# Patient Record
Sex: FEMALE | Race: BLACK OR AFRICAN AMERICAN | Employment: FULL TIME | URBAN - METROPOLITAN AREA
[De-identification: names, ages, dates, MRNs, and addresses within clinical notes are randomized per-mention and may not be internally consistent; named-entity substitution may affect disease eponyms.]

---

## 2017-10-25 ENCOUNTER — HOSPITAL ENCOUNTER (OUTPATIENT)
Dept: LAB | Age: 57
Discharge: HOME | End: 2017-10-25

## 2017-10-25 PROCEDURE — 88305 TISSUE EXAM BY PATHOLOGIST: CPT | Performed by: INTERNAL MEDICINE

## 2017-10-25 PROCEDURE — 88312 SPECIAL STAINS GROUP 1: CPT | Performed by: INTERNAL MEDICINE

## 2017-12-13 ENCOUNTER — ANESTHESIA EVENT (OUTPATIENT)
Dept: ENDOSCOPY | Age: 57
End: 2017-12-13
Payer: COMMERCIAL

## 2017-12-13 RX ORDER — SODIUM CHLORIDE, SODIUM LACTATE, POTASSIUM CHLORIDE, CALCIUM CHLORIDE 600; 310; 30; 20 MG/100ML; MG/100ML; MG/100ML; MG/100ML
100 INJECTION, SOLUTION INTRAVENOUS CONTINUOUS
Status: CANCELLED | OUTPATIENT
Start: 2017-12-13

## 2017-12-13 RX ORDER — SODIUM CHLORIDE 0.9 % (FLUSH) 0.9 %
5-10 SYRINGE (ML) INJECTION AS NEEDED
Status: CANCELLED | OUTPATIENT
Start: 2017-12-13

## 2017-12-14 ENCOUNTER — ANESTHESIA (OUTPATIENT)
Dept: ENDOSCOPY | Age: 57
End: 2017-12-14
Payer: COMMERCIAL

## 2017-12-14 ENCOUNTER — HOSPITAL ENCOUNTER (OUTPATIENT)
Age: 57
Setting detail: OUTPATIENT SURGERY
Discharge: HOME | End: 2017-12-14
Attending: INTERNAL MEDICINE | Admitting: INTERNAL MEDICINE
Payer: COMMERCIAL

## 2017-12-14 VITALS
RESPIRATION RATE: 14 BRPM | DIASTOLIC BLOOD PRESSURE: 84 MMHG | HEART RATE: 67 BPM | HEIGHT: 65 IN | TEMPERATURE: 98.6 F | BODY MASS INDEX: 35.32 KG/M2 | WEIGHT: 212 LBS | OXYGEN SATURATION: 97 % | SYSTOLIC BLOOD PRESSURE: 144 MMHG

## 2017-12-14 LAB — GLUCOSE BLD STRIP.AUTO-MCNC: 197 MG/DL (ref 65–100)

## 2017-12-14 PROCEDURE — 74011000250 HC RX REV CODE- 250

## 2017-12-14 PROCEDURE — 74011250636 HC RX REV CODE- 250/636

## 2017-12-14 PROCEDURE — 74011250636 HC RX REV CODE- 250/636: Performed by: ANESTHESIOLOGY

## 2017-12-14 PROCEDURE — 77030011640 HC PAD GRND REM COVD -A: Performed by: INTERNAL MEDICINE

## 2017-12-14 PROCEDURE — 82962 GLUCOSE BLOOD TEST: CPT

## 2017-12-14 PROCEDURE — 88305 TISSUE EXAM BY PATHOLOGIST: CPT | Performed by: INTERNAL MEDICINE

## 2017-12-14 PROCEDURE — 77030009426 HC FCPS BIOP ENDOSC BSC -B: Performed by: INTERNAL MEDICINE

## 2017-12-14 PROCEDURE — 76040000026: Performed by: INTERNAL MEDICINE

## 2017-12-14 PROCEDURE — 77030013991 HC SNR POLYP ENDOSC BSC -A: Performed by: INTERNAL MEDICINE

## 2017-12-14 PROCEDURE — 76060000032 HC ANESTHESIA 0.5 TO 1 HR: Performed by: INTERNAL MEDICINE

## 2017-12-14 RX ORDER — PROPOFOL 10 MG/ML
INJECTION, EMULSION INTRAVENOUS
Status: DISCONTINUED | OUTPATIENT
Start: 2017-12-14 | End: 2017-12-14 | Stop reason: HOSPADM

## 2017-12-14 RX ORDER — SODIUM CHLORIDE, SODIUM LACTATE, POTASSIUM CHLORIDE, CALCIUM CHLORIDE 600; 310; 30; 20 MG/100ML; MG/100ML; MG/100ML; MG/100ML
100 INJECTION, SOLUTION INTRAVENOUS CONTINUOUS
Status: DISCONTINUED | OUTPATIENT
Start: 2017-12-14 | End: 2017-12-14 | Stop reason: HOSPADM

## 2017-12-14 RX ORDER — LIDOCAINE HYDROCHLORIDE 20 MG/ML
INJECTION, SOLUTION EPIDURAL; INFILTRATION; INTRACAUDAL; PERINEURAL AS NEEDED
Status: DISCONTINUED | OUTPATIENT
Start: 2017-12-14 | End: 2017-12-14 | Stop reason: HOSPADM

## 2017-12-14 RX ORDER — PROPOFOL 10 MG/ML
INJECTION, EMULSION INTRAVENOUS AS NEEDED
Status: DISCONTINUED | OUTPATIENT
Start: 2017-12-14 | End: 2017-12-14 | Stop reason: HOSPADM

## 2017-12-14 RX ADMIN — SODIUM CHLORIDE, SODIUM LACTATE, POTASSIUM CHLORIDE, AND CALCIUM CHLORIDE 100 ML/HR: 600; 310; 30; 20 INJECTION, SOLUTION INTRAVENOUS at 09:47

## 2017-12-14 RX ADMIN — PROPOFOL 40 MG: 10 INJECTION, EMULSION INTRAVENOUS at 10:26

## 2017-12-14 RX ADMIN — PROPOFOL 20 MG: 10 INJECTION, EMULSION INTRAVENOUS at 10:48

## 2017-12-14 RX ADMIN — PROPOFOL 80 MG: 10 INJECTION, EMULSION INTRAVENOUS at 10:25

## 2017-12-14 RX ADMIN — PROPOFOL 160 MCG/KG/MIN: 10 INJECTION, EMULSION INTRAVENOUS at 10:25

## 2017-12-14 RX ADMIN — LIDOCAINE HYDROCHLORIDE 40 MG: 20 INJECTION, SOLUTION EPIDURAL; INFILTRATION; INTRACAUDAL; PERINEURAL at 10:25

## 2017-12-14 NOTE — ROUTINE PROCESS
Pt discharged to home with . Discharge instructions provided to both parties and time for questions and answers provided. Dr. Eric Gibson spoke with both.

## 2017-12-14 NOTE — DISCHARGE INSTRUCTIONS
Gastrointestinal Colonoscopy/Flexible Sigmoidoscopy - Lower Exam Discharge Instructions  1. Call Dr. Anup Mcclain at  for any problems or questions. 2. Contact the doctors office for follow up appointment as directed  3. Medication may cause drowsiness for several hours, therefore, do not drive or operate machinery for remainder of the day. 4. No alcohol today. 5. Ordinarily, you may resume regular diet and activity after exam unless otherwise specified by your physician. 6. Because of air put into your colon during exam, you may experience some abdominal distension, relieved by the passage of gas, for several hours. 7. Contact your physician if you have any of the following:  a. Excessive amount of bleeding - large amount when having a bowel movement. Occasional specks of blood with bowel movement would not be unusual.  b. Severe abdominal pain  c. Fever or Chills  8. Polyp Removal - follow these additional instructions  a. Take Metamucil - 1 tablespoon in juice every morning for 3 days as needed  b. No Aspirin, Advil, Aleve, Nuprin, Ibuprofen, or medications that contain these drugs for 2 weeks. Any additional instructions:  Repeat Colonoscopy in 1 year, Avoid Nsaids, resume Eliquis 12/15/17      Instructions given to Gunnar Garay and other family members.   Instructions given by:  Bree Rubio RN

## 2017-12-14 NOTE — H&P
Date of Surgery Update:  Maixme Vallejo was seen and examined. History and physical has been reviewed. The patient has been examined.  There have been no significant clinical changes since the completion of the originally dated History and Physical.    Signed By: Marcella Garcia MD     December 14, 2017 10:13 AM

## 2017-12-14 NOTE — H&P
Colonoscopy Procedure Note    Indications: Hx of colon polyps and poor prep at last colonoscopy    Anesthesia/Sedation: TIVA    Pre-Procedure Physical:  Current Facility-Administered Medications   Medication Dose Route Frequency    lactated Ringers infusion  100 mL/hr IntraVENous CONTINUOUS      Review of patient's allergies indicates no known allergies. Patient Vitals for the past 8 hrs:   BP Temp Pulse Resp SpO2 Height Weight   12/14/17 1107 114/59 - 66 16 96 % - -   12/14/17 1106 114/59 98.6 °F (37 °C) 66 16 96 % - -   12/14/17 0915 146/76 - 64 16 98 % - -   12/14/17 0859 - 97.7 °F (36.5 °C) - - - 5' 5\" (1.651 m) 96.2 kg (212 lb)       Exam:    Airway: clear   Heart: normal S1and S2    Lungs: clear bilateral  Abdomen: soft, nontender, bowel sounds present and normal in all quads   Mental Status: awake, alert and oriented to person, place and time        Procedure Details      Informed consent was obtained for the procedure, including sedation. Risks of perforation, hemorrhage, adverse drug reaction and aspiration were discussed. The patient was placed in the left lateral decubitus position. Based on the pre-procedure assessment, including review of the patient's medical history, medications, allergies, and review of systems, she had been deemed to be an appropriate candidate for conscious sedation; she was therefore sedated with the medications listed below. The patient was monitored continuously with ECG tracing, pulse oximetry, blood pressure monitoring, and direct observations. A rectal examination was performed. The colonoscope was inserted into the rectum and advanced under direct vision to the cecum. The quality of the colonic preparation was good. A careful inspection was made as the colonoscope was withdrawn, including a retroflexed view of the rectum; findings and interventions are described below. Appropriate photodocumentation was obtained.     Findings: Pan diverticulosis AC sessile pink 7 mm polyp hot snared                   TC sessile 1.5 cm polyp hot snared                   TC sessile 3 mm polyp cold biopsied                    2+ internal hemorrhoids      Specimens: AC polyp, TC polyps    Estimated Blood Loss: 0 cc           Complications: None; patient tolerated the procedure well. Attending Attestation: I performed the procedure.     Impression:  AC polyp, TC polyps, pan diverticulosis, internal hemorrhoids    Recommendations:   F/U polyp pathology  High fiber diet  Avoid NSAIDs  Repeat colonoscopy likely in one year

## 2017-12-14 NOTE — ANESTHESIA PREPROCEDURE EVALUATION
Anesthetic History   No history of anesthetic complications            Review of Systems / Medical History  Patient summary reviewed and pertinent labs reviewed    Pulmonary        Sleep apnea  Smoker         Neuro/Psych   Within defined limits           Cardiovascular    Hypertension        Dysrhythmias : atrial fibrillation      Exercise tolerance: >4 METS     GI/Hepatic/Renal           Liver disease ([place])     Endo/Other    Diabetes: poorly controlled, type 2    Morbid obesity     Other Findings              Physical Exam    Airway  Mallampati: II  TM Distance: 4 - 6 cm  Neck ROM: normal range of motion   Mouth opening: Normal     Cardiovascular    Rhythm: regular  Rate: normal      Pertinent negatives: No murmur, JVD and peripheral edema   Dental  No notable dental hx       Pulmonary  Breath sounds clear to auscultation               Abdominal         Other Findings            Anesthetic Plan    ASA: 3  Anesthesia type: total IV anesthesia          Induction: Intravenous  Anesthetic plan and risks discussed with: Patient

## 2017-12-14 NOTE — IP AVS SNAPSHOT
[facility] 
 
 
 [address] 
[phone] Patient: Elaine Montano MRN: [id number] :1960 About your hospitalization You were admitted on:  2017 You last received care in the:  [facility] You were discharged on:  2017 Why you were hospitalized Your primary diagnosis was:  Not on File Discharge Orders None A check misti indicates which time of day the medication should be taken. My Medications ASK your physician about these medications Instructions Each Dose to Equal  
 Morning Noon Evening Bedtime CYMBALTA 60 mg capsule Generic drug:  DULoxetine Your last dose was: Your next dose is: Take 60 mg by mouth daily. Indications: morning 60 mg  
    
   
   
   
  
 ELIQUIS 5 mg tablet Generic drug:  apixaban Your last dose was: Your next dose is: Take 5 mg by mouth two (2) times a day. Indications: Dr Faina White- [facility] held for procedure- Afib  
 5 mg HumaLOG 100 unit/mL injection Generic drug:  insulin lispro Your last dose was: Your next dose is:    
   
   
 by SubCUTAneous route Before breakfast, lunch, and dinner. Sliding scale if BS above 200 LANTUS 100 unit/mL injection Generic drug:  insulin glargine Your last dose was: Your next dose is:    
   
   
 30 Units by SubCUTAneous route two (2) times a day. 30 Units  
    
   
   
   
  
 losartan 50 mg tablet Commonly known as:  COZAAR Your last dose was: Your next dose is: Take 50 mg by mouth daily. Indications: morning 50 mg  
    
   
   
   
  
 metFORMIN 1,000 mg tablet Commonly known as:  GLUCOPHAGE Your last dose was: Your next dose is: Take 1,000 mg by mouth two (2) times daily (with meals). 1000 mg  
    
   
   
   
  
 metoprolol tartrate 25 mg tablet Commonly known as:  LOPRESSOR Your last dose was: Your next dose is: Take 25 mg by mouth two (2) times a day. 25 mg  
    
   
   
   
  
 NORVASC 10 mg tablet Generic drug:  amLODIPine Your last dose was: Your next dose is: Take 10 mg by mouth daily. Indications: morning 10 mg  
    
   
   
   
  
 omeprazole 40 mg capsule Commonly known as:  PRILOSEC Your last dose was: Your next dose is: Take 40 mg by mouth daily. Indications: morning 40 mg  
    
   
   
   
  
 raNITIdine hcl 300 mg Cap Your last dose was: Your next dose is: Take 1 Tab by mouth nightly. 1 Tab Discharge Instructions Gastrointestinal Colonoscopy/Flexible Sigmoidoscopy - Lower Exam Discharge Instructions 1. Call Dr. Taiwo Marin at  for any problems or questions. 2. Contact the doctors office for follow up appointment as directed 3. Medication may cause drowsiness for several hours, therefore, do not drive or operate machinery for remainder of the day. 4. No alcohol today. 5. Ordinarily, you may resume regular diet and activity after exam unless otherwise specified by your physician. 6. Because of air put into your colon during exam, you may experience some abdominal distension, relieved by the passage of gas, for several hours. 7. Contact your physician if you have any of the following: 
a. Excessive amount of bleeding  large amount when having a bowel movement. Occasional specks of blood with bowel movement would not be unusual. 
b. Severe abdominal pain 
c. Fever or Chills 8. Polyp Removal  follow these additional instructions 
a. Take Metamucil  1 tablespoon in juice every morning for 3 days as needed b. No Aspirin, Advil, Aleve, Nuprin, Ibuprofen, or medications that contain these drugs for 2 weeks. Any additional instructions:  Repeat Colonoscopy in 1 year, Avoid Nsaids, resume Eliquis 12/15/17 Instructions given to Rachel Wong and other family members. Instructions given by:  Rajesh Britton RN Introducing [facility]! [facility] introduces AddMyBest patient portal. Now you can access parts of your medical record, email your doctor's office, and request medication refills online. 1. In your internet browser, go to [email or web address] 2. Click on the First Time User? Click Here link in the Sign In box. You will see the New Member Sign Up page. 3. Enter your AddMyBest Access Code exactly as it appears below. You will not need to use this code after youve completed the sign-up process. If you do not sign up before the expiration date, you must request a new code. · AddMyBest Access Code: [email or web address] Expires: 3/11/2018  3:03 PM 
 
4. Enter the last four digits of your Social Security Number (xxxx) and Date of Birth (mm/dd/yyyy) as indicated and click Submit. You will be taken to the next sign-up page. 5. Create a AddMyBest ID. This will be your AddMyBest login ID and cannot be changed, so think of one that is secure and easy to remember. 6. Create a AddMyBest password. You can change your password at any time. 7. Enter your Password Reset Question and Answer. This can be used at a later time if you forget your password. 8. Enter your e-mail address. You will receive e-mail notification when new information is available in [address] 9. Click Sign Up. You can now view and download portions of your medical record. 10. Click the Download Summary menu link to download a portable copy of your medical information.  
 
If you have questions, please visit the Frequently Asked Questions section of the [employer]. Remember, MyChart is NOT to be used for urgent needs. For medical emergencies, dial 911. Now available from your iPhone and Android! Providers Seen During Your Hospitalization Provider Specialty Primary office phone Navdeep Johnson MD Gastroenterology [phone] Your Primary Care Physician (PCP) Primary Care Physician Office Phone Office Fax Charito Martines [phone] You are allergic to the following No active allergies Recent Documentation Height Weight BMI Smoking Status 1.651 m 96.2 kg 35.28 kg/m2 Current Every Day Smoker Emergency Contacts Name Discharge Info Relation Home Work Mobile Zeus Jensen  Spouse [3] [phone] Patient Belongings The following personal items are in your possession at time of discharge: 
  Dental Appliances: Uppers  Visual Aid: Glasses Please provide this summary of care documentation to your next provider. Signatures-by signing, you are acknowledging that this After Visit Summary has been reviewed with you and you have received a copy. Patient Signature:  ____________________________________________________________ Date:  ____________________________________________________________  
  
Angel Luis Milian Provider Signature:  ____________________________________________________________ Date:  ____________________________________________________________

## 2017-12-14 NOTE — ANESTHESIA POSTPROCEDURE EVALUATION
Post-Anesthesia Evaluation and Assessment    Patient: Meg Talavera MRN: [id number]  SSN: [id number]    YOB: 1960  Age: 56 y.o. Sex: female       Cardiovascular Function/Vital Signs  Visit Vitals    /76    Pulse 69    Temp 37 °C (98.6 °F)    Resp 16    Ht 5' 5\" (1.651 m)    Wt 96.2 kg (212 lb)    SpO2 92%    BMI 35.28 kg/m2       Patient is status post total IV anesthesia anesthesia for Procedure(s):  COLONOSCOPY / BMI=36  ENDOSCOPIC POLYPECTOMY  COLON BIOPSY. Nausea/Vomiting: None    Postoperative hydration reviewed and adequate. Pain:  Pain Scale 1: Visual (12/14/17 1107)  Pain Intensity 1: 0 (12/14/17 1107)   Managed    Neurological Status: At baseline    Mental Status and Level of Consciousness: Arousable    Pulmonary Status:   O2 Device: Room air (12/14/17 1107)   Adequate oxygenation and airway patent    Complications related to anesthesia: None    Post-anesthesia assessment completed.  No concerns    Signed By: Gideon Barney MD     December 14, 2017

## 2018-02-20 ENCOUNTER — HOSPITAL ENCOUNTER (OUTPATIENT)
Dept: ULTRASOUND IMAGING | Age: 58
Discharge: HOME | End: 2018-02-20
Attending: INTERNAL MEDICINE
Payer: COMMERCIAL

## 2018-02-20 VITALS
HEART RATE: 71 BPM | BODY MASS INDEX: 35.16 KG/M2 | DIASTOLIC BLOOD PRESSURE: 78 MMHG | RESPIRATION RATE: 19 BRPM | HEIGHT: 65 IN | OXYGEN SATURATION: 95 % | SYSTOLIC BLOOD PRESSURE: 152 MMHG | WEIGHT: 211 LBS | TEMPERATURE: 98.9 F

## 2018-02-20 DIAGNOSIS — R74.02 NONSPECIFIC ELEVATION OF LEVELS OF TRANSAMINASE OR LACTIC ACID DEHYDROGENASE (LDH): ICD-10-CM

## 2018-02-20 DIAGNOSIS — R74.01 NONSPECIFIC ELEVATION OF LEVELS OF TRANSAMINASE OR LACTIC ACID DEHYDROGENASE (LDH): ICD-10-CM

## 2018-02-20 LAB
GLUCOSE BLD STRIP.AUTO-MCNC: 263 MG/DL (ref 65–100)
GLUCOSE BLD STRIP.AUTO-MCNC: 283 MG/DL (ref 65–100)
INR BLD: 1.1 (ref 0.9–1.2)
PT BLD: 13.3 SECS (ref 9.6–11.6)

## 2018-02-20 PROCEDURE — 85610 PROTHROMBIN TIME: CPT

## 2018-02-20 PROCEDURE — 88312 SPECIAL STAINS GROUP 1: CPT | Performed by: INTERNAL MEDICINE

## 2018-02-20 PROCEDURE — 82962 GLUCOSE BLOOD TEST: CPT

## 2018-02-20 PROCEDURE — 76942 ECHO GUIDE FOR BIOPSY: CPT

## 2018-02-20 PROCEDURE — 74011636637 HC RX REV CODE- 636/637: Performed by: PHYSICIAN ASSISTANT

## 2018-02-20 PROCEDURE — 74011250637 HC RX REV CODE- 250/637: Performed by: RADIOLOGY

## 2018-02-20 PROCEDURE — 74011250636 HC RX REV CODE- 250/636

## 2018-02-20 PROCEDURE — 74011000250 HC RX REV CODE- 250: Performed by: RADIOLOGY

## 2018-02-20 PROCEDURE — 88307 TISSUE EXAM BY PATHOLOGIST: CPT | Performed by: INTERNAL MEDICINE

## 2018-02-20 PROCEDURE — 88313 SPECIAL STAINS GROUP 2: CPT | Performed by: INTERNAL MEDICINE

## 2018-02-20 PROCEDURE — 99152 MOD SED SAME PHYS/QHP 5/>YRS: CPT

## 2018-02-20 RX ORDER — MIDAZOLAM HYDROCHLORIDE 1 MG/ML
.5-2 INJECTION, SOLUTION INTRAMUSCULAR; INTRAVENOUS
Status: DISCONTINUED | OUTPATIENT
Start: 2018-02-20 | End: 2018-02-24 | Stop reason: HOSPADM

## 2018-02-20 RX ORDER — FENTANYL CITRATE 50 UG/ML
25-50 INJECTION, SOLUTION INTRAMUSCULAR; INTRAVENOUS
Status: DISCONTINUED | OUTPATIENT
Start: 2018-02-20 | End: 2018-02-24 | Stop reason: HOSPADM

## 2018-02-20 RX ORDER — ONDANSETRON 2 MG/ML
4 INJECTION INTRAMUSCULAR; INTRAVENOUS
Status: DISCONTINUED | OUTPATIENT
Start: 2018-02-20 | End: 2018-02-24 | Stop reason: HOSPADM

## 2018-02-20 RX ORDER — LIDOCAINE HYDROCHLORIDE 20 MG/ML
1-400 INJECTION, SOLUTION INFILTRATION; PERINEURAL
Status: DISCONTINUED | OUTPATIENT
Start: 2018-02-20 | End: 2018-02-24 | Stop reason: HOSPADM

## 2018-02-20 RX ORDER — DIPHENHYDRAMINE HYDROCHLORIDE 50 MG/ML
12.5-5 INJECTION, SOLUTION INTRAMUSCULAR; INTRAVENOUS ONCE
Status: ACTIVE | OUTPATIENT
Start: 2018-02-20 | End: 2018-02-20

## 2018-02-20 RX ORDER — INSULIN ASPART 100 [IU]/ML
INJECTION, SOLUTION INTRAVENOUS; SUBCUTANEOUS
COMMUNITY
End: 2020-07-15

## 2018-02-20 RX ORDER — SODIUM CHLORIDE 9 MG/ML
25 INJECTION, SOLUTION INTRAVENOUS CONTINUOUS
Status: DISCONTINUED | OUTPATIENT
Start: 2018-02-20 | End: 2018-02-24 | Stop reason: HOSPADM

## 2018-02-20 RX ORDER — HYDROCODONE BITARTRATE AND ACETAMINOPHEN 5; 325 MG/1; MG/1
1 TABLET ORAL
Status: DISCONTINUED | OUTPATIENT
Start: 2018-02-20 | End: 2018-02-24 | Stop reason: HOSPADM

## 2018-02-20 RX ADMIN — LIDOCAINE HYDROCHLORIDE 100 MG: 20 INJECTION, SOLUTION INFILTRATION; PERINEURAL at 10:17

## 2018-02-20 RX ADMIN — INSULIN HUMAN 9 UNITS: 100 INJECTION, SOLUTION PARENTERAL at 08:21

## 2018-02-20 RX ADMIN — MIDAZOLAM HYDROCHLORIDE 1 MG: 1 INJECTION, SOLUTION INTRAMUSCULAR; INTRAVENOUS at 10:12

## 2018-02-20 RX ADMIN — SODIUM BICARBONATE 2 ML: 0.2 INJECTION, SOLUTION INTRAVENOUS at 10:17

## 2018-02-20 RX ADMIN — MIDAZOLAM HYDROCHLORIDE 1 MG: 1 INJECTION, SOLUTION INTRAMUSCULAR; INTRAVENOUS at 10:05

## 2018-02-20 RX ADMIN — FENTANYL CITRATE 50 MCG: 50 INJECTION, SOLUTION INTRAMUSCULAR; INTRAVENOUS at 10:05

## 2018-02-20 RX ADMIN — HYDROCODONE BITARTRATE AND ACETAMINOPHEN 1 TABLET: 5; 325 TABLET ORAL at 13:12

## 2018-02-20 RX ADMIN — FENTANYL CITRATE 50 MCG: 50 INJECTION, SOLUTION INTRAMUSCULAR; INTRAVENOUS at 10:12

## 2018-02-20 NOTE — DISCHARGE INSTRUCTIONS
[facility] [address]  Department of Interventional Radiology  [address] [place] [address] [address] [facility]  [phone] Office  [phone] Fax    BIOPSY DISCHARGE INSTRUCTIONS    General Instructions:     A biopsy is the removal of a small piece of tissue for microscopic examination or testing. Healthy tissue can be obtained for the purpose of tissue-type matching for transplants. Unhealthy tissues are more commonly biopsied to diagnose disease. Liver Biopsy: This test helps detect cancer, infections, and the cause of an enlargement of the liver or elevated liver enzymes. It also helps to diagnose a number of liver diseases. The pain associated with the procedure may be felt in the shoulder. The risks include internal bleeding, pneumothorax, and injury to the surrounding organs. General Biopsy:     A mass can grow in any area of the body, and we are taking a specimen as ordered by your doctor. The risks are the same. They include bleeding, pain, and infection. Home Care Instructions: You may resume your regular diet and medication regimen. Do not drink alcohol, drive, or make any important legal decisions in the next 24 hours. Do not lift anything heavier than a gallon of milk until the soreness goes away. You may use over the counter acetaminophen or ibuprofen for the soreness. You may apply an ice pack to the affected area for 20-30 minutes at time for the first 24 hours. After that, you may apply a heat pack. Call If: You should call your Physician and/or the Radiology Nurse if you have any questions or concerns about the biopsy site. Call if you should have increased pain, fever, redness, drainage, or bleeding more than a small spot on the bandage. Follow-Up Instructions: Please see your ordering doctor as he/she has requested. To Reach Us:     If you have any questions about your procedure, please call the Interventional Radiology department at [phone]. After business hours (5pm) and weekends, call the answering service at [phone] and ask for the Radiologist on call to be paged. Si tiene Preguntas acerca del procedimiento, por favor llame al departamento de Radiología Intervencional al [phone]. Después de horas de oficina (5 pm) y los fines de [place], llamar al Marie Brit de llamadas al [phone] y pregunte por el Radiologo de [facility]. Interventional Radiology General Nurse Discharge    After general anesthesia or intravenous sedation, for 24 hours or while taking prescription Narcotics:  · Limit your activities  · Do not drive and operate hazardous machinery  · Do not make important personal or business decisions  · Do  not drink alcoholic beverages  · If you have not urinated within 8 hours after discharge, please contact your surgeon on call. * Please give a list of your current medications to your Primary Care Provider. * Please update this list whenever your medications are discontinued, doses are     changed, or new medications (including over-the-counter products) are added. * Please carry medication information at all times in case of emergency situations. These are general instructions for a healthy lifestyle:    No smoking/ No tobacco products/ Avoid exposure to second hand smoke  Surgeon General's Warning:  Quitting smoking now greatly reduces serious risk to your health. Obesity, smoking, and sedentary lifestyle greatly increases your risk for illness  A healthy diet, regular physical exercise & weight monitoring are important for maintaining a healthy lifestyle    You may be retaining fluid if you have a history of heart failure or if you experience any of the following symptoms:  Weight gain of 3 pounds or more overnight or 5 pounds in a week, increased swelling in our hands or feet or shortness of breath while lying flat in bed.   Please call your doctor as soon as you notice any of these symptoms; do not wait until your next office visit. Recognize signs and symptoms of STROKE:  F-face looks uneven    A-arms unable to move or move unevenly    S-speech slurred or non-existent    T-time-call 911 as soon as signs and symptoms begin-DO NOT go       Back to bed or wait to see if you get better-TIME IS BRAIN.       Patient Signature:  Date: 2/20/2018  Discharging Nurse: Miguel Cadena RN

## 2018-02-20 NOTE — PROGRESS NOTES
TRANSFER - OUT REPORT:    Verbal report given to Wesley Sharp RN on Sagrario Mallory  being transferred to IR recovery for routine progression of care       Report consisted of patients Situation, Background, Assessment and   Recommendations(SBAR). Information from the following report(s) SBAR, Procedure Summary and MAR was reviewed with the receiving nurse. Lines:   Peripheral IV 02/20/18 Right Hand (Active)   Site Assessment Clean, dry, & intact 2/20/2018  7:26 AM   Phlebitis Assessment 0 2/20/2018  7:26 AM   Infiltration Assessment 0 2/20/2018  7:26 AM   Dressing Status Clean, dry, & intact 2/20/2018  7:26 AM   Dressing Type Transparent 2/20/2018  7:26 AM        Opportunity for questions and clarification was provided.       Patient transported with:   Registered Nurse

## 2018-02-20 NOTE — IP AVS SNAPSHOT
Sharon Pulse 
 
 
 [address] [facility], [place] 
[phone] Patient: Heather Chiang MRN: [id number] :1960 About your hospitalization You were admitted on:  2018 You last received care in the:  [facility] RADIOLOGY ULTRASOUND You were discharged on:  2018 Why you were hospitalized Your primary diagnosis was:  Not on File Follow-up Information None Discharge Orders None A check misti indicates which time of day the medication should be taken. My Medications ASK your doctor about these medications Instructions Each Dose to Equal  
 Morning Noon Evening Bedtime CYMBALTA 60 mg capsule Generic drug:  DULoxetine Your last dose was: Your next dose is: Take 60 mg by mouth daily. Indications: morning 60 mg  
    
   
   
   
  
 ELIQUIS 5 mg tablet Generic drug:  apixaban Your last dose was: Your next dose is: Take 5 mg by mouth two (2) times a day. Indications: Dr Felipa Fre[facility] held for procedure- Afib  
 5 mg HumaLOG U-100 Insulin 100 unit/mL injection Generic drug:  insulin lispro Your last dose was: Your next dose is:    
   
   
 by SubCUTAneous route Before breakfast, lunch, and dinner. Sliding scale if BS above 200 LANTUS U-100 INSULIN 100 unit/mL injection Generic drug:  insulin glargine Your last dose was: Your next dose is:    
   
   
 30 Units by SubCUTAneous route two (2) times a day. 30 Units  
    
   
   
   
  
 losartan 50 mg tablet Commonly known as:  COZAAR Your last dose was: Your next dose is: Take 50 mg by mouth daily. Indications: morning 50 mg  
    
   
   
   
  
 metFORMIN 1,000 mg tablet Commonly known as:  GLUCOPHAGE Your last dose was: Your next dose is: Take 1,000 mg by mouth two (2) times daily (with meals). 1000 mg  
    
   
   
   
  
 metoprolol tartrate 25 mg tablet Commonly known as:  LOPRESSOR Your last dose was: Your next dose is: Take 25 mg by mouth two (2) times a day. 25 mg  
    
   
   
   
  
 NORVASC 10 mg tablet Generic drug:  amLODIPine Your last dose was: Your next dose is: Take 10 mg by mouth daily. Indications: morning 10 mg NovoLOG Flexpen U-100 Insulin 100 unit/mL Inpn Generic drug:  insulin aspart U-100 Your last dose was: Your next dose is:    
   
   
 by SubCUTAneous route. Indications: sliding scale  
     
   
   
   
  
 omeprazole 40 mg capsule Commonly known as:  PRILOSEC Your last dose was: Your next dose is: Take 40 mg by mouth daily. Indications: morning 40 mg  
    
   
   
   
  
 raNITIdine hcl 300 mg Cap Your last dose was: Your next dose is: Take 1 Tab by mouth nightly. 1 Tab Discharge Instructions [address] Department of Interventional Radiology [facility] 
[phone] Office 
[phone] Fax BIOPSY DISCHARGE INSTRUCTIONS General Instructions: A biopsy is the removal of a small piece of tissue for microscopic examination or testing. Healthy tissue can be obtained for the purpose of tissue-type matching for transplants. Unhealthy tissues are more commonly biopsied to diagnose disease. Liver Biopsy: This test helps detect cancer, infections, and the cause of an enlargement of the liver or elevated liver enzymes. It also helps to diagnose a number of liver diseases. The pain associated with the procedure may be felt in the shoulder.  The risks include internal bleeding, pneumothorax, and injury to the surrounding organs. General Biopsy: 
   A mass can grow in any area of the body, and we are taking a specimen as ordered by your doctor. The risks are the same. They include bleeding, pain, and infection. Home Care Instructions: You may resume your regular diet and medication regimen. Do not drink alcohol, drive, or make any important legal decisions in the next 24 hours. Do not lift anything heavier than a gallon of milk until the soreness goes away. You may use over the counter acetaminophen or ibuprofen for the soreness. You may apply an ice pack to the affected area for 20-30 minutes at time for the first 24 hours. After that, you may apply a heat pack. Call If: You should call your Physician and/or the Radiology Nurse if you have any questions or concerns about the biopsy site. Call if you should have increased pain, fever, redness, drainage, or bleeding more than a small spot on the bandage. Follow-Up Instructions: Please see your ordering doctor as he/she has requested. To Reach Us: If you have any questions about your procedure, please call the Interventional Radiology department at [phone]. After business hours (5pm) and weekends, call the answering service at [phone] and ask for the Radiologist on call to be paged. Si tiene Preguntas acerca del procedimiento, por favor llame al departamento de Radiología Intervencional al [phone]. Después de horas de oficina (5 pm) y los fines de [place], llamar al Nita Car de llamadas al [phone] y pregunte por el Radiologo de [facility]. Interventional Radiology General Nurse Discharge After general anesthesia or intravenous sedation, for 24 hours or while taking prescription Narcotics: · Limit your activities · Do not drive and operate hazardous machinery · Do not make important personal or business decisions · Do  not drink alcoholic beverages · If you have not urinated within 8 hours after discharge, please contact your surgeon on call. * Please give a list of your current medications to your Primary Care Provider. * Please update this list whenever your medications are discontinued, doses are 
   changed, or new medications (including over-the-counter products) are added. * Please carry medication information at all times in case of emergency situations. These are general instructions for a healthy lifestyle: No smoking/ No tobacco products/ Avoid exposure to second hand smoke Surgeon General's Warning:  Quitting smoking now greatly reduces serious risk to your health. Obesity, smoking, and sedentary lifestyle greatly increases your risk for illness A healthy diet, regular physical exercise & weight monitoring are important for maintaining a healthy lifestyle You may be retaining fluid if you have a history of heart failure or if you experience any of the following symptoms:  Weight gain of 3 pounds or more overnight or 5 pounds in a week, increased swelling in our hands or feet or shortness of breath while lying flat in bed. Please call your doctor as soon as you notice any of these symptoms; do not wait until your next office visit. Recognize signs and symptoms of STROKE: 
F-face looks uneven A-arms unable to move or move unevenly S-speech slurred or non-existent T-time-call 911 as soon as signs and symptoms begin-DO NOT go Back to bed or wait to see if you get better-TIME IS BRAIN. Patient Signature: 
Date: 2/20/2018 Discharging Nurse: Owen Simmons RN Introducing [facility] [place] [facility]! [employer] introduces [email or web address] patient portal. Now you can access parts of your medical record, email your doctor's office, and request medication refills online. 1. In your internet browser, go to [email or web address] 2. Click on the First Time User? Click Here link in the Sign In box. You will see the New Member Sign Up page. 3. Enter your Catglobe Access Code exactly as it appears below. You will not need to use this code after youve completed the sign-up process. If you do not sign up before the expiration date, you must request a new code. · Catglobe Access Code: [email or web address] Expires: 3/11/2018  3:03 PM 
 
4. Enter the last four digits of your Social Security Number (xxxx) and Date of Birth (mm/dd/yyyy) as indicated and click Submit. You will be taken to the next sign-up page. 5. Create a Catglobe ID. This will be your Catglobe login ID and cannot be changed, so think of one that is secure and easy to remember. 6. Create a Catglobe password. You can change your password at any time. 7. Enter your Password Reset Question and Answer. This can be used at a later time if you forget your password. 8. Enter your e-mail address. You will receive e-mail notification when new information is available in [address] 9. Click Sign Up. You can now view and download portions of your medical record. 10. Click the Download Summary menu link to download a portable copy of your medical information. If you have questions, please visit the Frequently Asked Questions section of the Catglobe website. Remember, Catglobe is NOT to be used for urgent needs. For medical emergencies, dial 911. Now available from your iPhone and Android! Providers Seen During Your Hospitalization Provider Specialty Primary office phone Edmund Hernandez MD Gastroenterology [phone] Your Primary Care Physician (PCP) Primary Care Physician Office Phone Office Fax Mehdi Rushing [phone] You are allergic to the following No active allergies Recent Documentation Height Weight Breastfeeding? BMI Smoking Status 1.651 m 95.7 kg No 35.11 kg/m2 Current Every Day Smoker Emergency Contacts Name Discharge Info Relation Home Work Mobile Zeus Jensen  Spouse [3] [phone] Patient Belongings The following personal items are in your possession at time of discharge: 
     Visual Aid: Glasses Please provide this summary of care documentation to your next provider. Signatures-by signing, you are acknowledging that this After Visit Summary has been reviewed with you and you have received a copy. Patient Signature:  ____________________________________________________________ Date:  ____________________________________________________________  
  
[facility] Provider Signature:  ____________________________________________________________ Date:  ____________________________________________________________

## 2018-02-20 NOTE — PROGRESS NOTES
Recovery period without difficulty. Pt alert and oriented and denies pain. Dressing is clean, dry, and intact. Reviewed discharge instructions with patient and , both verbalized understanding. Pt escorted to lobby discharge area via wheelchair. Vital signs and Pio score completed.

## 2018-02-20 NOTE — H&P
Department of Interventional Radiology  [phone]    History and Physical    Patient:  Carlos A Braswell MRN:  [id number]  SSN:  [id number]    YOB: 1960  Age:  56 y.o. Sex:  female      Primary Care Provider:  Pradeep Velasquez MD  Referring Physician:  Herminia Frazier MD    Subjective:     Chief Complaint: biopsy    History of the Present Illness: The patient is a 56 y.o. female who presents for random liver biopsy. Elevated LFTs. Eliquis for atrial fib, held. NPO>         Past Medical History:   Diagnosis Date    [facility]     GERD (gastroesophageal reflux disease)     hiatal hernia, nexium and ranitidine daily- 2 pillows    Hypertension     managed with meds    Insulin dependent diabetes mellitus ([facility]     fbs 250--- A1C 9.8 11/2017--- never has low bs/hypoglycemia    Morbid obesity ([facility]     QUINTANILLA (nonalcoholic steatohepatitis)     Sleep apnea     does not use CPAP     Past Surgical History:   Procedure Laterality Date    COLONOSCOPY N/A 12/14/2017    COLONOSCOPY / BMI=36 performed by Freddy Flanagan MD at [facility] 58 HX LAP CHOLECYSTECTOMY  2010    [facility]    HX UROLOGICAL  09/2017    bladder sling         Review of Systems:    Pertinent items are noted in the History of Present Illness. Current Outpatient Prescriptions   Medication Sig    insulin aspart U-100 (NOVOLOG FLEXPEN U-100 INSULIN) 100 unit/mL inpn by SubCUTAneous route. Indications: sliding scale    metFORMIN (GLUCOPHAGE) 1,000 mg tablet Take 1,000 mg by mouth two (2) times daily (with meals).  amLODIPine (NORVASC) 10 mg tablet Take 10 mg by mouth daily. Indications: morning    losartan (COZAAR) 50 mg tablet Take 50 mg by mouth daily. Indications: morning    metoprolol tartrate (LOPRESSOR) 25 mg tablet Take 25 mg by mouth two (2) times a day.  apixaban (ELIQUIS) 5 mg tablet Take 5 mg by mouth two (2) times a day.  Indications: [facility] held for procedure- Afib    DULoxetine (CYMBALTA) 60 mg capsule Take 60 mg by mouth daily. Indications: morning    omeprazole (PRILOSEC) 40 mg capsule Take 40 mg by mouth daily. Indications: morning    raNITIdine hcl 300 mg cap Take 1 Tab by mouth nightly.  insulin glargine (LANTUS) 100 unit/mL injection 30 Units by SubCUTAneous route two (2) times a day.  insulin lispro (HUMALOG) 100 unit/mL injection by SubCUTAneous route Before breakfast, lunch, and dinner. Sliding scale if BS above 200     Current Facility-Administered Medications   Medication Dose Route Frequency    lidocaine (XYLOCAINE) 20 mg/mL (2 %) injection 1-400 mg  1-400 mg IntraDERMal Multiple    sodium bicarbonate (4%) (NEUT) injection 0.5-2 mL  0.5-2 mL SubCUTAneous ONCE    diphenhydrAMINE (BENADRYL) injection 12.5-50 mg  12.5-50 mg IntraVENous ONCE    fentaNYL citrate (PF) injection 25-50 mcg  25-50 mcg IntraVENous Multiple    meperidine (DEMEROL) injection 50 mg  50 mg IntraVENous ONCE    midazolam (VERSED) injection 0.5-2 mg  0.5-2 mg IntraVENous Multiple        No Known Allergies    Family History   Problem Relation Age of Onset    Family history unknown: Yes     Social History   Substance Use Topics    Smoking status: Current Every Day Smoker     Packs/day: 0.50     Years: 30.00    Smokeless tobacco: Never Used    Alcohol use No        Objective:       Physical Examination:    Vitals:    02/20/18 0726   BP: 144/77   Pulse: 75   Resp: 18   Temp: 98.9 °F (37.2 °C)   SpO2: 96%   Weight: 95.7 kg (211 lb)   Height: 5' 5\" (1.651 m)     Blood pressure 144/77, pulse 75, temperature 98.9 °F (37.2 °C), resp. rate 18, height 5' 5\" (1.651 m), weight 95.7 kg (211 lb), SpO2 96 %, not currently breastfeeding.   HEART: regular rate and rhythm  LUNG: clear to auscultation bilaterally  ABDOMEN: normal findings: soft, non-tender  EXTREMITIES: warm, no edema    Laboratory:     No results found for: NA, K, CL, CO2, AGAP, GLU, BUN, CREA, GFRAA, GFRNA, CA, MG, PHOS, ALB, TBIL, TP, ALB, GLOB, AGRAT, SGOT, ALT, GPT  No results found for: WBC, HGB, HCT, PLT, HGBEXT, HCTEXT, PLTEXT  Lab Results   Component Value Date/Time    INR (POC) 1.1 02/20/2018 07:26 AM       Assessment:     Elevated LFTs    Plan:     Planned Procedure:  Liver biopsy    Risks, benefits, and alternatives reviewed with patient and she agrees to proceed with the procedure.       Signed By: Riddhi Fernandes PA-C     February 20, 2018

## 2018-02-20 NOTE — PROGRESS NOTES
IR Nurse Pre-Procedure Checklist Part 2          Consent signed: Yes    H&P complete:  Yes    Antibiotics: No    Airway/Mallampati Done: Yes    Shaved:  No    Pregnancy Form:No    Patient Position: Yes    MD Side: Yes     Biopsy Worksheet: Yes    Specimen Medium: Yes

## 2018-02-20 NOTE — PROCEDURES
Department of Interventional Radiology  [phone]        Interventional Radiology Brief Procedure Note    Patient: Viet Agrawal MRN: [id number]  SSN: [id number]    YOB: 1960  Age: 56 y.o.   Sex: female       Date of Procedure: 2/20/2018    Pre-Procedure Diagnosis: Elevated lfts    Post-Procedure Diagnosis: SAME    Procedure(s): Image Guided Biopsy    Performed By: Ashlee Cuellar MD     Assistants: None    Anesthesia:Moderate Sedation    Estimated Blood Loss: Less than 10ml    Specimens: liver     Implants: None    Findings: Na     Complications: None    Follow Up: prn    Signed By: Ashlee Cuellar MD     February 20, 2018

## 2018-06-26 ENCOUNTER — HOSPITAL ENCOUNTER (OUTPATIENT)
Dept: NUCLEAR MEDICINE | Age: 58
Discharge: HOME | End: 2018-06-26
Attending: INTERNAL MEDICINE
Payer: COMMERCIAL

## 2018-06-26 DIAGNOSIS — K31.84 GASTROPARESIS: ICD-10-CM

## 2018-06-26 DIAGNOSIS — R10.11 RUQ PAIN: ICD-10-CM

## 2018-06-26 PROCEDURE — 78264 GASTRIC EMPTYING IMG STUDY: CPT

## 2019-01-06 ENCOUNTER — HOSPITAL ENCOUNTER (EMERGENCY)
Age: 59
Discharge: HOME | End: 2019-01-07
Attending: STUDENT IN AN ORGANIZED HEALTH CARE EDUCATION/TRAINING PROGRAM
Payer: COMMERCIAL

## 2019-01-06 DIAGNOSIS — R10.13 DYSPEPSIA: Primary | ICD-10-CM

## 2019-01-06 LAB
BASOPHILS # BLD: 0.1 K/UL (ref 0–0.2)
BASOPHILS NFR BLD: 1 % (ref 0–2)
DIFFERENTIAL METHOD BLD: ABNORMAL
EOSINOPHIL # BLD: 0.4 K/UL (ref 0–0.8)
EOSINOPHIL NFR BLD: 3 % (ref 0.5–7.8)
ERYTHROCYTE [DISTWIDTH] IN BLOOD BY AUTOMATED COUNT: 14 % (ref 11.9–14.6)
HCT VFR BLD AUTO: 40.7 % (ref 35.8–46.3)
HGB BLD-MCNC: 14.3 G/DL (ref 11.7–15.4)
IMM GRANULOCYTES # BLD: 0 K/UL (ref 0–0.5)
IMM GRANULOCYTES NFR BLD AUTO: 0 % (ref 0–5)
LYMPHOCYTES # BLD: 5.1 K/UL (ref 0.5–4.6)
LYMPHOCYTES NFR BLD: 46 % (ref 13–44)
MCH RBC QN AUTO: 30.5 PG (ref 26.1–32.9)
MCHC RBC AUTO-ENTMCNC: 35.1 G/DL (ref 31.4–35)
MCV RBC AUTO: 86.8 FL (ref 79.6–97.8)
MONOCYTES # BLD: 0.5 K/UL (ref 0.1–1.3)
MONOCYTES NFR BLD: 5 % (ref 4–12)
NEUTS SEG # BLD: 4.9 K/UL (ref 1.7–8.2)
NEUTS SEG NFR BLD: 45 % (ref 43–78)
NRBC # BLD: 0 K/UL (ref 0–0.2)
PLATELET # BLD AUTO: 292 K/UL (ref 150–450)
PMV BLD AUTO: 10.2 FL (ref 9.4–12.3)
RBC # BLD AUTO: 4.69 M/UL (ref 4.05–5.2)
WBC # BLD AUTO: 11.1 K/UL (ref 4.3–11.1)

## 2019-01-06 PROCEDURE — 74011250636 HC RX REV CODE- 250/636: Performed by: STUDENT IN AN ORGANIZED HEALTH CARE EDUCATION/TRAINING PROGRAM

## 2019-01-06 PROCEDURE — 74011000250 HC RX REV CODE- 250: Performed by: STUDENT IN AN ORGANIZED HEALTH CARE EDUCATION/TRAINING PROGRAM

## 2019-01-06 PROCEDURE — 80053 COMPREHEN METABOLIC PANEL: CPT

## 2019-01-06 PROCEDURE — 99284 EMERGENCY DEPT VISIT MOD MDM: CPT | Performed by: STUDENT IN AN ORGANIZED HEALTH CARE EDUCATION/TRAINING PROGRAM

## 2019-01-06 PROCEDURE — C9113 INJ PANTOPRAZOLE SODIUM, VIA: HCPCS | Performed by: STUDENT IN AN ORGANIZED HEALTH CARE EDUCATION/TRAINING PROGRAM

## 2019-01-06 PROCEDURE — 83690 ASSAY OF LIPASE: CPT

## 2019-01-06 PROCEDURE — 96374 THER/PROPH/DIAG INJ IV PUSH: CPT | Performed by: STUDENT IN AN ORGANIZED HEALTH CARE EDUCATION/TRAINING PROGRAM

## 2019-01-06 PROCEDURE — 85025 COMPLETE CBC W/AUTO DIFF WBC: CPT

## 2019-01-06 RX ORDER — ONDANSETRON 2 MG/ML
4 INJECTION INTRAMUSCULAR; INTRAVENOUS
Status: COMPLETED | OUTPATIENT
Start: 2019-01-06 | End: 2019-01-06

## 2019-01-06 RX ADMIN — PANTOPRAZOLE SODIUM 40 MG: 40 INJECTION, POWDER, FOR SOLUTION INTRAVENOUS at 00:50

## 2019-01-06 RX ADMIN — ONDANSETRON 4 MG: 2 INJECTION INTRAMUSCULAR; INTRAVENOUS at 23:38

## 2019-01-06 RX ADMIN — SODIUM CHLORIDE 1000 ML: 900 INJECTION, SOLUTION INTRAVENOUS at 23:38

## 2019-01-06 NOTE — LETTER
[address] [facility] EMERGENCY DEPT [address] [id number] [phone] Work/School Note Date: 1/6/2019 To Whom It May concern: 
 
Harinder Barakat was seen and treated today in the emergency room by the following provider(s): 
Attending Provider: Samantha Barron. Harinder Barakat needs to be excused from work 1/6/2019 thru 1/7/2019. Ms. Arcenio Lazo can return to work on 1/8/2019.  
 
 
Sincerely, 
 
 
 
 
Jeff Colorado RN

## 2019-01-07 ENCOUNTER — APPOINTMENT (OUTPATIENT)
Dept: GENERAL RADIOLOGY | Age: 59
End: 2019-01-07
Attending: STUDENT IN AN ORGANIZED HEALTH CARE EDUCATION/TRAINING PROGRAM
Payer: COMMERCIAL

## 2019-01-07 VITALS
HEIGHT: 65 IN | WEIGHT: 219 LBS | DIASTOLIC BLOOD PRESSURE: 75 MMHG | OXYGEN SATURATION: 87 % | RESPIRATION RATE: 16 BRPM | TEMPERATURE: 97.9 F | SYSTOLIC BLOOD PRESSURE: 157 MMHG | BODY MASS INDEX: 36.49 KG/M2 | HEART RATE: 82 BPM

## 2019-01-07 LAB
ALBUMIN SERPL-MCNC: 3.4 G/DL (ref 3.5–5)
ALBUMIN/GLOB SERPL: 0.5 {RATIO} (ref 1.2–3.5)
ALP SERPL-CCNC: 146 U/L (ref 50–136)
ALT SERPL-CCNC: 67 U/L (ref 12–65)
ANION GAP SERPL CALC-SCNC: 7 MMOL/L (ref 7–16)
AST SERPL-CCNC: 55 U/L (ref 15–37)
BILIRUB SERPL-MCNC: 0.4 MG/DL (ref 0.2–1.1)
BUN SERPL-MCNC: 6 MG/DL (ref 6–23)
CALCIUM SERPL-MCNC: 9.7 MG/DL (ref 8.3–10.4)
CHLORIDE SERPL-SCNC: 104 MMOL/L (ref 98–107)
CO2 SERPL-SCNC: 26 MMOL/L (ref 21–32)
CREAT SERPL-MCNC: 0.81 MG/DL (ref 0.6–1)
GLOBULIN SER CALC-MCNC: 6.3 G/DL (ref 2.3–3.5)
GLUCOSE SERPL-MCNC: 79 MG/DL (ref 65–100)
LIPASE SERPL-CCNC: 124 U/L (ref 73–393)
POTASSIUM SERPL-SCNC: 3.8 MMOL/L (ref 3.5–5.1)
PROT SERPL-MCNC: 9.7 G/DL (ref 6.3–8.2)
SODIUM SERPL-SCNC: 137 MMOL/L (ref 136–145)

## 2019-01-07 PROCEDURE — 71046 X-RAY EXAM CHEST 2 VIEWS: CPT

## 2019-01-07 PROCEDURE — 74011250636 HC RX REV CODE- 250/636: Performed by: STUDENT IN AN ORGANIZED HEALTH CARE EDUCATION/TRAINING PROGRAM

## 2019-01-07 PROCEDURE — 74018 RADEX ABDOMEN 1 VIEW: CPT

## 2019-01-07 PROCEDURE — 96375 TX/PRO/DX INJ NEW DRUG ADDON: CPT | Performed by: STUDENT IN AN ORGANIZED HEALTH CARE EDUCATION/TRAINING PROGRAM

## 2019-01-07 PROCEDURE — 74011250637 HC RX REV CODE- 250/637: Performed by: STUDENT IN AN ORGANIZED HEALTH CARE EDUCATION/TRAINING PROGRAM

## 2019-01-07 PROCEDURE — 74011000250 HC RX REV CODE- 250: Performed by: STUDENT IN AN ORGANIZED HEALTH CARE EDUCATION/TRAINING PROGRAM

## 2019-01-07 RX ORDER — OMEPRAZOLE 40 MG/1
40 CAPSULE, DELAYED RELEASE ORAL DAILY
Qty: 30 CAP | Refills: 0 | Status: SHIPPED | OUTPATIENT
Start: 2019-01-07 | End: 2020-07-15

## 2019-01-07 RX ORDER — PROMETHAZINE HYDROCHLORIDE 25 MG/1
25 TABLET ORAL
Qty: 12 TAB | Refills: 0 | Status: SHIPPED | OUTPATIENT
Start: 2019-01-07 | End: 2021-05-26

## 2019-01-07 RX ORDER — SUCRALFATE 1 G/1
1 TABLET ORAL 4 TIMES DAILY
Qty: 60 TAB | Refills: 2 | Status: SHIPPED | OUTPATIENT
Start: 2019-01-07 | End: 2019-01-22

## 2019-01-07 RX ORDER — SUCRALFATE 1 G/1
1 TABLET ORAL 4 TIMES DAILY
Qty: 60 TAB | Refills: 2 | Status: SHIPPED | OUTPATIENT
Start: 2019-01-07 | End: 2019-01-07

## 2019-01-07 RX ORDER — MAG HYDROX/ALUMINUM HYD/SIMETH 200-200-20
30 SUSPENSION, ORAL (FINAL DOSE FORM) ORAL ONCE
Status: COMPLETED | OUTPATIENT
Start: 2019-01-07 | End: 2019-01-07

## 2019-01-07 RX ADMIN — PROMETHAZINE HYDROCHLORIDE 12.5 MG: 25 INJECTION INTRAMUSCULAR; INTRAVENOUS at 00:50

## 2019-01-07 RX ADMIN — ALUMINUM HYDROXIDE, MAGNESIUM HYDROXIDE, AND SIMETHICONE 30 ML: 200; 200; 20 SUSPENSION ORAL at 00:50

## 2019-01-07 NOTE — MED STUDENT NOTES
Emergency Medicine Medical Student History and Physical 
Encounter Date: 01/07/19 Subjective: MIRA Darden is a 66 y.o. female with PMHx Afib, QUINTANILLA, GERD, DM and HTN who presents with 2 days of worsening abdominal pain, distention, and belching. She states she is being evaluated by GI for gastroparesis and recently has had a negative gastric emptying study. She often vomits 2+ times per week and has belching with \"rotten\" taste. She can only eat 1 meal per day for fear of becoming sick and vomiting, but since Friday when her pain became worse, she has been nauseated but unable to vomit. She has 7/10 dull aching right sided abdominal pain with burning 5/10 epigastric pain. Worsening nausea but no vomiting. ROS: 
Nausea, abdominal pain Negative for SOB, CP, vomiting, myalgias, arthralgias, F/C, diarrhea or constipation. Prior to Admission medications Medication Sig Start Date End Date Taking? Authorizing Provider  
insulin aspart U-100 (NOVOLOG FLEXPEN U-100 INSULIN) 100 unit/mL inpn by SubCUTAneous route. Indications: sliding scale    Provider, Historical  
metFORMIN (GLUCOPHAGE) 1,000 mg tablet Take 1,000 mg by mouth two (2) times daily (with meals). Provider, Historical  
amLODIPine (NORVASC) 10 mg tablet Take 10 mg by mouth daily. Indications: morning    Provider, Historical  
losartan (COZAAR) 50 mg tablet Take 50 mg by mouth daily. Indications: morning    Provider, Historical  
metoprolol tartrate (LOPRESSOR) 25 mg tablet Take 25 mg by mouth two (2) times a day. Provider, Historical  
apixaban (ELIQUIS) 5 mg tablet Take 5 mg by mouth two (2) times a day. Indications: Dr Peg Miller[facility] procedure- Afib    Provider, Historical  
DULoxetine (CYMBALTA) 60 mg capsule Take 60 mg by mouth daily. Indications: morning    Provider, Historical  
omeprazole (PRILOSEC) 40 mg capsule Take 40 mg by mouth daily.  Indications: morning    Provider, Historical  
 raNITIdine hcl 300 mg cap Take 1 Tab by mouth nightly. Provider, Historical  
insulin glargine (LANTUS) 100 unit/mL injection 30 Units by SubCUTAneous route two (2) times a day. Provider, Historical  
insulin lispro (HUMALOG) 100 unit/mL injection by SubCUTAneous route Before breakfast, lunch, and dinner. Sliding scale if BS above 200    Provider, Historical  
 
Past Medical History:  
Diagnosis Date  [facility]-fib ([facility]  GERD (gastroesophageal reflux disease)   
 hiatal hernia, nexium and ranitidine daily- 2 pillows  Hypertension   
 managed with meds  Insulin dependent diabetes mellitus ([facility])   
 fbs 250--- A1C 9.8 11/2017--- never has low bs/hypoglycemia  Morbid obesity ([facility]  QUINTANILLA (nonalcoholic steatohepatitis)  Sleep apnea   
 does not use CPAP Past Surgical History:  
Procedure Laterality Date  COLONOSCOPY N/A 12/14/2017 COLONOSCOPY / BMI=36 performed by Wing Santhosh MD [address] LAP CHOLECYSTECTOMY  2010 [address]  HX UROLOGICAL  09/2017  
 bladder sling No Known Allergies Objective: 
Visit Vitals BP (!) 188/98 (BP 1 Location: Right arm, BP Patient Position: At rest) Pulse 83 Temp 97.9 °F (36.6 °C) Resp 16 Ht 5' 5\" (1.651 m) Wt 99.3 kg (219 lb) SpO2 98% BMI 36.44 kg/m² Physical Exam 
--General: Well appearing, in no acute distress  
--HENT: NCAT, nares clear without discharge, oropharynx clear and moist, no JVD 
--Eyes: PERRL, EOMI, no scleral icterus --Heart: Normal S1, S2, RRR, no murmurs rubs or gallops --Lungs: Normal work of breathing, CTAB 
--Abdomen: Normal BS, TTP RUQ and epigastrum, soft mildly distended. Abdominal striae. --Extremities: No cyanosis or edema, moves all 4 spontaneously 
--Skin: Warm, dry. No rashes or trauma 
--Neuro: CN II-XII grossly intact B/L, no focal deficits Labs:  
Recent Results (from the past 12 hour(s)) CBC WITH AUTOMATED DIFF  
 Collection Time: 01/06/19 11:18 PM  
Result Value Ref Range WBC 11.1 4.3 - 11.1 K/uL  
 RBC 4.69 4.05 - 5.2 M/uL  
 HGB 14.3 11.7 - 15.4 g/dL HCT 40.7 35.8 - 46.3 % MCV 86.8 79.6 - 97.8 FL  
 MCH 30.5 26.1 - 32.9 PG  
 MCHC 35.1 (H) 31.4 - 35.0 g/dL  
 RDW 14.0 11.9 - 14.6 % PLATELET 752 716 - 638 K/uL MPV 10.2 9.4 - 12.3 FL ABSOLUTE NRBC 0.00 0.0 - 0.2 K/uL  
 DF AUTOMATED NEUTROPHILS 45 43 - 78 % LYMPHOCYTES 46 (H) 13 - 44 % MONOCYTES 5 4.0 - 12.0 % EOSINOPHILS 3 0.5 - 7.8 % BASOPHILS 1 0.0 - 2.0 % IMMATURE GRANULOCYTES 0 0.0 - 5.0 %  
 ABS. NEUTROPHILS 4.9 1.7 - 8.2 K/UL  
 ABS. LYMPHOCYTES 5.1 (H) 0.5 - 4.6 K/UL  
 ABS. MONOCYTES 0.5 0.1 - 1.3 K/UL  
 ABS. EOSINOPHILS 0.4 0.0 - 0.8 K/UL  
 ABS. BASOPHILS 0.1 0.0 - 0.2 K/UL  
 ABS. IMM. GRANS. 0.0 0.0 - 0.5 K/UL METABOLIC PANEL, COMPREHENSIVE Collection Time: 01/06/19 11:18 PM  
Result Value Ref Range Sodium 137 136 - 145 mmol/L Potassium 3.8 3.5 - 5.1 mmol/L Chloride 104 98 - 107 mmol/L  
 CO2 26 21 - 32 mmol/L Anion gap 7 7 - 16 mmol/L Glucose 79 65 - 100 mg/dL BUN 6 6 - 23 MG/DL Creatinine 0.81 0.6 - 1.0 MG/DL  
 GFR est AA >60 >60 ml/min/1.73m2 GFR est non-AA >60 >60 ml/min/1.73m2 Calcium 9.7 8.3 - 10.4 MG/DL Bilirubin, total 0.4 0.2 - 1.1 MG/DL  
 ALT (SGPT) 67 (H) 12 - 65 U/L  
 AST (SGOT) 55 (H) 15 - 37 U/L Alk. phosphatase 146 (H) 50 - 136 U/L Protein, total 9.7 (H) 6.3 - 8.2 g/dL Albumin 3.4 (L) 3.5 - 5.0 g/dL Globulin 6.3 (H) 2.3 - 3.5 g/dL A-G Ratio 0.5 (L) 1.2 - 3.5 LIPASE Collection Time: 01/06/19 11:18 PM  
Result Value Ref Range Lipase 124 73 - 393 U/L Assessment/Plan:  
Monse Urias is a 65 yo female with PMHx DM, Afib, HTN, GERD who presents with acute on chronic abdominal pain with increased belching and abdominal distention. Patient does not have surgical abdomen.  Elevated liver enzymes at baseline. Will obtain imaging to rule out obstruction. - CXR 
- Abd series - protonix 
- Mago Fajardo - IV fluids Jonetta Brunner, OMS-IV *ATTENTION:  This note has been created by a medical student for educational purposes only. Please do not refer to the content of this note for clinical decision-making, billing, or other purposes. Please see attending physicians note to obtain clinical information on this patient. *

## 2019-01-07 NOTE — ED PROVIDER NOTES
65-year-old female patient presents with reports of lower epigastric abdominal pain, right upper quadrant abdominal pain, recurrent belching and nausea. Patient also describes foul taste in her mouth. The symptoms have been present for several days. She is able to keep down food and fluids but states some foods exacerbate her symptoms. She reports a long history of gastric issues including [place] recurrent reflux, nausea and vomiting. She is currently under the care of gastroenterology for the symptoms. She denies associated fever, chills, chest pain, shortness of breath. She denies any changes in bowel or bladder habits. Her last bowel movement was this morning and normal.  She denies black or bloody appearance to her stool. She reports nausea without vomiting. History of cholecystectomy, denies other surgical procedures. The history is provided by the patient and the spouse. No  was used. Abdominal Pain This is a recurrent problem. The current episode started more than 2 days ago. The problem occurs constantly. The problem has not changed since onset. The pain is associated with vomiting and eating. The pain is located in the RUQ and epigastric region. The pain is moderate. Associated symptoms include belching, diarrhea and nausea. Pertinent negatives include no anorexia, no fever, no flatus, no hematochezia, no melena, no vomiting, no constipation, no dysuria, no frequency, no hematuria, no headaches, no arthralgias, no myalgias, no trauma, no chest pain, no testicular pain and no back pain. Nothing worsens the pain. The pain is relieved by nothing. The patient's surgical history includes cholecystectomy. Past Medical History:  
Diagnosis Date  [facility]  GERD (gastroesophageal reflux disease)   
 hiatal hernia, nexium and ranitidine daily- 2 pillows  Hypertension   
 managed with meds  Insulin dependent diabetes mellitus ([facility] fbs 250--- A1C 9.8 11/2017--- never has low bs/hypoglycemia  Morbid obesity ([facility]  QUINTANILLA (nonalcoholic steatohepatitis)  Sleep apnea   
 does not use CPAP Past Surgical History:  
Procedure Laterality Date  COLONOSCOPY N/A 12/14/2017 COLONOSCOPY / BMI=36 performed by Yuly Meneses MD at [address] LAP CHOLECYSTECTOMY  2010 [facility] UROLOGICAL  09/2017  
 bladder sling Family History:  
Family history unknown: Yes  
 
 
Social History Socioeconomic History  Marital status:  Spouse name: Not on file  Number of children: Not on file  Years of education: Not on file  Highest education level: Not on file Social Needs  Financial resource strain: Not on file  Food insecurity - worry: Not on file  Food insecurity - inability: Not on file  Transportation needs - medical: Not on file  Transportation needs - non-medical: Not on file Occupational History  Not on file Tobacco Use  Smoking status: Current Every Day Smoker Packs/day: 0.50 Years: 30.00 Pack years: 15.00  Smokeless tobacco: Never Used Substance and Sexual Activity  Alcohol use: No  
 Drug use: No  
 Sexual activity: Not on file Other Topics Concern  Not on file Social History Narrative  Not on file ALLERGIES: Patient has no known allergies. Review of Systems Constitutional: Negative for chills, diaphoresis and fever. HENT: Negative for congestion, sneezing and sore throat. Eyes: Negative for visual disturbance. Respiratory: Negative for cough, chest tightness, shortness of breath and wheezing. Cardiovascular: Negative for chest pain and leg swelling. Gastrointestinal: Positive for abdominal pain, diarrhea and nausea. Negative for anorexia, blood in stool, constipation, flatus, hematochezia, melena and vomiting. Endocrine: Negative for polyuria. Genitourinary: Negative for difficulty urinating, dysuria, flank pain, frequency, hematuria, testicular pain and urgency. Musculoskeletal: Negative for arthralgias, back pain, myalgias, neck pain and neck stiffness. Skin: Negative for color change and rash. Neurological: Negative for dizziness, syncope, speech difficulty, weakness, light-headedness, numbness and headaches. Psychiatric/Behavioral: Negative for behavioral problems. All other systems reviewed and are negative. Vitals:  
 01/06/19 2307 BP: (!) 188/98 Pulse: 83 Resp: 16 Temp: 97.9 °F (36.6 °C) SpO2: 98% Weight: 99.3 kg (219 lb) Height: 5' 5\" (1.651 m) Physical Exam  
Constitutional: She is oriented to person, place, and time. She appears well-developed and well-nourished. No distress. Well-appearing, Alert and oriented to person place and time. No acute distress, speaks in clear, fluid sentences. HENT:  
Head: Normocephalic and atraumatic. Right Ear: External ear normal.  
Left Ear: External ear normal.  
Nose: Nose normal.  
Eyes: EOM are normal. Pupils are equal, round, and reactive to light. Neck: Normal range of motion. Cardiovascular: Normal rate, regular rhythm, normal heart sounds and intact distal pulses. Exam reveals no gallop and no friction rub. No murmur heard. Pulmonary/Chest: Effort normal and breath sounds normal. No respiratory distress. She has no wheezes. She has no rales. She exhibits no tenderness. Abdominal: Soft. She exhibits no distension and no mass. There is tenderness in the right upper quadrant and epigastric area. There is no rebound and no guarding. No hernia. There is pain over the lower epigastrium and right upper quadrant. No definite Bowling sign. Abdomen is slightly distended in appearance though soft palpation with bowel sounds present throughout. Musculoskeletal: Normal range of motion. She exhibits no edema, tenderness or deformity. Neurological: She is alert and oriented to person, place, and time. No cranial nerve deficit. Skin: Skin is warm and dry. She is not diaphoretic. Nursing note and vitals reviewed. MDM Number of Diagnoses or Management Options Diagnosis management comments: Patient's symptoms are consistent with previous exacerbations of her underlying reflux. She does not display a surgical abdomen on initial exam.  Low suspicion for obstruction, history of cholecystectomy. Her exam is inconsistent with ascites. We will collect labs, plain film imaging of the chest and abdomen and treat symptomatically. Laboratory evaluation thus far appears stable. Liver enzymes are elevated but consistent with previous values. Imaging pending X-ray imaging shows no evidence of bowel obstruction, chest x-ray clear no evidence of perforated ulcer. No improvement with Zofran. Orders for Phenergan placed. GI cocktail pending. Amount and/or Complexity of Data Reviewed Clinical lab tests: ordered and reviewed Tests in the radiology section of CPT®: ordered and reviewed Tests in the medicine section of CPT®: ordered and reviewed Review and summarize past medical records: yes Independent visualization of images, tracings, or specimens: yes Risk of Complications, Morbidity, and/or Mortality Presenting problems: moderate Diagnostic procedures: low Management options: moderate Patient Progress Patient progress: stable Procedures

## 2019-01-07 NOTE — DISCHARGE INSTRUCTIONS
Patient Education        Indigestion (Dyspepsia or Heartburn): Care Instructions  Your Care Instructions  Sometimes it can be hard to pinpoint the cause of indigestion. (It is also called dyspepsia or heartburn.) Most cases of an upset stomach with bloating, burning, burping, and nausea are minor and go away within several hours. Home treatment and over-the-counter medicine often are able to control symptoms. But if you take medicine to relieve your indigestion without making diet and lifestyle changes, your symptoms are likely to return again and again. If you get indigestion often, it may be a sign of a more serious medical problem. Be sure to follow up with your doctor, who may want to do tests to be sure of the cause of your indigestion. Follow-up care is a key part of your treatment and safety. Be sure to make and go to all appointments, and call your doctor if you are having problems. It's also a good idea to know your test results and keep a list of the medicines you take. How can you care for yourself at home? · Your doctor may recommend over-the-counter medicine. For mild or occasional indigestion, antacids such as Gaviscon, Mylanta, Maalox, or Tums, may help. Be safe with medicines. Be careful when you take over-the-counter antacid medicines. Many of these medicines have aspirin in them. Read the label to make sure that you are not taking more than the recommended dose. Too much aspirin can be harmful. · Your doctor also may recommend over-the-counter acid reducers, such as Pepcid AC, Tagamet HB, Zantac 75, or Prilosec. Read and follow all instructions on the label. If you use these medicines often, talk with your doctor. · Change your eating habits. ? It's best to eat several small meals instead of two or three large meals. ? After you eat, wait 2 to 3 hours before you lie down. ? Chocolate, mint, and alcohol can make GERD worse. ?  Spicy foods, foods that have a lot of acid (like tomatoes and oranges), and coffee can make GERD symptoms worse in some people. If your symptoms are worse after you eat a certain food, you may want to stop eating that food to see if your symptoms get better. · Do not smoke or chew tobacco. Smoking can make GERD worse. If you need help quitting, talk to your doctor about stop-smoking programs and medicines. These can increase your chances of quitting for good. · If you have GERD symptoms at night, raise the head of your bed 6 to 8 inches. You can do this by putting the frame on blocks or placing a foam wedge under the head of your mattress. (Adding extra pillows does not work.)  · Do not wear tight clothing around your middle. · Lose weight if you need to. Losing just 5 to 10 pounds can help. · Do not take anti-inflammatory medicines, such as aspirin, ibuprofen (Advil, Motrin), or naproxen (Aleve). These can irritate the stomach. If you need a pain medicine, try acetaminophen (Tylenol), which does not cause stomach upset. When should you call for help? Call your doctor now or seek immediate medical care if:    · You have new or worse belly pain.     · You are vomiting.    Watch closely for changes in your health, and be sure to contact your doctor if:    · You have new or worse symptoms of indigestion.     · You have trouble or pain swallowing.     · You are losing weight.     · You do not get better as expected. Where can you learn more? Go to http://jacky-marciano.info/. Enter M168 in the search box to learn more about \"Indigestion (Dyspepsia or Heartburn): Care Instructions. \"  Current as of: March 28, 2018  Content Version: 11.8  © 3676-1358 [employer]. Care instructions adapted under license by [employer] (which disclaims liability or warranty for this information).  If you have questions about a medical condition or this instruction, always ask your healthcare professional. Kori Robin disclaims any warranty or liability for your use of this information.

## 2019-01-07 NOTE — ED TRIAGE NOTES
Pt states that she is having abd pain and that she feels like she needs to vomit but is unable and having belching that has a foul taste per pt

## 2019-02-26 ENCOUNTER — HOSPITAL ENCOUNTER (OUTPATIENT)
Dept: ULTRASOUND IMAGING | Age: 59
Discharge: HOME | End: 2019-02-26
Payer: COMMERCIAL

## 2019-02-26 DIAGNOSIS — K76.0 FATTY LIVER: ICD-10-CM

## 2019-02-26 DIAGNOSIS — Z87.19 HISTORY OF ESOPHAGEAL VARICES: ICD-10-CM

## 2019-02-26 DIAGNOSIS — R11.2 NAUSEA WITH VOMITING: ICD-10-CM

## 2019-02-26 DIAGNOSIS — K21.9 ESOPHAGEAL REFLUX: ICD-10-CM

## 2019-02-26 DIAGNOSIS — Z86.010 PERSONAL HISTORY OF COLONIC POLYPS: ICD-10-CM

## 2019-02-26 DIAGNOSIS — Z12.11 SPECIAL SCREENING FOR MALIGNANT NEOPLASMS, COLON: ICD-10-CM

## 2019-02-26 PROCEDURE — 76700 US EXAM ABDOM COMPLETE: CPT

## 2019-03-26 ENCOUNTER — HOSPITAL ENCOUNTER (OUTPATIENT)
Age: 59
Setting detail: OUTPATIENT SURGERY
Discharge: HOME | End: 2019-03-26
Attending: INTERNAL MEDICINE | Admitting: INTERNAL MEDICINE
Payer: COMMERCIAL

## 2019-03-26 ENCOUNTER — ANESTHESIA EVENT (OUTPATIENT)
Dept: ENDOSCOPY | Age: 59
End: 2019-03-26
Payer: COMMERCIAL

## 2019-03-26 ENCOUNTER — ANESTHESIA (OUTPATIENT)
Dept: ENDOSCOPY | Age: 59
End: 2019-03-26
Payer: COMMERCIAL

## 2019-03-26 VITALS
RESPIRATION RATE: 12 BRPM | OXYGEN SATURATION: 96 % | WEIGHT: 219 LBS | HEIGHT: 65 IN | DIASTOLIC BLOOD PRESSURE: 84 MMHG | HEART RATE: 76 BPM | SYSTOLIC BLOOD PRESSURE: 143 MMHG | TEMPERATURE: 98 F | BODY MASS INDEX: 36.49 KG/M2

## 2019-03-26 LAB — GLUCOSE BLD STRIP.AUTO-MCNC: 174 MG/DL (ref 65–100)

## 2019-03-26 PROCEDURE — 74011250636 HC RX REV CODE- 250/636: Performed by: ANESTHESIOLOGY

## 2019-03-26 PROCEDURE — 77030009426 HC FCPS BIOP ENDOSC BSC -B: Performed by: INTERNAL MEDICINE

## 2019-03-26 PROCEDURE — 82962 GLUCOSE BLOOD TEST: CPT

## 2019-03-26 PROCEDURE — 74011250636 HC RX REV CODE- 250/636

## 2019-03-26 PROCEDURE — 76060000033 HC ANESTHESIA 1 TO 1.5 HR: Performed by: INTERNAL MEDICINE

## 2019-03-26 PROCEDURE — 88312 SPECIAL STAINS GROUP 1: CPT

## 2019-03-26 PROCEDURE — 76040000026: Performed by: INTERNAL MEDICINE

## 2019-03-26 PROCEDURE — 77030013991 HC SNR POLYP ENDOSC BSC -A: Performed by: INTERNAL MEDICINE

## 2019-03-26 PROCEDURE — 88305 TISSUE EXAM BY PATHOLOGIST: CPT

## 2019-03-26 RX ORDER — PROPOFOL 10 MG/ML
INJECTION, EMULSION INTRAVENOUS
Status: DISCONTINUED | OUTPATIENT
Start: 2019-03-26 | End: 2019-03-26 | Stop reason: HOSPADM

## 2019-03-26 RX ORDER — SODIUM CHLORIDE 0.9 % (FLUSH) 0.9 %
5-40 SYRINGE (ML) INJECTION EVERY 8 HOURS
Status: CANCELLED | OUTPATIENT
Start: 2019-03-26

## 2019-03-26 RX ORDER — SODIUM CHLORIDE, SODIUM LACTATE, POTASSIUM CHLORIDE, CALCIUM CHLORIDE 600; 310; 30; 20 MG/100ML; MG/100ML; MG/100ML; MG/100ML
100 INJECTION, SOLUTION INTRAVENOUS CONTINUOUS
Status: CANCELLED | OUTPATIENT
Start: 2019-03-26

## 2019-03-26 RX ORDER — PROPOFOL 10 MG/ML
INJECTION, EMULSION INTRAVENOUS AS NEEDED
Status: DISCONTINUED | OUTPATIENT
Start: 2019-03-26 | End: 2019-03-26 | Stop reason: HOSPADM

## 2019-03-26 RX ORDER — SODIUM CHLORIDE, SODIUM LACTATE, POTASSIUM CHLORIDE, CALCIUM CHLORIDE 600; 310; 30; 20 MG/100ML; MG/100ML; MG/100ML; MG/100ML
100 INJECTION, SOLUTION INTRAVENOUS CONTINUOUS
Status: DISCONTINUED | OUTPATIENT
Start: 2019-03-26 | End: 2019-03-26 | Stop reason: HOSPADM

## 2019-03-26 RX ORDER — VALSARTAN 160 MG/1
160 TABLET ORAL DAILY
COMMUNITY
End: 2020-07-15

## 2019-03-26 RX ORDER — SODIUM CHLORIDE 0.9 % (FLUSH) 0.9 %
5-40 SYRINGE (ML) INJECTION AS NEEDED
Status: CANCELLED | OUTPATIENT
Start: 2019-03-26

## 2019-03-26 RX ADMIN — PROPOFOL 50 MG: 10 INJECTION, EMULSION INTRAVENOUS at 10:00

## 2019-03-26 RX ADMIN — PROPOFOL 160 MCG/KG/MIN: 10 INJECTION, EMULSION INTRAVENOUS at 10:00

## 2019-03-26 RX ADMIN — SODIUM CHLORIDE, SODIUM LACTATE, POTASSIUM CHLORIDE, AND CALCIUM CHLORIDE 100 ML/HR: 600; 310; 30; 20 INJECTION, SOLUTION INTRAVENOUS at 09:02

## 2019-03-26 NOTE — DISCHARGE INSTRUCTIONS
Gastrointestinal Esophagogastroduodenoscopy (EGD) - Upper Exam Discharge Instructions    1. For mild soreness in your throat you may use Cepacol throat lozenges or warm salt-water gargles as needed. Any additional instructions:    1. Increase Omeprazole to 40 mg 2X a day and continue Ranitidine 150 mg at bedtime  2. Follow up biopsies  3. To take Maalox before meals      Gastrointestinal Colonoscopy/Flexible Sigmoidoscopy - Lower Exam Discharge Instructions  1. Call Dr. Curly Saldana at [phone] for any problems or questions. 2. Contact the doctors office for follow up appointment as directed  3. Medication may cause drowsiness for several hours, therefore:  · Do not drive or operate machinery for reminder of the day. · No alcohol today. · Do not make any important or legal decisions for 24 hours. · Do not sign any legal documents for 24 hours. 4. Ordinarily, you may resume regular diet and activity after exam unless otherwise specified by your physician. 5. Because of air put into your colon during exam, you may experience some abdominal distension, relieved by the passage of gas, for several hours. 6. Contact your physician if you have any of the following:  · Excessive amount of bleeding - large amount when having a bowel movement. Occasional specks of blood with bowel movement would not be unusual.  · Severe abdominal pain  · Fever or Chills  7. Polyp Removal - follow these additional instructions  · Take Metamucil - 1 tablespoon in juice every morning for 3 days if needed; avoid constipation. · No Aspirin, Advil, Aleve, Nuprin, Ibuprofen, or medications that contain these drugs for 2 weeks. Any additional instructions:     Plan one year Follow up due to poor prep. Will need a 2 day prep. Follow up polyp pathology      Instructions given to Argenis Gaviria and other family members.

## 2019-03-26 NOTE — ANESTHESIA PREPROCEDURE EVALUATION
Anesthetic History No history of anesthetic complications Review of Systems / Medical History Patient summary reviewed and pertinent labs reviewed Pulmonary Sleep apnea Smoker Neuro/Psych Within defined limits Cardiovascular Hypertension Dysrhythmias : atrial fibrillation Exercise tolerance: >4 METS 
  
GI/Hepatic/Renal 
  
 
 
 
Liver disease (QUINTANILLA) Endo/Other Diabetes: poorly controlled, type 2 Morbid obesity Other Findings Physical Exam 
 
Airway Mallampati: II 
TM Distance: 4 - 6 cm Neck ROM: normal range of motion Mouth opening: Normal 
 
 Cardiovascular Rhythm: regular Rate: normal 
 
 
Pertinent negatives: No murmur, JVD and peripheral edema Dental 
 
Dentition: Upper partial plate Pulmonary Breath sounds clear to auscultation Abdominal 
 
 
 
 Other Findings Anesthetic Plan ASA: 3 Anesthesia type: total IV anesthesia Induction: Intravenous Anesthetic plan and risks discussed with: Patient

## 2019-03-26 NOTE — ROUTINE PROCESS
VSS. No complaints noted. Education given and reviewed with  who voiced understanding. Pt wheeled out via wheelchair by Shy Davila [place].

## 2019-03-26 NOTE — PROCEDURES
ESOPHAGOGASTRODUODENOSCOPY    ESOPHAGOGASTRODUODENOSCOPY    DATE of PROCEDURE: 3/26/2019    PT NAME: Laura Chacon     [id number]  PCP: Gissell Simeon MD  MEDICATION:  TIVA    INSTRUMENT: GIF  H190    SPECIAL PROCEDURE: None  BLOOD LOSS- min. SPEC-Random gastric    PROCEDURE:  Standard EGD     ASSESSMENT:  1. 3 chains of grade 1 to 2 non bleeding esophageal varices  2. Moderate gastritis with small amount of gastric liquid  3. Duodenitis  4. No gastric varices    PLAN:   1. Increase Omeprazole to 40 mg 2X a day and continue Ranitidine 150 mg qhs  2. F/U biopsies  3.  To take Maalox Roxane Braswell MD

## 2019-03-26 NOTE — PROGRESS NOTES
Pt to resume eliquis tomorrow, 3/27 per Dr. Noemi Valentine. Pt given this information in discharge instructions.

## 2019-03-26 NOTE — PROCEDURES
Colonoscopy Procedure Note    Indications: Hx of polyps, constipation    Anesthesia/Sedation: TIVA    Pre-Procedure Physical:  Current Facility-Administered Medications   Medication Dose Route Frequency    lactated Ringers infusion  100 mL/hr IntraVENous CONTINUOUS      Patient has no known allergies. Patient Vitals for the past 8 hrs:   BP Temp Pulse Resp SpO2 Height Weight   03/26/19 1101 109/62 98 °F (36.7 °C) 92 14 94 % -- --   03/26/19 0956 165/84 -- 82 14 94 % -- --   03/26/19 0847 155/75 -- 83 18 96 % -- --   03/26/19 0839 -- 98.2 °F (36.8 °C) -- -- -- 5' 5\" (1.651 m) 99.3 kg (219 lb)       Exam:    Airway: clear   Heart: normal S1and S2    Lungs: clear bilateral  Abdomen: soft, nontender, bowel sounds present and normal in all quads   Mental Status: awake, alert and oriented to person, place and time        Procedure Details      Informed consent was obtained for the procedure, including sedation. Risks of perforation, hemorrhage, adverse drug reaction and aspiration were discussed. The patient was placed in the left lateral decubitus position. Based on the pre-procedure assessment, including review of the patient's medical history, medications, allergies, and review of systems, she had been deemed to be an appropriate candidate for conscious sedation; she was therefore sedated with the medications listed below. The patient was monitored continuously with ECG tracing, pulse oximetry, blood pressure monitoring, and direct observations. A rectal examination was performed. The colonoscope was inserted into the rectum and advanced under direct vision to the cecum. The quality of the colonic preparation was good. A careful inspection was made as the colonoscope was withdrawn, including a retroflexed view of the rectum; findings and interventions are described below. Appropriate photodocumentation was obtained.     Findings:  Pan diverticulosis, polyps ( AC--cold bxed away, TC --hot snared, DC--hot snared)                    POOR PREP    Specimens: None    Estimated Blood Loss: 0 cc           Complications: None; patient tolerated the procedure well. Attending Attestation: I performed the procedure. Impression:  Colon polyps (AC, TC, DC), pan diverticulosis      Plan one year F/U due to poor prep. Will need a 2 day prep. F/U polyp pathology    ANDREA Lorenzana MD    Recommendations:

## 2019-03-26 NOTE — ANESTHESIA POSTPROCEDURE EVALUATION
Procedure(s): ESOPHAGOGASTRODUODENOSCOPY (EGD) COLONOSCOPY/ 36 
ESOPHAGOGASTRODUODENAL (EGD) BIOPSY ENDOSCOPIC POLYPECTOMY 
COLON BIOPSY. total IV anesthesia Anesthesia Post Evaluation Multimodal analgesia: multimodal analgesia used between 6 hours prior to anesthesia start to PACU discharge Patient location during evaluation: PACU Patient participation: complete - patient participated Level of consciousness: awake Pain management: adequate Airway patency: patent Anesthetic complications: no 
Cardiovascular status: acceptable and hemodynamically stable Respiratory status: acceptable Hydration status: acceptable Comments: Acceptable for discharge from PACU. Post anesthesia nausea and vomiting:  none Vitals Value Taken Time /70 3/26/2019 11:12 AM  
Temp 36.7 °C (98 °F) 3/26/2019 11:01 AM  
Pulse 81 3/26/2019 11:14 AM  
Resp 14 3/26/2019 11:12 AM  
SpO2 94 % 3/26/2019 11:14 AM  
Vitals shown include unvalidated device data.

## 2019-03-26 NOTE — H&P
Gastroenterology Outpatient History and Physical    Patient: Brendan Eddy    Physician: Claribel Oliveira MD    Vital Signs: Blood pressure 155/75, pulse 83, temperature 98.2 °F (36.8 °C), resp. rate 18, height 5' 5\" (1.651 m), weight 99.3 kg (219 lb), SpO2 96 %. Allergies: No Known Allergies    Chief Complaint: Nausea, GERD, constipation and hx of polyps    History of Present Illness: As Above    Justification for Procedure: As Above    History:  Past Medical History:   Diagnosis Date    [facility] ([facility]     atrial fibrillation    GERD (gastroesophageal reflux disease)     hiatal hernia, nexium and ranitidine daily- 2 pillows    Hypertension     managed with meds    [facility]     fbs 250--- A1C 9.8 11/2017--- never has low bs/hypoglycemia    Morbid obesity ([facility]     QUINTANILLA (nonalcoholic steatohepatitis)     Sleep apnea     does not use CPAP      Past Surgical History:   Procedure Laterality Date    COLONOSCOPY N/A 12/14/2017    COLONOSCOPY / BMI=36 performed by Claribel Oliveira MD at [address] [place] [address] HX LAP CHOLECYSTECTOMY  2010    [address]    HX UROLOGICAL  09/2017    bladder sling       Social History     Socioeconomic History    Marital status:      Spouse name: Not on file    Number of children: Not on file    Years of education: Not on file    Highest education level: Not on file   Tobacco Use    Smoking status: Current Every Day Smoker     Packs/day: 0.50     Years: 30.00     Pack years: 15.00    Smokeless tobacco: Never Used   Substance and Sexual Activity    Alcohol use: No    Drug use: No      Family History   Family history unknown: Yes       Medications:   Prior to Admission medications    Medication Sig Start Date End Date Taking? Authorizing Provider   valsartan (DIOVAN) 160 mg tablet Take 160 mg by mouth daily. Indications: high blood pressure   Yes Provider, Historical   empagliflozin (JARDIANCE) 25 mg tablet Take  by mouth daily. Yes Provider, Historical   promethazine (PHENERGAN) 25 mg tablet Take 1 Tab by mouth every six (6) hours as needed for up to 15 doses. 1/7/19  Yes Isidro Figueroa,    insulin aspart U-100 (NOVOLOG FLEXPEN U-100 INSULIN) 100 unit/mL inpn by SubCUTAneous route. Indications: sliding scale   Yes Provider, Historical   metFORMIN (GLUCOPHAGE) 1,000 mg tablet Take 1,000 mg by mouth two (2) times daily (with meals). Yes Provider, Historical   amLODIPine (NORVASC) 10 mg tablet Take 10 mg by mouth daily. Indications: morning   Yes Provider, Historical   metoprolol tartrate (LOPRESSOR) 25 mg tablet Take 25 mg by mouth two (2) times a day. Yes Provider, Historical   DULoxetine (CYMBALTA) 60 mg capsule Take 60 mg by mouth daily. Indications: morning   Yes Provider, Historical   omeprazole (PRILOSEC) 40 mg capsule Take 40 mg by mouth daily. Indications: morning   Yes Provider, Historical   raNITIdine hcl 300 mg cap Take 1 Tab by mouth nightly. Yes Provider, Historical   omeprazole (PRILOSEC) 40 mg capsule Take 1 Cap by mouth daily. 1/7/19   Gisel Figueroa,    losartan (COZAAR) 50 mg tablet Take 50 mg by mouth daily. Indications: morning    Provider, Historical   apixaban (ELIQUIS) 5 mg tablet Take 5 mg by mouth two (2) times a day. Indications: Dr Antonino Weaver- [facility] in [facility] held for procedure- Afib    Provider, Historical   insulin glargine (LANTUS) 100 unit/mL injection 30 Units by SubCUTAneous route two (2) times a day. Provider, Historical   insulin lispro (HUMALOG) 100 unit/mL injection by SubCUTAneous route Before breakfast, lunch, and dinner.  Sliding scale if BS above 200    Provider, Historical       Physical Exam:         Heart: regular rate and rhythm, S1, S2 normal, no murmur, click, rub or gallop   Lungs: Heart exam - S1, S2 normal, no murmur, no gallop, rate regular   Abdominal: Bowel sounds are normal, liver is not enlarged, spleen is not enlarged Findings/Diagnosis: Nausea, GERD, Hx of polyps, constipation        Signed:  Rosalie Babcock MD 3/26/2019

## 2019-05-03 ENCOUNTER — HOSPITAL ENCOUNTER (OUTPATIENT)
Dept: CT IMAGING | Age: 59
Discharge: HOME | End: 2019-05-03
Attending: INTERNAL MEDICINE
Payer: COMMERCIAL

## 2019-05-03 VITALS — BODY MASS INDEX: 36.65 KG/M2 | HEIGHT: 65 IN | WEIGHT: 220 LBS

## 2019-05-03 DIAGNOSIS — R63.4 WEIGHT LOSS: ICD-10-CM

## 2019-05-03 LAB — CREAT BLD-MCNC: 0.7 MG/DL (ref 0.8–1.5)

## 2019-05-03 PROCEDURE — 82565 ASSAY OF CREATININE: CPT

## 2019-05-03 PROCEDURE — 74177 CT ABD & PELVIS W/CONTRAST: CPT

## 2019-05-03 PROCEDURE — 74011000258 HC RX REV CODE- 258: Performed by: INTERNAL MEDICINE

## 2019-05-03 PROCEDURE — 74011636320 HC RX REV CODE- 636/320: Performed by: INTERNAL MEDICINE

## 2019-05-03 RX ORDER — SODIUM CHLORIDE 0.9 % (FLUSH) 0.9 %
10 SYRINGE (ML) INJECTION
Status: COMPLETED | OUTPATIENT
Start: 2019-05-03 | End: 2019-05-03

## 2019-05-03 RX ADMIN — Medication 10 ML: at 11:25

## 2019-05-03 RX ADMIN — DIATRIZOATE MEGLUMINE AND DIATRIZOATE SODIUM 15 ML: 660; 100 LIQUID ORAL; RECTAL at 11:25

## 2019-05-03 RX ADMIN — SODIUM CHLORIDE 100 ML: 900 INJECTION, SOLUTION INTRAVENOUS at 11:25

## 2019-05-03 RX ADMIN — IOPAMIDOL 100 ML: 755 INJECTION, SOLUTION INTRAVENOUS at 11:25

## 2020-07-15 ENCOUNTER — HOSPITAL ENCOUNTER (OUTPATIENT)
Dept: WOUND CARE | Age: 60
Discharge: HOME | End: 2020-07-15
Attending: SURGERY
Payer: COMMERCIAL

## 2020-07-15 VITALS
SYSTOLIC BLOOD PRESSURE: 160 MMHG | TEMPERATURE: 99.3 F | HEIGHT: 65 IN | OXYGEN SATURATION: 97 % | WEIGHT: 227 LBS | DIASTOLIC BLOOD PRESSURE: 93 MMHG | RESPIRATION RATE: 18 BRPM | BODY MASS INDEX: 37.82 KG/M2 | HEART RATE: 88 BPM

## 2020-07-15 DIAGNOSIS — L03.031 PARONYCHIA OF GREAT TOE, RIGHT: Primary | ICD-10-CM

## 2020-07-15 PROCEDURE — 99213 OFFICE O/P EST LOW 20 MIN: CPT

## 2020-07-15 RX ORDER — OLMESARTAN MEDOXOMIL 5 MG/1
10 TABLET ORAL DAILY
COMMUNITY

## 2020-07-15 RX ORDER — AMOXICILLIN AND CLAVULANATE POTASSIUM 875; 125 MG/1; MG/1
1 TABLET, FILM COATED ORAL 2 TIMES DAILY
COMMUNITY
Start: 2020-07-11 | End: 2020-07-22

## 2020-07-15 NOTE — WOUND CARE
[address]  [address], [address]  Phone: [phone]  Fax: [phone]    Patient: Yael Preciado MRN: [id number]  SSN: [id number]    YOB: 1960  Age: 64 y.o. Sex: female       Return Appointment: 1 week with [place] Goodell, MD    Instructions: Right Great Toe:  Cleanse wound and periwound with wound cleanser or normal saline. Iodosorb- apply thin layer to wound bed, cover with cover dressing, change daily    Continue Augmentin til RX completed. Smoking Cessation  - contact this week. Diabetic Diet as discussed. ASHLEY's ordered. (Vascular Office will call). Should you experience increased redness, swelling, pain, foul odor, size of wound(s), or have a temperature over 101 degrees please contact the [address] [place] [address] Road at [phone] or if after hours contact your primary care physician or go to the hospital emergency department.     Signed By: Stan Griggs RN     July 15, 2020

## 2020-07-15 NOTE — WOUND CARE
07/15/20 1336   Wound Toe (Comment  which one) Right;Distal 07/15/20   Date First Assessed/Time First Assessed: 07/15/20 1334   Present on Hospital Admission: Yes  Wound Approximate Age at First Assessment (Weeks): 2 weeks  Primary Wound Type: Diabetic Ulcer  Location: (c) Toe (Comment  which one)  Wound Location Orienta. .. Dressing Status   (no dressing in place)   Non-staged Wound Description Full thickness   Wound Length (cm) 0.2 cm   Wound Width (cm) 0.2 cm   Wound Depth (cm) 0.1 cm   Wound Surface Area (cm^2) 0.04 cm^2   Wound Volume (cm^3) 0 cm^3   Tissue Type Percent Other (comment) 100  (light brown skin tone)   Drainage Amount Scant   Drainage Color Serous   Wound Odor None   Edna-wound Assessment Dry;Blanchable erythema   Cleansing and Cleansing Agents  Normal saline   Dressing Changed Changed/New   Dressing Type Applied   (Iodosorb, Gauze, Rolled Gauze.)       ANTICOAGULANT THERAPY: Eliquis  Mechanically debrided with gauze and normal saline.

## 2020-07-15 NOTE — PROGRESS NOTES
Wound Center Progress Note    Patient: Lisa Hood MRN: [id number]  SSN: [id number]    YOB: 1960  Age: 64 y.o. Sex: female      Subjective:     Chief Complaint:  Lisa Hood is a 64 y.o. BLACK OR  female who presents with toes right, great toe wound of 2 weeks duration. History of Present Illness:     Paronychia right great toenail since having a pedicure now on Augmentin and infection is clearing. We will check her pulses with an ASHLEY. She will return in 1 week and finish Augmentin.   Wound Caused By: acute injury due to pedicure  Associated Signs and Symptoms: Some pain in  No drainage timing: Intermittent  Quality: Burning  Severity: 3/10  Modifying Factors: Has smoked a pack a day for many years  Current Wound Care: See nurses notes    Past Medical History:   Diagnosis Date    A-fib [facility])     atrial fibrillation    GERD (gastroesophageal reflux disease)     hiatal hernia, nexium and ranitidine daily- 2 pillows    Hypercholesterolemia 07/14/2020    Hypertension     managed with meds    Insulin dependent diabetes mellitus     fbs 250--- A1C 9.8 11/2017--- never has low bs/hypoglycemia    Morbid obesity ([address]     QUINTANILLA (nonalcoholic steatohepatitis)     Sleep apnea     does not use CPAP      Past Surgical History:   Procedure Laterality Date    COLONOSCOPY N/A 12/14/2017    COLONOSCOPY / BMI=36 performed by Pushpa Maldonado MD at [address] [facility], [place] N/A 3/26/2019    COLONOSCOPY/ 36 performed by Ishmael Navarro MD at [address] [facility] LAP CHOLECYSTECTOMY  2010    [address] UROLOGICAL  09/2017    bladder sling      Family History   Family history unknown: Yes      Social History     Tobacco Use    Smoking status: Current Every Day Smoker     Packs/day: 1.00     Years: 30.00     Pack years: 30.00    Smokeless tobacco: Never Used    Tobacco comment: has signed up with smoking cessation  associated with her health insurance   Substance Use Topics    Alcohol use: No       Prior to Admission medications    Medication Sig Start Date End Date Taking? Authorizing Provider   olmesartan (Benicar) 5 mg tablet Take 10 mg by mouth daily. Indications: high blood pressure   Yes Provider, Historical   amoxicillin-clavulanate (Augmentin) 875-125 mg per tablet Take 1 Tab by mouth two (2) times a day. Indications: diabetic foot infection 7/11/20 7/21/20 Yes Provider, Historical   empagliflozin (JARDIANCE) 25 mg tablet Take  by mouth daily. Provider, Historical   promethazine (PHENERGAN) 25 mg tablet Take 1 Tab by mouth every six (6) hours as needed for up to 15 doses. 1/7/19   Julianna Figueroa,    metFORMIN (GLUCOPHAGE) 1,000 mg tablet Take 1,000 mg by mouth two (2) times daily (with meals). Provider, Historical   amLODIPine (NORVASC) 10 mg tablet Take 10 mg by mouth daily. Indications: morning    Provider, Historical   metoprolol tartrate (LOPRESSOR) 25 mg tablet 75 mg two (2) times a day. Indications: high blood pressure, A-fib    Provider, Historical   apixaban (ELIQUIS) 5 mg tablet Take 5 mg by mouth two (2) times a day. Indications: Dr Danielle [facility]- [facility] in [facility] held for procedure- Afib    Provider, Historical   DULoxetine (CYMBALTA) 60 mg capsule Take 60 mg by mouth daily. Indications: morning    Provider, Historical   omeprazole (PRILOSEC) 40 mg capsule Take 40 mg by mouth two (2) times a day. Indications: morning    Provider, Historical   insulin glargine (LANTUS) 100 unit/mL injection 30 Units by SubCUTAneous route two (2) times a day. Provider, Historical   insulin lispro (HUMALOG) 100 unit/mL injection by SubCUTAneous route Before breakfast, lunch, and dinner. Sliding scale if BS above 200    Provider, Historical     No Known Allergies     Review of Systems:  A comprehensive review of systems was negative except for that written in the History of Present Illness.      No results found for: HBA1C, ROA3UEYX, HGBE8, PDT7AKBU, XUE8CWFW       There is no immunization history on file for this patient. Body mass index is 37.77 kg/m². Counseling regarding nutrition done: No     Current medications:  Current Outpatient Medications   Medication Sig Dispense Refill    olmesartan (Benicar) 5 mg tablet Take 10 mg by mouth daily. Indications: high blood pressure      amoxicillin-clavulanate (Augmentin) 875-125 mg per tablet Take 1 Tab by mouth two (2) times a day. Indications: diabetic foot infection      empagliflozin (JARDIANCE) 25 mg tablet Take  by mouth daily.  promethazine (PHENERGAN) 25 mg tablet Take 1 Tab by mouth every six (6) hours as needed for up to 15 doses. 12 Tab 0    metFORMIN (GLUCOPHAGE) 1,000 mg tablet Take 1,000 mg by mouth two (2) times daily (with meals).  amLODIPine (NORVASC) 10 mg tablet Take 10 mg by mouth daily. Indications: morning      metoprolol tartrate (LOPRESSOR) 25 mg tablet 75 mg two (2) times a day. Indications: high blood pressure, A-fib      apixaban (ELIQUIS) 5 mg tablet Take 5 mg by mouth two (2) times a day. Indications: Dr Whit Mark- [facility] in [facility] held for procedure- Afib      DULoxetine (CYMBALTA) 60 mg capsule Take 60 mg by mouth daily. Indications: morning      omeprazole (PRILOSEC) 40 mg capsule Take 40 mg by mouth two (2) times a day. Indications: morning      insulin glargine (LANTUS) 100 unit/mL injection 30 Units by SubCUTAneous route two (2) times a day.  insulin lispro (HUMALOG) 100 unit/mL injection by SubCUTAneous route Before breakfast, lunch, and dinner. Sliding scale if BS above 200           Objective:     Physical Exam:     Visit Vitals  BP (!) 160/93 (BP 1 Location: Right arm, BP Patient Position: Sitting)   Pulse 88   Temp 99.3 °F (37.4 °C)   Resp 18   Ht 5' 5\" (1.651 m)   Wt 103 kg (227 lb)   SpO2 97%   BMI 37.77 kg/m²       General: well developed, well nourished, pleasant , NAD. Hygiene good  Psych: cooperative. Pleasant. No anxiety or depression. Normal mood and affect. Neuro: alert and oriented to person/place/situation. Otherwise nonfocal.  Derm: Normal turgor for age, dry skin  HEENT: Normocephalic, atraumatic. EOMI. Conjunctiva clear. No scleral icterus. Neck: Normal range of motion. No masses. Chest: Good air entry bilaterally. Respirations nonlabored  Cardio: Normal heart sounds,no rubs, murmurs or gallops  Abdomen: Soft, nontender, nondistended, normoactive bowel sounds  Lower extremities: color normal; temperature normal. Hair growth is not present. Calves are supple, nontender, approximately equally sized in comparison. Capillary refill <3 sec            Ulcer Description:   Wound Toe (Comment  which one) Right;Distal 07/15/20 (Active)   Non-staged Wound Description Partial thickness 07/15/20 1336   Wound Length (cm) 0.2 cm 07/15/20 1336   Wound Width (cm) 0.1 cm 07/15/20 1336   Wound Depth (cm) 0.1 cm 07/15/20 1336   Wound Surface Area (cm^2) 0.02 cm^2 07/15/20 1336   Wound Volume (cm^3) 0 cm^3 07/15/20 1336   Tissue Type Percent Pink 100 07/15/20 1336   Number of days: 0         Data Review:   No results found for this or any previous visit (from the past 24 hour(s)). Assessment:     64 y.o. female with toes right, great toe combined ulcer. Plan:     Orders Placed This Encounter    REFERRAL TO VASCULAR CENTER     Referral Priority:   Routine     Referral Type:   Consultation     Referral Reason:   Specialty Services Required     Number of Visits Requested:   1    olmesartan (Benicar) 5 mg tablet     Sig: Take 10 mg by mouth daily. Indications: high blood pressure    amoxicillin-clavulanate (Augmentin) 875-125 mg per tablet     Sig: Take 1 Tab by mouth two (2) times a day. Indications: diabetic foot infection        Patient understood and agrees with plan. Questions answered.     Follow-up Information    None            Any procedures done today in the [address] [facility],[address] are documented in a separate note in [facility] and made part of this record by reference.      Dictated using voice recognition software; proofread, but unrecognized errors may exist.    Signed By: Keith Alcantara MD     July 15, 2020

## 2020-07-15 NOTE — DISCHARGE INSTRUCTIONS
[address]  [address], [address]  Phone: [phone]  Fax: [phone]    Patient: Zion Kevin MRN: [id number]  SSN: [id number]    YOB: 1960  Age: 64 y.o. Sex: female       Return Appointment: 1 week with Kwame Rhodes MD    Instructions: Right Great Toe:  Cleanse wound and periwound with wound cleanser or normal saline. Iodosorb- apply thin layer to wound bed, cover with cover dressing, change daily    Continue Augmentin til RX completed. Smoking Cessation  - contact this week. Diabetic Diet as discussed. ASHLEY's ordered. (Vascular Office will call). Should you experience increased redness, swelling, pain, foul odor, size of wound(s), or have a temperature over 101 degrees please contact the [address] [place] [address] Road at [phone] or if after hours contact your primary care physician or go to the hospital emergency department.     Signed By: Mason Colbert RN     July 15, 2020

## 2020-07-22 ENCOUNTER — HOSPITAL ENCOUNTER (OUTPATIENT)
Dept: WOUND CARE | Age: 60
Discharge: HOME | End: 2020-07-22
Attending: SURGERY
Payer: COMMERCIAL

## 2020-07-22 VITALS
WEIGHT: 224 LBS | RESPIRATION RATE: 18 BRPM | BODY MASS INDEX: 37.32 KG/M2 | DIASTOLIC BLOOD PRESSURE: 75 MMHG | TEMPERATURE: 99.2 F | OXYGEN SATURATION: 99 % | HEART RATE: 88 BPM | HEIGHT: 65 IN | SYSTOLIC BLOOD PRESSURE: 172 MMHG

## 2020-07-22 PROCEDURE — 99211 OFF/OP EST MAY X REQ PHY/QHP: CPT

## 2020-07-22 NOTE — WOUND CARE
07/22/20 1445   Wound Toe (Comment  which one) Right;Distal 07/15/20   Date First Assessed/Time First Assessed: 07/15/20 1334   Present on Hospital Admission: Yes  Wound Approximate Age at First Assessment (Weeks): 2 weeks  Primary Wound Type: Diabetic Ulcer  Location: (c) Toe (Comment  which one)  Wound Location Orienta. .. Non-staged Wound Description Full thickness   Wound Length (cm) 0 cm   Wound Width (cm) 0 cm   Wound Depth (cm) 0 cm   Wound Surface Area (cm^2) 0 cm^2   Wound Volume (cm^3) 0 cm^3   Change in Wound Size % 100   Condition of Base Epithelializing   Epithelialization (%) 100   Drainage Amount None   Wound Odor None   Edna-wound Assessment Intact   Cleansing and Cleansing Agents  Normal saline   Dressing Changed Changed/New     Patient is on an anti coagulant daily eliquis.

## 2020-07-22 NOTE — WOUND CARE
[address]  [address], [address]  Phone: [phone]  Fax: [phone]    Patient: Meggan Ballesteros MRN: [id number]  SSN: [id number]    YOB: 1960  Age: 64 y.o. Sex: female       Return Appointment: as needed with Dariel Urias MD    Instructions: Discharge from wound center. Follow up if needed. Should you experience increased redness, swelling, pain, foul odor, size of wound(s), or have a temperature over 101 degrees please contact the [address] [place] [address] Road at [phone] or if after hours contact your primary care physician or go to the hospital emergency department.     Signed By: Brittney Torres     July 22, 2020

## 2020-07-22 NOTE — PROGRESS NOTES
Paronychia is now well, she is released               [address] [facility],[address] Progress Note    Patient: Zion Kevin MRN: [id number]  SSN: [id number]    YOB: 1960  Age: 64 y.o. Sex: female      Subjective:     Chief Complaint:  Zion Kevin is a 64 y.o. BLACK OR  female who presents with toes right, great toe wound of 4 weeks duration. History of Present Illness:     Paronychia is now healed she is released  Wound Caused By: previous infection        Modifying Factors: None*  Current Wound Care: See nurses notes    Past Medical History:   Diagnosis Date    A-fib [facility])     atrial fibrillation    GERD (gastroesophageal reflux disease)     hiatal hernia, nexium and ranitidine daily- 2 pillows    Hypercholesterolemia 07/14/2020    Hypertension     managed with meds    Insulin dependent diabetes mellitus     fbs 250--- A1C 9.8 11/2017--- never has low bs/hypoglycemia    Morbid obesity ([address]     QUINTANILLA (nonalcoholic steatohepatitis)     Sleep apnea     does not use CPAP      Past Surgical History:   Procedure Laterality Date    COLONOSCOPY N/A 12/14/2017    COLONOSCOPY / BMI=36 performed by Luisa Herrera MD at [facility]. N/A 3/26/2019    COLONOSCOPY/ 36 performed by Juju Joseph MD at [facility] LAP CHOLECYSTECTOMY  2010    [facility]     UROLOGICAL  09/2017    bladder sling      Family History   Family history unknown: Yes      Social History     Tobacco Use    Smoking status: Current Every Day Smoker     Packs/day: 1.00     Years: 30.00     Pack years: 30.00    Smokeless tobacco: Never Used    Tobacco comment: has signed up with smoking cessation  associated with her health insurance   Substance Use Topics    Alcohol use: No       Prior to Admission medications    Medication Sig Start Date End Date Taking? Authorizing Provider   olmesartan (Benicar) 5 mg tablet Take 10 mg by mouth daily.  Indications: high blood pressure Yes Provider, Historical   empagliflozin (JARDIANCE) 25 mg tablet Take  by mouth daily. Yes Provider, Historical   promethazine (PHENERGAN) 25 mg tablet Take 1 Tab by mouth every six (6) hours as needed for up to 15 doses. 1/7/19  Yes Arturo Figueroa, DO   metFORMIN (GLUCOPHAGE) 1,000 mg tablet Take 1,000 mg by mouth two (2) times daily (with meals). Yes Provider, Historical   amLODIPine (NORVASC) 10 mg tablet Take 10 mg by mouth daily. Indications: morning   Yes Provider, Historical   metoprolol tartrate (LOPRESSOR) 25 mg tablet 75 mg two (2) times a day. Indications: high blood pressure, A-fib   Yes Provider, Historical   apixaban (ELIQUIS) 5 mg tablet Take 5 mg by mouth two (2) times a day. Indications: Dr Myke Mcmahon- [facility] in [place]-  held for procedure- Afib   Yes Provider, Historical   DULoxetine (CYMBALTA) 60 mg capsule Take 60 mg by mouth daily. Indications: morning   Yes Provider, Historical   omeprazole (PRILOSEC) 40 mg capsule Take 40 mg by mouth two (2) times a day. Indications: morning   Yes Provider, Historical   insulin glargine (LANTUS) 100 unit/mL injection 30 Units by SubCUTAneous route two (2) times a day. Yes Provider, Historical   insulin lispro (HUMALOG) 100 unit/mL injection by SubCUTAneous route Before breakfast, lunch, and dinner. Sliding scale if BS above 200   Yes Provider, Historical     No Known Allergies     Review of Systems:  A comprehensive review of systems was negative except for that written in the History of Present Illness. No results found for: HBA1C, VOF1MIZY, HGBE8, ISU1SLUO, IUP4UIBZ       There is no immunization history on file for this patient. Body mass index is 37.28 kg/m². Counseling regarding nutrition done: No     Current medications:  Current Outpatient Medications   Medication Sig Dispense Refill    olmesartan (Benicar) 5 mg tablet Take 10 mg by mouth daily.  Indications: high blood pressure      empagliflozin (JARDIANCE) 25 mg tablet Take  by mouth daily.  promethazine (PHENERGAN) 25 mg tablet Take 1 Tab by mouth every six (6) hours as needed for up to 15 doses. 12 Tab 0    metFORMIN (GLUCOPHAGE) 1,000 mg tablet Take 1,000 mg by mouth two (2) times daily (with meals).  amLODIPine (NORVASC) 10 mg tablet Take 10 mg by mouth daily. Indications: morning      metoprolol tartrate (LOPRESSOR) 25 mg tablet 75 mg two (2) times a day. Indications: high blood pressure, A-fib      apixaban (ELIQUIS) 5 mg tablet Take 5 mg by mouth two (2) times a day. Indications: Dr Danielle Johnson- [facility] in [facility] held for procedure- Afib      DULoxetine (CYMBALTA) 60 mg capsule Take 60 mg by mouth daily. Indications: morning      omeprazole (PRILOSEC) 40 mg capsule Take 40 mg by mouth two (2) times a day. Indications: morning      insulin glargine (LANTUS) 100 unit/mL injection 30 Units by SubCUTAneous route two (2) times a day.  insulin lispro (HUMALOG) 100 unit/mL injection by SubCUTAneous route Before breakfast, lunch, and dinner. Sliding scale if BS above 200           Objective:     Physical Exam:     Visit Vitals  /75 (BP 1 Location: Left arm)   Pulse 88   Temp 99.2 °F (37.3 °C)   Resp 18   Ht 5' 5\" (1.651 m)   Wt 101.6 kg (224 lb)   SpO2 99%   BMI 37.28 kg/m²       General: well developed, well nourished, pleasant , NAD. Hygiene good  Psych: cooperative. Pleasant. No anxiety or depression. Normal mood and affect. Neuro: alert and oriented to person/place/situation. Otherwise nonfocal.  Derm: Normal turgor for age, dry skin  HEENT: Normocephalic, atraumatic. EOMI. Conjunctiva clear. No scleral icterus. Neck: Normal range of motion. No masses. Chest: Good air entry bilaterally. Respirations nonlabored  Cardio: Normal heart sounds,no rubs, murmurs or gallops  Abdomen: Soft, nontender, nondistended, normoactive bowel sounds  Lower extremities: color normal; temperature normal. Hair growth is not present.  Calves are supple, nontender, approximately equally sized in comparison. Capillary refill <3 sec            Ulcer Description:   Wound Toe (Comment  which one) Right;Distal 07/15/20 (Active)   Non-staged Wound Description Full thickness 07/22/20 1445   Wound Length (cm) 0 cm 07/22/20 1445   Wound Width (cm) 0 cm 07/22/20 1445   Wound Depth (cm) 0 cm 07/22/20 1445   Wound Surface Area (cm^2) 0 cm^2 07/22/20 1445   Wound Volume (cm^3) 0 cm^3 07/22/20 1445   Change in Wound Size % 100 07/22/20 1445   Condition of Base Epithelializing 07/22/20 1445   Epithelialization (%) 100 07/22/20 1445   Tissue Type Percent Other (comment) 100 07/15/20 1336   Drainage Amount None 07/22/20 1445   Drainage Color Serous 07/15/20 1336   Wound Odor None 07/22/20 1445   Edna-wound Assessment Intact 07/22/20 1445   Cleansing and Cleansing Agents  Normal saline 07/22/20 1445   Dressing Changed Changed/New 07/22/20 1445   Number of days: 7         Data Review:   No results found for this or any previous visit (from the past 24 hour(s)). Assessment:     64 y.o. female with toes right infectious ulcer. Plan:     Orders Placed This Encounter    WOUND CARE, DRESSING CHANGE     Discharge from wound center. Follow up if needed. Standing Status:   Standing     Number of Occurrences:   1        Patient understood and agrees with plan. Questions answered. Follow-up Information    None            Any procedures done today in the [address] [facility] are documented in a separate note in [facility] and made part of this record by reference.      Dictated using voice recognition software; proofread, but unrecognized errors may exist.    Signed By: Arminda Hendrix MD     July 22, 2020

## 2020-07-22 NOTE — DISCHARGE INSTRUCTIONS
[address]  [address], [address]  Phone: [phone]  Fax: [phone]    Patient: Jerilyn Alvarez MRN: [id number]  SSN: [id number]    YOB: 1960  Age: 64 y.o. Sex: female       Return Appointment: as needed with Malu Rodriguez MD    Instructions:  Discharge from wound center. Follow up if needed. Should you experience increased redness, swelling, pain, foul odor, size of wound(s), or have a temperature over 101 degrees please contact the [address] [place] [address] Road at [phone] or if after hours contact your primary care physician or go to the hospital emergency department.     Signed By: Ashley Perez     July 22, 2020

## 2021-05-27 ENCOUNTER — ANESTHESIA EVENT (OUTPATIENT)
Dept: ENDOSCOPY | Age: 61
End: 2021-05-27
Payer: COMMERCIAL

## 2021-05-27 RX ORDER — SODIUM CHLORIDE 0.9 % (FLUSH) 0.9 %
5-40 SYRINGE (ML) INJECTION EVERY 8 HOURS
Status: CANCELLED | OUTPATIENT
Start: 2021-05-27

## 2021-05-27 RX ORDER — SODIUM CHLORIDE 0.9 % (FLUSH) 0.9 %
5-40 SYRINGE (ML) INJECTION AS NEEDED
Status: CANCELLED | OUTPATIENT
Start: 2021-05-27

## 2021-05-27 RX ORDER — SODIUM CHLORIDE, SODIUM LACTATE, POTASSIUM CHLORIDE, CALCIUM CHLORIDE 600; 310; 30; 20 MG/100ML; MG/100ML; MG/100ML; MG/100ML
100 INJECTION, SOLUTION INTRAVENOUS CONTINUOUS
Status: CANCELLED | OUTPATIENT
Start: 2021-05-27

## 2021-05-28 ENCOUNTER — ANESTHESIA (OUTPATIENT)
Dept: ENDOSCOPY | Age: 61
End: 2021-05-28
Payer: COMMERCIAL

## 2021-05-28 ENCOUNTER — HOSPITAL ENCOUNTER (OUTPATIENT)
Age: 61
Setting detail: OUTPATIENT SURGERY
Discharge: HOME | End: 2021-05-28
Attending: INTERNAL MEDICINE | Admitting: INTERNAL MEDICINE
Payer: COMMERCIAL

## 2021-05-28 VITALS
SYSTOLIC BLOOD PRESSURE: 121 MMHG | OXYGEN SATURATION: 100 % | WEIGHT: 228 LBS | BODY MASS INDEX: 37.99 KG/M2 | HEIGHT: 65 IN | HEART RATE: 70 BPM | TEMPERATURE: 96.8 F | DIASTOLIC BLOOD PRESSURE: 71 MMHG | RESPIRATION RATE: 16 BRPM

## 2021-05-28 LAB
GLUCOSE BLD STRIP.AUTO-MCNC: 163 MG/DL (ref 65–100)
SERVICE CMNT-IMP: ABNORMAL

## 2021-05-28 PROCEDURE — 82962 GLUCOSE BLOOD TEST: CPT

## 2021-05-28 PROCEDURE — 76060000031 HC ANESTHESIA FIRST 0.5 HR: Performed by: INTERNAL MEDICINE

## 2021-05-28 PROCEDURE — 76040000025: Performed by: INTERNAL MEDICINE

## 2021-05-28 PROCEDURE — 77030021593 HC FCPS BIOP ENDOSC BSC -A: Performed by: INTERNAL MEDICINE

## 2021-05-28 PROCEDURE — 88305 TISSUE EXAM BY PATHOLOGIST: CPT

## 2021-05-28 PROCEDURE — 74011000250 HC RX REV CODE- 250: Performed by: REGISTERED NURSE

## 2021-05-28 PROCEDURE — 2709999900 HC NON-CHARGEABLE SUPPLY: Performed by: INTERNAL MEDICINE

## 2021-05-28 PROCEDURE — 74011250636 HC RX REV CODE- 250/636: Performed by: ANESTHESIOLOGY

## 2021-05-28 PROCEDURE — 74011250636 HC RX REV CODE- 250/636: Performed by: REGISTERED NURSE

## 2021-05-28 RX ORDER — PROPOFOL 10 MG/ML
INJECTION, EMULSION INTRAVENOUS
Status: DISCONTINUED | OUTPATIENT
Start: 2021-05-28 | End: 2021-05-28 | Stop reason: HOSPADM

## 2021-05-28 RX ORDER — LIDOCAINE HYDROCHLORIDE 20 MG/ML
INJECTION, SOLUTION EPIDURAL; INFILTRATION; INTRACAUDAL; PERINEURAL AS NEEDED
Status: DISCONTINUED | OUTPATIENT
Start: 2021-05-28 | End: 2021-05-28 | Stop reason: HOSPADM

## 2021-05-28 RX ORDER — PROPOFOL 10 MG/ML
INJECTION, EMULSION INTRAVENOUS AS NEEDED
Status: DISCONTINUED | OUTPATIENT
Start: 2021-05-28 | End: 2021-05-28 | Stop reason: HOSPADM

## 2021-05-28 RX ORDER — SODIUM CHLORIDE, SODIUM LACTATE, POTASSIUM CHLORIDE, CALCIUM CHLORIDE 600; 310; 30; 20 MG/100ML; MG/100ML; MG/100ML; MG/100ML
100 INJECTION, SOLUTION INTRAVENOUS CONTINUOUS
Status: DISCONTINUED | OUTPATIENT
Start: 2021-05-28 | End: 2021-05-28 | Stop reason: HOSPADM

## 2021-05-28 RX ADMIN — PROPOFOL 10 MG: 10 INJECTION, EMULSION INTRAVENOUS at 09:23

## 2021-05-28 RX ADMIN — LIDOCAINE HYDROCHLORIDE 100 MG: 20 INJECTION, SOLUTION EPIDURAL; INFILTRATION; INTRACAUDAL; PERINEURAL at 09:21

## 2021-05-28 RX ADMIN — PROPOFOL 10 MG: 10 INJECTION, EMULSION INTRAVENOUS at 09:22

## 2021-05-28 RX ADMIN — PROPOFOL 40 MG: 10 INJECTION, EMULSION INTRAVENOUS at 09:21

## 2021-05-28 RX ADMIN — SODIUM CHLORIDE, SODIUM LACTATE, POTASSIUM CHLORIDE, AND CALCIUM CHLORIDE 100 ML/HR: 600; 310; 30; 20 INJECTION, SOLUTION INTRAVENOUS at 08:33

## 2021-05-28 RX ADMIN — PROPOFOL 160 MCG/KG/MIN: 10 INJECTION, EMULSION INTRAVENOUS at 09:21

## 2021-05-28 NOTE — DISCHARGE INSTRUCTIONS
Gastrointestinal Esophagogastroduodenoscopy (EGD) - Upper Exam Discharge Instructions    1. Call Dr. Katelin Bear office for any problems or questions. 2. Contact the doctor's office for follow up appointment as directed. 3. Medication may cause drowsiness for several hours, therefore:  · Do not drive or operate machinery for remainder of the day. · No alcohol today. · Do not make any important or legal decisions for 24 hours. · Do not sign any legal documents for 24 hours. 5. Ordinarily, you may resume regular diet and activity after exam unless otherwise              specified by your physician. 6. For mild soreness in your throat you may use Cepacol throat lozenges or warm               salt-water gargles as needed. Any additional instructions:    1. Restart Eliquis on 5/29.  2. Office will call with pathology results and to set up an appointment with Dr. Adelina Fontanez in the next few weeks.

## 2021-05-28 NOTE — PROCEDURES
Esophagogastroduodenoscopy    DATE of PROCEDURE: 5/28/2021    INDICATION: low chest pain, screening for varices    POSTPROCEDURE DIAGNOSIS: esophageal varices; esophageal nodule; gastropathy    MEDICATIONS ADMINISTERED: MAC anesthesia (see anesthesia report)    INSTRUMENT:    PROCEDURE:  After obtaining informed consent, the patient was placed in the left lateral position and sedated. The endoscope was advanced under direct vision without difficulty. The esophagus, stomach (including retroflexed views) and duodenum were evaluated. The patient was taken to the recovery area in stable condition. FINDINGS:  ESOPHAGUS: mainly in mid esophagus are a couple of 2+ sized varices, minimally in distal esophagus; 3 mm nodule very distal esophagus close to GE junction that I cold biopsied. STOMACH: no gastric varices. Mild nonspecific gastropathy, not classic for portal hypertensive gastropathy  DUODENUM: normal    Estimated blood loss: 0-minimal   Specimens obtained during procedure: yes    PLAN: Followup with Dr Matthew Duckworth to discuss beta blocker prophylaxis.

## 2021-05-28 NOTE — H&P
PreProcedure H&P Update    Today's Date:  5/28/2021    CC:  GERD, screen for varices    Subjective:   HPI:  GERD, screen for varices    PMH:  Past Medical History:   Diagnosis Date    A-fib [facility])     atrial fibrillation    COVID-19 vaccine series completed 03/06/2021    [employer] 2nd dose 3/6/21    GERD (gastroesophageal reflux disease)     hiatal hernia, medication    Hypercholesterolemia 07/14/2020    Hypertension     managed with meds    Insulin dependent diabetes mellitus     oral and insulin, -285, A1c 10.9, denies hypoglycemia    Morbid obesity ([address]     QUINTANILLA (nonalcoholic steatohepatitis)     Sleep apnea     does not use CPAP       Medications:   Current Facility-Administered Medications   Medication Dose Route Frequency    lactated Ringers infusion  100 mL/hr IntraVENous CONTINUOUS         Objective:   Vitals:  Visit Vitals  BP (!) 142/69   Pulse 68   Resp 18   Ht 5' 5\" (1.651 m)   Wt 103.4 kg (228 lb)   SpO2 98%   Breastfeeding No   BMI 37.94 kg/m²     Exam:  General appearance: alert, cooperative, no distress  Lungs: clear to auscultation bilaterally anteriorly  Heart: regular rate and rhythm  Abdomen: soft, non-tender. Bowel sounds normal. No masses, no organomegaly      Data Review (Labs):    No results for input(s): WBC, HGB, HCT, PLT, MCV, NA, K, CL, CO2, BUN, CREA, CA, GLU, AP, TBIL, CBIL, ALB, TP, AML, LPSE, PTP, INR, APTT, HGBEXT, HCTEXT, PLTEXT, INREXT in the last 72 hours.     No lab exists for component: SGOT, GPT, DBIL    Plan:       GERD, screen for varices proceed with EGD

## 2021-05-28 NOTE — ANESTHESIA PREPROCEDURE EVALUATION
Relevant Problems   No relevant active problems       Anesthetic History   No history of anesthetic complications            Review of Systems / Medical History  Patient summary reviewed and pertinent labs reviewed    Pulmonary        Sleep apnea: No treatment           Neuro/Psych   Within defined limits           Cardiovascular    Hypertension: well controlled        Dysrhythmias (Eliquis held x 3d) : atrial fibrillation      Exercise tolerance: >4 METS  Comments: Stress 3/20 - normal perfusion  Echo 3/20 - normal EF, no sig valve dz   GI/Hepatic/Renal     GERD: well controlled      Liver disease (QUINTANILLA)     Endo/Other    Diabetes: well controlled, type 2, using insulin    Obesity     Other Findings              Physical Exam    Airway  Mallampati: II  TM Distance: 4 - 6 cm  Neck ROM: normal range of motion   Mouth opening: Normal     Cardiovascular    Rhythm: regular  Rate: normal         Dental    Dentition: Upper partial plate     Pulmonary  Breath sounds clear to auscultation               Abdominal         Other Findings            Anesthetic Plan    ASA: 3  Anesthesia type: total IV anesthesia            Anesthetic plan and risks discussed with: Patient

## 2021-05-28 NOTE — ANESTHESIA POSTPROCEDURE EVALUATION
Procedure(s):  ESOPHAGOGASTRODUODENOSCOPY (EGD) BMI 38  ESOPHAGOGASTRODUODENAL (EGD) BIOPSY. total IV anesthesia    Anesthesia Post Evaluation      Multimodal analgesia: multimodal analgesia used between 6 hours prior to anesthesia start to PACU discharge  Patient location during evaluation: PACU  Patient participation: complete - patient participated  Level of consciousness: awake  Pain management: adequate  Airway patency: patent  Anesthetic complications: no  Cardiovascular status: acceptable and hemodynamically stable  Respiratory status: acceptable  Hydration status: acceptable  Comments: Acceptable for discharge from PACU. Post anesthesia nausea and vomiting:  none  Final Post Anesthesia Temperature Assessment:  Normothermia (36.0-37.5 degrees C)      INITIAL Post-op Vital signs:   Vitals Value Taken Time   /68 05/28/21 0943   Temp     Pulse 68 05/28/21 0948   Resp     SpO2 98 % 05/28/21 0948   Vitals shown include unvalidated device data.

## 2021-08-18 ENCOUNTER — HOSPITAL ENCOUNTER (EMERGENCY)
Age: 61
Discharge: HOME | End: 2021-08-19
Payer: COMMERCIAL

## 2021-08-18 ENCOUNTER — APPOINTMENT (OUTPATIENT)
Dept: ULTRASOUND IMAGING | Age: 61
End: 2021-08-18
Attending: EMERGENCY MEDICINE
Payer: COMMERCIAL

## 2021-08-18 VITALS
HEART RATE: 95 BPM | BODY MASS INDEX: 38.65 KG/M2 | OXYGEN SATURATION: 97 % | DIASTOLIC BLOOD PRESSURE: 88 MMHG | TEMPERATURE: 99.1 F | RESPIRATION RATE: 20 BRPM | HEIGHT: 65 IN | WEIGHT: 232 LBS | SYSTOLIC BLOOD PRESSURE: 163 MMHG

## 2021-08-18 DIAGNOSIS — M79.601 RIGHT ARM PAIN: Primary | ICD-10-CM

## 2021-08-18 PROCEDURE — 93971 EXTREMITY STUDY: CPT

## 2021-08-18 PROCEDURE — 99283 EMERGENCY DEPT VISIT LOW MDM: CPT

## 2021-08-19 PROCEDURE — 74011250637 HC RX REV CODE- 250/637: Performed by: EMERGENCY MEDICINE

## 2021-08-19 RX ORDER — HYDROCODONE BITARTRATE AND ACETAMINOPHEN 10; 325 MG/1; MG/1
1 TABLET ORAL
Status: COMPLETED | OUTPATIENT
Start: 2021-08-19 | End: 2021-08-19

## 2021-08-19 RX ORDER — CYCLOBENZAPRINE HCL 10 MG
10 TABLET ORAL
Status: COMPLETED | OUTPATIENT
Start: 2021-08-19 | End: 2021-08-19

## 2021-08-19 RX ORDER — HYDROCODONE BITARTRATE AND ACETAMINOPHEN 5; 325 MG/1; MG/1
1-2 TABLET ORAL
Qty: 20 TABLET | Refills: 0 | Status: SHIPPED | OUTPATIENT
Start: 2021-08-19 | End: 2021-08-22

## 2021-08-19 RX ORDER — CYCLOBENZAPRINE HCL 10 MG
10 TABLET ORAL
Qty: 30 TABLET | Refills: 0 | Status: SHIPPED | OUTPATIENT
Start: 2021-08-19

## 2021-08-19 RX ORDER — IBUPROFEN 800 MG/1
800 TABLET ORAL
Status: COMPLETED | OUTPATIENT
Start: 2021-08-19 | End: 2021-08-19

## 2021-08-19 RX ADMIN — HYDROCODONE BITARTRATE AND ACETAMINOPHEN 1 TABLET: 10; 325 TABLET ORAL at 00:34

## 2021-08-19 RX ADMIN — CYCLOBENZAPRINE 10 MG: 10 TABLET, FILM COATED ORAL at 00:34

## 2021-08-19 RX ADMIN — IBUPROFEN 800 MG: 800 TABLET ORAL at 00:34

## 2021-08-19 NOTE — DISCHARGE INSTRUCTIONS
Wear sling for comfort for a few days.   Remove sling to do some range of motion exercises several times a day, as demonstrated  Alternate 4 ibuprofen every 8 hours with 2 extra-strength tylenol every 6 hours

## 2021-08-19 NOTE — ED NOTES
I have reviewed discharge instructions with the patient. The patient verbalized understanding. Patient left ED via Discharge Method: ambulatory to Home with (spouse). Opportunity for questions and clarification provided. Patient given 2 scripts. To continue your aftercare when you leave the hospital, you may receive an automated call from our care team to check in on how you are doing. This is a free service and part of our promise to provide the best care and service to meet your aftercare needs.  If you have questions, or wish to unsubscribe from this service please call [phone]. Thank you for Choosing our [facility] Emergency Department.

## 2021-08-20 NOTE — ED PROVIDER NOTES
42-year-old female presents to the ER for evaluation of right arm pain. Onset 4 days ago. Pain is predominantly in the right anterior bicep but does radiate distally. There is no dermatomal component to it    Worsens with movement, no relief with Tylenol    Patient recalls the day before the pain started she had moved to [address] [place] [address], as they are preparing to move from [place] to [place]    Patient was sent to us from an urgent care who was concerned about the possibility of a DVT despite being on Eliquis for atrial fibrillation.            Past Medical History:   Diagnosis Date    A-fib [facility])     atrial fibrillation    COVID-19 vaccine series completed 03/06/2021    Pfizer 2nd dose 3/6/21    GERD (gastroesophageal reflux disease)     hiatal hernia, medication    Hypercholesterolemia 07/14/2020    Hypertension     managed with meds    Insulin dependent diabetes mellitus     oral and insulin, -285, A1c 10.9, denies hypoglycemia    Morbid obesity ([address]     QUINTANILLA (nonalcoholic steatohepatitis)     Sleep apnea     does not use CPAP       Past Surgical History:   Procedure Laterality Date    COLONOSCOPY N/A 12/14/2017    COLONOSCOPY / BMI=36 performed by Marietta Martinez MD at [address] [place] [address] N/A 3/26/2019    COLONOSCOPY/ 36 performed by Danielle Archibald MD at [facility] HX LAP CHOLECYSTECTOMY  2010    [address]    HX UROLOGICAL  09/2017    bladder sling          Family History:   Family history unknown: Yes       Social History     Socioeconomic History    Marital status:      Spouse name: Not on file    Number of children: 3    Years of education: Not on file    Highest education level: Not on file   Occupational History    Occupation: LPN     Employer: [employer]   Tobacco Use    Smoking status: Current Every Day Smoker     Packs/day: 1.00     Years: 30.00     Pack years: 30.00    Smokeless tobacco: Never Used    Tobacco comment: working on quitting   Vaping Use    Vaping Use: Never used   Substance and Sexual Activity    Alcohol use: No    Drug use: No    Sexual activity: Not on file   Other Topics Concern     Service Not Asked    Blood Transfusions Not Asked    Caffeine Concern Not Asked    Occupational Exposure Not Asked    Hobby Hazards Not Asked    Sleep Concern Not Asked    Stress Concern Not Asked    Weight Concern Not Asked    Special Diet Not Asked    Back Care Not Asked    Exercise Not Asked    Bike Helmet Not Asked   [address] Not Asked    Self-Exams Not Asked   Social History Narrative    Not on file     Social Determinants of Health     Financial Resource Strain:     Difficulty of Paying Living Expenses:    Food Insecurity:     Worried About Running Out of Food in the Last Year:     [address] in the Last Year:    Transportation Needs:     Lack of Transportation (Medical):  Lack of Transportation (Non-Medical):    Physical Activity:     Days of Exercise per Week:     Minutes of Exercise per Session:    Stress:     Feeling of Stress :    Social Connections:     Frequency of Communication with Friends and Family:     Frequency of Social Gatherings with Friends and Family:     Attends Oriental orthodox Services:     Active Member of Clubs or Organizations:     Attends Club or Organization Meetings:     Marital Status:    Intimate Partner Violence:     Fear of Current or Ex-Partner:     Emotionally Abused:     Physically Abused:     Sexually Abused: ALLERGIES: Patient has no known allergies. Review of Systems   Musculoskeletal: Positive for arthralgias and myalgias. Negative for neck pain and neck stiffness. Neurological: Negative for weakness and numbness. All other systems reviewed and are negative.       Vitals:    08/18/21 2142   BP: (!) 163/88   Pulse: 95   Resp: 20   Temp: 99.1 °F (37.3 °C)   SpO2: 97%   Weight: 105.2 kg (232 lb)   Height: 5' 5\" (1.651 m) Physical Exam  Vitals and nursing note reviewed. Constitutional:       General: She is not in acute distress. Appearance: Normal appearance. She is well-developed. She is not ill-appearing, toxic-appearing or diaphoretic. HENT:      Head: Normocephalic and atraumatic. Right Ear: External ear normal.      Left Ear: External ear normal.   Eyes:      General:         Right eye: No discharge. Left eye: No discharge. Conjunctiva/sclera: Conjunctivae normal.   Pulmonary:      Effort: Pulmonary effort is normal. No respiratory distress. Musculoskeletal:         General: Tenderness present. Normal range of motion. Cervical back: Normal range of motion and neck supple. Comments: Mild diffuse right upper arm pain without swelling contusion or ecchymosis   Skin:     General: Skin is warm and dry. Findings: No rash. Neurological:      General: No focal deficit present. Mental Status: She is alert and oriented to person, place, and time. Mental status is at baseline. Motor: No abnormal muscle tone. Comments: cni 2-12 grossly  Nl gait,  Nl speech     Psychiatric:         Mood and Affect: Mood normal.         Behavior: Behavior normal.          MDM  Number of Diagnoses or Management Options  Right arm pain: new and requires workup  Diagnosis management comments: Medical decision making note:  Arm pain onset after moving furniture, Doppler ultrasound ordered from triage before my evaluation or consultation, no DVT  But musculoskeletal strain  This concludes the \"medical decision making note\" part of this emergency department visit note.          Amount and/or Complexity of Data Reviewed  Tests in the radiology section of CPT®: reviewed and ordered  Decide to obtain previous medical records or to obtain history from someone other than the patient: yes    Risk of Complications, Morbidity, and/or Mortality  Presenting problems: low  Diagnostic procedures: low  Management options: low    Patient Progress  Patient progress: improved         Procedures

## 2021-11-19 ENCOUNTER — OFFICE VISIT (OUTPATIENT)
Dept: FAMILY MEDICINE CLINIC | Facility: CLINIC | Age: 61
End: 2021-11-19

## 2021-11-19 ENCOUNTER — LAB (OUTPATIENT)
Dept: LAB | Facility: HOSPITAL | Age: 61
End: 2021-11-19

## 2021-11-19 VITALS
OXYGEN SATURATION: 99 % | TEMPERATURE: 97.8 F | BODY MASS INDEX: 38.02 KG/M2 | SYSTOLIC BLOOD PRESSURE: 138 MMHG | HEIGHT: 65 IN | WEIGHT: 228.2 LBS | DIASTOLIC BLOOD PRESSURE: 72 MMHG | HEART RATE: 90 BPM

## 2021-11-19 DIAGNOSIS — E11.65 TYPE 2 DIABETES MELLITUS WITH HYPERGLYCEMIA, WITH LONG-TERM CURRENT USE OF INSULIN (HCC): Chronic | ICD-10-CM

## 2021-11-19 DIAGNOSIS — Z79.4 TYPE 2 DIABETES MELLITUS WITH HYPERGLYCEMIA, WITH LONG-TERM CURRENT USE OF INSULIN (HCC): ICD-10-CM

## 2021-11-19 DIAGNOSIS — I48.0 PAROXYSMAL ATRIAL FIBRILLATION (HCC): Chronic | ICD-10-CM

## 2021-11-19 DIAGNOSIS — R06.00 DYSPNEA, UNSPECIFIED TYPE: ICD-10-CM

## 2021-11-19 DIAGNOSIS — Z11.59 NEED FOR HEPATITIS C SCREENING TEST: ICD-10-CM

## 2021-11-19 DIAGNOSIS — I10 ESSENTIAL HYPERTENSION: Chronic | ICD-10-CM

## 2021-11-19 DIAGNOSIS — E78.2 MIXED HYPERLIPIDEMIA: ICD-10-CM

## 2021-11-19 DIAGNOSIS — K75.81 NASH (NONALCOHOLIC STEATOHEPATITIS): ICD-10-CM

## 2021-11-19 DIAGNOSIS — Z72.0 TOBACCO ABUSE: ICD-10-CM

## 2021-11-19 DIAGNOSIS — Z76.89 ESTABLISHING CARE WITH NEW DOCTOR, ENCOUNTER FOR: Primary | ICD-10-CM

## 2021-11-19 DIAGNOSIS — E55.9 VITAMIN D DEFICIENCY: ICD-10-CM

## 2021-11-19 DIAGNOSIS — I10 ESSENTIAL HYPERTENSION: ICD-10-CM

## 2021-11-19 DIAGNOSIS — Z79.4 TYPE 2 DIABETES MELLITUS WITH HYPERGLYCEMIA, WITH LONG-TERM CURRENT USE OF INSULIN (HCC): Chronic | ICD-10-CM

## 2021-11-19 DIAGNOSIS — E78.2 MIXED HYPERLIPIDEMIA: Chronic | ICD-10-CM

## 2021-11-19 DIAGNOSIS — E11.65 TYPE 2 DIABETES MELLITUS WITH HYPERGLYCEMIA, WITH LONG-TERM CURRENT USE OF INSULIN (HCC): ICD-10-CM

## 2021-11-19 PROBLEM — R74.01 ELEVATED TRANSAMINASE LEVEL: Status: ACTIVE | Noted: 2017-01-09

## 2021-11-19 PROBLEM — R07.9 CHEST PAIN: Status: RESOLVED | Noted: 2020-11-13 | Resolved: 2021-11-19

## 2021-11-19 PROBLEM — E87.6 HYPOKALEMIA: Status: RESOLVED | Noted: 2017-01-09 | Resolved: 2021-11-19

## 2021-11-19 PROBLEM — K64.5 EXTERNAL HEMORRHOID, THROMBOSED: Status: RESOLVED | Noted: 2018-10-10 | Resolved: 2021-11-19

## 2021-11-19 PROBLEM — K31.84 DIABETIC GASTROPARESIS: Status: ACTIVE | Noted: 2017-10-19

## 2021-11-19 PROBLEM — Z86.010 HX OF COLONIC POLYP: Status: ACTIVE | Noted: 2017-10-30

## 2021-11-19 PROBLEM — K64.5 EXTERNAL HEMORRHOID, THROMBOSED: Status: ACTIVE | Noted: 2018-10-10

## 2021-11-19 PROBLEM — K57.30 DIVERTICULOSIS OF COLON: Status: ACTIVE | Noted: 2017-10-30

## 2021-11-19 PROBLEM — G47.33 OBSTRUCTIVE SLEEP APNEA SYNDROME: Status: ACTIVE | Noted: 2017-04-10

## 2021-11-19 PROBLEM — Z86.0100 HX OF COLONIC POLYP: Status: ACTIVE | Noted: 2017-10-30

## 2021-11-19 PROBLEM — E03.9 HYPOTHYROIDISM: Status: ACTIVE | Noted: 2019-03-04

## 2021-11-19 PROBLEM — R07.9 CHEST PAIN: Status: ACTIVE | Noted: 2020-11-13

## 2021-11-19 PROBLEM — E11.43 DIABETIC GASTROPARESIS: Status: ACTIVE | Noted: 2017-10-19

## 2021-11-19 PROBLEM — N32.81 OAB (OVERACTIVE BLADDER): Status: ACTIVE | Noted: 2017-01-09

## 2021-11-19 PROBLEM — E87.6 HYPOKALEMIA: Status: ACTIVE | Noted: 2017-01-09

## 2021-11-19 LAB
25(OH)D3 SERPL-MCNC: 22.6 NG/ML (ref 30–100)
ALBUMIN SERPL-MCNC: 4 G/DL (ref 3.5–5.2)
ALBUMIN UR-MCNC: 1.8 MG/DL
ALBUMIN/GLOB SERPL: 0.8 G/DL
ALP SERPL-CCNC: 130 U/L (ref 39–117)
ALT SERPL W P-5'-P-CCNC: 67 U/L (ref 1–33)
ANION GAP SERPL CALCULATED.3IONS-SCNC: 7.5 MMOL/L (ref 5–15)
AST SERPL-CCNC: 57 U/L (ref 1–32)
BASOPHILS # BLD AUTO: 0.04 10*3/MM3 (ref 0–0.2)
BASOPHILS NFR BLD AUTO: 0.5 % (ref 0–1.5)
BILIRUB SERPL-MCNC: 0.3 MG/DL (ref 0–1.2)
BUN SERPL-MCNC: 9 MG/DL (ref 6–20)
BUN/CREAT SERPL: 11.5 (ref 7–25)
CALCIUM SPEC-SCNC: 9.5 MG/DL (ref 8.6–10.5)
CHLORIDE SERPL-SCNC: 103 MMOL/L (ref 98–107)
CHOLEST SERPL-MCNC: 217 MG/DL (ref 0–200)
CO2 SERPL-SCNC: 25.5 MMOL/L (ref 22–29)
CREAT SERPL-MCNC: 0.78 MG/DL (ref 0.57–1)
DEPRECATED RDW RBC AUTO: 50 FL (ref 37–54)
EOSINOPHIL # BLD AUTO: 0.09 10*3/MM3 (ref 0–0.4)
EOSINOPHIL NFR BLD AUTO: 1.1 % (ref 0.3–6.2)
ERYTHROCYTE [DISTWIDTH] IN BLOOD BY AUTOMATED COUNT: 15.6 % (ref 12.3–15.4)
GFR SERPL CREATININE-BSD FRML MDRD: 92 ML/MIN/1.73
GLOBULIN UR ELPH-MCNC: 4.8 GM/DL
GLUCOSE SERPL-MCNC: 294 MG/DL (ref 65–99)
HBA1C MFR BLD: 8.39 % (ref 4.8–5.6)
HCT VFR BLD AUTO: 40.6 % (ref 34–46.6)
HCV AB SER DONR QL: NORMAL
HDLC SERPL-MCNC: 31 MG/DL (ref 40–60)
HGB BLD-MCNC: 13.8 G/DL (ref 12–15.9)
IMM GRANULOCYTES # BLD AUTO: 0.03 10*3/MM3 (ref 0–0.05)
IMM GRANULOCYTES NFR BLD AUTO: 0.4 % (ref 0–0.5)
LDLC SERPL CALC-MCNC: 148 MG/DL (ref 0–100)
LDLC/HDLC SERPL: 4.66 {RATIO}
LYMPHOCYTES # BLD AUTO: 3.43 10*3/MM3 (ref 0.7–3.1)
LYMPHOCYTES NFR BLD AUTO: 40.6 % (ref 19.6–45.3)
MCH RBC QN AUTO: 30.1 PG (ref 26.6–33)
MCHC RBC AUTO-ENTMCNC: 34 G/DL (ref 31.5–35.7)
MCV RBC AUTO: 88.5 FL (ref 79–97)
MONOCYTES # BLD AUTO: 0.39 10*3/MM3 (ref 0.1–0.9)
MONOCYTES NFR BLD AUTO: 4.6 % (ref 5–12)
NEUTROPHILS NFR BLD AUTO: 4.46 10*3/MM3 (ref 1.7–7)
NEUTROPHILS NFR BLD AUTO: 52.8 % (ref 42.7–76)
NRBC BLD AUTO-RTO: 0 /100 WBC (ref 0–0.2)
PLATELET # BLD AUTO: 267 10*3/MM3 (ref 140–450)
PMV BLD AUTO: 11.9 FL (ref 6–12)
POTASSIUM SERPL-SCNC: 3.9 MMOL/L (ref 3.5–5.2)
PROT SERPL-MCNC: 8.8 G/DL (ref 6–8.5)
RBC # BLD AUTO: 4.59 10*6/MM3 (ref 3.77–5.28)
SODIUM SERPL-SCNC: 136 MMOL/L (ref 136–145)
TRIGL SERPL-MCNC: 207 MG/DL (ref 0–150)
TSH SERPL DL<=0.05 MIU/L-ACNC: 2.06 UIU/ML (ref 0.27–4.2)
VLDLC SERPL-MCNC: 38 MG/DL (ref 5–40)
WBC NRBC COR # BLD: 8.44 10*3/MM3 (ref 3.4–10.8)

## 2021-11-19 PROCEDURE — 84443 ASSAY THYROID STIM HORMONE: CPT

## 2021-11-19 PROCEDURE — 80053 COMPREHEN METABOLIC PANEL: CPT

## 2021-11-19 PROCEDURE — 82306 VITAMIN D 25 HYDROXY: CPT

## 2021-11-19 PROCEDURE — 86803 HEPATITIS C AB TEST: CPT

## 2021-11-19 PROCEDURE — 83036 HEMOGLOBIN GLYCOSYLATED A1C: CPT

## 2021-11-19 PROCEDURE — 85025 COMPLETE CBC W/AUTO DIFF WBC: CPT

## 2021-11-19 PROCEDURE — 80061 LIPID PANEL: CPT

## 2021-11-19 PROCEDURE — 82043 UR ALBUMIN QUANTITATIVE: CPT

## 2021-11-19 PROCEDURE — 36415 COLL VENOUS BLD VENIPUNCTURE: CPT

## 2021-11-19 PROCEDURE — 99204 OFFICE O/P NEW MOD 45 MIN: CPT | Performed by: PHYSICIAN ASSISTANT

## 2021-11-19 RX ORDER — METOPROLOL TARTRATE 50 MG/1
75 TABLET, FILM COATED ORAL 2 TIMES DAILY
COMMUNITY
Start: 2021-06-22 | End: 2022-08-22 | Stop reason: SDUPTHER

## 2021-11-19 RX ORDER — OLMESARTAN MEDOXOMIL 5 MG/1
10 TABLET ORAL DAILY
COMMUNITY
End: 2021-11-19 | Stop reason: SDUPTHER

## 2021-11-19 RX ORDER — CYCLOBENZAPRINE HCL 10 MG
10 TABLET ORAL
COMMUNITY
Start: 2021-08-19 | End: 2021-11-19

## 2021-11-19 RX ORDER — FLURBIPROFEN SODIUM 0.3 MG/ML
SOLUTION/ DROPS OPHTHALMIC
COMMUNITY
Start: 2021-10-08

## 2021-11-19 RX ORDER — PROCHLORPERAZINE 25 MG/1
SUPPOSITORY RECTAL
COMMUNITY
Start: 2021-10-08 | End: 2021-11-19 | Stop reason: SDUPTHER

## 2021-11-19 RX ORDER — MELATONIN
2000 DAILY
COMMUNITY

## 2021-11-19 RX ORDER — INSULIN GLARGINE 300 U/ML
120 INJECTION, SOLUTION SUBCUTANEOUS DAILY
COMMUNITY
Start: 2020-02-03 | End: 2022-07-30

## 2021-11-19 RX ORDER — IBUPROFEN 800 MG/1
800 TABLET ORAL 3 TIMES DAILY PRN
COMMUNITY
End: 2022-11-21

## 2021-11-19 RX ORDER — PEN NEEDLE, DIABETIC 31 G X1/4"
NEEDLE, DISPOSABLE MISCELLANEOUS
COMMUNITY
Start: 2021-10-08 | End: 2021-11-19 | Stop reason: SDUPTHER

## 2021-11-19 RX ORDER — SEMAGLUTIDE 1.34 MG/ML
INJECTION, SOLUTION SUBCUTANEOUS
COMMUNITY
Start: 2021-11-01 | End: 2021-11-19 | Stop reason: SDUPTHER

## 2021-11-19 RX ORDER — METOPROLOL TARTRATE 50 MG/1
TABLET, FILM COATED ORAL
COMMUNITY
Start: 2021-10-23 | End: 2021-11-19 | Stop reason: SDUPTHER

## 2021-11-19 RX ORDER — ALBUTEROL SULFATE 90 UG/1
2 AEROSOL, METERED RESPIRATORY (INHALATION) EVERY 4 HOURS PRN
COMMUNITY

## 2021-11-19 RX ORDER — AMLODIPINE BESYLATE 10 MG/1
10 TABLET ORAL DAILY
COMMUNITY
End: 2022-12-16 | Stop reason: SDUPTHER

## 2021-11-19 RX ORDER — OLMESARTAN MEDOXOMIL 20 MG/1
20 TABLET ORAL DAILY
COMMUNITY
Start: 2021-11-10 | End: 2022-11-21 | Stop reason: SDUPTHER

## 2021-11-19 RX ORDER — SEMAGLUTIDE 1.34 MG/ML
0.5 INJECTION, SOLUTION SUBCUTANEOUS
Status: ON HOLD | COMMUNITY
Start: 2021-07-30 | End: 2022-04-16

## 2021-11-19 RX ORDER — INSULIN LISPRO 100 [IU]/ML
55 INJECTION, SOLUTION INTRAVENOUS; SUBCUTANEOUS
COMMUNITY
Start: 2021-07-30 | End: 2022-01-27 | Stop reason: SDUPTHER

## 2021-11-19 RX ORDER — MULTIPLE VITAMINS W/ MINERALS TAB 9MG-400MCG
1 TAB ORAL DAILY
COMMUNITY

## 2021-11-19 RX ORDER — BUPROPION HYDROCHLORIDE 150 MG/1
150 TABLET ORAL 2 TIMES DAILY
COMMUNITY
End: 2022-07-01 | Stop reason: SDUPTHER

## 2021-11-19 RX ORDER — MULTIVIT WITH MINERALS/LUTEIN
250 TABLET ORAL DAILY
COMMUNITY

## 2021-11-19 RX ORDER — POLYETHYLENE GLYCOL 3350 17 G/17G
17 POWDER, FOR SOLUTION ORAL 2 TIMES DAILY
COMMUNITY
End: 2022-06-30 | Stop reason: SDUPTHER

## 2021-11-19 RX ORDER — OMEPRAZOLE 40 MG/1
40 CAPSULE, DELAYED RELEASE ORAL
COMMUNITY
End: 2021-12-30

## 2021-11-19 RX ORDER — PEN NEEDLE, DIABETIC 30 GX5/16"
NEEDLE, DISPOSABLE MISCELLANEOUS
COMMUNITY
Start: 2021-10-08 | End: 2022-10-08

## 2021-11-19 RX ORDER — HYDROXYZINE HYDROCHLORIDE 25 MG/1
25 TABLET, FILM COATED ORAL 2 TIMES DAILY PRN
COMMUNITY
Start: 2021-06-23

## 2021-11-19 RX ORDER — INSULIN GLARGINE 300 U/ML
INJECTION, SOLUTION SUBCUTANEOUS
COMMUNITY
Start: 2021-11-18 | End: 2021-11-19 | Stop reason: SDUPTHER

## 2021-11-19 NOTE — PROGRESS NOTES
Chief Complaint  Chief Complaint   Patient presents with   • Establish Care   • Medication Refills       Subjective          Anika Casillas presents to [facility]  History of Present Illness     Establish Care     Patient would like refills on medications today.     Last PCP: Dr. Wesley Whitehead ([place])    PAP: 12/2020    MAMMO: 12/2020    Colonoscopy: 2 years ago    Labs 7/2021  ________  HTN: Patient presents for hypertension management. Patient is currently taking Amlodipine and Benicar and is consistent with medication. Patient denies chest pain, shortness of air and edema.     Afib: on Eliquis 5mg twice daily; needs referral to cardiology.    COPD: on albuterol and Stiolto (having diffiuculty affording but previous provider gave sample); needing referral to pulmonlogy    Hyperlipidemia: not on any medication due to elevated LFTs and BERKOWITZ; needs referral to both cardiology and GI    BERKOWITZ: needs referral to GI    DM: On Jardiance 25mg daily, Toujeo 120 u subq, Humalog 55 units and sliding scale, Ozempic 0.5mg subq weekly; patient states feet with blisters and needs referral to podiatry as well as endocrinology.          Medical History: has a past medical history of Atrial fibrillation (HCC), Diabetes mellitus (HCC), and Hypertension.   Surgical History: has a past surgical history that includes Gallbladder surgery and Breast lumpectomy.   Family History: Family history is unknown by patient.   Social History: reports that she has been smoking. She has a 10.25 pack-year smoking history. She has never used smokeless tobacco. She reports current alcohol use. She reports that she does not use drugs.    Allergies: Liraglutide and Metformin    Health Maintenance Due   Topic Date Due   • COLORECTAL CANCER SCREENING  Never done   • ANNUAL PHYSICAL  Never done   • Hepatitis B (1 of 3 - Risk 3-dose series) Never done   • TDAP/TD VACCINES (1 - Tdap) Never done   • ZOSTER VACCINE (1 of  "2) Never done   • COVID-19 Vaccine (3 - Booster for Pfizer series) 09/06/2021   • DIABETIC FOOT EXAM  Never done   • DIABETIC EYE EXAM  Never done       Immunization History   Administered Date(s) Administered   • COVID-19 (PFIZER) 02/06/2021, 03/06/2021   • Flu Vaccine Intradermal Quad 18-64YR 09/12/2021   • FluLaval/Fluarix/Fluzone >6 10/29/2019   • Influenza, Unspecified 11/12/2018, 09/27/2020   • Pneumococcal Conjugate 13-Valent (PCV13) 01/09/2017   • Pneumococcal Polysaccharide (PPSV23) 10/12/2020       Objective     Vitals:    11/19/21 1100 11/19/21 1149   BP: 148/84 138/72   BP Location: Right arm    Patient Position: Sitting    Pulse: 90    Temp: 97.8 °F (36.6 °C)    SpO2: 99%    Weight: 104 kg (228 lb 3.2 oz)    Height: 165.1 cm (65\")      Body mass index is 37.97 kg/m².       Physical Exam  Vitals and nursing note reviewed.   Constitutional:       Appearance: Normal appearance. She is obese.   HENT:      Head: Normocephalic and atraumatic.   Neck:      Vascular: No carotid bruit.      Comments: Thyroid : gland size normal, nontender, no nodules or masses present on palpation   Cardiovascular:      Rate and Rhythm: Normal rate and regular rhythm.      Pulses: Normal pulses.      Heart sounds: Murmur heard.       Pulmonary:      Effort: Pulmonary effort is normal.      Breath sounds: Normal breath sounds.   Musculoskeletal:      Cervical back: Neck supple. No tenderness.      Right lower leg: No edema.      Left lower leg: No edema.   Lymphadenopathy:      Cervical: No cervical adenopathy.   Neurological:      Mental Status: She is alert.   Psychiatric:         Mood and Affect: Mood normal.         Behavior: Behavior normal.           Result Review :                           Assessment and Plan      Diagnoses and all orders for this visit:    1. Establishing care with new doctor, encounter for (Primary)    2. Paroxysmal atrial fibrillation (HCC)  Comments:  Stable on Eliquis 5mg twice daily; referral to " cardiology.  Orders:  -     apixaban (ELIQUIS) 5 MG tablet tablet; Take 1 tablet by mouth 2 (Two) Times a Day.  Dispense: 180 tablet; Refill: 1  -     Ambulatory Referral to Cardiology    3. Essential hypertension  Comments:  Stable on Amlodipine 10mg daily and Benicar 20mg daily; check labs and follow up in 6mths.  Orders:  -     Comprehensive Metabolic Panel; Future  -     Lipid Panel; Future  -     CBC & Differential; Future    4. Mixed hyperlipidemia  Comments:  Check labs; consult with cardiology in regards to lipid lowering and elevated LFTs  Orders:  -     Lipid Panel; Future    5. BERKOWITZ (nonalcoholic steatohepatitis)  Comments:  Referral to GI.  Orders:  -     Ambulatory Referral to Gastroenterology    6. Type 2 diabetes mellitus with hyperglycemia, with long-term current use of insulin (HCC)  Comments:  Continue with current medication; referral to Endocrinology and Podiatry.  Orders:  -     TSH; Future  -     Hemoglobin A1c; Future  -     MicroAlbumin, Urine, Random - Urine, Clean Catch; Future  -     Ambulatory Referral to Endocrinology  -     Ambulatory Referral to Podiatry    7. Tobacco abuse  Comments:  Patient is trying to quit; on wellbutrin XL 300mg daily; down to 5 cig/day.    8. Dyspnea, unspecified type  Comments:  Referral to pulmonology.  Orders:  -     Ambulatory Referral to Pulmonology    9. Vitamin D deficiency  Comments:  Continue on current medication, check labs.  Orders:  -     Vitamin D 25 Hydroxy; Future    10. Need for hepatitis C screening test  -     Hepatitis C Antibody; Future            Follow Up     Return in about 6 months (around 5/19/2022).    Patient was given instructions and counseling regarding her condition or for health maintenance advice. Please see specific information pulled into the AVS if appropriate.

## 2021-11-22 PROBLEM — J43.8 OTHER EMPHYSEMA: Status: ACTIVE | Noted: 2021-11-22

## 2021-11-23 ENCOUNTER — TELEPHONE (OUTPATIENT)
Dept: FAMILY MEDICINE CLINIC | Facility: CLINIC | Age: 61
End: 2021-11-23

## 2021-12-10 ENCOUNTER — OFFICE VISIT (OUTPATIENT)
Dept: CARDIOLOGY | Facility: CLINIC | Age: 61
End: 2021-12-10

## 2021-12-10 VITALS
WEIGHT: 225 LBS | SYSTOLIC BLOOD PRESSURE: 170 MMHG | HEIGHT: 65 IN | HEART RATE: 91 BPM | BODY MASS INDEX: 37.49 KG/M2 | DIASTOLIC BLOOD PRESSURE: 94 MMHG

## 2021-12-10 DIAGNOSIS — E78.2 MIXED HYPERLIPIDEMIA: ICD-10-CM

## 2021-12-10 DIAGNOSIS — E66.01 MORBID (SEVERE) OBESITY DUE TO EXCESS CALORIES (HCC): ICD-10-CM

## 2021-12-10 DIAGNOSIS — F17.200 SMOKER: ICD-10-CM

## 2021-12-10 DIAGNOSIS — I10 HYPERTENSION, ESSENTIAL: ICD-10-CM

## 2021-12-10 DIAGNOSIS — I25.10 MILD CAD: ICD-10-CM

## 2021-12-10 DIAGNOSIS — I48.0 PAROXYSMAL ATRIAL FIBRILLATION (HCC): Primary | ICD-10-CM

## 2021-12-10 PROCEDURE — 99204 OFFICE O/P NEW MOD 45 MIN: CPT | Performed by: INTERNAL MEDICINE

## 2021-12-10 PROCEDURE — 99406 BEHAV CHNG SMOKING 3-10 MIN: CPT | Performed by: INTERNAL MEDICINE

## 2021-12-10 RX ORDER — EVOLOCUMAB 140 MG/ML
140 INJECTION, SOLUTION SUBCUTANEOUS
Qty: 45 EACH | Refills: 3 | Status: SHIPPED | OUTPATIENT
Start: 2021-12-10 | End: 2022-03-23

## 2021-12-10 NOTE — PROGRESS NOTES
Chief Complaint  Atrial Fibrillation    Subjective            Anika Casillas presents to [facility] CARDIOLOG[facility]  History of Present Illness    61-year-old -American female.  She has paroxysmal atrial fibrillation, hypertension, dyslipidemia, mild CAD and obesity.  She relocated from [place] and is establishing care here.  She followed with an electrophysiologist in [place].  She has been on metoprolol and Eliquis for atrial fibrillation.  There was some discussion regarding starting flecainide but this had not been started.  She typically has 1 or 2 episodes a week that just last a few minutes and are mild.  This is associated with palpitations.  It is self-limiting.  She has not taken her medications this morning but her blood pressure is usually running around 140 at home.  She has been intolerant to statins and has been on too high potency statins in the past with intolerable myalgias.    PMH  Past Medical History:   Diagnosis Date   • Atrial fibrillation (HCC)    • Diabetes mellitus (HCC)    • Hypertension          SURGICALHX  Past Surgical History:   Procedure Laterality Date   • BREAST LUMPECTOMY     • CARDIAC CATHETERIZATION     • GALLBLADDER SURGERY          SOC  Social History     Socioeconomic History   • Marital status:    Tobacco Use   • Smoking status: Current Every Day Smoker     Packs/day: 0.25     Years: 41.00     Pack years: 10.25   • Smokeless tobacco: Never Used   Vaping Use   • Vaping Use: Never used   Substance and Sexual Activity   • Alcohol use: Yes     Comment: 1 a month   • Drug use: Never   • Sexual activity: Defer         FAMHX  Family History   Adopted: Yes   Family history unknown: Yes          ALLERGY  Allergies   Allergen Reactions   • Liraglutide Nausea And Vomiting   • Metformin Nausea Only     GI Upset        MEDSCURRENT    Current Outpatient Medications:   •  albuterol sulfate  (90 Base) MCG/ACT inhaler, Inhale 2 puffs Every 4  "(Four) Hours As Needed., Disp: , Rfl:   •  amLODIPine (NORVASC) 10 MG tablet, Take 10 mg by mouth Daily., Disp: , Rfl:   •  apixaban (ELIQUIS) 5 MG tablet tablet, Take 1 tablet by mouth 2 (Two) Times a Day., Disp: 180 tablet, Rfl: 1  •  B-D UF III MINI PEN NEEDLES 31G X 5 MM misc, , Disp: , Rfl:   •  buPROPion XL (Wellbutrin XL) 150 MG 24 hr tablet, Take  by mouth Daily., Disp: , Rfl:   •  cholecalciferol (VITAMIN D3) 25 MCG (1000 UT) tablet, Take 2,000 Units by mouth Daily., Disp: , Rfl:   •  empagliflozin (Jardiance) 25 MG tablet tablet, Take 25 mg by mouth., Disp: , Rfl:   •  hydrOXYzine (ATARAX) 25 MG tablet, Take 25 mg by mouth 2 (Two) Times a Day As Needed., Disp: , Rfl:   •  ibuprofen (ADVIL,MOTRIN) 800 MG tablet, Take 800 mg by mouth 3 (Three) Times a Day As Needed., Disp: , Rfl:   •  Insulin Glargine, 2 Unit Dial, (Toujeo Max SoloStar) 300 UNIT/ML solution pen-injector injection, Inject 120 Units under the skin into the appropriate area as directed., Disp: , Rfl:   •  Insulin Lispro, 1 Unit Dial, (HUMALOG) 100 UNIT/ML solution pen-injector, Inject 55 Units under the skin into the appropriate area as directed., Disp: , Rfl:   •  Insulin Pen Needle (Pen Needles 3/16\") 31G X 5 MM misc, , Disp: , Rfl:   •  metoprolol tartrate (LOPRESSOR) 50 MG tablet, Take 75 mg by mouth 2 (Two) Times a Day. TAKE 1 1/2 TABLETS BID, Disp: , Rfl:   •  multivitamin with minerals tablet tablet, Take 1 tablet by mouth Daily., Disp: , Rfl:   •  olmesartan (BENICAR) 20 MG tablet, Daily., Disp: , Rfl:   •  omeprazole (priLOSEC) 40 MG capsule, Take 40 mg by mouth., Disp: , Rfl:   •  polyethylene glycol (MiraLax) 17 g packet, Take 17 g by mouth 2 (Two) Times a Day., Disp: , Rfl:   •  Semaglutide,0.25 or 0.5MG/DOS, (Ozempic, 0.25 or 0.5 MG/DOSE,) 2 MG/1.5ML solution pen-injector, Inject 0.5 mg under the skin into the appropriate area as directed., Disp: , Rfl:   •  tiotropium bromide-olodaterol (STIOLTO RESPIMAT) 2.5-2.5 MCG/ACT aerosol " "solution inhaler, Inhale Daily., Disp: , Rfl:   •  vitamin C (ASCORBIC ACID) 250 MG tablet, Take 250 mg by mouth Daily., Disp: , Rfl:   •  Evolocumab (Repatha) solution prefilled syringe injection, Inject 1 mL under the skin into the appropriate area as directed Every 14 (Fourteen) Days., Disp: 45 each, Rfl: 3      Review of Systems   Constitutional: Negative.   HENT: Negative.    Eyes: Negative.    Cardiovascular: Positive for palpitations. Negative for chest pain and dyspnea on exertion.   Respiratory: Negative.  Negative for shortness of breath.    Endocrine: Negative.    Hematologic/Lymphatic: Negative.    Skin: Negative.    Musculoskeletal: Negative.    Gastrointestinal: Negative.    Genitourinary: Negative.    Neurological: Negative.    Psychiatric/Behavioral: Negative.         Objective     /94   Pulse 91   Ht 165.1 cm (65\")   Wt 102 kg (225 lb)   BMI 37.44 kg/m²       General Appearance:   · well developed  · well nourished  HENT:   · oropharynx moist  · lips not cyanotic  Neck:  · thyroid not enlarged  · supple  Respiratory:  · no respiratory distress  · normal breath sounds  · no rales  Cardiovascular:  · no jugular venous distention  · regular rhythm  · apical impulse normal  · S1 normal, S2 normal  · no S3, no S4   · no murmur  · no rub, no thrill  · carotid pulses normal; no bruit  · pedal pulses normal  · lower extremity edema: none    Musculoskeletal:  · no clubbing of fingers.   · normocephalic, head atraumatic  Skin:   · warm, dry  Psychiatric:  · judgement and insight appropriate  · normal mood and affect      Result Review :     The following data was reviewed by: Mandeep Webb MD on 12/10/2021:    CMP    CMP 11/19/21   Glucose 294 (A)   BUN 9   Creatinine 0.78   eGFR African Am 92   Sodium 136   Potassium 3.9   Chloride 103   Calcium 9.5   Albumin 4.00   Total Bilirubin 0.3   Alkaline Phosphatase 130 (A)   AST (SGOT) 57 (A)   ALT (SGPT) 67 (A)   (A) Abnormal value        "     CBC    CBC 11/19/21   WBC 8.44   RBC 4.59   Hemoglobin 13.8   Hematocrit 40.6   MCV 88.5   MCH 30.1   MCHC 34.0   RDW 15.6 (A)   Platelets 267   (A) Abnormal value            Lipid Panel    Lipid Panel 11/19/21   Total Cholesterol 217 (A)   Triglycerides 207 (A)   HDL Cholesterol 31 (A)   VLDL Cholesterol 38   LDL Cholesterol  148 (A)   LDL/HDL Ratio 4.66   (A) Abnormal value              Data reviewed: Previous cardiology records and previous cardiac catheterization reviewed     Procedures      Anika Casillas  reports that she has been smoking. She has a 10.25 pack-year smoking history. She has never used smokeless tobacco.. I have educated her on the risk of diseases from using tobacco products such as cancer, COPD and heart disease.     I advised her to quit and she is willing to quit. We have discussed the following method/s for tobacco cessation:  Counseling.  Together we have set a quit date for When she weans down to 0 cigarettes.  She will follow up with me in several months or sooner to check on her progress.    I spent 3.5 minutes counseling the patient.                Assessment and Plan        ASSESSMENT:  Encounter Diagnoses   Name Primary?   • Paroxysmal atrial fibrillation (HCC) Yes   • Mixed hyperlipidemia    • Hypertension, essential    • Morbid (severe) obesity due to excess calories (HCC)    • Mild CAD    • Smoker          PLAN:    1.  Anika has paroxysmal atrial fibrillation, episodes are self-limiting at this time.  We will continue anticoagulation and beta-blocker therapy.  Antiarrhythmic drug therapy can be considered if her symptoms worsen  2.  Her blood pressure is uncontrolled, I need her to start monitoring that more closely at home, reduce dietary sodium intake, make attempts to lose weight.  Call if averaging greater than 140/90.  3.  Her LDL cholesterol is markedly elevated at 148, she has high vascular risk and has known mild coronary artery disease.  She is intolerant to statins.   I am starting Repatha today and we will repeat a lipid panel in 3 months.  4.  Smoking cessation counseling    Annual follow-up will be arranged otherwise.          Patient was given instructions and counseling regarding her condition or for health maintenance advice. Please see specific information pulled into the AVS if appropriate.             AKTHARINE Webb MD  12/10/2021    12:21 EST

## 2021-12-30 ENCOUNTER — TELEPHONE (OUTPATIENT)
Dept: GASTROENTEROLOGY | Facility: CLINIC | Age: 61
End: 2021-12-30

## 2021-12-30 ENCOUNTER — OFFICE VISIT (OUTPATIENT)
Dept: GASTROENTEROLOGY | Facility: CLINIC | Age: 61
End: 2021-12-30

## 2021-12-30 VITALS
HEIGHT: 65 IN | SYSTOLIC BLOOD PRESSURE: 178 MMHG | WEIGHT: 226.63 LBS | BODY MASS INDEX: 37.76 KG/M2 | HEART RATE: 96 BPM | DIASTOLIC BLOOD PRESSURE: 94 MMHG

## 2021-12-30 DIAGNOSIS — K76.82 HEPATIC ENCEPHALOPATHY (HCC): ICD-10-CM

## 2021-12-30 DIAGNOSIS — K22.70 BARRETT'S ESOPHAGUS WITHOUT DYSPLASIA: Primary | ICD-10-CM

## 2021-12-30 DIAGNOSIS — I85.10 SECONDARY ESOPHAGEAL VARICES WITHOUT BLEEDING (HCC): ICD-10-CM

## 2021-12-30 DIAGNOSIS — R11.2 NAUSEA AND VOMITING, INTRACTABILITY OF VOMITING NOT SPECIFIED, UNSPECIFIED VOMITING TYPE: ICD-10-CM

## 2021-12-30 DIAGNOSIS — K74.69 OTHER CIRRHOSIS OF LIVER (HCC): ICD-10-CM

## 2021-12-30 PROCEDURE — 99204 OFFICE O/P NEW MOD 45 MIN: CPT | Performed by: NURSE PRACTITIONER

## 2021-12-30 RX ORDER — LACTULOSE 10 G/15ML
30 SOLUTION ORAL 2 TIMES DAILY
Qty: 900 ML | Refills: 2 | Status: SHIPPED | OUTPATIENT
Start: 2021-12-30 | End: 2022-01-29

## 2021-12-30 RX ORDER — FAMOTIDINE 40 MG/1
40 TABLET, FILM COATED ORAL NIGHTLY PRN
Qty: 90 TABLET | Refills: 2 | Status: SHIPPED | OUTPATIENT
Start: 2021-12-30 | End: 2022-06-30 | Stop reason: SDUPTHER

## 2021-12-30 RX ORDER — PANTOPRAZOLE SODIUM 40 MG/1
40 TABLET, DELAYED RELEASE ORAL DAILY
Qty: 90 TABLET | Refills: 2 | Status: SHIPPED | OUTPATIENT
Start: 2021-12-30 | End: 2022-06-30 | Stop reason: SDUPTHER

## 2021-12-30 NOTE — PROGRESS NOTES
The patient is low to moderate historical stable cardiac risk for GI procedures    Okay to hold Eliquis for 48 to 72 hours for the procedure, please resume afterward    No additional preop cardiac work-up is needed    Mandeep Webb MD

## 2021-12-30 NOTE — TELEPHONE ENCOUNTER
"RECEIVED CLEARANCE TO HOLD ELIQUIS \"48 TO 72 HOURS\" PRIOR TO EGD. LEFT VOICEMAIL FOR PATIENT TO CALL ME BACK TO SCHEDULE HER EGD FOR 1ST OR 2ND WEEK IN JUNE AT PROVIDER REQUEST.  "

## 2021-12-30 NOTE — TELEPHONE ENCOUNTER
KATHARINE Webb MD French, Tonya, HORACE; Leia Marquez RegSched Rep           Previous Messages            Routed Note    Author: KATHARINE Webb MD Service: -- Author Type: Physician   Filed: 12/30/21 7244 Encounter Date: 12/30/2021 Status: Signed   : KATHARINE Webb MD (Physician)            Show:Clear all  [x]Manual[x]Template[]Copied    Added by:  [x]KATHARINE Webb MD      []Hover for details    The patient is low to moderate historical stable cardiac risk for GI procedures    Okay to hold Eliquis for 48 to 72 hours for the procedure, please resume afterward     No additional preop cardiac work-up is needed    Mandeep Webb MD

## 2021-12-30 NOTE — PATIENT INSTRUCTIONS
Cirrhosis    Cirrhosis is long-term (chronic) liver injury. The liver is the body's largest internal organ, and it performs many functions. It converts food into energy, removes toxic material from the blood, makes important proteins, and absorbs necessary vitamins from food.  In cirrhosis, healthy liver cells are replaced by scar tissue. This prevents blood from flowing through the liver, making it difficult for the liver to function. Scarring of the liver cannot be reversed, but treatment can prevent it from getting worse.  What are the causes?  Common causes of this condition are hepatitis C and long-term alcohol abuse. Other causes include:  · Nonalcoholic fatty liver disease. This happens when fat is deposited in the liver by causes other than alcohol.  · Hepatitis B infection.  · Autoimmune hepatitis. In this condition, the body's defense system (immune system) mistakenly attacks the liver cells, causing irritation and swelling (inflammation).  · Diseases that cause blockage of ducts inside the liver.  · Inherited liver diseases, such as hemochromatosis. This is one of the most common inherited liver diseases. In this disease, deposits of iron collect in the liver and other organs.  · Reactions to certain long-term medicines, such as amiodarone, a heart medicine.  · Parasitic infections. These include schistosomiasis, which is caused by a flatworm.  · Long-term contact to certain toxins. These toxins include certain organic solvents, such as toluene and chloroform.  What increases the risk?  You are more likely to develop this condition if:  · You have certain types of viral hepatitis.  · You abuse alcohol, especially if you are female.  · You are overweight.  · You share needles.  · You have unprotected sex with someone who has viral hepatitis.  What are the signs or symptoms?  You may not have any signs and symptoms at first. Symptoms may not develop until the damage to your liver starts to get worse.  Early  symptoms may include:  · Weakness and tiredness (fatigue).  · Changes in sleep patterns or having trouble sleeping.  · Itchiness.  · Tenderness in the right-upper part of your abdomen.  · Weight loss and muscle loss.  · Nausea.  · Loss of appetite.  · Appearance of tiny blood vessels under the skin.  Later symptoms may include:  · Fatigue or weakness that is getting worse.  · Yellow skin and eyes (jaundice).  · Buildup of fluid in the abdomen (ascites). You may notice that your clothes are tight around your waist.  · Weight gain.  · Swelling of the feet and ankles (edema).  · Trouble breathing.  · Easy bruising and bleeding.  · Vomiting blood.  · Black or bloody stool.  · Mental confusion.  How is this diagnosed?  Your health care provider may suspect cirrhosis based on your symptoms and medical history, especially if you have other medical conditions or a history of alcohol abuse. Your health care provider will do a physical exam to feel your liver and to check for signs of cirrhosis. He or she may perform other tests, including:  · Blood tests to check:  ? For hepatitis B or C.  ? Kidney function.  ? Liver function.  · Imaging tests such as:  ? MRI or CT scan to look for changes seen in advanced cirrhosis.  ? Ultrasound to see if normal liver tissue is being replaced by scar tissue.  · A procedure in which a long needle is used to take a sample of liver tissue to be checked in a lab (biopsy). Liver biopsy can confirm the diagnosis of cirrhosis.  How is this treated?  Treatment for this condition depends on how damaged your liver is and what caused the damage. It may include treating the symptoms of cirrhosis, or treating the underlying causes in order to slow the damage. Treatment may include:  · Making lifestyle changes, such as:  ? Eating a healthy diet. You may need to work with your health care provider or a diet and nutrition specialist (dietitian) to develop an eating plan.  ? Restricting salt  intake.  ? Maintaining a healthy weight.  ? Not abusing drugs or alcohol.  · Taking medicines to:  ? Treat liver infections or other infections.  ? Control itching.  ? Reduce fluid buildup.  ? Reduce certain blood toxins.  ? Reduce risk of bleeding from enlarged blood vessels in the stomach or esophagus (varices).  · Liver transplant. In this procedure, a liver from a donor is used to replace your diseased liver. This is done if cirrhosis has caused liver failure.  Other treatments and procedures may be done depending on the problems that you get from cirrhosis. Common problems include liver-related kidney failure (hepatorenal syndrome).  Follow these instructions at home:    · Take medicines only as told by your health care provider. Do not use medicines that are toxic to your liver. Ask your health care provider before taking any new medicines, including over-the-counter medicines.  · Rest as needed.  · Eat a well-balanced diet. Ask your health care provider or dietitian for more information.  · Limit your salt or water intake, if your health care provider asks you to do this.  · Do not drink alcohol. This is especially important if you are taking acetaminophen.  · Keep all follow-up visits as told by your health care provider. This is important.  Contact a health care provider if you:  · Have fatigue or weakness that is getting worse.  · Develop swelling of the hands, feet, legs, or face.  · Have a fever.  · Develop loss of appetite.  · Have nausea or vomiting.  · Develop jaundice.  · Develop easy bruising or bleeding.  Get help right away if you:  · Vomit bright red blood or a material that looks like coffee grounds.  · Have blood in your stools.  · Notice that your stools appear black and tarry.  · Become confused.  · Have chest pain or trouble breathing.  Summary  · Cirrhosis is chronic liver injury. Liver damage cannot be reversed. Common causes are hepatitis C and long-term alcohol abuse.  · Tests used to  diagnose cirrhosis include blood tests, imaging tests, and liver biopsy.  · Treatment for this condition involves treating the underlying cause. Avoid alcohol, drugs, salt, and medicines that may damage your liver.  · Contact your health care provider if you develop ascites, edema, jaundice, fever, nausea or vomiting, easy bruising or bleeding, or worsening fatigue.  This information is not intended to replace advice given to you by your health care provider. Make sure you discuss any questions you have with your health care provider.  Document Revised: 04/08/2020 Document Reviewed: 11/07/2018  [employer] Patient Education © 2021 [employer]  Colindres's Esophagus    Colindres's esophagus occurs when the tissue that lines the esophagus changes or becomes damaged. The esophagus is the tube that carries food from the throat to the stomach. With Colindres's esophagus, the cells that line the esophagus are replaced by cells that are similar to the lining of the intestines (intestinal metaplasia).  Colindres's esophagus itself may not cause any symptoms. However, many people who have Colindres's esophagus also have gastroesophageal reflux disease (GERD), which may cause symptoms such as heartburn. Over time, a few people with this condition may develop cancer of the esophagus. Treatment may include medicines, procedures to destroy the abnormal cells, or surgery.  What are the causes?  The exact cause of this condition is not known. In some cases, the condition develops from damage to the lining of the esophagus caused by gastroesophageal reflux disease (GERD). GERD occurs when stomach acids flow up from the stomach into the esophagus. Frequent symptoms of GERD may cause intestinal metaplasia or cause cell changes (dysplasia).  What increases the risk?  You are more likely to develop this condition if you:  · Have GERD.  · Are male.  · Are of  descent.  · Are obese.  · Are older than 50.  · Have a hiatal hernia. This is a  condition in which part of your stomach bulges into your chest.  · Smoke.  What are the signs or symptoms?  People with Colindres's esophagus often have no symptoms. However, many people with this condition also have GERD. Symptoms of GERD may include:  · Heartburn.  · Difficulty swallowing.  · Dry cough.  How is this diagnosed?  This condition may be diagnosed based on:  · Results of an upper gastrointestinal endoscopy. For this exam, a thin, flexible tube with a light and a camera on the end (endoscope) is passed down your esophagus. Your health care provider can view the inside of your esophagus during this procedure.  · Results of a biopsy. For this procedure, several tissue samples are removed (biopsy) from your esophagus to look at under a microscope. They are then checked for intestinal metaplasia or dysplasia.  How is this treated?  Treatment for this condition may include:  · Medicines (proton pump inhibitors, or PPIs) to decrease or stop GERD.  · Periodic endoscopic exams to make sure that cancer is not developing.  · A procedure or surgery for dysplasia. This may include:  ? Removal or destruction of abnormal cells.  ? Removal of part of the esophagus.  Follow these instructions at home:  Eating and drinking  · Eat more fruits and vegetables.  · Avoid fatty foods.  · Eat small, frequent meals instead of large meals.  · Avoid foods that cause heartburn. These foods include:  ? Coffee and alcoholic drinks.  ? Tomatoes and foods made with tomatoes.  ? Greasy or spicy foods.  ? Chocolate and peppermint.  · Do not drink alcohol.  General instructions  · Take over-the-counter and prescription medicines only as told by your health care provider.  · Do not use any products that contain nicotine or tobacco, such as cigarettes, e-cigarettes, and chewing tobacco. If you need help quitting, ask your health care provider.  · If you are being treated for GERD, make sure you take medicines and follow all instructions as  told by your health care provider.  · Keep all follow-up visits as told by your health care provider. This is important.  Contact a health care provider if:  · You have heartburn or GERD symptoms.  · You have difficulty swallowing.  Get help right away if:  · You have chest pain.  · You are unable to swallow.  · You vomit blood or material that looks like coffee grounds.  · Your stool (feces) is bright red or dark.  These symptoms may represent a serious problem that is an emergency. Do not wait to see if the symptoms will go away. Get medical help right away. Call your local emergency services (911 in the [place] Do not drive yourself to the hospital.  Summary  · Colindres's esophagus occurs when the tissue that lines the esophagus changes or becomes damaged.  · Colindres's esophagus may be diagnosed with an upper gastrointestinal endoscopy and a biopsy.  · Treatment may include medicines, procedures to remove abnormal cells, or surgery.  · Follow your health care provider's instructions about what to eat and drink, what medicines to take, and when to call for help.  This information is not intended to replace advice given to you by your health care provider. Make sure you discuss any questions you have with your health care provider.  Document Revised: 03/06/2021 Document Reviewed: 03/06/2021  [employer] Patient Education © 2021 [employer]

## 2021-12-30 NOTE — PROGRESS NOTES
"Patient Name: Anika Casillas   Visit Date: 12/30/2021   Patient ID: [id number]  Provider: CHU Emery    Sex: female  Location:  Location Address:  Location Phone: [address] [place] [address] [phone]    YOB: 1960      Primary Care Provider Elena Henderson PA      Referring Provider: No ref. provider found        Chief Complaint  GI Problem (Gastroparesis ), Vomiting, Constipation, and Hepatic Disease (KLINE )    History of Present Illness  Anika Casillas is a 61 y.o. who presents to [facility] on referral from No ref. provider found for a gastroenterology evaluation of GI Problem (Gastroparesis ), Vomiting, Constipation, and Hepatic Disease (KLINE ).    Ms. Casillas presents today seeking new GI care she has recently moved.  She has a history of cirrhosis from Kline.  Also has a history of esophageal varices.  Reports she was unable to take nadolol due to her insurance denying it however she now has new insurance with her new job.  Admits that she has been having more confusion lately as she feels it is hard for her to complete her daily tasks at work.  Feels that she is able to drive safely however.  Denies any dysphagia.      Extensive history with GERD and last EGD was consistent with Colindres's esophagus.  She has not had a repeat scope yet for surveillance of Colindres's esophagus.  Has been on omeprazole 40 mg for several years however does not feel it is working as well as it used to.  Nauseated often and will occasionally projectile vomit at night due to the acid.  Expresses that when she vomits her food looks undigested.  Feels that she has gastroparesis despite a negative gastric emptying study which actually showed rapid gastric emptying.  Expresses that her burp smelled \"rotten\".    Bowel movement once a day however reports she has to take capful MiraLAX 1-2 times daily in order to produce BM.  Describes bowel " movements to be a #1-3 on the [place] stool chart.  Previously tried Linzess with no change in bowel movement pattern.  Denies any hematochezia or melena.    eliquis- rx Dr. Webb     Labs Result Review Imaging    Past Medical History:   Diagnosis Date   • Atrial fibrillation (HCC)    • Diabetes mellitus (HCC)    • Gastroparesis    • Hypertension    • Irritable bowel syndrome    • BERKOWITZ (nonalcoholic steatohepatitis)        Past Surgical History:   Procedure Laterality Date   • BLADDER SLING MODIFIED, ANTERIOR AND POSTERIOR VAGINAL REPAIR     • BREAST LUMPECTOMY     • CARDIAC CATHETERIZATION     • CHOLECYSTECTOMY     • GALLBLADDER SURGERY     • TUBAL ABDOMINAL LIGATION           Current Outpatient Medications:   •  albuterol sulfate  (90 Base) MCG/ACT inhaler, Inhale 2 puffs Every 4 (Four) Hours As Needed., Disp: , Rfl:   •  amLODIPine (NORVASC) 10 MG tablet, Take 10 mg by mouth Daily., Disp: , Rfl:   •  apixaban (ELIQUIS) 5 MG tablet tablet, Take 1 tablet by mouth 2 (Two) Times a Day., Disp: 180 tablet, Rfl: 1  •  B-D UF III MINI PEN NEEDLES 31G X 5 MM misc, , Disp: , Rfl:   •  buPROPion XL (Wellbutrin XL) 150 MG 24 hr tablet, Take  by mouth Daily., Disp: , Rfl:   •  cholecalciferol (VITAMIN D3) 25 MCG (1000 UT) tablet, Take 2,000 Units by mouth Daily., Disp: , Rfl:   •  empagliflozin (Jardiance) 25 MG tablet tablet, Take 25 mg by mouth., Disp: , Rfl:   •  Evolocumab (Repatha) solution prefilled syringe injection, Inject 1 mL under the skin into the appropriate area as directed Every 14 (Fourteen) Days., Disp: 45 each, Rfl: 3  •  hydrOXYzine (ATARAX) 25 MG tablet, Take 25 mg by mouth 2 (Two) Times a Day As Needed., Disp: , Rfl:   •  ibuprofen (ADVIL,MOTRIN) 800 MG tablet, Take 800 mg by mouth 3 (Three) Times a Day As Needed., Disp: , Rfl:   •  Insulin Glargine, 2 Unit Dial, (Toujeo Max SoloStar) 300 UNIT/ML solution pen-injector injection, Inject 120 Units under the skin into the appropriate area as  "directed., Disp: , Rfl:   •  Insulin Lispro, 1 Unit Dial, (HUMALOG) 100 UNIT/ML solution pen-injector, Inject 55 Units under the skin into the appropriate area as directed., Disp: , Rfl:   •  Insulin Pen Needle (Pen Needles 3/16\") 31G X 5 MM misc, , Disp: , Rfl:   •  metoprolol tartrate (LOPRESSOR) 50 MG tablet, Take 75 mg by mouth 2 (Two) Times a Day. TAKE 1 1/2 TABLETS BID, Disp: , Rfl:   •  multivitamin with minerals tablet tablet, Take 1 tablet by mouth Daily., Disp: , Rfl:   •  olmesartan (BENICAR) 20 MG tablet, Daily., Disp: , Rfl:   •  polyethylene glycol (MiraLax) 17 g packet, Take 17 g by mouth 2 (Two) Times a Day., Disp: , Rfl:   •  Semaglutide,0.25 or 0.5MG/DOS, (Ozempic, 0.25 or 0.5 MG/DOSE,) 2 MG/1.5ML solution pen-injector, Inject 0.5 mg under the skin into the appropriate area as directed., Disp: , Rfl:   •  tiotropium bromide-olodaterol (STIOLTO RESPIMAT) 2.5-2.5 MCG/ACT aerosol solution inhaler, Inhale Daily., Disp: , Rfl:   •  vitamin C (ASCORBIC ACID) 250 MG tablet, Take 250 mg by mouth Daily., Disp: , Rfl:   •  famotidine (PEPCID) 40 MG tablet, Take 1 tablet by mouth At Night As Needed for Heartburn., Disp: 90 tablet, Rfl: 2  •  lactulose (CHRONULAC) 10 GM/15ML solution, Take 30 mL by mouth 2 (Two) Times a Day for 30 days., Disp: 900 mL, Rfl: 2  •  pantoprazole (PROTONIX) 40 MG EC tablet, Take 1 tablet by mouth Daily., Disp: 90 tablet, Rfl: 2     Allergies   Allergen Reactions   • Liraglutide Nausea And Vomiting   • Metformin Nausea Only     GI Upset       Family History   Adopted: Yes   Family history unknown: Yes        Social History     Social History Narrative    Exercises 1 x week    Lives at home with spouse         Objective     Review of Systems     Vital Signs:   /94 (BP Location: Left arm, Patient Position: Sitting, Cuff Size: Large Adult)   Pulse 96   Ht 165.1 cm (65\")   Wt 103 kg (226 lb 10.1 oz)   BMI 37.71 kg/m²       Physical Exam    Result Review :   The following data " was reviewed by: CHU Emery on 12/30/2021:  CMP    CMP 11/19/21   Glucose 294 (A)   BUN 9   Creatinine 0.78   eGFR African Am 92   Sodium 136   Potassium 3.9   Chloride 103   Calcium 9.5   Albumin 4.00   Total Bilirubin 0.3   Alkaline Phosphatase 130 (A)   AST (SGOT) 57 (A)   ALT (SGPT) 67 (A)   (A) Abnormal value            CBC w/diff    CBC w/Diff 11/19/21   WBC 8.44   RBC 4.59   Hemoglobin 13.8   Hematocrit 40.6   MCV 88.5   MCH 30.1   MCHC 34.0   RDW 15.6 (A)   Platelets 267   Neutrophil Rel % 52.8   Immature Granulocyte Rel % 0.4   Lymphocyte Rel % 40.6   Monocyte Rel % 4.6 (A)   Eosinophil Rel % 1.1   Basophil Rel % 0.5   (A) Abnormal value            No results found for: IRON, TIBC, FERRITIN, LABIRON      EGD 5/28/2021: Mainly in mid esophagus are couple of 2+ size varices, minimally in distal esophagus; 3 mm nodule very distal esophagus close to GE junction that was biopsied.  Mild nonspecific gastropathy, not classic for portal hypertensive gastropathy.  Esophageal biopsies-gastroesophageal mucosa with focal goblet cell intestinal metaplasia, no signs of dysplasia identified.    EGD 3/26/2019: 3 changes of grade 1-2 nonbleeding esophageal varices.  Moderate gastritis with small amount of gastric liquid.  Duodenitis.  Colonoscopy 3/26/2019: Colon polyps  Random gastric biopsies-no H. pylori organisms identified.  Descending colon polyps-fragments of benign colonic mucosa.  Ascending colon biopsies-fragments of hyperplastic polyp.  Transverse colon polyp-fragments of benign colonic mucosa.    Gastric emptying study 2/26/2018: No gastroparesis.  Abnormally rapid gastric emptying.  There was 0% activity remaining in the stomach at 2 hours.    Liver biopsy 2/20/2018: Moderate to marked fatty metamorphosis in association with increased fibrous connective tissue most consistent with stage IV 4 fibrosis and grade 0 for chronic inflammation.     Assessment and Plan    Diagnoses and all orders for this  visit:    1. Colindres's esophagus without dysplasia (Primary)  -     Case Request; Standing  -     Case Request    2. Other cirrhosis of liver (HCC)  -     US Liver; Future  -     CBC & Differential  -     Comprehensive Metabolic Panel; Future  -     Protime-INR; Future    3. Nausea and vomiting, intractability of vomiting not specified, unspecified vomiting type    4. Hepatic encephalopathy (HCC)    5. Secondary esophageal varices without bleeding (HCC)  -     Case Request; Standing  -     Case Request    Other orders  -     lactulose (CHRONULAC) 10 GM/15ML solution; Take 30 mL by mouth 2 (Two) Times a Day for 30 days.  Dispense: 900 mL; Refill: 2  -     pantoprazole (PROTONIX) 40 MG EC tablet; Take 1 tablet by mouth Daily.  Dispense: 90 tablet; Refill: 2  -     famotidine (PEPCID) 40 MG tablet; Take 1 tablet by mouth At Night As Needed for Heartburn.  Dispense: 90 tablet; Refill: 2  -     Follow Anesthesia Guidelines / Protocol; Future  -     Obtain Informed Consent; Future      ESOPHAGOGASTRODUODENOSCOPY (N/A)     Cirrhosis: BERKOWITZ  Ascites: None noted  Varices: Esophageal varices noted on last EGD 5/20/2021.  Asking permission from Dr. Webb to switch metoprolol to nadolol.  Encephalopathy: Noted.  Checking ammonia level and initiating lactulose today.    Sending letter to Dr. Webb for permission to switch metoprolol to nadaolol     Follow Up   Return in about 6 months (around 6/30/2022).  Patient was given instructions and counseling regarding her condition or for health maintenance advice. Please see specific information pulled into the AVS if appropriate.

## 2022-01-03 ENCOUNTER — TELEPHONE (OUTPATIENT)
Dept: CARDIOLOGY | Facility: CLINIC | Age: 62
End: 2022-01-03

## 2022-01-03 NOTE — TELEPHONE ENCOUNTER
Per Dr Webb:  The patient is low to moderate historical stable cardiac risk for GI procedures    Okay to hold Eliquis for 48 to 72 hours for the procedure, please resume afterward     No additional preop cardiac work-up is needed    Mandeep Webb MD

## 2022-01-03 NOTE — TELEPHONE ENCOUNTER
----- Message from KATHARINE Webb MD sent at 12/30/2021  1:51 PM EST -----      ----- Message -----  From: Leia Marquez RegSched Rep  Sent: 12/30/2021  10:21 AM EST  To: KATHARINE Webb MD

## 2022-01-13 PROBLEM — I85.10 SECONDARY ESOPHAGEAL VARICES WITHOUT BLEEDING: Status: ACTIVE | Noted: 2022-01-13

## 2022-01-13 PROBLEM — K22.70 BARRETT'S ESOPHAGUS WITHOUT DYSPLASIA: Status: ACTIVE | Noted: 2022-01-13

## 2022-01-18 ENCOUNTER — TELEPHONE (OUTPATIENT)
Dept: GASTROENTEROLOGY | Facility: CLINIC | Age: 62
End: 2022-01-18

## 2022-01-26 ENCOUNTER — PATIENT MESSAGE (OUTPATIENT)
Dept: FAMILY MEDICINE CLINIC | Facility: CLINIC | Age: 62
End: 2022-01-26

## 2022-01-26 DIAGNOSIS — Z79.4 TYPE 2 DIABETES MELLITUS WITH HYPERGLYCEMIA, WITH LONG-TERM CURRENT USE OF INSULIN: Primary | ICD-10-CM

## 2022-01-26 DIAGNOSIS — E11.65 TYPE 2 DIABETES MELLITUS WITH HYPERGLYCEMIA, WITH LONG-TERM CURRENT USE OF INSULIN: Primary | ICD-10-CM

## 2022-01-27 ENCOUNTER — LAB (OUTPATIENT)
Dept: LAB | Facility: HOSPITAL | Age: 62
End: 2022-01-27

## 2022-01-27 DIAGNOSIS — K74.69 OTHER CIRRHOSIS OF LIVER: ICD-10-CM

## 2022-01-27 DIAGNOSIS — K76.82 HEPATIC ENCEPHALOPATHY: ICD-10-CM

## 2022-01-27 LAB
ALBUMIN SERPL-MCNC: 4.1 G/DL (ref 3.5–5.2)
ALBUMIN/GLOB SERPL: 0.9 G/DL
ALP SERPL-CCNC: 152 U/L (ref 39–117)
ALT SERPL W P-5'-P-CCNC: 88 U/L (ref 1–33)
AMMONIA BLD-SCNC: 26 UMOL/L (ref 11–51)
ANION GAP SERPL CALCULATED.3IONS-SCNC: 8 MMOL/L (ref 5–15)
AST SERPL-CCNC: 76 U/L (ref 1–32)
BASOPHILS # BLD AUTO: 0.04 10*3/MM3 (ref 0–0.2)
BASOPHILS NFR BLD AUTO: 0.5 % (ref 0–1.5)
BILIRUB SERPL-MCNC: 0.4 MG/DL (ref 0–1.2)
BUN SERPL-MCNC: 8 MG/DL (ref 6–20)
BUN/CREAT SERPL: 11.3 (ref 7–25)
CALCIUM SPEC-SCNC: 10 MG/DL (ref 8.6–10.5)
CHLORIDE SERPL-SCNC: 103 MMOL/L (ref 98–107)
CO2 SERPL-SCNC: 26 MMOL/L (ref 22–29)
CREAT SERPL-MCNC: 0.71 MG/DL (ref 0.57–1)
DEPRECATED RDW RBC AUTO: 47.7 FL (ref 37–54)
EOSINOPHIL # BLD AUTO: 0.1 10*3/MM3 (ref 0–0.4)
EOSINOPHIL NFR BLD AUTO: 1.3 % (ref 0.3–6.2)
ERYTHROCYTE [DISTWIDTH] IN BLOOD BY AUTOMATED COUNT: 15.1 % (ref 12.3–15.4)
GFR SERPL CREATININE-BSD FRML MDRD: 102 ML/MIN/1.73
GLOBULIN UR ELPH-MCNC: 4.7 GM/DL
GLUCOSE SERPL-MCNC: 204 MG/DL (ref 65–99)
HCT VFR BLD AUTO: 43 % (ref 34–46.6)
HGB BLD-MCNC: 14.2 G/DL (ref 12–15.9)
IMM GRANULOCYTES # BLD AUTO: 0.02 10*3/MM3 (ref 0–0.05)
IMM GRANULOCYTES NFR BLD AUTO: 0.3 % (ref 0–0.5)
INR PPP: 1.01 (ref 2–3)
LYMPHOCYTES # BLD AUTO: 3.68 10*3/MM3 (ref 0.7–3.1)
LYMPHOCYTES NFR BLD AUTO: 47.7 % (ref 19.6–45.3)
MCH RBC QN AUTO: 28.9 PG (ref 26.6–33)
MCHC RBC AUTO-ENTMCNC: 33 G/DL (ref 31.5–35.7)
MCV RBC AUTO: 87.4 FL (ref 79–97)
MONOCYTES # BLD AUTO: 0.31 10*3/MM3 (ref 0.1–0.9)
MONOCYTES NFR BLD AUTO: 4 % (ref 5–12)
NEUTROPHILS NFR BLD AUTO: 3.57 10*3/MM3 (ref 1.7–7)
NEUTROPHILS NFR BLD AUTO: 46.2 % (ref 42.7–76)
NRBC BLD AUTO-RTO: 0 /100 WBC (ref 0–0.2)
PLATELET # BLD AUTO: 233 10*3/MM3 (ref 140–450)
PMV BLD AUTO: 11.6 FL (ref 6–12)
POTASSIUM SERPL-SCNC: 4.1 MMOL/L (ref 3.5–5.2)
PROT SERPL-MCNC: 8.8 G/DL (ref 6–8.5)
PROTHROMBIN TIME: 10.6 SECONDS (ref 9.4–12)
RBC # BLD AUTO: 4.92 10*6/MM3 (ref 3.77–5.28)
SODIUM SERPL-SCNC: 137 MMOL/L (ref 136–145)
WBC NRBC COR # BLD: 7.72 10*3/MM3 (ref 3.4–10.8)

## 2022-01-27 PROCEDURE — 80053 COMPREHEN METABOLIC PANEL: CPT

## 2022-01-27 PROCEDURE — 85610 PROTHROMBIN TIME: CPT

## 2022-01-27 PROCEDURE — 85025 COMPLETE CBC W/AUTO DIFF WBC: CPT | Performed by: NURSE PRACTITIONER

## 2022-01-27 PROCEDURE — 82140 ASSAY OF AMMONIA: CPT

## 2022-01-27 RX ORDER — INSULIN LISPRO 100 [IU]/ML
55 INJECTION, SOLUTION INTRAVENOUS; SUBCUTANEOUS 2 TIMES DAILY
Qty: 11 PEN | Refills: 0 | Status: SHIPPED | OUTPATIENT
Start: 2022-01-27 | End: 2022-02-07 | Stop reason: ALTCHOICE

## 2022-01-27 NOTE — TELEPHONE ENCOUNTER
From: Anika Casillas  To: CHACHO Carrasco  Sent: 1/26/2022 7:48 PM EST  Subject: med refill    Can u refill my one touch ultra test strips also I need refills on my Humalog Kwik Pen 55 units BID after FS qid I used [facility] Thanks

## 2022-01-28 ENCOUNTER — TELEPHONE (OUTPATIENT)
Dept: CARDIOLOGY | Facility: CLINIC | Age: 62
End: 2022-01-28

## 2022-01-28 NOTE — TELEPHONE ENCOUNTER
Please send an appeal to this denial for Repatha, and include the following as well.    This patient has very high cardiovascular risk, based on all traditional risk modeling she is high cardiovascular risk.  In addition she has known coronary artery disease by previous cardiac catheterization.  She is intolerant to high potency statins and has an LDL of 148, well over twice what our goal is.    PCSK9 inhibitor therapy is indicated for this use, otherwise the patient is going to carry higher cardiovascular risk and statistically worse cardiovascular outcome by denying this therapy.    Please let us know at your earliest convenience regarding coverage for this indicated and needed therapy for the patient.    Sincerely,    Mandeep Webb MD

## 2022-01-31 ENCOUNTER — HOSPITAL ENCOUNTER (OUTPATIENT)
Dept: ULTRASOUND IMAGING | Facility: HOSPITAL | Age: 62
Discharge: HOME | End: 2022-01-31
Admitting: NURSE PRACTITIONER

## 2022-01-31 DIAGNOSIS — K74.69 OTHER CIRRHOSIS OF LIVER: ICD-10-CM

## 2022-01-31 PROCEDURE — 76705 ECHO EXAM OF ABDOMEN: CPT

## 2022-02-07 DIAGNOSIS — Z79.4 TYPE 2 DIABETES MELLITUS WITH HYPERGLYCEMIA, WITH LONG-TERM CURRENT USE OF INSULIN: Primary | ICD-10-CM

## 2022-02-07 DIAGNOSIS — E11.65 TYPE 2 DIABETES MELLITUS WITH HYPERGLYCEMIA, WITH LONG-TERM CURRENT USE OF INSULIN: Primary | ICD-10-CM

## 2022-02-14 ENCOUNTER — TELEPHONE (OUTPATIENT)
Dept: FAMILY MEDICINE CLINIC | Facility: CLINIC | Age: 62
End: 2022-02-14

## 2022-02-14 DIAGNOSIS — Z79.4 TYPE 2 DIABETES MELLITUS WITH HYPERGLYCEMIA, WITH LONG-TERM CURRENT USE OF INSULIN: Primary | ICD-10-CM

## 2022-02-14 DIAGNOSIS — E11.65 TYPE 2 DIABETES MELLITUS WITH HYPERGLYCEMIA, WITH LONG-TERM CURRENT USE OF INSULIN: Primary | ICD-10-CM

## 2022-02-14 RX ORDER — BLOOD-GLUCOSE METER
1 KIT MISCELLANEOUS DAILY
Qty: 1 EACH | Refills: 0 | Status: SHIPPED | OUTPATIENT
Start: 2022-02-14

## 2022-02-14 NOTE — TELEPHONE ENCOUNTER
[facility] Fax:    Patient requesting a prescription for:   Accu Check Guide Meter   Accu Check Soft Click Lancets    Requesting prescription sent to [facility] in [place].

## 2022-02-21 ENCOUNTER — TELEPHONE (OUTPATIENT)
Dept: FAMILY MEDICINE CLINIC | Facility: CLINIC | Age: 62
End: 2022-02-21

## 2022-02-21 NOTE — TELEPHONE ENCOUNTER
[facility] Fax: Alternative Medication Request    Insulin Lispro 100 unit/ML Pen    Sig: inject 55 units under the skin into the appropriate area as directed 2 (two) times a day.     Quantity: 30

## 2022-03-21 ENCOUNTER — PATIENT MESSAGE (OUTPATIENT)
Dept: CARDIOLOGY | Facility: CLINIC | Age: 62
End: 2022-03-21

## 2022-03-21 RX ORDER — BLOOD-GLUCOSE METER
EACH MISCELLANEOUS
COMMUNITY
Start: 2022-02-14

## 2022-03-23 ENCOUNTER — OFFICE VISIT (OUTPATIENT)
Dept: FAMILY MEDICINE CLINIC | Facility: CLINIC | Age: 62
End: 2022-03-23

## 2022-03-23 ENCOUNTER — HOSPITAL ENCOUNTER (OUTPATIENT)
Dept: GENERAL RADIOLOGY | Facility: HOSPITAL | Age: 62
Discharge: HOME | End: 2022-03-23

## 2022-03-23 ENCOUNTER — LAB (OUTPATIENT)
Dept: LAB | Facility: HOSPITAL | Age: 62
End: 2022-03-23

## 2022-03-23 VITALS
HEIGHT: 65 IN | OXYGEN SATURATION: 98 % | WEIGHT: 223 LBS | BODY MASS INDEX: 37.15 KG/M2 | HEART RATE: 75 BPM | DIASTOLIC BLOOD PRESSURE: 67 MMHG | SYSTOLIC BLOOD PRESSURE: 133 MMHG | TEMPERATURE: 97.2 F

## 2022-03-23 DIAGNOSIS — H53.9 VISION CHANGES: ICD-10-CM

## 2022-03-23 DIAGNOSIS — R41.840 POOR CONCENTRATION: ICD-10-CM

## 2022-03-23 DIAGNOSIS — E55.9 VITAMIN D DEFICIENCY: ICD-10-CM

## 2022-03-23 DIAGNOSIS — R05.9 COUGH: Primary | ICD-10-CM

## 2022-03-23 DIAGNOSIS — E78.2 MIXED HYPERLIPIDEMIA: ICD-10-CM

## 2022-03-23 DIAGNOSIS — R05.9 COUGH: ICD-10-CM

## 2022-03-23 PROBLEM — E66.812 CLASS 2 OBESITY WITH BODY MASS INDEX (BMI) OF 36.0 TO 36.9 IN ADULT: Status: ACTIVE | Noted: 2022-02-19

## 2022-03-23 PROBLEM — IMO0002 UNCONTROLLED TYPE 2 DIABETES MELLITUS WITH NEUROLOGIC COMPLICATION: Status: ACTIVE | Noted: 2022-03-15

## 2022-03-23 PROBLEM — E66.9 CLASS 2 OBESITY WITH BODY MASS INDEX (BMI) OF 36.0 TO 36.9 IN ADULT: Status: ACTIVE | Noted: 2022-02-19

## 2022-03-23 PROBLEM — E16.1 HYPERINSULINISM: Status: ACTIVE | Noted: 2022-02-19

## 2022-03-23 PROBLEM — Z79.4 ENCOUNTER FOR LONG-TERM (CURRENT) USE OF INSULIN: Status: ACTIVE | Noted: 2022-02-19

## 2022-03-23 LAB
25(OH)D3 SERPL-MCNC: 15.9 NG/ML (ref 30–100)
BASOPHILS # BLD AUTO: 0.06 10*3/MM3 (ref 0–0.2)
BASOPHILS NFR BLD AUTO: 0.6 % (ref 0–1.5)
CHOLEST SERPL-MCNC: 216 MG/DL (ref 0–200)
DEPRECATED RDW RBC AUTO: 47.5 FL (ref 37–54)
EOSINOPHIL # BLD AUTO: 0.11 10*3/MM3 (ref 0–0.4)
EOSINOPHIL NFR BLD AUTO: 1.2 % (ref 0.3–6.2)
ERYTHROCYTE [DISTWIDTH] IN BLOOD BY AUTOMATED COUNT: 15.1 % (ref 12.3–15.4)
FOLATE SERPL-MCNC: >20 NG/ML (ref 4.78–24.2)
HCT VFR BLD AUTO: 39.7 % (ref 34–46.6)
HDLC SERPL-MCNC: 35 MG/DL (ref 40–60)
HGB BLD-MCNC: 13.6 G/DL (ref 12–15.9)
IMM GRANULOCYTES # BLD AUTO: 0.03 10*3/MM3 (ref 0–0.05)
IMM GRANULOCYTES NFR BLD AUTO: 0.3 % (ref 0–0.5)
LDLC SERPL CALC-MCNC: 135 MG/DL (ref 0–100)
LDLC/HDLC SERPL: 3.7 {RATIO}
LYMPHOCYTES # BLD AUTO: 3.94 10*3/MM3 (ref 0.7–3.1)
LYMPHOCYTES NFR BLD AUTO: 41.4 % (ref 19.6–45.3)
MCH RBC QN AUTO: 29.8 PG (ref 26.6–33)
MCHC RBC AUTO-ENTMCNC: 34.3 G/DL (ref 31.5–35.7)
MCV RBC AUTO: 87.1 FL (ref 79–97)
MONOCYTES # BLD AUTO: 0.45 10*3/MM3 (ref 0.1–0.9)
MONOCYTES NFR BLD AUTO: 4.7 % (ref 5–12)
NEUTROPHILS NFR BLD AUTO: 4.92 10*3/MM3 (ref 1.7–7)
NEUTROPHILS NFR BLD AUTO: 51.8 % (ref 42.7–76)
NRBC BLD AUTO-RTO: 0 /100 WBC (ref 0–0.2)
PLATELET # BLD AUTO: 275 10*3/MM3 (ref 140–450)
PMV BLD AUTO: 11.5 FL (ref 6–12)
RBC # BLD AUTO: 4.56 10*6/MM3 (ref 3.77–5.28)
T4 FREE SERPL-MCNC: 0.98 NG/DL (ref 0.93–1.7)
TRIGL SERPL-MCNC: 257 MG/DL (ref 0–150)
TSH SERPL DL<=0.05 MIU/L-ACNC: 2.66 UIU/ML (ref 0.27–4.2)
VIT B12 BLD-MCNC: 946 PG/ML (ref 211–946)
VLDLC SERPL-MCNC: 46 MG/DL (ref 5–40)
WBC NRBC COR # BLD: 9.51 10*3/MM3 (ref 3.4–10.8)

## 2022-03-23 PROCEDURE — 82607 VITAMIN B-12: CPT

## 2022-03-23 PROCEDURE — 84443 ASSAY THYROID STIM HORMONE: CPT

## 2022-03-23 PROCEDURE — 80061 LIPID PANEL: CPT

## 2022-03-23 PROCEDURE — 82746 ASSAY OF FOLIC ACID SERUM: CPT

## 2022-03-23 PROCEDURE — 71046 X-RAY EXAM CHEST 2 VIEWS: CPT

## 2022-03-23 PROCEDURE — 82306 VITAMIN D 25 HYDROXY: CPT

## 2022-03-23 PROCEDURE — 36415 COLL VENOUS BLD VENIPUNCTURE: CPT

## 2022-03-23 PROCEDURE — 85025 COMPLETE CBC W/AUTO DIFF WBC: CPT

## 2022-03-23 PROCEDURE — 99213 OFFICE O/P EST LOW 20 MIN: CPT | Performed by: PHYSICIAN ASSISTANT

## 2022-03-23 PROCEDURE — 84439 ASSAY OF FREE THYROXINE: CPT

## 2022-03-23 RX ORDER — POLYETHYLENE GLYCOL 3350 17 G/17G
17 POWDER, FOR SOLUTION ORAL
Status: ON HOLD | COMMUNITY
End: 2022-04-16

## 2022-03-23 RX ORDER — LACTULOSE 10 G/15ML
SOLUTION ORAL
Status: ON HOLD | COMMUNITY
Start: 2021-12-30 | End: 2022-04-16

## 2022-03-23 RX ORDER — INSULIN ASPART 100 [IU]/ML
60 INJECTION, SOLUTION INTRAVENOUS; SUBCUTANEOUS 2 TIMES DAILY
COMMUNITY
Start: 2022-03-14

## 2022-04-11 ENCOUNTER — OFFICE VISIT (OUTPATIENT)
Dept: PULMONOLOGY | Facility: CLINIC | Age: 62
End: 2022-04-11

## 2022-04-11 VITALS
BODY MASS INDEX: 37.15 KG/M2 | SYSTOLIC BLOOD PRESSURE: 134 MMHG | HEART RATE: 79 BPM | OXYGEN SATURATION: 96 % | DIASTOLIC BLOOD PRESSURE: 78 MMHG | RESPIRATION RATE: 17 BRPM | HEIGHT: 65 IN | TEMPERATURE: 98 F | WEIGHT: 223 LBS

## 2022-04-11 DIAGNOSIS — G47.33 OBSTRUCTIVE SLEEP APNEA: ICD-10-CM

## 2022-04-11 DIAGNOSIS — R06.09 DOE (DYSPNEA ON EXERTION): Primary | ICD-10-CM

## 2022-04-11 PROCEDURE — 99204 OFFICE O/P NEW MOD 45 MIN: CPT | Performed by: INTERNAL MEDICINE

## 2022-04-11 NOTE — PROGRESS NOTES
Primary Care Provider  Elena Henderson PA   Referring Provider  Elena Henderson, *      Patient Complaint  Establish Care (New Patient ), Sleep Apnea, Emphysema, Shortness of Breath, and Cough      Subjective          Anika Casillas presents to [facility] PULMONARY & CRITICAL CARE MEDICINE      History of Presenting Illness  Anika Casillas is a 61 y.o. pleasant -American lady, who works as an LPN, presents to [facility] PULMONARY & CRITICAL CARE MEDICINE  first time for evaluation of dyspnea on exertion and dry cough.  The patient reports dyspnea on exertion walking less than 100 yards but denies associated cough or wheezing.  The patient reports mild dry cough particularly when she lays down at night but several times she gets these bouts of cough every now and then regardless of time.  The patient denies hemoptysis, chest pain, fever, chills, and night sweats.  She is an active smoker, used to smoke 1 pack a day for about 40 years down to 4 cigarettes a day since she started cutting down last September.  The patient has history of sinus allergies but denies history of skin allergies or previous history of asthma.  She has obstructive sleep apnea and supposed to be on CPAP but she has not been using it lately because she reports that the tubing gets tangled and interfered with her sleep.      I have personally reviewed the review of systems, past family, social, medical and surgical histories; and agree with their findings.        Review of Systems  Constitutional symptoms:  Denied complaints   Ear, nose, throat: Denied complaints  Cardiovascular:  Denied complaints  Respiratory: See above  Gastrointestinal: Denied complaints  Musculoskeletal: Denied complaints  Genitourinary: Denied complaints  Allergy / Immunology: Denied complaints  Hematologic: Denied complaints  Neurologic: Denied complaints  Skin: Denied complaints  Endocrine: Denied  complaints  Psychiatric: Denied complaints      Family History   Adopted: Yes   Family history unknown: Yes        Social History     Socioeconomic History   • Marital status:    Tobacco Use   • Smoking status: Current Every Day Smoker     Packs/day: 0.25     Years: 41.00     Pack years: 10.25     Types: Cigarettes     Start date: 6/17/1975   • Smokeless tobacco: Never Used   Vaping Use   • Vaping Use: Never used   Substance and Sexual Activity   • Alcohol use: Yes     Alcohol/week: 1.0 standard drink     Types: 1 Drinks containing 0.5 oz of alcohol per week     Comment: 1 a month   • Drug use: Never   • Sexual activity: Yes     Partners: Male     Birth control/protection: Surgical        Past Medical History:   Diagnosis Date   • Atrial fibrillation (HCC)    • Diabetes mellitus (HCC)    • Gastroparesis    • GERD (gastroesophageal reflux disease) 1995   • Hypertension    • Irritable bowel syndrome    • BERKOWITZ (nonalcoholic steatohepatitis)         Immunization History   Administered Date(s) Administered   • COVID-19 (PFIZER) PURPLE CAP 02/06/2021, 03/06/2021, 12/04/2021   • Flu Vaccine Intradermal Quad 18-64YR 09/12/2021, 09/12/2021   • FluLaval/Fluarix/Fluzone >6 10/29/2019   • Influenza, Unspecified 11/12/2018, 09/27/2020   • Pneumococcal Conjugate 13-Valent (PCV13) 01/09/2017   • Pneumococcal Polysaccharide (PPSV23) 10/12/2020   • flucelvax quad pfs =>4 YRS 09/27/2020         Allergies   Allergen Reactions   • Liraglutide Nausea And Vomiting   • Metformin Nausea Only     GI Upset          Current Outpatient Medications:   •  albuterol sulfate  (90 Base) MCG/ACT inhaler, Inhale 2 puffs Every 4 (Four) Hours As Needed., Disp: , Rfl:   •  amLODIPine (NORVASC) 10 MG tablet, Take 10 mg by mouth Daily., Disp: , Rfl:   •  apixaban (ELIQUIS) 5 MG tablet tablet, Take 1 tablet by mouth 2 (Two) Times a Day., Disp: 180 tablet, Rfl: 1  •  B-D UF III MINI PEN NEEDLES 31G X 5 MM misc, , Disp: , Rfl:   •  Blood  "Glucose Monitoring Suppl (Accu-Chek Guide) w/Device kit, , Disp: , Rfl:   •  buPROPion XL (WELLBUTRIN XL) 150 MG 24 hr tablet, Take  by mouth Daily., Disp: , Rfl:   •  cholecalciferol (VITAMIN D3) 25 MCG (1000 UT) tablet, Take 2,000 Units by mouth Daily., Disp: , Rfl:   •  empagliflozin (JARDIANCE) 25 MG tablet tablet, Take 25 mg by mouth., Disp: , Rfl:   •  famotidine (PEPCID) 40 MG tablet, Take 1 tablet by mouth At Night As Needed for Heartburn., Disp: 90 tablet, Rfl: 2  •  glucose blood test strip, Check blood glucose twice daily, Disp: 60 each, Rfl: 12  •  glucose monitor monitoring kit, 1 each Daily. Patient prefers Accu Chek Meter., Disp: 1 each, Rfl: 0  •  hydrOXYzine (ATARAX) 25 MG tablet, Take 25 mg by mouth 2 (Two) Times a Day As Needed., Disp: , Rfl:   •  ibuprofen (ADVIL,MOTRIN) 800 MG tablet, Take 800 mg by mouth 3 (Three) Times a Day As Needed., Disp: , Rfl:   •  Insulin Glargine, 2 Unit Dial, (Toujeo Max SoloStar) 300 UNIT/ML solution pen-injector injection, Inject 120 Units under the skin into the appropriate area as directed., Disp: , Rfl:   •  Insulin Pen Needle (Pen Needles 3/16\") 31G X 5 MM misc, , Disp: , Rfl:   •  Insulin Pen Needle 31G X 5 MM misc, by Other route., Disp: , Rfl:   •  lactulose (CHRONULAC) 10 GM/15ML solution, , Disp: , Rfl:   •  Lancets (accu-chek soft touch) lancets, Check blood sugar twice daily, Disp: 100 each, Rfl: 12  •  metoprolol tartrate (LOPRESSOR) 50 MG tablet, Take 75 mg by mouth 2 (Two) Times a Day. TAKE 1 1/2 TABLETS BID, Disp: , Rfl:   •  multivitamin with minerals tablet tablet, Take 1 tablet by mouth Daily., Disp: , Rfl:   •  NovoLOG FlexPen 100 UNIT/ML solution pen-injector sc pen, , Disp: , Rfl:   •  olmesartan (BENICAR) 20 MG tablet, Daily., Disp: , Rfl:   •  pantoprazole (PROTONIX) 40 MG EC tablet, Take 1 tablet by mouth Daily., Disp: 90 tablet, Rfl: 2  •  polyethylene glycol (MIRALAX) 17 g packet, Take 17 g by mouth 2 (Two) Times a Day., Disp: , Rfl:   •  " "polyethylene glycol (MIRALAX) 17 GM/SCOOP powder, Take 17 g by mouth., Disp: , Rfl:   •  Semaglutide,0.25 or 0.5MG/DOS, (Ozempic, 0.25 or 0.5 MG/DOSE,) 2 MG/1.5ML solution pen-injector, Inject 0.5 mg under the skin into the appropriate area as directed., Disp: , Rfl:   •  vitamin C (ASCORBIC ACID) 250 MG tablet, Take 250 mg by mouth Daily., Disp: , Rfl:          Objective     Vital Signs:   /78 (BP Location: Left arm, Patient Position: Sitting, Cuff Size: Adult)   Pulse 79   Temp 98 °F (36.7 °C) (Temporal)   Resp 17   Ht 165.1 cm (65\")   Wt 101 kg (223 lb)   SpO2 96% Comment: room air  BMI 37.11 kg/m²     Physical Exam  Vital Signs Reviewed   WDWN, Alert, NAD.    HEENT:  PERRL, EOMI.  OP, nares clear, no sinus tenderness  Neck:  Supple, no JVD, no thyromegaly  Lymph: no axillary, cervical, supraclavicular lymphadenopathy noted bilaterally  Chest:  good aeration, clear to auscultation bilaterally, tympanic to percussion bilaterally, no work of breathing noted  CV: RRR, no MGR, pulses 2+, equal.  Abd:  Soft, NT, ND, + BS, no HSM  EXT:  no clubbing, no cyanosis, no edema, no joint tenderness  Neuro:  A&Ox3, CN grossly intact, no focal deficits.  Skin: No rashes or lesions noted       Result Review :   I have personally reviewed the medical records, labs, and radiology reports.  Chest x-ray done last month showed no active disease.  Unremarkable low-dose chest CT scan in 2020.            Pulmonary Functions Testing Results:    No results found for: FEV1, FVC, [id number], TLC, DLCO        Assessment and Plan      Patient Active Problem List   Diagnosis   • Diabetic gastroparesis (HCC)   • Diverticulosis of colon   • Elevated transaminase level   • Essential hypertension   • Gastroesophageal reflux disease without esophagitis   • Hx of colonic polyp   • Hypothyroidism   • Major depressive disorder with single episode, in full remission (HCC)   • Mixed hyperlipidemia   • BERKOWITZ (nonalcoholic steatohepatitis)   • " OAB (overactive bladder)   • Obstructive sleep apnea syndrome   • Paroxysmal atrial fibrillation (HCC)   • Tobacco abuse   • Type 2 diabetes mellitus with hyperglycemia, with long-term current use of insulin (HCC)   • Other emphysema (HCC)   • Colindres's esophagus without dysplasia   • Secondary esophageal varices without bleeding (HCC)   • Encounter for long-term (current) use of insulin (HCC)   • Hyperinsulinism   • Uncontrolled type 2 diabetes mellitus with neurologic complication   • Class 2 obesity with body mass index (BMI) of 36.0 to 36.9 in adult           Impression and Plan:  Mrs. Casillas is a 61-year-old lady who presents with dyspnea on exertion and mild dry cough suggestive of an obstructive airway disease.  This could be COPD, asthma, or an overlap between both.  The patient is an active smoker but down significantly to 4 cigarettes a day.  Plan: We will start the patient on Trelegy 100 daily.  6-week sample was given.  PFT was ordered.  Clinical response to the triple inhaler therapy will be assessed in 6 weeks.  Clinical response and pulmonary function test will then be used to tailor the therapy either with ICS-based inhaler therapy for asthma or ACO, or bronchodilator therapy alone for pure COPD.  Smoking cessation was stressed and counseling provided.  Will schedule the patient for low-dose CT scan of the chest in the next visit.    Medications personally reviewed.      Follow Up     Patient was given instructions and counseling regarding her condition or for health maintenance advice. Please see specific information pulled into the AVS if appropriate.

## 2022-04-15 ENCOUNTER — APPOINTMENT (OUTPATIENT)
Dept: GENERAL RADIOLOGY | Facility: HOSPITAL | Age: 62
End: 2022-04-15

## 2022-04-15 ENCOUNTER — HOSPITAL ENCOUNTER (INPATIENT)
Facility: HOSPITAL | Age: 62
LOS: 3 days | Discharge: HOME | End: 2022-04-19
Attending: EMERGENCY MEDICINE | Admitting: INTERNAL MEDICINE

## 2022-04-15 DIAGNOSIS — K66.8 PNEUMOPERITONEUM OF UNKNOWN ETIOLOGY: ICD-10-CM

## 2022-04-15 DIAGNOSIS — Z98.890 S/P EXPLORATORY LAPAROTOMY: ICD-10-CM

## 2022-04-15 DIAGNOSIS — K29.50 CHRONIC GASTRITIS WITHOUT BLEEDING, UNSPECIFIED GASTRITIS TYPE: Primary | ICD-10-CM

## 2022-04-15 DIAGNOSIS — K21.00 GASTROESOPHAGEAL REFLUX DISEASE WITH ESOPHAGITIS WITHOUT HEMORRHAGE: ICD-10-CM

## 2022-04-15 DIAGNOSIS — K59.00 CONSTIPATION, UNSPECIFIED CONSTIPATION TYPE: ICD-10-CM

## 2022-04-15 LAB
ALBUMIN SERPL-MCNC: 4.6 G/DL (ref 3.5–5.2)
ALBUMIN/GLOB SERPL: 0.9 G/DL
ALP SERPL-CCNC: 154 U/L (ref 39–117)
ALT SERPL W P-5'-P-CCNC: 79 U/L (ref 1–33)
ANION GAP SERPL CALCULATED.3IONS-SCNC: 14.2 MMOL/L (ref 5–15)
AST SERPL-CCNC: 73 U/L (ref 1–32)
BACTERIA UR QL AUTO: ABNORMAL /HPF
BASOPHILS # BLD AUTO: 0.05 10*3/MM3 (ref 0–0.2)
BASOPHILS NFR BLD AUTO: 0.5 % (ref 0–1.5)
BILIRUB SERPL-MCNC: 0.3 MG/DL (ref 0–1.2)
BILIRUB UR QL STRIP: NEGATIVE
BUN SERPL-MCNC: 10 MG/DL (ref 6–20)
BUN/CREAT SERPL: 11.4 (ref 7–25)
CALCIUM SPEC-SCNC: 9.9 MG/DL (ref 8.6–10.5)
CHLORIDE SERPL-SCNC: 104 MMOL/L (ref 98–107)
CLARITY UR: CLEAR
CO2 SERPL-SCNC: 20.8 MMOL/L (ref 22–29)
COLOR UR: YELLOW
CREAT SERPL-MCNC: 0.88 MG/DL (ref 0.57–1)
DEPRECATED RDW RBC AUTO: 48.8 FL (ref 37–54)
EGFRCR SERPLBLD CKD-EPI 2021: 75.8 ML/MIN/1.73
EOSINOPHIL # BLD AUTO: 0.11 10*3/MM3 (ref 0–0.4)
EOSINOPHIL NFR BLD AUTO: 1 % (ref 0.3–6.2)
ERYTHROCYTE [DISTWIDTH] IN BLOOD BY AUTOMATED COUNT: 15.9 % (ref 12.3–15.4)
GLOBULIN UR ELPH-MCNC: 4.9 GM/DL
GLUCOSE SERPL-MCNC: 175 MG/DL (ref 65–99)
GLUCOSE UR STRIP-MCNC: ABNORMAL MG/DL
HCT VFR BLD AUTO: 41.5 % (ref 34–46.6)
HGB BLD-MCNC: 14.6 G/DL (ref 12–15.9)
HGB UR QL STRIP.AUTO: NEGATIVE
HOLD SPECIMEN: NORMAL
HOLD SPECIMEN: NORMAL
HYALINE CASTS UR QL AUTO: ABNORMAL /LPF
IMM GRANULOCYTES # BLD AUTO: 0.03 10*3/MM3 (ref 0–0.05)
IMM GRANULOCYTES NFR BLD AUTO: 0.3 % (ref 0–0.5)
KETONES UR QL STRIP: NEGATIVE
LEUKOCYTE ESTERASE UR QL STRIP.AUTO: NEGATIVE
LIPASE SERPL-CCNC: 24 U/L (ref 13–60)
LYMPHOCYTES # BLD AUTO: 3.92 10*3/MM3 (ref 0.7–3.1)
LYMPHOCYTES NFR BLD AUTO: 36.6 % (ref 19.6–45.3)
MCH RBC QN AUTO: 29.9 PG (ref 26.6–33)
MCHC RBC AUTO-ENTMCNC: 35.2 G/DL (ref 31.5–35.7)
MCV RBC AUTO: 84.9 FL (ref 79–97)
MONOCYTES # BLD AUTO: 0.68 10*3/MM3 (ref 0.1–0.9)
MONOCYTES NFR BLD AUTO: 6.3 % (ref 5–12)
NEUTROPHILS NFR BLD AUTO: 5.93 10*3/MM3 (ref 1.7–7)
NEUTROPHILS NFR BLD AUTO: 55.3 % (ref 42.7–76)
NITRITE UR QL STRIP: NEGATIVE
NRBC BLD AUTO-RTO: 0 /100 WBC (ref 0–0.2)
PH UR STRIP.AUTO: 7 [PH] (ref 5–8)
PLATELET # BLD AUTO: 275 10*3/MM3 (ref 140–450)
PMV BLD AUTO: 10.8 FL (ref 6–12)
POTASSIUM SERPL-SCNC: 3.7 MMOL/L (ref 3.5–5.2)
PROT SERPL-MCNC: 9.5 G/DL (ref 6–8.5)
PROT UR QL STRIP: ABNORMAL
RBC # BLD AUTO: 4.89 10*6/MM3 (ref 3.77–5.28)
RBC # UR STRIP: ABNORMAL /HPF
REF LAB TEST METHOD: ABNORMAL
SODIUM SERPL-SCNC: 139 MMOL/L (ref 136–145)
SP GR UR STRIP: 1.02 (ref 1–1.03)
SQUAMOUS #/AREA URNS HPF: ABNORMAL /HPF
TROPONIN T SERPL-MCNC: <0.01 NG/ML (ref 0–0.03)
UROBILINOGEN UR QL STRIP: ABNORMAL
WBC # UR STRIP: ABNORMAL /HPF
WBC NRBC COR # BLD: 10.72 10*3/MM3 (ref 3.4–10.8)
WHOLE BLOOD HOLD SPECIMEN: NORMAL
WHOLE BLOOD HOLD SPECIMEN: NORMAL

## 2022-04-15 PROCEDURE — 99283 EMERGENCY DEPT VISIT LOW MDM: CPT

## 2022-04-15 PROCEDURE — 80053 COMPREHEN METABOLIC PANEL: CPT

## 2022-04-15 PROCEDURE — 84484 ASSAY OF TROPONIN QUANT: CPT

## 2022-04-15 PROCEDURE — 93005 ELECTROCARDIOGRAM TRACING: CPT | Performed by: EMERGENCY MEDICINE

## 2022-04-15 PROCEDURE — 25010000002 ONDANSETRON PER 1 MG: Performed by: NURSE PRACTITIONER

## 2022-04-15 PROCEDURE — 81001 URINALYSIS AUTO W/SCOPE: CPT

## 2022-04-15 PROCEDURE — 99284 EMERGENCY DEPT VISIT MOD MDM: CPT

## 2022-04-15 PROCEDURE — 93005 ELECTROCARDIOGRAM TRACING: CPT | Performed by: NURSE PRACTITIONER

## 2022-04-15 PROCEDURE — 36415 COLL VENOUS BLD VENIPUNCTURE: CPT

## 2022-04-15 PROCEDURE — 25010000002 DICYCLOMINE PER 20 MG: Performed by: NURSE PRACTITIONER

## 2022-04-15 PROCEDURE — 85025 COMPLETE CBC W/AUTO DIFF WBC: CPT

## 2022-04-15 PROCEDURE — 86677 HELICOBACTER PYLORI ANTIBODY: CPT | Performed by: NURSE PRACTITIONER

## 2022-04-15 PROCEDURE — 93005 ELECTROCARDIOGRAM TRACING: CPT

## 2022-04-15 PROCEDURE — 83690 ASSAY OF LIPASE: CPT

## 2022-04-15 PROCEDURE — 74022 RADEX COMPL AQT ABD SERIES: CPT

## 2022-04-15 RX ORDER — SODIUM CHLORIDE 0.9 % (FLUSH) 0.9 %
10 SYRINGE (ML) INJECTION AS NEEDED
Status: DISCONTINUED | OUTPATIENT
Start: 2022-04-15 | End: 2022-04-19 | Stop reason: HOSPADM

## 2022-04-15 RX ORDER — DICYCLOMINE HYDROCHLORIDE 10 MG/ML
20 INJECTION INTRAMUSCULAR ONCE
Status: COMPLETED | OUTPATIENT
Start: 2022-04-15 | End: 2022-04-15

## 2022-04-15 RX ORDER — LIDOCAINE HYDROCHLORIDE 20 MG/ML
15 SOLUTION OROPHARYNGEAL ONCE
Status: COMPLETED | OUTPATIENT
Start: 2022-04-15 | End: 2022-04-15

## 2022-04-15 RX ORDER — ALUMINA, MAGNESIA, AND SIMETHICONE 2400; 2400; 240 MG/30ML; MG/30ML; MG/30ML
15 SUSPENSION ORAL ONCE
Status: COMPLETED | OUTPATIENT
Start: 2022-04-15 | End: 2022-04-15

## 2022-04-15 RX ORDER — ONDANSETRON 2 MG/ML
4 INJECTION INTRAMUSCULAR; INTRAVENOUS ONCE
Status: COMPLETED | OUTPATIENT
Start: 2022-04-15 | End: 2022-04-15

## 2022-04-15 RX ADMIN — DICYCLOMINE HYDROCHLORIDE 20 MG: 20 INJECTION, SOLUTION INTRAMUSCULAR at 22:59

## 2022-04-15 RX ADMIN — ALUMINUM HYDROXIDE, MAGNESIUM HYDROXIDE, AND DIMETHICONE 15 ML: 400; 400; 40 SUSPENSION ORAL at 22:59

## 2022-04-15 RX ADMIN — ONDANSETRON 4 MG: 2 INJECTION INTRAMUSCULAR; INTRAVENOUS at 23:31

## 2022-04-15 RX ADMIN — LIDOCAINE HYDROCHLORIDE 15 ML: 20 SOLUTION ORAL at 23:00

## 2022-04-15 RX ADMIN — SODIUM CHLORIDE 1000 ML: 9 INJECTION, SOLUTION INTRAVENOUS at 23:31

## 2022-04-16 ENCOUNTER — ANESTHESIA EVENT (OUTPATIENT)
Dept: PERIOP | Facility: HOSPITAL | Age: 62
End: 2022-04-16

## 2022-04-16 ENCOUNTER — APPOINTMENT (OUTPATIENT)
Dept: CT IMAGING | Facility: HOSPITAL | Age: 62
End: 2022-04-16

## 2022-04-16 ENCOUNTER — ANESTHESIA (OUTPATIENT)
Dept: PERIOP | Facility: HOSPITAL | Age: 62
End: 2022-04-16

## 2022-04-16 PROBLEM — K29.50 CHRONIC GASTRITIS WITHOUT BLEEDING, UNSPECIFIED GASTRITIS TYPE: Status: ACTIVE | Noted: 2022-04-16

## 2022-04-16 PROBLEM — K66.8 PNEUMOPERITONEUM OF UNKNOWN ETIOLOGY: Status: ACTIVE | Noted: 2022-04-15

## 2022-04-16 LAB
ANION GAP SERPL CALCULATED.3IONS-SCNC: 13.8 MMOL/L (ref 5–15)
BASOPHILS # BLD AUTO: 0.03 10*3/MM3 (ref 0–0.2)
BASOPHILS NFR BLD AUTO: 0.2 % (ref 0–1.5)
BUN SERPL-MCNC: 16 MG/DL (ref 6–20)
BUN/CREAT SERPL: 15.7 (ref 7–25)
CALCIUM SPEC-SCNC: 9.3 MG/DL (ref 8.6–10.5)
CHLORIDE SERPL-SCNC: 105 MMOL/L (ref 98–107)
CO2 SERPL-SCNC: 20.2 MMOL/L (ref 22–29)
CREAT SERPL-MCNC: 1.02 MG/DL (ref 0.57–1)
DEPRECATED RDW RBC AUTO: 47.8 FL (ref 37–54)
EGFRCR SERPLBLD CKD-EPI 2021: 63.5 ML/MIN/1.73
EOSINOPHIL # BLD AUTO: 0 10*3/MM3 (ref 0–0.4)
EOSINOPHIL NFR BLD AUTO: 0 % (ref 0.3–6.2)
ERYTHROCYTE [DISTWIDTH] IN BLOOD BY AUTOMATED COUNT: 15.5 % (ref 12.3–15.4)
GLUCOSE BLDC GLUCOMTR-MCNC: 141 MG/DL (ref 70–99)
GLUCOSE BLDC GLUCOMTR-MCNC: 167 MG/DL (ref 70–99)
GLUCOSE BLDC GLUCOMTR-MCNC: 208 MG/DL (ref 70–99)
GLUCOSE BLDC GLUCOMTR-MCNC: 223 MG/DL (ref 70–99)
GLUCOSE BLDC GLUCOMTR-MCNC: 249 MG/DL (ref 70–99)
GLUCOSE SERPL-MCNC: 203 MG/DL (ref 65–99)
H PYLORI IGG SER IA-ACNC: NEGATIVE
HCT VFR BLD AUTO: 38.6 % (ref 34–46.6)
HGB BLD-MCNC: 13.6 G/DL (ref 12–15.9)
IMM GRANULOCYTES # BLD AUTO: 0.04 10*3/MM3 (ref 0–0.05)
IMM GRANULOCYTES NFR BLD AUTO: 0.3 % (ref 0–0.5)
LYMPHOCYTES # BLD AUTO: 1.53 10*3/MM3 (ref 0.7–3.1)
LYMPHOCYTES NFR BLD AUTO: 11.7 % (ref 19.6–45.3)
MCH RBC QN AUTO: 29.8 PG (ref 26.6–33)
MCHC RBC AUTO-ENTMCNC: 35.2 G/DL (ref 31.5–35.7)
MCV RBC AUTO: 84.6 FL (ref 79–97)
MONOCYTES # BLD AUTO: 0.51 10*3/MM3 (ref 0.1–0.9)
MONOCYTES NFR BLD AUTO: 3.9 % (ref 5–12)
NEUTROPHILS NFR BLD AUTO: 10.93 10*3/MM3 (ref 1.7–7)
NEUTROPHILS NFR BLD AUTO: 83.9 % (ref 42.7–76)
NRBC BLD AUTO-RTO: 0 /100 WBC (ref 0–0.2)
PLATELET # BLD AUTO: 274 10*3/MM3 (ref 140–450)
PMV BLD AUTO: 10.1 FL (ref 6–12)
POTASSIUM SERPL-SCNC: 4.3 MMOL/L (ref 3.5–5.2)
QT INTERVAL: 374 MS
QT INTERVAL: 377 MS
RBC # BLD AUTO: 4.56 10*6/MM3 (ref 3.77–5.28)
SODIUM SERPL-SCNC: 139 MMOL/L (ref 136–145)
WBC NRBC COR # BLD: 13.04 10*3/MM3 (ref 3.4–10.8)

## 2022-04-16 PROCEDURE — 0DBL0ZZ EXCISION OF TRANSVERSE COLON, OPEN APPROACH: ICD-10-PCS | Performed by: SURGERY

## 2022-04-16 PROCEDURE — 85025 COMPLETE CBC W/AUTO DIFF WBC: CPT | Performed by: SURGERY

## 2022-04-16 PROCEDURE — 25010000002 HYDROMORPHONE 1 MG/ML SOLUTION: Performed by: EMERGENCY MEDICINE

## 2022-04-16 PROCEDURE — 94761 N-INVAS EAR/PLS OXIMETRY MLT: CPT

## 2022-04-16 PROCEDURE — 25010000002 PROTHROMBIN COMPLEX CONC HUMAN 500 UNITS KIT: Performed by: EMERGENCY MEDICINE

## 2022-04-16 PROCEDURE — 44140 PARTIAL REMOVAL OF COLON: CPT | Performed by: SPECIALIST/TECHNOLOGIST, OTHER

## 2022-04-16 PROCEDURE — 25010000002 HYDROMORPHONE 1 MG/ML SOLUTION: Performed by: NURSE ANESTHETIST, CERTIFIED REGISTERED

## 2022-04-16 PROCEDURE — 0DBU0ZZ EXCISION OF OMENTUM, OPEN APPROACH: ICD-10-PCS | Performed by: SURGERY

## 2022-04-16 PROCEDURE — 0 IOPAMIDOL PER 1 ML: Performed by: EMERGENCY MEDICINE

## 2022-04-16 PROCEDURE — 44140 PARTIAL REMOVAL OF COLON: CPT | Performed by: SURGERY

## 2022-04-16 PROCEDURE — 25010000002 PIPERACILLIN SOD-TAZOBACTAM PER 1 G: Performed by: SURGERY

## 2022-04-16 PROCEDURE — 74177 CT ABD & PELVIS W/CONTRAST: CPT

## 2022-04-16 PROCEDURE — 94799 UNLISTED PULMONARY SVC/PX: CPT

## 2022-04-16 PROCEDURE — 25010000002 ALBUMIN HUMAN 5% PER 50 ML: Performed by: NURSE ANESTHETIST, CERTIFIED REGISTERED

## 2022-04-16 PROCEDURE — 99223 1ST HOSP IP/OBS HIGH 75: CPT | Performed by: FAMILY MEDICINE

## 2022-04-16 PROCEDURE — 99255 IP/OBS CONSLTJ NEW/EST HI 80: CPT | Performed by: SURGERY

## 2022-04-16 PROCEDURE — 25010000002 ONDANSETRON PER 1 MG: Performed by: NURSE ANESTHETIST, CERTIFIED REGISTERED

## 2022-04-16 PROCEDURE — 25010000002 METOCLOPRAMIDE PER 10 MG: Performed by: NURSE PRACTITIONER

## 2022-04-16 PROCEDURE — C1889 IMPLANT/INSERT DEVICE, NOC: HCPCS | Performed by: SURGERY

## 2022-04-16 PROCEDURE — 25010000002 DEXAMETHASONE PER 1 MG: Performed by: NURSE ANESTHETIST, CERTIFIED REGISTERED

## 2022-04-16 PROCEDURE — 25010000002 DIPHENHYDRAMINE PER 50 MG: Performed by: NURSE PRACTITIONER

## 2022-04-16 PROCEDURE — 25010000002 FENTANYL CITRATE (PF) 50 MCG/ML SOLUTION: Performed by: NURSE ANESTHETIST, CERTIFIED REGISTERED

## 2022-04-16 PROCEDURE — P9041 ALBUMIN (HUMAN),5%, 50ML: HCPCS | Performed by: NURSE ANESTHETIST, CERTIFIED REGISTERED

## 2022-04-16 PROCEDURE — 88305 TISSUE EXAM BY PATHOLOGIST: CPT | Performed by: SURGERY

## 2022-04-16 PROCEDURE — 82962 GLUCOSE BLOOD TEST: CPT

## 2022-04-16 PROCEDURE — 25010000002 MORPHINE PER 10 MG: Performed by: SURGERY

## 2022-04-16 PROCEDURE — 80048 BASIC METABOLIC PNL TOTAL CA: CPT | Performed by: SURGERY

## 2022-04-16 PROCEDURE — 94640 AIRWAY INHALATION TREATMENT: CPT

## 2022-04-16 PROCEDURE — 25010000002 PROPOFOL 10 MG/ML EMULSION: Performed by: NURSE ANESTHETIST, CERTIFIED REGISTERED

## 2022-04-16 PROCEDURE — 63710000001 INSULIN LISPRO (HUMAN) PER 5 UNITS: Performed by: SURGERY

## 2022-04-16 DEVICE — LIGACLIP MCA MULTIPLE CLIP APPLIERS, 20 MEDIUM CLIPS
Type: IMPLANTABLE DEVICE | Site: ABDOMEN | Status: FUNCTIONAL
Brand: LIGACLIP

## 2022-04-16 DEVICE — PROXIMATE RELOADABLE LINEAR STAPLER, 60MM
Type: IMPLANTABLE DEVICE | Site: ABDOMEN | Status: FUNCTIONAL
Brand: PROXIMATE

## 2022-04-16 RX ORDER — DIPHENHYDRAMINE HYDROCHLORIDE 50 MG/ML
25 INJECTION INTRAMUSCULAR; INTRAVENOUS ONCE
Status: COMPLETED | OUTPATIENT
Start: 2022-04-16 | End: 2022-04-16

## 2022-04-16 RX ORDER — EPHEDRINE SULFATE 50 MG/ML
INJECTION, SOLUTION INTRAVENOUS AS NEEDED
Status: DISCONTINUED | OUTPATIENT
Start: 2022-04-16 | End: 2022-04-16 | Stop reason: SURG

## 2022-04-16 RX ORDER — CLINDAMYCIN PHOSPHATE 150 MG/ML
INJECTION, SOLUTION INTRAVENOUS AS NEEDED
Status: DISCONTINUED | OUTPATIENT
Start: 2022-04-16 | End: 2022-04-16 | Stop reason: SURG

## 2022-04-16 RX ORDER — SODIUM CHLORIDE 0.9 % (FLUSH) 0.9 %
10 SYRINGE (ML) INJECTION EVERY 12 HOURS SCHEDULED
Status: DISCONTINUED | OUTPATIENT
Start: 2022-04-16 | End: 2022-04-16 | Stop reason: HOSPADM

## 2022-04-16 RX ORDER — ALBUMIN, HUMAN INJ 5% 5 %
SOLUTION INTRAVENOUS CONTINUOUS PRN
Status: DISCONTINUED | OUTPATIENT
Start: 2022-04-16 | End: 2022-04-16 | Stop reason: SURG

## 2022-04-16 RX ORDER — DICYCLOMINE HCL 20 MG
20 TABLET ORAL EVERY 8 HOURS PRN
Qty: 30 TABLET | Refills: 0 | Status: ON HOLD | OUTPATIENT
Start: 2022-04-16 | End: 2022-06-03

## 2022-04-16 RX ORDER — ACETAMINOPHEN 325 MG/1
650 TABLET ORAL EVERY 4 HOURS PRN
Status: DISCONTINUED | OUTPATIENT
Start: 2022-04-16 | End: 2022-04-19 | Stop reason: HOSPADM

## 2022-04-16 RX ORDER — ONDANSETRON 2 MG/ML
4 INJECTION INTRAMUSCULAR; INTRAVENOUS ONCE AS NEEDED
Status: DISCONTINUED | OUTPATIENT
Start: 2022-04-16 | End: 2022-04-16

## 2022-04-16 RX ORDER — PROMETHAZINE HYDROCHLORIDE 25 MG/1
25 SUPPOSITORY RECTAL ONCE AS NEEDED
Status: DISCONTINUED | OUTPATIENT
Start: 2022-04-16 | End: 2022-04-16

## 2022-04-16 RX ORDER — MEPERIDINE HYDROCHLORIDE 25 MG/ML
12.5 INJECTION INTRAMUSCULAR; INTRAVENOUS; SUBCUTANEOUS
Status: DISCONTINUED | OUTPATIENT
Start: 2022-04-16 | End: 2022-04-16

## 2022-04-16 RX ORDER — CLINDAMYCIN PHOSPHATE 900 MG/50ML
900 INJECTION INTRAVENOUS ONCE
Status: DISCONTINUED | OUTPATIENT
Start: 2022-04-16 | End: 2022-04-16 | Stop reason: HOSPADM

## 2022-04-16 RX ORDER — DEXAMETHASONE SODIUM PHOSPHATE 4 MG/ML
INJECTION, SOLUTION INTRA-ARTICULAR; INTRALESIONAL; INTRAMUSCULAR; INTRAVENOUS; SOFT TISSUE AS NEEDED
Status: DISCONTINUED | OUTPATIENT
Start: 2022-04-16 | End: 2022-04-16 | Stop reason: SURG

## 2022-04-16 RX ORDER — SODIUM CHLORIDE 0.9 % (FLUSH) 0.9 %
1-10 SYRINGE (ML) INJECTION AS NEEDED
Status: DISCONTINUED | OUTPATIENT
Start: 2022-04-16 | End: 2022-04-16 | Stop reason: HOSPADM

## 2022-04-16 RX ORDER — NALOXONE HCL 0.4 MG/ML
0.1 VIAL (ML) INJECTION
Status: DISCONTINUED | OUTPATIENT
Start: 2022-04-16 | End: 2022-04-17

## 2022-04-16 RX ORDER — NICOTINE 21 MG/24HR
1 PATCH, TRANSDERMAL 24 HOURS TRANSDERMAL
Status: DISCONTINUED | OUTPATIENT
Start: 2022-04-16 | End: 2022-04-19 | Stop reason: HOSPADM

## 2022-04-16 RX ORDER — ACETAMINOPHEN 160 MG/5ML
650 SOLUTION ORAL EVERY 4 HOURS PRN
Status: DISCONTINUED | OUTPATIENT
Start: 2022-04-16 | End: 2022-04-19 | Stop reason: HOSPADM

## 2022-04-16 RX ORDER — MORPHINE SULFATE 1 MG/ML
INJECTION INTRAVENOUS CONTINUOUS
Status: DISCONTINUED | OUTPATIENT
Start: 2022-04-16 | End: 2022-04-17

## 2022-04-16 RX ORDER — ONDANSETRON 2 MG/ML
4 INJECTION INTRAMUSCULAR; INTRAVENOUS EVERY 6 HOURS PRN
Status: DISCONTINUED | OUTPATIENT
Start: 2022-04-16 | End: 2022-04-19 | Stop reason: HOSPADM

## 2022-04-16 RX ORDER — FAMOTIDINE 10 MG/ML
20 INJECTION, SOLUTION INTRAVENOUS EVERY 12 HOURS SCHEDULED
Status: DISCONTINUED | OUTPATIENT
Start: 2022-04-16 | End: 2022-04-17

## 2022-04-16 RX ORDER — METOCLOPRAMIDE HYDROCHLORIDE 5 MG/ML
10 INJECTION INTRAMUSCULAR; INTRAVENOUS ONCE
Status: COMPLETED | OUTPATIENT
Start: 2022-04-16 | End: 2022-04-16

## 2022-04-16 RX ORDER — OXYCODONE HYDROCHLORIDE 5 MG/1
5 TABLET ORAL
Status: DISCONTINUED | OUTPATIENT
Start: 2022-04-16 | End: 2022-04-16

## 2022-04-16 RX ORDER — MORPHINE SULFATE 2 MG/ML
2 INJECTION, SOLUTION INTRAMUSCULAR; INTRAVENOUS EVERY 4 HOURS PRN
Status: DISCONTINUED | OUTPATIENT
Start: 2022-04-16 | End: 2022-04-17 | Stop reason: ALTCHOICE

## 2022-04-16 RX ORDER — SUCCINYLCHOLINE/SOD CL,ISO/PF 100 MG/5ML
SYRINGE (ML) INTRAVENOUS AS NEEDED
Status: DISCONTINUED | OUTPATIENT
Start: 2022-04-16 | End: 2022-04-16 | Stop reason: SURG

## 2022-04-16 RX ORDER — IPRATROPIUM BROMIDE AND ALBUTEROL SULFATE 2.5; .5 MG/3ML; MG/3ML
3 SOLUTION RESPIRATORY (INHALATION) EVERY 6 HOURS PRN
Status: DISCONTINUED | OUTPATIENT
Start: 2022-04-16 | End: 2022-04-19 | Stop reason: HOSPADM

## 2022-04-16 RX ORDER — ONDANSETRON 2 MG/ML
INJECTION INTRAMUSCULAR; INTRAVENOUS AS NEEDED
Status: DISCONTINUED | OUTPATIENT
Start: 2022-04-16 | End: 2022-04-16 | Stop reason: SURG

## 2022-04-16 RX ORDER — ARFORMOTEROL TARTRATE 15 UG/2ML
15 SOLUTION RESPIRATORY (INHALATION)
Status: DISCONTINUED | OUTPATIENT
Start: 2022-04-16 | End: 2022-04-19 | Stop reason: HOSPADM

## 2022-04-16 RX ORDER — FENTANYL CITRATE 50 UG/ML
INJECTION, SOLUTION INTRAMUSCULAR; INTRAVENOUS AS NEEDED
Status: DISCONTINUED | OUTPATIENT
Start: 2022-04-16 | End: 2022-04-16 | Stop reason: SURG

## 2022-04-16 RX ORDER — SODIUM CHLORIDE 0.9 % (FLUSH) 0.9 %
10 SYRINGE (ML) INJECTION AS NEEDED
Status: DISCONTINUED | OUTPATIENT
Start: 2022-04-16 | End: 2022-04-19 | Stop reason: HOSPADM

## 2022-04-16 RX ORDER — POLYETHYLENE GLYCOL 3350 17 G/17G
17 POWDER, FOR SOLUTION ORAL DAILY PRN
Status: DISCONTINUED | OUTPATIENT
Start: 2022-04-16 | End: 2022-04-17

## 2022-04-16 RX ORDER — PROMETHAZINE HYDROCHLORIDE 12.5 MG/1
25 TABLET ORAL ONCE AS NEEDED
Status: DISCONTINUED | OUTPATIENT
Start: 2022-04-16 | End: 2022-04-16

## 2022-04-16 RX ORDER — SODIUM CHLORIDE 0.9 % (FLUSH) 0.9 %
10 SYRINGE (ML) INJECTION EVERY 12 HOURS SCHEDULED
Status: DISCONTINUED | OUTPATIENT
Start: 2022-04-16 | End: 2022-04-19 | Stop reason: HOSPADM

## 2022-04-16 RX ORDER — BISACODYL 5 MG/1
5 TABLET, DELAYED RELEASE ORAL DAILY PRN
Status: DISCONTINUED | OUTPATIENT
Start: 2022-04-16 | End: 2022-04-17

## 2022-04-16 RX ORDER — BUDESONIDE 0.5 MG/2ML
0.5 INHALANT ORAL
Status: DISCONTINUED | OUTPATIENT
Start: 2022-04-16 | End: 2022-04-19 | Stop reason: HOSPADM

## 2022-04-16 RX ORDER — ROCURONIUM BROMIDE 10 MG/ML
INJECTION, SOLUTION INTRAVENOUS AS NEEDED
Status: DISCONTINUED | OUTPATIENT
Start: 2022-04-16 | End: 2022-04-16 | Stop reason: SURG

## 2022-04-16 RX ORDER — NICOTINE POLACRILEX 4 MG
24 LOZENGE BUCCAL
Status: DISCONTINUED | OUTPATIENT
Start: 2022-04-16 | End: 2022-04-19 | Stop reason: HOSPADM

## 2022-04-16 RX ORDER — DEXMEDETOMIDINE HYDROCHLORIDE 100 UG/ML
INJECTION, SOLUTION INTRAVENOUS AS NEEDED
Status: DISCONTINUED | OUTPATIENT
Start: 2022-04-16 | End: 2022-04-16 | Stop reason: SURG

## 2022-04-16 RX ORDER — LIDOCAINE HYDROCHLORIDE 20 MG/ML
INJECTION, SOLUTION INFILTRATION; PERINEURAL AS NEEDED
Status: DISCONTINUED | OUTPATIENT
Start: 2022-04-16 | End: 2022-04-16 | Stop reason: SURG

## 2022-04-16 RX ORDER — MAGNESIUM HYDROXIDE 1200 MG/15ML
LIQUID ORAL AS NEEDED
Status: DISCONTINUED | OUTPATIENT
Start: 2022-04-16 | End: 2022-04-16 | Stop reason: HOSPADM

## 2022-04-16 RX ORDER — NALOXONE HCL 0.4 MG/ML
0.4 VIAL (ML) INJECTION
Status: DISCONTINUED | OUTPATIENT
Start: 2022-04-16 | End: 2022-04-17 | Stop reason: ALTCHOICE

## 2022-04-16 RX ORDER — BISACODYL 10 MG
10 SUPPOSITORY, RECTAL RECTAL DAILY PRN
Status: DISCONTINUED | OUTPATIENT
Start: 2022-04-16 | End: 2022-04-17

## 2022-04-16 RX ORDER — CLINDAMYCIN PHOSPHATE 150 MG/ML
INJECTION, SOLUTION INTRAVENOUS AS NEEDED
Status: DISCONTINUED | OUTPATIENT
Start: 2022-04-16 | End: 2022-04-16

## 2022-04-16 RX ORDER — ACETAMINOPHEN 650 MG/1
650 SUPPOSITORY RECTAL EVERY 4 HOURS PRN
Status: DISCONTINUED | OUTPATIENT
Start: 2022-04-16 | End: 2022-04-19 | Stop reason: HOSPADM

## 2022-04-16 RX ORDER — PHENYLEPHRINE HCL IN 0.9% NACL 1 MG/10 ML
SYRINGE (ML) INTRAVENOUS AS NEEDED
Status: DISCONTINUED | OUTPATIENT
Start: 2022-04-16 | End: 2022-04-16 | Stop reason: SURG

## 2022-04-16 RX ORDER — AMOXICILLIN 250 MG
2 CAPSULE ORAL 2 TIMES DAILY
Status: DISCONTINUED | OUTPATIENT
Start: 2022-04-16 | End: 2022-04-17

## 2022-04-16 RX ORDER — DEXTROSE MONOHYDRATE 100 MG/ML
25 INJECTION, SOLUTION INTRAVENOUS
Status: DISCONTINUED | OUTPATIENT
Start: 2022-04-16 | End: 2022-04-19 | Stop reason: HOSPADM

## 2022-04-16 RX ORDER — SODIUM CHLORIDE, SODIUM LACTATE, POTASSIUM CHLORIDE, CALCIUM CHLORIDE 600; 310; 30; 20 MG/100ML; MG/100ML; MG/100ML; MG/100ML
INJECTION, SOLUTION INTRAVENOUS CONTINUOUS PRN
Status: DISCONTINUED | OUTPATIENT
Start: 2022-04-16 | End: 2022-04-16 | Stop reason: SURG

## 2022-04-16 RX ORDER — PROPOFOL 10 MG/ML
VIAL (ML) INTRAVENOUS AS NEEDED
Status: DISCONTINUED | OUTPATIENT
Start: 2022-04-16 | End: 2022-04-16 | Stop reason: SURG

## 2022-04-16 RX ADMIN — EPHEDRINE SULFATE 5 MG: 50 INJECTION INTRAVENOUS at 09:15

## 2022-04-16 RX ADMIN — HYDROMORPHONE HYDROCHLORIDE 0.5 MG: 1 INJECTION, SOLUTION INTRAMUSCULAR; INTRAVENOUS; SUBCUTANEOUS at 10:03

## 2022-04-16 RX ADMIN — TAZOBACTAM SODIUM AND PIPERACILLIN SODIUM 3.38 G: 375; 3 INJECTION, SOLUTION INTRAVENOUS at 21:16

## 2022-04-16 RX ADMIN — FAMOTIDINE 20 MG: 10 INJECTION INTRAVENOUS at 21:14

## 2022-04-16 RX ADMIN — Medication 200 MCG: at 08:55

## 2022-04-16 RX ADMIN — EPHEDRINE SULFATE 10 MG: 50 INJECTION INTRAVENOUS at 09:17

## 2022-04-16 RX ADMIN — CLINDAMYCIN PHOSPHATE 900 MG: 150 INJECTION, SOLUTION INTRAVENOUS at 08:22

## 2022-04-16 RX ADMIN — MORPHINE SULFATE: 1 INJECTION INTRAVENOUS at 10:56

## 2022-04-16 RX ADMIN — SUGAMMADEX 200 MG: 100 INJECTION, SOLUTION INTRAVENOUS at 09:40

## 2022-04-16 RX ADMIN — DEXMEDETOMIDINE HYDROCHLORIDE 10 MCG: 100 INJECTION, SOLUTION, CONCENTRATE INTRAVENOUS at 08:44

## 2022-04-16 RX ADMIN — HYDROMORPHONE HYDROCHLORIDE 1 MG: 1 INJECTION, SOLUTION INTRAMUSCULAR; INTRAVENOUS; SUBCUTANEOUS at 04:07

## 2022-04-16 RX ADMIN — INSULIN LISPRO 4 UNITS: 100 INJECTION, SOLUTION INTRAVENOUS; SUBCUTANEOUS at 12:53

## 2022-04-16 RX ADMIN — ALBUMIN HUMAN: 0.05 INJECTION, SOLUTION INTRAVENOUS at 09:08

## 2022-04-16 RX ADMIN — SODIUM CHLORIDE, POTASSIUM CHLORIDE, SODIUM LACTATE AND CALCIUM CHLORIDE: 600; 310; 30; 20 INJECTION, SOLUTION INTRAVENOUS at 09:36

## 2022-04-16 RX ADMIN — SODIUM CHLORIDE, POTASSIUM CHLORIDE, SODIUM LACTATE AND CALCIUM CHLORIDE: 600; 310; 30; 20 INJECTION, SOLUTION INTRAVENOUS at 08:20

## 2022-04-16 RX ADMIN — Medication 10 ML: at 21:15

## 2022-04-16 RX ADMIN — TAZOBACTAM SODIUM AND PIPERACILLIN SODIUM 3.38 G: 375; 3 INJECTION, SOLUTION INTRAVENOUS at 14:31

## 2022-04-16 RX ADMIN — HYDROMORPHONE HYDROCHLORIDE 0.5 MG: 1 INJECTION, SOLUTION INTRAMUSCULAR; INTRAVENOUS; SUBCUTANEOUS at 09:57

## 2022-04-16 RX ADMIN — Medication 100 MCG: at 09:24

## 2022-04-16 RX ADMIN — LIDOCAINE HYDROCHLORIDE 80 MG: 20 INJECTION, SOLUTION INFILTRATION; PERINEURAL at 08:30

## 2022-04-16 RX ADMIN — FAMOTIDINE 20 MG: 10 INJECTION INTRAVENOUS at 12:54

## 2022-04-16 RX ADMIN — ARFORMOTEROL TARTRATE 15 MCG: 15 SOLUTION RESPIRATORY (INHALATION) at 20:47

## 2022-04-16 RX ADMIN — METOPROLOL TARTRATE 75 MG: 25 TABLET, FILM COATED ORAL at 21:14

## 2022-04-16 RX ADMIN — FENTANYL CITRATE 50 MCG: 50 INJECTION, SOLUTION INTRAMUSCULAR; INTRAVENOUS at 09:25

## 2022-04-16 RX ADMIN — ROCURONIUM BROMIDE 10 MG: 10 INJECTION INTRAVENOUS at 08:30

## 2022-04-16 RX ADMIN — Medication 100 MCG: at 08:47

## 2022-04-16 RX ADMIN — Medication 100 MG: at 08:30

## 2022-04-16 RX ADMIN — INSULIN LISPRO 4 UNITS: 100 INJECTION, SOLUTION INTRAVENOUS; SUBCUTANEOUS at 17:44

## 2022-04-16 RX ADMIN — METOCLOPRAMIDE HYDROCHLORIDE 10 MG: 5 INJECTION INTRAMUSCULAR; INTRAVENOUS at 01:44

## 2022-04-16 RX ADMIN — Medication 200 MCG: at 09:00

## 2022-04-16 RX ADMIN — Medication 200 MCG: at 09:17

## 2022-04-16 RX ADMIN — EPHEDRINE SULFATE 10 MG: 50 INJECTION INTRAVENOUS at 09:20

## 2022-04-16 RX ADMIN — IOPAMIDOL 100 ML: 755 INJECTION, SOLUTION INTRAVENOUS at 02:34

## 2022-04-16 RX ADMIN — DIPHENHYDRAMINE HYDROCHLORIDE 25 MG: 50 INJECTION INTRAMUSCULAR; INTRAVENOUS at 01:44

## 2022-04-16 RX ADMIN — DEXMEDETOMIDINE HYDROCHLORIDE 20 MCG: 100 INJECTION, SOLUTION, CONCENTRATE INTRAVENOUS at 08:21

## 2022-04-16 RX ADMIN — EPHEDRINE SULFATE 10 MG: 50 INJECTION INTRAVENOUS at 09:25

## 2022-04-16 RX ADMIN — BUDESONIDE 0.5 MG: 0.5 SUSPENSION RESPIRATORY (INHALATION) at 20:47

## 2022-04-16 RX ADMIN — DEXMEDETOMIDINE HYDROCHLORIDE 10 MCG: 100 INJECTION, SOLUTION, CONCENTRATE INTRAVENOUS at 09:26

## 2022-04-16 RX ADMIN — DEXAMETHASONE SODIUM PHOSPHATE 4 MG: 4 INJECTION, SOLUTION INTRA-ARTICULAR; INTRALESIONAL; INTRAMUSCULAR; INTRAVENOUS; SOFT TISSUE at 09:20

## 2022-04-16 RX ADMIN — ONDANSETRON 4 MG: 2 INJECTION INTRAMUSCULAR; INTRAVENOUS at 09:20

## 2022-04-16 RX ADMIN — HYDROMORPHONE HYDROCHLORIDE 0.5 MG: 1 INJECTION, SOLUTION INTRAMUSCULAR; INTRAVENOUS; SUBCUTANEOUS at 10:18

## 2022-04-16 RX ADMIN — Medication 200 MCG: at 09:15

## 2022-04-16 RX ADMIN — SENNOSIDES AND DOCUSATE SODIUM 2 TABLET: 50; 8.6 TABLET ORAL at 21:14

## 2022-04-16 RX ADMIN — ROCURONIUM BROMIDE 40 MG: 10 INJECTION INTRAVENOUS at 08:34

## 2022-04-16 RX ADMIN — FENTANYL CITRATE 50 MCG: 50 INJECTION, SOLUTION INTRAMUSCULAR; INTRAVENOUS at 08:30

## 2022-04-16 RX ADMIN — Medication 200 MCG: at 08:45

## 2022-04-16 RX ADMIN — PROPOFOL 200 MG: 10 INJECTION, EMULSION INTRAVENOUS at 08:30

## 2022-04-16 NOTE — ED PROVIDER NOTES
Time: 04:45 EDT  Arrived by: Vehicle  Chief Complaint: abdominal pain  History provided by: Patient  History is limited by: N/A    History of Present Illness:  Patient is a 61 y.o. year old female that presents to the emergency department with abdominal pain for the past week mildly.  Worse today with vomiting starting      Abdominal Pain  Pain location:  Epigastric  Pain quality: bloating, burning, cramping and gnawing    Pain radiates to:  Back  Pain severity:  Severe  Onset quality:  Gradual  Duration:  7 days  Timing:  Constant  Progression:  Worsening ( Today)  Chronicity:  Recurrent  Context comment:  Patient has history of gastritis and reflux and takes Protonix and Prevacid and is due to have a EGD in June.  Pain today was much more severe  Relieved by:  Nothing  Worsened by:  Eating, palpation and movement  Ineffective treatments:  Belching (Home GI meds including Prevacid and Protonix)  Associated symptoms: belching and vomiting    Associated symptoms: no anorexia, no chest pain, no chills, no constipation, no cough, no diarrhea, no dysuria, no fatigue, no fever, no flatus, no hematemesis, no hematochezia, no hematuria, no melena, no nausea, no shortness of breath and no sore throat    Vomiting  The primary symptoms include abdominal pain and vomiting. Primary symptoms do not include fever, fatigue, nausea, diarrhea, melena, hematemesis, hematochezia or dysuria.   The illness is also significant for back pain. The illness does not include chills, anorexia or constipation.       Similar Symptoms Previously: Patient has chronic history of gastritis and takes Protonix and Prevacid from her GI doctor.  Is scheduled for scope in June.  This pain today was more severe  Recently seen: Patient sees a GI doctor Dr. Charles and was put on medications few months ago      Patient Care Team  Primary Care Provider: Elena Henderson    Past Medical History:     Allergies   Allergen Reactions   • Liraglutide Nausea And  Vomiting   • Metformin Nausea Only     GI Upset     Past Medical History:   Diagnosis Date   • Atrial fibrillation (HCC)    • Diabetes mellitus (HCC)    • Gastroparesis    • GERD (gastroesophageal reflux disease) 1995   • Hypertension    • Irritable bowel syndrome    • BERKOWITZ (nonalcoholic steatohepatitis)      Past Surgical History:   Procedure Laterality Date   • BLADDER SLING MODIFIED, ANTERIOR AND POSTERIOR VAGINAL REPAIR     • BREAST LUMPECTOMY     • CARDIAC CATHETERIZATION     • CHOLECYSTECTOMY     • GALLBLADDER SURGERY     • TUBAL ABDOMINAL LIGATION       Family History   Adopted: Yes   Family history unknown: Yes       Home Medications:  Prior to Admission medications    Medication Sig Start Date End Date Taking? Authorizing Provider   albuterol sulfate  (90 Base) MCG/ACT inhaler Inhale 2 puffs Every 4 (Four) Hours As Needed.    Joyce Colmenares MD   amLODIPine (NORVASC) 10 MG tablet Take 10 mg by mouth Daily.    Joyce Colmenares MD   apixaban (ELIQUIS) 5 MG tablet tablet Take 1 tablet by mouth 2 (Two) Times a Day. 11/19/21   Elena Henderson PA   B-D UF III MINI PEN NEEDLES 31G X 5 MM misc  10/8/21   Joyce Colmenares MD   Blood Glucose Monitoring Suppl (Accu-Chek Guide) w/Device kit  2/14/22   Joyce Colmenares MD   buPROPion XL (WELLBUTRIN XL) 150 MG 24 hr tablet Take  by mouth Daily.    Joyce Colmenares MD   cholecalciferol (VITAMIN D3) 25 MCG (1000 UT) tablet Take 2,000 Units by mouth Daily.    Joyce Colmenares MD   empagliflozin (JARDIANCE) 25 MG tablet tablet Take 25 mg by mouth. 7/30/21   Joyce Colmenares MD   famotidine (PEPCID) 40 MG tablet Take 1 tablet by mouth At Night As Needed for Heartburn. 12/30/21   Christina Rinaldi APRN   Fluticasone-Umeclidin-Vilant (Trelegy Ellipta) 100-62.5-25 MCG/INH inhaler Inhale 1 puff Daily. 4/14/22   Yarelis Melara MD   glucose blood test strip Check blood glucose twice daily 1/27/22   Elena Henderson PA  "  glucose monitor monitoring kit 1 each Daily. Patient prefers Accu Chek Meter. 2/14/22   Elena Henderson PA   hydrOXYzine (ATARAX) 25 MG tablet Take 25 mg by mouth 2 (Two) Times a Day As Needed. 6/23/21   Joyce Colmenares MD   ibuprofen (ADVIL,MOTRIN) 800 MG tablet Take 800 mg by mouth 3 (Three) Times a Day As Needed.    Joyce Colmenares MD   Insulin Glargine, 2 Unit Dial, (Toujeo Max SoloStar) 300 UNIT/ML solution pen-injector injection Inject 120 Units under the skin into the appropriate area as directed. 2/3/20 7/30/22  Joyce Colmenares MD   Insulin Pen Needle (Pen Needles 3/16\") 31G X 5 MM misc  10/8/21 10/8/22  Joyce Colmenares MD   Insulin Pen Needle 31G X 5 MM misc by Other route. 10/8/21 10/8/22  Joyce Colmenares MD   lactulose (CHRONULAC) 10 GM/15ML solution  12/30/21   Joyce Colmenares MD   Lancets (accu-chek soft touch) lancets Check blood sugar twice daily 2/14/22   Elena Henderson PA   metoprolol tartrate (LOPRESSOR) 50 MG tablet Take 75 mg by mouth 2 (Two) Times a Day. TAKE 1 1/2 TABLETS BID 6/22/21   Joyce Colmenares MD   multivitamin with minerals tablet tablet Take 1 tablet by mouth Daily.    Joyce Colmenares MD   NovoLOG FlexPen 100 UNIT/ML solution pen-injector sc pen  3/14/22   Joyce Colmenares MD   olmesartan (BENICAR) 20 MG tablet Daily. 11/10/21   Joyce Colmenares MD   pantoprazole (PROTONIX) 40 MG EC tablet Take 1 tablet by mouth Daily. 12/30/21   Christina Rinaldi APRN   polyethylene glycol (MIRALAX) 17 g packet Take 17 g by mouth 2 (Two) Times a Day.    Joyce Colmenares MD   polyethylene glycol (MIRALAX) 17 GM/SCOOP powder Take 17 g by mouth.    Joyce Colmenares MD   Semaglutide,0.25 or 0.5MG/DOS, (Ozempic, 0.25 or 0.5 MG/DOSE,) 2 MG/1.5ML solution pen-injector Inject 0.5 mg under the skin into the appropriate area as directed. 7/30/21   Provider, MD Joyce   vitamin C (ASCORBIC ACID) 250 MG tablet Take 250 " "mg by mouth Daily.    Provider, Joyce, MD        Social History:   PT  reports that she has been smoking cigarettes. She started smoking about 46 years ago. She has a 10.25 pack-year smoking history. She has never used smokeless tobacco. She reports current alcohol use of about 1.0 standard drink of alcohol per week. She reports that she does not use drugs.    Record Review:  I have reviewed the patient's records in [facility].     Review of Systems  Review of Systems   Constitutional: Negative for chills, fatigue and fever.   HENT: Negative for congestion, ear pain and sore throat.    Eyes: Negative for pain.   Respiratory: Negative for cough, chest tightness and shortness of breath.    Cardiovascular: Negative for chest pain and palpitations.   Gastrointestinal: Positive for abdominal pain and vomiting. Negative for anorexia, constipation, diarrhea, flatus, hematemesis, hematochezia, melena and nausea.   Genitourinary: Negative for dysuria, flank pain and hematuria.   Musculoskeletal: Positive for back pain. Negative for joint swelling.   Skin: Negative for pallor.   Neurological: Negative for seizures and headaches.   Hematological: Negative.    Psychiatric/Behavioral: Negative.    All other systems reviewed and are negative.       Physical Exam  /88   Pulse 94   Temp 98.3 °F (36.8 °C) (Oral)   Resp 20   Ht 165.1 cm (65\")   Wt 101 kg (222 lb 7.1 oz)   SpO2 98%   BMI 37.02 kg/m²     Physical Exam  Vitals and nursing note reviewed.   Constitutional:       General: She is not in acute distress.     Appearance: Normal appearance. She is not toxic-appearing.   HENT:      Head: Normocephalic and atraumatic.      Mouth/Throat:      Mouth: Mucous membranes are moist.   Eyes:      General: No scleral icterus.  Cardiovascular:      Rate and Rhythm: Normal rate and regular rhythm.      Pulses: Normal pulses.      Heart sounds: Normal heart sounds.   Pulmonary:      Effort: Pulmonary effort is normal. No " "respiratory distress.      Breath sounds: Normal breath sounds.   Abdominal:      General: Abdomen is protuberant. Bowel sounds are normal.      Palpations: Abdomen is soft.      Tenderness: There is abdominal tenderness in the epigastric area. There is no right CVA tenderness or left CVA tenderness.      Hernia: No hernia is present.   Musculoskeletal:         General: Normal range of motion.      Cervical back: Normal range of motion and neck supple.   Skin:     General: Skin is warm and dry.   Neurological:      Mental Status: She is alert and oriented to person, place, and time. Mental status is at baseline.   Psychiatric:         Mood and Affect: Mood normal.         Behavior: Behavior normal.          ED Course  /88   Pulse 94   Temp 98.3 °F (36.8 °C) (Oral)   Resp 20   Ht 165.1 cm (65\")   Wt 101 kg (222 lb 7.1 oz)   SpO2 98%   BMI 37.02 kg/m²   Results for orders placed or performed during the hospital encounter of 04/15/22   Comprehensive Metabolic Panel    Specimen: Hand, Right; Blood   Result Value Ref Range    Glucose 175 (H) 65 - 99 mg/dL    BUN 10 6 - 20 mg/dL    Creatinine 0.88 0.57 - 1.00 mg/dL    Sodium 139 136 - 145 mmol/L    Potassium 3.7 3.5 - 5.2 mmol/L    Chloride 104 98 - 107 mmol/L    CO2 20.8 (L) 22.0 - 29.0 mmol/L    Calcium 9.9 8.6 - 10.5 mg/dL    Total Protein 9.5 (H) 6.0 - 8.5 g/dL    Albumin 4.60 3.50 - 5.20 g/dL    ALT (SGPT) 79 (H) 1 - 33 U/L    AST (SGOT) 73 (H) 1 - 32 U/L    Alkaline Phosphatase 154 (H) 39 - 117 U/L    Total Bilirubin 0.3 0.0 - 1.2 mg/dL    Globulin 4.9 gm/dL    A/G Ratio 0.9 g/dL    BUN/Creatinine Ratio 11.4 7.0 - 25.0    Anion Gap 14.2 5.0 - 15.0 mmol/L    eGFR 75.8 >60.0 mL/min/1.73   Lipase    Specimen: Hand, Right; Blood   Result Value Ref Range    Lipase 24 13 - 60 U/L   Troponin    Specimen: Hand, Right; Blood   Result Value Ref Range    Troponin T <0.010 0.000 - 0.030 ng/mL   Urinalysis With Microscopic If Indicated (No Culture) - Urine, Clean " Catch    Specimen: Urine, Clean Catch   Result Value Ref Range    Color, UA Yellow Yellow, Straw    Appearance, UA Clear Clear    pH, UA 7.0 5.0 - 8.0    Specific Gravity, UA 1.024 1.005 - 1.030    Glucose, UA >=1000 mg/dL (3+) (A) Negative    Ketones, UA Negative Negative    Bilirubin, UA Negative Negative    Blood, UA Negative Negative    Protein,  mg/dL (2+) (A) Negative    Leuk Esterase, UA Negative Negative    Nitrite, UA Negative Negative    Urobilinogen, UA 1.0 E.U./dL 0.2 - 1.0 E.U./dL   CBC Auto Differential    Specimen: Hand, Right; Blood   Result Value Ref Range    WBC 10.72 3.40 - 10.80 10*3/mm3    RBC 4.89 3.77 - 5.28 10*6/mm3    Hemoglobin 14.6 12.0 - 15.9 g/dL    Hematocrit 41.5 34.0 - 46.6 %    MCV 84.9 79.0 - 97.0 fL    MCH 29.9 26.6 - 33.0 pg    MCHC 35.2 31.5 - 35.7 g/dL    RDW 15.9 (H) 12.3 - 15.4 %    RDW-SD 48.8 37.0 - 54.0 fl    MPV 10.8 6.0 - 12.0 fL    Platelets 275 140 - 450 10*3/mm3    Neutrophil % 55.3 42.7 - 76.0 %    Lymphocyte % 36.6 19.6 - 45.3 %    Monocyte % 6.3 5.0 - 12.0 %    Eosinophil % 1.0 0.3 - 6.2 %    Basophil % 0.5 0.0 - 1.5 %    Immature Grans % 0.3 0.0 - 0.5 %    Neutrophils, Absolute 5.93 1.70 - 7.00 10*3/mm3    Lymphocytes, Absolute 3.92 (H) 0.70 - 3.10 10*3/mm3    Monocytes, Absolute 0.68 0.10 - 0.90 10*3/mm3    Eosinophils, Absolute 0.11 0.00 - 0.40 10*3/mm3    Basophils, Absolute 0.05 0.00 - 0.20 10*3/mm3    Immature Grans, Absolute 0.03 0.00 - 0.05 10*3/mm3    nRBC 0.0 0.0 - 0.2 /100 WBC   Urinalysis, Microscopic Only - Urine, Clean Catch    Specimen: Urine, Clean Catch   Result Value Ref Range    RBC, UA 0-2 (A) None Seen /HPF    WBC, UA 0-2 (A) None Seen /HPF    Bacteria, UA None Seen None Seen /HPF    Squamous Epithelial Cells, UA 0-2 None Seen, 0-2 /HPF    Hyaline Casts, UA None Seen None Seen /LPF    Methodology Automated Microscopy    H. Pylori Antibody, IgG    Specimen: Hand, Right; Blood   Result Value Ref Range    H. pylori IgG Negative Negative,  Equivocal   ECG 12 Lead   Result Value Ref Range    QT Interval 374 ms   ECG 12 Lead   Result Value Ref Range    QT Interval 377 ms   Green Top (Gel)   Result Value Ref Range    Extra Tube Hold for add-ons.    Lavender Top   Result Value Ref Range    Extra Tube hold for add-on    Gold Top - SST   Result Value Ref Range    Extra Tube Hold for add-ons.    Light Blue Top   Result Value Ref Range    Extra Tube hold for add-on      Medications   sodium chloride 0.9 % flush 10 mL (has no administration in time range)   prothrombin complex conc human (KCentra) IV solution 5,000 Units (has no administration in time range)   ondansetron (ZOFRAN) injection 4 mg (4 mg Intravenous Given 4/15/22 2331)   sodium chloride 0.9 % bolus 1,000 mL (0 mL Intravenous Stopped 4/16/22 0100)   aluminum-magnesium hydroxide-simethicone (MAALOX MAX) 400-400-40 MG/5ML suspension 15 mL (15 mL Oral Given 4/15/22 2259)   Lidocaine Viscous HCl (XYLOCAINE) 2 % solution 15 mL (15 mL Mouth/Throat Given 4/15/22 2300)   dicyclomine (BENTYL) injection 20 mg (20 mg Intramuscular Given 4/15/22 2259)   metoclopramide (REGLAN) injection 10 mg (10 mg Intravenous Given 4/16/22 0144)   diphenhydrAMINE (BENADRYL) injection 25 mg (25 mg Intravenous Given 4/16/22 0144)   iopamidol (ISOVUE-370) 76 % injection 100 mL (100 mL Intravenous Given 4/16/22 0234)   HYDROmorphone (DILAUDID) injection 1 mg (1 mg Intravenous Given 4/16/22 0407)     XR Abdomen 2+ VW with Chest 1 VW    Result Date: 4/16/2022  Narrative: PROCEDURE: XR ABDOMEN 2+ VIEWS W CHEST 1 VW  COMPARISON: [facility], CR, XR CHEST PA AND LATERAL, 3/23/2022, 11:54.  INDICATIONS: UNSPECIFIED ABDOMINAL PAIN AND BLOATING.  DISCUSSION:  Three views of the abdomen and pelvis reveal a nonobstructive bowel gas pattern and no pneumoperitoneum.  The patient has undergone cholecystectomy.  Hepatomegaly is possible.  Formed stool is seen within the rectosigmoid colon.  Fecal stasis as with  constipation cannot be excluded.  Fecal impaction would be in the differential diagnosis.  There are arterial calcifications.  A single frontal chest radiograph reveals no acute infiltrate and no cardiac enlargement. The thoracic aorta is atherosclerotic.  No pneumothorax is seen.      Impression:   A nonobstructive bowel gas pattern is noted.  No pneumoperitoneum.     COMMENT:  Part of this note is an electronic transcription of spoken language to printed text. The electronic translation/transcription may permit erroneous, or at times, nonsensical (or even sensical) words or phrases to be inadvertently transcribed or omitted; this  has reviewed the note for such errors (as well as additional errors); however, some may still exist.  MIKEY CALERO JR, MD           Electronically Signed and Approved By: MIKEY CALERO JR, MD on 4/16/2022 at 0:24          CT Abdomen Pelvis With Contrast    Addendum Date: 4/16/2022 Addendum:   ADDENDUM:  Pertinent findings were phoned to CHU Broderick (ordering [facility] ED Clinician) at approximately 0311 hours on 4/16/2022.    MIKEY CALERO JR, MD       Electronically Signed and Approved By: MIKEY CALERO JR, MD on 4/16/2022 at 3:20              Result Date: 4/16/2022  Narrative: PROCEDURE: CT ABDOMEN PELVIS W CONTRAST  COMPARISON: None.  INDICATIONS: Upper abdominal pain.  TECHNIQUE: After obtaining the patient's consent, 631 CT images were created with non-ionic intravenous contrast material.  No oral contrast agent was administered for the study.  PROTOCOL:   Standard CT imaging protocol performed.    RADIATION:   DLP: 1,315.5 mGy*cm.   Automated exposure control was utilized to minimize radiation dose. CONTRAST: 100 mL Isovue 370 I.V.  FINDINGS: There is minimal pneumoperitoneum at about the level of the umbilicus, such as seen on image 69 of series 201 and adjacent images.  The etiology for the pneumoperitoneum is uncertain.  It may be related to a perforated colonic  diverticulum, especially involving the sigmoid colon and/or the descending colon.  Definite acute colonic diverticulitis is not clearly appreciated.  There is diffuse colonic diverticulosis.  No pericolonic fluid collections are seen to suggest abscess.  No pneumatosis.  No portal or mesenteric venous gas is seen.  No acute appendicitis.  No definite acute colitis or enteritis.  No mechanical bowel obstruction.  The stomach appears to be diffusely underdistended.  No definite gastric emphysema or gastric outlet obstruction is seen.  No pneumo- retroperitoneum is suggested.  There is diffuse hepatic steatosis with hepatomegaly.  There may be cirrhosis.  No splenomegaly.  The main portal vein is patent.  The patient has undergone cholecystectomy.  There may be mild compensatory dilatation of biliary tree.  No acute pancreatitis is seen.  Probably no adrenal mass.  There are benign right renal cysts, which measure about 2 cm or less.  No hydronephrosis or obstructive uropathy.  No acute pyelonephritis.  There may be a small hiatal hernia.  Portal hypertension and portosystemic venous shunting cannot be excluded.  There may be paraesophageal varices.  Minimal, if any, ascites is suggested.  No cardiac enlargement.  Mild atelectasis, infiltrate(s), and/or fibrosis involve the lung bases.  Probably no pleural effusion is seen.  Atherosclerotic change involves the aortoiliac arterial system without aneurysmal dilatation.  There are suspected accessory right renal arteries (with probably 2 or 3 total right renal arteries).  There is thought to be a single left renal artery.  The celiac trunk, superior mesenteric artery, and inferior mesenteric artery are patent.  No definite nephrolithiasis or ureterolithiasis.  No acute pyelonephritis.  No hydronephrosis or obstructive uropathy.  The minimal left renal hilar calcifications are thought to be arterial in nature.  No suspicious uterine or adnexal mass is seen by CT.  There  are pelvic phleboliths.  No urinary bladder wall thickening or urinary bladder calculi.      Impression:  Minimal pneumoperitoneum is seen.  Please correlate clinically, especially with history of a recent abdominal/pelvic recent procedure.  A perforated diverticulum would be in the differential diagnosis.  Minimal if any acute colonic diverticulitis is suggested.  No mechanical bowel obstruction.  No definite pneumatosis.  No pericolonic fluid collections are seen to suggest abscess.  Probably no portal or mesenteric venous gas is seen.  Please see above comments for further detail.     COMMENT:  Part of this note is an electronic transcription of spoken language to printed text. The electronic translation/transcription may permit erroneous, or at times, nonsensical (or even sensical) words or phrases to be inadvertently transcribed or omitted; this  has reviewed the note for such errors (as well as additional errors); however, some may still exist.  MIKEY CALERO JR, MD       Electronically Signed and Approved By: MIKEY CALERO JR, MD on 4/16/2022 at 3:07              XR Chest PA & Lateral    Result Date: 3/23/2022  Narrative: PROCEDURE: XR CHEST PA AND LATERAL  COMPARISON: None  INDICATIONS: CHRONIC COUGH, WORSE X 1 MONTH  FINDINGS:  The heart is normal in size.  The lungs are well-expanded and free of infiltrates.  Mild degenerative spurring is seen in the thoracic spine.      Impression:  No active disease is seen.     FLYNN DUNNE MD       Electronically Signed and Approved By: FLYNN DUNNE MD on 3/23/2022 at 11:49               Procedures/EKGs:  Procedures    EKG:  Narrative & Impression    HEART RATE= 88  bpm  RR Interval= 684  ms  SD Interval= 173  ms  P Horizontal Axis= -13  deg  P Front Axis= 69  deg  QRSD Interval= 72  ms  QT Interval= 377  ms  QRS Axis= 52  deg  T Wave Axis= 87  deg  - NORMAL ECG -  Sinus rhythm       Medical Decision Making:                     MDM  Number of Diagnoses  or Management Options  Chronic gastritis without bleeding, unspecified gastritis type  Constipation, unspecified constipation type  Gastroesophageal reflux disease with esophagitis without hemorrhage  Pneumoperitoneum of unknown etiology  Diagnosis management comments: Patient was found to have pneumoperitoneum which requires surgical intervention and treatment.  She will be admitted to medicine.  She has been advised of this plan and is agreeable       Amount and/or Complexity of Data Reviewed  Clinical lab tests: reviewed and ordered  Tests in the radiology section of CPT®: reviewed and ordered  Tests in the medicine section of CPT®: reviewed and ordered  Discuss the patient with other providers: yes ([id number]-spoke with Dr. Stanley the surgeon on-call.  Give reversal agent for Eliquis now she will take for exploratory lap this morning.  Admit to the hospitalist     [id number]-patient has been accepted to hospitalist service dr rizvi)    Risk of Complications, Morbidity, and/or Mortality  Presenting problems: moderate  Diagnostic procedures: moderate  Management options: moderate    Patient Progress  Patient progress: stable       Final diagnoses:   Chronic gastritis without bleeding, unspecified gastritis type   Gastroesophageal reflux disease with esophagitis without hemorrhage   Constipation, unspecified constipation type   Pneumoperitoneum of unknown etiology        Disposition:  ED Disposition     ED Disposition   Decision to Admit    Condition   --    Comment   --              Elidia Lee, APRN  04/16/22 0445

## 2022-04-16 NOTE — OP NOTE
LAPAROTOMY EXPLORATORY  Procedure Report    Patient Name:  Anika Casillas  YOB: 1960    Date of Surgery:  4/16/2022     Indications:  Free air at iblicus    Pre-op Diagnosis:   Pneumoperitoneum of unknown etiology [K66.8]       Post-Op Diagnosis Codes:     * Pneumoperitoneum of unknown etiology [K66.8]    Procedure/CPT® Codes:  Procedure(s):  EXPLORATORY LAPAROTOMY, LYSIS OF ADHESIONS, RESECTION OF NECTROIC COLON WITHOUT ANASTAMOSIS    Staff:  Surgeon(s):  Cande Stanley MD    Assistant: Yesika Murguia CSA    Anesthesia: General    Estimated Blood Loss: minimal    Implants:    Implant Name Type Inv. Item Serial No.  Lot No. LRB No. Used Action   CLIPAPPLR M/ ENDO LIGACLIP 20CLP 11IN MD - [id number] Implant CLIPAPPLR M/ ENDO LIGACLIP 20CLP 11IN MD  [employer]  DIV OF J AND J [id number] N/A 1 Implanted   STPLR LNR CUT 60MM GRN [id number] - [id number] Implant STPLR LNR CUT 60MM GRN [id number]  [employer]  DIV OF J AND J [id number] N/A 1 Implanted       Specimen:          Specimens     ID Source Type Tests Collected By Collected At Frozen?    A Large Intestine, Transverse Colon Tissue · TISSUE PATHOLOGY EXAM   Cande Stanley MD 4/16/22 0925     Description: NECTROIC COLON AND OMENTUM    This specimen was not marked as sent.              Findings: none    Complications: none    Description of Procedure:   After informed consent was obtained, the patient was taken to the O.R. and placed supine on the table, and after adequate anesthesia, they were prepped and draped. We began by making a midline incision and dissecting down to the level of the fascia. The fascia was grasped, elevated and incised. Care was taken not to injure the underlying structures. Adhesions between the bowel and abdominal wall were taken down with no enterotomies or deserosalizations. We were able to see a discolored area of transverse colon densely adherent to the omentum and to the underside of  the umbilicus.  The omentum stuck here had incarcerated a 1.5 cm diameter knuckle of the antimesenteric side of the transverse colon that was necrotic and black in color.  I feel this was the etiology of the air and likely had a microperforation.  We used the SPY to ensure this area was in fact necrotic with no blood flow and that the surrounding colon was viable.  Once we had done this we lifted it up with a paolo and were able to resect it using a TA 60 stapler and then oversew the area with no significant narrowing of the lumen of the colon.  We re SPY'd the area and showed no areas of decreased blood flow with very well perfused tissue only.  The impacted omentum was also resected with a ligasure.  The abdomen was irrigated with saline. The fascia was closed with looped PDS superiorly and inferiorly after we had changed gowns, gloves and all instruments. The skin was closed with staples at all sites. The patient was awakened and taken to the recovery room in good condition.  Assistant: Yesika Murguia CSA  was responsible for performing the following activities: Retraction and Closing and their skilled assistance was necessary for the success of this case.    Cande Stanley MD     Date: 4/16/2022  Time: 09:54 EDT

## 2022-04-16 NOTE — H&P
[facility] HISTORY AND PHYSICAL  Date: 2022   Patient Name: Anika Casillas  : 1960  MRN: [id number]  Primary Care Physician:  Elena Henderson PA  Date of admission: 4/15/2022    Subjective   Subjective     Chief Complaint: abdominal pain    HPI:    Anika Casillas is a very pleasant 61 y.o. female with a history of BERKOWITZ cirrhosis, Afib, Diabetes mellitus, chronic gastritis and GERD who presents to the hospital with abdominal pain.  Pain started about 1 week ago suddenly and has been getting progressively worse and more frequent (constant but waxes and wanes).  She started vomiting today which was not bloody or coffee grounds but was more like dark bile.  She denies fever or chest pain.  Her abdominal pain is located in the epigastric area and does not radiate.  In the ER her pain was poorly controlled with parenteral narcotics so she underwent CT which showed free air without a clear source.  Due to her pain and associated free air, general surgery was consulted and we are admitting her to the hospital for further medical management.       Personal History     Past Medical History:  Past Medical History:   Diagnosis Date   • Atrial fibrillation (HCC)    • Diabetes mellitus (HCC)    • Gastroparesis    • GERD (gastroesophageal reflux disease)    • Hypertension    • Irritable bowel syndrome    • BERKOWITZ (nonalcoholic steatohepatitis)          Past Surgical History:  Past Surgical History:   Procedure Laterality Date   • BLADDER SLING MODIFIED, ANTERIOR AND POSTERIOR VAGINAL REPAIR     • BREAST LUMPECTOMY     • CARDIAC CATHETERIZATION     • CHOLECYSTECTOMY     • GALLBLADDER SURGERY     • TUBAL ABDOMINAL LIGATION           Family History:   Family History   Adopted: Yes   Family history unknown: Yes         Social History:   Social History     Tobacco Use   • Smoking status: Current Every Day Smoker     Packs/day: 0.25     Years: 41.00     Pack years: 10.25     Types: Cigarettes      "Start date: 6/17/1975   • Smokeless tobacco: Never Used   Vaping Use   • Vaping Use: Never used   Substance Use Topics   • Alcohol use: Yes     Alcohol/week: 1.0 standard drink     Types: 1 Drinks containing 0.5 oz of alcohol per week     Comment: 1 a month   • Drug use: Never         Home Medications:  Accu-Chek Guide, Fluticasone-Umeclidin-Vilant, Insulin Glargine (2 Unit Dial), Insulin Pen Needle, Pen Needles 3/16\", Semaglutide(0.25 or 0.5MG/DOS), accu-chek soft touch, albuterol sulfate HFA, amLODIPine, apixaban, buPROPion XL, cholecalciferol, dicyclomine, empagliflozin, famotidine, glucose blood, glucose monitor, hydrOXYzine, ibuprofen, insulin aspart, lactulose, metoprolol tartrate, multivitamin with minerals, olmesartan, pantoprazole, polyethylene glycol, and vitamin C    Allergies:  Allergies   Allergen Reactions   • Liraglutide Nausea And Vomiting   • Metformin Nausea Only     GI Upset       Review of Systems   All systems were reviewed and negative except for: Noted above or in HPI    Objective   Objective     Vitals:   Temp:  [98.3 °F (36.8 °C)] 98.3 °F (36.8 °C)  Heart Rate:  [88-99] 94  Resp:  [20] 20  BP: (159-179)/() 164/88    Physical Exam    Constitutional: Awake, alert, no acute distress   Eyes: Pupils equal, sclerae anicteric, no conjunctival injection   HENT: NCAT, mucous membranes moist   Neck: Supple, no thyromegaly, no lymphadenopathy, trachea midline   Respiratory: Clear to auscultation bilaterally, nonlabored respirations    Cardiovascular: RRR, no murmurs, rubs, or gallops, palpable pedal pulses bilaterally   Gastrointestinal: Positive bowel sounds, soft, mildly tender epigastric area, nondistended   Musculoskeletal: No bilateral ankle edema, no clubbing or cyanosis to extremities   Psychiatric: Appropriate affect, cooperative   Neurologic: Oriented x 3, strength symmetric in all extremities, Cranial Nerves grossly intact to confrontation, speech clear   Skin: No rashes "           Assessment/Plan   Assessment / Plan     Assessment/Plan:   • Abdominal pain  • Free air in the abdomen - suspect perforated viscus/perforated ulcer  • BERKOWITZ cirrhosis  • Afib - currently in sinus rhythm  • Diabetes mellitus  • Chronic gastritis and GERD    Patient is admitted to the hospital for further care  Consult general surgery.  Planning exploratory laparotomy  Continuous cardiac telemetry monitoring  IV Zosyn  Pain control  N.p.o.  Sliding scale insulin  CBC in am for surveillance of any acute blood loss or thrombocytopenia  Metabolic panel in the morning to evaluate electrolytes and renal function  Reviewed today's labs  Reviewed telemetry  Discussed with ER physician  Risk of primary complaint: patient is at significant risk of morbidity if primary complaint is not treated especially considering the patient's comorbidities.  DVT prophylaxis:  Mechanical DVT prophylaxis orders are present.    CODE STATUS:    Code Status (Patient has no pulse and is not breathing): CPR (Attempt to Resuscitate)  Medical Interventions (Patient has pulse or is breathing): Full Support      Admission Status:  I believe this patient meets inpatient status.    Electronically signed by Jose Garibay DO, 04/16/22, 4:40 AM EDT.

## 2022-04-16 NOTE — CONSULTS
Chief Complaint: Abdominal Pain (Patient reports abdominal pain X1 week. Vomiting starting today.) and Vomiting    Subjective         History of Present Illness  Anika Casillas is a 61 y.o. female presents to [facility] to be seen for abdominal pain x 1 week that has worsened.  She has no hx of gastric or duodenal ulcer disease but does have a hx of gastritis and was scheduled to have an EGD in June.  She does have diverticulosis.  Her ct scan showed a small amount of free air near umbilicus with no obvious etiology seen -  No inflammation or fluid seen near stomach or colon.  She did have diverticulosis but no signs of active diverticulitis.  We discussed that both sites are the most likely etiologies for free air and the treatments for each.  She understands that she may need a colostomy if the colon is the source of free air.  Given that there is only air and no pericolonic inflammation I am hopeful the source is a small gastric or pyloric perforation.  She was on Eliquis and hemodynamically very stable so we have given her a reversal agent and now are taking her immediately to OR.  She does not report any recent surgery.   She is still having abdominal pain not controlled with pain meds but otherwise clinically stable.  Her ct scan is shown below:    Narrative & Impression   PROCEDURE:  CT ABDOMEN PELVIS W CONTRAST     COMPARISON: None.     INDICATIONS:  Upper abdominal pain.     TECHNIQUE:    After obtaining the patient's consent, 631 CT images were created with non-ionic   intravenous contrast material.  No oral contrast agent was administered for the study.     PROTOCOL:     Standard CT imaging protocol performed.                 RADIATION:      DLP: 1,315.5 mGy*cm.               Automated exposure control was utilized to minimize radiation dose.   CONTRAST:      100 mL Isovue 370 I.V.     FINDINGS:        There is minimal pneumoperitoneum at about the level of the  umbilicus, such as seen on   image 69 of series 201 and adjacent images.  The etiology for the pneumoperitoneum is uncertain.    It may be related to a perforated colonic diverticulum, especially involving the sigmoid colon   and/or the descending colon.  Definite acute colonic diverticulitis is not clearly appreciated.    There is diffuse colonic diverticulosis.  No pericolonic fluid collections are seen to suggest   abscess.  No pneumatosis.  No portal or mesenteric venous gas is seen.  No acute appendicitis.  No   definite acute colitis or enteritis.  No mechanical bowel obstruction.  The stomach appears to be   diffusely underdistended.  No definite gastric emphysema or gastric outlet obstruction is seen.  No   pneumo- retroperitoneum is suggested.     There is diffuse hepatic steatosis with hepatomegaly.  There may be cirrhosis.  No splenomegaly.    The main portal vein is patent.  The patient has undergone cholecystectomy.  There may be mild   compensatory dilatation of biliary tree.  No acute pancreatitis is seen.  Probably no adrenal mass.    There are benign right renal cysts, which measure about 2 cm or less.  No hydronephrosis or   obstructive uropathy.  No acute pyelonephritis.  There may be a small hiatal hernia.  Portal   hypertension and portosystemic venous shunting cannot be excluded.  There may be paraesophageal   varices.  Minimal, if any, ascites is suggested.  No cardiac enlargement.  Mild atelectasis,   infiltrate(s), and/or fibrosis involve the lung bases.  Probably no pleural effusion is seen.    Atherosclerotic change involves the aortoiliac arterial system without aneurysmal dilatation.    There are suspected accessory right renal arteries (with probably 2 or 3 total right renal   arteries).  There is thought to be a single left renal artery.  The celiac trunk, superior   mesenteric artery, and inferior mesenteric artery are patent.  No definite nephrolithiasis or   ureterolithiasis.  No  acute pyelonephritis.  No hydronephrosis or obstructive uropathy.  The   minimal left renal hilar calcifications are thought to be arterial in nature.  No suspicious   uterine or adnexal mass is seen by CT.  There are pelvic phleboliths.  No urinary bladder wall   thickening or urinary bladder calculi.     IMPRESSION:               Minimal pneumoperitoneum is seen.  Please correlate clinically, especially with history   of a recent abdominal/pelvic recent procedure.  A perforated diverticulum would be in the   differential diagnosis.  Minimal if any acute colonic diverticulitis is suggested.  No mechanical   bowel obstruction.  No definite pneumatosis.  No pericolonic fluid collections are seen to suggest   abscess.  Probably no portal or mesenteric venous gas is seen.  Please see above comments for   further detail.              Objective     Past Medical History:   Diagnosis Date   • Atrial fibrillation (HCC)    • Diabetes mellitus (HCC)    • Gastroparesis    • GERD (gastroesophageal reflux disease) 1995   • Hypertension    • Irritable bowel syndrome    • BERKOWITZ (nonalcoholic steatohepatitis)        Past Surgical History:   Procedure Laterality Date   • BLADDER SLING MODIFIED, ANTERIOR AND POSTERIOR VAGINAL REPAIR     • BREAST LUMPECTOMY     • CARDIAC CATHETERIZATION     • CHOLECYSTECTOMY     • GALLBLADDER SURGERY     • TUBAL ABDOMINAL LIGATION           Current Facility-Administered Medications:   •  acetaminophen (TYLENOL) tablet 650 mg, 650 mg, Oral, Q4H PRN **OR** acetaminophen (TYLENOL) 160 MG/5ML solution 650 mg, 650 mg, Oral, Q4H PRN **OR** acetaminophen (TYLENOL) suppository 650 mg, 650 mg, Rectal, Q4H PRN, Jose Garibay, DO  •  sennosides-docusate (PERICOLACE) 8.6-50 MG per tablet 2 tablet, 2 tablet, Oral, BID **AND** polyethylene glycol (MIRALAX) packet 17 g, 17 g, Oral, Daily PRN **AND** bisacodyl (DULCOLAX) EC tablet 5 mg, 5 mg, Oral, Daily PRN **AND** bisacodyl (DULCOLAX) suppository 10 mg, 10 mg,  Rectal, Daily PRN, Jose Garibay, DO  •  clindamycin (CLEOCIN) 900 mg in dextrose 5% 50 mL IVPB (premix), 900 mg, Intravenous, Once, Cande Stanley MD  •  dextrose (GLUTOSE) oral gel 24 g, 24 g, Oral, Q15 Min PRN, Jose Garibay, DO  •  dextrose 10 % infusion, 25 g, Intravenous, Q15 Min PRN, Jose Garibay, DO  •  glucagon (human recombinant) (GLUCAGEN DIAGNOSTIC) injection 1 mg, 1 mg, Intramuscular, Q15 Min PRN, Jose Garibay, DO  •  insulin lispro (humaLOG) injection 0-9 Units, 0-9 Units, Subcutaneous, TID AC, Jose Garibay, DO  •  morphine injection 2 mg, 2 mg, Intravenous, Q4H PRN **AND** naloxone (NARCAN) injection 0.4 mg, 0.4 mg, Intravenous, Q5 Min PRN, Jose Garibay, DO  •  ondansetron (ZOFRAN) injection 4 mg, 4 mg, Intravenous, Q6H PRN, Jose Garibay, DO  •  Pharmacy to Dose Zosyn, , Does not apply, Continuous PRN, Jose Garibay DO  •  piperacillin-tazobactam (ZOSYN) 3.375 g in iso-osmotic dextrose 50 ml (premix), 3.375 g, Intravenous, Once, Jose Garibay, DO  •  piperacillin-tazobactam (ZOSYN) 3.375 g in iso-osmotic dextrose 50 ml (premix), 3.375 g, Intravenous, Q8H, Jose Garibay, DO  •  sodium chloride 0.9 % flush 1-10 mL, 1-10 mL, Intravenous, PRN, Cande Stanley MD  •  sodium chloride 0.9 % flush 10 mL, 10 mL, Intravenous, PRN, Shady Jamil MD  •  sodium chloride 0.9 % flush 10 mL, 10 mL, Intravenous, Q12H, Jose Garibay, DO  •  sodium chloride 0.9 % flush 10 mL, 10 mL, Intravenous, PRN, Jose Garibay, DO  •  sodium chloride 0.9 % flush 10 mL, 10 mL, Intravenous, Q12H, Cande Stanley MD    Allergies   Allergen Reactions   • Liraglutide Nausea And Vomiting   • Metformin Nausea Only     GI Upset        Family History   Adopted: Yes   Family history unknown: Yes       Social History     Socioeconomic History   • Marital status:    Tobacco Use   • Smoking status: Current Every Day Smoker     Packs/day: 0.25     Years: 41.00     Pack years: 10.25     Types: Cigarettes     Start  "date: 6/17/1975   • Smokeless tobacco: Never Used   Vaping Use   • Vaping Use: Never used   Substance and Sexual Activity   • Alcohol use: Yes     Alcohol/week: 1.0 standard drink     Types: 1 Drinks containing 0.5 oz of alcohol per week     Comment: 1 a month   • Drug use: Never   • Sexual activity: Defer     Partners: Male     Birth control/protection: Surgical       Vital Signs:   /75 (BP Location: Left arm, Patient Position: Lying)   Pulse 87   Temp 98.4 °F (36.9 °C) (Oral)   Resp 22   Ht 165 cm (64.96\")   Wt 101 kg (222 lb 7.1 oz)   SpO2 98%   BMI 37.06 kg/m²    Review of Systems   Constitutional: Negative for fatigue and fever.   HENT: Negative for ear pain and sore throat.    Eyes: Negative for blurred vision.   Respiratory: Negative for cough and shortness of breath.    Cardiovascular: Negative for chest pain and leg swelling.   Gastrointestinal: Positive for abdominal pain and vomiting. Negative for constipation, diarrhea and nausea.   Musculoskeletal: Negative for arthralgias and myalgias.   Skin: Negative for rash and skin lesions.   Neurological: Negative for weakness and headache.   Hematological: Negative for adenopathy. Does not bruise/bleed easily.   Psychiatric/Behavioral: Negative for sleep disturbance and depressed mood.       Physical Exam  Vitals and nursing note reviewed.   Constitutional:       General: She is not in acute distress.     Appearance: Normal appearance. She is well-developed.   HENT:      Head: Normocephalic and atraumatic.   Eyes:      Extraocular Movements: Extraocular movements intact.      Pupils: Pupils are equal, round, and reactive to light.   Cardiovascular:      Pulses: Normal pulses.   Pulmonary:      Effort: Pulmonary effort is normal. No retractions.      Breath sounds: Normal air entry. No wheezing.   Abdominal:      General: There is no distension.      Palpations: Abdomen is soft.      Tenderness: There is abdominal tenderness.      Hernia: No hernia " is present.   Musculoskeletal:         General: No swelling or deformity.      Cervical back: Neck supple.   Skin:     General: Skin is warm and dry.      Findings: No erythema.   Neurological:      General: No focal deficit present.      Mental Status: She is alert and oriented to person, place, and time.      Motor: Motor function is intact.   Psychiatric:         Mood and Affect: Mood normal.         Thought Content: Thought content normal.          Result Review :     Common labs    Common Labsle 1/27/22 1/27/22 3/23/22 3/23/22 4/15/22 4/15/22    1430 1430 1157 1157 2044 2044   Glucose  204 (A)    175 (A)   BUN  8    10   Creatinine  0.71    0.88   eGFR African Am  102       Sodium  137    139   Potassium  4.1    3.7   Chloride  103    104   Calcium  10.0    9.9   Albumin  4.10    4.60   Total Bilirubin  0.4    0.3   Alkaline Phosphatase  152 (A)    154 (A)   AST (SGOT)  76 (A)    73 (A)   ALT (SGPT)  88 (A)    79 (A)   WBC 7.72  9.51  10.72    Hemoglobin 14.2  13.6  14.6    Hematocrit 43.0  39.7  41.5    Platelets 233  275  275    Total Cholesterol    216 (A)     Triglycerides    257 (A)     HDL Cholesterol    35 (A)     LDL Cholesterol     135 (A)     (A) Abnormal value            Data reviewed: Radiologic studies ct scan        Assessment and Plan    Diagnoses and all orders for this visit:    1. Chronic gastritis without bleeding, unspecified gastritis type (Primary)    2. Gastroesophageal reflux disease with esophagitis without hemorrhage    3. Constipation, unspecified constipation type    4. Pneumoperitoneum of unknown etiology  -     Case Request; Standing  -     Case Request    Risks and benefits discussed with patient and allergies reviewed.  Consent for surgery   NPO  IVF  IV abx      This document has been electronically signed by Cande Stanley MD  April 16, 2022 07:27 EDT

## 2022-04-16 NOTE — ED PROVIDER NOTES
"Time: 03:50 EDT  Arrived by: Vehicle  Chief Complaint: Abdominal pain  History provided by: Patient  History is limited by: N/A    Patient is 61 y.o. year old female that presents to the ED for evaluation of abdominal pain. Patient states that she developed mild abdominal pain approximately one week ago which has worsened significantly with time. She also developed associated vomiting today. She does not feel that her pain has improved in the ED.    The patient confirms that she is presently on Eliquis. There are no other acute complaints at this time.       Physical Exam  Vitals and nursing note reviewed.   Constitutional:       General: She is not in acute distress.     Comments: Patient appears uncomfortable.   HENT:      Head: Normocephalic and atraumatic.      Nose: Nose normal.      Mouth/Throat:      Mouth: Mucous membranes are moist.   Eyes:      General: No scleral icterus.  Cardiovascular:      Rate and Rhythm: Normal rate and regular rhythm.      Heart sounds: Normal heart sounds. No murmur heard.  Pulmonary:      Effort: No respiratory distress.      Breath sounds: Normal breath sounds.   Abdominal:      Palpations: Abdomen is soft.      Tenderness: There is generalized abdominal tenderness.   Musculoskeletal:         General: No tenderness. Normal range of motion.      Cervical back: Normal range of motion and neck supple.      Right lower leg: No edema.      Left lower leg: No edema.   Skin:     General: Skin is warm and dry.   Neurological:      Mental Status: She is alert. Mental status is at baseline.   Psychiatric:         Behavior: Behavior normal.         ED Course:    /75 (BP Location: Left arm, Patient Position: Lying)   Pulse 87   Temp 98.4 °F (36.9 °C) (Oral)   Resp 22   Ht 165 cm (64.96\")   Wt 101 kg (222 lb 7.1 oz)   SpO2 98%   BMI 37.06 kg/m²   Results for orders placed or performed during the hospital encounter of 04/15/22   Comprehensive Metabolic Panel    Specimen: Hand, " Right; Blood   Result Value Ref Range    Glucose 175 (H) 65 - 99 mg/dL    BUN 10 6 - 20 mg/dL    Creatinine 0.88 0.57 - 1.00 mg/dL    Sodium 139 136 - 145 mmol/L    Potassium 3.7 3.5 - 5.2 mmol/L    Chloride 104 98 - 107 mmol/L    CO2 20.8 (L) 22.0 - 29.0 mmol/L    Calcium 9.9 8.6 - 10.5 mg/dL    Total Protein 9.5 (H) 6.0 - 8.5 g/dL    Albumin 4.60 3.50 - 5.20 g/dL    ALT (SGPT) 79 (H) 1 - 33 U/L    AST (SGOT) 73 (H) 1 - 32 U/L    Alkaline Phosphatase 154 (H) 39 - 117 U/L    Total Bilirubin 0.3 0.0 - 1.2 mg/dL    Globulin 4.9 gm/dL    A/G Ratio 0.9 g/dL    BUN/Creatinine Ratio 11.4 7.0 - 25.0    Anion Gap 14.2 5.0 - 15.0 mmol/L    eGFR 75.8 >60.0 mL/min/1.73   Lipase    Specimen: Hand, Right; Blood   Result Value Ref Range    Lipase 24 13 - 60 U/L   Troponin    Specimen: Hand, Right; Blood   Result Value Ref Range    Troponin T <0.010 0.000 - 0.030 ng/mL   Urinalysis With Microscopic If Indicated (No Culture) - Urine, Clean Catch    Specimen: Urine, Clean Catch   Result Value Ref Range    Color, UA Yellow Yellow, Straw    Appearance, UA Clear Clear    pH, UA 7.0 5.0 - 8.0    Specific Gravity, UA 1.024 1.005 - 1.030    Glucose, UA >=1000 mg/dL (3+) (A) Negative    Ketones, UA Negative Negative    Bilirubin, UA Negative Negative    Blood, UA Negative Negative    Protein,  mg/dL (2+) (A) Negative    Leuk Esterase, UA Negative Negative    Nitrite, UA Negative Negative    Urobilinogen, UA 1.0 E.U./dL 0.2 - 1.0 E.U./dL   CBC Auto Differential    Specimen: Hand, Right; Blood   Result Value Ref Range    WBC 10.72 3.40 - 10.80 10*3/mm3    RBC 4.89 3.77 - 5.28 10*6/mm3    Hemoglobin 14.6 12.0 - 15.9 g/dL    Hematocrit 41.5 34.0 - 46.6 %    MCV 84.9 79.0 - 97.0 fL    MCH 29.9 26.6 - 33.0 pg    MCHC 35.2 31.5 - 35.7 g/dL    RDW 15.9 (H) 12.3 - 15.4 %    RDW-SD 48.8 37.0 - 54.0 fl    MPV 10.8 6.0 - 12.0 fL    Platelets 275 140 - 450 10*3/mm3    Neutrophil % 55.3 42.7 - 76.0 %    Lymphocyte % 36.6 19.6 - 45.3 %    Monocyte  % 6.3 5.0 - 12.0 %    Eosinophil % 1.0 0.3 - 6.2 %    Basophil % 0.5 0.0 - 1.5 %    Immature Grans % 0.3 0.0 - 0.5 %    Neutrophils, Absolute 5.93 1.70 - 7.00 10*3/mm3    Lymphocytes, Absolute 3.92 (H) 0.70 - 3.10 10*3/mm3    Monocytes, Absolute 0.68 0.10 - 0.90 10*3/mm3    Eosinophils, Absolute 0.11 0.00 - 0.40 10*3/mm3    Basophils, Absolute 0.05 0.00 - 0.20 10*3/mm3    Immature Grans, Absolute 0.03 0.00 - 0.05 10*3/mm3    nRBC 0.0 0.0 - 0.2 /100 WBC   Urinalysis, Microscopic Only - Urine, Clean Catch    Specimen: Urine, Clean Catch   Result Value Ref Range    RBC, UA 0-2 (A) None Seen /HPF    WBC, UA 0-2 (A) None Seen /HPF    Bacteria, UA None Seen None Seen /HPF    Squamous Epithelial Cells, UA 0-2 None Seen, 0-2 /HPF    Hyaline Casts, UA None Seen None Seen /LPF    Methodology Automated Microscopy    H. Pylori Antibody, IgG    Specimen: Hand, Right; Blood   Result Value Ref Range    H. pylori IgG Negative Negative, Equivocal   ECG 12 Lead   Result Value Ref Range    QT Interval 374 ms   ECG 12 Lead   Result Value Ref Range    QT Interval 377 ms   Green Top (Gel)   Result Value Ref Range    Extra Tube Hold for add-ons.    Lavender Top   Result Value Ref Range    Extra Tube hold for add-on    Gold Top - SST   Result Value Ref Range    Extra Tube Hold for add-ons.    Light Blue Top   Result Value Ref Range    Extra Tube hold for add-on      Medications   sodium chloride 0.9 % flush 10 mL (has no administration in time range)   sodium chloride 0.9 % flush 10 mL (has no administration in time range)   sodium chloride 0.9 % flush 10 mL (has no administration in time range)   acetaminophen (TYLENOL) tablet 650 mg (has no administration in time range)     Or   acetaminophen (TYLENOL) 160 MG/5ML solution 650 mg (has no administration in time range)     Or   acetaminophen (TYLENOL) suppository 650 mg (has no administration in time range)   sennosides-docusate (PERICOLACE) 8.6-50 MG per tablet 2 tablet (has no  administration in time range)     And   polyethylene glycol (MIRALAX) packet 17 g (has no administration in time range)     And   bisacodyl (DULCOLAX) EC tablet 5 mg (has no administration in time range)     And   bisacodyl (DULCOLAX) suppository 10 mg (has no administration in time range)   ondansetron (ZOFRAN) injection 4 mg (has no administration in time range)   dextrose (GLUTOSE) oral gel 24 g (has no administration in time range)   dextrose 10 % infusion (has no administration in time range)   glucagon (human recombinant) (GLUCAGEN DIAGNOSTIC) injection 1 mg (has no administration in time range)   insulin lispro (humaLOG) injection 0-9 Units (has no administration in time range)   morphine injection 2 mg (has no administration in time range)     And   naloxone (NARCAN) injection 0.4 mg (has no administration in time range)   Pharmacy to Dose Zosyn (has no administration in time range)   ondansetron (ZOFRAN) injection 4 mg (4 mg Intravenous Given 4/15/22 2331)   sodium chloride 0.9 % bolus 1,000 mL (0 mL Intravenous Stopped 4/16/22 0100)   aluminum-magnesium hydroxide-simethicone (MAALOX MAX) 400-400-40 MG/5ML suspension 15 mL (15 mL Oral Given 4/15/22 2259)   Lidocaine Viscous HCl (XYLOCAINE) 2 % solution 15 mL (15 mL Mouth/Throat Given 4/15/22 2300)   dicyclomine (BENTYL) injection 20 mg (20 mg Intramuscular Given 4/15/22 2259)   metoclopramide (REGLAN) injection 10 mg (10 mg Intravenous Given 4/16/22 0144)   diphenhydrAMINE (BENADRYL) injection 25 mg (25 mg Intravenous Given 4/16/22 0144)   iopamidol (ISOVUE-370) 76 % injection 100 mL (100 mL Intravenous Given 4/16/22 0234)   HYDROmorphone (DILAUDID) injection 1 mg (1 mg Intravenous Given 4/16/22 0407)   prothrombin complex conc human (KCentra) IV solution 5,000 Units (5,000 Units Intravenous Given 4/16/22 3381)     XR Abdomen 2+ VW with Chest 1 VW    Result Date: 4/16/2022  Narrative: PROCEDURE: XR ABDOMEN 2+ VIEWS W CHEST 1 VW  COMPARISON: [facility], CR, XR CHEST PA AND LATERAL, 3/23/2022, 11:54.  INDICATIONS: UNSPECIFIED ABDOMINAL PAIN AND BLOATING.  DISCUSSION:  Three views of the abdomen and pelvis reveal a nonobstructive bowel gas pattern and no pneumoperitoneum.  The patient has undergone cholecystectomy.  Hepatomegaly is possible.  Formed stool is seen within the rectosigmoid colon.  Fecal stasis as with constipation cannot be excluded.  Fecal impaction would be in the differential diagnosis.  There are arterial calcifications.  A single frontal chest radiograph reveals no acute infiltrate and no cardiac enlargement. The thoracic aorta is atherosclerotic.  No pneumothorax is seen.      Impression:   A nonobstructive bowel gas pattern is noted.  No pneumoperitoneum.     COMMENT:  Part of this note is an electronic transcription of spoken language to printed text. The electronic translation/transcription may permit erroneous, or at times, nonsensical (or even sensical) words or phrases to be inadvertently transcribed or omitted; this  has reviewed the note for such errors (as well as additional errors); however, some may still exist.  MIKEY CALERO JR, MD           Electronically Signed and Approved By: MIKEY CALERO JR, MD on 4/16/2022 at 0:24          CT Abdomen Pelvis With Contrast    Addendum Date: 4/16/2022 Addendum:   ADDENDUM:  Pertinent findings were phoned to CHU Broderick (ordering [facility] ED Clinician) at approximately 0311 hours on 4/16/2022.    MIKEY CALERO JR, MD       Electronically Signed and Approved By: MIKEY CALERO JR, MD on 4/16/2022 at 3:20              Result Date: 4/16/2022  Narrative: PROCEDURE: CT ABDOMEN PELVIS W CONTRAST  COMPARISON: None.  INDICATIONS: Upper abdominal pain.  TECHNIQUE: After obtaining the patient's consent, 631 CT images were created with non-ionic intravenous contrast material.  No oral contrast agent was administered for the study.  PROTOCOL:   Standard CT imaging protocol  performed.    RADIATION:   DLP: 1,315.5 mGy*cm.   Automated exposure control was utilized to minimize radiation dose. CONTRAST: 100 mL Isovue 370 I.V.  FINDINGS: There is minimal pneumoperitoneum at about the level of the umbilicus, such as seen on image 69 of series 201 and adjacent images.  The etiology for the pneumoperitoneum is uncertain.  It may be related to a perforated colonic diverticulum, especially involving the sigmoid colon and/or the descending colon.  Definite acute colonic diverticulitis is not clearly appreciated.  There is diffuse colonic diverticulosis.  No pericolonic fluid collections are seen to suggest abscess.  No pneumatosis.  No portal or mesenteric venous gas is seen.  No acute appendicitis.  No definite acute colitis or enteritis.  No mechanical bowel obstruction.  The stomach appears to be diffusely underdistended.  No definite gastric emphysema or gastric outlet obstruction is seen.  No pneumo- retroperitoneum is suggested.  There is diffuse hepatic steatosis with hepatomegaly.  There may be cirrhosis.  No splenomegaly.  The main portal vein is patent.  The patient has undergone cholecystectomy.  There may be mild compensatory dilatation of biliary tree.  No acute pancreatitis is seen.  Probably no adrenal mass.  There are benign right renal cysts, which measure about 2 cm or less.  No hydronephrosis or obstructive uropathy.  No acute pyelonephritis.  There may be a small hiatal hernia.  Portal hypertension and portosystemic venous shunting cannot be excluded.  There may be paraesophageal varices.  Minimal, if any, ascites is suggested.  No cardiac enlargement.  Mild atelectasis, infiltrate(s), and/or fibrosis involve the lung bases.  Probably no pleural effusion is seen.  Atherosclerotic change involves the aortoiliac arterial system without aneurysmal dilatation.  There are suspected accessory right renal arteries (with probably 2 or 3 total right renal arteries).  There is thought  to be a single left renal artery.  The celiac trunk, superior mesenteric artery, and inferior mesenteric artery are patent.  No definite nephrolithiasis or ureterolithiasis.  No acute pyelonephritis.  No hydronephrosis or obstructive uropathy.  The minimal left renal hilar calcifications are thought to be arterial in nature.  No suspicious uterine or adnexal mass is seen by CT.  There are pelvic phleboliths.  No urinary bladder wall thickening or urinary bladder calculi.      Impression:  Minimal pneumoperitoneum is seen.  Please correlate clinically, especially with history of a recent abdominal/pelvic recent procedure.  A perforated diverticulum would be in the differential diagnosis.  Minimal if any acute colonic diverticulitis is suggested.  No mechanical bowel obstruction.  No definite pneumatosis.  No pericolonic fluid collections are seen to suggest abscess.  Probably no portal or mesenteric venous gas is seen.  Please see above comments for further detail.     COMMENT:  Part of this note is an electronic transcription of spoken language to printed text. The electronic translation/transcription may permit erroneous, or at times, nonsensical (or even sensical) words or phrases to be inadvertently transcribed or omitted; this  has reviewed the note for such errors (as well as additional errors); however, some may still exist.  MIKEY CALERO JR, MD       Electronically Signed and Approved By: MIKEY CALERO JR, MD on 4/16/2022 at 3:07              XR Chest PA & Lateral    Result Date: 3/23/2022  Narrative: PROCEDURE: XR CHEST PA AND LATERAL  COMPARISON: None  INDICATIONS: CHRONIC COUGH, WORSE X 1 MONTH  FINDINGS:  The heart is normal in size.  The lungs are well-expanded and free of infiltrates.  Mild degenerative spurring is seen in the thoracic spine.      Impression:  No active disease is seen.     FLYNN DUNNE MD       Electronically Signed and Approved By: FLYNN DUNNE MD on 3/23/2022 at  11:49               MDM:      I have seen and evaluated this patient and agree with the nurse practitioner or physician assistant´s documentation and assessment. All charts, labs, and imaging studies were reviewed. Documentation of one or more elements of my assessment included in the medical record. I agree with findings, exam, and plan.    Disposition:   ED Disposition     ED Disposition   Decision to Admit    Condition   --    Comment   Level of Care: Telemetry [5]   Diagnosis: Chronic gastritis without bleeding, unspecified gastritis type [[id number]]   Admitting Physician: CLOVIS WILLS [[id number]]   Attending Physician: CLOVIS WILLS [[id number]]   Certification: I Certify That Inpatient Hospital Services Are Medically Necessary For Greater Than 2 Midnights                 Clincal Impression:     Pneumoperitoneum    Shady Jamil MD  07:10 EDT  04/16/22       Jennifer Nair  04/16/22 0351       Shady Jamil MD  04/16/22 0710

## 2022-04-16 NOTE — ANESTHESIA PREPROCEDURE EVALUATION
Anesthesia Evaluation     Patient summary reviewed and Nursing notes reviewed   no history of anesthetic complications:  NPO Solid Status: > 8 hours  NPO Liquid Status: > 2 hours           Airway   Mallampati: II  TM distance: >3 FB  Neck ROM: full  No difficulty expected  Dental      Pulmonary - normal exam    breath sounds clear to auscultation  (+) COPD mild, sleep apnea,   Cardiovascular - normal exam  Exercise tolerance: good (4-7 METS)    Rhythm: regular  Rate: normal    (+) hypertension, dysrhythmias Atrial Fib, hyperlipidemia,       Neuro/Psych  (+) psychiatric history,    GI/Hepatic/Renal/Endo    (+) obesity,  GERD well controlled,  hepatitis, liver disease, diabetes mellitus type 2, thyroid problem hypothyroidism    Musculoskeletal (-) negative ROS    Abdominal    Substance History - negative use     OB/GYN negative ob/gyn ROS         Other - negative ROS                       Anesthesia Plan    ASA 3 - emergent     general     intravenous induction     Anesthetic plan, all risks, benefits, and alternatives have been provided, discussed and informed consent has been obtained with: patient.  Use of blood products discussed with patient .   Plan discussed with CRNA.        CODE STATUS:    Code Status (Patient has no pulse and is not breathing): CPR (Attempt to Resuscitate)  Medical Interventions (Patient has pulse or is breathing): Full Support

## 2022-04-17 LAB
ALBUMIN SERPL-MCNC: 4.1 G/DL (ref 3.5–5.2)
ALBUMIN/GLOB SERPL: 0.9 G/DL
ALP SERPL-CCNC: 116 U/L (ref 39–117)
ALT SERPL W P-5'-P-CCNC: 49 U/L (ref 1–33)
ANION GAP SERPL CALCULATED.3IONS-SCNC: 10.6 MMOL/L (ref 5–15)
AST SERPL-CCNC: 43 U/L (ref 1–32)
BASOPHILS # BLD AUTO: 0.04 10*3/MM3 (ref 0–0.2)
BASOPHILS NFR BLD AUTO: 0.3 % (ref 0–1.5)
BILIRUB SERPL-MCNC: 0.9 MG/DL (ref 0–1.2)
BUN SERPL-MCNC: 11 MG/DL (ref 6–20)
BUN/CREAT SERPL: 14.5 (ref 7–25)
CALCIUM SPEC-SCNC: 10 MG/DL (ref 8.6–10.5)
CHLORIDE SERPL-SCNC: 102 MMOL/L (ref 98–107)
CO2 SERPL-SCNC: 22.4 MMOL/L (ref 22–29)
CREAT SERPL-MCNC: 0.76 MG/DL (ref 0.57–1)
DEPRECATED RDW RBC AUTO: 46.9 FL (ref 37–54)
EGFRCR SERPLBLD CKD-EPI 2021: 90.4 ML/MIN/1.73
EOSINOPHIL # BLD AUTO: 0.01 10*3/MM3 (ref 0–0.4)
EOSINOPHIL NFR BLD AUTO: 0.1 % (ref 0.3–6.2)
ERYTHROCYTE [DISTWIDTH] IN BLOOD BY AUTOMATED COUNT: 15.5 % (ref 12.3–15.4)
GLOBULIN UR ELPH-MCNC: 4.5 GM/DL
GLUCOSE BLDC GLUCOMTR-MCNC: 175 MG/DL (ref 70–99)
GLUCOSE BLDC GLUCOMTR-MCNC: 199 MG/DL (ref 70–99)
GLUCOSE SERPL-MCNC: 162 MG/DL (ref 65–99)
HCT VFR BLD AUTO: 38.5 % (ref 34–46.6)
HGB BLD-MCNC: 13.3 G/DL (ref 12–15.9)
IMM GRANULOCYTES # BLD AUTO: 0.04 10*3/MM3 (ref 0–0.05)
IMM GRANULOCYTES NFR BLD AUTO: 0.3 % (ref 0–0.5)
LYMPHOCYTES # BLD AUTO: 2.49 10*3/MM3 (ref 0.7–3.1)
LYMPHOCYTES NFR BLD AUTO: 20.7 % (ref 19.6–45.3)
MCH RBC QN AUTO: 29.1 PG (ref 26.6–33)
MCHC RBC AUTO-ENTMCNC: 34.5 G/DL (ref 31.5–35.7)
MCV RBC AUTO: 84.2 FL (ref 79–97)
MONOCYTES # BLD AUTO: 0.94 10*3/MM3 (ref 0.1–0.9)
MONOCYTES NFR BLD AUTO: 7.8 % (ref 5–12)
NEUTROPHILS NFR BLD AUTO: 70.8 % (ref 42.7–76)
NEUTROPHILS NFR BLD AUTO: 8.5 10*3/MM3 (ref 1.7–7)
NRBC BLD AUTO-RTO: 0 /100 WBC (ref 0–0.2)
PLATELET # BLD AUTO: 227 10*3/MM3 (ref 140–450)
PMV BLD AUTO: 10.7 FL (ref 6–12)
POTASSIUM SERPL-SCNC: 3.8 MMOL/L (ref 3.5–5.2)
PROT SERPL-MCNC: 8.6 G/DL (ref 6–8.5)
RBC # BLD AUTO: 4.57 10*6/MM3 (ref 3.77–5.28)
SODIUM SERPL-SCNC: 135 MMOL/L (ref 136–145)
WBC NRBC COR # BLD: 12.02 10*3/MM3 (ref 3.4–10.8)

## 2022-04-17 PROCEDURE — 94761 N-INVAS EAR/PLS OXIMETRY MLT: CPT

## 2022-04-17 PROCEDURE — 85025 COMPLETE CBC W/AUTO DIFF WBC: CPT | Performed by: INTERNAL MEDICINE

## 2022-04-17 PROCEDURE — 82962 GLUCOSE BLOOD TEST: CPT

## 2022-04-17 PROCEDURE — 25010000002 ONDANSETRON PER 1 MG: Performed by: SURGERY

## 2022-04-17 PROCEDURE — 80053 COMPREHEN METABOLIC PANEL: CPT | Performed by: INTERNAL MEDICINE

## 2022-04-17 PROCEDURE — 94799 UNLISTED PULMONARY SVC/PX: CPT

## 2022-04-17 PROCEDURE — 63710000001 INSULIN LISPRO (HUMAN) PER 5 UNITS: Performed by: SURGERY

## 2022-04-17 PROCEDURE — 25010000002 PIPERACILLIN SOD-TAZOBACTAM PER 1 G: Performed by: SURGERY

## 2022-04-17 PROCEDURE — 99233 SBSQ HOSP IP/OBS HIGH 50: CPT | Performed by: INTERNAL MEDICINE

## 2022-04-17 PROCEDURE — 25010000002 KETOROLAC TROMETHAMINE PER 15 MG: Performed by: INTERNAL MEDICINE

## 2022-04-17 PROCEDURE — 99024 POSTOP FOLLOW-UP VISIT: CPT | Performed by: SURGERY

## 2022-04-17 RX ORDER — POLYETHYLENE GLYCOL 3350 17 G/17G
17 POWDER, FOR SOLUTION ORAL 2 TIMES DAILY
Status: DISCONTINUED | OUTPATIENT
Start: 2022-04-17 | End: 2022-04-19 | Stop reason: HOSPADM

## 2022-04-17 RX ORDER — AMLODIPINE BESYLATE 5 MG/1
10 TABLET ORAL DAILY
Status: DISCONTINUED | OUTPATIENT
Start: 2022-04-17 | End: 2022-04-19 | Stop reason: HOSPADM

## 2022-04-17 RX ORDER — HYDROXYZINE HYDROCHLORIDE 25 MG/1
25 TABLET, FILM COATED ORAL 2 TIMES DAILY PRN
Status: DISCONTINUED | OUTPATIENT
Start: 2022-04-17 | End: 2022-04-19 | Stop reason: HOSPADM

## 2022-04-17 RX ORDER — MORPHINE SULFATE 2 MG/ML
2 INJECTION, SOLUTION INTRAMUSCULAR; INTRAVENOUS EVERY 4 HOURS PRN
Status: DISCONTINUED | OUTPATIENT
Start: 2022-04-17 | End: 2022-04-19 | Stop reason: HOSPADM

## 2022-04-17 RX ORDER — BUPROPION HYDROCHLORIDE 150 MG/1
150 TABLET ORAL 2 TIMES DAILY
Status: DISCONTINUED | OUTPATIENT
Start: 2022-04-17 | End: 2022-04-19 | Stop reason: HOSPADM

## 2022-04-17 RX ORDER — BISACODYL 5 MG/1
5 TABLET, DELAYED RELEASE ORAL DAILY PRN
Status: DISCONTINUED | OUTPATIENT
Start: 2022-04-17 | End: 2022-04-19 | Stop reason: HOSPADM

## 2022-04-17 RX ORDER — KETOROLAC TROMETHAMINE 30 MG/ML
30 INJECTION, SOLUTION INTRAMUSCULAR; INTRAVENOUS EVERY 6 HOURS PRN
Status: DISCONTINUED | OUTPATIENT
Start: 2022-04-17 | End: 2022-04-19 | Stop reason: HOSPADM

## 2022-04-17 RX ORDER — BISACODYL 10 MG
10 SUPPOSITORY, RECTAL RECTAL DAILY PRN
Status: DISCONTINUED | OUTPATIENT
Start: 2022-04-17 | End: 2022-04-19 | Stop reason: HOSPADM

## 2022-04-17 RX ORDER — LOSARTAN POTASSIUM 50 MG/1
50 TABLET ORAL
Status: DISCONTINUED | OUTPATIENT
Start: 2022-04-17 | End: 2022-04-18

## 2022-04-17 RX ORDER — PANTOPRAZOLE SODIUM 40 MG/1
40 TABLET, DELAYED RELEASE ORAL DAILY
Status: DISCONTINUED | OUTPATIENT
Start: 2022-04-17 | End: 2022-04-19 | Stop reason: HOSPADM

## 2022-04-17 RX ORDER — OXYCODONE HYDROCHLORIDE 5 MG/1
5 TABLET ORAL EVERY 6 HOURS PRN
Status: DISCONTINUED | OUTPATIENT
Start: 2022-04-17 | End: 2022-04-19 | Stop reason: HOSPADM

## 2022-04-17 RX ORDER — OXYCODONE HYDROCHLORIDE 5 MG/1
10 TABLET ORAL EVERY 6 HOURS PRN
Status: DISCONTINUED | OUTPATIENT
Start: 2022-04-17 | End: 2022-04-19 | Stop reason: HOSPADM

## 2022-04-17 RX ORDER — AMOXICILLIN 250 MG
2 CAPSULE ORAL 2 TIMES DAILY
Status: DISCONTINUED | OUTPATIENT
Start: 2022-04-17 | End: 2022-04-19 | Stop reason: HOSPADM

## 2022-04-17 RX ORDER — FAMOTIDINE 20 MG/1
20 TABLET, FILM COATED ORAL
Status: DISCONTINUED | OUTPATIENT
Start: 2022-04-17 | End: 2022-04-19 | Stop reason: HOSPADM

## 2022-04-17 RX ADMIN — KETOROLAC TROMETHAMINE 30 MG: 30 INJECTION, SOLUTION INTRAMUSCULAR; INTRAVENOUS at 22:45

## 2022-04-17 RX ADMIN — Medication 10 ML: at 20:54

## 2022-04-17 RX ADMIN — POLYETHYLENE GLYCOL 3350 17 G: 17 POWDER, FOR SOLUTION ORAL at 12:05

## 2022-04-17 RX ADMIN — BUPROPION HYDROCHLORIDE 150 MG: 150 TABLET, EXTENDED RELEASE ORAL at 12:01

## 2022-04-17 RX ADMIN — TAZOBACTAM SODIUM AND PIPERACILLIN SODIUM 3.38 G: 375; 3 INJECTION, SOLUTION INTRAVENOUS at 15:22

## 2022-04-17 RX ADMIN — INSULIN LISPRO 2 UNITS: 100 INJECTION, SOLUTION INTRAVENOUS; SUBCUTANEOUS at 18:31

## 2022-04-17 RX ADMIN — KETOROLAC TROMETHAMINE 30 MG: 30 INJECTION, SOLUTION INTRAMUSCULAR; INTRAVENOUS at 12:44

## 2022-04-17 RX ADMIN — TAZOBACTAM SODIUM AND PIPERACILLIN SODIUM 3.38 G: 375; 3 INJECTION, SOLUTION INTRAVENOUS at 22:45

## 2022-04-17 RX ADMIN — POLYETHYLENE GLYCOL 3350 17 G: 17 POWDER, FOR SOLUTION ORAL at 20:56

## 2022-04-17 RX ADMIN — BUDESONIDE 0.5 MG: 0.5 SUSPENSION RESPIRATORY (INHALATION) at 22:10

## 2022-04-17 RX ADMIN — ARFORMOTEROL TARTRATE 15 MCG: 15 SOLUTION RESPIRATORY (INHALATION) at 06:27

## 2022-04-17 RX ADMIN — FAMOTIDINE 20 MG: 10 INJECTION INTRAVENOUS at 10:53

## 2022-04-17 RX ADMIN — INSULIN LISPRO 2 UNITS: 100 INJECTION, SOLUTION INTRAVENOUS; SUBCUTANEOUS at 12:50

## 2022-04-17 RX ADMIN — LOSARTAN POTASSIUM 50 MG: 50 TABLET, FILM COATED ORAL at 12:04

## 2022-04-17 RX ADMIN — ARFORMOTEROL TARTRATE 15 MCG: 15 SOLUTION RESPIRATORY (INHALATION) at 22:10

## 2022-04-17 RX ADMIN — AMLODIPINE BESYLATE 10 MG: 5 TABLET ORAL at 12:04

## 2022-04-17 RX ADMIN — METOPROLOL TARTRATE 75 MG: 25 TABLET, FILM COATED ORAL at 20:53

## 2022-04-17 RX ADMIN — BUDESONIDE 0.5 MG: 0.5 SUSPENSION RESPIRATORY (INHALATION) at 06:27

## 2022-04-17 RX ADMIN — ONDANSETRON 4 MG: 2 INJECTION INTRAMUSCULAR; INTRAVENOUS at 18:30

## 2022-04-17 RX ADMIN — SENNOSIDES AND DOCUSATE SODIUM 2 TABLET: 50; 8.6 TABLET ORAL at 20:53

## 2022-04-17 RX ADMIN — TAZOBACTAM SODIUM AND PIPERACILLIN SODIUM 3.38 G: 375; 3 INJECTION, SOLUTION INTRAVENOUS at 06:37

## 2022-04-17 RX ADMIN — PANTOPRAZOLE SODIUM 40 MG: 40 TABLET, DELAYED RELEASE ORAL at 12:04

## 2022-04-17 RX ADMIN — FAMOTIDINE 20 MG: 20 TABLET ORAL at 18:30

## 2022-04-17 RX ADMIN — METOPROLOL TARTRATE 75 MG: 25 TABLET, FILM COATED ORAL at 09:24

## 2022-04-17 RX ADMIN — BUPROPION HYDROCHLORIDE 150 MG: 150 TABLET, EXTENDED RELEASE ORAL at 20:54

## 2022-04-17 NOTE — PROGRESS NOTES
[facility]   Hospitalist Progress Note  Date: 2022  Patient Name: Anika Casillas  : 1960  MRN: [id number]  Date of admission: 4/15/2022      Subjective   Subjective     Chief Complaint: Abdominal pain    Summary:   Anika Casillas is a very pleasant 61 y.o. female with a history of BERKOWITZ cirrhosis, Afib, Diabetes mellitus, chronic gastritis and GERD who presents to the hospital with abdominal pain.  Pain started about 1 week ago suddenly and has been getting progressively worse and more frequent (constant but waxes and wanes).  She started vomiting today which was not bloody or coffee grounds but was more like dark bile.  She denies fever or chest pain.  Her abdominal pain is located in the epigastric area and does not radiate.  In the ER her pain was poorly controlled with parenteral narcotics so she underwent CT which showed free air without a clear source.  Due to her pain and associated free air, general surgery was consulted and we are admitting her to the hospital for further medical management.  Patient taken to the OR was found to have incarcerated necrotic portion of transverse colon with omentum under the umbilicus.  S/p resection of necrotic colon and end-to-end anastomosis.  [facility].    Interval Followup:   Blood pressure up.  Other vital signs stable.  97% saturation on 2 L.  Of morphine PCA pump.  Pain tolerable.  No nausea or vomiting.  Tolerating liquid diet well.  Passing gas.  No BM yet.  Good urine output -1.8 L.      Review of Systems   All systems were reviewed and negative except for: Summary and interval follow-up    Objective   Objective     Vitals:   Temp:  [98.24 °F (36.8 °C)-99.5 °F (37.5 °C)] 98.96 °F (37.2 °C)  Heart Rate:  [] 88  Resp:  [17-27] 20  BP: (151-175)/(67-87) 158/77  Flow (L/min):  [0-2] 2  Physical Exam    Constitutional: Awake, alert, no acute distress   Eyes: Pupils equal, sclerae anicteric, no conjunctival  injection   HENT: NCAT, mucous membranes moist   Neck: Supple, no thyromegaly, no lymphadenopathy, trachea midline   Respiratory: Decreased to auscultation bilaterally, nonlabored respirations    Cardiovascular: RRR, no murmurs, rubs, or gallops, palpable pedal pulses bilaterally   Gastrointestinal: Positive bowel sounds, soft, mild tender at surgical incision.  Wound dressed, nondistended   Musculoskeletal: +1 bilateral ankle edema, no clubbing or cyanosis to extremities   Psychiatric: Appropriate affect, cooperative   Neurologic: Oriented x 3, strength symmetric in all extremities, Cranial Nerves grossly intact to confrontation, speech clear   Skin: No rashes     Result Review    Result Review:  I have personally reviewed the results from the time of this admission to 4/17/2022 16:41 EDT and agree with these findings:  [x]  Laboratory  []  Microbiology  [x]  Radiology  []  EKG/Telemetry   []  Cardiology/Vascular   []  Pathology  [x]  Old records  [x]  Other: Medications    Assessment/Plan   Assessment / Plan     Assessment:    · Abdominal pain.  S/p exploratory laparotomy, lysis of adhesion and resection of necrotic transverse colon/omentum due to incarcerated hernia under the umbilicus on April 16.  · Free air in the abdomen -due to above.    · BERKOWITZ cirrhosis  · Afib - currently in sinus rhythm on chronic anticoagulation with Eliquis  · Diabetes mellitus  · Chronic gastritis and GERD  · Hypertension.  · Leukocytosis.  Improving.  · Chronic tobacco abuse disorder.  · Elevated liver enzymes..  Resolved     Plan.  Advance diet as per general surgery.  IV and oral narcotics for pain control.  As needed Toradol.  Of morphine PCA pump  Appreciate general surgery input.  Keep holding Eliquis  Continuous cardiac telemetry monitoring  IV Zosyn  Resume home blood pressure medication including metoprolol, Norvasc and olmesartan.  Nebulizer treatment.  Nicotine patch  Sliding scale insulin.  MiraLAX.  SCD      Discussed plan  with RN.  Discharge home when cleared by general surgery    DVT prophylaxis:  Medical and mechanical DVT prophylaxis orders are present.    CODE STATUS:   Code Status (Patient has no pulse and is not breathing): CPR (Attempt to Resuscitate)  Medical Interventions (Patient has pulse or is breathing): Full Support      Part of this note may be an electronic transcription/translation of spoken language to printed text using the Dragon Dictation System.     Electronically signed by Gregorio Herrera MD, 04/17/22, 4:41 PM EDT.

## 2022-04-17 NOTE — PROGRESS NOTES
LOS: 1 day   Patient Care Team:  Elena Henderson PA as PCP - General (Family Medicine)  KATHARINE Webb MD as Consulting Physician (Cardiology)      Subjective     Interval History:   Doing very well, no new issues, tolerating clears, no BM but passing some flatus, hungry and would like more to eat and having no nausea    Objective     Vital Signs  Temp:  [98.24 °F (36.8 °C)-99.5 °F (37.5 °C)] 99.5 °F (37.5 °C)  Heart Rate:  [] 93  Resp:  [17-27] 20  BP: (151-175)/(67-87) 161/81    Physical Exam:  NAD  Abd soft, nd, inc ok      Results Review:     I reviewed the patient's new clinical results.      Assessment/Plan       Pneumoperitoneum of unknown etiology    Chronic gastritis without bleeding, unspecified gastritis type        Plan:  Advance to fulls  Will dc PCA varun and add po pain meds  Await full gi function

## 2022-04-18 PROBLEM — Z98.890 S/P EXPLORATORY LAPAROTOMY: Status: ACTIVE | Noted: 2022-04-18

## 2022-04-18 LAB
ALBUMIN SERPL-MCNC: 4 G/DL (ref 3.5–5.2)
ANION GAP SERPL CALCULATED.3IONS-SCNC: 10.9 MMOL/L (ref 5–15)
BASOPHILS # BLD AUTO: 0.06 10*3/MM3 (ref 0–0.2)
BASOPHILS NFR BLD AUTO: 0.5 % (ref 0–1.5)
BUN SERPL-MCNC: 12 MG/DL (ref 6–20)
BUN/CREAT SERPL: 14.6 (ref 7–25)
CALCIUM SPEC-SCNC: 9.7 MG/DL (ref 8.6–10.5)
CHLORIDE SERPL-SCNC: 103 MMOL/L (ref 98–107)
CO2 SERPL-SCNC: 24.1 MMOL/L (ref 22–29)
CREAT SERPL-MCNC: 0.82 MG/DL (ref 0.57–1)
DEPRECATED RDW RBC AUTO: 48.9 FL (ref 37–54)
EGFRCR SERPLBLD CKD-EPI 2021: 82.5 ML/MIN/1.73
EOSINOPHIL # BLD AUTO: 0.11 10*3/MM3 (ref 0–0.4)
EOSINOPHIL NFR BLD AUTO: 1 % (ref 0.3–6.2)
ERYTHROCYTE [DISTWIDTH] IN BLOOD BY AUTOMATED COUNT: 15.9 % (ref 12.3–15.4)
GLUCOSE BLDC GLUCOMTR-MCNC: 129 MG/DL (ref 70–99)
GLUCOSE BLDC GLUCOMTR-MCNC: 136 MG/DL (ref 70–99)
GLUCOSE BLDC GLUCOMTR-MCNC: 217 MG/DL (ref 70–99)
GLUCOSE SERPL-MCNC: 151 MG/DL (ref 65–99)
HCT VFR BLD AUTO: 36 % (ref 34–46.6)
HGB BLD-MCNC: 12.9 G/DL (ref 12–15.9)
IMM GRANULOCYTES # BLD AUTO: 0.05 10*3/MM3 (ref 0–0.05)
IMM GRANULOCYTES NFR BLD AUTO: 0.4 % (ref 0–0.5)
LYMPHOCYTES # BLD AUTO: 4.04 10*3/MM3 (ref 0.7–3.1)
LYMPHOCYTES NFR BLD AUTO: 35.2 % (ref 19.6–45.3)
MCH RBC QN AUTO: 30.6 PG (ref 26.6–33)
MCHC RBC AUTO-ENTMCNC: 35.8 G/DL (ref 31.5–35.7)
MCV RBC AUTO: 85.5 FL (ref 79–97)
MONOCYTES # BLD AUTO: 0.93 10*3/MM3 (ref 0.1–0.9)
MONOCYTES NFR BLD AUTO: 8.1 % (ref 5–12)
NEUTROPHILS NFR BLD AUTO: 54.8 % (ref 42.7–76)
NEUTROPHILS NFR BLD AUTO: 6.3 10*3/MM3 (ref 1.7–7)
NRBC BLD AUTO-RTO: 0 /100 WBC (ref 0–0.2)
PHOSPHATE SERPL-MCNC: 2.9 MG/DL (ref 2.5–4.5)
PLATELET # BLD AUTO: 205 10*3/MM3 (ref 140–450)
PMV BLD AUTO: 10.3 FL (ref 6–12)
POTASSIUM SERPL-SCNC: 3.5 MMOL/L (ref 3.5–5.2)
RBC # BLD AUTO: 4.21 10*6/MM3 (ref 3.77–5.28)
SODIUM SERPL-SCNC: 138 MMOL/L (ref 136–145)
WBC NRBC COR # BLD: 11.49 10*3/MM3 (ref 3.4–10.8)

## 2022-04-18 PROCEDURE — 63710000001 INSULIN LISPRO (HUMAN) PER 5 UNITS: Performed by: SURGERY

## 2022-04-18 PROCEDURE — 80069 RENAL FUNCTION PANEL: CPT | Performed by: INTERNAL MEDICINE

## 2022-04-18 PROCEDURE — 25010000002 KETOROLAC TROMETHAMINE PER 15 MG: Performed by: INTERNAL MEDICINE

## 2022-04-18 PROCEDURE — 94799 UNLISTED PULMONARY SVC/PX: CPT

## 2022-04-18 PROCEDURE — 25010000002 PIPERACILLIN SOD-TAZOBACTAM PER 1 G: Performed by: SURGERY

## 2022-04-18 PROCEDURE — 85025 COMPLETE CBC W/AUTO DIFF WBC: CPT | Performed by: INTERNAL MEDICINE

## 2022-04-18 PROCEDURE — 82962 GLUCOSE BLOOD TEST: CPT

## 2022-04-18 PROCEDURE — 99233 SBSQ HOSP IP/OBS HIGH 50: CPT | Performed by: INTERNAL MEDICINE

## 2022-04-18 RX ORDER — POTASSIUM CHLORIDE 750 MG/1
40 CAPSULE, EXTENDED RELEASE ORAL ONCE
Status: COMPLETED | OUTPATIENT
Start: 2022-04-18 | End: 2022-04-18

## 2022-04-18 RX ORDER — LOSARTAN POTASSIUM 50 MG/1
100 TABLET ORAL
Status: DISCONTINUED | OUTPATIENT
Start: 2022-04-19 | End: 2022-04-19 | Stop reason: HOSPADM

## 2022-04-18 RX ADMIN — METOPROLOL TARTRATE 75 MG: 25 TABLET, FILM COATED ORAL at 20:30

## 2022-04-18 RX ADMIN — POLYETHYLENE GLYCOL 3350 17 G: 17 POWDER, FOR SOLUTION ORAL at 08:06

## 2022-04-18 RX ADMIN — TAZOBACTAM SODIUM AND PIPERACILLIN SODIUM 3.38 G: 375; 3 INJECTION, SOLUTION INTRAVENOUS at 22:20

## 2022-04-18 RX ADMIN — KETOROLAC TROMETHAMINE 30 MG: 30 INJECTION, SOLUTION INTRAMUSCULAR; INTRAVENOUS at 13:31

## 2022-04-18 RX ADMIN — FAMOTIDINE 20 MG: 20 TABLET ORAL at 16:49

## 2022-04-18 RX ADMIN — BUPROPION HYDROCHLORIDE 150 MG: 150 TABLET, EXTENDED RELEASE ORAL at 20:30

## 2022-04-18 RX ADMIN — INSULIN LISPRO 4 UNITS: 100 INJECTION, SOLUTION INTRAVENOUS; SUBCUTANEOUS at 11:22

## 2022-04-18 RX ADMIN — BUPROPION HYDROCHLORIDE 150 MG: 150 TABLET, EXTENDED RELEASE ORAL at 08:06

## 2022-04-18 RX ADMIN — Medication 10 ML: at 20:30

## 2022-04-18 RX ADMIN — FAMOTIDINE 20 MG: 20 TABLET ORAL at 08:10

## 2022-04-18 RX ADMIN — POTASSIUM CHLORIDE 40 MEQ: 750 CAPSULE, EXTENDED RELEASE ORAL at 10:08

## 2022-04-18 RX ADMIN — AMLODIPINE BESYLATE 10 MG: 5 TABLET ORAL at 08:07

## 2022-04-18 RX ADMIN — ARFORMOTEROL TARTRATE 15 MCG: 15 SOLUTION RESPIRATORY (INHALATION) at 08:36

## 2022-04-18 RX ADMIN — LOSARTAN POTASSIUM 50 MG: 50 TABLET, FILM COATED ORAL at 08:07

## 2022-04-18 RX ADMIN — Medication 10 ML: at 08:08

## 2022-04-18 RX ADMIN — METOPROLOL TARTRATE 75 MG: 25 TABLET, FILM COATED ORAL at 08:07

## 2022-04-18 RX ADMIN — SENNOSIDES AND DOCUSATE SODIUM 2 TABLET: 50; 8.6 TABLET ORAL at 08:07

## 2022-04-18 RX ADMIN — TAZOBACTAM SODIUM AND PIPERACILLIN SODIUM 3.38 G: 375; 3 INJECTION, SOLUTION INTRAVENOUS at 05:42

## 2022-04-18 RX ADMIN — BUDESONIDE 0.5 MG: 0.5 SUSPENSION RESPIRATORY (INHALATION) at 22:26

## 2022-04-18 RX ADMIN — ARFORMOTEROL TARTRATE 15 MCG: 15 SOLUTION RESPIRATORY (INHALATION) at 22:26

## 2022-04-18 RX ADMIN — TAZOBACTAM SODIUM AND PIPERACILLIN SODIUM 3.38 G: 375; 3 INJECTION, SOLUTION INTRAVENOUS at 13:36

## 2022-04-18 RX ADMIN — BUDESONIDE 0.5 MG: 0.5 SUSPENSION RESPIRATORY (INHALATION) at 08:36

## 2022-04-18 RX ADMIN — PANTOPRAZOLE SODIUM 40 MG: 40 TABLET, DELAYED RELEASE ORAL at 08:07

## 2022-04-18 NOTE — PAYOR COMM NOTE
"Anika Tran (61 y.o. Female)             Date of Birth   1960    Social Security Number       Address   [address] [address]    Home Phone   [phone]    MRN   [id number]       [facility]    Marital Status                               Admission Date   4/15/22    Admission Type   Emergency    Admitting Provider   Gregorio Herrera MD    Attending Provider   Gregorio Herrera MD    Department, Room/Bed   [facility] 5TH FLOOR SURGICAL TELEMETRY UNIT, 515/1       Discharge Date       Discharge Disposition       Discharge Destination                               Attending Provider: Gregorio Herrera MD    Allergies: Liraglutide, Metformin    Isolation: None   Infection: None   Code Status: CPR   Advance Care Planning Activity    Ht: 165 cm (64.96\")   Wt: 101 kg (222 lb 7.1 oz)    Admission Cmt: None   Principal Problem: Pneumoperitoneum of unknown etiology [K66.8] More...                 Active Insurance as of 4/15/2022     Primary Coverage     Payor Plan Insurance Group Employer/Plan Group    HUMANA HUMANA [id number]     Payor Plan Address Payor Plan Phone Number Payor Plan Fax Number Effective Dates    [address] [address] [phone]  9/1/2021 - None Entered    [facility] [id number]       Subscriber Name Subscriber Birth Date Member ID       ANIKA TRAN 1960 [id number]                 Emergency Contacts      (Rel.) Home Phone Work Phone Mobile Phone    LUIS TRAN (Spouse) [phone] -- [phone]              Gastroenterology GRG - Clinical Indications for Admission to Inpatient Care by Luba Carrasco         Met: Reviewed on 4/18/2022 by Luba Carrasco       Created Using Review Status Review Entered   [place] book Completed 4/18/2022 16:41       Criteria Set Name - Subset   Gastroenterology GRG - Clinical Indications for Admission to Inpatient Care      Criteria Review      Clinical Indications for Admission to Inpatient Care  "   Most Recent : Luba Carrasco Most Recent Date: 4/18/2022 16:41:20 EDST    (X) Hospital admission is needed for appropriate care of the patient because of  1 or more  of    the following :       (X) Suspected acute intra-abdominal process, as indicated by  1 or more  of the following  (1)       (2) (3) (4) (5):          (X) Other signs or symptoms of acute abdominal disease (eg, severe pain, free air) (11)          4/18/2022 16:41:20 EDST by Luba Carrasco            Pt. presents to ED with abdominal pain for the past week, worse today with vomiting. Epigastrice pain, bloating, burning, carmping and gnawing, radiates to back. Severe pain gradual onset, constant, worsening, recurrent.    Notes:    4/18/2022 16:41:20 EDST by Luba Carrasco    Subject: Admission    Abdominal Pain    Pain location:  Epigastric    Pain quality: bloating, burning, cramping and gnawing     Pain radiates to:  Back    Pain severity:  Severe    Onset quality:  Gradual    Duration:  7 days    Timing:  Constant    Progression:  Worsening ( Today)    Chronicity:  Recurrent    Context comment:  Patient has history of gastritis and reflux and takes Protonix and Prevacid and is due to have a EGD in June.  Pain today was much more severe    Relieved by:  Nothing    Worsened by:  Eating, palpation and movement    Ineffective treatments:  Belching (Home GI meds including Prevacid and Protonix)    Associated symptoms: belching and vomiting     Associated symptoms: no anorexia, no chest pain, no chills, no constipation, no cough, no diarrhea, no dysuria, no fatigue, no fever, no flatus, no hematemesis, no hematochezia, no hematuria, no melena, no nausea, no shortness of breath and no sore throat               Patient is 61 y.o. year old female that presents to the ED for evaluation of abdominal pain. Patient states that she developed mild abdominal pain approximately one week ago which has worsened significantly with time. She also  developed associated vomiting today. She does not feel that her pain has improved in the ED.           Anika Casillas is a very pleasant 61 y.o. female with a history of BERKOWITZ cirrhosis, Afib, Diabetes mellitus, chronic gastritis and GERD who presents to the hospital with abdominal pain.  Pain started about 1 week ago suddenly and has been getting progressively worse and more frequent (constant but waxes and wanes).  She started vomiting today which was not bloody or coffee grounds but was more like dark bile.  She denies fever or chest pain.  Her abdominal pain is located in the epigastric area and does not radiate.  In the ER her pain was poorly controlled with parenteral narcotics so she underwent CT which showed free air without a clear source.  Due to her pain and associated free air, general surgery was consulted and we are admitting her to the hospital for further medical management.          Chetna CARVAJALKADEN [id number] Tax ID [id number]  Problem List           Codes Noted - Resolved       Hospital    S/P exploratory laparotomy, lysis of adhesions, resection of necrotic colon without anastomosis ICD-10-CM: Z98.890  ICD-9-CM: V45.89 4/18/2022 - Present    Chronic gastritis without bleeding, unspecified gastritis type ICD-10-CM: K29.50  ICD-9-CM: 535.10 4/16/2022 - Present    * (Principal) Pneumoperitoneum of unknown etiology ICD-10-CM: K66.8  ICD-9-CM: 568.89 4/15/2022 - Present       Non-Hospital    Uncontrolled type 2 diabetes mellitus with neurologic complication ICD-10-CM: SIA0072  ICD-9-CM: 250.62 3/15/2022 - Present    Encounter for long-term (current) use of insulin (HCC) ICD-10-CM: Z79.4  ICD-9-CM: V58.67 2/19/2022 - Present    Hyperinsulinism ICD-10-CM: E16.1  ICD-9-CM: 251.1 2/19/2022 - Present    Class 2 obesity with body mass index (BMI) of 36.0 to 36.9 in adult ICD-10-CM: E66.9, Z68.36  ICD-9-CM: 278.00, V85.36 2/19/2022 - Present    Colindres's esophagus without dysplasia ICD-10-CM: K22.70  ICD-9-CM:  530.85 2022 - Present    Secondary esophageal varices without bleeding (HCC) ICD-10-CM: I85.10  ICD-9-CM: 456.21 2022 - Present    Other emphysema (HCC) ICD-10-CM: J43.8  ICD-9-CM: 492.8 2021 - Present    Hypothyroidism ICD-10-CM: E03.9  ICD-9-CM: 244.9 3/4/2019 - Present    BERKOWITZ (nonalcoholic steatohepatitis) ICD-10-CM: K75.81  ICD-9-CM: 571.8 11/3/2017 - Present    Diverticulosis of colon ICD-10-CM: K57.30  ICD-9-CM: 562.10 10/30/2017 - Present    Hx of colonic polyp ICD-10-CM: Z86.010  ICD-9-CM: V12.72 10/30/2017 - Present    Diabetic gastroparesis (HCC) ICD-10-CM: E11.43, K31.84  ICD-9-CM: 250.60, 536.3 10/19/2017 - Present    Obstructive sleep apnea syndrome ICD-10-CM: G47.33  ICD-9-CM: 327.23 4/10/2017 - Present    Elevated transaminase level ICD-10-CM: R74.01  ICD-9-CM: 790.4 2017 - Present    Mixed hyperlipidemia ICD-10-CM: E78.2  ICD-9-CM: 272.2 2017 - Present    OAB (overactive bladder) ICD-10-CM: N32.81  ICD-9-CM: 596.51 2017 - Present    Paroxysmal atrial fibrillation (HCC) ICD-10-CM: I48.0  ICD-9-CM: 427.31 2016 - Present    Gastroesophageal reflux disease without esophagitis ICD-10-CM: K21.9  ICD-9-CM: 530.81 2016 - Present    Major depressive disorder with single episode, in full remission (HCC) ICD-10-CM: F32.5  ICD-9-CM: 296.26 2016 - Present    Essential hypertension ICD-10-CM: I10  ICD-9-CM: 401.9 2016 - Present    Tobacco abuse ICD-10-CM: Z72.0  ICD-9-CM: 305.1 2016 - Present    Type 2 diabetes mellitus with hyperglycemia, with long-term current use of insulin (HCC) ICD-10-CM: E11.65, Z79.4  ICD-9-CM: 250.00, 790.29, V58.67 2016 - Present             History & Physical      Jose Garibay DO at 22 0440           [facility] HISTORY AND PHYSICAL  Date: 2022   Patient Name: Anika Casillas  : 1960  MRN: [id number]  Primary Care Physician:  Elena Henderson PA  Date of admission:  4/15/2022    Subjective   Subjective     Chief Complaint: abdominal pain    HPI:    Anika Casillas is a very pleasant 61 y.o. female with a history of BERKOWITZ cirrhosis, Afib, Diabetes mellitus, chronic gastritis and GERD who presents to the hospital with abdominal pain.  Pain started about 1 week ago suddenly and has been getting progressively worse and more frequent (constant but waxes and wanes).  She started vomiting today which was not bloody or coffee grounds but was more like dark bile.  She denies fever or chest pain.  Her abdominal pain is located in the epigastric area and does not radiate.  In the ER her pain was poorly controlled with parenteral narcotics so she underwent CT which showed free air without a clear source.  Due to her pain and associated free air, general surgery was consulted and we are admitting her to the hospital for further medical management.       Personal History     Past Medical History:  Past Medical History:   Diagnosis Date   • Atrial fibrillation (HCC)    • Diabetes mellitus (HCC)    • Gastroparesis    • GERD (gastroesophageal reflux disease) 1995   • Hypertension    • Irritable bowel syndrome    • BERKOWITZ (nonalcoholic steatohepatitis)          Past Surgical History:  Past Surgical History:   Procedure Laterality Date   • BLADDER SLING MODIFIED, ANTERIOR AND POSTERIOR VAGINAL REPAIR     • BREAST LUMPECTOMY     • CARDIAC CATHETERIZATION     • CHOLECYSTECTOMY     • GALLBLADDER SURGERY     • TUBAL ABDOMINAL LIGATION           Family History:   Family History   Adopted: Yes   Family history unknown: Yes         Social History:   Social History     Tobacco Use   • Smoking status: Current Every Day Smoker     Packs/day: 0.25     Years: 41.00     Pack years: 10.25     Types: Cigarettes     Start date: 6/17/1975   • Smokeless tobacco: Never Used   Vaping Use   • Vaping Use: Never used   Substance Use Topics   • Alcohol use: Yes     Alcohol/week: 1.0 standard drink     Types: 1 Drinks  "containing 0.5 oz of alcohol per week     Comment: 1 a month   • Drug use: Never         Home Medications:  Accu-Chek Guide, Fluticasone-Umeclidin-Vilant, Insulin Glargine (2 Unit Dial), Insulin Pen Needle, Pen Needles 3/16\", Semaglutide(0.25 or 0.5MG/DOS), accu-chek soft touch, albuterol sulfate HFA, amLODIPine, apixaban, buPROPion XL, cholecalciferol, dicyclomine, empagliflozin, famotidine, glucose blood, glucose monitor, hydrOXYzine, ibuprofen, insulin aspart, lactulose, metoprolol tartrate, multivitamin with minerals, olmesartan, pantoprazole, polyethylene glycol, and vitamin C    Allergies:  Allergies   Allergen Reactions   • Liraglutide Nausea And Vomiting   • Metformin Nausea Only     GI Upset       Review of Systems   All systems were reviewed and negative except for: Noted above or in HPI    Objective   Objective     Vitals:   Temp:  [98.3 °F (36.8 °C)] 98.3 °F (36.8 °C)  Heart Rate:  [88-99] 94  Resp:  [20] 20  BP: (159-179)/() 164/88    Physical Exam    Constitutional: Awake, alert, no acute distress   Eyes: Pupils equal, sclerae anicteric, no conjunctival injection   HENT: NCAT, mucous membranes moist   Neck: Supple, no thyromegaly, no lymphadenopathy, trachea midline   Respiratory: Clear to auscultation bilaterally, nonlabored respirations    Cardiovascular: RRR, no murmurs, rubs, or gallops, palpable pedal pulses bilaterally   Gastrointestinal: Positive bowel sounds, soft, mildly tender epigastric area, nondistended   Musculoskeletal: No bilateral ankle edema, no clubbing or cyanosis to extremities   Psychiatric: Appropriate affect, cooperative   Neurologic: Oriented x 3, strength symmetric in all extremities, Cranial Nerves grossly intact to confrontation, speech clear   Skin: No rashes           Assessment/Plan   Assessment / Plan     Assessment/Plan:   Abdominal pain  Free air in the abdomen - suspect perforated viscus/perforated ulcer  BERKOWITZ cirrhosis  Afib - currently in sinus " rhythm  Diabetes mellitus  Chronic gastritis and GERD    Patient is admitted to the hospital for further care  Consult general surgery.  Planning exploratory laparotomy  Continuous cardiac telemetry monitoring  IV Zosyn  Pain control  N.p.o.  Sliding scale insulin  CBC in am for surveillance of any acute blood loss or thrombocytopenia  Metabolic panel in the morning to evaluate electrolytes and renal function  Reviewed today's labs  Reviewed telemetry  Discussed with ER physician  Risk of primary complaint: patient is at significant risk of morbidity if primary complaint is not treated especially considering the patient's comorbidities.  DVT prophylaxis:  Mechanical DVT prophylaxis orders are present.    CODE STATUS:    Code Status (Patient has no pulse and is not breathing): CPR (Attempt to Resuscitate)  Medical Interventions (Patient has pulse or is breathing): Full Support      Admission Status:  I believe this patient meets inpatient status.    Electronically signed by Jose Garibay DO, 04/16/22, 4:40 AM EDT.               Electronically signed by Jose Garibay DO at 04/16/22 0709          Emergency Department Notes      Elidia Lee APRN at 04/15/22 2232     Attestation signed by Shady Jamil MD at 04/16/22 0710        FACE TO FACE: This visit was performed by BOTH a physician and an APC. I personally evaluated and examined the patient. I performed all aspects of the MDM as documented.  Shady Jamil MD 4/16/2022 07:10 EDT                         Time: 04:45 EDT  Arrived by: Vehicle  Chief Complaint: abdominal pain  History provided by: Patient  History is limited by: N/A    History of Present Illness:  Patient is a 61 y.o. year old female that presents to the emergency department with abdominal pain for the past week mildly.  Worse today with vomiting starting      Abdominal Pain  Pain location:  Epigastric  Pain quality: bloating, burning, cramping and gnawing    Pain radiates to:  Back  Pain severity:   Severe  Onset quality:  Gradual  Duration:  7 days  Timing:  Constant  Progression:  Worsening ( Today)  Chronicity:  Recurrent  Context comment:  Patient has history of gastritis and reflux and takes Protonix and Prevacid and is due to have a EGD in June.  Pain today was much more severe  Relieved by:  Nothing  Worsened by:  Eating, palpation and movement  Ineffective treatments:  Belching (Home GI meds including Prevacid and Protonix)  Associated symptoms: belching and vomiting    Associated symptoms: no anorexia, no chest pain, no chills, no constipation, no cough, no diarrhea, no dysuria, no fatigue, no fever, no flatus, no hematemesis, no hematochezia, no hematuria, no melena, no nausea, no shortness of breath and no sore throat    Vomiting  The primary symptoms include abdominal pain and vomiting. Primary symptoms do not include fever, fatigue, nausea, diarrhea, melena, hematemesis, hematochezia or dysuria.   The illness is also significant for back pain. The illness does not include chills, anorexia or constipation.       Similar Symptoms Previously: Patient has chronic history of gastritis and takes Protonix and Prevacid from her GI doctor.  Is scheduled for scope in June.  This pain today was more severe  Recently seen: Patient sees a GI doctor Dr. Charles and was put on medications few months ago      Patient Care Team  Primary Care Provider: Elena Henderson    Past Medical History:     Allergies   Allergen Reactions   • Liraglutide Nausea And Vomiting   • Metformin Nausea Only     GI Upset     Past Medical History:   Diagnosis Date   • Atrial fibrillation (HCC)    • Diabetes mellitus (HCC)    • Gastroparesis    • GERD (gastroesophageal reflux disease) 1995   • Hypertension    • Irritable bowel syndrome    • BERKOWITZ (nonalcoholic steatohepatitis)      Past Surgical History:   Procedure Laterality Date   • BLADDER SLING MODIFIED, ANTERIOR AND POSTERIOR VAGINAL REPAIR     • BREAST LUMPECTOMY     • CARDIAC  CATHETERIZATION     • CHOLECYSTECTOMY     • GALLBLADDER SURGERY     • TUBAL ABDOMINAL LIGATION       Family History   Adopted: Yes   Family history unknown: Yes       Home Medications:  Prior to Admission medications    Medication Sig Start Date End Date Taking? Authorizing Provider   albuterol sulfate  (90 Base) MCG/ACT inhaler Inhale 2 puffs Every 4 (Four) Hours As Needed.    Joyce Colmenares MD   amLODIPine (NORVASC) 10 MG tablet Take 10 mg by mouth Daily.    Joyce Colmenares MD   apixaban (ELIQUIS) 5 MG tablet tablet Take 1 tablet by mouth 2 (Two) Times a Day. 11/19/21   Elena Henderson PA   B-D UF III MINI PEN NEEDLES 31G X 5 MM misc  10/8/21   Joyce Colmenares MD   Blood Glucose Monitoring Suppl (Accu-Chek Guide) w/Device kit  2/14/22   Joyce Colmenares MD   buPROPion XL (WELLBUTRIN XL) 150 MG 24 hr tablet Take  by mouth Daily.    Joyce Colmenares MD   cholecalciferol (VITAMIN D3) 25 MCG (1000 UT) tablet Take 2,000 Units by mouth Daily.    Joyce Colmenares MD   empagliflozin (JARDIANCE) 25 MG tablet tablet Take 25 mg by mouth. 7/30/21   Joyce Colmenares MD   famotidine (PEPCID) 40 MG tablet Take 1 tablet by mouth At Night As Needed for Heartburn. 12/30/21   Christina Rinaldi APRN   Fluticasone-Umeclidin-Vilant (Trelegy Ellipta) 100-62.5-25 MCG/INH inhaler Inhale 1 puff Daily. 4/14/22   Yarelis Melara MD   glucose blood test strip Check blood glucose twice daily 1/27/22   Elena Henderson PA   glucose monitor monitoring kit 1 each Daily. Patient prefers Accu Chek Meter. 2/14/22   Elena Henderson PA   hydrOXYzine (ATARAX) 25 MG tablet Take 25 mg by mouth 2 (Two) Times a Day As Needed. 6/23/21   Joyce Colmenares MD   ibuprofen (ADVIL,MOTRIN) 800 MG tablet Take 800 mg by mouth 3 (Three) Times a Day As Needed.    Joyce Colmenares MD   Insulin Glargine, 2 Unit Dial, (Toujeo Max SoloStar) 300 UNIT/ML solution pen-injector injection Inject  "120 Units under the skin into the appropriate area as directed. 2/3/20 7/30/22  Joyce Colmenares MD   Insulin Pen Needle (Pen Needles 3/16\") 31G X 5 MM misc  10/8/21 10/8/22  Joyce Colmenares MD   Insulin Pen Needle 31G X 5 MM misc by Other route. 10/8/21 10/8/22  Joyce Colmenares MD   lactulose (CHRONULAC) 10 GM/15ML solution  12/30/21   Joyce Colmenares MD   Lancets (accu-chek soft touch) lancets Check blood sugar twice daily 2/14/22   Elena Henderson PA   metoprolol tartrate (LOPRESSOR) 50 MG tablet Take 75 mg by mouth 2 (Two) Times a Day. TAKE 1 1/2 TABLETS BID 6/22/21   Joyce Colmenares MD   multivitamin with minerals tablet tablet Take 1 tablet by mouth Daily.    Joyce Colmenares MD   NovoLOG FlexPen 100 UNIT/ML solution pen-injector sc pen  3/14/22   Joyce Colmenares MD   olmesartan (BENICAR) 20 MG tablet Daily. 11/10/21   Joyce Colmenares MD   pantoprazole (PROTONIX) 40 MG EC tablet Take 1 tablet by mouth Daily. 12/30/21   Christina Rinaldi APRN   polyethylene glycol (MIRALAX) 17 g packet Take 17 g by mouth 2 (Two) Times a Day.    Joyce Colmenares MD   polyethylene glycol (MIRALAX) 17 GM/SCOOP powder Take 17 g by mouth.    Joyce Colmenares MD   Semaglutide,0.25 or 0.5MG/DOS, (Ozempic, 0.25 or 0.5 MG/DOSE,) 2 MG/1.5ML solution pen-injector Inject 0.5 mg under the skin into the appropriate area as directed. 7/30/21   Joyce Colmenares MD   vitamin C (ASCORBIC ACID) 250 MG tablet Take 250 mg by mouth Daily.    Joyce Colmenares MD        Social History:   PT  reports that she has been smoking cigarettes. She started smoking about 46 years ago. She has a 10.25 pack-year smoking history. She has never used smokeless tobacco. She reports current alcohol use of about 1.0 standard drink of alcohol per week. She reports that she does not use drugs.    Record Review:  I have reviewed the patient's records in [facility].     Review of Systems  Review of " "Systems   Constitutional: Negative for chills, fatigue and fever.   HENT: Negative for congestion, ear pain and sore throat.    Eyes: Negative for pain.   Respiratory: Negative for cough, chest tightness and shortness of breath.    Cardiovascular: Negative for chest pain and palpitations.   Gastrointestinal: Positive for abdominal pain and vomiting. Negative for anorexia, constipation, diarrhea, flatus, hematemesis, hematochezia, melena and nausea.   Genitourinary: Negative for dysuria, flank pain and hematuria.   Musculoskeletal: Positive for back pain. Negative for joint swelling.   Skin: Negative for pallor.   Neurological: Negative for seizures and headaches.   Hematological: Negative.    Psychiatric/Behavioral: Negative.    All other systems reviewed and are negative.       Physical Exam  /88   Pulse 94   Temp 98.3 °F (36.8 °C) (Oral)   Resp 20   Ht 165.1 cm (65\")   Wt 101 kg (222 lb 7.1 oz)   SpO2 98%   BMI 37.02 kg/m²     Physical Exam  Vitals and nursing note reviewed.   Constitutional:       General: She is not in acute distress.     Appearance: Normal appearance. She is not toxic-appearing.   HENT:      Head: Normocephalic and atraumatic.      Mouth/Throat:      Mouth: Mucous membranes are moist.   Eyes:      General: No scleral icterus.  Cardiovascular:      Rate and Rhythm: Normal rate and regular rhythm.      Pulses: Normal pulses.      Heart sounds: Normal heart sounds.   Pulmonary:      Effort: Pulmonary effort is normal. No respiratory distress.      Breath sounds: Normal breath sounds.   Abdominal:      General: Abdomen is protuberant. Bowel sounds are normal.      Palpations: Abdomen is soft.      Tenderness: There is abdominal tenderness in the epigastric area. There is no right CVA tenderness or left CVA tenderness.      Hernia: No hernia is present.   Musculoskeletal:         General: Normal range of motion.      Cervical back: Normal range of motion and neck supple.   Skin:     " "General: Skin is warm and dry.   Neurological:      Mental Status: She is alert and oriented to person, place, and time. Mental status is at baseline.   Psychiatric:         Mood and Affect: Mood normal.         Behavior: Behavior normal.          ED Course  /88   Pulse 94   Temp 98.3 °F (36.8 °C) (Oral)   Resp 20   Ht 165.1 cm (65\")   Wt 101 kg (222 lb 7.1 oz)   SpO2 98%   BMI 37.02 kg/m²   Results for orders placed or performed during the hospital encounter of 04/15/22   Comprehensive Metabolic Panel    Specimen: Hand, Right; Blood   Result Value Ref Range    Glucose 175 (H) 65 - 99 mg/dL    BUN 10 6 - 20 mg/dL    Creatinine 0.88 0.57 - 1.00 mg/dL    Sodium 139 136 - 145 mmol/L    Potassium 3.7 3.5 - 5.2 mmol/L    Chloride 104 98 - 107 mmol/L    CO2 20.8 (L) 22.0 - 29.0 mmol/L    Calcium 9.9 8.6 - 10.5 mg/dL    Total Protein 9.5 (H) 6.0 - 8.5 g/dL    Albumin 4.60 3.50 - 5.20 g/dL    ALT (SGPT) 79 (H) 1 - 33 U/L    AST (SGOT) 73 (H) 1 - 32 U/L    Alkaline Phosphatase 154 (H) 39 - 117 U/L    Total Bilirubin 0.3 0.0 - 1.2 mg/dL    Globulin 4.9 gm/dL    A/G Ratio 0.9 g/dL    BUN/Creatinine Ratio 11.4 7.0 - 25.0    Anion Gap 14.2 5.0 - 15.0 mmol/L    eGFR 75.8 >60.0 mL/min/1.73   Lipase    Specimen: Hand, Right; Blood   Result Value Ref Range    Lipase 24 13 - 60 U/L   Troponin    Specimen: Hand, Right; Blood   Result Value Ref Range    Troponin T <0.010 0.000 - 0.030 ng/mL   Urinalysis With Microscopic If Indicated (No Culture) - Urine, Clean Catch    Specimen: Urine, Clean Catch   Result Value Ref Range    Color, UA Yellow Yellow, Straw    Appearance, UA Clear Clear    pH, UA 7.0 5.0 - 8.0    Specific Gravity, UA 1.024 1.005 - 1.030    Glucose, UA >=1000 mg/dL (3+) (A) Negative    Ketones, UA Negative Negative    Bilirubin, UA Negative Negative    Blood, UA Negative Negative    Protein,  mg/dL (2+) (A) Negative    Leuk Esterase, UA Negative Negative    Nitrite, UA Negative Negative    Urobilinogen, " UA 1.0 E.U./dL 0.2 - 1.0 E.U./dL   CBC Auto Differential    Specimen: Hand, Right; Blood   Result Value Ref Range    WBC 10.72 3.40 - 10.80 10*3/mm3    RBC 4.89 3.77 - 5.28 10*6/mm3    Hemoglobin 14.6 12.0 - 15.9 g/dL    Hematocrit 41.5 34.0 - 46.6 %    MCV 84.9 79.0 - 97.0 fL    MCH 29.9 26.6 - 33.0 pg    MCHC 35.2 31.5 - 35.7 g/dL    RDW 15.9 (H) 12.3 - 15.4 %    RDW-SD 48.8 37.0 - 54.0 fl    MPV 10.8 6.0 - 12.0 fL    Platelets 275 140 - 450 10*3/mm3    Neutrophil % 55.3 42.7 - 76.0 %    Lymphocyte % 36.6 19.6 - 45.3 %    Monocyte % 6.3 5.0 - 12.0 %    Eosinophil % 1.0 0.3 - 6.2 %    Basophil % 0.5 0.0 - 1.5 %    Immature Grans % 0.3 0.0 - 0.5 %    Neutrophils, Absolute 5.93 1.70 - 7.00 10*3/mm3    Lymphocytes, Absolute 3.92 (H) 0.70 - 3.10 10*3/mm3    Monocytes, Absolute 0.68 0.10 - 0.90 10*3/mm3    Eosinophils, Absolute 0.11 0.00 - 0.40 10*3/mm3    Basophils, Absolute 0.05 0.00 - 0.20 10*3/mm3    Immature Grans, Absolute 0.03 0.00 - 0.05 10*3/mm3    nRBC 0.0 0.0 - 0.2 /100 WBC   Urinalysis, Microscopic Only - Urine, Clean Catch    Specimen: Urine, Clean Catch   Result Value Ref Range    RBC, UA 0-2 (A) None Seen /HPF    WBC, UA 0-2 (A) None Seen /HPF    Bacteria, UA None Seen None Seen /HPF    Squamous Epithelial Cells, UA 0-2 None Seen, 0-2 /HPF    Hyaline Casts, UA None Seen None Seen /LPF    Methodology Automated Microscopy    H. Pylori Antibody, IgG    Specimen: Hand, Right; Blood   Result Value Ref Range    H. pylori IgG Negative Negative, Equivocal   ECG 12 Lead   Result Value Ref Range    QT Interval 374 ms   ECG 12 Lead   Result Value Ref Range    QT Interval 377 ms   Green Top (Gel)   Result Value Ref Range    Extra Tube Hold for add-ons.    Lavender Top   Result Value Ref Range    Extra Tube hold for add-on    Gold Top - SST   Result Value Ref Range    Extra Tube Hold for add-ons.    Light Blue Top   Result Value Ref Range    Extra Tube hold for add-on      Medications   sodium chloride 0.9 % flush 10  mL (has no administration in time range)   prothrombin complex conc human (KCentra) IV solution 5,000 Units (has no administration in time range)   ondansetron (ZOFRAN) injection 4 mg (4 mg Intravenous Given 4/15/22 2331)   sodium chloride 0.9 % bolus 1,000 mL (0 mL Intravenous Stopped 4/16/22 0100)   aluminum-magnesium hydroxide-simethicone (MAALOX MAX) 400-400-40 MG/5ML suspension 15 mL (15 mL Oral Given 4/15/22 2259)   Lidocaine Viscous HCl (XYLOCAINE) 2 % solution 15 mL (15 mL Mouth/Throat Given 4/15/22 2300)   dicyclomine (BENTYL) injection 20 mg (20 mg Intramuscular Given 4/15/22 2259)   metoclopramide (REGLAN) injection 10 mg (10 mg Intravenous Given 4/16/22 0144)   diphenhydrAMINE (BENADRYL) injection 25 mg (25 mg Intravenous Given 4/16/22 0144)   iopamidol (ISOVUE-370) 76 % injection 100 mL (100 mL Intravenous Given 4/16/22 0234)   HYDROmorphone (DILAUDID) injection 1 mg (1 mg Intravenous Given 4/16/22 0407)     XR Abdomen 2+ VW with Chest 1 VW    Result Date: 4/16/2022  Narrative: PROCEDURE: XR ABDOMEN 2+ VIEWS W CHEST 1 VW  COMPARISON: [facility], CR, XR CHEST PA AND LATERAL, 3/23/2022, 11:54.  INDICATIONS: UNSPECIFIED ABDOMINAL PAIN AND BLOATING.  DISCUSSION:  Three views of the abdomen and pelvis reveal a nonobstructive bowel gas pattern and no pneumoperitoneum.  The patient has undergone cholecystectomy.  Hepatomegaly is possible.  Formed stool is seen within the rectosigmoid colon.  Fecal stasis as with constipation cannot be excluded.  Fecal impaction would be in the differential diagnosis.  There are arterial calcifications.  A single frontal chest radiograph reveals no acute infiltrate and no cardiac enlargement. The thoracic aorta is atherosclerotic.  No pneumothorax is seen.      Impression:   A nonobstructive bowel gas pattern is noted.  No pneumoperitoneum.     COMMENT:  Part of this note is an electronic transcription of spoken language to printed text. The electronic  translation/transcription may permit erroneous, or at times, nonsensical (or even sensical) words or phrases to be inadvertently transcribed or omitted; this  has reviewed the note for such errors (as well as additional errors); however, some may still exist.  MIKEY CALERO JR, MD           Electronically Signed and Approved By: MIKEY CALERO JR, MD on 4/16/2022 at 0:24          CT Abdomen Pelvis With Contrast    Addendum Date: 4/16/2022 Addendum:   ADDENDUM:  Pertinent findings were phoned to CHU Broderick (ordering [facility] ED Clinician) at approximately 0311 hours on 4/16/2022.    MIKEY CALERO JR, MD       Electronically Signed and Approved By: MIKEY CALERO JR, MD on 4/16/2022 at 3:20              Result Date: 4/16/2022  Narrative: PROCEDURE: CT ABDOMEN PELVIS W CONTRAST  COMPARISON: None.  INDICATIONS: Upper abdominal pain.  TECHNIQUE: After obtaining the patient's consent, 631 CT images were created with non-ionic intravenous contrast material.  No oral contrast agent was administered for the study.  PROTOCOL:   Standard CT imaging protocol performed.    RADIATION:   DLP: 1,315.5 mGy*cm.   Automated exposure control was utilized to minimize radiation dose. CONTRAST: 100 mL Isovue 370 I.V.  FINDINGS: There is minimal pneumoperitoneum at about the level of the umbilicus, such as seen on image 69 of series 201 and adjacent images.  The etiology for the pneumoperitoneum is uncertain.  It may be related to a perforated colonic diverticulum, especially involving the sigmoid colon and/or the descending colon.  Definite acute colonic diverticulitis is not clearly appreciated.  There is diffuse colonic diverticulosis.  No pericolonic fluid collections are seen to suggest abscess.  No pneumatosis.  No portal or mesenteric venous gas is seen.  No acute appendicitis.  No definite acute colitis or enteritis.  No mechanical bowel obstruction.  The stomach appears to be diffusely underdistended.  No definite  gastric emphysema or gastric outlet obstruction is seen.  No pneumo- retroperitoneum is suggested.  There is diffuse hepatic steatosis with hepatomegaly.  There may be cirrhosis.  No splenomegaly.  The main portal vein is patent.  The patient has undergone cholecystectomy.  There may be mild compensatory dilatation of biliary tree.  No acute pancreatitis is seen.  Probably no adrenal mass.  There are benign right renal cysts, which measure about 2 cm or less.  No hydronephrosis or obstructive uropathy.  No acute pyelonephritis.  There may be a small hiatal hernia.  Portal hypertension and portosystemic venous shunting cannot be excluded.  There may be paraesophageal varices.  Minimal, if any, ascites is suggested.  No cardiac enlargement.  Mild atelectasis, infiltrate(s), and/or fibrosis involve the lung bases.  Probably no pleural effusion is seen.  Atherosclerotic change involves the aortoiliac arterial system without aneurysmal dilatation.  There are suspected accessory right renal arteries (with probably 2 or 3 total right renal arteries).  There is thought to be a single left renal artery.  The celiac trunk, superior mesenteric artery, and inferior mesenteric artery are patent.  No definite nephrolithiasis or ureterolithiasis.  No acute pyelonephritis.  No hydronephrosis or obstructive uropathy.  The minimal left renal hilar calcifications are thought to be arterial in nature.  No suspicious uterine or adnexal mass is seen by CT.  There are pelvic phleboliths.  No urinary bladder wall thickening or urinary bladder calculi.      Impression:  Minimal pneumoperitoneum is seen.  Please correlate clinically, especially with history of a recent abdominal/pelvic recent procedure.  A perforated diverticulum would be in the differential diagnosis.  Minimal if any acute colonic diverticulitis is suggested.  No mechanical bowel obstruction.  No definite pneumatosis.  No pericolonic fluid collections are seen to suggest  abscess.  Probably no portal or mesenteric venous gas is seen.  Please see above comments for further detail.     COMMENT:  Part of this note is an electronic transcription of spoken language to printed text. The electronic translation/transcription may permit erroneous, or at times, nonsensical (or even sensical) words or phrases to be inadvertently transcribed or omitted; this  has reviewed the note for such errors (as well as additional errors); however, some may still exist.  MIKEY CALERO JR, MD       Electronically Signed and Approved By: MIKEY CALERO JR, MD on 4/16/2022 at 3:07              XR Chest PA & Lateral    Result Date: 3/23/2022  Narrative: PROCEDURE: XR CHEST PA AND LATERAL  COMPARISON: None  INDICATIONS: CHRONIC COUGH, WORSE X 1 MONTH  FINDINGS:  The heart is normal in size.  The lungs are well-expanded and free of infiltrates.  Mild degenerative spurring is seen in the thoracic spine.      Impression:  No active disease is seen.     FLYNN DUNNE MD       Electronically Signed and Approved By: FLYNN DUNNE MD on 3/23/2022 at 11:49               Procedures/EKGs:  Procedures    EKG:  Narrative & Impression    HEART RATE= 88  bpm  RR Interval= 684  ms  IN Interval= 173  ms  P Horizontal Axis= -13  deg  P Front Axis= 69  deg  QRSD Interval= 72  ms  QT Interval= 377  ms  QRS Axis= 52  deg  T Wave Axis= 87  deg  - NORMAL ECG -  Sinus rhythm       Medical Decision Making:                     MDM  Number of Diagnoses or Management Options  Chronic gastritis without bleeding, unspecified gastritis type  Constipation, unspecified constipation type  Gastroesophageal reflux disease with esophagitis without hemorrhage  Pneumoperitoneum of unknown etiology  Diagnosis management comments: Patient was found to have pneumoperitoneum which requires surgical intervention and treatment.  She will be admitted to medicine.  She has been advised of this plan and is agreeable       Amount and/or  Complexity of Data Reviewed  Clinical lab tests: reviewed and ordered  Tests in the radiology section of CPT®: reviewed and ordered  Tests in the medicine section of CPT®: reviewed and ordered  Discuss the patient with other providers: yes ([id number]-spoke with Dr. Stanley the surgeon on-call.  Give reversal agent for Eliquis now she will take for exploratory lap this morning.  Admit to the hospitalist     [id number]-patient has been accepted to hospitalist service dr rizvi)    Risk of Complications, Morbidity, and/or Mortality  Presenting problems: moderate  Diagnostic procedures: moderate  Management options: moderate    Patient Progress  Patient progress: stable       Final diagnoses:   Chronic gastritis without bleeding, unspecified gastritis type   Gastroesophageal reflux disease with esophagitis without hemorrhage   Constipation, unspecified constipation type   Pneumoperitoneum of unknown etiology        Disposition:  ED Disposition     ED Disposition   Decision to Admit    Condition   --    Comment   --              Elidia Lee, CHU  04/16/22 2995      Electronically signed by Shady Jamil MD at 04/16/22 0710     Shady Jamil MD at 04/16/22 1109          Time: 03:50 EDT  Arrived by: Vehicle  Chief Complaint: Abdominal pain  History provided by: Patient  History is limited by: N/A    Patient is 61 y.o. year old female that presents to the ED for evaluation of abdominal pain. Patient states that she developed mild abdominal pain approximately one week ago which has worsened significantly with time. She also developed associated vomiting today. She does not feel that her pain has improved in the ED.    The patient confirms that she is presently on Eliquis. There are no other acute complaints at this time.       Physical Exam  Vitals and nursing note reviewed.   Constitutional:       General: She is not in acute distress.     Comments: Patient appears uncomfortable.   HENT:      Head: Normocephalic and atraumatic.  "     Nose: Nose normal.      Mouth/Throat:      Mouth: Mucous membranes are moist.   Eyes:      General: No scleral icterus.  Cardiovascular:      Rate and Rhythm: Normal rate and regular rhythm.      Heart sounds: Normal heart sounds. No murmur heard.  Pulmonary:      Effort: No respiratory distress.      Breath sounds: Normal breath sounds.   Abdominal:      Palpations: Abdomen is soft.      Tenderness: There is generalized abdominal tenderness.   Musculoskeletal:         General: No tenderness. Normal range of motion.      Cervical back: Normal range of motion and neck supple.      Right lower leg: No edema.      Left lower leg: No edema.   Skin:     General: Skin is warm and dry.   Neurological:      Mental Status: She is alert. Mental status is at baseline.   Psychiatric:         Behavior: Behavior normal.         ED Course:    /75 (BP Location: Left arm, Patient Position: Lying)   Pulse 87   Temp 98.4 °F (36.9 °C) (Oral)   Resp 22   Ht 165 cm (64.96\")   Wt 101 kg (222 lb 7.1 oz)   SpO2 98%   BMI 37.06 kg/m²   Results for orders placed or performed during the hospital encounter of 04/15/22   Comprehensive Metabolic Panel    Specimen: Hand, Right; Blood   Result Value Ref Range    Glucose 175 (H) 65 - 99 mg/dL    BUN 10 6 - 20 mg/dL    Creatinine 0.88 0.57 - 1.00 mg/dL    Sodium 139 136 - 145 mmol/L    Potassium 3.7 3.5 - 5.2 mmol/L    Chloride 104 98 - 107 mmol/L    CO2 20.8 (L) 22.0 - 29.0 mmol/L    Calcium 9.9 8.6 - 10.5 mg/dL    Total Protein 9.5 (H) 6.0 - 8.5 g/dL    Albumin 4.60 3.50 - 5.20 g/dL    ALT (SGPT) 79 (H) 1 - 33 U/L    AST (SGOT) 73 (H) 1 - 32 U/L    Alkaline Phosphatase 154 (H) 39 - 117 U/L    Total Bilirubin 0.3 0.0 - 1.2 mg/dL    Globulin 4.9 gm/dL    A/G Ratio 0.9 g/dL    BUN/Creatinine Ratio 11.4 7.0 - 25.0    Anion Gap 14.2 5.0 - 15.0 mmol/L    eGFR 75.8 >60.0 mL/min/1.73   Lipase    Specimen: Hand, Right; Blood   Result Value Ref Range    Lipase 24 13 - 60 U/L   Troponin    " Specimen: Hand, Right; Blood   Result Value Ref Range    Troponin T <0.010 0.000 - 0.030 ng/mL   Urinalysis With Microscopic If Indicated (No Culture) - Urine, Clean Catch    Specimen: Urine, Clean Catch   Result Value Ref Range    Color, UA Yellow Yellow, Straw    Appearance, UA Clear Clear    pH, UA 7.0 5.0 - 8.0    Specific Gravity, UA 1.024 1.005 - 1.030    Glucose, UA >=1000 mg/dL (3+) (A) Negative    Ketones, UA Negative Negative    Bilirubin, UA Negative Negative    Blood, UA Negative Negative    Protein,  mg/dL (2+) (A) Negative    Leuk Esterase, UA Negative Negative    Nitrite, UA Negative Negative    Urobilinogen, UA 1.0 E.U./dL 0.2 - 1.0 E.U./dL   CBC Auto Differential    Specimen: Hand, Right; Blood   Result Value Ref Range    WBC 10.72 3.40 - 10.80 10*3/mm3    RBC 4.89 3.77 - 5.28 10*6/mm3    Hemoglobin 14.6 12.0 - 15.9 g/dL    Hematocrit 41.5 34.0 - 46.6 %    MCV 84.9 79.0 - 97.0 fL    MCH 29.9 26.6 - 33.0 pg    MCHC 35.2 31.5 - 35.7 g/dL    RDW 15.9 (H) 12.3 - 15.4 %    RDW-SD 48.8 37.0 - 54.0 fl    MPV 10.8 6.0 - 12.0 fL    Platelets 275 140 - 450 10*3/mm3    Neutrophil % 55.3 42.7 - 76.0 %    Lymphocyte % 36.6 19.6 - 45.3 %    Monocyte % 6.3 5.0 - 12.0 %    Eosinophil % 1.0 0.3 - 6.2 %    Basophil % 0.5 0.0 - 1.5 %    Immature Grans % 0.3 0.0 - 0.5 %    Neutrophils, Absolute 5.93 1.70 - 7.00 10*3/mm3    Lymphocytes, Absolute 3.92 (H) 0.70 - 3.10 10*3/mm3    Monocytes, Absolute 0.68 0.10 - 0.90 10*3/mm3    Eosinophils, Absolute 0.11 0.00 - 0.40 10*3/mm3    Basophils, Absolute 0.05 0.00 - 0.20 10*3/mm3    Immature Grans, Absolute 0.03 0.00 - 0.05 10*3/mm3    nRBC 0.0 0.0 - 0.2 /100 WBC   Urinalysis, Microscopic Only - Urine, Clean Catch    Specimen: Urine, Clean Catch   Result Value Ref Range    RBC, UA 0-2 (A) None Seen /HPF    WBC, UA 0-2 (A) None Seen /HPF    Bacteria, UA None Seen None Seen /HPF    Squamous Epithelial Cells, UA 0-2 None Seen, 0-2 /HPF    Hyaline Casts, UA None Seen None Seen  /LPF    Methodology Automated Microscopy    H. Pylori Antibody, IgG    Specimen: Hand, Right; Blood   Result Value Ref Range    H. pylori IgG Negative Negative, Equivocal   ECG 12 Lead   Result Value Ref Range    QT Interval 374 ms   ECG 12 Lead   Result Value Ref Range    QT Interval 377 ms   Green Top (Gel)   Result Value Ref Range    Extra Tube Hold for add-ons.    Lavender Top   Result Value Ref Range    Extra Tube hold for add-on    Gold Top - SST   Result Value Ref Range    Extra Tube Hold for add-ons.    Light Blue Top   Result Value Ref Range    Extra Tube hold for add-on      Medications   sodium chloride 0.9 % flush 10 mL (has no administration in time range)   sodium chloride 0.9 % flush 10 mL (has no administration in time range)   sodium chloride 0.9 % flush 10 mL (has no administration in time range)   acetaminophen (TYLENOL) tablet 650 mg (has no administration in time range)     Or   acetaminophen (TYLENOL) 160 MG/5ML solution 650 mg (has no administration in time range)     Or   acetaminophen (TYLENOL) suppository 650 mg (has no administration in time range)   sennosides-docusate (PERICOLACE) 8.6-50 MG per tablet 2 tablet (has no administration in time range)     And   polyethylene glycol (MIRALAX) packet 17 g (has no administration in time range)     And   bisacodyl (DULCOLAX) EC tablet 5 mg (has no administration in time range)     And   bisacodyl (DULCOLAX) suppository 10 mg (has no administration in time range)   ondansetron (ZOFRAN) injection 4 mg (has no administration in time range)   dextrose (GLUTOSE) oral gel 24 g (has no administration in time range)   dextrose 10 % infusion (has no administration in time range)   glucagon (human recombinant) (GLUCAGEN DIAGNOSTIC) injection 1 mg (has no administration in time range)   insulin lispro (humaLOG) injection 0-9 Units (has no administration in time range)   morphine injection 2 mg (has no administration in time range)     And   naloxone  (NARCAN) injection 0.4 mg (has no administration in time range)   Pharmacy to Dose Zosyn (has no administration in time range)   ondansetron (ZOFRAN) injection 4 mg (4 mg Intravenous Given 4/15/22 2331)   sodium chloride 0.9 % bolus 1,000 mL (0 mL Intravenous Stopped 4/16/22 0100)   aluminum-magnesium hydroxide-simethicone (MAALOX MAX) 400-400-40 MG/5ML suspension 15 mL (15 mL Oral Given 4/15/22 2259)   Lidocaine Viscous HCl (XYLOCAINE) 2 % solution 15 mL (15 mL Mouth/Throat Given 4/15/22 2300)   dicyclomine (BENTYL) injection 20 mg (20 mg Intramuscular Given 4/15/22 2259)   metoclopramide (REGLAN) injection 10 mg (10 mg Intravenous Given 4/16/22 0144)   diphenhydrAMINE (BENADRYL) injection 25 mg (25 mg Intravenous Given 4/16/22 0144)   iopamidol (ISOVUE-370) 76 % injection 100 mL (100 mL Intravenous Given 4/16/22 0234)   HYDROmorphone (DILAUDID) injection 1 mg (1 mg Intravenous Given 4/16/22 0407)   prothrombin complex conc human (KCentra) IV solution 5,000 Units (5,000 Units Intravenous Given 4/16/22 0451)     XR Abdomen 2+ VW with Chest 1 VW    Result Date: 4/16/2022  Narrative: PROCEDURE: XR ABDOMEN 2+ VIEWS W CHEST 1 VW  COMPARISON: [facility], , XR CHEST PA AND LATERAL, 3/23/2022, 11:54.  INDICATIONS: UNSPECIFIED ABDOMINAL PAIN AND BLOATING.  DISCUSSION:  Three views of the abdomen and pelvis reveal a nonobstructive bowel gas pattern and no pneumoperitoneum.  The patient has undergone cholecystectomy.  Hepatomegaly is possible.  Formed stool is seen within the rectosigmoid colon.  Fecal stasis as with constipation cannot be excluded.  Fecal impaction would be in the differential diagnosis.  There are arterial calcifications.  A single frontal chest radiograph reveals no acute infiltrate and no cardiac enlargement. The thoracic aorta is atherosclerotic.  No pneumothorax is seen.      Impression:   A nonobstructive bowel gas pattern is noted.  No pneumoperitoneum.     COMMENT:  Part of  this note is an electronic transcription of spoken language to printed text. The electronic translation/transcription may permit erroneous, or at times, nonsensical (or even sensical) words or phrases to be inadvertently transcribed or omitted; this  has reviewed the note for such errors (as well as additional errors); however, some may still exist.  MIKEY CALERO JR, MD           Electronically Signed and Approved By: MIKEY CALERO JR, MD on 4/16/2022 at 0:24          CT Abdomen Pelvis With Contrast    Addendum Date: 4/16/2022 Addendum:   ADDENDUM:  Pertinent findings were phoned to [facility]U Broderick (ordering [facility] ED Clinician) at approximately 0311 hours on 4/16/2022.    MIKEY CALERO JR, MD       Electronically Signed and Approved By: MIKEY CALERO JR, MD on 4/16/2022 at 3:20              Result Date: 4/16/2022  Narrative: PROCEDURE: CT ABDOMEN PELVIS W CONTRAST  COMPARISON: None.  INDICATIONS: Upper abdominal pain.  TECHNIQUE: After obtaining the patient's consent, 631 CT images were created with non-ionic intravenous contrast material.  No oral contrast agent was administered for the study.  PROTOCOL:   Standard CT imaging protocol performed.    RADIATION:   DLP: 1,315.5 mGy*cm.   Automated exposure control was utilized to minimize radiation dose. CONTRAST: 100 mL Isovue 370 I.V.  FINDINGS: There is minimal pneumoperitoneum at about the level of the umbilicus, such as seen on image 69 of series 201 and adjacent images.  The etiology for the pneumoperitoneum is uncertain.  It may be related to a perforated colonic diverticulum, especially involving the sigmoid colon and/or the descending colon.  Definite acute colonic diverticulitis is not clearly appreciated.  There is diffuse colonic diverticulosis.  No pericolonic fluid collections are seen to suggest abscess.  No pneumatosis.  No portal or mesenteric venous gas is seen.  No acute appendicitis.  No definite acute colitis or enteritis.  No  mechanical bowel obstruction.  The stomach appears to be diffusely underdistended.  No definite gastric emphysema or gastric outlet obstruction is seen.  No pneumo- retroperitoneum is suggested.  There is diffuse hepatic steatosis with hepatomegaly.  There may be cirrhosis.  No splenomegaly.  The main portal vein is patent.  The patient has undergone cholecystectomy.  There may be mild compensatory dilatation of biliary tree.  No acute pancreatitis is seen.  Probably no adrenal mass.  There are benign right renal cysts, which measure about 2 cm or less.  No hydronephrosis or obstructive uropathy.  No acute pyelonephritis.  There may be a small hiatal hernia.  Portal hypertension and portosystemic venous shunting cannot be excluded.  There may be paraesophageal varices.  Minimal, if any, ascites is suggested.  No cardiac enlargement.  Mild atelectasis, infiltrate(s), and/or fibrosis involve the lung bases.  Probably no pleural effusion is seen.  Atherosclerotic change involves the aortoiliac arterial system without aneurysmal dilatation.  There are suspected accessory right renal arteries (with probably 2 or 3 total right renal arteries).  There is thought to be a single left renal artery.  The celiac trunk, superior mesenteric artery, and inferior mesenteric artery are patent.  No definite nephrolithiasis or ureterolithiasis.  No acute pyelonephritis.  No hydronephrosis or obstructive uropathy.  The minimal left renal hilar calcifications are thought to be arterial in nature.  No suspicious uterine or adnexal mass is seen by CT.  There are pelvic phleboliths.  No urinary bladder wall thickening or urinary bladder calculi.      Impression:  Minimal pneumoperitoneum is seen.  Please correlate clinically, especially with history of a recent abdominal/pelvic recent procedure.  A perforated diverticulum would be in the differential diagnosis.  Minimal if any acute colonic diverticulitis is suggested.  No mechanical bowel  obstruction.  No definite pneumatosis.  No pericolonic fluid collections are seen to suggest abscess.  Probably no portal or mesenteric venous gas is seen.  Please see above comments for further detail.     COMMENT:  Part of this note is an electronic transcription of spoken language to printed text. The electronic translation/transcription may permit erroneous, or at times, nonsensical (or even sensical) words or phrases to be inadvertently transcribed or omitted; this  has reviewed the note for such errors (as well as additional errors); however, some may still exist.  MIKEY CALERO JR, MD       Electronically Signed and Approved By: MIKEY CALERO JR, MD on 4/16/2022 at 3:07              XR Chest PA & Lateral    Result Date: 3/23/2022  Narrative: PROCEDURE: XR CHEST PA AND LATERAL  COMPARISON: None  INDICATIONS: CHRONIC COUGH, WORSE X 1 MONTH  FINDINGS:  The heart is normal in size.  The lungs are well-expanded and free of infiltrates.  Mild degenerative spurring is seen in the thoracic spine.      Impression:  No active disease is seen.     FLYNN DUNNE MD       Electronically Signed and Approved By: FLYNN DUNNE MD on 3/23/2022 at 11:49               MDM:      I have seen and evaluated this patient and agree with the nurse practitioner or physician assistant´s documentation and assessment. All charts, labs, and imaging studies were reviewed. Documentation of one or more elements of my assessment included in the medical record. I agree with findings, exam, and plan.    Disposition:   ED Disposition     ED Disposition   Decision to Admit    Condition   --    Comment   Level of Care: Telemetry [5]   Diagnosis: Chronic gastritis without bleeding, unspecified gastritis type [[id number]]   Admitting Physician: CLOVIS WILLS [[id number]]   Attending Physician: CLOVIS WILLS [[id number]]   Certification: I Certify That Inpatient Hospital Services Are Medically Necessary For Greater Than 2 Midnights                  Clincal Impression:     Pneumoperitoneum    Shady Jamil MD  07:10 EDT  04/16/22       Jennifer Nair  04/16/22 0351       Shady Jamil MD  04/16/22 0710      Electronically signed by Shady Jamil MD at 04/16/22 0710       Vital Signs (last day)     Date/Time Temp Temp src Pulse Resp BP Patient Position SpO2    04/18/22 1525 99 (37.2) Oral 83 18 145/78 Lying 97    04/18/22 1121 98.1 (36.7) Oral 87 18 143/82 Lying 94    04/18/22 0836 -- -- 86 20 -- -- 95    04/18/22 0831 -- -- 84 18 -- Sitting 94    04/18/22 0709 98.2 (36.8) Oral 86 18 161/84 Lying 95    04/18/22 0254 98.8 (37.1) Oral 84 18 143/81 Lying 93    04/17/22 2255 98.3 (36.8) Oral 90 18 165/83 -- 97    04/17/22 2211 -- -- 81 18 -- -- 94    04/17/22 2200 -- -- 79 16 -- -- 94    04/17/22 1900 98.9 (37.2) Oral 88 18 134/73 Lying 93    04/17/22 1447 98.96 (37.2) Oral 88 20 158/77 Lying 94    04/17/22 1223 -- -- -- -- -- -- 94    04/17/22 1122 99.5 (37.5) Oral 93 20 161/81 Lying 93    04/17/22 0700 98.24 (36.8) Oral 94 20 175/83 Lying 97    04/17/22 0630 -- -- 89 18 -- -- --    04/17/22 0628 -- -- 89 18 -- -- 100    04/17/22 0405 99.3 (37.4) Oral 90 20 171/87 Lying 96    04/17/22 0336 -- -- 91 27 -- -- 95    04/17/22 0100 -- -- 93 24 -- -- 95    04/17/22 0000 -- -- -- 23 -- -- 96          Oxygen Therapy (last day)     Date/Time SpO2 Device (Oxygen Therapy) Flow (L/min) Oxygen Concentration (%) ETCO2 (mmHg)    04/18/22 1525 97 room air -- -- --    04/18/22 1121 94 room air -- -- --    04/18/22 0836 95 room air -- -- --    04/18/22 0831 94 room air -- -- --    04/18/22 0709 95 room air -- -- --    04/18/22 0705 -- room air -- -- --    04/18/22 0254 93 -- -- -- --    04/17/22 2255 97 -- -- -- --    04/17/22 2211 94 room air -- -- --    04/17/22 2200 94 room air -- -- --    04/17/22 2100 -- room air -- -- --    04/17/22 1900 93 -- -- -- --    04/17/22 1447 94 -- -- -- --    04/17/22 1223 94 nasal cannula 2 -- 29 04/17/22 1122 93 -- -- -- --     "04/17/22 0700 97 -- -- -- --    04/17/22 0628 100 nasal cannula 2 -- 35    04/17/22 0405 96 nasal cannula with ETCO2 -- -- 32    04/17/22 0336 95 nasal cannula with ETCO2 -- -- 31    04/17/22 0100 95 nasal cannula with ETCO2 0 -- 33    04/17/22 0000 96 nasal cannula with ETCO2 2 -- 32          Orders (last 24 hrs)      Start     Ordered    04/19/22 0900  losartan (COZAAR) tablet 100 mg  Every 24 Hours Scheduled         04/18/22 0917    04/19/22 0600  Change Dressing  Daily      Comments: Clean incision with alcohol and leave open to air    04/18/22 0837    04/19/22 0600  CBC & Differential  Morning Draw         04/18/22 0837    04/19/22 0600  Basic Metabolic Panel  Morning Draw         04/18/22 0917    04/18/22 1642  POC Glucose Once  PROCEDURE ONCE         04/18/22 1640    04/18/22 1636  Discontinue Telemetry Monitoring  Once         04/18/22 1635    04/18/22 1103  POC Glucose Once  PROCEDURE ONCE         04/18/22 1101    04/18/22 1015  potassium chloride (MICRO-K) CR capsule 40 mEq  Once         04/18/22 0918    04/18/22 0701  POC Glucose Once  PROCEDURE ONCE         04/18/22 0659    04/18/22 0600  CBC & Differential  Morning Draw         04/17/22 1059    04/18/22 0600  Renal Function Panel  Morning Draw         04/17/22 1059    04/18/22 0600  CBC Auto Differential  PROCEDURE ONCE         04/17/22 2205    04/17/22 2100  polyethylene glycol (MIRALAX) packet 17 g  2 Times Daily        \"And\" Linked Group Details    04/17/22 1640    04/17/22 2100  sennosides-docusate (PERICOLACE) 8.6-50 MG per tablet 2 tablet  2 Times Daily        \"And\" Linked Group Details    04/17/22 1640    04/17/22 1812  POC Glucose Once  PROCEDURE ONCE         04/17/22 1809    04/17/22 1730  famotidine (PEPCID) tablet 20 mg  2 Times Daily Before Meals         04/17/22 1056    04/17/22 1640  bisacodyl (DULCOLAX) EC tablet 5 mg  Daily PRN        \"And\" Linked Group Details    04/17/22 1640    04/17/22 1640  bisacodyl (DULCOLAX) suppository 10 mg  " "Daily PRN        \"And\" Linked Group Details    04/17/22 1640    04/17/22 1639  oxyCODONE (ROXICODONE) immediate release tablet 10 mg  Every 6 Hours PRN         04/17/22 1640    04/17/22 1639  oxyCODONE (ROXICODONE) immediate release tablet 5 mg  Every 6 Hours PRN         04/17/22 1640    04/17/22 1638  morphine injection 2 mg  Every 4 Hours PRN         04/17/22 1640    04/17/22 1145  amLODIPine (NORVASC) tablet 10 mg  Daily         04/17/22 1058    04/17/22 1145  buPROPion XL (WELLBUTRIN XL) 24 hr tablet 150 mg  2 Times Daily         04/17/22 1058    04/17/22 1145  losartan (COZAAR) tablet 50 mg  Every 24 Hours Scheduled,   Status:  Discontinued         04/17/22 1058    04/17/22 1145  pantoprazole (PROTONIX) EC tablet 40 mg  Daily         04/17/22 1058    04/17/22 1059  ketorolac (TORADOL) injection 30 mg  Every 6 Hours PRN         04/17/22 1059    04/17/22 1057  hydrOXYzine (ATARAX) tablet 25 mg  2 Times Daily PRN         04/17/22 1058    04/16/22 2115  metoprolol tartrate (LOPRESSOR) tablet 75 mg  2 Times Daily         04/16/22 2026 04/16/22 1800  !NOT ORDERED- apixaban (ELIQUIS)  Daily With Dinner         04/16/22 1230    04/16/22 1415  piperacillin-tazobactam (ZOSYN) 3.375 g in iso-osmotic dextrose 50 ml (premix)  Every 8 Hours         04/16/22 0721    04/16/22 1300  nicotine (NICODERM CQ) 21 MG/24HR patch 1 patch  Every 24 Hours Scheduled         04/16/22 1203    04/16/22 1300  budesonide (PULMICORT) nebulizer solution 0.5 mg  2 Times Daily - RT         04/16/22 1204    04/16/22 1300  arformoterol (BROVANA) nebulizer solution 15 mcg  2 Times Daily - RT         04/16/22 1204    04/16/22 1203  ipratropium-albuterol (DUO-NEB) nebulizer solution 3 mL  Every 6 Hours PRN         04/16/22 1203    04/16/22 0900  sodium chloride 0.9 % flush 10 mL  Every 12 Hours Scheduled         04/16/22 0621    04/16/22 0815  piperacillin-tazobactam (ZOSYN) 3.375 g in iso-osmotic dextrose 50 ml (premix)  Once         04/16/22 0721 " "   22 0800  Vital Signs  Every 4 Hours       22 0621    22 0800  Instructions on coughing, deep breathing, and incentive spirometry.  Every 2 Hours While Awake       22 0706    22 0730  insulin lispro (humaLOG) injection 0-9 Units  3 Times Daily Before Meals         22 0621    22 0707  Pharmacy to Dose Zosyn  Continuous PRN         22 0707    22 0700  POC Glucose TID AC  3 Times Daily Before Meals       22 0621    22 0700  Strict Intake & Output  Every Hour       22 0621    22 0620  sodium chloride 0.9 % flush 10 mL  As Needed         22 0621    22 0620  acetaminophen (TYLENOL) tablet 650 mg  Every 4 Hours PRN        \"Or\" Linked Group Details    22 0621    22 0620  acetaminophen (TYLENOL) 160 MG/5ML solution 650 mg  Every 4 Hours PRN        \"Or\" Linked Group Details    22 0621    22 0620  acetaminophen (TYLENOL) suppository 650 mg  Every 4 Hours PRN        \"Or\" Linked Group Details    22 0621    22 0620  ondansetron (ZOFRAN) injection 4 mg  Every 6 Hours PRN         22 0621    22 0620  dextrose (GLUTOSE) oral gel 24 g  Every 15 Minutes PRN         22 0621    22 0620  dextrose 10 % infusion  Every 15 Minutes PRN         22 0621    22 0620  glucagon (human recombinant) (GLUCAGEN DIAGNOSTIC) injection 1 mg  Every 15 Minutes PRN         22 0621    22 0000  dicyclomine (BENTYL) 20 MG tablet  Every 8 Hours PRN         22 0036    04/15/22 2026  sodium chloride 0.9 % flush 10 mL  As Needed         04/15/22 2026                   Physician Progress Notes (last 24 hours)      Gregorio Herrera MD at 22 1638           [facility]  Date[facility]  Patient Name: Anika Casillas  : 1960  MRN: [id number]  Date of admission: 4/15/2022      Subjective   Subjective     Chief Complaint: Abdominal pain    Summary: "   Anika Casillas is a very pleasant 61 y.o. female with a history of BERKOWITZ cirrhosis, Afib, Diabetes mellitus, chronic gastritis and GERD who presents to the hospital with abdominal pain.  Pain started about 1 week ago suddenly and has been getting progressively worse and more frequent (constant but waxes and wanes).  She started vomiting today which was not bloody or coffee grounds but was more like dark bile.  She denies fever or chest pain.  Her abdominal pain is located in the epigastric area and does not radiate.  In the ER her pain was poorly controlled with parenteral narcotics so she underwent CT which showed free air without a clear source.  Due to her pain and associated free air, general surgery was consulted and we are admitting her to the hospital for further medical management.  Patient taken to the OR was found to have incarcerated necrotic portion of transverse colon with omentum under the umbilicus.  S/p resection of necrotic colon and end-to-end anastomosis.  Her Eliquis was reserved by [facility] prior to procedure.    Interval Followup:   Blood pressure up.  Other vital signs stable.  97% saturation on 2 L.  Surgical incision site pain relieved by pain medicines  Having nausea, no  vomiting.  Tolerating liquid diet well.  Passing gas.  No BM yet.  Good urine output .      Review of Systems   All systems were reviewed and negative except for: Summary and interval follow-up    Objective   Objective     Vitals:   Temp:  [98.1 °F (36.7 °C)-99 °F (37.2 °C)] 99 °F (37.2 °C)  Heart Rate:  [79-90] 83  Resp:  [16-20] 18  BP: (134-165)/(73-84) 145/78  Physical Exam    Constitutional: Awake, alert, no acute distress   Eyes: Pupils equal, sclerae anicteric, no conjunctival injection   HENT: NCAT, mucous membranes moist   Neck: Supple, no thyromegaly, no lymphadenopathy, trachea midline   Respiratory: Decreased to auscultation bilaterally, nonlabored respirations    Cardiovascular: RRR, no murmurs, rubs, or  gallops, palpable pedal pulses bilaterally   Gastrointestinal: Positive bowel sounds, soft, mild tender at surgical incision.  Wound dressed, nondistended   Musculoskeletal: +1 bilateral ankle edema, no clubbing or cyanosis to extremities   Psychiatric: Appropriate affect, cooperative   Neurologic: Oriented x 3, strength symmetric in all extremities, Cranial Nerves grossly intact to confrontation, speech clear   Skin: No rashes     Result Review    Result Review:  I have personally reviewed the results from the time of this admission to 4/18/2022 16:38 EDT and agree with these findings:  [x]  Laboratory  []  Microbiology  [x]  Radiology  []  EKG/Telemetry   []  Cardiology/Vascular   []  Pathology  [x]  Old records  [x]  Other: Medications    Assessment/Plan   Assessment / Plan     Assessment:    Abdominal pain.  S/p exploratory laparotomy, lysis of adhesion and resection of necrotic transverse colon/omentum due to incarcerated hernia under the umbilicus on April 16.  Free air in the abdomen -due to above.    BERKOWITZ cirrhosis  Afib - currently in sinus rhythm on chronic anticoagulation with Eliquis  Diabetes mellitus  Chronic gastritis and GERD  Hypertension.  Leukocytosis.  Improving.  Chronic tobacco abuse disorder.  Elevated liver enzymes..  Resolved     Plan.  Advance diet as per general surgery.  IV and oral narcotics for pain control.  As needed Toradol.   Appreciate general surgery input.  Keep holding Eliquis  DC cardiac telemetry monitoring  IV Zosyn  Resume home blood pressure medication including metoprolol, Norvasc and olmesartan(losartan) dose increased.  Nebulizer treatment.  Nicotine patch  Sliding scale insulin.  MiraLAX.  SCD  Ambulate      Discussed plan with RN.  Discharge home when cleared by general surgery.  General surgery to decide on the timing of resuming home Eliquis.    DVT prophylaxis:  Medical and mechanical DVT prophylaxis orders are present.    CODE STATUS:   Code Status (Patient has no  pulse and is not breathing): CPR (Attempt to Resuscitate)  Medical Interventions (Patient has pulse or is breathing): Full Support      Part of this note may be an electronic transcription/translation of spoken language to printed text using the Dragon Dictation System.       Electronically signed by Gregorio Herrera MD, 22, 4:42 PM EDT.                 Electronically signed by Gregorio Herrera MD at 22 1642     Christel Pillai APRN at 22 0832            [facility]     Progress Note    Patient Name: Anika Casillas  : 1960  MRN: [id number]    Date of admission: 4/15/2022    Subjective   Subjective     Patient sitting up at bedside.  Reports nausea with clear liquids.  Pain controlled. VSS. Afebrile.  WBC 11. No BM.     Objective   Objective     Review of Systems:    A 10 point review of systems was performed and all are negative except for what is documented in the HPI.     Vitals:   Temp:  [98.2 °F (36.8 °C)-99.5 °F (37.5 °C)] 98.2 °F (36.8 °C)  Heart Rate:  [79-93] 86  Resp:  [16-20] 18  BP: (134-165)/(73-84) 161/84  Flow (L/min):  [2] 2  Physical Exam    Constitutional: Awake, alert   Eyes: PERRLA    Neck: Supple, trachea midline   Respiratory: respirations even and unlabored   Cardiovascular: RRR   Gastrointestinal: Soft, approp TTP, midline incision with staples intact, without evidence of  infection   Psychiatric: Appropriate affect, cooperative   Neurologic: Oriented x 3, speech clear   Skin: No rashes     Result Review    Result Review:  I have personally reviewed the results from the time of this admission to 2022 08:36 EDT and agree with these findings:  [x]  Laboratory  []  Microbiology  []  Radiology  []  EKG/Telemetry   []  Cardiology/Vascular   []  Pathology  []  Old records  []  Other:      Assessment/Plan   Assessment / Plan     Diagnosis     S/P ex lap, lysis of adhesions, resection of necrotic colon without anastomosis    Active Hospital Problems:  Active  Hospital Problems    Diagnosis    • **Pneumoperitoneum of unknown etiology      Added automatically from request for surgery [id number]     • S/P exploratory laparotomy, lysis of adhesions, resection of necrotic colon without anastomosis    • Chronic gastritis without bleeding, unspecified gastritis type        Plan:      Stay on full liquid diet  CBC in AM  ambulate       DVT prophylaxis:  Medical and mechanical DVT prophylaxis orders are present.          This document has been electronically signed by CHU Avila  April 18, 2022 08:36 EDT           Electronically signed by Christel Pillai APRN at 04/18/22 0836       Consult Notes (last 24 hours)  Notes from 04/17/22 1655 through 04/18/22 1655   No notes of this type exist for this encounter.

## 2022-04-18 NOTE — PROGRESS NOTES
[facility]   Hospitalist Progress Note  Date: 2022  Patient Name: Anika Casillas  : 1960  MRN: [id number]  Date of admission: 4/15/2022      Subjective   Subjective     Chief Complaint: Abdominal pain    Summary:   Anika Casillas is a very pleasant 61 y.o. female with a history of BERKOWITZ cirrhosis, Afib, Diabetes mellitus, chronic gastritis and GERD who presents to the hospital with abdominal pain.  Pain started about 1 week ago suddenly and has been getting progressively worse and more frequent (constant but waxes and wanes).  She started vomiting today which was not bloody or coffee grounds but was more like dark bile.  She denies fever or chest pain.  Her abdominal pain is located in the epigastric area and does not radiate.  In the ER her pain was poorly controlled with parenteral narcotics so she underwent CT which showed free air without a clear source.  Due to her pain and associated free air, general surgery was consulted and we are admitting her to the hospital for further medical management.  Patient taken to the OR was found to have incarcerated necrotic portion of transverse colon with omentum under the umbilicus.  S/p resection of necrotic colon and end-to-end anastomosis.  Her Eliquis was reserved by [facility] prior to procedure.    Interval Followup:   Blood pressure up.  Other vital signs stable.  97% saturation on 2 L.  Surgical incision site pain relieved by pain medicines  Having nausea, no  vomiting.  Tolerating liquid diet well.  Passing gas.  No BM yet.  Good urine output .      Review of Systems   All systems were reviewed and negative except for: Summary and interval follow-up    Objective   Objective     Vitals:   Temp:  [98.1 °F (36.7 °C)-99 °F (37.2 °C)] 99 °F (37.2 °C)  Heart Rate:  [79-90] 83  Resp:  [16-20] 18  BP: (134-165)/(73-84) 145/78  Physical Exam    Constitutional: Awake, alert, no acute distress   Eyes: Pupils equal, sclerae anicteric, no conjunctival  injection   HENT: NCAT, mucous membranes moist   Neck: Supple, no thyromegaly, no lymphadenopathy, trachea midline   Respiratory: Decreased to auscultation bilaterally, nonlabored respirations    Cardiovascular: RRR, no murmurs, rubs, or gallops, palpable pedal pulses bilaterally   Gastrointestinal: Positive bowel sounds, soft, mild tender at surgical incision.  Wound dressed, nondistended   Musculoskeletal: +1 bilateral ankle edema, no clubbing or cyanosis to extremities   Psychiatric: Appropriate affect, cooperative   Neurologic: Oriented x 3, strength symmetric in all extremities, Cranial Nerves grossly intact to confrontation, speech clear   Skin: No rashes     Result Review    Result Review:  I have personally reviewed the results from the time of this admission to 4/18/2022 16:38 EDT and agree with these findings:  [x]  Laboratory  []  Microbiology  [x]  Radiology  []  EKG/Telemetry   []  Cardiology/Vascular   []  Pathology  [x]  Old records  [x]  Other: Medications    Assessment/Plan   Assessment / Plan     Assessment:    · Abdominal pain.  S/p exploratory laparotomy, lysis of adhesion and resection of necrotic transverse colon/omentum due to incarcerated hernia under the umbilicus on April 16.  · Free air in the abdomen -due to above.    · BERKOWITZ cirrhosis  · Afib - currently in sinus rhythm on chronic anticoagulation with Eliquis  · Diabetes mellitus  · Chronic gastritis and GERD  · Hypertension.  · Leukocytosis.  Improving.  · Chronic tobacco abuse disorder.  · Elevated liver enzymes..  Resolved     Plan.  Advance diet as per general surgery.  IV and oral narcotics for pain control.  As needed Toradol.   Appreciate general surgery input.  Keep holding Eliquis  DC cardiac telemetry monitoring  IV Zosyn  Resume home blood pressure medication including metoprolol, Norvasc and olmesartan(losartan) dose increased.  Nebulizer treatment.  Nicotine patch  Sliding scale  insulin.  MiraLAX.  SCD  Ambulate      Discussed plan with RN.  Discharge home when cleared by general surgery.  General surgery to decide on the timing of resuming home Eliquis.    DVT prophylaxis:  Medical and mechanical DVT prophylaxis orders are present.    CODE STATUS:   Code Status (Patient has no pulse and is not breathing): CPR (Attempt to Resuscitate)  Medical Interventions (Patient has pulse or is breathing): Full Support      Part of this note may be an electronic transcription/translation of spoken language to printed text using the Dragon Dictation System.       Electronically signed by Gregorio Herrera MD, 04/18/22, 4:42 PM EDT.

## 2022-04-18 NOTE — PROGRESS NOTES
[facility]     Progress Note    Patient Name: Anika Casillas  : 1960  MRN: [id number]    Date of admission: 4/15/2022    Subjective   Subjective     Patient sitting up at bedside.  Reports nausea with clear liquids.  Pain controlled. VSS. Afebrile.  WBC 11. No BM.     Objective   Objective     Review of Systems:    A 10 point review of systems was performed and all are negative except for what is documented in the HPI.     Vitals:   Temp:  [98.2 °F (36.8 °C)-99.5 °F (37.5 °C)] 98.2 °F (36.8 °C)  Heart Rate:  [79-93] 86  Resp:  [16-20] 18  BP: (134-165)/(73-84) 161/84  Flow (L/min):  [2] 2  Physical Exam    Constitutional: Awake, alert   Eyes: PERRLA    Neck: Supple, trachea midline   Respiratory: respirations even and unlabored   Cardiovascular: RRR   Gastrointestinal: Soft, approp TTP, midline incision with staples intact, without evidence of  infection   Psychiatric: Appropriate affect, cooperative   Neurologic: Oriented x 3, speech clear   Skin: No rashes     Result Review    Result Review:  I have personally reviewed the results from the time of this admission to 2022 08:36 EDT and agree with these findings:  [x]  Laboratory  []  Microbiology  []  Radiology  []  EKG/Telemetry   []  Cardiology/Vascular   []  Pathology  []  Old records  []  Other:      Assessment/Plan   Assessment / Plan     Diagnosis     S/P ex lap, lysis of adhesions, resection of necrotic colon without anastomosis    Active Hospital Problems:  Active Hospital Problems    Diagnosis    • **Pneumoperitoneum of unknown etiology      Added automatically from request for surgery [id number]     • S/P exploratory laparotomy, lysis of adhesions, resection of necrotic colon without anastomosis    • Chronic gastritis without bleeding, unspecified gastritis type        Plan:      Stay on full liquid diet  CBC in AM  ambulate       DVT prophylaxis:  Medical and mechanical DVT prophylaxis orders are present.          This document has been  electronically signed by Christel Pillai, CHU  April 18, 2022 08:36 EDT

## 2022-04-19 ENCOUNTER — HOSPITAL ENCOUNTER (OUTPATIENT)
Dept: CT IMAGING | Facility: HOSPITAL | Age: 62
Discharge: HOME | End: 2022-04-19
Admitting: PHYSICIAN ASSISTANT

## 2022-04-19 ENCOUNTER — READMISSION MANAGEMENT (OUTPATIENT)
Dept: CALL CENTER | Facility: HOSPITAL | Age: 62
End: 2022-04-19

## 2022-04-19 VITALS
HEIGHT: 65 IN | HEART RATE: 81 BPM | OXYGEN SATURATION: 98 % | DIASTOLIC BLOOD PRESSURE: 68 MMHG | BODY MASS INDEX: 37.06 KG/M2 | WEIGHT: 222.44 LBS | RESPIRATION RATE: 16 BRPM | SYSTOLIC BLOOD PRESSURE: 142 MMHG | TEMPERATURE: 98.6 F

## 2022-04-19 DIAGNOSIS — H53.9 VISION CHANGES: ICD-10-CM

## 2022-04-19 DIAGNOSIS — R41.840 POOR CONCENTRATION: ICD-10-CM

## 2022-04-19 LAB
ANION GAP SERPL CALCULATED.3IONS-SCNC: 12.1 MMOL/L (ref 5–15)
BASOPHILS # BLD AUTO: 0.05 10*3/MM3 (ref 0–0.2)
BASOPHILS NFR BLD AUTO: 0.5 % (ref 0–1.5)
BUN SERPL-MCNC: 11 MG/DL (ref 6–20)
BUN/CREAT SERPL: 12.9 (ref 7–25)
CALCIUM SPEC-SCNC: 9.4 MG/DL (ref 8.6–10.5)
CHLORIDE SERPL-SCNC: 102 MMOL/L (ref 98–107)
CO2 SERPL-SCNC: 22.9 MMOL/L (ref 22–29)
CREAT SERPL-MCNC: 0.85 MG/DL (ref 0.57–1)
DEPRECATED RDW RBC AUTO: 47.7 FL (ref 37–54)
EGFRCR SERPLBLD CKD-EPI 2021: 79 ML/MIN/1.73
EOSINOPHIL # BLD AUTO: 0.17 10*3/MM3 (ref 0–0.4)
EOSINOPHIL NFR BLD AUTO: 1.7 % (ref 0.3–6.2)
ERYTHROCYTE [DISTWIDTH] IN BLOOD BY AUTOMATED COUNT: 15.5 % (ref 12.3–15.4)
GLUCOSE BLDC GLUCOMTR-MCNC: 190 MG/DL (ref 70–99)
GLUCOSE BLDC GLUCOMTR-MCNC: 195 MG/DL (ref 70–99)
GLUCOSE SERPL-MCNC: 155 MG/DL (ref 65–99)
HCT VFR BLD AUTO: 35.7 % (ref 34–46.6)
HGB BLD-MCNC: 12.4 G/DL (ref 12–15.9)
IMM GRANULOCYTES # BLD AUTO: 0.04 10*3/MM3 (ref 0–0.05)
IMM GRANULOCYTES NFR BLD AUTO: 0.4 % (ref 0–0.5)
LYMPHOCYTES # BLD AUTO: 3.56 10*3/MM3 (ref 0.7–3.1)
LYMPHOCYTES NFR BLD AUTO: 35 % (ref 19.6–45.3)
MCH RBC QN AUTO: 29.5 PG (ref 26.6–33)
MCHC RBC AUTO-ENTMCNC: 34.7 G/DL (ref 31.5–35.7)
MCV RBC AUTO: 85 FL (ref 79–97)
MONOCYTES # BLD AUTO: 0.82 10*3/MM3 (ref 0.1–0.9)
MONOCYTES NFR BLD AUTO: 8.1 % (ref 5–12)
NEUTROPHILS NFR BLD AUTO: 5.52 10*3/MM3 (ref 1.7–7)
NEUTROPHILS NFR BLD AUTO: 54.3 % (ref 42.7–76)
NRBC BLD AUTO-RTO: 0 /100 WBC (ref 0–0.2)
PLATELET # BLD AUTO: 223 10*3/MM3 (ref 140–450)
PMV BLD AUTO: 10.3 FL (ref 6–12)
POTASSIUM SERPL-SCNC: 3.6 MMOL/L (ref 3.5–5.2)
RBC # BLD AUTO: 4.2 10*6/MM3 (ref 3.77–5.28)
SODIUM SERPL-SCNC: 137 MMOL/L (ref 136–145)
WBC NRBC COR # BLD: 10.16 10*3/MM3 (ref 3.4–10.8)

## 2022-04-19 PROCEDURE — 82962 GLUCOSE BLOOD TEST: CPT

## 2022-04-19 PROCEDURE — 80048 BASIC METABOLIC PNL TOTAL CA: CPT | Performed by: INTERNAL MEDICINE

## 2022-04-19 PROCEDURE — 0 IOPAMIDOL PER 1 ML: Performed by: PHYSICIAN ASSISTANT

## 2022-04-19 PROCEDURE — 94760 N-INVAS EAR/PLS OXIMETRY 1: CPT

## 2022-04-19 PROCEDURE — 70470 CT HEAD/BRAIN W/O & W/DYE: CPT

## 2022-04-19 PROCEDURE — 25010000002 PIPERACILLIN SOD-TAZOBACTAM PER 1 G: Performed by: SURGERY

## 2022-04-19 PROCEDURE — 94799 UNLISTED PULMONARY SVC/PX: CPT

## 2022-04-19 PROCEDURE — 99239 HOSP IP/OBS DSCHRG MGMT >30: CPT | Performed by: INTERNAL MEDICINE

## 2022-04-19 PROCEDURE — 63710000001 INSULIN LISPRO (HUMAN) PER 5 UNITS: Performed by: SURGERY

## 2022-04-19 PROCEDURE — 85025 COMPLETE CBC W/AUTO DIFF WBC: CPT | Performed by: NURSE PRACTITIONER

## 2022-04-19 RX ORDER — POLYETHYLENE GLYCOL 3350 17 G/17G
17 POWDER, FOR SOLUTION ORAL DAILY
Qty: 21 PACKET | Refills: 0 | Status: SHIPPED | OUTPATIENT
Start: 2022-04-19 | End: 2022-06-30 | Stop reason: SDUPTHER

## 2022-04-19 RX ORDER — OXYCODONE HYDROCHLORIDE 5 MG/1
5 TABLET ORAL EVERY 4 HOURS PRN
Qty: 18 TABLET | Refills: 0 | Status: SHIPPED | OUTPATIENT
Start: 2022-04-19 | End: 2022-04-22

## 2022-04-19 RX ADMIN — INSULIN LISPRO 2 UNITS: 100 INJECTION, SOLUTION INTRAVENOUS; SUBCUTANEOUS at 10:10

## 2022-04-19 RX ADMIN — METOPROLOL TARTRATE 75 MG: 25 TABLET, FILM COATED ORAL at 10:12

## 2022-04-19 RX ADMIN — BUPROPION HYDROCHLORIDE 150 MG: 150 TABLET, EXTENDED RELEASE ORAL at 10:13

## 2022-04-19 RX ADMIN — FAMOTIDINE 20 MG: 20 TABLET ORAL at 10:13

## 2022-04-19 RX ADMIN — BUDESONIDE 0.5 MG: 0.5 SUSPENSION RESPIRATORY (INHALATION) at 08:21

## 2022-04-19 RX ADMIN — Medication 10 ML: at 13:02

## 2022-04-19 RX ADMIN — INSULIN LISPRO 2 UNITS: 100 INJECTION, SOLUTION INTRAVENOUS; SUBCUTANEOUS at 12:54

## 2022-04-19 RX ADMIN — SENNOSIDES AND DOCUSATE SODIUM 2 TABLET: 50; 8.6 TABLET ORAL at 10:12

## 2022-04-19 RX ADMIN — TAZOBACTAM SODIUM AND PIPERACILLIN SODIUM 3.38 G: 375; 3 INJECTION, SOLUTION INTRAVENOUS at 05:52

## 2022-04-19 RX ADMIN — IOPAMIDOL 50 ML: 755 INJECTION, SOLUTION INTRAVENOUS at 16:48

## 2022-04-19 RX ADMIN — LOSARTAN POTASSIUM 100 MG: 50 TABLET, FILM COATED ORAL at 10:13

## 2022-04-19 RX ADMIN — AMLODIPINE BESYLATE 10 MG: 5 TABLET ORAL at 10:13

## 2022-04-19 RX ADMIN — POLYETHYLENE GLYCOL 3350 17 G: 17 POWDER, FOR SOLUTION ORAL at 12:59

## 2022-04-19 RX ADMIN — PANTOPRAZOLE SODIUM 40 MG: 40 TABLET, DELAYED RELEASE ORAL at 10:12

## 2022-04-19 RX ADMIN — ARFORMOTEROL TARTRATE 15 MCG: 15 SOLUTION RESPIRATORY (INHALATION) at 08:21

## 2022-04-19 NOTE — PROGRESS NOTES
[facility]     Progress Note  General surgery    Patient Name: Anika Casillas  : 1960  MRN: [id number]    Date of admission: 4/15/2022    Subjective   Subjective     Patient sitting up at bedside.  Tolerating regular diet.  Pain controlled. BM x2    Objective   Objective     Review of Systems:    A complete review of systems was performed and all are negative except for what is documented in the HPI.     Vitals:   Temp:  [97.3 °F (36.3 °C)-99.1 °F (37.3 °C)] 98.6 °F (37 °C)  Heart Rate:  [69-85] 81  Resp:  [16-18] 16  BP: (128-176)/(62-79) 142/68  Physical Exam    Constitutional: Awake, alert   Eyes: PERRLA    Neck: Supple, trachea midline   Respiratory: respirations even and unlabored   Cardiovascular: RRR   Gastrointestinal: Soft, approp TTP, incision with staples intact   Psychiatric: Appropriate affect, cooperative   Neurologic: Oriented x 3, speech clear   Skin: No rashes     Result Review    Result Review:  I have personally reviewed the results from the time of this admission to 2022 11:35 EDT and agree with these findings:  []  Laboratory  []  Microbiology  []  Radiology  []  EKG/Telemetry   []  Cardiology/Vascular   []  Pathology  []  Old records  []  Other:      Assessment/Plan   Assessment / Plan     Diagnosis       Active Hospital Problems:  Active Hospital Problems    Diagnosis    • **Pneumoperitoneum of unknown etiology      Added automatically from request for surgery [id number]     • S/P exploratory laparotomy, lysis of adhesions, resection of necrotic colon without anastomosis    • Chronic gastritis without bleeding, unspecified gastritis type        Plan:      Ok to DC home  Follow up in one week         DVT prophylaxis:  Medical and mechanical DVT prophylaxis orders are present.          This document has been electronically signed by Christel Pillai, CHU  2022 11:35 EDT    .[email or web address]

## 2022-04-19 NOTE — PLAN OF CARE
Problem: Adult Inpatient Plan of Care  Goal: Plan of Care Review  Outcome: Met  Goal: Patient-Specific Goal (Individualized)  Outcome: Met  Goal: Absence of Hospital-Acquired Illness or Injury  Outcome: Met  Intervention: Prevent Skin Injury  Recent Flowsheet Documentation  Taken 4/19/2022 0830 by Tyler Serrano RN  Body Position:   position changed independently   head facing, left  Intervention: Prevent and Manage VTE (Venous Thromboembolism) Risk  Recent Flowsheet Documentation  Taken 4/19/2022 0830 by Tyler Serrano RN  Activity Management: up in chair  VTE Prevention/Management: sequential compression devices off  Goal: Optimal Comfort and Wellbeing  Outcome: Met  Intervention: Monitor Pain and Promote Comfort  Recent Flowsheet Documentation  Taken 4/19/2022 0830 by Tyler Serrano RN  Pain Management Interventions: quiet environment facilitated  Intervention: Provide Person-Centered Care  Recent Flowsheet Documentation  Taken 4/19/2022 0830 by Tyler Serrano RN  Trust Relationship/Rapport:   care explained   thoughts/feelings acknowledged   reassurance provided   questions encouraged  Goal: Readiness for Transition of Care  Outcome: Met     Problem: Pain Acute  Goal: Acceptable Pain Control and Functional Ability  Outcome: Met  Intervention: Develop Pain Management Plan  Recent Flowsheet Documentation  Taken 4/19/2022 0830 by Tyler Serrano RN  Pain Management Interventions: quiet environment facilitated     Problem: Diabetes Comorbidity  Goal: Blood Glucose Level Within Targeted Range  Outcome: Met  Intervention: Monitor and Manage Glycemia  Recent Flowsheet Documentation  Taken 4/19/2022 0830 by Tyler Serrano RN  Glycemic Management: blood glucose monitored     Problem: Bleeding (Surgery Nonspecified)  Goal: Absence of Bleeding  Outcome: Met     Problem: Bowel Motility Impaired (Surgery Nonspecified)  Goal: Effective Bowel Elimination  Outcome: Met   Goal Outcome Evaluation:

## 2022-04-19 NOTE — OUTREACH NOTE
Prep Survey    Flowsheet Row Responses   [facility] patient discharged from? [facility]   Is LACE score < 7 ? No   Emergency Room discharge w/ pulse ox? No   Eligibility [facility]   Date of Admission 04/15/22   Date of Discharge 04/12/22   Discharge Disposition Home or Self Care   Discharge diagnosis Pneumoperitoneum of unknown etiology  EXPLORATORY LAPAROTOMY, LYSIS OF ADHESIONS, RESECTION OF NECTROIC COLON   Does the patient have one of the following disease processes/diagnoses(primary or secondary)? General Surgery   Does the patient have Home health ordered? No   Is there a DME ordered? No   Prep survey completed? Yes          OSIEL HOANG - Registered Nurse

## 2022-04-20 ENCOUNTER — TRANSITIONAL CARE MANAGEMENT TELEPHONE ENCOUNTER (OUTPATIENT)
Dept: CALL CENTER | Facility: HOSPITAL | Age: 62
End: 2022-04-20

## 2022-04-20 ENCOUNTER — PATIENT ROUNDING (BHMG ONLY) (OUTPATIENT)
Dept: PULMONOLOGY | Facility: CLINIC | Age: 62
End: 2022-04-20

## 2022-04-20 LAB
CYTO UR: NORMAL
LAB AP CASE REPORT: NORMAL
LAB AP CLINICAL INFORMATION: NORMAL
PATH REPORT.FINAL DX SPEC: NORMAL
PATH REPORT.GROSS SPEC: NORMAL

## 2022-04-20 NOTE — PROGRESS NOTES
Xiomy, My name is Taisha    I am  The Practice Manager with [facility] and want to officially welcome you to our practice and ask about your recent visit.    Tell me about your visit with us. What things went well?         We're always looking for ways to make our patients' experiences even better. Do you have recommendations on ways we may improve?      Overall were you satisfied with your first visit to our practice?      I appreciate you taking the time to answer these question. Is there anything else I can do for you?     You may also receive a brief survey via e-mail or mail from [employer] about our patient satisfaction, if you could please take a moment and answer it would be helpful to the practice growth.    Thank you, and have a great day.    Taisha

## 2022-04-20 NOTE — OUTREACH NOTE
Call Center TCM Note    Flowsheet Row Responses   [facility] patient discharged from? [facility]   Does the patient have one of the following disease processes/diagnoses(primary or secondary)? General Surgery   TCM attempt successful? No   Unsuccessful attempts Attempt 2  [Patients number is busy. Spouse went to . ]          Mima Adam RN    4/20/2022, 13:55 EDT

## 2022-04-20 NOTE — OUTREACH NOTE
Call Center TCM Note    Flowsheet Row Responses   [facility] patient discharged from? [facility]   Does the patient have one of the following disease processes/diagnoses(primary or secondary)? General Surgery   TCM attempt successful? No  [Negrito-spouse, son-no number listed]   Unsuccessful attempts Attempt 1  [Pt's number rings busy]   Call Status Left message          JAMES MIRAMONTES RN    4/20/2022, 09:05 EDT

## 2022-04-20 NOTE — DISCHARGE SUMMARY
[facility]         HOSPITALIST  DISCHARGE SUMMARY    Patient Name: Anika Casillas  : 1960  MRN: [id number]    Date of Admission: 4/15/2022  Date of Discharge:  2022  Primary Care Physician: Elena Henderson PA    Consults     Date and Time Order Name Status Description    2022  3:15 AM Surgery (on-call MD unless specified) Completed           Active and Resolved Hospital Problems:  Pneumoperitoneum s/p exploratory laparotomy, lysis of adhesion and resection of necrotic transverse colon/omentum due to incarcerated hernia under the umbilicus on .  BERKOWITZ cirrhosis  Afib - currently in sinus rhythm on chronic anticoagulation with Eliquis  Diabetes mellitus  Chronic gastritis and GERD  Hypertension.  Leukocytosis.  Improving.  Chronic tobacco abuse disorder.  Elevated liver enzymes..  Resolved      Hospital Course     Hospital Course:  Anika Casillas is a 61 y.o. female with a history of BERKOWITZ cirrhosis, Afib on eliquis, Diabetes mellitus, chronic gastritis and GERD who presents to the hospital with abdominal pain.  Pain started about 1 week ago suddenly and has been getting progressively worse and more frequent (constant but waxes and wanes).  In the ER her pain was poorly controlled with parenteral narcotics so she underwent CT which showed free air without a clear source.  Due to her pain and associated free air, general surgery was consulted. Patient taken to the OR was found to have incarcerated necrotic portion of transverse colon with omentum under the umbilicus.  S/p resection of necrotic colon and end-to-end anastomosis.  Patient was slowly advanced on diet and discharged home on 2022.  Patient will follow up with surgery in 1 week as an outpatient.    DISCHARGE Follow Up Recommendations for labs and diagnostics:   Follow-up with PCP soon as possible  Follow-up with general surgery in 1 week      Day of Discharge     Vital Signs:     Physical Exam:                Constitutional: Awake, alert, no acute distress              Eyes: Pupils equal, sclerae anicteric, no conjunctival injection              HENT: NCAT, mucous membranes moist              Neck: Supple, no thyromegaly, no lymphadenopathy, trachea midline              Respiratory: Decreased to auscultation bilaterally, nonlabored respirations               Cardiovascular: RRR, no murmurs, rubs, or gallops, palpable pedal pulses bilaterally              Gastrointestinal: Positive bowel sounds, soft, mild tender at surgical incision.  Wound dressed, nondistended              Musculoskeletal: +1 bilateral ankle edema, no clubbing or cyanosis to extremities              Psychiatric: Appropriate affect, cooperative              Neurologic: Oriented x 3, strength symmetric in all extremities, Cranial Nerves grossly intact to confrontation, speech clear              Skin: No rashes          Discharge Details        Discharge Medications      New Medications      Instructions Start Date   dicyclomine 20 MG tablet  Commonly known as: BENTYL   20 mg, Oral, Every 8 Hours PRN      oxyCODONE 5 MG immediate release tablet  Commonly known as: ROXICODONE   5 mg, Oral, Every 4 Hours PRN         Changes to Medications      Instructions Start Date   polyethylene glycol 17 g packet  Commonly known as: MIRALAX  What changed: Another medication with the same name was added. Make sure you understand how and when to take each.   17 g, Oral, 2 Times Daily      polyethylene glycol 17 g packet  Commonly known as: MIRALAX  What changed: You were already taking a medication with the same name, and this prescription was added. Make sure you understand how and when to take each.   17 g, Oral, Daily         Continue These Medications      Instructions Start Date   Accu-Chek Guide w/Device kit   No dose, route, or frequency recorded.      accu-chek soft touch lancets   Check blood sugar twice daily      albuterol sulfate  (90 Base) MCG/ACT  "inhaler  Commonly known as: PROVENTIL HFA;VENTOLIN HFA;PROAIR HFA   2 puffs, Inhalation, Every 4 Hours PRN      amLODIPine 10 MG tablet  Commonly known as: NORVASC   10 mg, Oral, Daily      apixaban 5 MG tablet tablet  Commonly known as: ELIQUIS   5 mg, Oral, 2 Times Daily      buPROPion  MG 24 hr tablet  Commonly known as: WELLBUTRIN XL   150 mg, Oral, 2 Times Daily      cholecalciferol 25 MCG (1000 UT) tablet  Commonly known as: VITAMIN D3   2,000 Units, Oral, Daily      empagliflozin 25 MG tablet tablet  Commonly known as: JARDIANCE   25 mg, Oral, Daily      famotidine 40 MG tablet  Commonly known as: PEPCID   40 mg, Oral, Nightly PRN      glucose blood test strip   Check blood glucose twice daily      glucose monitor monitoring kit   1 each, Does not apply, Daily, Patient prefers Accu Chek Meter.      hydrOXYzine 25 MG tablet  Commonly known as: ATARAX   25 mg, Oral, 2 Times Daily PRN      ibuprofen 800 MG tablet  Commonly known as: ADVIL,MOTRIN   800 mg, Oral, 3 Times Daily PRN      metoprolol tartrate 50 MG tablet  Commonly known as: LOPRESSOR   75 mg, Oral, 2 Times Daily, TAKE 1 1/2 TABLETS BID      multivitamin with minerals tablet tablet   1 tablet, Oral, Daily      NovoLOG FlexPen 100 UNIT/ML solution pen-injector sc pen  Generic drug: insulin aspart   60 Units, Subcutaneous, 2 Times Daily      olmesartan 20 MG tablet  Commonly known as: BENICAR   20 mg, Oral, Daily      pantoprazole 40 MG EC tablet  Commonly known as: PROTONIX   40 mg, Oral, Daily      Pen Needles 3/16\" 31G X 5 MM misc   Does not apply      B-D UF III MINI PEN NEEDLES 31G X 5 MM misc  Generic drug: Insulin Pen Needle   No dose, route, or frequency recorded.      Insulin Pen Needle 31G X 5 MM misc   Other      Toujeo Max SoloStar 300 UNIT/ML solution pen-injector injection  Generic drug: Insulin Glargine (2 Unit Dial)   120 Units, Subcutaneous, Daily      Trelegy Ellipta 100-62.5-25 MCG/INH inhaler  Generic drug: " Fluticasone-Umeclidin-Vilant   1 puff, Inhalation, Daily - RT      vitamin C 250 MG tablet  Commonly known as: ASCORBIC ACID   250 mg, Oral, Daily             Allergies   Allergen Reactions   • Liraglutide Nausea And Vomiting   • Metformin Nausea Only     GI Upset       Discharge Disposition:  Home or Self Care    Diet:  Hospital:No active diet order      Discharge Activity:   Activity Instructions     Bathing Restrictions      Ok to shower    Type of Restriction: Bathing    Bathing Restrictions: No Tub Bath    Driving Restrictions      Type of Restriction: Driving    Driving Restrictions: No Driving While Taking Narcotics    Lifting Restrictions      Type of Restriction: Lifting    Lifting Restrictions: Lifting Restriction (Indicate Limit)    Weight Limit (Pounds): 10    Length of Lifting Restriction: 4 weeks    Work Restrictions      Type of Restriction: Work    May Return to Work: In 1 Week    With / Without Restrictions: Without Restrictions          CODE STATUS:  Code Status and Medical Interventions:   Ordered at: 04/16/22 0504     Code Status (Patient has no pulse and is not breathing):    CPR (Attempt to Resuscitate)     Medical Interventions (Patient has pulse or is breathing):    Full Support         Future Appointments   Date Time Provider Department Center   4/27/2022 11:00 AM Elena Henderson PA [facility] CSPRG [facility]   4/28/2022  9:20 AM Cande Stanley MD [facility] GS HARET [facility]   5/12/2022  9:30 AM Elena Henderson PA [facility] CSPRG [facility]   5/24/2022  3:00 PM Yarelis Melara MD [facility] PCC ETW [facility]   6/30/2022  3:00 PM Christina Rinaldi APRN [facility] GE ETWH [facility]   7/5/2022  8:30 AM [facility] PULM LAB ROOM [facility] PFT [facility]   12/16/2022 11:45 AM KATHARINE Webb MD [facility] CD EDIXE [facility]       Additional Instructions for the Follow-ups that You Need to Schedule     Discharge Follow-up with PCP   As directed       Currently Documented PCP:    Elena Henderson PA    PCP Phone Number:    [phone]     Follow Up  Details: In less than one week         Discharge Follow-up with Specialty: Lizeth; 1 Week   As directed      Specialty: Lizeth    Follow Up: 1 Week        Additional information on Labs and Follow-ups:    April 28th @ 0920           Pertinent  and/or Most Recent Results     PROCEDURES:   LAPAROTOMY EXPLORATORY  Procedure Report     Patient Name:  Anika Casillas  YOB: 1960     Date of Surgery:  4/16/2022     Indications:  Free air at [facility]     Pre-op Diagnosis:   Pneumoperitoneum of unknown etiology [K66.8]       Post-Op Diagnosis Codes:     * Pneumoperitoneum of unknown etiology [K66.8]     Procedure/CPT® Codes:  Procedure(s):  EXPLORATORY LAPAROTOMY, LYSIS OF ADHESIONS, RESECTION OF NECTROIC COLON WITHOUT ANASTAMOSIS     Staff:  Surgeon(s):  Cande Stanley MD     Assistant: Yesika Murguia CSA     Anesthesia: General     Estimated Blood Loss: minimal     Implants:    Implant Name Type Inv. Item Serial No.  Lot No. LRB No. Used Action   CLIPAPPLR M/ ENDO LIGACLIP 20CLP 11IN MD - [id number] Implant CLIPAPPLR M/ ENDO LIGACLIP 20CLP 11IN MD   [employer]  DIV [employer] J [employer] J [id number] N/A 1 Implanted   STPLR LNR CUT 60MM GRN [id number] - [id number] Implant STPLR LNR CUT 60MM GRN TA60 [id number]   [employer]  [employer] J [id number] N/A 1 Implanted         Specimen:                     Specimens      ID Source Type Tests Collected By Collected At Frozen?     A Large Intestine, Transverse Colon Tissue · TISSUE PATHOLOGY EXAM    Cande Stanley MD 4/16/22 0935       Description: NECTROIC COLON AND OMENTUM     This specimen was not marked as sent.                 Findings: none     Complications: none     Description of Procedure:   After informed consent was obtained, the patient was taken to the O.R. and placed supine on the table, and after adequate anesthesia, they were prepped and draped. We began by making a midline incision and dissecting down to the level of  the fascia. The fascia was grasped, elevated and incised. Care was taken not to injure the underlying structures. Adhesions between the bowel and abdominal wall were taken down with no enterotomies or deserosalizations. We were able to see a discolored area of transverse colon densely adherent to the omentum and to the underside of the umbilicus.  The omentum stuck here had incarcerated a 1.5 cm diameter knuckle of the antimesenteric side of the transverse colon that was necrotic and black in color.  I feel this was the etiology of the air and likely had a microperforation.  We used the SPY to ensure this area was in fact necrotic with no blood flow and that the surrounding colon was viable.  Once we had done this we lifted it up with a paolo and were able to resect it using a TA 60 stapler and then oversew the area with no significant narrowing of the lumen of the colon.  We re SPY'd the area and showed no areas of decreased blood flow with very well perfused tissue only.  The impacted omentum was also resected with a ligasure.  The abdomen was irrigated with saline. The fascia was closed with looped PDS superiorly and inferiorly after we had changed gowns, gloves and all instruments. The skin was closed with staples at all sites. The patient was awakened and taken to the recovery room in good condition.  Assistant: Yesika Murguia CSA  was responsible for performing the following activities: Retraction and Closing and their skilled assistance was necessary for the success of this case.     Cande Stanley MD      Date: 4/16/2022  Time: 09:54 EDT                         LAB RESULTS:      Lab 04/19/22  0454 04/18/22  0540 04/17/22  0540 04/16/22  1311 04/15/22  2044   WBC 10.16 11.49* 12.02* 13.04* 10.72   HEMOGLOBIN 12.4 12.9 13.3 13.6 14.6   HEMATOCRIT 35.7 36.0 38.5 38.6 41.5   PLATELETS 223 205 227 274 275   NEUTROS ABS 5.52 6.30 8.50* 10.93* 5.93   IMMATURE GRANS (ABS) 0.04 0.05 0.04 0.04 0.03   LYMPHS  ABS 3.56* 4.04* 2.49 1.53 3.92*   MONOS ABS 0.82 0.93* 0.94* 0.51 0.68   EOS ABS 0.17 0.11 0.01 0.00 0.11   MCV 85.0 85.5 84.2 84.6 84.9         Lab 04/19/22  0454 04/18/22  0540 04/17/22  0540 04/16/22  1311 04/15/22  2044   SODIUM 137 138 135* 139 139   POTASSIUM 3.6 3.5 3.8 4.3 3.7   CHLORIDE 102 103 102 105 104   CO2 22.9 24.1 22.4 20.2* 20.8*   ANION GAP 12.1 10.9 10.6 13.8 14.2   BUN 11 12 11 16 10   CREATININE 0.85 0.82 0.76 1.02* 0.88   EGFR 79.0 82.5 90.4 63.5 75.8   GLUCOSE 155* 151* 162* 203* 175*   CALCIUM 9.4 9.7 10.0 9.3 9.9   PHOSPHORUS  --  2.9  --   --   --          Lab 04/18/22  0540 04/17/22  0540 04/15/22  2044   TOTAL PROTEIN  --  8.6* 9.5*   ALBUMIN 4.00 4.10 4.60   GLOBULIN  --  4.5 4.9   ALT (SGPT)  --  49* 79*   AST (SGOT)  --  43* 73*   BILIRUBIN  --  0.9 0.3   ALK PHOS  --  116 154*   LIPASE  --   --  24         Lab 04/15/22  2044   TROPONIN T <0.010                 Brief Urine Lab Results  (Last result in the past 365 days)      Color   Clarity   Blood   Leuk Est   Nitrite   Protein   CREAT   Urine HCG        04/15/22 2051 Yellow   Clear   Negative   Negative   Negative   100 mg/dL (2+)               Microbiology Results (last 10 days)     ** No results found for the last 240 hours. **          XR Abdomen 2+ VW with Chest 1 VW    Result Date: 4/16/2022  Impression:   A nonobstructive bowel gas pattern is noted.  No pneumoperitoneum.     COMMENT:  Part of this note is an electronic transcription of spoken language to printed text. The electronic translation/transcription may permit erroneous, or at times, nonsensical (or even sensical) words or phrases to be inadvertently transcribed or omitted; this  has reviewed the note for such errors (as well as additional errors); however, some may still exist.  MIKEY CALERO JR, MD           Electronically Signed and Approved By: MIKEY CALERO JR, MD on 4/16/2022 at 0:24          CT Head With & Without Contrast    Result Date:  4/19/2022  Impression:   1. No acute intracranial abnormality     Vinayak Farah M.D.       Electronically Signed and Approved By: Vinayak Farah M.D. on 4/19/2022 at 17:11             CT Abdomen Pelvis With Contrast    Addendum Date: 4/16/2022    ADDENDUM:  Pertinent findings were phoned to CHU Broderick (ordering [facility] ED Clinician) at approximately 0311 hours on 4/16/2022.    MIKEY CALERO JR, MD       Electronically Signed and Approved By: MIKEY CALERO JR, MD on 4/16/2022 at 3:20              Result Date: 4/16/2022  Impression:  Minimal pneumoperitoneum is seen.  Please correlate clinically, especially with history of a recent abdominal/pelvic recent procedure.  A perforated diverticulum would be in the differential diagnosis.  Minimal if any acute colonic diverticulitis is suggested.  No mechanical bowel obstruction.  No definite pneumatosis.  No pericolonic fluid collections are seen to suggest abscess.  Probably no portal or mesenteric venous gas is seen.  Please see above comments for further detail.     COMMENT:  Part of this note is an electronic transcription of spoken language to printed text. The electronic translation/transcription may permit erroneous, or at times, nonsensical (or even sensical) words or phrases to be inadvertently transcribed or omitted; this  has reviewed the note for such errors (as well as additional errors); however, some may still exist.  MIKEY CALERO JR, MD       Electronically Signed and Approved By: MIKEY CALERO JR, MD on 4/16/2022 at 3:07              XR Chest PA & Lateral    Result Date: 3/23/2022  Impression:  No active disease is seen.     FLYNN DUNNE MD       Electronically Signed and Approved By: FLYNN DUNNE MD on 3/23/2022 at 11:49                           Labs Pending at Discharge:        Time spent on Discharge including face to face service:  35 minutes    Electronically signed by Fan Perez MD, 04/20/22, 4:13 PM EDT.

## 2022-04-21 ENCOUNTER — TRANSITIONAL CARE MANAGEMENT TELEPHONE ENCOUNTER (OUTPATIENT)
Dept: CALL CENTER | Facility: HOSPITAL | Age: 62
End: 2022-04-21

## 2022-04-21 NOTE — OUTREACH NOTE
Call Center TCM Note    Flowsheet Row Responses   [facility] facility patient discharged from? [facility]   Does the patient have one of the following disease processes/diagnoses(primary or secondary)? General Surgery   TCM attempt successful? No   Unsuccessful attempts Attempt 3  [attempted patient and spouse]          America Velazquez RN    4/21/2022, 09:34 EDT

## 2022-04-26 NOTE — PROGRESS NOTES
"Chief Complaint  Follow-up    Subjective          History of Present Illness  The patient is here to follow up on ex lap with necrotic colon resection.  They are doing well and have no complaints.  Pathology is shown below:    Clinical Information    Comment:    Pneumoperitoneum of unknown etiology      Final Diagnosis   Necrotic colon and omentum, resection:               -Unremarkable omentum               -1 benign lymph node               -Colon not present for evaluation     Pathology said colon not present- discussed with them that it would be a stapled very small piece less than the size of a nickel and it was grossly necrotic... they went back through slides and did not see this... discussed this with patient as well     Objective   Vital Signs:   Resp 15   Ht 165.1 cm (65\")   Wt 98.4 kg (217 lb)   BMI 36.11 kg/m²     Physical Exam  Vitals and nursing note reviewed.   Constitutional:       General: She is not in acute distress.     Appearance: Normal appearance. She is well-developed.   HENT:      Head: Normocephalic and atraumatic.   Eyes:      Extraocular Movements: Extraocular movements intact.      Pupils: Pupils are equal, round, and reactive to light.   Cardiovascular:      Pulses: Normal pulses.   Pulmonary:      Effort: Pulmonary effort is normal. No retractions.      Breath sounds: Normal air entry. No wheezing.   Abdominal:      General: There is no distension.      Palpations: Abdomen is soft.      Tenderness: There is no abdominal tenderness.      Hernia: No hernia is present.   Musculoskeletal:         General: No swelling or deformity.      Cervical back: Neck supple.   Skin:     General: Skin is warm and dry.      Findings: No erythema.      Comments: Surgical Incision Healing Well   Neurological:      General: No focal deficit present.      Mental Status: She is alert and oriented to person, place, and time.      Motor: Motor function is intact.   Psychiatric:         Mood and Affect: Mood " normal.         Thought Content: Thought content normal.            Result Review :                 Assessment and Plan    Diagnoses and all orders for this visit:    1. S/P exploratory laparotomy, lysis of adhesions, resection of necrotic colon without anastomosis (Primary)    Follow up one week with Leyla SAGE for remaining staple removal and then prn    Follow Up   Return in about 1 week (around 5/5/2022), or if symptoms worsen or fail to improve.  Patient was given instructions and counseling regarding her condition or for health maintenance advice. Please see specific information pulled into the AVS if appropriate.

## 2022-04-27 ENCOUNTER — TELEPHONE (OUTPATIENT)
Dept: SURGERY | Facility: OTHER | Age: 62
End: 2022-04-27

## 2022-04-27 ENCOUNTER — OFFICE VISIT (OUTPATIENT)
Dept: FAMILY MEDICINE CLINIC | Facility: CLINIC | Age: 62
End: 2022-04-27

## 2022-04-27 VITALS
BODY MASS INDEX: 36.22 KG/M2 | HEART RATE: 102 BPM | DIASTOLIC BLOOD PRESSURE: 78 MMHG | HEIGHT: 65 IN | OXYGEN SATURATION: 97 % | SYSTOLIC BLOOD PRESSURE: 142 MMHG | WEIGHT: 217.4 LBS

## 2022-04-27 DIAGNOSIS — R41.840 POOR CONCENTRATION: ICD-10-CM

## 2022-04-27 DIAGNOSIS — N76.0 ACUTE VAGINITIS: Primary | ICD-10-CM

## 2022-04-27 DIAGNOSIS — Z98.890 S/P EXPLORATORY LAPAROTOMY: ICD-10-CM

## 2022-04-27 DIAGNOSIS — R41.3 MEMORY CHANGES: ICD-10-CM

## 2022-04-27 PROCEDURE — 99213 OFFICE O/P EST LOW 20 MIN: CPT | Performed by: PHYSICIAN ASSISTANT

## 2022-04-27 RX ORDER — FLUCONAZOLE 150 MG/1
150 TABLET ORAL ONCE
Qty: 1 TABLET | Refills: 0 | Status: SHIPPED | OUTPATIENT
Start: 2022-04-27 | End: 2022-04-27

## 2022-04-27 NOTE — PROGRESS NOTES
Transitional Care Follow Up Visit  Subjective     Anika Casillas is a 61 y.o. female who presents for a transitional care management visit.    TCM call was unsuccessful.     I reviewed and discussed the details of that call along with the discharge summary, hospital problems, inpatient lab results, inpatient diagnostic studies, and consultation reports with Anika.     Current outpatient and discharge medications have been reconciled for the patient.  Reviewed by: CHACHO Carrasco     Anika Casillas is a 61 y.o. female who presents today for TCM.     Pt presented to [facility] ED 4/16/22 with abdominal pain and vomiting. Pt was found to have chronic gastritis and pneumoperitoneum which required surgical intervention. Pt was admitted and underwent exploratory laparotomy and resection of necrotic colon with Dr. Stanley. Pt was discharged 4/19/22 and has follow up with surgeon 4/28/22.     Pt reports she is feeling good since discharge. Pt stated she is sore at the incision sites. Pt denies any vomiting, abdominal pain, she has been having regular BM.        Date of TCM Phone Call 4/19/2022   [facility]   Date of Admission 4/15/2022   Date of Discharge 4/12/2022   Discharge Disposition Home or Self Care     Risk for Readmission (LACE) Score: 13 (4/19/2022  6:01 AM)  Current outpatient and discharge medications have been reconciled for the patient.  Reviewed by: CHACHO Carrasco      History of Present Illness   Course During Hospital Stay:  Anika Casillas is a 61 y.o. female with a history of BERKOWITZ cirrhosis, Afib on eliquis, Diabetes mellitus, chronic gastritis and GERD who presents to the hospital with abdominal pain.  Pain started about 1 week ago suddenly and has been getting progressively worse and more frequent (constant but waxes and wanes).  In the ER her pain was poorly controlled with parenteral narcotics so she underwent CT which showed free air without a clear source.  Due to  her pain and associated free air, general surgery was consulted. Patient taken to the OR was found to have incarcerated necrotic portion of transverse colon with omentum under the umbilicus.  S/p resection of necrotic colon and end-to-end anastomosis.  Patient was slowly advanced on diet and discharged home on 4/19/2022.      Patient is doing well without abdominal pain. No drainage from wound. BM normal; had bowel movement this morning.    She does states vaginal itching and discharge; requesting treatment.    Patient also recently had CT of brain in regards to recent mental status change and change in memory; CT normal; patient requests referral to neurology.    Patient forgot to take blood pressure medication this morning; blood pressure slightly elevated.     The following portions of the patient's history were reviewed and updated as appropriate: allergies, current medications, past family history, past medical history, past social history, past surgical history and problem list.    Review of Systems    Objective   Physical Exam  Vitals and nursing note reviewed.   Constitutional:       Appearance: Normal appearance.   HENT:      Head: Normocephalic and atraumatic.   Neck:      Vascular: No carotid bruit.   Cardiovascular:      Rate and Rhythm: Normal rate and regular rhythm.      Pulses: Normal pulses.      Heart sounds: Normal heart sounds.   Pulmonary:      Effort: Pulmonary effort is normal.      Breath sounds: Normal breath sounds.   Abdominal:      Palpations: Abdomen is soft.      Tenderness: There is no abdominal tenderness.      Comments: Staples in place; surgical wound appears to be healing well.   Musculoskeletal:      Cervical back: Neck supple. No tenderness.      Right lower leg: No edema.      Left lower leg: No edema.   Lymphadenopathy:      Cervical: No cervical adenopathy.   Neurological:      Mental Status: She is alert.   Psychiatric:         Mood and Affect: Mood normal.         Behavior:  Behavior normal.         Assessment/Plan   Diagnoses and all orders for this visit:    1. Acute vaginitis (Primary)  Comments:  Probably secondary to antibiotics; Diflucan 150mg one dose sent to pharmacy.  Orders:  -     fluconazole (Diflucan) 150 MG tablet; Take 1 tablet by mouth 1 (One) Time for 1 dose.  Dispense: 1 tablet; Refill: 0    2. Poor concentration    3. Memory changes  Comments:  Discussed CT; patient states problems persisting; referral to neurology placed.  Orders:  -     Ambulatory Referral to Neurology    4. S/P exploratory laparotomy, lysis of adhesions, resection of necrotic colon without anastomosis  Comments:  Patient appears to be doing well after surgery; patient has appointment with general surgery tomorrow.

## 2022-04-27 NOTE — TELEPHONE ENCOUNTER
CALLER: BRUCE    RELATIONSHIP TO PT: [facility]- SHORT TERM DISABILITY DEPARTMENT    REASON FOR CALL: TO CONFIRM PT HAD SURGERY Saturday 4/16/22. HUB CONFIRMED THAT PATIENT HAD SURGERY THAT DATE.

## 2022-04-28 ENCOUNTER — OFFICE VISIT (OUTPATIENT)
Dept: SURGERY | Facility: CLINIC | Age: 62
End: 2022-04-28

## 2022-04-28 VITALS — RESPIRATION RATE: 15 BRPM | WEIGHT: 217 LBS | HEIGHT: 65 IN | BODY MASS INDEX: 36.15 KG/M2

## 2022-04-28 DIAGNOSIS — Z98.890 S/P EXPLORATORY LAPAROTOMY: Primary | ICD-10-CM

## 2022-04-28 PROCEDURE — 99024 POSTOP FOLLOW-UP VISIT: CPT | Performed by: SURGERY

## 2022-04-28 RX ORDER — FLUCONAZOLE 150 MG/1
TABLET ORAL
Status: ON HOLD | COMMUNITY
Start: 2022-04-27 | End: 2022-06-03

## 2022-05-04 ENCOUNTER — OFFICE VISIT (OUTPATIENT)
Dept: NEUROLOGY | Facility: CLINIC | Age: 62
End: 2022-05-04

## 2022-05-04 ENCOUNTER — OFFICE VISIT (OUTPATIENT)
Dept: SURGERY | Facility: CLINIC | Age: 62
End: 2022-05-04

## 2022-05-04 VITALS — BODY MASS INDEX: 36.15 KG/M2 | HEIGHT: 65 IN | HEART RATE: 84 BPM | RESPIRATION RATE: 18 BRPM | WEIGHT: 217 LBS

## 2022-05-04 VITALS
SYSTOLIC BLOOD PRESSURE: 134 MMHG | DIASTOLIC BLOOD PRESSURE: 76 MMHG | BODY MASS INDEX: 36.52 KG/M2 | WEIGHT: 219.2 LBS | HEART RATE: 84 BPM | HEIGHT: 65 IN

## 2022-05-04 DIAGNOSIS — Z98.890 S/P EXPLORATORY LAPAROTOMY: Primary | ICD-10-CM

## 2022-05-04 DIAGNOSIS — G47.33 OSA (OBSTRUCTIVE SLEEP APNEA): ICD-10-CM

## 2022-05-04 DIAGNOSIS — R41.3 MEMORY LOSS: Primary | ICD-10-CM

## 2022-05-04 PROCEDURE — 99024 POSTOP FOLLOW-UP VISIT: CPT | Performed by: NURSE PRACTITIONER

## 2022-05-04 PROCEDURE — 99215 OFFICE O/P EST HI 40 MIN: CPT | Performed by: NURSE PRACTITIONER

## 2022-05-04 RX ORDER — FLUCONAZOLE 100 MG/1
100 TABLET ORAL DAILY
Qty: 3 TABLET | Refills: 0 | Status: SHIPPED | OUTPATIENT
Start: 2022-05-04 | End: 2022-05-07

## 2022-05-04 NOTE — PROGRESS NOTES
"Chief Complaint  Memory Loss (Feels like she is in a fog)    Subjective          Anika Casillas presents to [facility]  States that about a year ago she would see a letter or number and would write the wrong thing.  States in the past 3-4 months she's having difficulty with her job.  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  Does the same thing every day.  States she's having to really focus to ensure she's doing her job appropriately.  Will look at a number or letter and either see the wrong thing or will forget it quickly.  States she has difficulty getting her words out at times.  States she feels as if her brain is in a fog. Denies increase in depression or anxiety recently.  States she's sleeping about 4 hours per night.  Has history of RUKHSANA but has difficulty getting tangled in the tubing at night so she doesn't use it.       Objective   Vital Signs:   /76   Pulse 84   Ht 165.1 cm (65\")   Wt 99.4 kg (219 lb 3.2 oz)   BMI 36.48 kg/m²     Physical Exam  HENT:      Head: Normocephalic.   Pulmonary:      Effort: Pulmonary effort is normal.   Neurological:      Mental Status: She is alert and oriented to person, place, and time.      Cranial Nerves: Cranial nerves are intact.      Sensory: Sensation is intact.      Motor: Motor function is intact.      Coordination: Coordination is intact.      Deep Tendon Reflexes: Reflexes are normal and symmetric.        Neurologic Exam     Mental Status   Oriented to person, place, and time.        Result Review :   CBC:  Lab Results   Component Value Date    WBC 10.16 04/19/2022    RBC 4.20 04/19/2022    HGB 12.4 04/19/2022    HCT 35.7 04/19/2022    MCV 85.0 04/19/2022    MCH 29.5 04/19/2022    MCHC 34.7 04/19/2022    RDW 15.5 (H) 04/19/2022     04/19/2022     CMP:  Lab Results   Component Value Date    BUN 11 04/19/2022    CREATININE 0.85 04/19/2022    EGFRIFAFRI 102 01/27/2022     04/19/2022    K 3.6 " 04/19/2022     04/19/2022    CALCIUM 9.4 04/19/2022    ALBUMIN 4.00 04/18/2022    BILITOT 0.9 04/17/2022    ALKPHOS 116 04/17/2022    AST 43 (H) 04/17/2022    ALT 49 (H) 04/17/2022     B12:   Lab Results   Component Value Date    APAMNXJO81 946 03/23/2022      FOLATE:   Lab Results   Component Value Date    FOLATE >20.00 03/23/2022             MoCA: 27/30    Assessment and Plan    Diagnoses and all orders for this visit:    1. Memory loss (Primary)  Assessment & Plan:  Will order MRI brain for further evaluation of memory difficulty.  MoCA intact.     Orders:  -     MRI Brain With & Without Contrast; Future    2. RUKHSANA (obstructive sleep apnea)  Assessment & Plan:  Discussed correlation between unmanaged RUKHSANA and memory loss.  Will refer to sleep medicine.     Orders:  -     Ambulatory Referral to Sleep Medicine    I spent 40 minutes caring for Anika on this date of service. This time includes time spent by me in the following activities:preparing for the visit, reviewing tests, obtaining and/or reviewing a separately obtained history, performing a medically appropriate examination and/or evaluation , counseling and educating the patient/family/caregiver, ordering medications, tests, or procedures, documenting information in the medical record and independently interpreting results and communicating that information with the patient/family/caregiver  Follow Up   No follow-ups on file.  Patient was given instructions and counseling regarding her condition or for health maintenance advice. Please see specific information pulled into the AVS if appropriate.

## 2022-05-05 PROBLEM — G47.33 OBSTRUCTIVE SLEEP APNEA SYNDROME: Status: RESOLVED | Noted: 2017-04-10 | Resolved: 2022-05-05

## 2022-05-05 PROBLEM — G47.33 OSA (OBSTRUCTIVE SLEEP APNEA): Status: ACTIVE | Noted: 2022-05-05

## 2022-05-05 PROBLEM — R41.3 MEMORY LOSS: Status: ACTIVE | Noted: 2022-05-05

## 2022-05-05 NOTE — PROGRESS NOTES
Chief Complaint: Post-op Follow-up    Subjective      Follow-up visit   {Problem List  Visit Diagnosis   Encounters  Notes  Medications  Labs  Result Review Imaging  Media :23}    History of Present Illness  Anika Casillas is a 61 y.o. female presents to [facility] for a follow-up visit.    Patient underwent an exploratory laparotomy with lysis of adhesions and resection of necrotic colon on 4/16/2022 performed by Dr. Cande Stanley.    Patient admits to tolerating her diet well with no nausea.  Having bowel movements without difficulty.    Pathology:  Clinical Information    Comment:    Pneumoperitoneum of unknown etiology      Final Diagnosis   Necrotic colon and omentum, resection:               -Unremarkable omentum               -1 benign lymph node               -Colon not present for evaluation   Electronically signed by Barb Fraser MD      Patient does report a yeast infection since being on IV antibiotics at the hospital.  I will treat her with Diflucan.    Objective     Past Medical History:   Diagnosis Date   • Atrial fibrillation (HCC)    • Diabetes mellitus (HCC)    • Gastroparesis    • GERD (gastroesophageal reflux disease) 1995   • Hypertension    • Irritable bowel syndrome    • BERKOWITZ (nonalcoholic steatohepatitis)    • Obstructive sleep apnea syndrome    • S/P exploratory laparotomy, lysis of adhesions, resection of necrotic colon without anastomosis 4/18/2022       Past Surgical History:   Procedure Laterality Date   • BLADDER SLING MODIFIED, ANTERIOR AND POSTERIOR VAGINAL REPAIR     • BREAST LUMPECTOMY     • CARDIAC CATHETERIZATION     • CHOLECYSTECTOMY     • EXPLORATORY LAPAROTOMY N/A 4/16/2022    Procedure: EXPLORATORY LAPAROTOMY, LYSIS OF ADHESIONS, RESECTION OF NECTROIC COLON;  Surgeon: Cande Stanley MD;  Location: [facility];  Service: General;  Laterality: N/A;   • GALLBLADDER SURGERY     • TUBAL ABDOMINAL LIGATION    "      Outpatient Medications Marked as Taking for the 5/4/22 encounter (Office Visit) with Leyla ClarosCHU   Medication Sig Dispense Refill   • albuterol sulfate  (90 Base) MCG/ACT inhaler Inhale 2 puffs Every 4 (Four) Hours As Needed.     • amLODIPine (NORVASC) 10 MG tablet Take 10 mg by mouth Daily.     • apixaban (ELIQUIS) 5 MG tablet tablet Take 1 tablet by mouth 2 (Two) Times a Day. 180 tablet 1   • B-D UF III MINI PEN NEEDLES 31G X 5 MM misc      • Blood Glucose Monitoring Suppl (Accu-Chek Guide) w/Device kit      • buPROPion XL (WELLBUTRIN XL) 150 MG 24 hr tablet Take 150 mg by mouth 2 (Two) Times a Day.     • cholecalciferol (VITAMIN D3) 25 MCG (1000 UT) tablet Take 2,000 Units by mouth Daily.     • dicyclomine (BENTYL) 20 MG tablet Take 1 tablet by mouth Every 8 (Eight) Hours As Needed (Abdominal pain and cramping). 30 tablet 0   • empagliflozin (JARDIANCE) 25 MG tablet tablet Take 25 mg by mouth Daily.     • famotidine (PEPCID) 40 MG tablet Take 1 tablet by mouth At Night As Needed for Heartburn. 90 tablet 2   • fluconazole (DIFLUCAN) 150 MG tablet      • Fluticasone-Umeclidin-Vilant (Trelegy Ellipta) 100-62.5-25 MCG/INH inhaler Inhale 1 puff Daily. 2 each 0   • glucose blood test strip Check blood glucose twice daily 60 each 12   • glucose monitor monitoring kit 1 each Daily. Patient prefers Accu Chek Meter. 1 each 0   • hydrOXYzine (ATARAX) 25 MG tablet Take 25 mg by mouth 2 (Two) Times a Day As Needed.     • ibuprofen (ADVIL,MOTRIN) 800 MG tablet Take 800 mg by mouth 3 (Three) Times a Day As Needed.     • Insulin Glargine, 2 Unit Dial, (Toujeo Max SoloStar) 300 UNIT/ML solution pen-injector injection Inject 120 Units under the skin into the appropriate area as directed Daily.     • Insulin Pen Needle (Pen Needles 3/16\") 31G X 5 MM misc      • Insulin Pen Needle 31G X 5 MM misc by Other route.     • Lancets (accu-chek soft touch) lancets Check blood sugar twice daily 100 each 12   • metoprolol " "tartrate (LOPRESSOR) 50 MG tablet Take 75 mg by mouth 2 (Two) Times a Day. TAKE 1 1/2 TABLETS BID     • multivitamin with minerals tablet tablet Take 1 tablet by mouth Daily.     • NovoLOG FlexPen 100 UNIT/ML solution pen-injector sc pen Inject 60 Units under the skin into the appropriate area as directed 2 (Two) Times a Day.     • olmesartan (BENICAR) 20 MG tablet Take 20 mg by mouth Daily.     • pantoprazole (PROTONIX) 40 MG EC tablet Take 1 tablet by mouth Daily. 90 tablet 2   • polyethylene glycol (MIRALAX) 17 g packet Take 17 g by mouth 2 (Two) Times a Day.     • polyethylene glycol (MIRALAX) 17 g packet Take 17 g by mouth Daily. 21 packet 0   • vitamin C (ASCORBIC ACID) 250 MG tablet Take 250 mg by mouth Daily.         Allergies   Allergen Reactions   • Liraglutide Nausea And Vomiting   • Metformin Nausea Only     GI Upset        Family History   Adopted: Yes   Family history unknown: Yes       Social History     Socioeconomic History   • Marital status:    Tobacco Use   • Smoking status: Current Every Day Smoker     Packs/day: 0.25     Years: 41.00     Pack years: 10.25     Types: Cigarettes     Start date: 6/17/1975   • Smokeless tobacco: Never Used   Vaping Use   • Vaping Use: Never used   Substance and Sexual Activity   • Alcohol use: Yes     Alcohol/week: 1.0 standard drink     Types: 1 Drinks containing 0.5 oz of alcohol per week     Comment: 1 a month   • Drug use: Never   • Sexual activity: Defer     Partners: Male     Birth control/protection: Surgical       Review of Systems   Constitutional: Negative for chills and fever.   Gastrointestinal: Negative for abdominal distention, abdominal pain, anal bleeding, blood in stool, constipation, diarrhea and rectal pain.        Vital Signs:   Pulse 84   Resp 18   Ht 165.1 cm (65\")   Wt 98.4 kg (217 lb)   BMI 36.11 kg/m²      Physical Exam  Constitutional:       Appearance: She is obese.   HENT:      Head: Normocephalic.   Cardiovascular:      Rate " and Rhythm: Normal rate.   Pulmonary:      Effort: Pulmonary effort is normal.   Abdominal:      Palpations: Abdomen is soft.      Tenderness: There is abdominal tenderness.      Comments: Abdomen midline incision is clean dry and intact without erythema.  Staples are present.    Today I have removed all staples and applied Steri-Strips.   Skin:     General: Skin is warm and dry.   Neurological:      General: No focal deficit present.      Mental Status: She is alert and oriented to person, place, and time.   Psychiatric:         Mood and Affect: Mood normal.         Thought Content: Thought content normal.          Result Review :          []  Laboratory  []  Radiology  [x]  Pathology  []  Microbiology  []  EKG/Telemetry   []  Cardiology/Vascular   [x]  Old records  Today I reviewed Dr. Stanley's previous operative and pathology report.     Assessment and Plan    Diagnoses and all orders for this visit:    1. S/P exploratory laparotomy, lysis of adhesions, resection of necrotic colon without anastomosis (Primary)    Other orders  -     fluconazole (Diflucan) 100 MG tablet; Take 1 tablet by mouth Daily for 3 days.  Dispense: 3 tablet; Refill: 0        Follow Up   Return if symptoms worsen or fail to improve.     Seek medical emergency services:  Fever greater than 101 associated with chills.  Extreme pain.    Patient was given instructions and counseling regarding her condition or for health maintenance advice. Please see specific information pulled into the AVS if appropriate.

## 2022-05-11 ENCOUNTER — OFFICE VISIT (OUTPATIENT)
Dept: SLEEP MEDICINE | Facility: HOSPITAL | Age: 62
End: 2022-05-11

## 2022-05-11 VITALS
OXYGEN SATURATION: 98 % | SYSTOLIC BLOOD PRESSURE: 157 MMHG | HEART RATE: 80 BPM | BODY MASS INDEX: 36.32 KG/M2 | HEIGHT: 65 IN | WEIGHT: 218 LBS | TEMPERATURE: 96 F | DIASTOLIC BLOOD PRESSURE: 80 MMHG

## 2022-05-11 DIAGNOSIS — G47.8 NON-RESTORATIVE SLEEP: ICD-10-CM

## 2022-05-11 DIAGNOSIS — G47.30 OBSERVED SLEEP APNEA: Primary | ICD-10-CM

## 2022-05-11 DIAGNOSIS — I10 ESSENTIAL HYPERTENSION: ICD-10-CM

## 2022-05-11 DIAGNOSIS — E66.09 CLASS 2 OBESITY DUE TO EXCESS CALORIES WITHOUT SERIOUS COMORBIDITY WITH BODY MASS INDEX (BMI) OF 36.0 TO 36.9 IN ADULT: ICD-10-CM

## 2022-05-11 DIAGNOSIS — G47.10 HYPERSOMNIA: ICD-10-CM

## 2022-05-11 DIAGNOSIS — R06.83 SNORING: ICD-10-CM

## 2022-05-11 PROCEDURE — 99204 OFFICE O/P NEW MOD 45 MIN: CPT | Performed by: INTERNAL MEDICINE

## 2022-05-11 PROCEDURE — G0463 HOSPITAL OUTPT CLINIC VISIT: HCPCS | Performed by: INTERNAL MEDICINE

## 2022-05-11 NOTE — PROGRESS NOTES
[address] [address]  Phone: [phone]  Fax: [phone]      Anika Casillas  [id number]   1960  61 y.o.  female      PCP:Elena Henderson PA    Type of service: Initial New Patient Office Visit  Date of service: 5/11/2022    Chief Complaint   Patient presents with   • Witnessed Apnea   • Snoring   • Non-restorative Sleep   • Fatigue   • Daytime Sleepiness   • Obesity   • Dry Mouth       History of present illness;  Anika Casillas 61 y.o.  is a new patient for me and was seen today for sleep related problems of snoring, non-restorative sleep and witnessed apneas. The symptoms are present for 10 years and they are persistent in nature.  The snoring is present in all positions and it is loud.  Patient has no prior surgery namely tonsillectomy, nasal surgery and UPPP.     She recently moved to [place] to be closer to her family.  She is working in hospice in the office.  She reports that she feels tired and exhausted.    Patient gives the following sleep history.  Sleep schedule:  Bedtime: 9 PM  Wake time: 5 AM  Normally takes about 30-60 minutes to fall asleep  Average hours of sleep 5-6  Number of naps per day none  Symptoms  In addition to snoring, nonrestorative sleep and witnessed apneas patient gives the following associated symptoms.  Have you ever awakened gasping for breath, coughing, choking: Yes   Change in weight,  No   Morning headaches  No   Awaken with a sore throat or dry mouth  Yes   Leg jerking at night:  Yes   Crawly feeling/urge sensation to move in the legs: No   Teeth grinding:No   Have you ever awakened at night with a sour taste or burning sensation in your chest:  Yes   Do you have muscle weakness with laughing or anger or sleep paralysis:  No   Have you ever felt paralyzed while going to sleep or waking up:  No   Sleepwalking, nightmares, No   Nocturia (urination at night): 2 times per night  Memory Problem:No  "    Past medical history: (Relevant to sleep medicine)  1. Diabetes mellitus  2. GERD  3. Atrial fibrillation  4. Hypertension  5. Depression    • Medications are reviewed by me and documented in the encounter  • Allergies reviewed and documented in encounter    Social history:  Do you drive a commercial vehicle:  No   Shift work:  No   Tobacco use:  Yes   Alcohol use:  0 per week  Caffeinated drinks: 2    FAMILY HISTORY (Your mother, father, brothers and sisters)  1. No history of sleep apnea    REVIEW OF SYSTEMS.  Full review of systems available on the intake form which is scanned in the media tab.  The relevant positive are noted below  1. Daytime excessive sleepiness with [place] Sleepiness Scale :Total score: 9   2. Snoring  3. Frequent urination  4. Fatigue  5. Depression  6. Heartburn      Physical exam:  Vitals:    05/11/22 0900   BP: 157/80   Pulse: 80   Temp: 96 °F (35.6 °C)   SpO2: 98%   Weight: 98.9 kg (218 lb)   Height: 165.1 cm (65\")    Body mass index is 36.28 kg/m².    HEENT: Head is atraumatic, normocephalic  Eyes: pupils are round equal and reacting to light and accommodation, conjunctiva normal  Nose: no nasal septal defects or deviation and the nasal passages are clear, no nasal polyps,  Throat: tonsils are nonenlarged, tongue normal, oral airway Mallampati class 3  NECK: , trachea is in the midline, thyroid not enlarged  RESPIRATORY SYSTEM: Breath sounds are equal on both sides, there are no wheezes   CARDIOVASULAR SYSTEM: Heart sounds are regular rhythm and guilherme rate, no edema  EXTREMITES: No cyanosis, clubbing  NEUROLOGICAL SYSTEM: Oriented x 3, no gross motor defects, gait normal    Labs reviewed.  TSH Results:  TSH    TSH 11/19/21 3/23/22   TSH 2.060 2.660            Most Recent A1C    HGBA1C Most Recent 11/19/21   Hemoglobin A1C 8.39 (A)   (A) Abnormal value               Assessment and plan:  · Witnessed apnea (R06.81) patient's symptoms and examination is consistent with sleep apnea " (G47.30)  I have talked to the patient about the signs and symptoms of sleep apnea. In addition, I have also discussed pathophysiology of sleep apnea.  I also discussed the complications of untreated sleep apnea including effects on hypertension, diabetes mellitus and nonrestorative sleep with hypersomnia which can increase risk for motor vehicle accidents.  Untreated sleep apnea is also a risk factor for development of atrial fibrillation, pulmonary hypertension and stroke.  Discussed in detail of various testing methods including home-based and lab based sleep studies.  Based on history and physical examination the most appropriate study is home sleep test.  The order for the sleep study is placed in [facility].  The test will be scheduled after approval from insurance. Treatment and management will be discussed after the test is completed.  Patient was given opportunity to ask questions and all the questions were answered.   · Snoring (R06.83) snoring is the sound created by turbulent airflow vibrating upper airway soft tissue.  I have also discussed factors affecting snoring including sleep deprivation, sleeping on the back and alcohol ingestion. To minimize snoring, patient is advised to have adequate sleep, sleep on the side and avoid alcohol and sedative medications before bedtime  · Daytime excessive sleepiness .  It was assessed with [place] Sleepiness Scale of Total score: 9.  There are many causes for daytime excessive sleepiness including sleep depression, shiftwork syndrome, depression and other medical disorders including heart, kidney and liver failure.  The most serious cause of excessive sleepiness is due to neurological conditions like narcolepsy/cataplexy.  But the most common cause of excessive sleepiness is due to sleep apnea with frequent awakenings during sleep time.  I have discussed safety of driving and to remain vigilant while driving.  · Obesity 2, with BMI Body mass index is 36.28 kg/m².. I  have discussed the relationship between weight and sleep apnea.There is direct correlation between weight and severity of sleep apnea.  Weight reduction is encouraged, as it is going to reduce the severity of sleep apnea. I have also discussed with the patient diet and exercise to achieve ideal body weigh    I have also discussed with the patient the following  • Sleep hygiene: Maintaining a regular bedtime and wake time, not to watch television or work in bed, limit caffeine-containing beverages before bed time and avoid naps during the day  • Adequate amount of sleep.  Generally most people needs about 7 to 8 hours of sleep.  • Return for 31 to 90 days after PAP setup with down load..  Patient's questions were answered.        Luis Johnson MD  Sleep Medicine.  Medical Director, [facility] sleep [facility]  5/11/2022 ,

## 2022-05-20 ENCOUNTER — APPOINTMENT (OUTPATIENT)
Dept: MRI IMAGING | Facility: HOSPITAL | Age: 62
End: 2022-05-20

## 2022-05-23 ENCOUNTER — TELEPHONE (OUTPATIENT)
Dept: GASTROENTEROLOGY | Facility: CLINIC | Age: 62
End: 2022-05-23

## 2022-05-23 NOTE — TELEPHONE ENCOUNTER
I have called patient and left a detailed message for an upcoming procedure including date, time and a good callback number for any questions.   Mychart reminder also sent.

## 2022-05-24 ENCOUNTER — HOSPITAL ENCOUNTER (OUTPATIENT)
Dept: SLEEP MEDICINE | Facility: HOSPITAL | Age: 62
Discharge: HOME | End: 2022-05-24
Admitting: INTERNAL MEDICINE

## 2022-05-24 DIAGNOSIS — G47.30 OBSERVED SLEEP APNEA: ICD-10-CM

## 2022-05-24 DIAGNOSIS — E66.09 CLASS 2 OBESITY DUE TO EXCESS CALORIES WITHOUT SERIOUS COMORBIDITY WITH BODY MASS INDEX (BMI) OF 36.0 TO 36.9 IN ADULT: ICD-10-CM

## 2022-05-24 DIAGNOSIS — G47.10 HYPERSOMNIA: ICD-10-CM

## 2022-05-24 DIAGNOSIS — I10 ESSENTIAL HYPERTENSION: ICD-10-CM

## 2022-05-24 DIAGNOSIS — R06.83 SNORING: ICD-10-CM

## 2022-05-24 DIAGNOSIS — G47.8 NON-RESTORATIVE SLEEP: ICD-10-CM

## 2022-05-24 PROCEDURE — 95806 SLEEP STUDY UNATT&RESP EFFT: CPT

## 2022-05-24 PROCEDURE — 95806 SLEEP STUDY UNATT&RESP EFFT: CPT | Performed by: INTERNAL MEDICINE

## 2022-05-25 ENCOUNTER — TELEPHONE (OUTPATIENT)
Dept: GASTROENTEROLOGY | Facility: CLINIC | Age: 62
End: 2022-05-25

## 2022-05-25 DIAGNOSIS — R06.83 SNORING: ICD-10-CM

## 2022-05-25 DIAGNOSIS — G47.33 OSA (OBSTRUCTIVE SLEEP APNEA): Primary | ICD-10-CM

## 2022-05-25 NOTE — TELEPHONE ENCOUNTER
Patient left a phone message at [phone], stating she is schedule for a scope on 6/3/22 at 6 am, she also has an MRI schedule for 6/3/22 at 6 pm. She would like to know does she need to reschedule the MRI. She would like a call back as soon as possible.

## 2022-05-25 NOTE — TELEPHONE ENCOUNTER
I would not recommend that these are done on the same day as she will go a very long time without eating anything however there is not any contraindications on my end just because she is getting a scope that morning.

## 2022-05-26 ENCOUNTER — TELEPHONE (OUTPATIENT)
Dept: SLEEP MEDICINE | Facility: HOSPITAL | Age: 62
End: 2022-05-26

## 2022-05-26 NOTE — TELEPHONE ENCOUNTER
Called pt - she wants to use [employer] - pt has a newer machine and is going to have [employer] check it out to make sure it is functioning properly - I scheduled her CC visit for 07/27/22 @ 3:00 pm.

## 2022-06-02 NOTE — PAT
Attempted to contact patient regarding PAT.  No answer.  Left message with arrival time of 0600 on 6/3/22 for an esophagogastroduodenoscopy with Dr. Charles .  Instructed to come to the [facility] entrance to check in. Reinforced NPO after midnight.    Kira Mcnamara RN 10:36 EDT 6/2/2022

## 2022-06-03 ENCOUNTER — ANESTHESIA EVENT (OUTPATIENT)
Dept: GASTROENTEROLOGY | Facility: HOSPITAL | Age: 62
End: 2022-06-03

## 2022-06-03 ENCOUNTER — HOSPITAL ENCOUNTER (OUTPATIENT)
Dept: MRI IMAGING | Facility: HOSPITAL | Age: 62
Discharge: HOME | End: 2022-06-03

## 2022-06-03 ENCOUNTER — HOSPITAL ENCOUNTER (OUTPATIENT)
Facility: HOSPITAL | Age: 62
Setting detail: HOSPITAL OUTPATIENT SURGERY
Discharge: HOME | End: 2022-06-03
Attending: INTERNAL MEDICINE | Admitting: INTERNAL MEDICINE

## 2022-06-03 ENCOUNTER — ANESTHESIA (OUTPATIENT)
Dept: GASTROENTEROLOGY | Facility: HOSPITAL | Age: 62
End: 2022-06-03

## 2022-06-03 VITALS
DIASTOLIC BLOOD PRESSURE: 78 MMHG | RESPIRATION RATE: 16 BRPM | HEART RATE: 79 BPM | WEIGHT: 218.26 LBS | OXYGEN SATURATION: 98 % | SYSTOLIC BLOOD PRESSURE: 144 MMHG | BODY MASS INDEX: 36.32 KG/M2 | TEMPERATURE: 96.4 F

## 2022-06-03 DIAGNOSIS — I48.0 PAROXYSMAL ATRIAL FIBRILLATION: Chronic | ICD-10-CM

## 2022-06-03 DIAGNOSIS — R41.3 MEMORY LOSS: ICD-10-CM

## 2022-06-03 DIAGNOSIS — K22.70 BARRETT'S ESOPHAGUS WITHOUT DYSPLASIA: ICD-10-CM

## 2022-06-03 DIAGNOSIS — I85.10 SECONDARY ESOPHAGEAL VARICES WITHOUT BLEEDING: ICD-10-CM

## 2022-06-03 LAB
CREAT BLDA-MCNC: 0.7 MG/DL
EGFRCR SERPLBLD CKD-EPI 2021: 99.8 ML/MIN/1.73
GLUCOSE BLDC GLUCOMTR-MCNC: 221 MG/DL (ref 70–99)

## 2022-06-03 PROCEDURE — 0 GADOBENATE DIMEGLUMINE 529 MG/ML SOLUTION: Performed by: NURSE PRACTITIONER

## 2022-06-03 PROCEDURE — 88305 TISSUE EXAM BY PATHOLOGIST: CPT | Performed by: INTERNAL MEDICINE

## 2022-06-03 PROCEDURE — 82565 ASSAY OF CREATININE: CPT

## 2022-06-03 PROCEDURE — 82962 GLUCOSE BLOOD TEST: CPT

## 2022-06-03 PROCEDURE — 70553 MRI BRAIN STEM W/O & W/DYE: CPT

## 2022-06-03 PROCEDURE — A9577 INJ MULTIHANCE: HCPCS | Performed by: NURSE PRACTITIONER

## 2022-06-03 PROCEDURE — 43239 EGD BIOPSY SINGLE/MULTIPLE: CPT | Performed by: INTERNAL MEDICINE

## 2022-06-03 RX ORDER — SODIUM CHLORIDE, SODIUM LACTATE, POTASSIUM CHLORIDE, CALCIUM CHLORIDE 600; 310; 30; 20 MG/100ML; MG/100ML; MG/100ML; MG/100ML
INJECTION, SOLUTION INTRAVENOUS CONTINUOUS PRN
Status: DISCONTINUED | OUTPATIENT
Start: 2022-06-03 | End: 2022-06-03 | Stop reason: SURG

## 2022-06-03 RX ORDER — SODIUM CHLORIDE, SODIUM LACTATE, POTASSIUM CHLORIDE, CALCIUM CHLORIDE 600; 310; 30; 20 MG/100ML; MG/100ML; MG/100ML; MG/100ML
30 INJECTION, SOLUTION INTRAVENOUS CONTINUOUS
Status: DISCONTINUED | OUTPATIENT
Start: 2022-06-03 | End: 2022-06-03 | Stop reason: HOSPADM

## 2022-06-03 RX ADMIN — SODIUM CHLORIDE, POTASSIUM CHLORIDE, SODIUM LACTATE AND CALCIUM CHLORIDE 30 ML/HR: 600; 310; 30; 20 INJECTION, SOLUTION INTRAVENOUS at 07:03

## 2022-06-03 RX ADMIN — GADOBENATE DIMEGLUMINE 20 ML: 529 INJECTION, SOLUTION INTRAVENOUS at 18:50

## 2022-06-03 RX ADMIN — SODIUM CHLORIDE, POTASSIUM CHLORIDE, SODIUM LACTATE AND CALCIUM CHLORIDE: 600; 310; 30; 20 INJECTION, SOLUTION INTRAVENOUS at 07:35

## 2022-06-03 NOTE — ANESTHESIA POSTPROCEDURE EVALUATION
Patient: Anika Casillas    Procedure Summary     Date: 06/03/22 Room / Location: [facility] ENDOSCOPY 1 / [facility] ENDOSCOPY    Anesthesia Start: 0717 Anesthesia Stop: 0739    Procedure: ESOPHAGOGASTRODUODENOSCOPY (N/A ) Diagnosis:       Colindres's esophagus without dysplasia      Secondary esophageal varices without bleeding (HCC)      (Colindres's esophagus without dysplasia [K22.70])      (Secondary esophageal varices without bleeding (HCC) [I85.10])    Surgeons: Shannan Charles MD Provider: Jace Puente MD    Anesthesia Type: MAC, general ASA Status: 3          Anesthesia Type: MAC, general    Vitals  Vitals Value Taken Time   /70 06/03/22 0745   Temp 35.8 °C (96.4 °F) 06/03/22 0739   Pulse 83 06/03/22 0748   Resp 20 06/03/22 0745   SpO2 94 % 06/03/22 0748   Vitals shown include unvalidated device data.        Post Anesthesia Care and Evaluation    Patient location during evaluation: bedside  Patient participation: complete - patient participated  Level of consciousness: awake  Pain management: adequate  Airway patency: patent  Anesthetic complications: No anesthetic complications  PONV Status: none  Cardiovascular status: acceptable and stable  Respiratory status: acceptable and room air  Hydration status: acceptable    Comments: An Anesthesiologist personally participated in the most demanding procedures (including induction and emergence if applicable) in the anesthesia plan, monitored the course of anesthesia administration at frequent intervals and remained physically present and available for immediate diagnosis and treatment of emergencies.

## 2022-06-03 NOTE — ANESTHESIA PREPROCEDURE EVALUATION
Anesthesia Evaluation     Patient summary reviewed and Nursing notes reviewed   NPO Solid Status: > 6 hours  NPO Liquid Status: > 6 hours           Airway   Mallampati: III  TM distance: >3 FB  Dental      Pulmonary - normal exam   (+) COPD, sleep apnea,   Cardiovascular - normal exam  Exercise tolerance: good (4-7 METS)    (+) hypertension, dysrhythmias,       Neuro/Psych  GI/Hepatic/Renal/Endo    (+) morbid obesity, GERD,  liver disease, diabetes mellitus,     Musculoskeletal     Abdominal    Substance History      OB/GYN          Other                        Anesthesia Plan    ASA 3     MAC and general     intravenous induction     Anesthetic plan, all risks, benefits, and alternatives have been provided, discussed and informed consent has been obtained with: patient.        CODE STATUS:

## 2022-06-03 NOTE — H&P
Pre Procedure History & Physical    Chief Complaint:   Colindres's esophagus, cirrhosis, nausea, vomiting    Subjective     HPI:   62 yo F here for eval of Colindres's esophagus, cirrhosis, nausea, vomiting.    Past Medical History:   Past Medical History:   Diagnosis Date   • Atrial fibrillation (HCC)    • Diabetes mellitus (HCC)    • Gastroparesis    • GERD (gastroesophageal reflux disease) 1995   • Hypertension    • Irritable bowel syndrome    • BERKOWITZ (nonalcoholic steatohepatitis)    • Obstructive sleep apnea syndrome    • S/P exploratory laparotomy, lysis of adhesions, resection of necrotic colon without anastomosis 4/18/2022       Past Surgical History:  Past Surgical History:   Procedure Laterality Date   • BLADDER SLING MODIFIED, ANTERIOR AND POSTERIOR VAGINAL REPAIR     • BREAST LUMPECTOMY     • CARDIAC CATHETERIZATION     • CHOLECYSTECTOMY     • EXPLORATORY LAPAROTOMY N/A 4/16/2022    Procedure: EXPLORATORY LAPAROTOMY, LYSIS OF ADHESIONS, RESECTION OF NECTROIC COLON;  Surgeon: Cande Stanley MD;  Location: [facility] OR;  Service: General;  Laterality: N/A;   • GALLBLADDER SURGERY     • TUBAL ABDOMINAL LIGATION         Family History:  Family History   Adopted: Yes   Family history unknown: Yes       Social History:   reports that she has been smoking cigarettes. She started smoking about 46 years ago. She has a 10.25 pack-year smoking history. She has never used smokeless tobacco. She reports current alcohol use of about 1.0 standard drink of alcohol per week. She reports that she does not use drugs.    Medications:   Medications Prior to Admission   Medication Sig Dispense Refill Last Dose   • amLODIPine (NORVASC) 10 MG tablet Take 10 mg by mouth Daily.   6/2/2022 at Unknown time   • buPROPion XL (WELLBUTRIN XL) 150 MG 24 hr tablet Take 150 mg by mouth 2 (Two) Times a Day.   6/2/2022 at Unknown time   • cholecalciferol (VITAMIN D3) 25 MCG (1000 UT) tablet Take 2,000 Units by mouth Daily.   6/2/2022 at  Unknown time   • empagliflozin (JARDIANCE) 25 MG tablet tablet Take 25 mg by mouth Daily.   6/2/2022 at Unknown time   • famotidine (PEPCID) 40 MG tablet Take 1 tablet by mouth At Night As Needed for Heartburn. 90 tablet 2 Past Week at Unknown time   • Fluticasone-Umeclidin-Vilant (Trelegy Ellipta) 100-62.5-25 MCG/INH inhaler Inhale 1 puff Daily. 2 each 0 6/2/2022 at Unknown time   • Insulin Glargine, 2 Unit Dial, (Toujeo Max SoloStar) 300 UNIT/ML solution pen-injector injection Inject 120 Units under the skin into the appropriate area as directed Daily.   6/2/2022 at Unknown time   • metoprolol tartrate (LOPRESSOR) 50 MG tablet Take 75 mg by mouth 2 (Two) Times a Day. TAKE 1 1/2 TABLETS BID   6/2/2022 at 2000   • multivitamin with minerals tablet tablet Take 1 tablet by mouth Daily.   6/2/2022 at Unknown time   • NovoLOG FlexPen 100 UNIT/ML solution pen-injector sc pen Inject 60 Units under the skin into the appropriate area as directed 2 (Two) Times a Day.   6/2/2022 at Unknown time   • olmesartan (BENICAR) 20 MG tablet Take 20 mg by mouth Daily.   6/2/2022 at Unknown time   • pantoprazole (PROTONIX) 40 MG EC tablet Take 1 tablet by mouth Daily. 90 tablet 2 6/2/2022 at Unknown time   • polyethylene glycol (MIRALAX) 17 g packet Take 17 g by mouth Daily. 21 packet 0 Past Week at Unknown time   • vitamin C (ASCORBIC ACID) 250 MG tablet Take 250 mg by mouth Daily.   Past Month at Unknown time   • albuterol sulfate  (90 Base) MCG/ACT inhaler Inhale 2 puffs Every 4 (Four) Hours As Needed.   More than a month at Unknown time   • apixaban (ELIQUIS) 5 MG tablet tablet Take 1 tablet by mouth 2 (Two) Times a Day. 180 tablet 1 5/29/2022   • B-D UF III MINI PEN NEEDLES 31G X 5 MM misc       • Blood Glucose Monitoring Suppl (Accu-Chek Guide) w/Device kit       • glucose blood test strip Check blood glucose twice daily 60 each 12    • glucose monitor monitoring kit 1 each Daily. Patient prefers Accu Chek Meter. 1 each 0   "  • hydrOXYzine (ATARAX) 25 MG tablet Take 25 mg by mouth 2 (Two) Times a Day As Needed.   More than a month at Unknown time   • ibuprofen (ADVIL,MOTRIN) 800 MG tablet Take 800 mg by mouth 3 (Three) Times a Day As Needed.   More than a month at Unknown time   • Insulin Pen Needle (Pen Needles 3/16\") 31G X 5 MM misc       • Insulin Pen Needle 31G X 5 MM misc by Other route.      • Lancets (accu-chek soft touch) lancets Check blood sugar twice daily 100 each 12    • polyethylene glycol (MIRALAX) 17 g packet Take 17 g by mouth 2 (Two) Times a Day.          Allergies:  Liraglutide and Metformin    ROS:    Pertinent items are noted in HPI     Objective     Blood pressure 113/74, pulse 80, temperature 97.2 °F (36.2 °C), temperature source Temporal, resp. rate 20, weight 99 kg (218 lb 4.1 oz), SpO2 96 %.    Physical Exam   Constitutional: Pt is oriented to person, place, and time and well-developed, well-nourished, and in no distress.   Mouth/Throat: Oropharynx is clear and moist.   Neck: Normal range of motion.   Cardiovascular: Normal rate, regular rhythm and normal heart sounds.    Pulmonary/Chest: Effort normal and breath sounds normal.   Abdominal: Soft. Nontender  Skin: Skin is warm and dry.   Psychiatric: Mood, memory, affect and judgment normal.     Assessment & Plan     Diagnosis:  Colindres's esophagus, cirrhosis, nausea, vomiting    Anticipated Surgical Procedure:  EGD    The risks, benefits, and alternatives of this procedure have been discussed with the patient or the responsible party- the patient understands and agrees to proceed.          "

## 2022-06-07 ENCOUNTER — TELEPHONE (OUTPATIENT)
Dept: GASTROENTEROLOGY | Facility: CLINIC | Age: 62
End: 2022-06-07

## 2022-06-07 NOTE — TELEPHONE ENCOUNTER
Spoke to pt and informed of Julian murillo. Pt verified understanding and acknowledgement of f/u apt scheduled on 06/30/2022.

## 2022-06-07 NOTE — TELEPHONE ENCOUNTER
----- Message from CHU Emery sent at 6/7/2022  9:22 AM EDT -----  Stomach biopsy consistent with chronic inactive gastritis.  Continue PPI and avoid NSAIDs.  Follow-up already scheduled.

## 2022-06-30 ENCOUNTER — OFFICE VISIT (OUTPATIENT)
Dept: GASTROENTEROLOGY | Facility: CLINIC | Age: 62
End: 2022-06-30

## 2022-06-30 VITALS
SYSTOLIC BLOOD PRESSURE: 138 MMHG | HEIGHT: 65 IN | DIASTOLIC BLOOD PRESSURE: 64 MMHG | WEIGHT: 220 LBS | BODY MASS INDEX: 36.65 KG/M2 | HEART RATE: 74 BPM

## 2022-06-30 DIAGNOSIS — I85.10 SECONDARY ESOPHAGEAL VARICES WITHOUT BLEEDING: Primary | ICD-10-CM

## 2022-06-30 DIAGNOSIS — K74.69 OTHER CIRRHOSIS OF LIVER: ICD-10-CM

## 2022-06-30 DIAGNOSIS — K31.84 GASTROPARESIS: ICD-10-CM

## 2022-06-30 DIAGNOSIS — K22.70 BARRETT'S ESOPHAGUS WITHOUT DYSPLASIA: ICD-10-CM

## 2022-06-30 DIAGNOSIS — K76.82 HEPATIC ENCEPHALOPATHY: ICD-10-CM

## 2022-06-30 DIAGNOSIS — R11.2 NAUSEA AND VOMITING, UNSPECIFIED VOMITING TYPE: ICD-10-CM

## 2022-06-30 PROCEDURE — 99214 OFFICE O/P EST MOD 30 MIN: CPT | Performed by: NURSE PRACTITIONER

## 2022-06-30 RX ORDER — PEPPERMINT OIL 90 MG
25 CAPSULE, DELAYED, AND EXTENDED RELEASE ORAL AS NEEDED
Qty: 20 CAPSULE | Refills: 0 | COMMUNITY
Start: 2022-06-30 | End: 2022-07-01

## 2022-06-30 RX ORDER — SEMAGLUTIDE 1.34 MG/ML
INJECTION, SOLUTION SUBCUTANEOUS
COMMUNITY
Start: 2022-06-07

## 2022-06-30 RX ORDER — FAMOTIDINE 40 MG/1
40 TABLET, FILM COATED ORAL NIGHTLY PRN
Qty: 90 TABLET | Refills: 2 | Status: SHIPPED | OUTPATIENT
Start: 2022-06-30

## 2022-06-30 RX ORDER — PANTOPRAZOLE SODIUM 40 MG/1
40 TABLET, DELAYED RELEASE ORAL DAILY
Qty: 90 TABLET | Refills: 2 | Status: SHIPPED | OUTPATIENT
Start: 2022-06-30

## 2022-06-30 RX ORDER — POLYETHYLENE GLYCOL 3350 17 G/17G
17 POWDER, FOR SOLUTION ORAL 2 TIMES DAILY
Qty: 100 EACH | Refills: 2 | Status: SHIPPED | OUTPATIENT
Start: 2022-06-30

## 2022-06-30 NOTE — PROGRESS NOTES
"  Chief Complaint  EGD and Follow-up (Barretts, Cirrhosis, Nausea )    History of Present Illness  Anika Casillas is a 62 y.o. who presents to [facility] GASTRO[facility]OLOGY for follow up of EGD and Follow-up (Barretts, Cirrhosis, Nausea ).    Ms. Casillas reports that she is having an increase in nausea, belching, and abdominal bloating. Expresses that her burps smell like \"rotten food\".  Recent EGD was performed which was negative for H. pylori.  Biopsies were consistent with chronic gastritis.  Patient also had grade 2 esophageal varices.  There was also a large amount of food retained in stomach.  Patient has a history of gastroparesis.  Prior gastric emptying study was done 3 years ago at [facility] hospital. Heartburn controlled with protonix in the AM and pepcid at night.     Bowel movement at least once daily, fromed stool. Using miralax qd-BID depending on consitency of stool.  Denies any hematochezia or melena.    Admits to having confusion and it is starting to affect her job. Uses an example of talking on the phone and writing the message down however writing something completely different then what she was told.  Reports she is unable to tolerate the lactulose due to GI discomfort.  Denies any ascites, trouble sleeping at night, dysphagia, or lower extremity swelling.    Bowel resection in April due to abnormal imaging.  Exploratory laparotomy performed which did show a small necrotic piece of colon.  General surgeons note states that size was of a nickel.  Patient reports she is healing well after that.    Labs Result Review Imaging    Past Medical History:   Diagnosis Date   • Atrial fibrillation (HCC)    • Colindres's esophagus    • Diabetes mellitus (HCC)    • Esophageal varices (HCC)    • Gastroparesis    • GERD (gastroesophageal reflux disease) 1995   • Hypertension    • Irritable bowel syndrome    • BERKOWITZ (nonalcoholic steatohepatitis)    • Obstructive sleep apnea syndrome    • S/P " exploratory laparotomy, lysis of adhesions, resection of necrotic colon without anastomosis 04/18/2022       Past Surgical History:   Procedure Laterality Date   • BLADDER SLING MODIFIED, ANTERIOR AND POSTERIOR VAGINAL REPAIR     • BREAST LUMPECTOMY     • CARDIAC CATHETERIZATION     • CHOLECYSTECTOMY     • ENDOSCOPY N/A 6/3/2022    Procedure: ESOPHAGOGASTRODUODENOSCOPY;  Surgeon: Shannan Charles MD;  Location: [facility] ENDOSCOPY;  Service: Gastroenterology;  Laterality: N/A;  RETAINED FOOD IN STOMACH  ESOPHAGEAL VARICIES   • EXPLORATORY LAPAROTOMY N/A 4/16/2022    Procedure: EXPLORATORY LAPAROTOMY, LYSIS OF ADHESIONS, RESECTION OF NECTROIC COLON;  Surgeon: Cande Stanley MD;  Location: [facility] MAIN OR;  Service: General;  Laterality: N/A;   • GALLBLADDER SURGERY     • TUBAL ABDOMINAL LIGATION         Current Outpatient Medications on File Prior to Visit   Medication Sig Dispense Refill   • albuterol sulfate  (90 Base) MCG/ACT inhaler Inhale 2 puffs Every 4 (Four) Hours As Needed.     • amLODIPine (NORVASC) 10 MG tablet Take 10 mg by mouth Daily.     • apixaban (ELIQUIS) 5 MG tablet tablet Take 1 tablet by mouth 2 (Two) Times a Day. 180 tablet 1   • B-D UF III MINI PEN NEEDLES 31G X 5 MM misc      • Blood Glucose Monitoring Suppl (Accu-Chek Guide) w/Device kit      • buPROPion XL (WELLBUTRIN XL) 150 MG 24 hr tablet Take 150 mg by mouth 2 (Two) Times a Day.     • cholecalciferol (VITAMIN D3) 25 MCG (1000 UT) tablet Take 2,000 Units by mouth Daily.     • empagliflozin (JARDIANCE) 25 MG tablet tablet Take 25 mg by mouth Daily.     • Fluticasone-Umeclidin-Vilant (Trelegy Ellipta) 100-62.5-25 MCG/INH inhaler Inhale 1 puff Daily. 2 each 0   • glucose blood test strip Check blood glucose twice daily 60 each 12   • glucose monitor monitoring kit 1 each Daily. Patient prefers Accu Chek Meter. 1 each 0   • hydrOXYzine (ATARAX) 25 MG tablet Take 25 mg by mouth 2 (Two) Times a Day As Needed.     • ibuprofen  "(ADVIL,MOTRIN) 800 MG tablet Take 800 mg by mouth 3 (Three) Times a Day As Needed.     • Insulin Glargine, 2 Unit Dial, (Toujeo Max SoloStar) 300 UNIT/ML solution pen-injector injection Inject 120 Units under the skin into the appropriate area as directed Daily.     • Insulin Pen Needle (Pen Needles 3/16\") 31G X 5 MM misc      • Insulin Pen Needle 31G X 5 MM misc by Other route.     • Lancets (accu-chek soft touch) lancets Check blood sugar twice daily 100 each 12   • metoprolol tartrate (LOPRESSOR) 50 MG tablet Take 75 mg by mouth 2 (Two) Times a Day. TAKE 1 1/2 TABLETS BID     • multivitamin with minerals tablet tablet Take 1 tablet by mouth Daily.     • NovoLOG FlexPen 100 UNIT/ML solution pen-injector sc pen Inject 60 Units under the skin into the appropriate area as directed 2 (Two) Times a Day.     • olmesartan (BENICAR) 20 MG tablet Take 20 mg by mouth Daily.     • Ozempic, 0.25 or 0.5 MG/DOSE, 2 MG/1.5ML solution pen-injector      • vitamin C (ASCORBIC ACID) 250 MG tablet Take 250 mg by mouth Daily.     • [DISCONTINUED] famotidine (PEPCID) 40 MG tablet Take 1 tablet by mouth At Night As Needed for Heartburn. 90 tablet 2   • [DISCONTINUED] pantoprazole (PROTONIX) 40 MG EC tablet Take 1 tablet by mouth Daily. 90 tablet 2   • [DISCONTINUED] polyethylene glycol (MIRALAX) 17 g packet Take 17 g by mouth 2 (Two) Times a Day.     • [DISCONTINUED] polyethylene glycol (MIRALAX) 17 g packet Take 17 g by mouth Daily. 21 packet 0     No current facility-administered medications on file prior to visit.       Social History     Social History Narrative    Exercises 1 x week    Lives at home with spouse         Objective     Review of Systems   Constitutional: Negative for appetite change and unexpected weight loss.   Gastrointestinal: Positive for abdominal distention, nausea and vomiting.        Vital Signs:   /64 (BP Location: Right arm, Patient Position: Sitting, Cuff Size: Small Adult)   Pulse 74   Ht 165.1 cm " "(65\")   Wt 99.8 kg (220 lb)   BMI 36.61 kg/m²       Physical Exam  Constitutional:       General: She is not in acute distress.     Appearance: Normal appearance. She is well-developed and normal weight.   HENT:      Head: Normocephalic and atraumatic.   Eyes:      Conjunctiva/sclera: Conjunctivae normal.      Pupils: Pupils are equal, round, and reactive to light.      Visual Fields: Right eye visual fields normal and left eye visual fields normal.   Cardiovascular:      Rate and Rhythm: Normal rate and regular rhythm.      Heart sounds: Normal heart sounds.   Pulmonary:      Effort: Pulmonary effort is normal. No retractions.      Breath sounds: Normal breath sounds and air entry.   Abdominal:      General: Bowel sounds are normal. There is no distension.      Palpations: Abdomen is soft.      Tenderness: There is generalized abdominal tenderness.      Comments: No appreciable hepatosplenomegaly or ascites   Musculoskeletal:         General: Normal range of motion.      Cervical back: Normal range of motion and neck supple.   Skin:     General: Skin is warm and dry.   Neurological:      Mental Status: She is alert and oriented to person, place, and time.   Psychiatric:         Mood and Affect: Mood and affect normal.         Behavior: Behavior normal.         Result Review :   The following data was reviewed by: CHU Emery on 06/30/2022:  CBC w/diff    CBC w/Diff 4/17/22 4/18/22 4/19/22   WBC 12.02 (A) 11.49 (A) 10.16   RBC 4.57 4.21 4.20   Hemoglobin 13.3 12.9 12.4   Hematocrit 38.5 36.0 35.7   MCV 84.2 85.5 85.0   MCH 29.1 30.6 29.5   MCHC 34.5 35.8 (A) 34.7   RDW 15.5 (A) 15.9 (A) 15.5 (A)   Platelets 227 205 223   Neutrophil Rel % 70.8 54.8 54.3   Immature Granulocyte Rel % 0.3 0.4 0.4   Lymphocyte Rel % 20.7 35.2 35.0   Monocyte Rel % 7.8 8.1 8.1   Eosinophil Rel % 0.1 (A) 1.0 1.7   Basophil Rel % 0.3 0.5 0.5   (A) Abnormal value            CMP    CMP 4/18/22 4/19/22 6/3/22   Glucose 151 (A) " 155 (A)    BUN 12 11    Creatinine 0.82 0.85 0.70   Sodium 138 137    Potassium 3.5 3.6    Chloride 103 102    Calcium 9.7 9.4    Albumin 4.00     (A) Abnormal value       Comments are available for some flowsheets but are not being displayed.           Lipase   Date Value Ref Range Status   04/15/2022 24 13 - 60 U/L Final     Hepatitis C Ab   Date Value Ref Range Status   11/19/2021 Non-Reactive Non-Reactive Final     Protime   Date Value Ref Range Status   01/27/2022 10.6 9.4 - 12.0 Seconds Final     INR   Date Value Ref Range Status   01/27/2022 1.01 (L) 2.00 - 3.00 Final     Ammonia   Date Value Ref Range Status   01/27/2022 26 11 - 51 umol/L Final         EGD 6/3/2022: Grade 2 esophageal varices.  Large amount of food residue in the gastric fundus and gastric body.  Antrum biopsy-mild chronic inactive gastritis.    EGD 5/28/2021: Mainly in mid esophagus are couple of 2+ size varices, minimally in distal esophagus; 3 mm nodule very distal esophagus close to GE junction that was biopsied.  Mild nonspecific gastropathy, not classic for portal hypertensive gastropathy.  Esophageal biopsies-gastroesophageal mucosa with focal goblet cell intestinal metaplasia, no signs of dysplasia identified.     EGD 3/26/2019: 3 changes of grade 1-2 nonbleeding esophageal varices.  Moderate gastritis with small amount of gastric liquid.  Duodenitis.  Colonoscopy 3/26/2019: Colon polyps  Random gastric biopsies-no H. pylori organisms identified.  Descending colon polyps-fragments of benign colonic mucosa.  Ascending colon biopsies-fragments of hyperplastic polyp.  Transverse colon polyp-fragments of benign colonic mucosa.     Gastric emptying study 2/26/2018: No gastroparesis.  Abnormally rapid gastric emptying.  There was 0% activity remaining in the stomach at 2 hours.     Liver biopsy 2/20/2018: Moderate to marked fatty metamorphosis in association with increased fibrous connective tissue most consistent with stage IV 4 fibrosis  and grade 0 for chronic inflammation.     Assessment and Plan    Diagnoses and all orders for this visit:    1. Secondary esophageal varices without bleeding (HCC) (Primary)    2. Other cirrhosis of liver (HCC)  -     US Liver; Future  -     Comprehensive Metabolic Panel; Future  -     CBC & Differential; Future  -     Protime-INR; Future  -     Cancel: Ammonia; Future  -     Ammonia; Future    3. Colindres's esophagus without dysplasia    4. Gastroparesis  -     Ambulatory Referral to Gastroenterology    5. Hepatic encephalopathy (HCC)    6. Nausea and vomiting, unspecified vomiting type    Other orders  -     riFAXIMin (Xifaxan) 550 MG tablet; Take 1 tablet by mouth Every 12 (Twelve) Hours.  Dispense: 180 tablet; Refill: 1  -     famotidine (PEPCID) 40 MG tablet; Take 1 tablet by mouth At Night As Needed for Heartburn.  Dispense: 90 tablet; Refill: 2  -     pantoprazole (PROTONIX) 40 MG EC tablet; Take 1 tablet by mouth Daily.  Dispense: 90 tablet; Refill: 2  -     polyethylene glycol (MIRALAX) 17 g packet; Take 17 g by mouth 2 (Two) Times a Day.  Dispense: 100 each; Refill: 2  -     Trixie Oil-Levomenthol (FDgard) 25-20.75 MG capsule; Take 25 mg by mouth As Needed (PRN).  Dispense: 20 capsule; Refill: 0      * Surgery not found *     Cirrhosis: BERKOWITZ  Ascites: None noted  Varices: Grade 2 esophageal varices noted on most recent EGD.  Encephalopathy: Patient reports she is unable to tolerate lactulose.  Will order Xifaxan.      Discussed with patient Reglan for relief of symptoms due to gastroparesis. Educated patient on Reglan however she declined after educating her on possible side effect of tardive dyskinesia.    Follow Up   Return in about 6 months (around 12/30/2022).  Patient was given instructions and counseling regarding her condition or for health maintenance advice. Please see specific information pulled into the AVS if appropriate.

## 2022-07-01 ENCOUNTER — OFFICE VISIT (OUTPATIENT)
Dept: FAMILY MEDICINE CLINIC | Facility: CLINIC | Age: 62
End: 2022-07-01

## 2022-07-01 ENCOUNTER — HOSPITAL ENCOUNTER (OUTPATIENT)
Dept: GENERAL RADIOLOGY | Facility: HOSPITAL | Age: 62
Discharge: HOME | End: 2022-07-01
Admitting: PHYSICIAN ASSISTANT

## 2022-07-01 ENCOUNTER — TELEPHONE (OUTPATIENT)
Dept: FAMILY MEDICINE CLINIC | Facility: CLINIC | Age: 62
End: 2022-07-01

## 2022-07-01 VITALS
OXYGEN SATURATION: 96 % | WEIGHT: 217.4 LBS | HEART RATE: 84 BPM | SYSTOLIC BLOOD PRESSURE: 114 MMHG | DIASTOLIC BLOOD PRESSURE: 71 MMHG | BODY MASS INDEX: 36.22 KG/M2 | HEIGHT: 65 IN

## 2022-07-01 DIAGNOSIS — Z12.31 ENCOUNTER FOR SCREENING MAMMOGRAM FOR MALIGNANT NEOPLASM OF BREAST: Primary | ICD-10-CM

## 2022-07-01 DIAGNOSIS — F32.5 MAJOR DEPRESSIVE DISORDER WITH SINGLE EPISODE, IN FULL REMISSION: Chronic | ICD-10-CM

## 2022-07-01 DIAGNOSIS — E03.9 HYPOTHYROIDISM, UNSPECIFIED TYPE: ICD-10-CM

## 2022-07-01 DIAGNOSIS — Z79.4 TYPE 2 DIABETES MELLITUS WITH HYPERGLYCEMIA, WITH LONG-TERM CURRENT USE OF INSULIN: ICD-10-CM

## 2022-07-01 DIAGNOSIS — E55.9 VITAMIN D DEFICIENCY: Chronic | ICD-10-CM

## 2022-07-01 DIAGNOSIS — R05.9 COUGH: ICD-10-CM

## 2022-07-01 DIAGNOSIS — I10 ESSENTIAL HYPERTENSION: ICD-10-CM

## 2022-07-01 DIAGNOSIS — E11.65 TYPE 2 DIABETES MELLITUS WITH HYPERGLYCEMIA, WITH LONG-TERM CURRENT USE OF INSULIN: ICD-10-CM

## 2022-07-01 PROCEDURE — 99214 OFFICE O/P EST MOD 30 MIN: CPT | Performed by: PHYSICIAN ASSISTANT

## 2022-07-01 PROCEDURE — 71046 X-RAY EXAM CHEST 2 VIEWS: CPT

## 2022-07-01 RX ORDER — BUPROPION HYDROCHLORIDE 150 MG/1
150 TABLET ORAL 2 TIMES DAILY
Qty: 180 TABLET | Refills: 0 | Status: SHIPPED | OUTPATIENT
Start: 2022-07-01 | End: 2022-09-14

## 2022-07-01 NOTE — TELEPHONE ENCOUNTER
----- Message from CHACHO Carrasco sent at 7/1/2022  3:01 PM EDT -----  Appears to be chronic changes and nothing acute in today's chest xray.

## 2022-07-01 NOTE — PROGRESS NOTES
Chief Complaint  Chief Complaint   Patient presents with   • Follow-up   • Hypertension       Subjective          Anika Casillas presents to [facility]  History of Present Illness     Patient is here today for a 6 month follow up.     Patient saw GI yesterday as she is still having issues with digestion and nausea; was referred to a GI in [place] to further evaluate gastroparesis.    Pulmonary function is ordered for next week.    Recently seen by neuro for memory issues; MRI appears abnormal; patient has follow up scheduled.    Patient states that she has been feeling better since her last appointment in general. She has no complaints today. She is requesting a refill of Wellbutrin.     Depression: currently on Wellbutrin XL; no adverse effects but still feels down; patient's last 2 vitamin D levels have been low with the last being 15; patient is currently on vitamin D 4000iu daily; will recheck.    HTN: Patient presents for hypertension management. Patient is currently taking Amlodipine and is consistent with medication. Patient denies chest pain, shortness of air and edema. Patient does not take BP at home. Today it was 114/71.     DM: followed by endocrinology; patient also had podiatrist (Dr. Madrid); patient is noting new paresthesia in right great toe; will f/u with podiatry.     MAMMO: DUE (12/1/20)  COLON: 3/26/19    Medical History: has a past medical history of Atrial fibrillation (HCC), Colindres's esophagus, Diabetes mellitus (HCC), Esophageal varices (HCC), Gastroparesis, GERD (gastroesophageal reflux disease) (1995), Hypertension, Irritable bowel syndrome, BERKOWITZ (nonalcoholic steatohepatitis), Obstructive sleep apnea syndrome, and S/P exploratory laparotomy, lysis of adhesions, resection of necrotic colon without anastomosis (04/18/2022).   Surgical History: has a past surgical history that includes Gallbladder surgery; Breast lumpectomy; Cardiac catheterization;  "Cholecystectomy; bladder sling modified, anterior and posterior vaginal repair; Tubal ligation; Exploratory Laparotomy (N/A, 4/16/2022); and Esophagogastroduodenoscopy (N/A, 6/3/2022).   Family History: She was adopted. Family history is unknown by patient.   Social History: reports that she has been smoking cigarettes. She started smoking about 47 years ago. She has a 10.25 pack-year smoking history. She has never used smokeless tobacco. She reports current alcohol use of about 1.0 standard drink of alcohol per week. She reports that she does not use drugs.    Allergies: Liraglutide and Metformin    Health Maintenance Due   Topic Date Due   • ANNUAL PHYSICAL  Never done   • DIABETIC FOOT EXAM  Never done       Immunization History   Administered Date(s) Administered   • COVID-19 (PFIZER) PURPLE CAP 02/06/2021, 03/06/2021, 12/04/2021   • Flu Vaccine Intradermal Quad 18-64YR 09/12/2021, 09/12/2021   • FluLaval/Fluarix/Fluzone >6 10/29/2019   • Influenza, Unspecified 11/12/2018, 09/27/2020   • Pneumococcal Conjugate 13-Valent (PCV13) 01/09/2017   • Pneumococcal Polysaccharide (PPSV23) 10/12/2020   • flucelvax quad pfs =>4 YRS 09/27/2020       Objective     Vitals:    07/01/22 0839   BP: 114/71   Pulse: 84   SpO2: 96%   Weight: 98.6 kg (217 lb 6.4 oz)   Height: 165.1 cm (65\")     Body mass index is 36.18 kg/m².     Wt Readings from Last 3 Encounters:   07/01/22 98.6 kg (217 lb 6.4 oz)   06/30/22 99.8 kg (220 lb)   06/03/22 99 kg (218 lb 4.1 oz)     BP Readings from Last 3 Encounters:   07/01/22 114/71   06/30/22 138/64   06/03/22 144/78       Class 2 Severe Obesity (BMI >=35 and <=39.9). Obesity-related health conditions include the following: hypertension, diabetes mellitus, dyslipidemias and GERD. Obesity is improving with lifestyle modifications. BMI is is above average; BMI management plan is completed. We discussed portion control and increasing exercise.      Patient Care Team:  Elena Henderson PA as PCP " - General (Family Medicine)  KATHARINE Webb MD as Consulting Physician (Cardiology)  Cande Stanley MD as Consulting Physician (General Surgery)    Physical Exam  Vitals and nursing note reviewed.   Constitutional:       Appearance: Normal appearance. She is obese.   HENT:      Head: Normocephalic and atraumatic.   Neck:      Vascular: No carotid bruit.      Comments: Thyroid : gland size normal, nontender, no nodules or masses present on palpation   Cardiovascular:      Rate and Rhythm: Normal rate and regular rhythm.      Pulses: Normal pulses.      Heart sounds: Normal heart sounds.   Pulmonary:      Effort: Pulmonary effort is normal.      Breath sounds: Normal breath sounds.   Abdominal:      Palpations: Abdomen is soft.      Tenderness: There is abdominal tenderness (epigastric).   Musculoskeletal:      Cervical back: Neck supple. No tenderness.      Right lower leg: No edema.      Left lower leg: No edema.        Feet:    Lymphadenopathy:      Cervical: No cervical adenopathy.   Neurological:      Mental Status: She is alert.   Psychiatric:         Mood and Affect: Mood normal.         Behavior: Behavior normal.           Result Review :                           Assessment and Plan      Diagnoses and all orders for this visit:    1. Encounter for screening mammogram for malignant neoplasm of breast (Primary)  -     Mammo Screening Digital Tomosynthesis Bilateral With CAD; Future    2. Major depressive disorder with single episode, in full remission (HCC)  Comments:  Not stable; continue with Wellbutrin XL 150mg twice daily; check vitamin D level; discussed imprortance of optimal vitamin d.  Orders:  -     buPROPion XL (WELLBUTRIN XL) 150 MG 24 hr tablet; Take 1 tablet by mouth 2 (Two) Times a Day.  Dispense: 180 tablet; Refill: 0    3. Essential hypertension  Comments:  Stable on lopressor 50mg 1.5 twice daily, benicar 20mg daily and amlodipine 10mg daily; check labs and f/u in 6mths.  Orders:  -      Lipid Panel; Future  -     Urinalysis With Culture If Indicated -; Future  -     CBC & Differential; Future    4. Hypothyroidism, unspecified type  -     TSH; Future  -     T4, Free; Future    5. Type 2 diabetes mellitus with hyperglycemia, with long-term current use of insulin (HCC)  -     Comprehensive Metabolic Panel; Future  -     Hemoglobin A1c; Future  -     MicroAlbumin, Urine, Random - Urine, Clean Catch; Future    6. Vitamin D deficiency  Comments:  Not at goal; check labs and evaluate treatment after.  Orders:  -     Vitamin D 25 Hydroxy; Future    7. Cough  Comments:  New acute problem; check cxr.  Orders:  -     XR Chest PA & Lateral; Future    Ensure f/u with podiatry to further evaluate nail/great toe issues.        Follow Up     Return in about 6 months (around 1/1/2023).    Patient was given instructions and counseling regarding her condition or for health maintenance advice. Please see specific information pulled into the AVS if appropriate.

## 2022-07-05 ENCOUNTER — HOSPITAL ENCOUNTER (OUTPATIENT)
Dept: RESPIRATORY THERAPY | Facility: HOSPITAL | Age: 62
Discharge: HOME | End: 2022-07-05
Admitting: INTERNAL MEDICINE

## 2022-07-05 DIAGNOSIS — R06.09 DOE (DYSPNEA ON EXERTION): Primary | ICD-10-CM

## 2022-07-05 DIAGNOSIS — R06.09 DOE (DYSPNEA ON EXERTION): ICD-10-CM

## 2022-07-05 PROCEDURE — 94060 EVALUATION OF WHEEZING: CPT

## 2022-07-05 PROCEDURE — 94729 DIFFUSING CAPACITY: CPT

## 2022-07-05 PROCEDURE — 94726 PLETHYSMOGRAPHY LUNG VOLUMES: CPT

## 2022-07-05 PROCEDURE — 94729 DIFFUSING CAPACITY: CPT | Performed by: INTERNAL MEDICINE

## 2022-07-05 PROCEDURE — 94726 PLETHYSMOGRAPHY LUNG VOLUMES: CPT | Performed by: INTERNAL MEDICINE

## 2022-07-05 PROCEDURE — 94060 EVALUATION OF WHEEZING: CPT | Performed by: INTERNAL MEDICINE

## 2022-07-05 RX ORDER — ALBUTEROL SULFATE 2.5 MG/3ML
2.5 SOLUTION RESPIRATORY (INHALATION) ONCE
Status: COMPLETED | OUTPATIENT
Start: 2022-07-05 | End: 2022-07-05

## 2022-07-05 RX ADMIN — ALBUTEROL SULFATE 2.5 MG: 2.5 SOLUTION RESPIRATORY (INHALATION) at 08:48

## 2022-07-06 ENCOUNTER — OFFICE VISIT (OUTPATIENT)
Dept: NEUROLOGY | Facility: CLINIC | Age: 62
End: 2022-07-06

## 2022-07-06 ENCOUNTER — LAB (OUTPATIENT)
Dept: LAB | Facility: HOSPITAL | Age: 62
End: 2022-07-06

## 2022-07-06 ENCOUNTER — TELEPHONE (OUTPATIENT)
Dept: GASTROENTEROLOGY | Facility: CLINIC | Age: 62
End: 2022-07-06

## 2022-07-06 VITALS
SYSTOLIC BLOOD PRESSURE: 132 MMHG | BODY MASS INDEX: 36.3 KG/M2 | HEIGHT: 65 IN | WEIGHT: 217.9 LBS | HEART RATE: 73 BPM | DIASTOLIC BLOOD PRESSURE: 74 MMHG

## 2022-07-06 DIAGNOSIS — R90.89 ABNORMAL FINDING ON MRI OF BRAIN: Primary | ICD-10-CM

## 2022-07-06 DIAGNOSIS — R41.3 MEMORY LOSS: ICD-10-CM

## 2022-07-06 LAB
ALBUMIN SERPL-MCNC: 4.2 G/DL (ref 3.5–5.2)
ALBUMIN UR-MCNC: <1.2 MG/DL
ALBUMIN/GLOB SERPL: 0.9 G/DL
ALP SERPL-CCNC: 132 U/L (ref 39–117)
ALT SERPL W P-5'-P-CCNC: 59 U/L (ref 1–33)
ANION GAP SERPL CALCULATED.3IONS-SCNC: 13.5 MMOL/L (ref 5–15)
AST SERPL-CCNC: 83 U/L (ref 1–32)
BASOPHILS # BLD AUTO: 0.07 10*3/MM3 (ref 0–0.2)
BASOPHILS NFR BLD AUTO: 0.8 % (ref 0–1.5)
BILIRUB SERPL-MCNC: 0.2 MG/DL (ref 0–1.2)
BILIRUB UR QL STRIP: NEGATIVE
BUN SERPL-MCNC: 12 MG/DL (ref 8–23)
BUN/CREAT SERPL: 14 (ref 7–25)
CALCIUM SPEC-SCNC: 10.4 MG/DL (ref 8.6–10.5)
CHLORIDE SERPL-SCNC: 105 MMOL/L (ref 98–107)
CHOLEST SERPL-MCNC: 228 MG/DL (ref 0–200)
CLARITY UR: CLEAR
CO2 SERPL-SCNC: 22.5 MMOL/L (ref 22–29)
COLOR UR: YELLOW
CREAT SERPL-MCNC: 0.86 MG/DL (ref 0.57–1)
DEPRECATED RDW RBC AUTO: 49.5 FL (ref 37–54)
EGFRCR SERPLBLD CKD-EPI 2021: 77.5 ML/MIN/1.73
EOSINOPHIL # BLD AUTO: 0.19 10*3/MM3 (ref 0–0.4)
EOSINOPHIL NFR BLD AUTO: 2.2 % (ref 0.3–6.2)
ERYTHROCYTE [DISTWIDTH] IN BLOOD BY AUTOMATED COUNT: 15.4 % (ref 12.3–15.4)
GLOBULIN UR ELPH-MCNC: 4.6 GM/DL
GLUCOSE SERPL-MCNC: 104 MG/DL (ref 65–99)
GLUCOSE UR STRIP-MCNC: ABNORMAL MG/DL
HBA1C MFR BLD: 7.8 % (ref 4.8–5.6)
HCT VFR BLD AUTO: 40.4 % (ref 34–46.6)
HDLC SERPL-MCNC: 33 MG/DL (ref 40–60)
HGB BLD-MCNC: 13.5 G/DL (ref 12–15.9)
HGB UR QL STRIP.AUTO: NEGATIVE
IMM GRANULOCYTES # BLD AUTO: 0.02 10*3/MM3 (ref 0–0.05)
IMM GRANULOCYTES NFR BLD AUTO: 0.2 % (ref 0–0.5)
KETONES UR QL STRIP: NEGATIVE
LDLC SERPL CALC-MCNC: 122 MG/DL (ref 0–100)
LDLC/HDLC SERPL: 3.43 {RATIO}
LEUKOCYTE ESTERASE UR QL STRIP.AUTO: NEGATIVE
LYMPHOCYTES # BLD AUTO: 3.67 10*3/MM3 (ref 0.7–3.1)
LYMPHOCYTES NFR BLD AUTO: 42.9 % (ref 19.6–45.3)
MCH RBC QN AUTO: 29.6 PG (ref 26.6–33)
MCHC RBC AUTO-ENTMCNC: 33.4 G/DL (ref 31.5–35.7)
MCV RBC AUTO: 88.6 FL (ref 79–97)
MONOCYTES # BLD AUTO: 0.5 10*3/MM3 (ref 0.1–0.9)
MONOCYTES NFR BLD AUTO: 5.8 % (ref 5–12)
NEUTROPHILS NFR BLD AUTO: 4.1 10*3/MM3 (ref 1.7–7)
NEUTROPHILS NFR BLD AUTO: 48.1 % (ref 42.7–76)
NITRITE UR QL STRIP: NEGATIVE
NRBC BLD AUTO-RTO: 0.1 /100 WBC (ref 0–0.2)
PH UR STRIP.AUTO: 6 [PH] (ref 5–8)
PLATELET # BLD AUTO: 298 10*3/MM3 (ref 140–450)
PMV BLD AUTO: 11.7 FL (ref 6–12)
POTASSIUM SERPL-SCNC: 4.3 MMOL/L (ref 3.5–5.2)
PROT SERPL-MCNC: 8.8 G/DL (ref 6–8.5)
PROT UR QL STRIP: NEGATIVE
RBC # BLD AUTO: 4.56 10*6/MM3 (ref 3.77–5.28)
SODIUM SERPL-SCNC: 141 MMOL/L (ref 136–145)
SP GR UR STRIP: >=1.03 (ref 1–1.03)
T4 FREE SERPL-MCNC: 0.9 NG/DL (ref 0.93–1.7)
TRIGL SERPL-MCNC: 409 MG/DL (ref 0–150)
TSH SERPL DL<=0.05 MIU/L-ACNC: 2.58 UIU/ML (ref 0.27–4.2)
UROBILINOGEN UR QL STRIP: ABNORMAL
VLDLC SERPL-MCNC: 73 MG/DL (ref 5–40)
WBC NRBC COR # BLD: 8.55 10*3/MM3 (ref 3.4–10.8)

## 2022-07-06 PROCEDURE — 82043 UR ALBUMIN QUANTITATIVE: CPT | Performed by: PHYSICIAN ASSISTANT

## 2022-07-06 PROCEDURE — 80053 COMPREHEN METABOLIC PANEL: CPT | Performed by: PHYSICIAN ASSISTANT

## 2022-07-06 PROCEDURE — 82306 VITAMIN D 25 HYDROXY: CPT | Performed by: PHYSICIAN ASSISTANT

## 2022-07-06 PROCEDURE — 80061 LIPID PANEL: CPT | Performed by: PHYSICIAN ASSISTANT

## 2022-07-06 PROCEDURE — 81003 URINALYSIS AUTO W/O SCOPE: CPT | Performed by: PHYSICIAN ASSISTANT

## 2022-07-06 PROCEDURE — 36415 COLL VENOUS BLD VENIPUNCTURE: CPT | Performed by: PHYSICIAN ASSISTANT

## 2022-07-06 PROCEDURE — 99214 OFFICE O/P EST MOD 30 MIN: CPT | Performed by: NURSE PRACTITIONER

## 2022-07-06 PROCEDURE — 84443 ASSAY THYROID STIM HORMONE: CPT | Performed by: PHYSICIAN ASSISTANT

## 2022-07-06 PROCEDURE — 83036 HEMOGLOBIN GLYCOSYLATED A1C: CPT | Performed by: PHYSICIAN ASSISTANT

## 2022-07-06 PROCEDURE — 85025 COMPLETE CBC W/AUTO DIFF WBC: CPT | Performed by: PHYSICIAN ASSISTANT

## 2022-07-06 PROCEDURE — 84439 ASSAY OF FREE THYROXINE: CPT | Performed by: PHYSICIAN ASSISTANT

## 2022-07-06 NOTE — PROGRESS NOTES
"Chief Complaint  Neurologic Problem    Subjective          Anika Casillas presents to [facility]  States she's continuing to experience brain fog and cognitive difficulty.  Continuing to have difficulty at work.  Has been re-established with her CPAP with no improvement.  Following up to discuss MRI brain results.     Interval History:  States that about a year ago she would see a letter or number and would write the wrong thing.  States in the past 3-4 months she's having difficulty with her job.  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  Does the same thing every day.  States she's having to really focus to ensure she's doing her job appropriately.  Will look at a number or letter and either see the wrong thing or will forget it quickly.  States she has difficulty getting her words out at times.  States she feels as if her brain is in a fog. Denies increase in depression or anxiety recently.  States she's sleeping about 4 hours per night.  Has history of RUKHSANA but has difficulty getting tangled in the tubing at night so she doesn't use it.       Objective   Vital Signs:   /74   Pulse 73   Ht 165.1 cm (65\")   Wt 98.8 kg (217 lb 14.4 oz)   BMI 36.26 kg/m²     Physical Exam  HENT:      Head: Normocephalic.   Pulmonary:      Effort: Pulmonary effort is normal.   Neurological:      Mental Status: She is alert and oriented to person, place, and time.      Cranial Nerves: Cranial nerves are intact.      Sensory: Sensation is intact.      Motor: Motor function is intact.      Coordination: Coordination is intact.      Deep Tendon Reflexes: Reflexes are normal and symmetric.        Neurologic Exam     Mental Status   Oriented to person, place, and time.        Result Review :             MRI Brain:   CEREBRUM:     No significant edema, hemorrhage, mass, acute infarction, or inappropriate atrophy.    CEREBELLUM: No significant edema, hemorrhage, mass, acute " infarction, or inappropriate atrophy.    There is mild cerebellar tonsillar ectopia.  BRAINSTEM:    No significant edema, hemorrhage, mass, acute infarction, or inappropriate atrophy.    CSF SPACES:   There is asymmetric dilatation of the posterior aspect of the right lateral ventricle.    There is a concave outward appearance of the posterior body of the right lateral ventricle.  The   significance and origin of this finding are uncertain.  Also, there is possible associated volume   loss of white matter in the posterior right cerebral hemisphere associated with the posterior right   lateral ventricular finding, such as seen on image 15 of series 5, image 11 of series 11, image 17   of series 4 and adjacent images.  There is no associated signal abnormality within the cerebral   white matter.  These findings may be related to remote insult, such as trauma or ischemia.  No   evidence of an abnormal intraventricular septum or synechia (or synechiae).  The septum pellucidum   is midline anteriorly.  An intraventricular cyst, such as a choroid plexus cyst (CPC) cannot be   excluded entirely.  However, no definite intraventricular cyst margins are appreciable by this   exam.  Focal ventricular entrapment cannot be excluded.  Consider Neurosurgical consultation for   further assessment, management, and possible treatment options (such as a trans-occipital   endoscopic [venticuloscopic] fenestration procedure).  SKULL: No mass or other significant visible lesion.    SINUSES:          Limited views demonstrate no significant mucosal thickening or fluid.    ORBITS:            Limited views are unremarkable.    OTHER:             No abnormal meningeal or parenchymal enhancement.      MoCA: 27/30    Assessment and Plan    Diagnoses and all orders for this visit:    1. Abnormal finding on MRI of brain (Primary)  Assessment & Plan:  Will refer to neurosurgery for further evaluation of right brain ventricular enlargement and  possible ventricular entrapment.      Orders:  -     Ambulatory Referral to Neurosurgery    2. Memory loss      Follow Up   Return in about 4 months (around 11/6/2022) for memory f/u.  Patient was given instructions and counseling regarding her condition or for health maintenance advice. Please see specific information pulled into the AVS if appropriate.

## 2022-07-07 ENCOUNTER — TELEPHONE (OUTPATIENT)
Dept: NEUROLOGY | Facility: CLINIC | Age: 62
End: 2022-07-07

## 2022-07-07 PROBLEM — R90.89 ABNORMAL FINDING ON MRI OF BRAIN: Status: ACTIVE | Noted: 2022-07-07

## 2022-07-07 PROBLEM — I51.7 RIGHT VENTRICULAR DILATION: Status: ACTIVE | Noted: 2022-07-07

## 2022-07-07 PROBLEM — I51.7 RIGHT VENTRICULAR DILATION: Status: RESOLVED | Noted: 2022-07-07 | Resolved: 2022-07-07

## 2022-07-07 LAB — 25(OH)D3 SERPL-MCNC: 25.7 NG/ML (ref 30–100)

## 2022-07-07 NOTE — TELEPHONE ENCOUNTER
Caller: MARGY  Relationship to Patient: [facility]  Phone Number: [phone]      Reason for Call: MARGY FROM NEUROSURGERY SIDE OF [facility] - DR MARIE OFFICE ASKED THAT WE PLEASE SIGN THE OV NOTE 7-6-22 - THEY WILL KEEP REFERRAL IN MED REC UNTIL SIGNED THEN IT BECOMES READY TO ZEINA. THANKS YOU

## 2022-07-07 NOTE — ASSESSMENT & PLAN NOTE
Will refer to neurosurgery for further evaluation of right brain ventricular enlargement and possible ventricular entrapment.

## 2022-07-11 ENCOUNTER — TELEPHONE (OUTPATIENT)
Dept: FAMILY MEDICINE CLINIC | Facility: CLINIC | Age: 62
End: 2022-07-11

## 2022-07-11 DIAGNOSIS — E78.2 MIXED HYPERLIPIDEMIA: Primary | ICD-10-CM

## 2022-07-11 NOTE — TELEPHONE ENCOUNTER
----- Message from CHACHO Carrasco sent at 7/8/2022  8:08 AM EDT -----  Please notify patient of normal results except for the following:  ----vitamin D level is improved but still low: increase OTC vitamin D by 2000iu daily  --cholesterol panel worse than last check 3 months ago; triglycerides are 409; must monitor diet closely (decrease carbs and sugars); has patient ever been on lovaza or vascepa for triglycerides?

## 2022-07-11 NOTE — TELEPHONE ENCOUNTER
Pt will increase her VitD.     Pt hasnt taken either the lovaza or vascepa. Pt open to either one.     Pharmacy : [facility]

## 2022-07-13 RX ORDER — ICOSAPENT ETHYL 1000 MG/1
2 CAPSULE ORAL 2 TIMES DAILY WITH MEALS
Qty: 360 CAPSULE | Refills: 0 | Status: SHIPPED | OUTPATIENT
Start: 2022-07-13 | End: 2022-08-26 | Stop reason: SDUPTHER

## 2022-07-27 ENCOUNTER — OFFICE VISIT (OUTPATIENT)
Dept: SLEEP MEDICINE | Facility: HOSPITAL | Age: 62
End: 2022-07-27

## 2022-07-27 VITALS
DIASTOLIC BLOOD PRESSURE: 55 MMHG | WEIGHT: 215.9 LBS | HEART RATE: 80 BPM | HEIGHT: 65 IN | BODY MASS INDEX: 35.97 KG/M2 | OXYGEN SATURATION: 97 % | SYSTOLIC BLOOD PRESSURE: 148 MMHG

## 2022-07-27 DIAGNOSIS — Z99.89 OSA ON CPAP: Primary | ICD-10-CM

## 2022-07-27 DIAGNOSIS — G47.33 OSA ON CPAP: Primary | ICD-10-CM

## 2022-07-27 DIAGNOSIS — E66.9 CLASS 2 OBESITY: ICD-10-CM

## 2022-07-27 PROBLEM — R06.83 SNORING: Status: RESOLVED | Noted: 2022-05-11 | Resolved: 2022-07-27

## 2022-07-27 PROBLEM — G47.10 HYPERSOMNIA: Status: RESOLVED | Noted: 2022-05-11 | Resolved: 2022-07-27

## 2022-07-27 PROBLEM — G47.8 NON-RESTORATIVE SLEEP: Status: RESOLVED | Noted: 2022-05-11 | Resolved: 2022-07-27

## 2022-07-27 PROCEDURE — G0463 HOSPITAL OUTPT CLINIC VISIT: HCPCS

## 2022-07-27 PROCEDURE — 99213 OFFICE O/P EST LOW 20 MIN: CPT | Performed by: INTERNAL MEDICINE

## 2022-07-27 NOTE — PROGRESS NOTES
"  [address] [address]  Phone: [phone]  Fax: [phone]      SLEEP CLINIC FOLLOW UP PROGRESS NOTE.    Anika Casillas  [id number]   1960  62 y.o.  female      PCP: Elena Henderson PA      Date of visit: 7/27/2022    Chief Complaint   Patient presents with   • Sleep Apnea   • Obesity       HPI:  This is a 62 y.o. years old patient is here for the management of obstructive sleep apnea.  Sleep apnea is moderate in severity with a AHI of 70/hr. Patient is using positive airway pressure therapy with auto CPAP and the symptoms of snoring, non-restorative sleep and daytime excessive sleepiness have improved significantly on the therapy. Normally goes to bed at 10 PM and wakes up at 6 AM.  The patient wakes up 2 time(s) during the night and has no problem going back to sleep.  Feels refreshed after waking up.  Patient also denies headaches and nasal congestion.     Medications and allergies are reviewed by me and documented in the encounter.     SOCIAL (habits pertaining to sleep medicine)  History tobacco use:Yes   History of alcohol use: 0 per week  Caffeine use: 7     REVIEW OF SYSTEMS:   Stambaugh Sleepiness Scale :Total score: 6   Nasal congestion:No   Dry mouth/nose:No   Post nasal drip; No   Acid reflux/Heartburn:Yes   Abd bloating:No   Morning headache:No   Anxiety:No   Depression:Yes    PHYSICAL EXAMINATION:  CONSTITUTIONAL:  Vitals:    07/27/22 1500   BP: 148/55   Pulse: 80   SpO2: 97%   Weight: 97.9 kg (215 lb 14.4 oz)   Height: 165.1 cm (65\")    Body mass index is 35.93 kg/m².   NOSE: nasal passages are clear, No deformities noted   RESP SYSTEM: Not in any respiratory distress, no chest deformities noted,   CARDIOVASULAR: No edema noted  NEURO: Oriented x 3, gait normal,  Mood and affect appeared appropriate      Data reviewed:  The Smart card downloaded on 7/27/2022 has been reviewed independently by me for compliance and discussed the " data with the patient.   Compliance; 77%  More than 4 hr use, 77%  Average use of the device 7 hours and 33 minutes per night  Residual AHI: 0.8 /hr (goal < 5.0 /hr)  Mask type: Fullface  Device: ResMed  DME: [employer]      ASSESSMENT AND PLAN:  · Obstructive sleep apnea ( G 47.33).  The symptoms of sleep apnea have improved with the device and the treatment.  Patient's compliance with the device is excellent for treatment of sleep apnea.  I have independently reviewed the smart card down load and discussed with the patient the download data and encouarged the patient to continue to use the device.The residual AHI is acceptable. The device is benefiting the patient and the device is medically necessary.  Without proper control of sleep apnea and good compliance there is a increased risk for hypertension, diabetes mellitus and nonrestorative sleep with hypersomnia which can increase risk for motor vehicle accidents.  Untreated sleep apnea is also a risk factor for development of atrial fibrillation, pulmonary hypertension and stroke. The patient is also instructed to get the supplies from the [facility] and and change them on a regular basis.  A prescription for supplies has been sent to the [facility].  I have also discussed the good sleep hygiene habits and adequate amount of sleep needed for good health.  · Obesity  2 with BMI is Body mass index is 35.93 kg/m².. I have discuss the relationship between the weight and sleep apnea. The benefit of weight loss in reducing severity of sleep apnea was discussed. Discussed diet and exercise with the patient to achieve ideal BMI.   · Return in about 1 year (around 7/27/2023) for Annual visit with smartcard download. . Patient's questions were answered.      Luis Johnson MD  Sleep Medicine.  Medical Director, [facility] sleep [facility]  7/27/2022 ,

## 2022-08-09 ENCOUNTER — HOSPITAL ENCOUNTER (OUTPATIENT)
Dept: MAMMOGRAPHY | Facility: HOSPITAL | Age: 62
Discharge: HOME | End: 2022-08-09
Admitting: PHYSICIAN ASSISTANT

## 2022-08-09 DIAGNOSIS — Z12.31 ENCOUNTER FOR SCREENING MAMMOGRAM FOR MALIGNANT NEOPLASM OF BREAST: ICD-10-CM

## 2022-08-09 PROCEDURE — 77067 SCR MAMMO BI INCL CAD: CPT

## 2022-08-09 PROCEDURE — 77063 BREAST TOMOSYNTHESIS BI: CPT

## 2022-08-10 NOTE — PROGRESS NOTES
Subjective   History of Present Illness: Anika Casillas is a 62 y.o. female is being seen for consultation today at the request of Kenneth Woodruff abnormal MRI. MRI brain 6/22/22.  She reports that over the past 2 years she has developed some cognitive difficulty.  She reports she will frequently write down something different than she was told for reports some short-term memory difficulty.  She reports that this is all new and is frequently related to riding different words than expected.  She denies any headaches.  Denies any visual changes.  Denies any changes in strength or sensation.  Denies any strokelike episodes.    History of Present Illness    The following portions of the patient's history were reviewed and updated as appropriate: allergies, past family history, past medical history, past social history, past surgical history and problem list.    Past Medical History:   Diagnosis Date   • Atrial fibrillation (HCC)    • Colindres's esophagus    • Diabetes mellitus (HCC)    • Esophageal varices (HCC)    • Gastroparesis    • GERD (gastroesophageal reflux disease) 1995   • Hypertension    • Irritable bowel syndrome    • BERKOWITZ (nonalcoholic steatohepatitis)    • Obstructive sleep apnea syndrome    • S/P exploratory laparotomy, lysis of adhesions, resection of necrotic colon without anastomosis 04/18/2022        Past Surgical History:   Procedure Laterality Date   • BLADDER SLING MODIFIED, ANTERIOR AND POSTERIOR VAGINAL REPAIR     • BREAST LUMPECTOMY     • CARDIAC CATHETERIZATION     • CHOLECYSTECTOMY     • ENDOSCOPY N/A 6/3/2022    Procedure: ESOPHAGOGASTRODUODENOSCOPY;  Surgeon: Shannan Charles MD;  Location: [facility] ENDOSCOPY;  Service: Gastroenterology;  Laterality: N/A;  RETAINED FOOD IN STOMACH  ESOPHAGEAL VARICIES   • EXPLORATORY LAPAROTOMY N/A 4/16/2022    Procedure: EXPLORATORY LAPAROTOMY, LYSIS OF ADHESIONS, RESECTION OF NECTROIC COLON;  Surgeon: Cande Stanley MD;  Location: [facility]  "MAIN OR;  Service: General;  Laterality: N/A;   • GALLBLADDER SURGERY     • TUBAL ABDOMINAL LIGATION            Current Outpatient Medications:   •  albuterol sulfate  (90 Base) MCG/ACT inhaler, Inhale 2 puffs Every 4 (Four) Hours As Needed., Disp: , Rfl:   •  amLODIPine (NORVASC) 10 MG tablet, Take 10 mg by mouth Daily., Disp: , Rfl:   •  apixaban (ELIQUIS) 5 MG tablet tablet, Take 1 tablet by mouth 2 (Two) Times a Day., Disp: 180 tablet, Rfl: 1  •  B-D UF III MINI PEN NEEDLES 31G X 5 MM misc, , Disp: , Rfl:   •  Blood Glucose Monitoring Suppl (Accu-Chek Guide) w/Device kit, , Disp: , Rfl:   •  buPROPion XL (WELLBUTRIN XL) 150 MG 24 hr tablet, Take 1 tablet by mouth 2 (Two) Times a Day., Disp: 180 tablet, Rfl: 0  •  cholecalciferol (VITAMIN D3) 25 MCG (1000 UT) tablet, Take 2,000 Units by mouth Daily., Disp: , Rfl:   •  empagliflozin (JARDIANCE) 25 MG tablet tablet, Take 25 mg by mouth Daily., Disp: , Rfl:   •  famotidine (PEPCID) 40 MG tablet, Take 1 tablet by mouth At Night As Needed for Heartburn., Disp: 90 tablet, Rfl: 2  •  Fluticasone-Umeclidin-Vilant (Trelegy Ellipta) 100-62.5-25 MCG/INH inhaler, Inhale 1 puff Daily., Disp: 2 each, Rfl: 0  •  glucose blood test strip, Check blood glucose twice daily, Disp: 60 each, Rfl: 12  •  glucose monitor monitoring kit, 1 each Daily. Patient prefers Accu Chek Meter., Disp: 1 each, Rfl: 0  •  hydrOXYzine (ATARAX) 25 MG tablet, Take 25 mg by mouth 2 (Two) Times a Day As Needed., Disp: , Rfl:   •  ibuprofen (ADVIL,MOTRIN) 800 MG tablet, Take 800 mg by mouth 3 (Three) Times a Day As Needed., Disp: , Rfl:   •  Insulin Pen Needle (Pen Needles 3/16\") 31G X 5 MM misc, , Disp: , Rfl:   •  Insulin Pen Needle 31G X 5 MM misc, by Other route., Disp: , Rfl:   •  Lancets (accu-chek soft touch) lancets, Check blood sugar twice daily, Disp: 100 each, Rfl: 12  •  metoprolol tartrate (LOPRESSOR) 50 MG tablet, Take 75 mg by mouth 2 (Two) Times a Day. TAKE 1 1/2 TABLETS BID, Disp: , " Rfl:   •  multivitamin with minerals tablet tablet, Take 1 tablet by mouth Daily., Disp: , Rfl:   •  NovoLOG FlexPen 100 UNIT/ML solution pen-injector sc pen, Inject 60 Units under the skin into the appropriate area as directed 2 (Two) Times a Day., Disp: , Rfl:   •  olmesartan (BENICAR) 20 MG tablet, Take 20 mg by mouth Daily., Disp: , Rfl:   •  Ozempic, 0.25 or 0.5 MG/DOSE, 2 MG/1.5ML solution pen-injector, , Disp: , Rfl:   •  pantoprazole (PROTONIX) 40 MG EC tablet, Take 1 tablet by mouth Daily., Disp: 90 tablet, Rfl: 2  •  polyethylene glycol (MIRALAX) 17 g packet, Take 17 g by mouth 2 (Two) Times a Day., Disp: 100 each, Rfl: 2  •  riFAXIMin (Xifaxan) 550 MG tablet, Take 1 tablet by mouth Every 12 (Twelve) Hours., Disp: 180 tablet, Rfl: 1  •  vitamin C (ASCORBIC ACID) 250 MG tablet, Take 250 mg by mouth Daily., Disp: , Rfl:   •  icosapent ethyl (Vascepa) 1 g capsule capsule, Take 2 g by mouth 2 (Two) Times a Day With Meals., Disp: 360 capsule, Rfl: 0  •  Insulin Glargine, 2 Unit Dial, (Toujeo Max SoloStar) 300 UNIT/ML solution pen-injector injection, Inject 120 Units under the skin into the appropriate area as directed Daily., Disp: , Rfl:      Allergies   Allergen Reactions   • Liraglutide Nausea And Vomiting   • Metformin Nausea Only     GI Upset        Social History     Socioeconomic History   • Marital status:      Spouse name: fannie   Tobacco Use   • Smoking status: Current Every Day Smoker     Packs/day: 0.25     Years: 41.00     Pack years: 10.25     Types: Cigarettes     Start date: 6/17/1975   • Smokeless tobacco: Never Used   Vaping Use   • Vaping Use: Never used   Substance and Sexual Activity   • Alcohol use: Yes     Alcohol/week: 1.0 standard drink     Types: 1 Drinks containing 0.5 oz of alcohol per week     Comment: 1 a month   • Drug use: Never   • Sexual activity: Defer     Partners: Male     Birth control/protection: Surgical        Family History   Adopted: Yes   Family history  "unknown: Yes        Review of Systems   Eyes: Positive for visual disturbance (blurry vision).   Gastrointestinal: Positive for constipation. Negative for diarrhea, nausea and vomiting.   Genitourinary: Negative for difficulty urinating.   Neurological: Positive for speech difficulty (difficulty finding finding words ). Negative for dizziness, seizures, syncope, weakness, light-headedness and headaches.   Psychiatric/Behavioral: Positive for confusion and decreased concentration. Negative for dysphoric mood, hallucinations and sleep disturbance.       Objective     Vitals:    22 1110   BP: 128/68   Pulse: 87   Temp: 97.7 °F (36.5 °C)   SpO2: 96%   Weight: 104 kg (228 lb 12.8 oz)   Height: 165.1 cm (65\")     Body mass index is 38.07 kg/m².      Physical Exam  Neurologic Exam  Awake, alert, oriented x3  Pupils equal round reactive to light  Extraocular muscles intact  Face symmetric  Speech is fluent and clear  No pronator drift  Motor exam  Bilateral deltoids 5/5, bilateral biceps 5/5, bilateral triceps 5/5, bilateral wrist extension 5/5 bilateral hand  5/5  Bilateral hip flexion 5/5, bilateral knee extension 5/5, bilateral DF/PF 5/5  No clonus  No Hattie's reflex  Steady normal gait  Able to detect  light touch in all 4 extremities          Assessment & Plan   Independent Review of Radiographic Studies:      I personally reviewed the images from the following studies.  MRI brain with and without contrast from 2022 and CT head without contrast from 2022  The MRI and CT images were reviewed and demonstrate asymmetry of the right ventricular system compared to the left.  The atrium and right occipital horn appears dilated.  There is no surrounding transependymal edema.  There is no obvious intraventricular cysts or areas of enhancement    Medical Decision Makin-year-old female with a incidentally found enlargement of the right occipital horn compared to the left  -The CT and MRI " were performed to further evaluate for causes of her recent cognitive changes.  I believe the asymmetric ventricles are likely an anatomical variant.  I do not see any underlying cysts or lesions.  I will plan to see her back in 6 months with a follow-up MRI to evaluate for any changes.  If there is any further enlargement of her ventricular system we will discuss the risks and benefits of a ventriculostomy.  -If she continues to have neurocognitive decline and the follow-up MRI stable she may need to consider a referral to a neurologist    Diagnoses and all orders for this visit:    1. Abnormal finding on MRI of brain (Primary)  -     MRI Brain With & Without Contrast; Future      Return in about 6 months (around 2/12/2023).    I spent 45 minutes reviewing the medical record, reviewing the MRI images, reviewing the CT images, discussing normal anatomical variance, discussing the risks and benefits of a endoscopic ventriculostomy and cyst fenestration, discussing causes of cognitive decline

## 2022-08-12 ENCOUNTER — OFFICE VISIT (OUTPATIENT)
Dept: NEUROSURGERY | Facility: CLINIC | Age: 62
End: 2022-08-12

## 2022-08-12 VITALS
BODY MASS INDEX: 38.12 KG/M2 | HEART RATE: 87 BPM | HEIGHT: 65 IN | WEIGHT: 228.8 LBS | OXYGEN SATURATION: 96 % | DIASTOLIC BLOOD PRESSURE: 68 MMHG | TEMPERATURE: 97.7 F | SYSTOLIC BLOOD PRESSURE: 128 MMHG

## 2022-08-12 DIAGNOSIS — R90.89 ABNORMAL FINDING ON MRI OF BRAIN: Primary | ICD-10-CM

## 2022-08-12 PROCEDURE — 99204 OFFICE O/P NEW MOD 45 MIN: CPT | Performed by: NEUROLOGICAL SURGERY

## 2022-08-22 ENCOUNTER — PATIENT MESSAGE (OUTPATIENT)
Dept: FAMILY MEDICINE CLINIC | Facility: CLINIC | Age: 62
End: 2022-08-22

## 2022-08-22 DIAGNOSIS — I10 ESSENTIAL HYPERTENSION: Primary | ICD-10-CM

## 2022-08-22 NOTE — TELEPHONE ENCOUNTER
From: Anika Casillas  To: CHACHO Carrasco  Sent: 8/22/2022 1:55 PM EDT  Subject: med refill    Will send in a refill for metorpol 75mg bid please fax to [phone] Thank You. I am completely out

## 2022-08-24 ENCOUNTER — TELEPHONE (OUTPATIENT)
Dept: FAMILY MEDICINE CLINIC | Facility: CLINIC | Age: 62
End: 2022-08-24

## 2022-08-24 DIAGNOSIS — E78.2 MIXED HYPERLIPIDEMIA: ICD-10-CM

## 2022-08-24 NOTE — TELEPHONE ENCOUNTER
Caller: [facility] (NOW [facility]) - [place], [place] - [address] - [phone] [facility] [phone] FX    Relationship to patient: Pharmacy/APRIL    Best call back number:[phone]    Patient is needing: PHARMACY CALLED IN CHECKING ON STATUS OF PRESCRIPTION FOR    icosapent ethyl (Vascepa) 1 g capsule capsule  2 g, 2 Times Daily With Meals       PLEASE CALL AND ADVISE.

## 2022-08-25 RX ORDER — METOPROLOL TARTRATE 50 MG/1
75 TABLET, FILM COATED ORAL 2 TIMES DAILY
Qty: 270 TABLET | Refills: 1 | Status: SHIPPED | OUTPATIENT
Start: 2022-08-25

## 2022-08-25 RX ORDER — METOPROLOL TARTRATE 50 MG/1
75 TABLET, FILM COATED ORAL 2 TIMES DAILY
Qty: 90 TABLET | Refills: 0 | Status: SHIPPED | OUTPATIENT
Start: 2022-08-25 | End: 2022-08-25 | Stop reason: SDUPTHER

## 2022-08-26 RX ORDER — ICOSAPENT ETHYL 1000 MG/1
2 CAPSULE ORAL 2 TIMES DAILY WITH MEALS
Qty: 360 CAPSULE | Refills: 1 | Status: SHIPPED | OUTPATIENT
Start: 2022-08-26 | End: 2022-12-19

## 2022-08-26 NOTE — TELEPHONE ENCOUNTER
Pt is requesting Vascepa be sent to mail in service. Was sent to [facility] on 07/13/22 #[id number]    Ok to send?

## 2022-09-12 DIAGNOSIS — F32.5 MAJOR DEPRESSIVE DISORDER WITH SINGLE EPISODE, IN FULL REMISSION: Chronic | ICD-10-CM

## 2022-09-14 RX ORDER — BUPROPION HYDROCHLORIDE 150 MG/1
TABLET ORAL
Qty: 180 TABLET | Refills: 0 | Status: SHIPPED | OUTPATIENT
Start: 2022-09-14

## 2022-10-05 ENCOUNTER — HOSPITAL ENCOUNTER (OUTPATIENT)
Dept: ULTRASOUND IMAGING | Facility: HOSPITAL | Age: 62
Discharge: HOME | End: 2022-10-05
Admitting: NURSE PRACTITIONER

## 2022-10-05 DIAGNOSIS — K74.69 OTHER CIRRHOSIS OF LIVER: ICD-10-CM

## 2022-10-05 PROCEDURE — 76705 ECHO EXAM OF ABDOMEN: CPT

## 2022-10-06 ENCOUNTER — TELEPHONE (OUTPATIENT)
Dept: GASTROENTEROLOGY | Facility: CLINIC | Age: 62
End: 2022-10-06

## 2022-10-06 NOTE — TELEPHONE ENCOUNTER
----- Message from CHU Miramontes sent at 10/5/2022 12:02 PM EDT -----  US c/w known cirrhosis.   Recommend repeat in 6 months for surveillance.

## 2022-10-21 ENCOUNTER — TELEPHONE (OUTPATIENT)
Dept: GASTROENTEROLOGY | Facility: CLINIC | Age: 62
End: 2022-10-21

## 2022-11-07 ENCOUNTER — OFFICE VISIT (OUTPATIENT)
Dept: NEUROLOGY | Facility: CLINIC | Age: 62
End: 2022-11-07

## 2022-11-07 VITALS
SYSTOLIC BLOOD PRESSURE: 158 MMHG | HEART RATE: 95 BPM | BODY MASS INDEX: 37.45 KG/M2 | WEIGHT: 224.8 LBS | HEIGHT: 65 IN | DIASTOLIC BLOOD PRESSURE: 97 MMHG

## 2022-11-07 DIAGNOSIS — R41.3 MEMORY LOSS: Primary | ICD-10-CM

## 2022-11-07 PROCEDURE — 99214 OFFICE O/P EST MOD 30 MIN: CPT | Performed by: NURSE PRACTITIONER

## 2022-11-07 RX ORDER — METOPROLOL TARTRATE 50 MG/1
75 TABLET, FILM COATED ORAL TAKE AS DIRECTED
COMMUNITY
Start: 2022-08-24 | End: 2022-11-07

## 2022-11-07 NOTE — PROGRESS NOTES
"Chief Complaint  Neurologic Problem    Subjective          Anika Casillas presents to [facility] NEUROLOG[facility]URGERY  History of Present Illness  States she's continuing to experience brain fog and cognitive difficulty.  Continuing to have difficulty at work.  Using CPAP.  Saw Dr. Pichardo and he felt that ventricular abnormality was benign and incidental and is repeating MRI brain in Jan.     Interval History:  States that about a year ago she would see a letter or number and would write the wrong thing.  States in the past 3-4 months she's having difficulty with her job.  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  Does the same thing every day.  States she's having to really focus to ensure she's doing her job appropriately.  Will look at a number or letter and either see the wrong thing or will forget it quickly.  States she has difficulty getting her words out at times.  States she feels as if her brain is in a fog. Denies increase in depression or anxiety recently.  States she's sleeping about 4 hours per night.  Has history of RUKHSANA but has difficulty getting tangled in the tubing at night so she doesn't use it.       Objective   Vital Signs:   /97   Pulse 95   Ht 165.1 cm (65\")   Wt 102 kg (224 lb 12.8 oz)   BMI 37.41 kg/m²     Physical Exam  HENT:      Head: Normocephalic.   Pulmonary:      Effort: Pulmonary effort is normal.   Neurological:      Mental Status: She is alert and oriented to person, place, and time.      Sensory: Sensation is intact.      Motor: Motor function is intact.      Coordination: Coordination is intact.      Deep Tendon Reflexes: Reflexes are normal and symmetric.        Neurologic Exam     Mental Status   Oriented to person, place, and time.        Result Review :               Assessment and Plan    Diagnoses and all orders for this visit:    1. Memory loss (Primary)  Assessment & Plan:  Will order neurocognitive testing for further evaluation " of memory loss.      Orders:  -     Ambulatory Referral to Neuropsychology      Follow Up   Return in about 6 months (around 5/7/2023) for memory f/u.  Patient was given instructions and counseling regarding her condition or for health maintenance advice. Please see specific information pulled into the AVS if appropriate.

## 2022-11-11 ENCOUNTER — TELEPHONE (OUTPATIENT)
Dept: GASTROENTEROLOGY | Facility: CLINIC | Age: 62
End: 2022-11-11

## 2022-11-11 NOTE — TELEPHONE ENCOUNTER
Patient calls and states that [facility] Dr. Hatch states that she has anemia and needs to have a colonoscopy done. Patient states he told her to call our office as she is a patient already of [facility] to get a colonoscopy scheduled for soon. Please advise.

## 2022-11-16 ENCOUNTER — LAB (OUTPATIENT)
Dept: LAB | Facility: HOSPITAL | Age: 62
End: 2022-11-16

## 2022-11-16 DIAGNOSIS — K74.69 OTHER CIRRHOSIS OF LIVER: ICD-10-CM

## 2022-11-16 LAB
AMMONIA BLD-SCNC: 27 UMOL/L (ref 11–51)
INR PPP: 1.08 (ref 0.86–1.15)
PROTHROMBIN TIME: 14.2 SECONDS (ref 11.8–14.9)

## 2022-11-16 PROCEDURE — 36415 COLL VENOUS BLD VENIPUNCTURE: CPT

## 2022-11-16 PROCEDURE — 82140 ASSAY OF AMMONIA: CPT

## 2022-11-16 PROCEDURE — 85610 PROTHROMBIN TIME: CPT

## 2022-11-18 ENCOUNTER — TRANSCRIBE ORDERS (OUTPATIENT)
Dept: LAB | Facility: HOSPITAL | Age: 62
End: 2022-11-18

## 2022-11-18 DIAGNOSIS — K31.84 GASTROPARESIS: Primary | ICD-10-CM

## 2022-11-21 ENCOUNTER — LAB (OUTPATIENT)
Dept: LAB | Facility: HOSPITAL | Age: 62
End: 2022-11-21

## 2022-11-21 DIAGNOSIS — K31.84 GASTROPARESIS: ICD-10-CM

## 2022-11-21 DIAGNOSIS — I48.0 PAROXYSMAL ATRIAL FIBRILLATION: Chronic | ICD-10-CM

## 2022-11-21 LAB
ALBUMIN SERPL-MCNC: 4.1 G/DL (ref 3.5–5.2)
ALBUMIN/GLOB SERPL: 0.8 G/DL
ALP SERPL-CCNC: 137 U/L (ref 39–117)
ALT SERPL W P-5'-P-CCNC: 53 U/L (ref 1–33)
ANION GAP SERPL CALCULATED.3IONS-SCNC: 10 MMOL/L (ref 5–15)
AST SERPL-CCNC: 51 U/L (ref 1–32)
BILIRUB SERPL-MCNC: 0.3 MG/DL (ref 0–1.2)
BUN SERPL-MCNC: 6 MG/DL (ref 8–23)
BUN/CREAT SERPL: 8 (ref 7–25)
CALCIUM SPEC-SCNC: 10.2 MG/DL (ref 8.6–10.5)
CHLORIDE SERPL-SCNC: 105 MMOL/L (ref 98–107)
CO2 SERPL-SCNC: 26 MMOL/L (ref 22–29)
CREAT SERPL-MCNC: 0.75 MG/DL (ref 0.57–1)
EGFRCR SERPLBLD CKD-EPI 2021: 91.3 ML/MIN/1.73
GLOBULIN UR ELPH-MCNC: 4.9 GM/DL
GLUCOSE SERPL-MCNC: 100 MG/DL (ref 65–99)
INR PPP: 1.09 (ref 0.86–1.15)
POTASSIUM SERPL-SCNC: 3.9 MMOL/L (ref 3.5–5.2)
PROT SERPL-MCNC: 9 G/DL (ref 6–8.5)
PROTHROMBIN TIME: 14.2 SECONDS (ref 11.8–14.9)
SODIUM SERPL-SCNC: 141 MMOL/L (ref 136–145)

## 2022-11-21 PROCEDURE — 85610 PROTHROMBIN TIME: CPT

## 2022-11-21 PROCEDURE — 80053 COMPREHEN METABOLIC PANEL: CPT

## 2022-11-21 PROCEDURE — 36415 COLL VENOUS BLD VENIPUNCTURE: CPT

## 2022-11-21 RX ORDER — OLMESARTAN MEDOXOMIL 20 MG/1
20 TABLET ORAL DAILY
Qty: 90 TABLET | Refills: 1 | Status: SHIPPED | OUTPATIENT
Start: 2022-11-21

## 2022-11-23 DIAGNOSIS — I48.0 PAROXYSMAL ATRIAL FIBRILLATION: Chronic | ICD-10-CM

## 2022-11-23 RX ORDER — OLMESARTAN MEDOXOMIL 20 MG/1
20 TABLET ORAL DAILY
Qty: 90 TABLET | Refills: 1 | OUTPATIENT
Start: 2022-11-23

## 2022-11-23 RX ORDER — PANTOPRAZOLE SODIUM 40 MG/1
40 TABLET, DELAYED RELEASE ORAL DAILY
Qty: 90 TABLET | Refills: 2 | OUTPATIENT
Start: 2022-11-23

## 2022-11-23 RX ORDER — AMLODIPINE BESYLATE 10 MG/1
10 TABLET ORAL DAILY
OUTPATIENT
Start: 2022-11-23

## 2022-11-23 NOTE — TELEPHONE ENCOUNTER
Caller: [facility] - [place], [place] - [address] - [phone] [facility] [phone] FX    Relationship: Pharmacy    Best call back number: [phone]    Requested Prescriptions:   Requested Prescriptions     Pending Prescriptions Disp Refills   • apixaban (ELIQUIS) 5 MG tablet tablet 180 tablet 1     Sig: Take 1 tablet by mouth 2 (Two) Times a Day.   • olmesartan (BENICAR) 20 MG tablet 90 tablet 1     Sig: Take 1 tablet by mouth Daily.   • amLODIPine (NORVASC) 10 MG tablet       Sig: Take 1 tablet by mouth Daily.   • pantoprazole (PROTONIX) 40 MG EC tablet 90 tablet 2     Sig: Take 1 tablet by mouth Daily.        Pharmacy where request should be sent: [facility] - [place], [place] - [address] - [phone] [facility] [phone] FX     Does the patient have less than a 3 day supply:  [] Yes  [x] No    [place] Rep   11/23/22 09:27 EST

## 2022-12-06 ENCOUNTER — OFFICE VISIT (OUTPATIENT)
Dept: PULMONOLOGY | Facility: CLINIC | Age: 62
End: 2022-12-06

## 2022-12-06 VITALS
SYSTOLIC BLOOD PRESSURE: 143 MMHG | HEIGHT: 65 IN | WEIGHT: 224 LBS | DIASTOLIC BLOOD PRESSURE: 78 MMHG | RESPIRATION RATE: 18 BRPM | HEART RATE: 80 BPM | OXYGEN SATURATION: 98 % | TEMPERATURE: 98.2 F | BODY MASS INDEX: 37.32 KG/M2

## 2022-12-06 DIAGNOSIS — G47.33 OSA ON CPAP: ICD-10-CM

## 2022-12-06 DIAGNOSIS — Z72.0 TOBACCO ABUSE: Primary | ICD-10-CM

## 2022-12-06 DIAGNOSIS — J43.9 PULMONARY EMPHYSEMA, UNSPECIFIED EMPHYSEMA TYPE: ICD-10-CM

## 2022-12-06 DIAGNOSIS — Z99.89 OSA ON CPAP: ICD-10-CM

## 2022-12-06 PROCEDURE — 99214 OFFICE O/P EST MOD 30 MIN: CPT | Performed by: STUDENT IN AN ORGANIZED HEALTH CARE EDUCATION/TRAINING PROGRAM

## 2022-12-06 NOTE — PROGRESS NOTES
Primary Care Provider  Elena Henderson PA   Referring Provider  No ref. provider found      Patient Complaint  No chief complaint on file.      Subjective          Anika Casillas presents to [facility] PULMONARY & CRITICAL CARE MEDICINE      History of Presenting Illness  Anika Casillas is a 62 y.o. female with history of memory impairment, RUKHSANA on CPAP, hypertension, diabetes here as a new patient patient was referred by her PCP for abnormal PFTs.    PFTs done 7/5/2022 showed normal spirometry, mild hyperinflation with signs of air trapping, and moderate reduction in DLCO.  There was no response to bronchodilator therapy.     Patient reports having chronic cough and chronic shortness of breath.  She reports that even with just sitting she has some shortness of breath.  He also reports dyspnea on exertion with minimal exertion.'  With 15 steps or going to the mailbo, get shortness of breath'.  Patient is currently on trilogy Ellipta daily and as needed albuterol.  She reports that she does not use albuterol as much.  She is a current smoker, smoking about 5 cigarettes/day.  She has been smoking since she was 19 years old.  In the past she has smoked 1 pack/day for many years.  Patient reports that her cough is worse at night, mostly nonproductive but sometimes yellow to green in color in the morning.  She is on PPI for her acid reflux.  She has not been hospitalized within the last year for any of her symptoms. I have personally reviewed patients past family, social, medical and surgical histories and agree with their findings.      Review of Systems  Constitutional:  No fever. No chills. No weakness.  Eyes: No pain, erythema, or discharge. No blurring of vision.  ENT:  No sore throat, URI symptoms.   Cardiovascular:  No chest pain. No palpitations. No lower extremity edema.  Respiratory:  +shortness of breath; +chronic cough; no pleuritic chest pain. No hemoptysis. No dyspnea.   GI:  Normal  appetite. No nausea, vomiting, diarrhea. No pain. No melena.  Musculoskeletal:  No arthralgias or myalgias.  Neurologic:  No headache. No weakness.    Family History   Adopted: Yes   Family history unknown: Yes        Social History     Socioeconomic History   • Marital status:      Spouse name: fannie   Tobacco Use   • Smoking status: Every Day     Packs/day: 0.25     Years: 41.00     Pack years: 10.25     Types: Cigarettes     Start date: 6/17/1975   • Smokeless tobacco: Never   Vaping Use   • Vaping Use: Never used   Substance and Sexual Activity   • Alcohol use: Yes     Alcohol/week: 1.0 standard drink     Types: 1 Drinks containing 0.5 oz of alcohol per week     Comment: 1 a month   • Drug use: Never   • Sexual activity: Defer     Partners: Male     Birth control/protection: Surgical        Past Medical History:   Diagnosis Date   • Atrial fibrillation (HCC)    • Colindres's esophagus    • Diabetes mellitus (HCC)    • Esophageal varices (HCC)    • Gastroparesis    • GERD (gastroesophageal reflux disease) 1995   • Hypertension    • Irritable bowel syndrome    • BERKOWITZ (nonalcoholic steatohepatitis)    • Obstructive sleep apnea syndrome    • S/P exploratory laparotomy, lysis of adhesions, resection of necrotic colon without anastomosis 04/18/2022        Immunization History   Administered Date(s) Administered   • COVID-19 (PFIZER) PURPLE CAP 02/06/2021, 03/06/2021, 12/04/2021   • Flu Vaccine Intradermal Quad 18-64YR 09/12/2021, 09/12/2021   • FluLaval/Fluzone >6mos 10/29/2019   • Influenza, Unspecified 11/12/2018, 09/27/2020   • Pneumococcal Conjugate 13-Valent (PCV13) 01/09/2017   • Pneumococcal Polysaccharide (PPSV23) 10/12/2020   • flucelvax quad pfs =>4 YRS 09/27/2020       Allergies   Allergen Reactions   • Liraglutide Nausea And Vomiting   • Metformin Nausea Only     GI Upset          Current Outpatient Medications:   •  albuterol sulfate  (90 Base) MCG/ACT inhaler, Inhale 2 puffs Every 4 (Four)  Hours As Needed., Disp: , Rfl:   •  amLODIPine (NORVASC) 10 MG tablet, Take 10 mg by mouth Daily., Disp: , Rfl:   •  apixaban (ELIQUIS) 5 MG tablet tablet, Take 1 tablet by mouth 2 (Two) Times a Day., Disp: 180 tablet, Rfl: 1  •  B-D UF III MINI PEN NEEDLES 31G X 5 MM misc, , Disp: , Rfl:   •  Blood Glucose Monitoring Suppl (Accu-Chek Guide) w/Device kit, , Disp: , Rfl:   •  buPROPion XL (WELLBUTRIN XL) 150 MG 24 hr tablet, TAKE 1 TABLET TWICE DAILY, Disp: 180 tablet, Rfl: 0  •  cholecalciferol (VITAMIN D3) 25 MCG (1000 UT) tablet, Take 2,000 Units by mouth Daily., Disp: , Rfl:   •  empagliflozin (JARDIANCE) 25 MG tablet tablet, Take 25 mg by mouth Daily., Disp: , Rfl:   •  famotidine (PEPCID) 40 MG tablet, Take 1 tablet by mouth At Night As Needed for Heartburn., Disp: 90 tablet, Rfl: 2  •  Fluticasone-Umeclidin-Vilant (Trelegy Ellipta) 100-62.5-25 MCG/INH inhaler, Inhale 1 puff Daily., Disp: 2 each, Rfl: 0  •  glucose blood test strip, Check blood glucose twice daily, Disp: 60 each, Rfl: 12  •  glucose monitor monitoring kit, 1 each Daily. Patient prefers Accu Chek Meter., Disp: 1 each, Rfl: 0  •  hydrOXYzine (ATARAX) 25 MG tablet, Take 25 mg by mouth 2 (Two) Times a Day As Needed., Disp: , Rfl:   •  icosapent ethyl (Vascepa) 1 g capsule capsule, Take 2 g by mouth 2 (Two) Times a Day With Meals., Disp: 360 capsule, Rfl: 1  •  Insulin Glargine, 2 Unit Dial, (Toujeo Max SoloStar) 300 UNIT/ML solution pen-injector injection, Inject 120 Units under the skin into the appropriate area as directed Daily., Disp: , Rfl:   •  Lancets (accu-chek soft touch) lancets, Check blood sugar twice daily, Disp: 100 each, Rfl: 12  •  metoprolol tartrate (LOPRESSOR) 50 MG tablet, Take 1.5 tablets by mouth 2 (Two) Times a Day. TAKE 1 1/2 TABLETS BID, Disp: 270 tablet, Rfl: 1  •  multivitamin with minerals tablet tablet, Take 1 tablet by mouth Daily., Disp: , Rfl:   •  NovoLOG FlexPen 100 UNIT/ML solution pen-injector sc pen, Inject 60  Units under the skin into the appropriate area as directed 2 (Two) Times a Day., Disp: , Rfl:   •  olmesartan (BENICAR) 20 MG tablet, Take 1 tablet by mouth Daily., Disp: 90 tablet, Rfl: 1  •  Ozempic, 0.25 or 0.5 MG/DOSE, 2 MG/1.5ML solution pen-injector, , Disp: , Rfl:   •  pantoprazole (PROTONIX) 40 MG EC tablet, Take 1 tablet by mouth Daily., Disp: 90 tablet, Rfl: 2  •  polyethylene glycol (MIRALAX) 17 g packet, Take 17 g by mouth 2 (Two) Times a Day., Disp: 100 each, Rfl: 2  •  riFAXIMin (Xifaxan) 550 MG tablet, Take 1 tablet by mouth Every 12 (Twelve) Hours., Disp: 180 tablet, Rfl: 1  •  vitamin C (ASCORBIC ACID) 250 MG tablet, Take 250 mg by mouth Daily., Disp: , Rfl:      Objective     Vital Signs:   There were no vitals taken for this visit.    Physical Exam  There were no vitals filed for this visit.    General: Alert, NAD  HEENT:  EOMI, no sinus tenderness  Neck:  Supple, no thyromegaly,  no JVD  Lymph: no cervical, supraclavicular lymphadenopathy bilaterally  Chest:  clear to auscultation bilaterally, no wheezing or crackles; no work of breathing noted on room air  CV: RRR, no M/G/R, pulses 2+, equal.  Abd:  Soft, NT, ND, +BS, obese  EXT:  no clubbing, no cyanosis, no edema b/l  Neuro:  A&Ox3, CN grossly intact, no focal deficits  Skin: No rashes or lesions noted       Result Review :   I have personally reviewed patient's labs and images.          Assessment and Plan      Patient Active Problem List   Diagnosis   • Diabetic gastroparesis (HCC)   • Diverticulosis of colon   • Elevated transaminase level   • Essential hypertension   • Gastroesophageal reflux disease without esophagitis   • Hx of colonic polyp   • Hypothyroidism   • Major depressive disorder with single episode, in full remission (HCC)   • Mixed hyperlipidemia   • BERKOWITZ (nonalcoholic steatohepatitis)   • OAB (overactive bladder)   • Paroxysmal atrial fibrillation (HCC)   • Tobacco abuse   • Type 2 diabetes mellitus with hyperglycemia, with  long-term current use of insulin (HCC)   • Other emphysema (HCC)   • Colindres's esophagus without dysplasia   • Secondary esophageal varices without bleeding (HCC)   • Encounter for long-term (current) use of insulin (HCC)   • Hyperinsulinism   • Uncontrolled type 2 diabetes mellitus with neurologic complication   • Class 2 obesity   • Chronic gastritis without bleeding, unspecified gastritis type   • Pneumoperitoneum of unknown etiology   • S/P exploratory laparotomy, lysis of adhesions, resection of necrotic colon without anastomosis   • Memory loss   • RUKHSANA on CPAP   • Abnormal finding on MRI of brain       Impression and Plan:    1. COPD  2. Active smoker  3. Proximal A. fib on Eliquis  4. History of hypertension  5. RUKHSANA on CPAP    - PFTs done 7/5/2022 showed normal spirometry, mild hyperinflation with signs of air trapping, and moderate reduction in DLCO.  There was no response to bronchodilator therapy.   -We will order 2D echo to assess RV function  -We will order low-dose CT for cancer screening  -Continue trilogy Ellipta daily and albuterol as needed  -Continue PPI for acid reflux  -Can try Cait pot for postnasal drip  -We will discuss smoking cessation next visit; patient is currently on Wellbutrin; she will consider trying Chantix    Vaccination status: Patient is up-to-date with her vaccinations  Medications personally reviewed.    Follow Up   No follow-ups on file.  Patient was given instructions and counseling regarding her condition or for health maintenance advice. Please see specific information pulled into the AVS if appropriate.

## 2022-12-15 NOTE — ANESTHESIA POSTPROCEDURE EVALUATION
COLONOSCOPY: GOLYTELY PREP     Re: Alexia CHUN Goss   [address] [place] [address]    Provider: Lilo Guerin MD    Your colon must be cleaned of stool to have a good view. You should follow these directions to have a successful colonoscopy.     Your exam is scheduled as an outpatient procedure on:     Day: Friday    Date: MAY 12 2023    Arrival Time: 1:30 PM (You will receive a confirmation message 3 days before your appointment, if you do not receive a message or have questions, contact [phone] or visit the patient portal for details.). Be aware your procedure time is subject to change.)     You will be receiving sedation for your procedure and MUST have an adult over 18 to drive you home and be with you after procedure.     Location: [facility], [address] [place] [id number] - Directions: Come all the way into the main lobby of the building and take the interior elevator down to the lower level. Turn left off the elevator and walk straight ahead to the Endoscopy reception area.     You will need the following in order to complete your prep:   1. Golytely/Nulytely  2. Two Simethicone tablets (OVER THE COUNTER)  3. Two Dulcolax (Bisacodyl) tablets (OVER THE COUNTER)  4. 6 Dulcolax (Bisacodyl) tablets (OVER THE COUNTER)  5. 238 gram bottle of Miralax (OVER THE COUNTER)  6. 64 ounces of Gatorade (No red or purple) (OVER THE COUNTER)    Your prep kit has been sent to   [id number] - [place], [place] - [address]  [address] [facility] [address]  Phone: [phone] Fax: [phone]  . Please  the kit today. If not picked up within 7 days contact your pharmacy directly as prescription would been placed back and re-stock.       14 days before colonoscopy: BEGIN DRINKING 1 SCOOP OF MIRALAX TWO TIMES DAILY WITH WATER    7 days before colonoscopy: Review your colonoscopy instructions. (Instructions can also be found on [employer]  Patient: Anika Casillas    Procedure Summary     Date: 04/16/22 Room / Location: [facility] OR 05 / [facility] MAIN OR    Anesthesia Start: 0819 Anesthesia Stop: 0956    Procedure: EXPLORATORY LAPAROTOMY, LYSIS OF ADHESIONS, RESECTION OF NECTROIC COLON (N/A Abdomen) Diagnosis:       Pneumoperitoneum of unknown etiology      (Pneumoperitoneum of unknown etiology [K66.8])    Surgeons: Cande Stanley MD Provider: Gregory Mckinley MD    Anesthesia Type: general ASA Status: 3 - Emergent          Anesthesia Type: general    Vitals  Vitals Value Taken Time   /76 04/16/22 1030   Temp 36.6 °C (97.9 °F) 04/16/22 0955   Pulse 93 04/16/22 1031   Resp 21 04/16/22 1025   SpO2 95 % 04/16/22 1031   Vitals shown include unvalidated device data.        Post Anesthesia Care and Evaluation    Patient location during evaluation: bedside  Patient participation: complete - patient participated  Level of consciousness: awake and alert  Pain management: adequate  Airway patency: patent  Anesthetic complications: No anesthetic complications  PONV Status: none  Cardiovascular status: acceptable  Respiratory status: acceptable  Hydration status: acceptable    Comments: An Anesthesiologist personally participated in the most demanding procedures (including induction and emergence if applicable) in the anesthesia plan, monitored the course of anesthesia administration at frequent intervals and remained physically present and available for immediate diagnosis and treatment of emergencies.       under the letters tab under communication)  • Stop eating popcorn, nuts, flax/sesame seeds, or any food that contains seeds    3 BUSINESS DAYS BEFORE your procedure:  • Stop taking all anti-inflammatory medicines. These include Advil, Aleve, Naprosyn, (Tylenol is okay to take)  2 DAYS BEFORE the procedure:   •Start a strict, clear liquid diet from the moment you wake. A clear liquid diet can include: Apple juice, white grape juice, and white cranberry juice; Beef or chicken broths that are clear - no noodles, vegetables, rice, etc.Tea and coffee without milk; -Soda pop, Gatorade, Wai-Aid and various -Jell-O Flavors (any color except red or purple)  •Avoid: juices with pulp, milk, cream, solid food, alcohol, Gum, and hard candy.  3:00 PM: Take the 4 Dulcolax tablets and chew the Simethicone tablet    5:00 PM: Mix the 238 gram bottle of Miralax in 64 ounces of Gatorade.  Shake the solution until the Miralax is dissolved. Drink an 8 ounce glass every 10-15 minutes until the solution is gone.     Continue to drink clear liquids throughout the evening but do not eat any solid food.    1 DAY BEFORE the procedure:   •CONTINUE a strict, clear liquid diet from the moment you wake. A clear liquid diet can include: Apple juice, white grape juice, and white cranberry juice; Beef or chicken broths that are clear - no noodles, vegetables, rice, etc.Tea and coffee without milk; -Soda pop, Gatorade, Wai-Aid and various -Jell-O Flavors (any color except red or purple)  •Avoid: juices with pulp, milk, cream, solid food, alcohol, Gum, and hard candy.    • In the morning when you wake, follow the directions on the prep packaging to mix your prep.  Fill the gallon jug half full with lukewarm tap water, shake vigorously. Continue to fill the jug with lukewarm water until full. Shake until the powder is completely dissolved. Chill in refrigerator before drinking. You may add Crystal Light powder to flavor the prep.     Drink half of the  gallon preparation at 4:00 p.m, drinking 8 ounces of solution every 10 minutes. Also chew one Simethicone tablet with the first glass of solution. Place remaining solution in the refrigerator.     Continue to drink clear liquids throughout the evening but do not eat any solid food.    6 hours prior to arrival time:  Drink 8 ounces of solution every 10 minutes until complete.  Take one Dulcolax (Bisacodyl) tablet with each of the last two glasses of the solution. Chew the remaining Simethicone tablet. For a successful prep, you must drink the entire solution.     4 hours prior to your arrival time, STOP ALL LIQUIDS INCLUDING WATER AT THIS TIME.    If you are still having solid/formed stools or trouble completing this preparation, please call the doctor's office at [phone].

## 2022-12-16 ENCOUNTER — OFFICE VISIT (OUTPATIENT)
Dept: CARDIOLOGY | Facility: CLINIC | Age: 62
End: 2022-12-16

## 2022-12-16 VITALS
BODY MASS INDEX: 37.82 KG/M2 | HEART RATE: 78 BPM | SYSTOLIC BLOOD PRESSURE: 133 MMHG | WEIGHT: 227 LBS | DIASTOLIC BLOOD PRESSURE: 78 MMHG | HEIGHT: 65 IN

## 2022-12-16 DIAGNOSIS — E78.2 MIXED HYPERLIPIDEMIA: Primary | ICD-10-CM

## 2022-12-16 DIAGNOSIS — F17.200 SMOKER: ICD-10-CM

## 2022-12-16 DIAGNOSIS — I10 HYPERTENSION, ESSENTIAL: ICD-10-CM

## 2022-12-16 DIAGNOSIS — I48.0 PAROXYSMAL ATRIAL FIBRILLATION: Chronic | ICD-10-CM

## 2022-12-16 PROCEDURE — 99406 BEHAV CHNG SMOKING 3-10 MIN: CPT | Performed by: INTERNAL MEDICINE

## 2022-12-16 PROCEDURE — 99214 OFFICE O/P EST MOD 30 MIN: CPT | Performed by: INTERNAL MEDICINE

## 2022-12-16 RX ORDER — AMLODIPINE BESYLATE 10 MG/1
10 TABLET ORAL DAILY
Qty: 90 TABLET | Refills: 3 | Status: SHIPPED | OUTPATIENT
Start: 2022-12-16

## 2022-12-16 RX ORDER — EVOLOCUMAB 140 MG/ML
140 INJECTION, SOLUTION SUBCUTANEOUS
Qty: 6 EACH | Refills: 4 | Status: SHIPPED | OUTPATIENT
Start: 2022-12-16

## 2022-12-16 NOTE — PROGRESS NOTES
Chief Complaint  Atrial Fibrillation, Hyperlipidemia, Hypertension, Obesity, and Coronary Artery Disease    Subjective            Anika Casillas presents to [facility] CARDIOLOGY  History of Present Illness    Ms. Casillas is here for follow-up evaluation management of mixed hyperlipidemia, paroxysmal atrial fibrillation, essential hypertension, tobacco use.  Since last visit she is doing relatively well.  She has occasional breakthrough palpitations which last a few minutes but are infrequent.  No bleeding problems on anticoagulation.  She is statin intolerant with previous myalgias, and her lipids are uncontrolled.    PMH  Past Medical History:   Diagnosis Date   • Atrial fibrillation (HCC)    • Colindres's esophagus    • Diabetes mellitus (HCC)    • Esophageal varices (HCC)    • Gastroparesis    • GERD (gastroesophageal reflux disease) 1995   • Hypertension    • Irritable bowel syndrome    • BERKOWITZ (nonalcoholic steatohepatitis)    • Obstructive sleep apnea syndrome    • S/P exploratory laparotomy, lysis of adhesions, resection of necrotic colon without anastomosis 04/18/2022         SURGICALHX  Past Surgical History:   Procedure Laterality Date   • BLADDER SLING MODIFIED, ANTERIOR AND POSTERIOR VAGINAL REPAIR     • BREAST LUMPECTOMY     • CARDIAC CATHETERIZATION     • CHOLECYSTECTOMY     • ENDOSCOPY N/A 6/3/2022    Procedure: ESOPHAGOGASTRODUODENOSCOPY;  Surgeon: Shannan Charles MD;  Location: [facility] ENDOS[facility];  Service: Gastroenterology;  Laterality: N/A;  RETAINED FOOD IN STOMACH  ESOPHAGEAL VARICIES   • EXPLORATORY LAPAROTOMY N/A 4/16/2022    Procedure: EXPLORATORY LAPAROTOMY, LYSIS OF ADHESIONS, RESECTION OF NECTROIC COLON;  Surgeon: Cande Stanley MD;  Location: [facility] MAIN OR;  Service: General;  Laterality: N/A;   • GALLBLADDER SURGERY     • TUBAL ABDOMINAL LIGATION          SOC  Social History     Socioeconomic History   • Marital status:      Spouse name: fannie    Tobacco Use   • Smoking status: Every Day     Packs/day: 0.25     Years: 41.00     Pack years: 10.25     Types: Cigarettes     Start date: 6/17/1975   • Smokeless tobacco: Never   Vaping Use   • Vaping Use: Never used   Substance and Sexual Activity   • Alcohol use: Yes     Alcohol/week: 1.0 standard drink     Types: 1 Drinks containing 0.5 oz of alcohol per week     Comment: 1 a month   • Drug use: Never   • Sexual activity: Defer     Partners: Male     Birth control/protection: Surgical         FAMHX  Family History   Adopted: Yes   Family history unknown: Yes          ALLERGY  Allergies   Allergen Reactions   • Liraglutide Nausea And Vomiting   • Metformin Nausea Only     GI Upset        MEDSCURRENT    Current Outpatient Medications:   •  albuterol sulfate  (90 Base) MCG/ACT inhaler, Inhale 2 puffs Every 4 (Four) Hours As Needed., Disp: , Rfl:   •  amLODIPine (NORVASC) 10 MG tablet, Take 1 tablet by mouth Daily., Disp: 90 tablet, Rfl: 3  •  apixaban (ELIQUIS) 5 MG tablet tablet, Take 1 tablet by mouth 2 (Two) Times a Day., Disp: 180 tablet, Rfl: 3  •  B-D UF III MINI PEN NEEDLES 31G X 5 MM misc, , Disp: , Rfl:   •  Blood Glucose Monitoring Suppl (Accu-Chek Guide) w/Device kit, , Disp: , Rfl:   •  buPROPion XL (WELLBUTRIN XL) 150 MG 24 hr tablet, TAKE 1 TABLET TWICE DAILY, Disp: 180 tablet, Rfl: 0  •  cholecalciferol (VITAMIN D3) 25 MCG (1000 UT) tablet, Take 2,000 Units by mouth Daily., Disp: , Rfl:   •  empagliflozin (JARDIANCE) 25 MG tablet tablet, Take 25 mg by mouth Daily., Disp: , Rfl:   •  famotidine (PEPCID) 40 MG tablet, Take 1 tablet by mouth At Night As Needed for Heartburn., Disp: 90 tablet, Rfl: 2  •  Fluticasone-Umeclidin-Vilant (Trelegy Ellipta) 100-62.5-25 MCG/INH inhaler, Inhale 1 puff Daily., Disp: 2 each, Rfl: 0  •  glucose blood test strip, Check blood glucose twice daily, Disp: 60 each, Rfl: 12  •  glucose monitor monitoring kit, 1 each Daily. Patient prefers Accu Chek Meter., Disp: 1  "each, Rfl: 0  •  hydrOXYzine (ATARAX) 25 MG tablet, Take 25 mg by mouth 2 (Two) Times a Day As Needed., Disp: , Rfl:   •  icosapent ethyl (Vascepa) 1 g capsule capsule, Take 2 g by mouth 2 (Two) Times a Day With Meals., Disp: 360 capsule, Rfl: 1  •  Lancets (accu-chek soft touch) lancets, Check blood sugar twice daily, Disp: 100 each, Rfl: 12  •  metoprolol tartrate (LOPRESSOR) 50 MG tablet, Take 1.5 tablets by mouth 2 (Two) Times a Day. TAKE 1 1/2 TABLETS BID, Disp: 270 tablet, Rfl: 1  •  multivitamin with minerals tablet tablet, Take 1 tablet by mouth Daily., Disp: , Rfl:   •  NovoLOG FlexPen 100 UNIT/ML solution pen-injector sc pen, Inject 60 Units under the skin into the appropriate area as directed 2 (Two) Times a Day., Disp: , Rfl:   •  olmesartan (BENICAR) 20 MG tablet, Take 1 tablet by mouth Daily., Disp: 90 tablet, Rfl: 1  •  Ozempic, 0.25 or 0.5 MG/DOSE, 2 MG/1.5ML solution pen-injector, , Disp: , Rfl:   •  pantoprazole (PROTONIX) 40 MG EC tablet, Take 1 tablet by mouth Daily., Disp: 90 tablet, Rfl: 2  •  polyethylene glycol (MIRALAX) 17 g packet, Take 17 g by mouth 2 (Two) Times a Day., Disp: 100 each, Rfl: 2  •  riFAXIMin (Xifaxan) 550 MG tablet, Take 1 tablet by mouth Every 12 (Twelve) Hours., Disp: 180 tablet, Rfl: 1  •  vitamin C (ASCORBIC ACID) 250 MG tablet, Take 250 mg by mouth Daily., Disp: , Rfl:   •  Evolocumab (Repatha) solution prefilled syringe injection, Inject 1 mL under the skin into the appropriate area as directed Every 14 (Fourteen) Days., Disp: 6 each, Rfl: 4  •  Insulin Glargine, 2 Unit Dial, (Toujeo Max SoloStar) 300 UNIT/ML solution pen-injector injection, Inject 120 Units under the skin into the appropriate area as directed Daily., Disp: , Rfl:       Review of Systems   Cardiovascular: Positive for palpitations. Negative for chest pain.   Respiratory: Positive for shortness of breath.         Objective     /78   Pulse 78   Ht 165.1 cm (65\")   Wt 103 kg (227 lb)   BMI " 37.77 kg/m²       General Appearance:   · well developed  · well nourished, obese  HENT:   · oropharynx moist  · lips not cyanotic  Neck:  · thyroid not enlarged  · supple  Respiratory:  · no respiratory distress  · normal breath sounds  · no rales  Cardiovascular:  · no jugular venous distention  · regular rhythm  · apical impulse normal  · S1 normal, S2 normal  · no S3, no S4   · no murmur  · no rub, no thrill  · carotid pulses normal; no bruit  · pedal pulses normal  · lower extremity edema: none    Musculoskeletal:  · no clubbing of fingers.   · normocephalic, head atraumatic  Skin:   · warm, dry  Psychiatric:  · judgement and insight appropriate  · normal mood and affect      Result Review :     The following data was reviewed by: Mandeep Webb MD on 12/16/2022:    CMP    CMP 6/3/22 7/6/22 11/21/22   Glucose  104 (A) 100 (A)   BUN  12 6 (A)   Creatinine 0.70 0.86 0.75   Sodium  141 141   Potassium  4.3 3.9   Chloride  105 105   Calcium  10.4 10.2   Albumin  4.20 4.10   Total Bilirubin  0.2 0.3   Alkaline Phosphatase  132 (A) 137 (A)   AST (SGOT)  83 (A) 51 (A)   ALT (SGPT)  59 (A) 53 (A)   (A) Abnormal value       Comments are available for some flowsheets but are not being displayed.           CBC    CBC 4/18/22 4/19/22 7/6/22   WBC 11.49 (A) 10.16 8.55   RBC 4.21 4.20 4.56   Hemoglobin 12.9 12.4 13.5   Hematocrit 36.0 35.7 40.4   MCV 85.5 85.0 88.6   MCH 30.6 29.5 29.6   MCHC 35.8 (A) 34.7 33.4   RDW 15.9 (A) 15.5 (A) 15.4   Platelets 205 223 298   (A) Abnormal value            Lipid Panel    Lipid Panel 3/23/22 7/6/22   Total Cholesterol 216 (A) 228 (A)   Triglycerides 257 (A) 409 (A)   HDL Cholesterol 35 (A) 33 (A)   VLDL Cholesterol 46 (A) 73 (A)   LDL Cholesterol  135 (A) 122 (A)   LDL/HDL Ratio 3.70 3.43   (A) Abnormal value            TSH    TSH 3/23/22 7/6/22   TSH 2.660 2.580             Data reviewed: Primary care records reviewed     Procedures      Anika Casillas  reports that she has  been smoking cigarettes. She started smoking about 47 years ago. She has a 10.25 pack-year smoking history. She has never used smokeless tobacco.. I have educated her on the risk of diseases from using tobacco products such as cancer, COPD and heart disease.     I advised her to quit and she is willing to quit. We have discussed the following method/s for tobacco cessation:  Counseling.  Together we have set a quit date for When she weans down to 0 cigarettes.  She will follow up with me in several months or sooner to check on her progress.    I spent 3  minutes counseling the patient.                Assessment and Plan        ASSESSMENT:  Encounter Diagnoses   Name Primary?   • Paroxysmal atrial fibrillation (HCC)    • Mixed hyperlipidemia Yes   • Hypertension, essential    • Smoker          PLAN:    1.  Paroxysmal atrial fibrillation-she is maintaining sinus rhythm the majority of the time with only occasional breakthrough.  Continue current medical therapy.  If her symptoms worsen in frequency or severity we can consider titration of medical therapy or choosing a different antiarrhythmic agent.  2.  Mixed hyperlipidemia-uncontrolled, she is statin intolerant and has multiple cardiovascular risk factors including documented atherosclerotic changes of the abdominal vasculature.  Goal LDL less than 70.  I am starting Repatha, repeat lipid panel in 3 months  3.  Essential hypertension-controlled, continue current medical therapy  4.  Tobacco use/smoker-counseling done          Patient was given instructions and counseling regarding her condition or for health maintenance advice. Please see specific information pulled into the AVS if appropriate.             KATHARINE Webb MD  12/16/2022    13:10 EST

## 2022-12-17 DIAGNOSIS — E78.2 MIXED HYPERLIPIDEMIA: ICD-10-CM

## 2022-12-19 ENCOUNTER — TELEPHONE (OUTPATIENT)
Dept: CARDIOLOGY | Facility: CLINIC | Age: 62
End: 2022-12-19

## 2022-12-19 ENCOUNTER — OFFICE VISIT (OUTPATIENT)
Dept: GASTROENTEROLOGY | Facility: CLINIC | Age: 62
End: 2022-12-19

## 2022-12-19 VITALS
DIASTOLIC BLOOD PRESSURE: 77 MMHG | SYSTOLIC BLOOD PRESSURE: 151 MMHG | BODY MASS INDEX: 37.82 KG/M2 | HEART RATE: 92 BPM | WEIGHT: 227 LBS | HEIGHT: 65 IN

## 2022-12-19 DIAGNOSIS — K74.69 OTHER CIRRHOSIS OF LIVER: Primary | ICD-10-CM

## 2022-12-19 DIAGNOSIS — K31.84 GASTROPARESIS: ICD-10-CM

## 2022-12-19 DIAGNOSIS — K22.70 BARRETT'S ESOPHAGUS WITHOUT DYSPLASIA: ICD-10-CM

## 2022-12-19 DIAGNOSIS — K76.82 HEPATIC ENCEPHALOPATHY: ICD-10-CM

## 2022-12-19 PROCEDURE — 99214 OFFICE O/P EST MOD 30 MIN: CPT | Performed by: NURSE PRACTITIONER

## 2022-12-19 RX ORDER — ICOSAPENT ETHYL 1000 MG/1
2 CAPSULE ORAL 2 TIMES DAILY WITH MEALS
Qty: 360 CAPSULE | Refills: 1 | Status: SHIPPED | OUTPATIENT
Start: 2022-12-19

## 2022-12-19 NOTE — PROGRESS NOTES
Chief Complaint  GI Problem (Barretts Esophagus )    History of Present Illness  Anika Casillas is a 62 y.o. who presents to [facility] for follow up of GI Problem (Barretts Esophagus ).    Bowel movement daily to every other day. Uses  miralax as needed for constipation. Tried lactulose in the past and stated it caused abdominal cramping. Denies any ascites, lower extremity swelling, new confusion, or difficulty sleeping at night. Continues xifaxin.     Heartburn controlled with protonix and pepcid. Does still have nausea often r/t gastroparesis. Was seen by [facility] surgical specialist however needed hepatology clearance from [facility]. Frustrated with waiting. Weight stable.     Labs Result Review Imaging    Past Medical History:   Diagnosis Date   • Atrial fibrillation (HCC)    • Colindres's esophagus    • Diabetes mellitus (HCC)    • Esophageal varices (HCC)    • Gastroparesis    • GERD (gastroesophageal reflux disease) 1995   • Hypertension    • Irritable bowel syndrome    • BERKOWITZ (nonalcoholic steatohepatitis)    • Obstructive sleep apnea syndrome    • S/P exploratory laparotomy, lysis of adhesions, resection of necrotic colon without anastomosis 04/18/2022       Past Surgical History:   Procedure Laterality Date   • BLADDER SLING MODIFIED, ANTERIOR AND POSTERIOR VAGINAL REPAIR     • BREAST LUMPECTOMY     • CARDIAC CATHETERIZATION     • CHOLECYSTECTOMY     • COLON RESECTION     • ENDOSCOPY N/A 06/03/2022    Procedure: ESOPHAGOGASTRODUODENOSCOPY;  Surgeon: Shannan Charles MD;  Location: [facility] END[facility];  Service: Gastroenterology;  Laterality: N/A;  RETAINED FOOD IN STOMACH  ESOPHAGEAL VARICIES   • EXPLORATORY LAPAROTOMY N/A 04/16/2022    Procedure: EXPLORATORY LAPAROTOMY, LYSIS OF ADHESIONS, RESECTION OF NECTROIC COLON;  Surgeon: Cande Stanley MD;  Location: [facility] MAIN OR;  Service: General;  Laterality: N/A;   • GALLBLADDER SURGERY     • TUBAL ABDOMINAL LIGATION          Current Outpatient Medications on File Prior to Visit   Medication Sig Dispense Refill   • albuterol sulfate  (90 Base) MCG/ACT inhaler Inhale 2 puffs Every 4 (Four) Hours As Needed.     • amLODIPine (NORVASC) 10 MG tablet Take 1 tablet by mouth Daily. 90 tablet 3   • apixaban (ELIQUIS) 5 MG tablet tablet Take 1 tablet by mouth 2 (Two) Times a Day. 180 tablet 3   • B-D UF III MINI PEN NEEDLES 31G X 5 MM misc      • Blood Glucose Monitoring Suppl (Accu-Chek Guide) w/Device kit      • buPROPion XL (WELLBUTRIN XL) 150 MG 24 hr tablet TAKE 1 TABLET TWICE DAILY 180 tablet 0   • cholecalciferol (VITAMIN D3) 25 MCG (1000 UT) tablet Take 2,000 Units by mouth Daily.     • empagliflozin (JARDIANCE) 25 MG tablet tablet Take 25 mg by mouth Daily.     • Evolocumab (Repatha) solution prefilled syringe injection Inject 1 mL under the skin into the appropriate area as directed Every 14 (Fourteen) Days. 6 each 4   • famotidine (PEPCID) 40 MG tablet Take 1 tablet by mouth At Night As Needed for Heartburn. 90 tablet 2   • Fluticasone-Umeclidin-Vilant (Trelegy Ellipta) 100-62.5-25 MCG/INH inhaler Inhale 1 puff Daily. 2 each 0   • glucose blood test strip Check blood glucose twice daily 60 each 12   • glucose monitor monitoring kit 1 each Daily. Patient prefers Accu Chek Meter. 1 each 0   • hydrOXYzine (ATARAX) 25 MG tablet Take 25 mg by mouth 2 (Two) Times a Day As Needed.     • icosapent ethyl (Vascepa) 1 g capsule capsule Take 2 g by mouth 2 (Two) Times a Day With Meals. 360 capsule 1   • Lancets (accu-chek soft touch) lancets Check blood sugar twice daily 100 each 12   • metoprolol tartrate (LOPRESSOR) 50 MG tablet Take 1.5 tablets by mouth 2 (Two) Times a Day. TAKE 1 1/2 TABLETS  tablet 1   • multivitamin with minerals tablet tablet Take 1 tablet by mouth Daily.     • NovoLOG FlexPen 100 UNIT/ML solution pen-injector sc pen Inject 60 Units under the skin into the appropriate area as directed 2 (Two) Times a Day.  "    • olmesartan (BENICAR) 20 MG tablet Take 1 tablet by mouth Daily. 90 tablet 1   • Ozempic, 0.25 or 0.5 MG/DOSE, 2 MG/1.5ML solution pen-injector      • pantoprazole (PROTONIX) 40 MG EC tablet Take 1 tablet by mouth Daily. 90 tablet 2   • polyethylene glycol (MIRALAX) 17 g packet Take 17 g by mouth 2 (Two) Times a Day. 100 each 2   • riFAXIMin (Xifaxan) 550 MG tablet Take 1 tablet by mouth Every 12 (Twelve) Hours. 180 tablet 1   • vitamin C (ASCORBIC ACID) 250 MG tablet Take 250 mg by mouth Daily.     • Insulin Glargine, 2 Unit Dial, (Toujeo Max SoloStar) 300 UNIT/ML solution pen-injector injection Inject 120 Units under the skin into the appropriate area as directed Daily.       No current facility-administered medications on file prior to visit.       Social History     Social History Narrative    Exercises 1 x week    Lives at home with spouse         Objective     Review of Systems   Gastrointestinal: Positive for nausea and GERD.        Vital Signs:   /77 (BP Location: Right arm, Patient Position: Sitting, Cuff Size: Large Adult)   Pulse 92   Ht 165.1 cm (65\")   Wt 103 kg (227 lb)   BMI 37.77 kg/m²       Physical Exam  Constitutional:       General: She is not in acute distress.     Appearance: Normal appearance. She is well-developed. She is obese.   HENT:      Head: Normocephalic and atraumatic.   Eyes:      Conjunctiva/sclera: Conjunctivae normal.      Pupils: Pupils are equal, round, and reactive to light.      Visual Fields: Right eye visual fields normal and left eye visual fields normal.   Cardiovascular:      Rate and Rhythm: Normal rate and regular rhythm.      Heart sounds: Normal heart sounds.   Pulmonary:      Effort: Pulmonary effort is normal. No retractions.      Breath sounds: Normal breath sounds and air entry.   Abdominal:      General: Bowel sounds are normal. There is no distension.      Palpations: Abdomen is soft.      Tenderness: There is no abdominal tenderness.      " Comments: No appreciable hepatosplenomegaly or ascites   Musculoskeletal:         General: Normal range of motion.      Cervical back: Normal range of motion and neck supple.   Skin:     General: Skin is warm and dry.   Neurological:      Mental Status: She is alert and oriented to person, place, and time.   Psychiatric:         Mood and Affect: Mood and affect normal.         Behavior: Behavior normal.         Result Review :   The following data was reviewed by: CHU Emery on 12/19/2022:  CBC w/diff    CBC w/Diff 4/18/22 4/19/22 7/6/22   WBC 11.49 (A) 10.16 8.55   RBC 4.21 4.20 4.56   Hemoglobin 12.9 12.4 13.5   Hematocrit 36.0 35.7 40.4   MCV 85.5 85.0 88.6   MCH 30.6 29.5 29.6   MCHC 35.8 (A) 34.7 33.4   RDW 15.9 (A) 15.5 (A) 15.4   Platelets 205 223 298   Neutrophil Rel % 54.8 54.3 48.1   Immature Granulocyte Rel % 0.4 0.4 0.2   Lymphocyte Rel % 35.2 35.0 42.9   Monocyte Rel % 8.1 8.1 5.8   Eosinophil Rel % 1.0 1.7 2.2   Basophil Rel % 0.5 0.5 0.8   (A) Abnormal value            CMP    CMP 6/3/22 7/6/22 11/21/22   Glucose  104 (A) 100 (A)   BUN  12 6 (A)   Creatinine 0.70 0.86 0.75   Sodium  141 141   Potassium  4.3 3.9   Chloride  105 105   Calcium  10.4 10.2   Albumin  4.20 4.10   Total Bilirubin  0.2 0.3   Alkaline Phosphatase  132 (A) 137 (A)   AST (SGOT)  83 (A) 51 (A)   ALT (SGPT)  59 (A) 53 (A)   (A) Abnormal value       Comments are available for some flowsheets but are not being displayed.           Lipase   Date Value Ref Range Status   04/15/2022 24 13 - 60 U/L Final     Hepatitis C Ab   Date Value Ref Range Status   11/19/2021 Non-Reactive Non-Reactive Final     Protime   Date Value Ref Range Status   11/22/2022 11.1 9.7 - 11.5 Second Final     Comment:     When using the PT to screen for a coagulopathy, please refer to the PT reference range to evaluate results.     INR   Date Value Ref Range Status   11/22/2022 1.0  Final     Comment:     To monitor warfarin, the INR calculated from  "the PT is used, according to current published guidelines:    *Most indications, moderate intensity INR (2.0-3.0) is effective.    *Patients with recurrent thromboembolic events with a therapeutic INR as above, or other additional risk factors for thromboembolic events, high intensity INR (2.5-3.5) is recommended.    *Optimal target range for the INR is not likely to be the same for all indications, and lower INR targets may be prudent for patients at very high risk of bleeding.    The INR is used to manage patients on warfarin, and thus is not compared to a \"normal\" INR range. The validity of the INR in other conditions of impaired coagulation has not been fully evaluated, and the PT recorded in seconds is recommended in these   instances. Ref:  CHEST June 2008 supplement.     Ammonia   Date Value Ref Range Status   11/16/2022 27 11 - 51 umol/L Final             Assessment and Plan    Diagnoses and all orders for this visit:    1. Other cirrhosis of liver (HCC) (Primary)    2. Gastroparesis    3. Colindres's esophagus without dysplasia    4. Hepatic encephalopathy      * Surgery not found *     Cirrhosis: BERKOWITZ  Ascites: None noted  Varices: Grade 2 esophageal varices noted on most recent EGD.  Encephalopathy: Patient reports she is unable to tolerate lactulose. Continue Xifaxan. Use Miralax PRN constipation. Goal is 1-2 BMs a day.     Continue to f/u with Dr. Lugo regarding gastroparesis.     Follow Up   Return in about 6 months (around 6/19/2023).  Patient was given instructions and counseling regarding her condition or for health maintenance advice. Please see specific information pulled into the AVS if appropriate.    "

## 2022-12-19 NOTE — TELEPHONE ENCOUNTER
The PA for Repatha was approved.    PA Case: [id number], Status: Approved, Coverage Starts on: 12/19/2022 12:00:00 AM, Coverage Ends on: 6/17/2023 12:00:00 AM. Questions? Contact [phone].

## 2022-12-30 NOTE — PROGRESS NOTES
Chief Complaint  Chief Complaint   Patient presents with   • Follow-up   • Hyperlipidemia   • Diabetes   • Depression   • Hypertension       Subjective          Anika Casillas presents to [facility] for 6 month follow up for HL, DM, Depression, and HTN.    History of Present Illness    HL: Patient presents for management of hyperlipidemia. Patient is currently on Vascepa. Patient denies muscle cramps and muscle weakness. Patient is monitoring diet to help lower lipids.    Depression: Patient is currently on Wellbutrin.  Patient states feeling hopeless.  Is occasionally down with fatigue.  Denies crying spells.  Denies suicidal ideation.  Previously on Celexa and Cymbalta.    HTN: Patient presents for hypertension management. Patient is currently taking Amlodipine and is consistent with medication. Patient denies chest pain, shortness of air and edema. Patient does not check blood pressure at home.    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  Patient has referral to neuropsych in February.    Vitamin D deficiency: currently on 6000iu/day.    Patient complains of skin lesions around her eyes that are growing and starting to interfere with her vision.  Patient is requesting a referral to dermatology.    Tobacco use decreasing: smoked one cigarette yesterday.    Mammo: 8-9-22  Colonoscopy: 3-26-19    Medical History: has a past medical history of Atrial fibrillation (HCC), Colindres's esophagus, Diabetes mellitus (HCC), Esophageal varices (HCC), Gastroparesis, GERD (gastroesophageal reflux disease) (1995), Hypertension, Irritable bowel syndrome, BERKOWITZ (nonalcoholic steatohepatitis), Obstructive sleep apnea syndrome, and S/P exploratory laparotomy, lysis of adhesions, resection of necrotic colon without anastomosis (04/18/2022).   Surgical History: has a past surgical history that includes Gallbladder  "surgery; Breast lumpectomy; Cardiac catheterization; Cholecystectomy; bladder sling modified, anterior and posterior vaginal repair; Tubal ligation; Exploratory Laparotomy (N/A, 04/16/2022); Esophagogastroduodenoscopy (N/A, 06/03/2022); and Colectomy.   Family History: She was adopted. Family history is unknown by patient.   Social History: reports that she has been smoking cigarettes. She started smoking about 47 years ago. She has a 10.25 pack-year smoking history. She has never used smokeless tobacco. She reports current alcohol use of about 1.0 standard drink per week. She reports that she does not use drugs.    Allergies: Liraglutide and Metformin    Health Maintenance Due   Topic Date Due   • ZOSTER VACCINE (1 of 2) Never done   • ANNUAL PHYSICAL  Never done   • DIABETIC FOOT EXAM  Never done   • DIABETIC EYE EXAM  12/08/2021   • COVID-19 Vaccine (4 - Booster for Pfizer series) 01/29/2022       Immunization History   Administered Date(s) Administered   • COVID-19 (PFIZER) PURPLE CAP 02/06/2021, 03/06/2021, 12/04/2021   • Flu Vaccine Intradermal Quad 18-64YR 09/12/2021, 09/12/2021   • FluLaval/Fluzone >6mos 10/29/2019, 10/03/2022   • Influenza, Unspecified 11/12/2018, 09/27/2020   • Pneumococcal Conjugate 13-Valent (PCV13) 01/09/2017   • Pneumococcal Polysaccharide (PPSV23) 10/12/2020   • flucelvax quad pfs =>4 YRS 09/27/2020       Objective     Vitals:    01/04/23 0843 01/04/23 0913   BP: 146/82 132/70   Pulse: 90    Resp: 18    SpO2: 98%    Weight: 98.8 kg (217 lb 12.8 oz)    Height: 165.1 cm (65\")      Body mass index is 36.24 kg/m².     Wt Readings from Last 3 Encounters:   01/09/23 99.7 kg (219 lb 12.8 oz)   01/04/23 98.8 kg (217 lb 12.8 oz)   12/19/22 103 kg (227 lb)     BP Readings from Last 3 Encounters:   01/09/23 157/87   01/04/23 132/70 12/19/22 151/77       Class 2 Severe Obesity (BMI >=35 and <=39.9). Obesity-related health conditions include the following: obstructive sleep apnea, hypertension, " diabetes mellitus, dyslipidemias and GERD. Obesity is improving with lifestyle modifications. BMI is is above average; BMI management plan is completed. We discussed portion control and increasing exercise.      Patient Care Team:  Elena Henderson PA as PCP - General (Family Medicine)  KATHARINE Webb MD as Consulting Physician (Cardiology)  Cande Stanley MD as Consulting Physician (General Surgery)    Physical Exam  Vitals and nursing note reviewed.   Constitutional:       Appearance: Normal appearance. She is obese.   HENT:      Head: Normocephalic and atraumatic.   Neck:      Vascular: No carotid bruit.      Comments: Thyroid : gland size normal, nontender, no nodules or masses present on palpation   Cardiovascular:      Rate and Rhythm: Normal rate and regular rhythm.      Pulses: Normal pulses.      Heart sounds: Normal heart sounds.   Pulmonary:      Effort: Pulmonary effort is normal.      Breath sounds: Normal breath sounds.   Abdominal:      Comments: Small reducible abdominal hernia   Musculoskeletal:      Cervical back: Neck supple. No tenderness.      Right lower leg: No edema.      Left lower leg: No edema.   Lymphadenopathy:      Cervical: No cervical adenopathy.   Skin:     Comments: Elevated skin lesions of the face.   Neurological:      Mental Status: She is alert.   Psychiatric:         Mood and Affect: Mood normal.         Behavior: Behavior normal.           Result Review :                           Assessment and Plan      Diagnoses and all orders for this visit:    1. Skin lesions (Primary)  Comments:  Worsening problem: Refer to Derm  Orders:  -     Ambulatory Referral to Dermatology    2. Major depressive disorder with single episode, in full remission ([facility])  Comments:  Not currently controlled: Continue with Wellbutrin  Mg daily; add Lexapro 10 mg daily: Ativan and side effects discussed  Orders:  -     escitalopram (Lexapro) 10 MG tablet; Take 1 tablet by mouth Daily.   Dispense: 90 tablet; Refill: 0    3. Type 2 diabetes mellitus with hyperglycemia, with long-term current use of insulin ([facility])  Comments:  Not currently under control: Continue follow-up with endocrinology  Orders:  -     Comprehensive Metabolic Panel; Future  -     MicroAlbumin, Urine, Random - Urine, Clean Catch; Future  -     Hemoglobin A1c; Future    4. Essential hypertension  Comments:  Currently stable: Continue current medication and check labs.  Follow-up in 6 months  Orders:  -     Comprehensive Metabolic Panel; Future  -     Lipid Panel; Future  -     CBC & Differential; Future  -     Urinalysis With Culture If Indicated -; Future    5. Other fatigue  Comments:  Worsening problem; check labs to further evaluate  Orders:  -     TSH; Future  -     T4, Free; Future    6. Vitamin D deficiency  Comments:  Unsure of stability; check labs  Orders:  -     Vitamin D,25-Hydroxy; Future    7. Class 2 severe obesity due to excess calories with serious comorbidity and body mass index (BMI) of 36.0 to 36.9 in adult ([facility])            Follow Up     Return in about 8 weeks (around 3/1/2023).    Patient was given instructions and counseling regarding her condition or for health maintenance advice. Please see specific information pulled into the AVS if appropriate.

## 2023-01-04 ENCOUNTER — OFFICE VISIT (OUTPATIENT)
Dept: FAMILY MEDICINE CLINIC | Facility: CLINIC | Age: 63
End: 2023-01-04
Payer: COMMERCIAL

## 2023-01-04 ENCOUNTER — LAB (OUTPATIENT)
Dept: LAB | Facility: HOSPITAL | Age: 63
End: 2023-01-04
Payer: COMMERCIAL

## 2023-01-04 VITALS
HEART RATE: 90 BPM | SYSTOLIC BLOOD PRESSURE: 132 MMHG | HEIGHT: 65 IN | DIASTOLIC BLOOD PRESSURE: 70 MMHG | WEIGHT: 217.8 LBS | OXYGEN SATURATION: 98 % | RESPIRATION RATE: 18 BRPM | BODY MASS INDEX: 36.29 KG/M2

## 2023-01-04 DIAGNOSIS — Z79.4 TYPE 2 DIABETES MELLITUS WITH HYPERGLYCEMIA, WITH LONG-TERM CURRENT USE OF INSULIN: Chronic | ICD-10-CM

## 2023-01-04 DIAGNOSIS — Z79.4 TYPE 2 DIABETES MELLITUS WITH HYPERGLYCEMIA, WITH LONG-TERM CURRENT USE OF INSULIN: ICD-10-CM

## 2023-01-04 DIAGNOSIS — R53.83 OTHER FATIGUE: ICD-10-CM

## 2023-01-04 DIAGNOSIS — I10 ESSENTIAL HYPERTENSION: Chronic | ICD-10-CM

## 2023-01-04 DIAGNOSIS — E66.01 CLASS 2 SEVERE OBESITY DUE TO EXCESS CALORIES WITH SERIOUS COMORBIDITY AND BODY MASS INDEX (BMI) OF 36.0 TO 36.9 IN ADULT: ICD-10-CM

## 2023-01-04 DIAGNOSIS — I10 ESSENTIAL HYPERTENSION: ICD-10-CM

## 2023-01-04 DIAGNOSIS — E11.65 TYPE 2 DIABETES MELLITUS WITH HYPERGLYCEMIA, WITH LONG-TERM CURRENT USE OF INSULIN: ICD-10-CM

## 2023-01-04 DIAGNOSIS — E55.9 VITAMIN D DEFICIENCY: Chronic | ICD-10-CM

## 2023-01-04 DIAGNOSIS — E11.65 TYPE 2 DIABETES MELLITUS WITH HYPERGLYCEMIA, WITH LONG-TERM CURRENT USE OF INSULIN: Chronic | ICD-10-CM

## 2023-01-04 DIAGNOSIS — L98.9 SKIN LESIONS: Primary | ICD-10-CM

## 2023-01-04 DIAGNOSIS — F32.5 MAJOR DEPRESSIVE DISORDER WITH SINGLE EPISODE, IN FULL REMISSION: Chronic | ICD-10-CM

## 2023-01-04 DIAGNOSIS — E55.9 VITAMIN D DEFICIENCY: ICD-10-CM

## 2023-01-04 LAB
25(OH)D3 SERPL-MCNC: 39.2 NG/ML (ref 30–100)
ALBUMIN SERPL-MCNC: 4 G/DL (ref 3.5–5.2)
ALBUMIN UR-MCNC: 5.7 MG/DL
ALBUMIN/GLOB SERPL: 0.9 G/DL
ALP SERPL-CCNC: 126 U/L (ref 39–117)
ALT SERPL W P-5'-P-CCNC: 86 U/L (ref 1–33)
ANION GAP SERPL CALCULATED.3IONS-SCNC: 9.8 MMOL/L (ref 5–15)
AST SERPL-CCNC: 85 U/L (ref 1–32)
BACTERIA UR QL AUTO: ABNORMAL /HPF
BASOPHILS # BLD AUTO: 0.05 10*3/MM3 (ref 0–0.2)
BASOPHILS NFR BLD AUTO: 0.7 % (ref 0–1.5)
BILIRUB SERPL-MCNC: 0.3 MG/DL (ref 0–1.2)
BILIRUB UR QL STRIP: NEGATIVE
BUN SERPL-MCNC: 10 MG/DL (ref 8–23)
BUN/CREAT SERPL: 11.5 (ref 7–25)
CALCIUM SPEC-SCNC: 10 MG/DL (ref 8.6–10.5)
CHLORIDE SERPL-SCNC: 104 MMOL/L (ref 98–107)
CHOLEST SERPL-MCNC: 207 MG/DL (ref 0–200)
CLARITY UR: ABNORMAL
CO2 SERPL-SCNC: 24.2 MMOL/L (ref 22–29)
COLOR UR: ABNORMAL
CREAT SERPL-MCNC: 0.87 MG/DL (ref 0.57–1)
DEPRECATED RDW RBC AUTO: 50.5 FL (ref 37–54)
EGFRCR SERPLBLD CKD-EPI 2021: 76.4 ML/MIN/1.73
EOSINOPHIL # BLD AUTO: 0.09 10*3/MM3 (ref 0–0.4)
EOSINOPHIL NFR BLD AUTO: 1.2 % (ref 0.3–6.2)
ERYTHROCYTE [DISTWIDTH] IN BLOOD BY AUTOMATED COUNT: 16.8 % (ref 12.3–15.4)
GLOBULIN UR ELPH-MCNC: 4.6 GM/DL
GLUCOSE SERPL-MCNC: 267 MG/DL (ref 65–99)
GLUCOSE UR STRIP-MCNC: ABNORMAL MG/DL
HBA1C MFR BLD: 8.1 % (ref 4.8–5.6)
HCT VFR BLD AUTO: 36.6 % (ref 34–46.6)
HDLC SERPL-MCNC: 31 MG/DL (ref 40–60)
HGB BLD-MCNC: 12 G/DL (ref 12–15.9)
HGB UR QL STRIP.AUTO: NEGATIVE
HYALINE CASTS UR QL AUTO: ABNORMAL /LPF
IMM GRANULOCYTES # BLD AUTO: 0.02 10*3/MM3 (ref 0–0.05)
IMM GRANULOCYTES NFR BLD AUTO: 0.3 % (ref 0–0.5)
KETONES UR QL STRIP: ABNORMAL
LDLC SERPL CALC-MCNC: 145 MG/DL (ref 0–100)
LDLC/HDLC SERPL: 4.57 {RATIO}
LEUKOCYTE ESTERASE UR QL STRIP.AUTO: NEGATIVE
LYMPHOCYTES # BLD AUTO: 2.46 10*3/MM3 (ref 0.7–3.1)
LYMPHOCYTES NFR BLD AUTO: 34.1 % (ref 19.6–45.3)
MCH RBC QN AUTO: 27 PG (ref 26.6–33)
MCHC RBC AUTO-ENTMCNC: 32.8 G/DL (ref 31.5–35.7)
MCV RBC AUTO: 82.4 FL (ref 79–97)
MONOCYTES # BLD AUTO: 0.55 10*3/MM3 (ref 0.1–0.9)
MONOCYTES NFR BLD AUTO: 7.6 % (ref 5–12)
NEUTROPHILS NFR BLD AUTO: 4.04 10*3/MM3 (ref 1.7–7)
NEUTROPHILS NFR BLD AUTO: 56.1 % (ref 42.7–76)
NITRITE UR QL STRIP: NEGATIVE
NRBC BLD AUTO-RTO: 0 /100 WBC (ref 0–0.2)
PH UR STRIP.AUTO: 5.5 [PH] (ref 5–8)
PLATELET # BLD AUTO: 310 10*3/MM3 (ref 140–450)
PMV BLD AUTO: 11 FL (ref 6–12)
POTASSIUM SERPL-SCNC: 4.3 MMOL/L (ref 3.5–5.2)
PROT SERPL-MCNC: 8.6 G/DL (ref 6–8.5)
PROT UR QL STRIP: ABNORMAL
RBC # BLD AUTO: 4.44 10*6/MM3 (ref 3.77–5.28)
RBC # UR STRIP: ABNORMAL /HPF
REF LAB TEST METHOD: ABNORMAL
SODIUM SERPL-SCNC: 138 MMOL/L (ref 136–145)
SP GR UR STRIP: 1.03 (ref 1–1.03)
SQUAMOUS #/AREA URNS HPF: ABNORMAL /HPF
T4 FREE SERPL-MCNC: 1.1 NG/DL (ref 0.93–1.7)
TRIGL SERPL-MCNC: 171 MG/DL (ref 0–150)
TSH SERPL DL<=0.05 MIU/L-ACNC: 2.16 UIU/ML (ref 0.27–4.2)
UROBILINOGEN UR QL STRIP: ABNORMAL
VLDLC SERPL-MCNC: 31 MG/DL (ref 5–40)
WBC # UR STRIP: ABNORMAL /HPF
WBC NRBC COR # BLD: 7.21 10*3/MM3 (ref 3.4–10.8)

## 2023-01-04 PROCEDURE — 81001 URINALYSIS AUTO W/SCOPE: CPT

## 2023-01-04 PROCEDURE — 36415 COLL VENOUS BLD VENIPUNCTURE: CPT

## 2023-01-04 PROCEDURE — 84443 ASSAY THYROID STIM HORMONE: CPT

## 2023-01-04 PROCEDURE — 80053 COMPREHEN METABOLIC PANEL: CPT

## 2023-01-04 PROCEDURE — 80061 LIPID PANEL: CPT

## 2023-01-04 PROCEDURE — 85025 COMPLETE CBC W/AUTO DIFF WBC: CPT

## 2023-01-04 PROCEDURE — 82043 UR ALBUMIN QUANTITATIVE: CPT

## 2023-01-04 PROCEDURE — 82306 VITAMIN D 25 HYDROXY: CPT

## 2023-01-04 PROCEDURE — 84439 ASSAY OF FREE THYROXINE: CPT

## 2023-01-04 PROCEDURE — 83036 HEMOGLOBIN GLYCOSYLATED A1C: CPT

## 2023-01-04 PROCEDURE — 99214 OFFICE O/P EST MOD 30 MIN: CPT | Performed by: PHYSICIAN ASSISTANT

## 2023-01-04 RX ORDER — ESCITALOPRAM OXALATE 10 MG/1
10 TABLET ORAL DAILY
Qty: 90 TABLET | Refills: 0 | Status: SHIPPED | OUTPATIENT
Start: 2023-01-04 | End: 2023-02-15 | Stop reason: SDUPTHER

## 2023-01-09 ENCOUNTER — APPOINTMENT (OUTPATIENT)
Dept: CT IMAGING | Facility: HOSPITAL | Age: 63
End: 2023-01-09
Payer: COMMERCIAL

## 2023-01-09 ENCOUNTER — HOSPITAL ENCOUNTER (EMERGENCY)
Facility: HOSPITAL | Age: 63
Discharge: HOME | End: 2023-01-09
Attending: EMERGENCY MEDICINE | Admitting: EMERGENCY MEDICINE
Payer: COMMERCIAL

## 2023-01-09 VITALS
OXYGEN SATURATION: 93 % | RESPIRATION RATE: 18 BRPM | HEIGHT: 65 IN | WEIGHT: 219.8 LBS | SYSTOLIC BLOOD PRESSURE: 157 MMHG | BODY MASS INDEX: 36.62 KG/M2 | TEMPERATURE: 98.4 F | HEART RATE: 91 BPM | DIASTOLIC BLOOD PRESSURE: 87 MMHG

## 2023-01-09 DIAGNOSIS — R10.84 GENERALIZED ABDOMINAL PAIN: Primary | ICD-10-CM

## 2023-01-09 DIAGNOSIS — R11.2 NAUSEA AND VOMITING, UNSPECIFIED VOMITING TYPE: ICD-10-CM

## 2023-01-09 DIAGNOSIS — R19.7 DIARRHEA, UNSPECIFIED TYPE: ICD-10-CM

## 2023-01-09 LAB
ALBUMIN SERPL-MCNC: 4 G/DL (ref 3.5–5.2)
ALBUMIN/GLOB SERPL: 0.8 G/DL
ALP SERPL-CCNC: 122 U/L (ref 39–117)
ALT SERPL W P-5'-P-CCNC: 59 U/L (ref 1–33)
ANION GAP SERPL CALCULATED.3IONS-SCNC: 9.7 MMOL/L (ref 5–15)
AST SERPL-CCNC: 57 U/L (ref 1–32)
BASOPHILS # BLD AUTO: 0.06 10*3/MM3 (ref 0–0.2)
BASOPHILS NFR BLD AUTO: 0.6 % (ref 0–1.5)
BILIRUB SERPL-MCNC: 0.4 MG/DL (ref 0–1.2)
BUN SERPL-MCNC: 9 MG/DL (ref 8–23)
BUN/CREAT SERPL: 10.3 (ref 7–25)
CALCIUM SPEC-SCNC: 9.9 MG/DL (ref 8.6–10.5)
CHLORIDE SERPL-SCNC: 106 MMOL/L (ref 98–107)
CO2 SERPL-SCNC: 23.3 MMOL/L (ref 22–29)
CREAT SERPL-MCNC: 0.87 MG/DL (ref 0.57–1)
D-LACTATE SERPL-SCNC: 1.3 MMOL/L (ref 0.5–2)
DEPRECATED RDW RBC AUTO: 48 FL (ref 37–54)
EGFRCR SERPLBLD CKD-EPI 2021: 76.4 ML/MIN/1.73
EOSINOPHIL # BLD AUTO: 0.13 10*3/MM3 (ref 0–0.4)
EOSINOPHIL NFR BLD AUTO: 1.3 % (ref 0.3–6.2)
ERYTHROCYTE [DISTWIDTH] IN BLOOD BY AUTOMATED COUNT: 17 % (ref 12.3–15.4)
GLOBULIN UR ELPH-MCNC: 5.1 GM/DL
GLUCOSE SERPL-MCNC: 147 MG/DL (ref 65–99)
HCT VFR BLD AUTO: 36 % (ref 34–46.6)
HGB BLD-MCNC: 12.3 G/DL (ref 12–15.9)
HOLD SPECIMEN: NORMAL
HOLD SPECIMEN: NORMAL
IMM GRANULOCYTES # BLD AUTO: 0.02 10*3/MM3 (ref 0–0.05)
IMM GRANULOCYTES NFR BLD AUTO: 0.2 % (ref 0–0.5)
LIPASE SERPL-CCNC: 20 U/L (ref 13–60)
LYMPHOCYTES # BLD AUTO: 4.42 10*3/MM3 (ref 0.7–3.1)
LYMPHOCYTES NFR BLD AUTO: 43.8 % (ref 19.6–45.3)
MCH RBC QN AUTO: 26.9 PG (ref 26.6–33)
MCHC RBC AUTO-ENTMCNC: 34.2 G/DL (ref 31.5–35.7)
MCV RBC AUTO: 78.8 FL (ref 79–97)
MONOCYTES # BLD AUTO: 0.64 10*3/MM3 (ref 0.1–0.9)
MONOCYTES NFR BLD AUTO: 6.3 % (ref 5–12)
NEUTROPHILS NFR BLD AUTO: 4.81 10*3/MM3 (ref 1.7–7)
NEUTROPHILS NFR BLD AUTO: 47.8 % (ref 42.7–76)
NRBC BLD AUTO-RTO: 0 /100 WBC (ref 0–0.2)
PLATELET # BLD AUTO: 359 10*3/MM3 (ref 140–450)
PMV BLD AUTO: 9.7 FL (ref 6–12)
POTASSIUM SERPL-SCNC: 3.9 MMOL/L (ref 3.5–5.2)
PROT SERPL-MCNC: 9.1 G/DL (ref 6–8.5)
RBC # BLD AUTO: 4.57 10*6/MM3 (ref 3.77–5.28)
SODIUM SERPL-SCNC: 139 MMOL/L (ref 136–145)
WBC NRBC COR # BLD: 10.08 10*3/MM3 (ref 3.4–10.8)
WHOLE BLOOD HOLD SPECIMEN: NORMAL

## 2023-01-09 PROCEDURE — 25010000002 ONDANSETRON PER 1 MG: Performed by: NURSE PRACTITIONER

## 2023-01-09 PROCEDURE — 83690 ASSAY OF LIPASE: CPT | Performed by: EMERGENCY MEDICINE

## 2023-01-09 PROCEDURE — 96374 THER/PROPH/DIAG INJ IV PUSH: CPT

## 2023-01-09 PROCEDURE — 74177 CT ABD & PELVIS W/CONTRAST: CPT

## 2023-01-09 PROCEDURE — 80053 COMPREHEN METABOLIC PANEL: CPT | Performed by: EMERGENCY MEDICINE

## 2023-01-09 PROCEDURE — 0 IOPAMIDOL PER 1 ML: Performed by: EMERGENCY MEDICINE

## 2023-01-09 PROCEDURE — 96375 TX/PRO/DX INJ NEW DRUG ADDON: CPT

## 2023-01-09 PROCEDURE — 85025 COMPLETE CBC W/AUTO DIFF WBC: CPT | Performed by: EMERGENCY MEDICINE

## 2023-01-09 PROCEDURE — 83605 ASSAY OF LACTIC ACID: CPT | Performed by: EMERGENCY MEDICINE

## 2023-01-09 PROCEDURE — 25010000002 DIPHENHYDRAMINE PER 50 MG: Performed by: NURSE PRACTITIONER

## 2023-01-09 PROCEDURE — 99283 EMERGENCY DEPT VISIT LOW MDM: CPT

## 2023-01-09 PROCEDURE — 96361 HYDRATE IV INFUSION ADD-ON: CPT

## 2023-01-09 PROCEDURE — 25010000002 METOCLOPRAMIDE PER 10 MG: Performed by: NURSE PRACTITIONER

## 2023-01-09 RX ORDER — ONDANSETRON 2 MG/ML
4 INJECTION INTRAMUSCULAR; INTRAVENOUS ONCE
Status: COMPLETED | OUTPATIENT
Start: 2023-01-09 | End: 2023-01-09

## 2023-01-09 RX ORDER — ONDANSETRON 4 MG/1
4 TABLET, ORALLY DISINTEGRATING ORAL EVERY 8 HOURS PRN
Qty: 15 TABLET | Refills: 0 | Status: SHIPPED | OUTPATIENT
Start: 2023-01-09

## 2023-01-09 RX ORDER — METOCLOPRAMIDE HYDROCHLORIDE 5 MG/ML
10 INJECTION INTRAMUSCULAR; INTRAVENOUS ONCE
Status: COMPLETED | OUTPATIENT
Start: 2023-01-09 | End: 2023-01-09

## 2023-01-09 RX ORDER — LOPERAMIDE HYDROCHLORIDE 2 MG/1
2 CAPSULE ORAL 4 TIMES DAILY PRN
Qty: 20 CAPSULE | Refills: 0 | Status: SHIPPED | OUTPATIENT
Start: 2023-01-09

## 2023-01-09 RX ORDER — SODIUM CHLORIDE 0.9 % (FLUSH) 0.9 %
10 SYRINGE (ML) INJECTION AS NEEDED
Status: DISCONTINUED | OUTPATIENT
Start: 2023-01-09 | End: 2023-01-09 | Stop reason: HOSPADM

## 2023-01-09 RX ORDER — DICYCLOMINE HCL 20 MG
20 TABLET ORAL EVERY 6 HOURS
Qty: 20 TABLET | Refills: 0 | Status: SHIPPED | OUTPATIENT
Start: 2023-01-09

## 2023-01-09 RX ORDER — DIPHENHYDRAMINE HYDROCHLORIDE 50 MG/ML
25 INJECTION INTRAMUSCULAR; INTRAVENOUS ONCE
Status: COMPLETED | OUTPATIENT
Start: 2023-01-09 | End: 2023-01-09

## 2023-01-09 RX ADMIN — SODIUM CHLORIDE 1000 ML: 9 INJECTION, SOLUTION INTRAVENOUS at 04:50

## 2023-01-09 RX ADMIN — ONDANSETRON 4 MG: 2 INJECTION INTRAMUSCULAR; INTRAVENOUS at 04:52

## 2023-01-09 RX ADMIN — METOCLOPRAMIDE HYDROCHLORIDE 10 MG: 5 INJECTION INTRAMUSCULAR; INTRAVENOUS at 04:52

## 2023-01-09 RX ADMIN — DIPHENHYDRAMINE HYDROCHLORIDE 25 MG: 50 INJECTION, SOLUTION INTRAMUSCULAR; INTRAVENOUS at 04:52

## 2023-01-09 RX ADMIN — IOPAMIDOL 100 ML: 755 INJECTION, SOLUTION INTRAVENOUS at 05:54

## 2023-01-09 NOTE — Clinical Note
[facility] EMERGENCY ROOM  [address]  [place] [address]  Phone: [phone]    Anika Casillas was seen and treated in our emergency department on 1/9/2023.  She may return to work on 01/10/2023.         Thank you for choosing [facility].    Shady Jamil MD

## 2023-01-09 NOTE — DISCHARGE INSTRUCTIONS
Clear liquids.  Advance diet as tolerated.    Take medications as prescribed for symptomatic treatment.    Follow-up with your PCP if no better.    Return for new or worsening symptoms

## 2023-01-09 NOTE — ED PROVIDER NOTES
Time: 4:31 AM EST  Date of encounter:  1/9/2023  Independent Historian/Clinical History and Information was obtained by:   Patient  Chief Complaint: Nausea vomiting diarrhea and abdominal pain    History is limited by: N/A    History of Present Illness:  Patient is a 62 y.o. year old female who presents to the emergency department for evaluation of nausea vomiting and diarrhea with abdominal pain since 10 PM eating.  Patient concerned because history of gastroparesis and has also had a perforated bowel      Abdominal Pain  Pain location:  Generalized  Pain quality: bloating and cramping    Pain radiates to:  Does not radiate  Pain severity:  Moderate  Onset quality:  Sudden  Duration:  6 hours  Timing:  Constant  Progression:  Unchanged  Chronicity:  New  Context comment:  Patient ate around 6:00 by 10:00 was in pain with nausea vomiting and diarrhea.  Pain has not stopped since.  No definite known cause  Relieved by:  Nothing  Worsened by:  Nothing  Ineffective treatments:  None tried  Associated symptoms: diarrhea, nausea and vomiting    Associated symptoms: no anorexia, no belching, no chest pain, no chills, no constipation, no cough, no dysuria, no fatigue, no fever, no flatus, no hematemesis, no hematochezia, no hematuria, no melena, no shortness of breath and no sore throat    Risk factors: multiple surgeries    Nausea  The primary symptoms include abdominal pain, nausea, vomiting and diarrhea. Primary symptoms do not include fever, fatigue, melena, hematemesis, hematochezia or dysuria.   The illness does not include chills, anorexia, constipation or back pain.   Vomiting  The primary symptoms include abdominal pain, nausea, vomiting and diarrhea. Primary symptoms do not include fever, fatigue, melena, hematemesis, hematochezia or dysuria.   The illness does not include chills, anorexia, constipation or back pain.       Patient Care Team  Primary Care Provider: Elena Henderson PA    Past Medical  History:     Allergies   Allergen Reactions   • Liraglutide Nausea And Vomiting   • Metformin Nausea Only     GI Upset     Past Medical History:   Diagnosis Date   • Atrial fibrillation (HCC)    • Colindres's esophagus    • Diabetes mellitus (HCC)    • Esophageal varices (HCC)    • Gastroparesis    • GERD (gastroesophageal reflux disease) 1995   • Hypertension    • Irritable bowel syndrome    • BERKOWITZ (nonalcoholic steatohepatitis)    • Obstructive sleep apnea syndrome    • S/P exploratory laparotomy, lysis of adhesions, resection of necrotic colon without anastomosis 04/18/2022     Past Surgical History:   Procedure Laterality Date   • BLADDER SLING MODIFIED, ANTERIOR AND POSTERIOR VAGINAL REPAIR     • BREAST LUMPECTOMY     • CARDIAC CATHETERIZATION     • CHOLECYSTECTOMY     • COLON RESECTION     • ENDOSCOPY N/A 06/03/2022    Procedure: ESOPHAGOGASTRODUODENOSCOPY;  Surgeon: Shannan Charles MD;  Location: [facility] ENDOSCOPY;  Service: Gastroenterology;  Laterality: N/A;  RETAINED FOOD IN STOMACH  ESOPHAGEAL VARICIES   • EXPLORATORY LAPAROTOMY N/A 04/16/2022    Procedure: EXPLORATORY LAPAROTOMY, LYSIS OF ADHESIONS, RESECTION OF NECTROIC COLON;  Surgeon: Cande Stanley MD;  Location: [facility] MAIN OR;  Service: General;  Laterality: N/A;   • GALLBLADDER SURGERY     • TUBAL ABDOMINAL LIGATION       Family History   Adopted: Yes   Family history unknown: Yes       Home Medications:  Prior to Admission medications    Medication Sig Start Date End Date Taking? Authorizing Provider   albuterol sulfate  (90 Base) MCG/ACT inhaler Inhale 2 puffs Every 4 (Four) Hours As Needed.    ProviderJoyce MD   amLODIPine (NORVASC) 10 MG tablet Take 1 tablet by mouth Daily. 12/16/22   KATHARINE Webb MD   apixaban (ELIQUIS) 5 MG tablet tablet Take 1 tablet by mouth 2 (Two) Times a Day. 12/16/22   KATHARINE Webb MD   B-D UF III MINI PEN NEEDLES 31G X 5 MM misc  10/8/21   ProviderJoyce MD   Blood Glucose  Monitoring Suppl (Accu-Chek Guide) w/Device kit  2/14/22   Joyce Colmenares MD   buPROPion XL (WELLBUTRIN XL) 150 MG 24 hr tablet TAKE 1 TABLET TWICE DAILY 9/14/22   Elena Henderson PA   cholecalciferol (VITAMIN D3) 25 MCG (1000 UT) tablet Take 2,000 Units by mouth Daily.    Joyce Colmenares MD   empagliflozin (JARDIANCE) 25 MG tablet tablet Take 25 mg by mouth Daily. 7/30/21   Joyce Colmenares MD   escitalopram (Lexapro) 10 MG tablet Take 1 tablet by mouth Daily. 1/4/23   Elena Henderson PA   Evolocumab (Repatha) solution prefilled syringe injection Inject 1 mL under the skin into the appropriate area as directed Every 14 (Fourteen) Days. 12/16/22   KATHARINE Webb MD   famotidine (PEPCID) 40 MG tablet Take 1 tablet by mouth At Night As Needed for Heartburn. 6/30/22   Christina Rinaldi APRN   Fluticasone-Umeclidin-Vilant (Trelegy Ellipta) 100-62.5-25 MCG/INH inhaler Inhale 1 puff Daily. 4/14/22   Yarelis Melara MD   glucose blood test strip Check blood glucose twice daily 1/27/22   Elena Henderson PA   glucose monitor monitoring kit 1 each Daily. Patient prefers Accu Chek Meter. 2/14/22   Elena Henderson PA   hydrOXYzine (ATARAX) 25 MG tablet Take 25 mg by mouth 2 (Two) Times a Day As Needed. 6/23/21   Joyce Colmenares MD   icosapent ethyl (VASCEPA) 1 g capsule capsule TAKE 2 G BY MOUTH 2 (TWO) TIMES A DAY WITH MEALS. 12/19/22   Elena Henderson PA   Insulin Glargine, 2 Unit Dial, (Toujeo Johnny SoloStar) 300 UNIT/ML solution pen-injector injection Inject 120 Units under the skin into the appropriate area as directed Daily. 2/3/20 7/30/22  Joyce Colmenares MD   Lancets (accu-chek soft touch) lancets Check blood sugar twice daily 2/14/22   Elena Henderson PA   metoprolol tartrate (LOPRESSOR) 50 MG tablet Take 1.5 tablets by mouth 2 (Two) Times a Day. TAKE 1 1/2 TABLETS BID 8/25/22   Elena Henderson PA   multivitamin with minerals tablet  tablet Take 1 tablet by mouth Daily.    Joyce Colmenares MD   NovoLOG FlexPen 100 UNIT/ML solution pen-injector sc pen Inject 60 Units under the skin into the appropriate area as directed 2 (Two) Times a Day. 3/14/22   Joyce Colmenares MD   olmesartan (BENICAR) 20 MG tablet Take 1 tablet by mouth Daily. 11/21/22   Elena Henderson PA   Ozempic, 0.25 or 0.5 MG/DOSE, 2 MG/1.5ML solution pen-injector  6/7/22   Joyce Colmenares MD   pantoprazole (PROTONIX) 40 MG EC tablet Take 1 tablet by mouth Daily. 6/30/22   Christina Rinaldi APRN   polyethylene glycol (MIRALAX) 17 g packet Take 17 g by mouth 2 (Two) Times a Day. 6/30/22   Christina Rinaldi APRN   riFAXIMin (Xifaxan) 550 MG tablet Take 1 tablet by mouth Every 12 (Twelve) Hours. 6/30/22   Christina Rinaldi APRN   vitamin C (ASCORBIC ACID) 250 MG tablet Take 250 mg by mouth Daily.    Joyce Colmenares MD        Social History:   Social History     Tobacco Use   • Smoking status: Every Day     Packs/day: 0.25     Years: 41.00     Pack years: 10.25     Types: Cigarettes     Start date: 6/17/1975   • Smokeless tobacco: Never   Vaping Use   • Vaping Use: Never used   Substance Use Topics   • Alcohol use: Yes     Alcohol/week: 1.0 standard drink     Types: 1 Drinks containing 0.5 oz of alcohol per week     Comment: 1 a month   • Drug use: Never         Review of Systems:  Review of Systems   Constitutional: Negative for chills, fatigue and fever.   HENT: Negative for congestion, ear pain and sore throat.    Eyes: Negative for pain.   Respiratory: Negative for cough, chest tightness and shortness of breath.    Cardiovascular: Negative for chest pain.   Gastrointestinal: Positive for abdominal pain, diarrhea, nausea and vomiting. Negative for anorexia, constipation, flatus, hematemesis, hematochezia and melena.   Genitourinary: Negative for dysuria, flank pain and hematuria.   Musculoskeletal: Negative for back pain and joint swelling.   Skin:  "Negative for pallor.   Neurological: Negative for seizures and headaches.   Hematological: Negative.    Psychiatric/Behavioral: Negative.    All other systems reviewed and are negative.       Physical Exam:  /87   Pulse 85   Temp 98.4 °F (36.9 °C) (Oral)   Resp 18   Ht 165.1 cm (65\")   Wt 99.7 kg (219 lb 12.8 oz)   SpO2 94%   BMI 36.58 kg/m²     Physical Exam  Vitals and nursing note reviewed.   Constitutional:       General: She is not in acute distress.     Appearance: Normal appearance. She is not toxic-appearing.   HENT:      Head: Normocephalic and atraumatic.      Nose: Nose normal.      Mouth/Throat:      Mouth: Mucous membranes are moist.   Eyes:      Conjunctiva/sclera: Conjunctivae normal.   Cardiovascular:      Rate and Rhythm: Normal rate and regular rhythm.      Pulses: Normal pulses.      Heart sounds: Normal heart sounds.   Pulmonary:      Effort: Pulmonary effort is normal.      Breath sounds: Normal breath sounds.   Abdominal:      General: Bowel sounds are normal. There is no distension.      Palpations: Abdomen is soft.      Tenderness: There is generalized abdominal tenderness. There is no right CVA tenderness or left CVA tenderness.      Hernia: No hernia is present.   Musculoskeletal:         General: Normal range of motion.      Cervical back: Normal range of motion.   Skin:     General: Skin is warm and dry.   Neurological:      General: No focal deficit present.      Mental Status: She is alert and oriented to person, place, and time.   Psychiatric:         Mood and Affect: Mood normal.         Behavior: Behavior normal.         Thought Content: Thought content normal.         Judgment: Judgment normal.          Procedures:  Procedures      Medical Decision Making:      Comorbidities that affect care:    Gastroparesis, IBS, BERKOWITZ,, Atrial Fibrillation, Diabetes, Hypertension, Smoking    External Notes reviewed:    None      The following orders were placed and all results were " independently analyzed by me:  Orders Placed This Encounter   Procedures   • CT Abdomen Pelvis With Contrast   • [place] Draw   • Comprehensive Metabolic Panel   • Lipase   • Urinalysis With Microscopic If Indicated (No Culture) - Urine, Clean Catch   • Lactic Acid, Plasma   • CBC Auto Differential   • NPO Diet NPO Type: Strict NPO   • Undress & Gown   • Encourage Fluids   • Insert Peripheral IV   • CBC & Differential   • Green Top (Gel)   • Lavender Top   • Gold Top - SST   • Light Blue Top       Medications Given in the Emergency Department:  Medications   sodium chloride 0.9 % flush 10 mL (has no administration in time range)   ondansetron (ZOFRAN) injection 4 mg (4 mg Intravenous Given 1/9/23 0452)   metoclopramide (REGLAN) injection 10 mg (10 mg Intravenous Given 1/9/23 0452)   diphenhydrAMINE (BENADRYL) injection 25 mg (25 mg Intravenous Given 1/9/23 0452)   sodium chloride 0.9 % bolus 1,000 mL (0 mL Intravenous Stopped 1/9/23 0604)   iopamidol (ISOVUE-370) 76 % injection 100 mL (100 mL Intravenous Given 1/9/23 0554)        ED Course:    ED Course as of 01/09/23 0630   Mon Jan 09, 2023   0623 CT Abdomen Pelvis With Contrast  No acute findings [DS]   0625 Patient states she is feeling better after medication and IV fluids.  Patient states she was able to tolerate her CT juice without vomiting [DS]      ED Course User Index  [DS] Elidia Lee APRN       Labs:    Lab Results (last 24 hours)     Procedure Component Value Units Date/Time    CBC & Differential [[id number]]  (Abnormal) Collected: 01/09/23 0436    Specimen: Blood Updated: 01/09/23 0439    Narrative:      The following orders were created for panel order CBC & Differential.  Procedure                               Abnormality         Status                     ---------                               -----------         ------                     CBC Auto Differential[[id number]]        Abnormal            Final result                 Please view results  for these tests on the individual orders.    Comprehensive Metabolic Panel [[id number]]  (Abnormal) Collected: 01/09/23 0436    Specimen: Blood Updated: 01/09/23 0457     Glucose 147 mg/dL      BUN 9 mg/dL      Creatinine 0.87 mg/dL      Sodium 139 mmol/L      Potassium 3.9 mmol/L      Chloride 106 mmol/L      CO2 23.3 mmol/L      Calcium 9.9 mg/dL      Total Protein 9.1 g/dL      Albumin 4.0 g/dL      ALT (SGPT) 59 U/L      AST (SGOT) 57 U/L      Alkaline Phosphatase 122 U/L      Total Bilirubin 0.4 mg/dL      Globulin 5.1 gm/dL      A/G Ratio 0.8 g/dL      BUN/Creatinine Ratio 10.3     Anion Gap 9.7 mmol/L      eGFR 76.4 mL/min/1.73      Comment: National Kidney Foundation and American Society of Nephrology (ASN) Task Force recommended calculation based on the Chronic Kidney Disease Epidemiology Collaboration (CKD-EPI) equation refit without adjustment for race.       Narrative:      GFR Normal >60  Chronic Kidney Disease <60  Kidney Failure <15      Lipase [[id number]]  (Normal) Collected: 01/09/23 0436    Specimen: Blood Updated: 01/09/23 0457     Lipase 20 U/L     CBC Auto Differential [[id number]]  (Abnormal) Collected: 01/09/23 0436    Specimen: Blood Updated: 01/09/23 0439     WBC 10.08 10*3/mm3      RBC 4.57 10*6/mm3      Hemoglobin 12.3 g/dL      Hematocrit 36.0 %      MCV 78.8 fL      MCH 26.9 pg      MCHC 34.2 g/dL      RDW 17.0 %      RDW-SD 48.0 fl      MPV 9.7 fL      Platelets 359 10*3/mm3      Neutrophil % 47.8 %      Lymphocyte % 43.8 %      Monocyte % 6.3 %      Eosinophil % 1.3 %      Basophil % 0.6 %      Immature Grans % 0.2 %      Neutrophils, Absolute 4.81 10*3/mm3      Lymphocytes, Absolute 4.42 10*3/mm3      Monocytes, Absolute 0.64 10*3/mm3      Eosinophils, Absolute 0.13 10*3/mm3      Basophils, Absolute 0.06 10*3/mm3      Immature Grans, Absolute 0.02 10*3/mm3      nRBC 0.0 /100 WBC     Lactic Acid, Plasma [[id number]]  (Normal) Collected: 01/09/23 0447    Specimen: Blood Updated: 01/09/23  0503     Lactate 1.3 mmol/L            Imaging:    CT Abdomen Pelvis With Contrast    Result Date: 1/9/2023  PROCEDURE: CT ABDOMEN PELVIS W CONTRAST  COMPARISON: [facility], CT, CT ABDOMEN PELVIS W CONTRAST, 4/16/2022, 2:38.  INDICATIONS: ab pain  TECHNIQUE: After obtaining the patient's consent, CT images were created with non-ionic intravenous contrast material.   PROTOCOL:   Standard imaging protocol performed    RADIATION:   DLP: 781.1mGy*cm   Automated exposure control was utilized to minimize radiation dose. CONTRAST: 100cc Isovue 370 I.V.  FINDINGS:  LIVER: There is diffuse nodularity of the liver with enlargement of the caudate lobe consistent with cirrhotic morphology. BILIARY: There are clips from cholecystectomy. PANCREAS: Normal.  No lesion, fluid collection, ductal dilatation, or atrophy.  SPLEEN: Normal.  No enlargement or focal lesion.  KIDNEYS: There is a small cyst of the lower pole the right kidney.  There are multiple dilated venous structures including what appears to be a left-sided splenorenal shunt. ADRENALS: Normal.  No mass or enlargement.  AORTA/VASCULAR: Normal.  No aneurysm or dissection.  RETROPERITONEUM: Normal.  No mass or adenopathy.  BOWEL/MESENTERY: There is diverticulosis without evidence of diverticulitis. ABDOMINAL WALL: There is a midline fascial defect containing herniated fat without inflammation. URINARY BLADDER: Normal.  No visible focal wall thickening, lesion, or calculus.  PELVIC NODES: Normal.  No adenopathy.  PELVIC ORGANS: Normal.  No visible mass.  Pelvic organs appropriate for patient age.  BONES: Normal.  No bony lesion or fracture.  LUNG BASES: There is mild basilar atelectasis.       Cirrhotic morphology of the liver.  Multiple dilated venous structures which appear to represent a splenorenal shunt.   DIXON CAVANAUGH MD       Electronically Signed and Approved By: DIXON CAVANAUGH MD on 1/09/2023 at 6:15                 Differential Diagnosis and  Discussion:    Abdominal Pain: Based on the patient's signs and symptoms, I considered abdominal aortic aneurysm, small bowel obstruction, pancreatitis, acute cholecystitis, acute appendecitis, peptic ulcer disease, gastritis, colitis, endocrine disorders, irritable bowel syndrome and other differential diagnosis an etiology of the patient's abdominal pain.    All labs were reviewed and analyzed by me.  CT scan radiology interpretation was reviewed by me.    MDM  Number of Diagnoses or Management Options  Diarrhea, unspecified type  Generalized abdominal pain  Nausea and vomiting, unspecified vomiting type  Diagnosis management comments: The patient is resting comfortably and feels better, is alert and in no distress. Repeat examination is unremarkable and benign; in particular, there's no discomfort at McBurney's point and there is no pulsatile mass. The history, exam, diagnostic testing, and current condition does not suggest acute appendicitis, bowel obstruction, acute cholecystitis, bowel perforation, major gastrointestinal bleeding, severe diverticulitis, abdominal aortic aneurysm, mesenteric ischemia, volvulus, sepsis, or other significant pathology that warrants further testing, continued ED treatment, admission, for surgical evaluation at this point. The vital signs have been stable. The patient does not have uncontrollable pain, intractable vomiting, or other significant symptoms. The patient's condition is stable and appropriate for discharge from the emergency department.         Amount and/or Complexity of Data Reviewed  Clinical lab tests: ordered and reviewed  Tests in the radiology section of CPT®: ordered and reviewed  Tests in the medicine section of CPT®: ordered and reviewed    Risk of Complications, Morbidity, and/or Mortality  Presenting problems: moderate  Diagnostic procedures: moderate  Management options: low    Patient Progress  Patient progress: stable     Patient Care  Considerations:    None    Consultants/Shared Management Plan:    None    Social Determinants of Health:    Patient is independent, reliable, and has access to care.       Disposition and Care Coordination:    Discharged: The patient is suitable and stable for discharge with no need for consideration of observation or admission.    I have explained the patient´s condition, diagnoses and treatment plan based on the information available to me at this time. I have answered questions and addressed any concerns. The patient has a good  understanding of the patient´s diagnosis, condition, and treatment plan as can be expected at this point. The vital signs have been stable. The patient´s condition is stable and appropriate for discharge from the emergency department.      The patient will pursue further outpatient evaluation with the primary care physician or other designated or consulting physician as outlined in the discharge instructions. They are agreeable to this plan of care and follow-up instructions have been explained in detail. The patient has received these instructions in written format and have expressed an understanding of the discharge instructions. The patient is aware that any significant change in condition or worsening of symptoms should prompt an immediate return to this or the closest emergency department or call to 911.    Final diagnoses:   Generalized abdominal pain   Nausea and vomiting, unspecified vomiting type   Diarrhea, unspecified type        ED Disposition     ED Disposition   Discharge    Condition   Stable    Comment   --             This medical record created using voice recognition software.           Elidia Lee, CHU  01/09/23 0304

## 2023-01-09 NOTE — ED TRIAGE NOTES
Pt to ED from home with reports of hx of gastroparesis.  Pt reports nausea, vomiting, generalized abdominal pain since eating at 1800.  Pt states vomiting and pain began at 2200.

## 2023-01-11 ENCOUNTER — HOSPITAL ENCOUNTER (OUTPATIENT)
Dept: MRI IMAGING | Facility: HOSPITAL | Age: 63
Discharge: HOME | End: 2023-01-11
Admitting: NEUROLOGICAL SURGERY
Payer: COMMERCIAL

## 2023-01-11 DIAGNOSIS — R90.89 ABNORMAL FINDING ON MRI OF BRAIN: ICD-10-CM

## 2023-01-11 PROCEDURE — 0 GADOBENATE DIMEGLUMINE 529 MG/ML SOLUTION: Performed by: NEUROLOGICAL SURGERY

## 2023-01-11 PROCEDURE — 70553 MRI BRAIN STEM W/O & W/DYE: CPT

## 2023-01-11 PROCEDURE — A9577 INJ MULTIHANCE: HCPCS | Performed by: NEUROLOGICAL SURGERY

## 2023-01-11 RX ADMIN — GADOBENATE DIMEGLUMINE 20 ML: 529 INJECTION, SOLUTION INTRAVENOUS at 19:01

## 2023-01-20 ENCOUNTER — TRANSCRIBE ORDERS (OUTPATIENT)
Dept: ADMINISTRATIVE | Facility: HOSPITAL | Age: 63
End: 2023-01-20
Payer: COMMERCIAL

## 2023-01-20 ENCOUNTER — LAB (OUTPATIENT)
Dept: LAB | Facility: HOSPITAL | Age: 63
End: 2023-01-20
Payer: COMMERCIAL

## 2023-01-20 DIAGNOSIS — K75.81 NONALCOHOLIC STEATOHEPATITIS (NASH): ICD-10-CM

## 2023-01-20 DIAGNOSIS — K74.60 HEPATIC CIRRHOSIS, UNSPECIFIED HEPATIC CIRRHOSIS TYPE, UNSPECIFIED WHETHER ASCITES PRESENT: ICD-10-CM

## 2023-01-20 DIAGNOSIS — K74.60 HEPATIC CIRRHOSIS, UNSPECIFIED HEPATIC CIRRHOSIS TYPE, UNSPECIFIED WHETHER ASCITES PRESENT: Primary | ICD-10-CM

## 2023-01-20 LAB
ALBUMIN SERPL-MCNC: 4 G/DL (ref 3.5–5.2)
ALBUMIN/GLOB SERPL: 0.8 G/DL
ALP SERPL-CCNC: 128 U/L (ref 39–117)
ALT SERPL W P-5'-P-CCNC: 69 U/L (ref 1–33)
ANION GAP SERPL CALCULATED.3IONS-SCNC: 9.8 MMOL/L (ref 5–15)
AST SERPL-CCNC: 82 U/L (ref 1–32)
BASOPHILS # BLD AUTO: 0.1 10*3/MM3 (ref 0–0.2)
BASOPHILS NFR BLD AUTO: 1.1 % (ref 0–1.5)
BILIRUB CONJ SERPL-MCNC: <0.2 MG/DL (ref 0–0.3)
BILIRUB SERPL-MCNC: 0.3 MG/DL (ref 0–1.2)
BUN SERPL-MCNC: 7 MG/DL (ref 8–23)
BUN/CREAT SERPL: 8.6 (ref 7–25)
CALCIUM SPEC-SCNC: 10 MG/DL (ref 8.6–10.5)
CHLORIDE SERPL-SCNC: 104 MMOL/L (ref 98–107)
CO2 SERPL-SCNC: 25.2 MMOL/L (ref 22–29)
CREAT SERPL-MCNC: 0.81 MG/DL (ref 0.57–1)
DEPRECATED RDW RBC AUTO: 49 FL (ref 37–54)
EGFRCR SERPLBLD CKD-EPI 2021: 83.2 ML/MIN/1.73
EOSINOPHIL # BLD AUTO: 0.16 10*3/MM3 (ref 0–0.4)
EOSINOPHIL NFR BLD AUTO: 1.8 % (ref 0.3–6.2)
ERYTHROCYTE [DISTWIDTH] IN BLOOD BY AUTOMATED COUNT: 16.9 % (ref 12.3–15.4)
GLOBULIN UR ELPH-MCNC: 5 GM/DL
GLUCOSE SERPL-MCNC: 170 MG/DL (ref 65–99)
HCT VFR BLD AUTO: 36.5 % (ref 34–46.6)
HGB BLD-MCNC: 12.1 G/DL (ref 12–15.9)
IMM GRANULOCYTES # BLD AUTO: 0.02 10*3/MM3 (ref 0–0.05)
IMM GRANULOCYTES NFR BLD AUTO: 0.2 % (ref 0–0.5)
INR PPP: 1.25 (ref 0.86–1.15)
LYMPHOCYTES # BLD AUTO: 4.25 10*3/MM3 (ref 0.7–3.1)
LYMPHOCYTES NFR BLD AUTO: 48.6 % (ref 19.6–45.3)
MCH RBC QN AUTO: 27.2 PG (ref 26.6–33)
MCHC RBC AUTO-ENTMCNC: 33.2 G/DL (ref 31.5–35.7)
MCV RBC AUTO: 82 FL (ref 79–97)
MONOCYTES # BLD AUTO: 0.59 10*3/MM3 (ref 0.1–0.9)
MONOCYTES NFR BLD AUTO: 6.8 % (ref 5–12)
NEUTROPHILS NFR BLD AUTO: 3.62 10*3/MM3 (ref 1.7–7)
NEUTROPHILS NFR BLD AUTO: 41.5 % (ref 42.7–76)
NRBC BLD AUTO-RTO: 0.1 /100 WBC (ref 0–0.2)
PLATELET # BLD AUTO: 302 10*3/MM3 (ref 140–450)
PMV BLD AUTO: 10.5 FL (ref 6–12)
POTASSIUM SERPL-SCNC: 3.6 MMOL/L (ref 3.5–5.2)
PROT SERPL-MCNC: 9 G/DL (ref 6–8.5)
PROTHROMBIN TIME: 15.9 SECONDS (ref 11.8–14.9)
RBC # BLD AUTO: 4.45 10*6/MM3 (ref 3.77–5.28)
SODIUM SERPL-SCNC: 139 MMOL/L (ref 136–145)
WBC NRBC COR # BLD: 8.74 10*3/MM3 (ref 3.4–10.8)

## 2023-01-20 PROCEDURE — 85025 COMPLETE CBC W/AUTO DIFF WBC: CPT

## 2023-01-20 PROCEDURE — 36415 COLL VENOUS BLD VENIPUNCTURE: CPT

## 2023-01-20 PROCEDURE — 82248 BILIRUBIN DIRECT: CPT

## 2023-01-20 PROCEDURE — 80053 COMPREHEN METABOLIC PANEL: CPT

## 2023-01-20 PROCEDURE — 82105 ALPHA-FETOPROTEIN SERUM: CPT

## 2023-01-20 PROCEDURE — 85610 PROTHROMBIN TIME: CPT

## 2023-01-21 LAB — ALPHA-FETOPROTEIN: 2.15 NG/ML (ref 0–8.3)

## 2023-01-27 ENCOUNTER — HOSPITAL ENCOUNTER (OUTPATIENT)
Dept: CARDIOLOGY | Facility: HOSPITAL | Age: 63
Discharge: HOME | End: 2023-01-27
Payer: COMMERCIAL

## 2023-01-27 ENCOUNTER — HOSPITAL ENCOUNTER (OUTPATIENT)
Dept: CT IMAGING | Facility: HOSPITAL | Age: 63
Discharge: HOME | End: 2023-01-27
Payer: COMMERCIAL

## 2023-01-27 DIAGNOSIS — G47.33 OSA ON CPAP: ICD-10-CM

## 2023-01-27 DIAGNOSIS — J43.9 PULMONARY EMPHYSEMA, UNSPECIFIED EMPHYSEMA TYPE: ICD-10-CM

## 2023-01-27 DIAGNOSIS — Z72.0 TOBACCO ABUSE: ICD-10-CM

## 2023-01-27 DIAGNOSIS — Z99.89 OSA ON CPAP: ICD-10-CM

## 2023-01-27 LAB
BH CV ECHO MEAS - AO ROOT DIAM: 3.2 CM
BH CV ECHO MEAS - EDV(MOD-SP2): 50.6 ML
BH CV ECHO MEAS - EDV(MOD-SP4): 57.2 ML
BH CV ECHO MEAS - ESV(MOD-SP2): 17.8 ML
BH CV ECHO MEAS - ESV(MOD-SP4): 17.7 ML
BH CV ECHO MEAS - IVSD: 1.2 CM
BH CV ECHO MEAS - LA DIMENSION: 2.7 CM
BH CV ECHO MEAS - LAT PEAK E' VEL: 10 CM/SEC
BH CV ECHO MEAS - LVIDD: 3.3 CM
BH CV ECHO MEAS - LVIDS: 2.4 CM
BH CV ECHO MEAS - LVOT DIAM: 1.9 CM
BH CV ECHO MEAS - LVPWD: 1.2 CM
BH CV ECHO MEAS - MED PEAK E' VEL: 7.3 CM/SEC
BH CV ECHO MEAS - MV A MAX VEL: 105 CM/SEC
BH CV ECHO MEAS - MV DEC TIME: 102 MSEC
BH CV ECHO MEAS - MV E MAX VEL: 101 CM/SEC
BH CV ECHO MEAS - MV E/A: 1
BH CV ECHO MEAS - RVDD: 2.9 CM
BH CV ECHO MEASUREMENTS AVERAGE E/E' RATIO: 11.68
LEFT ATRIUM VOLUME INDEX: 21 ML/M2
MAXIMAL PREDICTED HEART RATE: 160 BPM
STRESS TARGET HR: 136 BPM

## 2023-01-27 PROCEDURE — 71271 CT THORAX LUNG CANCER SCR C-: CPT

## 2023-01-27 PROCEDURE — 93306 TTE W/DOPPLER COMPLETE: CPT

## 2023-02-15 ENCOUNTER — OFFICE VISIT (OUTPATIENT)
Dept: FAMILY MEDICINE CLINIC | Facility: CLINIC | Age: 63
End: 2023-02-15
Payer: COMMERCIAL

## 2023-02-15 VITALS
BODY MASS INDEX: 36.96 KG/M2 | DIASTOLIC BLOOD PRESSURE: 75 MMHG | SYSTOLIC BLOOD PRESSURE: 126 MMHG | WEIGHT: 221.8 LBS | OXYGEN SATURATION: 98 % | HEIGHT: 65 IN | RESPIRATION RATE: 18 BRPM | HEART RATE: 79 BPM

## 2023-02-15 DIAGNOSIS — J40 BRONCHITIS: Primary | ICD-10-CM

## 2023-02-15 DIAGNOSIS — F32.5 MAJOR DEPRESSIVE DISORDER WITH SINGLE EPISODE, IN FULL REMISSION: Chronic | ICD-10-CM

## 2023-02-15 DIAGNOSIS — I10 ESSENTIAL HYPERTENSION: Chronic | ICD-10-CM

## 2023-02-15 PROCEDURE — 99214 OFFICE O/P EST MOD 30 MIN: CPT | Performed by: PHYSICIAN ASSISTANT

## 2023-02-15 RX ORDER — METHYLPREDNISOLONE 4 MG/1
TABLET ORAL
Qty: 21 EACH | Refills: 0 | Status: SHIPPED | OUTPATIENT
Start: 2023-02-15

## 2023-02-15 RX ORDER — ESCITALOPRAM OXALATE 10 MG/1
10 TABLET ORAL DAILY
Qty: 90 TABLET | Refills: 1 | Status: SHIPPED | OUTPATIENT
Start: 2023-02-15

## 2023-02-15 NOTE — PROGRESS NOTES
Chief Complaint  Chief Complaint   Patient presents with   • Depression   • Follow-up     New medication       Subjective          Anika Casillas presents to [facility] for 8 week follow up for depression.     History of Present Illness    Depression: Patient is currently on Wellbutrin, Lexapro (10mg recently added to regimen). Patient states that symptoms of depression are improved. and denies any side effects of the medication.    HTN: Patient presents for hypertension management. Patient is currently taking Lopressor and is consistent with medication. Patient denies chest pain, shortness of air and edema. Patient does check blood pressure at home.    Patient has been having cough and chest congestion; no fever.    Mammo: 8.9.22  Colon: 3.26.19    Medical History: has a past medical history of Atrial fibrillation (HCC), Colindres's esophagus, Diabetes mellitus (HCC), Esophageal varices (HCC), Gastroparesis, GERD (gastroesophageal reflux disease) (1995), Hypertension, Irritable bowel syndrome, BERKOWITZ (nonalcoholic steatohepatitis), Obstructive sleep apnea syndrome, and S/P exploratory laparotomy, lysis of adhesions, resection of necrotic colon without anastomosis (04/18/2022).   Surgical History: has a past surgical history that includes Gallbladder surgery; Breast lumpectomy; Cardiac catheterization; Cholecystectomy; bladder sling modified, anterior and posterior vaginal repair; Tubal ligation; Exploratory Laparotomy (N/A, 04/16/2022); Esophagogastroduodenoscopy (N/A, 06/03/2022); and Colectomy.   Family History: She was adopted. Family history is unknown by patient.   Social History: reports that she has been smoking cigarettes. She started smoking about 47 years ago. She has a 10.25 pack-year smoking history. She has never used smokeless tobacco. She reports current alcohol use of about 1.0 standard drink per week. She reports that she does not use drugs.    Allergies: Liraglutide  "and Metformin    Health Maintenance Due   Topic Date Due   • ZOSTER VACCINE (1 of 2) Never done   • ANNUAL PHYSICAL  Never done   • DIABETIC FOOT EXAM  Never done   • DIABETIC EYE EXAM  12/08/2021   • COVID-19 Vaccine (4 - Booster for Pfizer series) 01/29/2022       Immunization History   Administered Date(s) Administered   • COVID-19 (PFIZER) PURPLE CAP 02/06/2021, 03/06/2021, 12/04/2021   • Flu Vaccine Intradermal Quad 18-64YR 09/12/2021, 09/12/2021   • FluLaval/Fluzone >6mos 10/29/2019, 10/03/2022   • Influenza, Unspecified 11/12/2018, 09/27/2020   • Pneumococcal Conjugate 13-Valent (PCV13) 01/09/2017   • Pneumococcal Polysaccharide (PPSV23) 10/12/2020   • flucelvax quad pfs =>4 YRS 09/27/2020       Objective     Vitals:    02/15/23 1128   BP: 126/75   BP Location: Right arm   Patient Position: Sitting   Cuff Size: Adult   Pulse: 79   Resp: 18   SpO2: 98%   Weight: 101 kg (221 lb 12.8 oz)   Height: 165.1 cm (65\")     Body mass index is 36.91 kg/m².     Wt Readings from Last 3 Encounters:   02/15/23 101 kg (221 lb 12.8 oz)   01/09/23 99.7 kg (219 lb 12.8 oz)   01/04/23 98.8 kg (217 lb 12.8 oz)     BP Readings from Last 3 Encounters:   02/15/23 126/75   01/09/23 157/87   01/04/23 132/70         Patient Care Team:  Elena Henderson PA as PCP - General (Family Medicine)  KATHARINE Webb MD as Consulting Physician (Cardiology)  Cande Stanley MD as Consulting Physician (General Surgery)    Physical Exam  Vitals and nursing note reviewed.   Constitutional:       Appearance: Normal appearance. She is obese.   HENT:      Head: Normocephalic and atraumatic.   Neck:      Vascular: No carotid bruit.      Comments: Thyroid : gland size normal, nontender, no nodules or masses present on palpation   Cardiovascular:      Rate and Rhythm: Normal rate and regular rhythm.      Pulses: Normal pulses.      Heart sounds: Normal heart sounds.   Pulmonary:      Effort: Pulmonary effort is normal.      Breath sounds: Normal " breath sounds.   Musculoskeletal:      Cervical back: Neck supple. No tenderness.   Lymphadenopathy:      Cervical: No cervical adenopathy.   Neurological:      Mental Status: She is alert.   Psychiatric:         Mood and Affect: Mood normal.         Behavior: Behavior normal.           Result Review :                           Assessment and Plan      Diagnoses and all orders for this visit:    1. Bronchitis (Primary)  Comments:  Acute problem: trial of Steroid dose pack; f/u if no improvement.  Orders:  -     methylPREDNISolone (MEDROL) 4 MG dose pack; Take as directed on package instructions.  Dispense: 21 each; Refill: 0    2. Major depressive disorder with single episode, in full remission (HCC)  Comments:  Improved: Continue with Wellbutrin  Mg daily and Lexapro 10 mg daily.  Orders:  -     escitalopram (Lexapro) 10 MG tablet; Take 1 tablet by mouth Daily.  Dispense: 90 tablet; Refill: 1    3. Essential hypertension  Comments:  Stable; continue with current medication.            Follow Up     Return in about 6 months (around 8/15/2023).    Patient was given instructions and counseling regarding her condition or for health maintenance advice. Please see specific information pulled into the AVS if appropriate.

## 2023-02-24 ENCOUNTER — TELEPHONE (OUTPATIENT)
Dept: GASTROENTEROLOGY | Facility: CLINIC | Age: 63
End: 2023-02-24
Payer: COMMERCIAL

## 2023-02-24 NOTE — TELEPHONE ENCOUNTER
Called Anika and spoke with patient regarding scheduling an appointment with Migdalia Sanon. Patient agreed to be scheduled and was scheduled for 06/13/2023 at 9:15 am. Patient verbalized understanding.

## 2023-03-15 ENCOUNTER — OFFICE VISIT (OUTPATIENT)
Dept: PULMONOLOGY | Facility: CLINIC | Age: 63
End: 2023-03-15
Payer: COMMERCIAL

## 2023-03-15 VITALS
DIASTOLIC BLOOD PRESSURE: 86 MMHG | WEIGHT: 221 LBS | OXYGEN SATURATION: 98 % | HEIGHT: 65 IN | HEART RATE: 105 BPM | BODY MASS INDEX: 36.82 KG/M2 | SYSTOLIC BLOOD PRESSURE: 156 MMHG | TEMPERATURE: 97.8 F | RESPIRATION RATE: 18 BRPM

## 2023-03-15 DIAGNOSIS — Z72.0 TOBACCO ABUSE: ICD-10-CM

## 2023-03-15 DIAGNOSIS — J43.9 PULMONARY EMPHYSEMA, UNSPECIFIED EMPHYSEMA TYPE: Primary | ICD-10-CM

## 2023-03-15 DIAGNOSIS — Z99.89 OSA ON CPAP: ICD-10-CM

## 2023-03-15 DIAGNOSIS — G47.33 OSA ON CPAP: ICD-10-CM

## 2023-03-15 PROCEDURE — 99213 OFFICE O/P EST LOW 20 MIN: CPT | Performed by: STUDENT IN AN ORGANIZED HEALTH CARE EDUCATION/TRAINING PROGRAM

## 2023-03-15 RX ORDER — DICYCLOMINE HCL 20 MG
20 TABLET ORAL
COMMUNITY
Start: 2023-01-09

## 2023-03-15 NOTE — PROGRESS NOTES
Primary Care Provider  Elena Henderson PA   Referring Provider  No ref. provider found      Patient Complaint  No chief complaint on file.      Subjective          Anika Casillas presents to [facility]      History of Presenting Illness  Anika Casillas is a 62 y.o. female with history of memory impairment, RUKHSANA on CPAP, hypertension, diabetes here for follow up.     Initial Visit: PFTs done 7/5/2022 showed normal spirometry, mild hyperinflation with signs of air trapping, and moderate reduction in DLCO.  There was no response to bronchodilator therapy. Patient reports having chronic cough and chronic shortness of breath.  She reports that even with just sitting she has some shortness of breath.  He also reports dyspnea on exertion with minimal exertion.'  With 15 steps or going to the mailbox, get shortness of breath'.  Patient is currently on trilogy Ellipta daily and as needed albuterol.  She reports that she does not use albuterol as much.  She is a current smoker, smoking about 5 cigarettes/day.  She has been smoking since she was 19 years old.  In the past she has smoked 1 pack/day for many years.  Patient reports that her cough is worse at night, mostly nonproductive but sometimes yellow to green in color in the morning.  She is on PPI for her acid reflux.  She has not been hospitalized within the last year for any of her symptoms. I have personally reviewed patients past family, social, medical and surgical histories and agree with their findings.    Interval Update: Ms. Casillas is doing well today.  Since last visit her shortness of breath and coughing has improved.  She is also working to cut down her tobacco use, now smoking 3 cigarettes/day.  Reviewed her chest CT, and echocardiogram since last visit.  Patient does report that her symptoms worsen with the weather changes.  She continues to use her trilogy inhaler daily.  She rarely has to use her  rescue inhaler at this time.  She requested a refill for her trelegy.  No other acute issues at this time    Review of Systems  Constitutional:  No fever. No chills. No weakness.  Eyes: No pain, erythema, or discharge. No blurring of vision.  ENT:  No sore throat, URI symptoms.   Cardiovascular:  No chest pain. No palpitations. No lower extremity edema.  Respiratory:  +chronic cough; no pleuritic chest pain. No hemoptysis. No dyspnea.   GI:  Normal appetite. No nausea, vomiting, diarrhea. No pain. No melena.  Musculoskeletal:  No arthralgias or myalgias.  Neurologic:  No headache. No weakness.    Family History   Adopted: Yes   Family history unknown: Yes        Social History     Socioeconomic History   • Marital status:      Spouse name: fannie   Tobacco Use   • Smoking status: Every Day     Packs/day: 0.25     Years: 41.00     Pack years: 10.25     Types: Cigarettes     Start date: 6/17/1975   • Smokeless tobacco: Never   Vaping Use   • Vaping Use: Never used   Substance and Sexual Activity   • Alcohol use: Yes     Alcohol/week: 1.0 standard drink     Types: 1 Drinks containing 0.5 oz of alcohol per week     Comment: 1 a month   • Drug use: Never   • Sexual activity: Yes     Partners: Male     Birth control/protection: Post-menopausal, Tubal ligation        Past Medical History:   Diagnosis Date   • Atrial fibrillation (HCC)    • Colindres's esophagus    • Diabetes mellitus (HCC)    • Esophageal varices (HCC)    • Gastroparesis    • GERD (gastroesophageal reflux disease) 1995   • Hypertension    • Irritable bowel syndrome    • BERKOWITZ (nonalcoholic steatohepatitis)    • Obstructive sleep apnea syndrome    • S/P exploratory laparotomy, lysis of adhesions, resection of necrotic colon without anastomosis 04/18/2022        Immunization History   Administered Date(s) Administered   • COVID-19 (PFIZER) PURPLE CAP 02/06/2021, 03/06/2021, 12/04/2021   • Flu Vaccine Intradermal Quad 18-64YR 09/12/2021, 09/12/2021   •  FluLaval/Fluzone >6mos 10/29/2019, 10/03/2022   • Influenza, Unspecified 11/12/2018, 09/27/2020   • Pneumococcal Conjugate 13-Valent (PCV13) 01/09/2017   • Pneumococcal Polysaccharide (PPSV23) 10/12/2020   • flucelvax quad pfs =>4 YRS 09/27/2020       Allergies   Allergen Reactions   • Liraglutide Nausea And Vomiting   • Metformin Nausea Only     GI Upset          Current Outpatient Medications:   •  albuterol sulfate  (90 Base) MCG/ACT inhaler, Inhale 2 puffs Every 4 (Four) Hours As Needed., Disp: , Rfl:   •  amLODIPine (NORVASC) 10 MG tablet, Take 1 tablet by mouth Daily., Disp: 90 tablet, Rfl: 3  •  apixaban (ELIQUIS) 5 MG tablet tablet, Take 1 tablet by mouth 2 (Two) Times a Day., Disp: 180 tablet, Rfl: 3  •  B-D UF III MINI PEN NEEDLES 31G X 5 MM misc, , Disp: , Rfl:   •  Blood Glucose Monitoring Suppl (Accu-Chek Guide) w/Device kit, , Disp: , Rfl:   •  buPROPion XL (WELLBUTRIN XL) 150 MG 24 hr tablet, TAKE 1 TABLET TWICE DAILY, Disp: 180 tablet, Rfl: 0  •  cholecalciferol (VITAMIN D3) 25 MCG (1000 UT) tablet, Take 2,000 Units by mouth Daily., Disp: , Rfl:   •  dicyclomine (BENTYL) 20 MG tablet, Take 1 tablet by mouth Every 6 (Six) Hours., Disp: 20 tablet, Rfl: 0  •  empagliflozin (JARDIANCE) 25 MG tablet tablet, Take 25 mg by mouth Daily., Disp: , Rfl:   •  escitalopram (Lexapro) 10 MG tablet, Take 1 tablet by mouth Daily., Disp: 90 tablet, Rfl: 1  •  Evolocumab (Repatha) solution prefilled syringe injection, Inject 1 mL under the skin into the appropriate area as directed Every 14 (Fourteen) Days., Disp: 6 each, Rfl: 4  •  famotidine (PEPCID) 40 MG tablet, Take 1 tablet by mouth At Night As Needed for Heartburn., Disp: 90 tablet, Rfl: 2  •  Fluticasone-Umeclidin-Vilant (Trelegy Ellipta) 100-62.5-25 MCG/INH inhaler, Inhale 1 puff Daily., Disp: 2 each, Rfl: 0  •  glucose blood test strip, Check blood glucose twice daily, Disp: 60 each, Rfl: 12  •  glucose monitor monitoring kit, 1 each Daily. Patient  prefers Accu Chek Meter., Disp: 1 each, Rfl: 0  •  hydrOXYzine (ATARAX) 25 MG tablet, Take 25 mg by mouth 2 (Two) Times a Day As Needed., Disp: , Rfl:   •  icosapent ethyl (VASCEPA) 1 g capsule capsule, TAKE 2 G BY MOUTH 2 (TWO) TIMES A DAY WITH MEALS., Disp: 360 capsule, Rfl: 1  •  Insulin Glargine, 2 Unit Dial, (Toujeo Max SoloStar) 300 UNIT/ML solution pen-injector injection, Inject 120 Units under the skin into the appropriate area as directed Daily., Disp: , Rfl:   •  Lancets (accu-chek soft touch) lancets, Check blood sugar twice daily, Disp: 100 each, Rfl: 12  •  loperamide (IMODIUM) 2 MG capsule, Take 1 capsule by mouth 4 (Four) Times a Day As Needed for Diarrhea., Disp: 20 capsule, Rfl: 0  •  methylPREDNISolone (MEDROL) 4 MG dose pack, Take as directed on package instructions., Disp: 21 each, Rfl: 0  •  metoprolol tartrate (LOPRESSOR) 50 MG tablet, Take 1.5 tablets by mouth 2 (Two) Times a Day. TAKE 1 1/2 TABLETS BID, Disp: 270 tablet, Rfl: 1  •  multivitamin with minerals tablet tablet, Take 1 tablet by mouth Daily., Disp: , Rfl:   •  NovoLOG FlexPen 100 UNIT/ML solution pen-injector sc pen, Inject 60 Units under the skin into the appropriate area as directed 2 (Two) Times a Day., Disp: , Rfl:   •  olmesartan (BENICAR) 20 MG tablet, Take 1 tablet by mouth Daily., Disp: 90 tablet, Rfl: 1  •  ondansetron ODT (ZOFRAN-ODT) 4 MG disintegrating tablet, Place 1 tablet on the tongue Every 8 (Eight) Hours As Needed for Nausea or Vomiting., Disp: 15 tablet, Rfl: 0  •  Ozempic, 0.25 or 0.5 MG/DOSE, 2 MG/1.5ML solution pen-injector, , Disp: , Rfl:   •  pantoprazole (PROTONIX) 40 MG EC tablet, Take 1 tablet by mouth Daily., Disp: 90 tablet, Rfl: 2  •  polyethylene glycol (MIRALAX) 17 g packet, Take 17 g by mouth 2 (Two) Times a Day., Disp: 100 each, Rfl: 2  •  riFAXIMin (Xifaxan) 550 MG tablet, Take 1 tablet by mouth Every 12 (Twelve) Hours., Disp: 180 tablet, Rfl: 1  •  vitamin C (ASCORBIC ACID) 250 MG tablet, Take  250 mg by mouth Daily., Disp: , Rfl:      Objective     Vital Signs:   There were no vitals taken for this visit.    Physical Exam  There were no vitals filed for this visit.    General: Alert, NAD  HEENT:  EOMI, no sinus tenderness  Neck:  Supple, no thyromegaly,  no JVD  Lymph: no cervical, supraclavicular lymphadenopathy bilaterally  Chest:  clear to auscultation bilaterally, no wheezing or crackles; no work of breathing noted on room air  CV: RRR, no M/G/R, pulses 2+, equal.  Abd:  Soft, NT, ND, +BS, obese  EXT:  no clubbing, no cyanosis, no edema b/l  Neuro:  A&Ox3, CN grossly intact, no focal deficits  Skin: No rashes or lesions noted       Result Review :   I have personally reviewed patient's labs and images.          Assessment and Plan      Patient Active Problem List   Diagnosis   • Diabetic gastroparesis (HCC)   • Diverticulosis of colon   • Elevated transaminase level   • Essential hypertension   • Gastroesophageal reflux disease without esophagitis   • Hx of colonic polyp   • Hypothyroidism   • Major depressive disorder with single episode, in full remission ([facility])   • Mixed hyperlipidemia   • BERKOWITZ (nonalcoholic steatohepatitis)   • Class 2 severe obesity due to excess calories with serious comorbidity and body mass index (BMI) of 36.0 to 36.9 in adult ([facility])   • OAB (overactive bladder)   • Paroxysmal atrial fibrillation ([facility])   • Tobacco abuse   • Type 2 diabetes mellitus with hyperglycemia, with long-term current use of insulin ([facility])   • Pulmonary emphysema ([facility])   • Colindres's esophagus without dysplasia   • Secondary esophageal varices without bleeding ([facility])   • Encounter for long-term (current) use of insulin ([facility])   • Hyperinsulinism   • Uncontrolled type 2 diabetes mellitus with neurologic complication   • Class 2 obesity   • Chronic gastritis without bleeding, unspecified gastritis type   • Pneumoperitoneum of unknown etiology   • S/P exploratory laparotomy, lysis of adhesions, resection of  necrotic colon without anastomosis   • Memory loss   • RUKHSANA on CPAP   • Abnormal finding on MRI of brain       Impression and Plan:    1. COPD  2. Active smoker  3. Proximal A. fib on Eliquis  4. History of hypertension  5. RUKHSANA on CPAP    - PFTs done 7/5/2022 showed normal spirometry, mild hyperinflation with signs of air trapping, and moderate reduction in DLCO.  There was no response to bronchodilator therapy.   -2D echo done 1/27/23 showed EF 66-70%, LVH, G1DD  -Low-dose CT for cancer screening done 1/27/23 with nonspecific chronic ILD changes and scarring.  We will repeat in 1 year  -Continue Trelegy Ellipta daily and albuterol as needed  -Continue PPI for acid reflux  -Continue jonathan pot for postnasal drip if beneficial  -Continue CPAP with [employer] for RUKHSANA  -Continue discussion regarding smoking cessation; patient is currently on Wellbutrin; she will consider trying Chantix  -Follow-up in 6 months or earlier as needed     Vaccination status: Patient is up-to-date with her vaccinations  Medications personally reviewed.    Follow Up   No follow-ups on file.  Patient was given instructions and counseling regarding her condition or for health maintenance advice. Please see specific information pulled into the AVS if appropriate.

## 2023-04-13 ENCOUNTER — TELEPHONE (OUTPATIENT)
Dept: SLEEP MEDICINE | Facility: HOSPITAL | Age: 63
End: 2023-04-13
Payer: COMMERCIAL

## 2023-04-21 ENCOUNTER — ANCILLARY ORDERS (OUTPATIENT)
Dept: FAMILY MEDICINE CLINIC | Facility: CLINIC | Age: 63
End: 2023-04-21

## 2023-04-21 ENCOUNTER — OFFICE VISIT (OUTPATIENT)
Dept: FAMILY MEDICINE CLINIC | Facility: CLINIC | Age: 63
End: 2023-04-21
Payer: COMMERCIAL

## 2023-04-21 ENCOUNTER — HOSPITAL ENCOUNTER (OUTPATIENT)
Dept: CT IMAGING | Facility: HOSPITAL | Age: 63
Discharge: HOME | End: 2023-04-21
Payer: COMMERCIAL

## 2023-04-21 ENCOUNTER — LAB (OUTPATIENT)
Dept: LAB | Facility: HOSPITAL | Age: 63
End: 2023-04-21
Payer: COMMERCIAL

## 2023-04-21 VITALS
SYSTOLIC BLOOD PRESSURE: 151 MMHG | HEIGHT: 65 IN | OXYGEN SATURATION: 96 % | TEMPERATURE: 98.2 F | HEART RATE: 91 BPM | BODY MASS INDEX: 37.82 KG/M2 | WEIGHT: 227 LBS | DIASTOLIC BLOOD PRESSURE: 71 MMHG

## 2023-04-21 DIAGNOSIS — F50.89 PATHOLOGIC ICE EATING: ICD-10-CM

## 2023-04-21 DIAGNOSIS — R10.10 PAIN OF UPPER ABDOMEN: ICD-10-CM

## 2023-04-21 DIAGNOSIS — E78.2 MIXED HYPERLIPIDEMIA: ICD-10-CM

## 2023-04-21 DIAGNOSIS — R93.89 ABNORMAL FINDING ON CT SCAN: Primary | ICD-10-CM

## 2023-04-21 DIAGNOSIS — R10.10 PAIN OF UPPER ABDOMEN: Primary | ICD-10-CM

## 2023-04-21 LAB
ALBUMIN SERPL-MCNC: 4.1 G/DL (ref 3.5–5.2)
ALBUMIN/GLOB SERPL: 0.9 G/DL
ALP SERPL-CCNC: 120 U/L (ref 39–117)
ALT SERPL W P-5'-P-CCNC: 63 U/L (ref 1–33)
ANION GAP SERPL CALCULATED.3IONS-SCNC: 10 MMOL/L (ref 5–15)
AST SERPL-CCNC: 77 U/L (ref 1–32)
BASOPHILS # BLD AUTO: 0.07 10*3/MM3 (ref 0–0.2)
BASOPHILS NFR BLD AUTO: 0.9 % (ref 0–1.5)
BILIRUB SERPL-MCNC: 0.3 MG/DL (ref 0–1.2)
BUN SERPL-MCNC: 7 MG/DL (ref 8–23)
BUN/CREAT SERPL: 8.9 (ref 7–25)
CALCIUM SPEC-SCNC: 9.3 MG/DL (ref 8.6–10.5)
CHLORIDE SERPL-SCNC: 106 MMOL/L (ref 98–107)
CHOLEST SERPL-MCNC: 174 MG/DL (ref 0–200)
CO2 SERPL-SCNC: 24 MMOL/L (ref 22–29)
CREAT BLDA-MCNC: 0.7 MG/DL
CREAT SERPL-MCNC: 0.79 MG/DL (ref 0.57–1)
DEPRECATED RDW RBC AUTO: 47 FL (ref 37–54)
EGFRCR SERPLBLD CKD-EPI 2021: 85.8 ML/MIN/1.73
EGFRCR SERPLBLD CKD-EPI 2021: 99.2 ML/MIN/1.73
EOSINOPHIL # BLD AUTO: 0.15 10*3/MM3 (ref 0–0.4)
EOSINOPHIL NFR BLD AUTO: 2 % (ref 0.3–6.2)
ERYTHROCYTE [DISTWIDTH] IN BLOOD BY AUTOMATED COUNT: 16.7 % (ref 12.3–15.4)
GLOBULIN UR ELPH-MCNC: 4.5 GM/DL
GLUCOSE SERPL-MCNC: 143 MG/DL (ref 65–99)
HCT VFR BLD AUTO: 33.2 % (ref 34–46.6)
HDLC SERPL-MCNC: 36 MG/DL (ref 40–60)
HGB BLD-MCNC: 10.8 G/DL (ref 12–15.9)
IMM GRANULOCYTES # BLD AUTO: 0.01 10*3/MM3 (ref 0–0.05)
IMM GRANULOCYTES NFR BLD AUTO: 0.1 % (ref 0–0.5)
IRON 24H UR-MRATE: 32 MCG/DL (ref 37–145)
IRON SATN MFR SERPL: 7 % (ref 20–50)
LDLC SERPL CALC-MCNC: 110 MG/DL (ref 0–100)
LDLC/HDLC SERPL: 2.96 {RATIO}
LYMPHOCYTES # BLD AUTO: 2.8 10*3/MM3 (ref 0.7–3.1)
LYMPHOCYTES NFR BLD AUTO: 37.2 % (ref 19.6–45.3)
MCH RBC QN AUTO: 25.1 PG (ref 26.6–33)
MCHC RBC AUTO-ENTMCNC: 32.5 G/DL (ref 31.5–35.7)
MCV RBC AUTO: 77 FL (ref 79–97)
MONOCYTES # BLD AUTO: 0.53 10*3/MM3 (ref 0.1–0.9)
MONOCYTES NFR BLD AUTO: 7 % (ref 5–12)
NEUTROPHILS NFR BLD AUTO: 3.96 10*3/MM3 (ref 1.7–7)
NEUTROPHILS NFR BLD AUTO: 52.8 % (ref 42.7–76)
NRBC BLD AUTO-RTO: 0.1 /100 WBC (ref 0–0.2)
PLATELET # BLD AUTO: 317 10*3/MM3 (ref 140–450)
PMV BLD AUTO: 10.7 FL (ref 6–12)
POTASSIUM SERPL-SCNC: 3.9 MMOL/L (ref 3.5–5.2)
PROT SERPL-MCNC: 8.6 G/DL (ref 6–8.5)
RBC # BLD AUTO: 4.31 10*6/MM3 (ref 3.77–5.28)
SODIUM SERPL-SCNC: 140 MMOL/L (ref 136–145)
TIBC SERPL-MCNC: 480 MCG/DL (ref 298–536)
TRANSFERRIN SERPL-MCNC: 322 MG/DL (ref 200–360)
TRIGL SERPL-MCNC: 157 MG/DL (ref 0–150)
VLDLC SERPL-MCNC: 28 MG/DL (ref 5–40)
WBC NRBC COR # BLD: 7.52 10*3/MM3 (ref 3.4–10.8)

## 2023-04-21 PROCEDURE — 80061 LIPID PANEL: CPT

## 2023-04-21 PROCEDURE — 25510000001 IOPAMIDOL PER 1 ML: Performed by: NURSE PRACTITIONER

## 2023-04-21 PROCEDURE — 83540 ASSAY OF IRON: CPT

## 2023-04-21 PROCEDURE — 84466 ASSAY OF TRANSFERRIN: CPT

## 2023-04-21 PROCEDURE — 85025 COMPLETE CBC W/AUTO DIFF WBC: CPT

## 2023-04-21 PROCEDURE — 74177 CT ABD & PELVIS W/CONTRAST: CPT

## 2023-04-21 PROCEDURE — 36415 COLL VENOUS BLD VENIPUNCTURE: CPT

## 2023-04-21 PROCEDURE — 80053 COMPREHEN METABOLIC PANEL: CPT

## 2023-04-21 PROCEDURE — 82565 ASSAY OF CREATININE: CPT

## 2023-04-21 RX ADMIN — IOPAMIDOL 100 ML: 755 INJECTION, SOLUTION INTRAVENOUS at 14:22

## 2023-04-21 NOTE — PROGRESS NOTES
Chief Complaint  Abdominal Pain    Subjective      Anika Casillas is a 62 year old female that presents to [facility] with c/o upper abdominal pain x 2 wks. She states that she always has n/v due to gastroparesis. She has had belching and passing gas, which are her normal but it has been increased over the last 2 weeks. Pain is rated 5-6 now, tolerable. No fever. Normal BMs. Last one this am. No melena or hematochezia. She reports a bowel resection 1 year ago. She is followed by GI. She does have a hx of cirrhosis and barretts esophagus.    She noticed about 4 months ago that she has been craving ice.  She eats it all day long and even gets up through the night to do so. She states that this has become more frequent since it started.    Blood sugars have been in the upper 200's. She states this is normal for her. She is followed by endocrinology.       .    History of Present Illness    Current Outpatient Medications   Medication Instructions   • albuterol sulfate  (90 Base) MCG/ACT inhaler 2 puffs, Inhalation, Every 4 Hours PRN   • amLODIPine (NORVASC) 10 mg, Oral, Daily   • apixaban (ELIQUIS) 5 mg, Oral, 2 Times Daily   • B-D UF III MINI PEN NEEDLES 31G X 5 MM misc No dose, route, or frequency recorded.   • Blood Glucose Monitoring Suppl (Accu-Chek Guide) w/Device kit No dose, route, or frequency recorded.   • buPROPion XL (WELLBUTRIN XL) 150 MG 24 hr tablet TAKE 1 TABLET TWICE DAILY   • cholecalciferol (VITAMIN D3) 2,000 Units, Oral, Daily   • dicyclomine (BENTYL) 20 mg, Oral, Every 6 Hours   • dicyclomine (BENTYL) 20 mg, Oral   • empagliflozin (JARDIANCE) 25 mg, Oral, Daily   • escitalopram (LEXAPRO) 10 mg, Oral, Daily   • famotidine (PEPCID) 40 mg, Oral, Nightly PRN   • Fluticasone-Umeclidin-Vilant (Trelegy Ellipta) 100-62.5-25 MCG/INH inhaler 1 puff, Inhalation, Daily - RT   • glucose blood test strip Check blood glucose twice daily   • glucose monitor monitoring kit 1  "each, Does not apply, Daily, Patient prefers Accu Chek Meter.   • hydrOXYzine (ATARAX) 25 mg, Oral, 2 Times Daily PRN   • icosapent ethyl (VASCEPA) 2 g, Oral, 2 Times Daily With Meals   • Lancets (accu-chek soft touch) lancets Check blood sugar twice daily   • loperamide (IMODIUM) 2 mg, Oral, 4 Times Daily PRN   • methylPREDNISolone (MEDROL) 4 MG dose pack Take as directed on package instructions.   • metoprolol tartrate (LOPRESSOR) 75 mg, Oral, 2 Times Daily, TAKE 1 1/2 TABLETS BID   • multivitamin with minerals tablet tablet 1 tablet, Oral, Daily   • NovoLOG FlexPen 60 Units, Subcutaneous, 2 Times Daily   • olmesartan (BENICAR) 20 mg, Oral, Daily   • ondansetron ODT (ZOFRAN-ODT) 4 mg, Translingual, Every 8 Hours PRN   • Ozempic, 0.25 or 0.5 MG/DOSE, 2 MG/1.5ML solution pen-injector No dose, route, or frequency recorded.   • pantoprazole (PROTONIX) 40 mg, Oral, Daily   • polyethylene glycol (MIRALAX) 17 g, Oral, 2 Times Daily   • Repatha 140 mg, Subcutaneous, Every 14 Days   • riFAXIMin (XIFAXAN) 550 mg, Oral, Every 12 Hours Scheduled   • Toujeo Max SoloStar 120 Units, Subcutaneous, Daily   • vitamin C (ASCORBIC ACID) 250 mg, Oral, Daily       The following portions of the patient's history were reviewed and updated as appropriate: allergies, current medications, past family history, past medical history, past social history, past surgical history, and problem list.    Objective   Vital Signs:   /71 (BP Location: Right arm, Patient Position: Sitting, Cuff Size: Large Adult)   Pulse 91   Temp 98.2 °F (36.8 °C) (Oral)   Ht 165.1 cm (65\")   Wt 103 kg (227 lb)   SpO2 96%   BMI 37.77 kg/m²     Wt Readings from Last 3 Encounters:   04/21/23 103 kg (227 lb)   03/15/23 100 kg (221 lb)   02/15/23 101 kg (221 lb 12.8 oz)     BP Readings from Last 3 Encounters:   04/21/23 151/71   03/15/23 156/86   02/15/23 126/75     Physical Exam  Vitals reviewed.   Constitutional:       Appearance: Normal appearance. She is " well-developed. She is obese. She is not ill-appearing.   HENT:      Head: Normocephalic and atraumatic.   Eyes:      Conjunctiva/sclera: Conjunctivae normal.      Pupils: Pupils are equal, round, and reactive to light.   Cardiovascular:      Rate and Rhythm: Normal rate and regular rhythm.      Heart sounds: No murmur heard.  Pulmonary:      Effort: Pulmonary effort is normal.      Breath sounds: Normal breath sounds. No wheezing.   Abdominal:      General: Bowel sounds are normal. There is distension (mild in upper abdomen).      Palpations: Abdomen is soft. There is no mass.      Tenderness: There is abdominal tenderness in the right upper quadrant, epigastric area and left upper quadrant. There is no guarding or rebound.   Skin:     General: Skin is warm and dry.   Neurological:      Mental Status: She is alert and oriented to person, place, and time.   Psychiatric:         Mood and Affect: Affect normal.          Result Review :  The following data was reviewed by: CHU Aguero on 04/21/2023:      Common labs        1/4/2023    09:37 1/4/2023    09:44 1/9/2023    04:36 1/20/2023    17:27   Common Labs   Glucose 267    147   170     BUN 10    9   7     Creatinine 0.87    0.87   0.81     Sodium 138    139   139     Potassium 4.3    3.9   3.6     Chloride 104    106   104     Calcium 10.0    9.9   10.0     Albumin 4.0    4.0   4.0     Total Bilirubin 0.3    0.4   0.3     Alkaline Phosphatase 126    122   128     AST (SGOT) 85    57   82     ALT (SGPT) 86    59   69     WBC 7.21    10.08   8.74     Hemoglobin 12.0    12.3   12.1     Hematocrit 36.6    36.0   36.5     Platelets 310    359   302     Total Cholesterol 207        Triglycerides 171        HDL Cholesterol 31        LDL Cholesterol  145        Hemoglobin A1C 8.10        Microalbumin, Urine  5.7           Lab Results (last 72 hours)     Procedure Component Value Units Date/Time    CBC w AUTO Differential [[id number]] Collected: 04/21/23 0953     Specimen: Blood Updated: 04/21/23 1020    Narrative:      The following orders were created for panel order CBC w AUTO Differential.  Procedure                               Abnormality         Status                     ---------                               -----------         ------                     CBC Auto Differential[[id number]]                            In process                   Please view results for these tests on the individual orders.    Lipid Panel [[id number]] Collected: 04/21/23 0953    Specimen: Blood Updated: 04/21/23 1020    Comprehensive metabolic panel [[id number]] Collected: 04/21/23 0953    Specimen: Blood Updated: 04/21/23 1020    Iron Profile [[id number]] Collected: 04/21/23 0953    Specimen: Blood Updated: 04/21/23 1020    CBC Auto Differential [[id number]] Collected: 04/21/23 0953    Specimen: Blood Updated: 04/21/23 1020             CT Abdomen Pelvis With Contrast    Result Date: 1/9/2023   Cirrhotic morphology of the liver.  Multiple dilated venous structures which appear to represent a splenorenal shunt.   DIXON CAVANAUGH MD       Electronically Signed and Approved By: DIXON CAVANAUGH MD on 1/09/2023 at 6:15                     Procedures        Assessment and Plan   Diagnoses and all orders for this visit:    1. Pain of upper abdomen (Primary)  Comments:  Abdominal history with worsening symptoms. CT of abdomen and labs to rule out acute process.  Orders:  -     CBC w AUTO Differential; Future  -     Comprehensive metabolic panel; Future  -     CT Abdomen Pelvis With & Without Contrast; Future    2. Pathologic ice eating  Comments:  possible iron deficiency, check labs  Orders:  -     Iron Profile; Future          There are no discontinued medications.       Follow Up   No follow-ups on file.  Patient was given instructions and counseling regarding her condition or for health maintenance advice. Please see specific information pulled into the AVS if appropriate.       Leia Dodge,  APRN  04/21/23  11:26 EDT

## 2023-04-24 ENCOUNTER — TELEPHONE (OUTPATIENT)
Dept: CARDIOLOGY | Facility: CLINIC | Age: 63
End: 2023-04-24
Payer: COMMERCIAL

## 2023-04-24 NOTE — TELEPHONE ENCOUNTER
----- Message from CHU Gayle sent at 4/24/2023 12:18 PM EDT -----  Lipid panel reviewed, LDL cholesterol has improved however still above goal.  Can we please verify that patient is taking Repatha, the injectable cholesterol medication every 2 weeks?

## 2023-04-25 ENCOUNTER — TELEPHONE (OUTPATIENT)
Dept: GASTROENTEROLOGY | Facility: CLINIC | Age: 63
End: 2023-04-25
Payer: COMMERCIAL

## 2023-04-25 ENCOUNTER — TELEPHONE (OUTPATIENT)
Dept: FAMILY MEDICINE CLINIC | Facility: CLINIC | Age: 63
End: 2023-04-25
Payer: COMMERCIAL

## 2023-04-25 DIAGNOSIS — D50.9 IRON DEFICIENCY ANEMIA, UNSPECIFIED IRON DEFICIENCY ANEMIA TYPE: Primary | ICD-10-CM

## 2023-04-25 PROCEDURE — 82274 ASSAY TEST FOR BLOOD FECAL: CPT | Performed by: NURSE PRACTITIONER

## 2023-04-25 NOTE — TELEPHONE ENCOUNTER
Received phone call from Leia SAGE directly about getting this patient seen sooner.  Patient had a follow up with Migdalia Sanon for 05/23/2023 for cirrhosis.  PCM called due to patient having new symptoms of upper abdomen pain, iron def anemia, nausea and vomiting. PCM ordered a CT scan that she stated didn't show anything out of the normal also order stool studies due to the anemia.     Migdalia had an opening on 05/02/2023 moved patient sooner at the request of PCM.

## 2023-04-25 NOTE — TELEPHONE ENCOUNTER
Spoke with patient regarding lab results. Symptoms have not changed. Iron deficiency anemia.   Would like to check stool for occult blood.  Pt instructed to go to the lab at her convenience to have those specimens collected. We will hold off on iron supplementation at this time due to possibility of additional abdominal discomfort. Encouraged to increase iron rich foods in her diet.  Will also send handout through [employer]. Repeat labs in 1 month. Will need f/u with GI.   Voiced understanding. No further questions or concerns at this time.

## 2023-04-26 ENCOUNTER — LAB (OUTPATIENT)
Dept: LAB | Facility: HOSPITAL | Age: 63
End: 2023-04-26
Payer: COMMERCIAL

## 2023-04-26 PROCEDURE — 82274 ASSAY TEST FOR BLOOD FECAL: CPT | Performed by: NURSE PRACTITIONER

## 2023-04-27 ENCOUNTER — LAB (OUTPATIENT)
Dept: LAB | Facility: HOSPITAL | Age: 63
End: 2023-04-27
Payer: COMMERCIAL

## 2023-04-27 LAB
HEMOCCULT STL QL IA: NEGATIVE

## 2023-04-27 PROCEDURE — 82274 ASSAY TEST FOR BLOOD FECAL: CPT | Performed by: NURSE PRACTITIONER

## 2023-04-28 ENCOUNTER — LAB (OUTPATIENT)
Dept: LAB | Facility: HOSPITAL | Age: 63
End: 2023-04-28
Payer: COMMERCIAL

## 2023-04-28 NOTE — ED TRIAGE NOTES
Pt states pain in right arm from shoulder to fingertips for about two days. Denies injury to the area and no pain in neck or shoulder blade. . states she went to  and sent here for possible blood clot. Masked for triage. (4) rarely moist

## 2023-05-02 ENCOUNTER — OFFICE VISIT (OUTPATIENT)
Dept: GASTROENTEROLOGY | Facility: CLINIC | Age: 63
End: 2023-05-02
Payer: COMMERCIAL

## 2023-05-02 ENCOUNTER — TELEPHONE (OUTPATIENT)
Dept: GASTROENTEROLOGY | Facility: CLINIC | Age: 63
End: 2023-05-02
Payer: COMMERCIAL

## 2023-05-02 VITALS
DIASTOLIC BLOOD PRESSURE: 74 MMHG | WEIGHT: 226.2 LBS | HEIGHT: 65 IN | HEART RATE: 98 BPM | SYSTOLIC BLOOD PRESSURE: 163 MMHG | BODY MASS INDEX: 37.69 KG/M2

## 2023-05-02 DIAGNOSIS — K31.84 GASTROPARESIS: ICD-10-CM

## 2023-05-02 DIAGNOSIS — D50.9 IRON DEFICIENCY ANEMIA, UNSPECIFIED IRON DEFICIENCY ANEMIA TYPE: ICD-10-CM

## 2023-05-02 DIAGNOSIS — K76.82 HEPATIC ENCEPHALOPATHY: ICD-10-CM

## 2023-05-02 DIAGNOSIS — K22.70 BARRETT'S ESOPHAGUS WITHOUT DYSPLASIA: ICD-10-CM

## 2023-05-02 DIAGNOSIS — K75.81 NASH (NONALCOHOLIC STEATOHEPATITIS): ICD-10-CM

## 2023-05-02 DIAGNOSIS — Z86.010 HISTORY OF COLON POLYPS: ICD-10-CM

## 2023-05-02 DIAGNOSIS — K74.60 CIRRHOSIS OF LIVER WITHOUT ASCITES, UNSPECIFIED HEPATIC CIRRHOSIS TYPE: Primary | ICD-10-CM

## 2023-05-02 PROCEDURE — 99214 OFFICE O/P EST MOD 30 MIN: CPT | Performed by: NURSE PRACTITIONER

## 2023-05-02 RX ORDER — SODIUM, POTASSIUM,MAG SULFATES 17.5-3.13G
2 SOLUTION, RECONSTITUTED, ORAL ORAL ONCE
Qty: 354 ML | Refills: 0 | Status: SHIPPED | OUTPATIENT
Start: 2023-05-02 | End: 2023-05-02

## 2023-05-02 RX ORDER — PANTOPRAZOLE SODIUM 40 MG/1
40 TABLET, DELAYED RELEASE ORAL DAILY
Qty: 90 TABLET | Refills: 2 | Status: SHIPPED | OUTPATIENT
Start: 2023-05-02

## 2023-05-02 NOTE — PROGRESS NOTES
Chief Complaint   Cirrhosis    History of Present Illness       Anika Casillas is a 62 y.o. female who presents to [facility] GASTRO[facility]OLOGY for follow-up for cirrhosis.  She is new to me today.  She was last seen in the office by nurse practitioner Julian Rinaldi on 12/22.      Has a history of GERD with Colindres's esophagus without dysplasia. Is taking Protonix 40 mg every morning and Pepcid 40 mg at night.  Denies any dysphagia, rectal bleeding or melena.  Admits her appetite is okay.  Has to be really careful with what she eats due to her gastroparesis.    Has a history of Kline cirrhosis.  With grade 2 esophageal varices noted on most recent EGD 6/22.  Has a history of hepatic encephalopathy.  Unable to tolerate lactulose.  On Xifaxan twice daily.  Was previously referred to Dr. Lugo for her gastroparesis.is frustrated she has not heard from their office.  Still having nausea and vomiting. Admits her burps smell so bad she's embarrassed to burp at work. Doesn't want to take reglan. Is afraid of the side effects. Cannot tolerate lactulose. Does occasionally drink a beer.  Denies any NSAIDs.  Denies any ascites, itching or confusion.    Had a CT scan of the abdomen pelvis done on April 21 of this year that showed:    IMPRESSION:      1. The endometrium has an abnormally thickened heterogeneous appearance.  Recommend a pelvic   ultrasound examination to evaluate for endometrial hyperplasia or endometrial carcinoma.     2. Cirrhosis.  Splenorenal shunt.     3. Colonic diverticulosis.  No CT evidence of colitis or diverticulitis.  Is waiting for results of the pelvic ultrasound she had done.     Has history of constipation.  Bowels move pretty well. Takes miralax 1-2 times a day as needed.     Underwent colon resection with lysis of adhesions by Dr. Guidry on 4/16/2022.  Does feel like she has had more abdominal pain ever since that surgery.    Last EGD was on 6/3/2022 with Dr. Charles.  EGD  showed retained food and grade 2 esophageal varices.  Path negative.    Does have history of cholecystectomy.    Last colonoscopy--2-3 years ago done in [place]. History colon polyps.     Hx tobacco use. Smokes 1-3 a day.     Has hx iron def anemia. Denies any rectal bleeding or melena. Hx a-fib on eliquis.  Is concerned that her hemoglobin is lower right now at 10.8.  Hemoccult stool test x3 negative.  Her PCP does not want her to take iron supplementation.  Patient was just instructed to increase iron in her diet.  Patient is really concerned about this.    GI FI--adopted.     Results       Result Review :       CMP        1/9/2023    04:36 1/20/2023    17:27 4/21/2023    09:53 4/21/2023    14:08   CMP   Glucose 147   170   143      BUN 9   7   7      Creatinine 0.87   0.81   0.79   0.70     EGFR 76.4   83.2   85.8   99.2     Sodium 139   139   140      Potassium 3.9   3.6   3.9      Chloride 106   104   106      Calcium 9.9   10.0   9.3      Total Protein 9.1   9.0   8.6      Albumin 4.0   4.0   4.1      Globulin 5.1   5.0   4.5      Total Bilirubin 0.4   0.3   0.3      Alkaline Phosphatase 122   128   120      AST (SGOT) 57   82   77      ALT (SGPT) 59   69   63      Albumin/Globulin Ratio 0.8   0.8   0.9      BUN/Creatinine Ratio 10.3   8.6   8.9      Anion Gap 9.7   9.8   10.0        CBC        1/9/2023    04:36 1/20/2023    17:27 4/21/2023    09:53   CBC   WBC 10.08   8.74   7.52     RBC 4.57   4.45   4.31     Hemoglobin 12.3   12.1   10.8     Hematocrit 36.0   36.5   33.2     MCV 78.8   82.0   77.0     MCH 26.9   27.2   25.1     MCHC 34.2   33.2   32.5     RDW 17.0   16.9   16.7     Platelets 359   302   317         Iron Profile   Iron   Date Value Ref Range Status   04/21/2023 32 (L) 37 - 145 mcg/dL Final     TIBC   Date Value Ref Range Status   04/21/2023 480 298 - 536 mcg/dL Final     Iron Saturation   Date Value Ref Range Status   04/21/2023 7 (L) 20 - 50 % Final     Transferrin   Date Value Ref  "Range Status   04/21/2023 322 200 - 360 mg/dL Final     Liver Workup   Iron   Date Value Ref Range Status   04/21/2023 32 (L) 37 - 145 mcg/dL Final     TIBC   Date Value Ref Range Status   04/21/2023 480 298 - 536 mcg/dL Final     Iron Saturation   Date Value Ref Range Status   04/21/2023 7 (L) 20 - 50 % Final     Transferrin   Date Value Ref Range Status   04/21/2023 322 200 - 360 mg/dL Final     Protime   Date Value Ref Range Status   01/20/2023 15.9 (H) 11.8 - 14.9 Seconds Final   11/22/2022 11.1 9.7 - 11.5 Second Final     Comment:     When using the PT to screen for a coagulopathy, please refer to the PT reference range to evaluate results.     INR   Date Value Ref Range Status   01/20/2023 1.25 (H) 0.86 - 1.15 Final   11/22/2022 1.0  Final     Comment:     To monitor warfarin, the INR calculated from the PT is used, according to current published guidelines:    *Most indications, moderate intensity INR (2.0-3.0) is effective.    *Patients with recurrent thromboembolic events with a therapeutic INR as above, or other additional risk factors for thromboembolic events, high intensity INR (2.5-3.5) is recommended.    *Optimal target range for the INR is not likely to be the same for all indications, and lower INR targets may be prudent for patients at very high risk of bleeding.    The INR is used to manage patients on warfarin, and thus is not compared to a \"normal\" INR range. The validity of the INR in other conditions of impaired coagulation has not been fully evaluated, and the PT recorded in seconds is recommended in these   instances. Ref:  CHEST June 2008 supplement.     ALPHA-FETOPROTEIN   Date Value Ref Range Status   01/20/2023 2.15 0 - 8.3 ng/mL Final     AFP   ALPHA-FETOPROTEIN   Date Value Ref Range Status   01/20/2023 2.15 0 - 8.3 ng/mL Final      PT/INR   Protime   Date Value Ref Range Status   01/20/2023 15.9 (H) 11.8 - 14.9 Seconds Final   11/22/2022 11.1 9.7 - 11.5 Second Final     Comment:     " "When using the PT to screen for a coagulopathy, please refer to the PT reference range to evaluate results.     INR   Date Value Ref Range Status   01/20/2023 1.25 (H) 0.86 - 1.15 Final   11/22/2022 1.0  Final     Comment:     To monitor warfarin, the INR calculated from the PT is used, according to current published guidelines:    *Most indications, moderate intensity INR (2.0-3.0) is effective.    *Patients with recurrent thromboembolic events with a therapeutic INR as above, or other additional risk factors for thromboembolic events, high intensity INR (2.5-3.5) is recommended.    *Optimal target range for the INR is not likely to be the same for all indications, and lower INR targets may be prudent for patients at very high risk of bleeding.    The INR is used to manage patients on warfarin, and thus is not compared to a \"normal\" INR range. The validity of the INR in other conditions of impaired coagulation has not been fully evaluated, and the PT recorded in seconds is recommended in these   instances. Ref:  CHEST June 2008 supplement.     Ammonia   Ammonia   Date Value Ref Range Status   11/16/2022 27 11 - 51 umol/L Final     Vitamin D   25 Hydroxy, Vitamin D   Date Value Ref Range Status   01/04/2023 39.2 30.0 - 100.0 ng/ml Final               Past Medical History       Past Medical History:   Diagnosis Date   • Atrial fibrillation    • Colindres's esophagus    • Diabetes mellitus    • Esophageal varices    • Gastroparesis    • GERD (gastroesophageal reflux disease) 1995   • Hypertension    • Iron deficiency anemia 5/2/2023   • Irritable bowel syndrome    • BERKOWITZ (nonalcoholic steatohepatitis)    • Obstructive sleep apnea syndrome    • S/P exploratory laparotomy, lysis of adhesions, resection of necrotic colon without anastomosis 04/18/2022       Past Surgical History:   Procedure Laterality Date   • BLADDER SLING MODIFIED, ANTERIOR AND POSTERIOR VAGINAL REPAIR     • BREAST LUMPECTOMY     • CARDIAC " CATHETERIZATION     • CHOLECYSTECTOMY     • COLON RESECTION     • ENDOSCOPY N/A 06/03/2022    Procedure: ESOPHAGOGASTRODUODENOSCOPY;  Surgeon: Shannan Charles MD;  Location: [facility] ENDOSCOPY;  Service: Gastroenterology;  Laterality: N/A;  RETAINED FOOD IN STOMACH  ESOPHAGEAL VARICIES   • EXPLORATORY LAPAROTOMY N/A 04/16/2022    Procedure: EXPLORATORY LAPAROTOMY, LYSIS OF ADHESIONS, RESECTION OF NECTROIC COLON;  Surgeon: Cande Stanley MD;  Location: [facility] MAIN OR;  Service: General;  Laterality: N/A;   • GALLBLADDER SURGERY     • TUBAL ABDOMINAL LIGATION           Current Outpatient Medications:   •  albuterol sulfate  (90 Base) MCG/ACT inhaler, Inhale 2 puffs Every 4 (Four) Hours As Needed., Disp: , Rfl:   •  amLODIPine (NORVASC) 10 MG tablet, Take 1 tablet by mouth Daily., Disp: 90 tablet, Rfl: 3  •  apixaban (ELIQUIS) 5 MG tablet tablet, Take 1 tablet by mouth 2 (Two) Times a Day., Disp: 180 tablet, Rfl: 3  •  B-D UF III MINI PEN NEEDLES 31G X 5 MM misc, , Disp: , Rfl:   •  Blood Glucose Monitoring Suppl (Accu-Chek Guide) w/Device kit, , Disp: , Rfl:   •  buPROPion XL (WELLBUTRIN XL) 150 MG 24 hr tablet, TAKE 1 TABLET TWICE DAILY, Disp: 180 tablet, Rfl: 0  •  cholecalciferol (VITAMIN D3) 25 MCG (1000 UT) tablet, Take 2 tablets by mouth Daily., Disp: , Rfl:   •  dicyclomine (BENTYL) 20 MG tablet, Take 1 tablet by mouth Every 6 (Six) Hours., Disp: 20 tablet, Rfl: 0  •  empagliflozin (JARDIANCE) 25 MG tablet tablet, Take 1 tablet by mouth Daily., Disp: , Rfl:   •  escitalopram (Lexapro) 10 MG tablet, Take 1 tablet by mouth Daily., Disp: 90 tablet, Rfl: 1  •  Evolocumab (Repatha) solution prefilled syringe injection, Inject 1 mL under the skin into the appropriate area as directed Every 14 (Fourteen) Days., Disp: 6 each, Rfl: 4  •  famotidine (PEPCID) 40 MG tablet, Take 1 tablet by mouth At Night As Needed for Heartburn., Disp: 90 tablet, Rfl: 2  •  Fluticasone-Umeclidin-Vilant (Trelegy Ellipta)  100-62.5-25 MCG/INH inhaler, Inhale 1 puff Daily., Disp: 2 each, Rfl: 0  •  glucose blood test strip, Check blood glucose twice daily, Disp: 60 each, Rfl: 12  •  glucose monitor monitoring kit, 1 each Daily. Patient prefers Accu Chek Meter., Disp: 1 each, Rfl: 0  •  hydrOXYzine (ATARAX) 25 MG tablet, Take 1 tablet by mouth 2 (Two) Times a Day As Needed., Disp: , Rfl:   •  icosapent ethyl (VASCEPA) 1 g capsule capsule, TAKE 2 G BY MOUTH 2 (TWO) TIMES A DAY WITH MEALS., Disp: 360 capsule, Rfl: 1  •  Lancets (accu-chek soft touch) lancets, Check blood sugar twice daily, Disp: 100 each, Rfl: 12  •  loperamide (IMODIUM) 2 MG capsule, Take 1 capsule by mouth 4 (Four) Times a Day As Needed for Diarrhea., Disp: 20 capsule, Rfl: 0  •  methylPREDNISolone (MEDROL) 4 MG dose pack, Take as directed on package instructions., Disp: 21 each, Rfl: 0  •  metoprolol tartrate (LOPRESSOR) 50 MG tablet, Take 1.5 tablets by mouth 2 (Two) Times a Day. TAKE 1 1/2 TABLETS BID, Disp: 270 tablet, Rfl: 1  •  multivitamin with minerals tablet tablet, Take 1 tablet by mouth Daily., Disp: , Rfl:   •  NovoLOG FlexPen 100 UNIT/ML solution pen-injector sc pen, Inject 60 Units under the skin into the appropriate area as directed 2 (Two) Times a Day., Disp: , Rfl:   •  olmesartan (BENICAR) 20 MG tablet, Take 1 tablet by mouth Daily., Disp: 90 tablet, Rfl: 1  •  ondansetron ODT (ZOFRAN-ODT) 4 MG disintegrating tablet, Place 1 tablet on the tongue Every 8 (Eight) Hours As Needed for Nausea or Vomiting., Disp: 15 tablet, Rfl: 0  •  Ozempic, 0.25 or 0.5 MG/DOSE, 2 MG/1.5ML solution pen-injector, , Disp: , Rfl:   •  pantoprazole (PROTONIX) 40 MG EC tablet, Take 1 tablet by mouth Daily., Disp: 90 tablet, Rfl: 2  •  polyethylene glycol (MIRALAX) 17 g packet, Take 17 g by mouth 2 (Two) Times a Day., Disp: 100 each, Rfl: 2  •  riFAXIMin (Xifaxan) 550 MG tablet, Take 1 tablet by mouth Every 12 (Twelve) Hours., Disp: 180 tablet, Rfl: 1  •  vitamin C (ASCORBIC  "ACID) 250 MG tablet, Take 1 tablet by mouth Daily., Disp: , Rfl:   •  Insulin Glargine, 2 Unit Dial, (Toujeo Max SoloStar) 300 UNIT/ML solution pen-injector injection, Inject 120 Units under the skin into the appropriate area as directed Daily., Disp: , Rfl:   •  sodium-potassium-magnesium sulfates (SUPREP) 17.5-3.13-1.6 GM/177ML solution oral solution, Take 2 bottles by mouth 1 (One) Time for 1 dose. Take as directed, Disp: 354 mL, Rfl: 0     Allergies   Allergen Reactions   • Liraglutide Nausea And Vomiting   • Metformin Nausea Only     GI Upset       Family History   Adopted: Yes   Family history unknown: Yes        Social History     Social History Narrative    Exercises 1 x week    Lives at home with spouse       Objective       Review of Systems   Constitutional: Positive for fatigue. Negative for appetite change, fever, unexpected weight gain and unexpected weight loss.   HENT: Negative for trouble swallowing.    Respiratory: Negative for cough, choking, chest tightness, shortness of breath, wheezing and stridor.    Cardiovascular: Negative for chest pain, palpitations and leg swelling.   Gastrointestinal: Positive for abdominal distention, abdominal pain, nausea and vomiting. Negative for anal bleeding, blood in stool, constipation, diarrhea, rectal pain, GERD and indigestion.        Vital Signs:   /74 (BP Location: Right arm, Patient Position: Sitting, Cuff Size: Adult)   Pulse 98   Ht 165.1 cm (65\")   Wt 103 kg (226 lb 3.2 oz)   BMI 37.64 kg/m²       Physical Exam  Constitutional:       General: She is not in acute distress.     Appearance: She is well-developed. She is not ill-appearing.   HENT:      Head: Normocephalic.   Eyes:      Pupils: Pupils are equal, round, and reactive to light.   Cardiovascular:      Rate and Rhythm: Normal rate and regular rhythm.      Heart sounds: Normal heart sounds.   Pulmonary:      Effort: Pulmonary effort is normal.      Breath sounds: Normal breath sounds. "   Abdominal:      General: Bowel sounds are normal. There is distension.      Palpations: Abdomen is soft. There is no mass.      Tenderness: There is no abdominal tenderness. There is no guarding or rebound.      Hernia: No hernia is present.      Comments: No ascites appreciated   Musculoskeletal:         General: Normal range of motion.      Right lower leg: No edema.      Left lower leg: No edema.   Skin:     General: Skin is warm and dry.   Neurological:      Mental Status: She is alert and oriented to person, place, and time.   Psychiatric:         Speech: Speech normal.         Behavior: Behavior normal.         Judgment: Judgment normal.           Assessment & Plan          Assessment and Plan    Diagnoses and all orders for this visit:    1. Cirrhosis of liver without ascites, unspecified hepatic cirrhosis type (Primary)    2. BERKOWITZ (nonalcoholic steatohepatitis)    3. Colindres's esophagus without dysplasia  -     pantoprazole (PROTONIX) 40 MG EC tablet; Take 1 tablet by mouth Daily.  Dispense: 90 tablet; Refill: 2    4. Gastroparesis    5. Hepatic encephalopathy  -     riFAXIMin (Xifaxan) 550 MG tablet; Take 1 tablet by mouth Every 12 (Twelve) Hours.  Dispense: 180 tablet; Refill: 1    6. Iron deficiency anemia, unspecified iron deficiency anemia type  -     Case Request; Standing  -     Follow Anesthesia Guidelines / Protocol; Future  -     Obtain Informed Consent; Future  -     sodium-potassium-magnesium sulfates (SUPREP) 17.5-3.13-1.6 GM/177ML solution oral solution; Take 2 bottles by mouth 1 (One) Time for 1 dose. Take as directed  Dispense: 354 mL; Refill: 0  -     Case Request    7. History of colon polyps  -     Case Request; Standing  -     Follow Anesthesia Guidelines / Protocol; Future  -     Obtain Informed Consent; Future  -     sodium-potassium-magnesium sulfates (SUPREP) 17.5-3.13-1.6 GM/177ML solution oral solution; Take 2 bottles by mouth 1 (One) Time for 1 dose. Take as directed  Dispense:  354 mL; Refill: 0  -     Case Request    Other orders  -     Follow Anesthesia Guidelines / Protocol; Standing  -     Verify NPO; Standing  -     Verify Bowel Prep Was Successful; Standing  -     Give Tap Water Enema If Bowel Prep Insufficient; Standing        Cirrhosis: BERKOWITZ MELD 9  Ascites: None noted  Varices: Grade 2 esophageal varices noted on most recent EGD.  Encephalopathy: Patient reports she is unable to tolerate lactulose.   On Xifaxan and MiraLAX.  Health maintenance: Next surveillance EGD due 6/24.  Will schedule colonoscopy today.    Reviewed EGD results with her today.  She is due for screening colonoscopy.  Reviewed most recent lab work results with her as well today.  Hemoglobin is lower at 10.8.  Given her iron deficiency anemia I think it makes sense to go ahead and get her on the schedule for colonoscopy with Dr. Charles.  Patient is agreeable to the scope.  Gastroparesis is not well controlled currently.  I discussed with her again about possibly trying Reglan.  She does not want to try Reglan due to its possible side effects.  I will give more handouts on gastroparesis diet today.  She needs to get much stricter with her diet to help with her symptoms.  Will also refer her to the [facility] GI motility clinic for evaluation.  Given her iron deficiency anemia we will also refer to hematology for further evaluation.  Constipation seems pretty well controlled with MiraLAX as needed.  GERD seems well controlled with Protonix 40 mg every morning and Pepcid 40 mg at night.  Continue GERD precautions.  Most recent labs were stable.  We will continue to monitor liver enzymes.  No signs of jaundice, confusion, ascites or itching noted today.  Continue Xifaxan.  Continue daily weights.  Continue 2 g sodium diet.  Patient to call the office with any issues.  Patient to follow-up with me after her scope.  Patient is agreeable to the plan.      Surgical Risk and Benefits discussed: Possible  risks/complications, benefits, and alternatives to surgical or invasive procedure have been explained to patient and/or legal guardian; risks include bleeding, infection, and perforation. Patient has been evaluated and can tolerate anesthesia and/or sedation. Risks, benefits, and alternatives to anesthesia and sedation have been explained to patient and/or legal guardian.      Follow Up       Follow Up   Return in about 3 months (around 8/2/2023) for CIRRHOSIS.  Patient was given instructions and counseling regarding her condition or for health maintenance advice. Please see specific information pulled into the AVS if appropriate.

## 2023-05-02 NOTE — TELEPHONE ENCOUNTER
5/2/2023    Dear Dr Webb,     Patient: Anika Casillas   YOB: 1960        This patient is waiting to have a Colonoscopy which I will perform at [facility] on 08/14/2023.     Our records indicate this patient is currently taking Eliquis. This procedure requires the patient to suspend their anticoagulant medication prior to surgery.     Please respond to this request noting your recommendations. You may contact our office at [phone] Option 3 with any questions. I appreciate your prompt response in this matter.     Please return this form to our office as soon as possible to [phone]    ____ I approve my patient to stop taking their Anticoagulant Therapy medication 2 days prior to the scheduled procedure.    ____ I do NOT approve my patient to stop taking their Anticoagulant Therapy medication at this time.    ____ I approve my patient from a cardiac standpoint.    ____ I do NOT approve my patient from a cardiac standpoint at this time.    Please specify clearance expiration date:_____________________________    Approving physician name (please print):     _____________________________________________    Approving physician signature:     ________________________________    Date:________________        Sincerely,  [facility]   Gastroenterology -

## 2023-05-03 ENCOUNTER — TELEPHONE (OUTPATIENT)
Dept: GASTROENTEROLOGY | Facility: CLINIC | Age: 63
End: 2023-05-03
Payer: COMMERCIAL

## 2023-05-03 NOTE — TELEPHONE ENCOUNTER
Shanna from Hematology contacted the office about the referral for this patient due to iron deficiency anemia. The office was wanting to see if patient had completed iron profile that was ordered on 04/25/2023. Looking through the chart informed the office that I didn't see that they had been completed or that we have the results to view. Per hematology they want two sets of results from iron profile showing this dx before they will schedule.  Shanna is going to route the referral back to our office until the iron profile is completed.     Please advise.

## 2023-05-04 ENCOUNTER — LAB (OUTPATIENT)
Dept: LAB | Facility: HOSPITAL | Age: 63
End: 2023-05-04
Payer: COMMERCIAL

## 2023-05-04 DIAGNOSIS — D50.9 IRON DEFICIENCY ANEMIA, UNSPECIFIED IRON DEFICIENCY ANEMIA TYPE: ICD-10-CM

## 2023-05-04 LAB
ALBUMIN SERPL-MCNC: 3.9 G/DL (ref 3.5–5.2)
ALBUMIN/GLOB SERPL: 0.9 G/DL
ALP SERPL-CCNC: 135 U/L (ref 39–117)
ALT SERPL W P-5'-P-CCNC: 62 U/L (ref 1–33)
ANION GAP SERPL CALCULATED.3IONS-SCNC: 9.3 MMOL/L (ref 5–15)
AST SERPL-CCNC: 80 U/L (ref 1–32)
BASOPHILS # BLD AUTO: 0.09 10*3/MM3 (ref 0–0.2)
BASOPHILS NFR BLD AUTO: 1 % (ref 0–1.5)
BILIRUB SERPL-MCNC: 0.4 MG/DL (ref 0–1.2)
BUN SERPL-MCNC: 8 MG/DL (ref 8–23)
BUN/CREAT SERPL: 9.3 (ref 7–25)
CALCIUM SPEC-SCNC: 10 MG/DL (ref 8.6–10.5)
CHLORIDE SERPL-SCNC: 104 MMOL/L (ref 98–107)
CO2 SERPL-SCNC: 23.7 MMOL/L (ref 22–29)
CREAT SERPL-MCNC: 0.86 MG/DL (ref 0.57–1)
DEPRECATED RDW RBC AUTO: 47 FL (ref 37–54)
EGFRCR SERPLBLD CKD-EPI 2021: 77.5 ML/MIN/1.73
EOSINOPHIL # BLD AUTO: 0.2 10*3/MM3 (ref 0–0.4)
EOSINOPHIL NFR BLD AUTO: 2.2 % (ref 0.3–6.2)
ERYTHROCYTE [DISTWIDTH] IN BLOOD BY AUTOMATED COUNT: 17.1 % (ref 12.3–15.4)
GLOBULIN UR ELPH-MCNC: 4.4 GM/DL
GLUCOSE SERPL-MCNC: 165 MG/DL (ref 65–99)
HCT VFR BLD AUTO: 34.4 % (ref 34–46.6)
HGB BLD-MCNC: 11.4 G/DL (ref 12–15.9)
IMM GRANULOCYTES # BLD AUTO: 0.03 10*3/MM3 (ref 0–0.05)
IMM GRANULOCYTES NFR BLD AUTO: 0.3 % (ref 0–0.5)
IRON 24H UR-MRATE: 52 MCG/DL (ref 37–145)
IRON SATN MFR SERPL: 11 % (ref 20–50)
LYMPHOCYTES # BLD AUTO: 4.42 10*3/MM3 (ref 0.7–3.1)
LYMPHOCYTES NFR BLD AUTO: 48.9 % (ref 19.6–45.3)
MCH RBC QN AUTO: 25.6 PG (ref 26.6–33)
MCHC RBC AUTO-ENTMCNC: 33.1 G/DL (ref 31.5–35.7)
MCV RBC AUTO: 77.1 FL (ref 79–97)
MONOCYTES # BLD AUTO: 0.59 10*3/MM3 (ref 0.1–0.9)
MONOCYTES NFR BLD AUTO: 6.5 % (ref 5–12)
NEUTROPHILS NFR BLD AUTO: 3.7 10*3/MM3 (ref 1.7–7)
NEUTROPHILS NFR BLD AUTO: 41.1 % (ref 42.7–76)
NRBC BLD AUTO-RTO: 0.1 /100 WBC (ref 0–0.2)
PLATELET # BLD AUTO: 277 10*3/MM3 (ref 140–450)
PMV BLD AUTO: 11.2 FL (ref 6–12)
POTASSIUM SERPL-SCNC: 3.6 MMOL/L (ref 3.5–5.2)
PROT SERPL-MCNC: 8.3 G/DL (ref 6–8.5)
RBC # BLD AUTO: 4.46 10*6/MM3 (ref 3.77–5.28)
SODIUM SERPL-SCNC: 137 MMOL/L (ref 136–145)
TIBC SERPL-MCNC: 493 MCG/DL (ref 298–536)
TRANSFERRIN SERPL-MCNC: 331 MG/DL (ref 200–360)
WBC NRBC COR # BLD: 9.03 10*3/MM3 (ref 3.4–10.8)

## 2023-05-04 PROCEDURE — 85025 COMPLETE CBC W/AUTO DIFF WBC: CPT

## 2023-05-04 PROCEDURE — 36415 COLL VENOUS BLD VENIPUNCTURE: CPT

## 2023-05-04 PROCEDURE — 83540 ASSAY OF IRON: CPT

## 2023-05-04 PROCEDURE — 80053 COMPREHEN METABOLIC PANEL: CPT

## 2023-05-04 PROCEDURE — 82728 ASSAY OF FERRITIN: CPT

## 2023-05-04 PROCEDURE — 84466 ASSAY OF TRANSFERRIN: CPT

## 2023-05-04 NOTE — TELEPHONE ENCOUNTER
Spoke with patient and advised her to get the labs done. Patient stated she will attempt to get them done this afternoon

## 2023-05-05 LAB — FERRITIN SERPL-MCNC: 48.2 NG/ML (ref 13–150)

## 2023-05-10 ENCOUNTER — HOSPITAL ENCOUNTER (OUTPATIENT)
Dept: ULTRASOUND IMAGING | Facility: HOSPITAL | Age: 63
Discharge: HOME | End: 2023-05-10
Admitting: NURSE PRACTITIONER
Payer: COMMERCIAL

## 2023-05-10 DIAGNOSIS — R93.89 ABNORMAL FINDING ON CT SCAN: ICD-10-CM

## 2023-05-10 PROCEDURE — 76830 TRANSVAGINAL US NON-OB: CPT

## 2023-05-23 DIAGNOSIS — F32.5 MAJOR DEPRESSIVE DISORDER WITH SINGLE EPISODE, IN FULL REMISSION: Chronic | ICD-10-CM

## 2023-05-23 DIAGNOSIS — E78.2 MIXED HYPERLIPIDEMIA: ICD-10-CM

## 2023-05-23 RX ORDER — ICOSAPENT ETHYL 1000 MG/1
2 CAPSULE ORAL 2 TIMES DAILY WITH MEALS
Qty: 360 CAPSULE | Refills: 1 | Status: SHIPPED | OUTPATIENT
Start: 2023-05-23

## 2023-05-23 RX ORDER — OLMESARTAN MEDOXOMIL 20 MG/1
20 TABLET ORAL DAILY
Qty: 90 TABLET | Refills: 1 | Status: SHIPPED | OUTPATIENT
Start: 2023-05-23

## 2023-05-23 RX ORDER — ESCITALOPRAM OXALATE 10 MG/1
10 TABLET ORAL DAILY
Qty: 90 TABLET | Refills: 1 | Status: SHIPPED | OUTPATIENT
Start: 2023-05-23

## 2023-05-24 ENCOUNTER — CONSULT (OUTPATIENT)
Dept: ONCOLOGY | Facility: HOSPITAL | Age: 63
End: 2023-05-24
Payer: COMMERCIAL

## 2023-05-24 VITALS
WEIGHT: 224.65 LBS | HEART RATE: 84 BPM | SYSTOLIC BLOOD PRESSURE: 130 MMHG | RESPIRATION RATE: 18 BRPM | TEMPERATURE: 97.7 F | DIASTOLIC BLOOD PRESSURE: 70 MMHG | OXYGEN SATURATION: 99 % | BODY MASS INDEX: 37.38 KG/M2

## 2023-05-24 DIAGNOSIS — D64.9 ANEMIA, UNSPECIFIED TYPE: Primary | ICD-10-CM

## 2023-05-24 DIAGNOSIS — R93.89 ENDOMETRIAL THICKENING ON ULTRASOUND: ICD-10-CM

## 2023-05-24 PROBLEM — K76.82 ENCEPHALOPATHY, PORTAL SYSTEMIC: Status: ACTIVE | Noted: 2022-09-29

## 2023-05-24 PROBLEM — K74.60 ESOPHAGEAL VARICES IN CIRRHOSIS: Status: ACTIVE | Noted: 2022-09-29

## 2023-05-24 PROBLEM — F32.A DEPRESSION: Status: ACTIVE | Noted: 2023-05-24

## 2023-05-24 PROBLEM — Z98.890 OTHER SPECIFIED POSTPROCEDURAL STATES: Status: ACTIVE | Noted: 2022-04-18

## 2023-05-24 PROBLEM — R06.02 SHORTNESS OF BREATH: Status: ACTIVE | Noted: 2023-05-24

## 2023-05-24 PROBLEM — K74.60 NON-ALCOHOLIC CIRRHOSIS: Status: ACTIVE | Noted: 2022-09-29

## 2023-05-24 PROBLEM — E61.1 IRON DEFICIENCY: Status: ACTIVE | Noted: 2022-11-02

## 2023-05-24 PROBLEM — R74.8 ALKALINE PHOSPHATASE RAISED: Status: ACTIVE | Noted: 2022-09-29

## 2023-05-24 PROBLEM — I85.10 ESOPHAGEAL VARICES IN CIRRHOSIS: Status: ACTIVE | Noted: 2022-09-29

## 2023-05-24 PROCEDURE — G0463 HOSPITAL OUTPT CLINIC VISIT: HCPCS | Performed by: NURSE PRACTITIONER

## 2023-05-24 NOTE — PROGRESS NOTES
Chief Complaint/Reason for Referral:  Iron deficiency anemia     Migdalia Sanon, *  Elena Henderson PA    Records Obtained:  Records of the patients history including those obtained from  [facility] and patient information  were reviewed and summarized in detail.    Subjective    History of Present Illness     Ms. Anika Casillas is a very pleasant 62 year old  female presenting as referral for mild anemia as referral from gastroenterology. PMH is BERKOWITZ, type II DM, hypertension, IBS, gastroparesis, GERD, esophageal varices, Colindres's esophagus, diverticulosis, elevated liver enzymes. Tobacco user of 1 PPD since the age of 19 years old but recently cut down to 4-5 cigarettes / day. Recent low dose CT screening with a small LLL nodular area noted and interstitial changes noted.     Recent CT abdomen on 4/21/23 with an abnormal thickened heterogenous appearance noted and abdominal US completed. Vaginal US on 5/10/23 with an irregular hetergeneous mass within the endometrial canal and questionable irregularity of the cervix. Patient has follow up with GYN in June.     Recent lab work on 5/4/23: alk phos elevated 135, previously 120. Elevated ALT / AST 62 / 80, previously elevated and stable. Iron 52 up from 32, ferritin 48, not measured previously. Iron st 11%, up from 7%. Hemoglobin 11.4 which is up from 10.8. MCV 77. Fecal OB have been negative x 3 at the end of April. Prior scope done in  6/2022 with grade II esophageal varices.     Currently, not taking any oral iron. Has been increasing iron in her diet with liver and red meat. Reports fatigue 6/10 and craving ice now for the last 4 months. Denies any GI blood loss or other associated blood loss.     Previously adopted and does not know her family history.       Oncology/Hematology History    No history exists.       Review of Systems   Constitutional: Positive for fatigue. Negative for appetite change, diaphoresis, fever, unexpected weight  gain and unexpected weight loss.   HENT: Negative for hearing loss, sore throat and voice change.    Eyes: Negative for blurred vision, double vision, pain, redness and visual disturbance.   Respiratory: Negative for cough, shortness of breath and wheezing.    Cardiovascular: Negative for chest pain, palpitations and leg swelling.   Endocrine: Negative for cold intolerance, heat intolerance, polydipsia and polyuria.   Genitourinary: Negative for decreased urine volume, difficulty urinating, frequency and urinary incontinence.   Musculoskeletal: Negative for arthralgias, back pain, joint swelling and myalgias.   Skin: Negative for color change, rash, skin lesions and wound.   Neurological: Negative for dizziness, seizures, numbness and headache.   Hematological: Negative for adenopathy. Does not bruise/bleed easily.   Psychiatric/Behavioral: Negative for depressed mood. The patient is not nervous/anxious.    All other systems reviewed and are negative.      Current Outpatient Medications on File Prior to Visit   Medication Sig Dispense Refill   • albuterol sulfate  (90 Base) MCG/ACT inhaler Inhale 2 puffs Every 4 (Four) Hours As Needed.     • amLODIPine (NORVASC) 10 MG tablet Take 1 tablet by mouth Daily. 90 tablet 3   • apixaban (ELIQUIS) 5 MG tablet tablet Take 1 tablet by mouth 2 (Two) Times a Day. 180 tablet 3   • B-D UF III MINI PEN NEEDLES 31G X 5 MM misc      • Blood Glucose Monitoring Suppl (Accu-Chek Guide) w/Device kit      • buPROPion XL (WELLBUTRIN XL) 150 MG 24 hr tablet TAKE 1 TABLET TWICE DAILY 180 tablet 0   • cholecalciferol (VITAMIN D3) 25 MCG (1000 UT) tablet Take 2 tablets by mouth Daily.     • dicyclomine (BENTYL) 20 MG tablet Take 1 tablet by mouth Every 6 (Six) Hours. 20 tablet 0   • empagliflozin (JARDIANCE) 25 MG tablet tablet Take 1 tablet by mouth Daily.     • escitalopram (Lexapro) 10 MG tablet Take 1 tablet by mouth Daily. 90 tablet 1   • Evolocumab (Repatha) solution prefilled  syringe injection Inject 1 mL under the skin into the appropriate area as directed Every 14 (Fourteen) Days. 6 each 4   • famotidine (PEPCID) 40 MG tablet Take 1 tablet by mouth At Night As Needed for Heartburn. 90 tablet 2   • Fluticasone-Umeclidin-Vilant (Trelegy Ellipta) 100-62.5-25 MCG/INH inhaler Inhale 1 puff Daily. 2 each 0   • glucose blood test strip Check blood glucose twice daily 60 each 12   • glucose monitor monitoring kit 1 each Daily. Patient prefers Accu Chek Meter. 1 each 0   • hydrOXYzine (ATARAX) 25 MG tablet Take 1 tablet by mouth 2 (Two) Times a Day As Needed.     • icosapent ethyl (VASCEPA) 1 g capsule capsule Take 2 g by mouth 2 (Two) Times a Day With Meals. 360 capsule 1   • Lancets (accu-chek soft touch) lancets Check blood sugar twice daily 100 each 12   • loperamide (IMODIUM) 2 MG capsule Take 1 capsule by mouth 4 (Four) Times a Day As Needed for Diarrhea. 20 capsule 0   • methylPREDNISolone (MEDROL) 4 MG dose pack Take as directed on package instructions. 21 each 0   • metoprolol tartrate (LOPRESSOR) 50 MG tablet Take 1.5 tablets by mouth 2 (Two) Times a Day. TAKE 1 1/2 TABLETS  tablet 1   • multivitamin with minerals tablet tablet Take 1 tablet by mouth Daily.     • NovoLOG FlexPen 100 UNIT/ML solution pen-injector sc pen Inject 60 Units under the skin into the appropriate area as directed 2 (Two) Times a Day.     • olmesartan (BENICAR) 20 MG tablet Take 1 tablet by mouth Daily. 90 tablet 1   • ondansetron ODT (ZOFRAN-ODT) 4 MG disintegrating tablet Place 1 tablet on the tongue Every 8 (Eight) Hours As Needed for Nausea or Vomiting. 15 tablet 0   • Ozempic, 0.25 or 0.5 MG/DOSE, 2 MG/1.5ML solution pen-injector      • pantoprazole (PROTONIX) 40 MG EC tablet Take 1 tablet by mouth Daily. 90 tablet 2   • polyethylene glycol (MIRALAX) 17 g packet Take 17 g by mouth 2 (Two) Times a Day. 100 each 2   • riFAXIMin (Xifaxan) 550 MG tablet Take 1 tablet by mouth Every 12 (Twelve) Hours. 180  tablet 1   • vitamin C (ASCORBIC ACID) 250 MG tablet Take 1 tablet by mouth Daily.     • Insulin Glargine, 2 Unit Dial, (Toujeo Max SoloStar) 300 UNIT/ML solution pen-injector injection Inject 120 Units under the skin into the appropriate area as directed Daily.       No current facility-administered medications on file prior to visit.       Allergies   Allergen Reactions   • Liraglutide Nausea And Vomiting   • Metformin Nausea Only     GI Upset     Past Medical History:   Diagnosis Date   • Atrial fibrillation    • Colindres's esophagus    • Diabetes mellitus    • Esophageal varices    • Gastroparesis    • GERD (gastroesophageal reflux disease) 1995   • Hypertension    • Iron deficiency anemia 5/2/2023   • Irritable bowel syndrome    • BERKOWITZ (nonalcoholic steatohepatitis)    • Obstructive sleep apnea syndrome    • S/P exploratory laparotomy, lysis of adhesions, resection of necrotic colon without anastomosis 04/18/2022     Past Surgical History:   Procedure Laterality Date   • BLADDER SLING MODIFIED, ANTERIOR AND POSTERIOR VAGINAL REPAIR     • BREAST LUMPECTOMY     • CARDIAC CATHETERIZATION     • CHOLECYSTECTOMY     • COLON RESECTION     • ENDOSCOPY N/A 06/03/2022    Procedure: ESOPHAGOGASTRODUODENOSCOPY;  Surgeon: Shannan Charles MD;  Location: [facility] ENDOSCOPY;  Service: Gastroenterology;  Laterality: N/A;  RETAINED FOOD IN STOMACH  ESOPHAGEAL VARICIES   • EXPLORATORY LAPAROTOMY N/A 04/16/2022    Procedure: EXPLORATORY LAPAROTOMY, LYSIS OF ADHESIONS, RESECTION OF NECTROIC COLON;  Surgeon: Cande Stanley MD;  Location: [facility] MAIN OR;  Service: General;  Laterality: N/A;   • GALLBLADDER SURGERY     • TUBAL ABDOMINAL LIGATION       Social History     Socioeconomic History   • Marital status:      Spouse name: [facility]   Tobacco Use   • Smoking status: Every Day     Packs/day: 0.25     Years: 41.00     Pack years: 10.25     Types: Cigarettes     Start date: 6/17/1975   • Smokeless tobacco: Never    Vaping Use   • Vaping Use: Never used   Substance and Sexual Activity   • Alcohol use: Yes     Alcohol/week: 1.0 standard drink     Types: 1 Drinks containing 0.5 oz of alcohol per week     Comment: 1 a month   • Drug use: Never   • Sexual activity: Yes     Partners: Male     Birth control/protection: Post-menopausal, Tubal ligation     Family History   Adopted: Yes   Family history unknown: Yes     Immunization History   Administered Date(s) Administered   • COVID-19 (PFIZER) Purple Cap Monovalent 02/06/2021, 03/06/2021, 12/04/2021   • Flu Vaccine Intradermal Quad 18-64YR 09/12/2021, 09/12/2021   • FluLaval/Fluzone >6mos 10/29/2019, 09/27/2020, 10/03/2022   • Influenza Split Preservative Free ID 09/12/2021   • Influenza, Unspecified 11/12/2018, 09/27/2020   • Pneumococcal Conjugate 13-Valent (PCV13) 01/09/2017   • Pneumococcal Polysaccharide (PPSV23) 10/12/2020   • flucelvax quad pfs =>4 YRS 09/27/2020       Tobacco Use: High Risk   • Smoking Tobacco Use: Every Day   • Smokeless Tobacco Use: Never   • Passive Exposure: Not on file       Objective     Physical Exam  Vitals and nursing note reviewed.   Constitutional:       Appearance: Normal appearance.   HENT:      Head: Normocephalic.      Nose: Nose normal.      Mouth/Throat:      Mouth: Mucous membranes are moist.   Eyes:      Pupils: Pupils are equal, round, and reactive to light.   Cardiovascular:      Rate and Rhythm: Normal rate and regular rhythm.      Pulses: Normal pulses.      Heart sounds: Normal heart sounds.   Pulmonary:      Effort: Pulmonary effort is normal.      Breath sounds: Normal breath sounds.   Abdominal:      General: Bowel sounds are normal. There is no distension.      Palpations: Abdomen is soft.      Tenderness: There is no abdominal tenderness.   Musculoskeletal:         General: Normal range of motion.      Cervical back: Normal range of motion and neck supple.   Skin:     General: Skin is warm and dry.      Capillary Refill:  Capillary refill takes less than 2 seconds.   Neurological:      General: No focal deficit present.      Mental Status: She is alert and oriented to person, place, and time.   Psychiatric:         Mood and Affect: Mood normal.         Behavior: Behavior normal.         Thought Content: Thought content normal.         Judgment: Judgment normal.         Vitals:    05/24/23 1304   BP: 130/70   Pulse: 84   Resp: 18   Temp: 97.7 °F (36.5 °C)   TempSrc: Temporal   SpO2: 99%   Weight: 102 kg (224 lb 10.4 oz)   PainSc: 0-No pain       Wt Readings from Last 3 Encounters:   05/24/23 102 kg (224 lb 10.4 oz)   05/02/23 103 kg (226 lb 3.2 oz)   04/21/23 103 kg (227 lb)      ECOG score: 0         ECOG: (0) Fully Active - Able to Carry On All Pre-disease Performance Without Restriction  Fall Risk Assessment was completed, and patient is at low risk for falls.  PHQ-9 Total Score:         The patient is  experiencing fatigue. Fatigue score: 0    PT/OT Functional Screening: PT fx screen: No needs identified  Speech Functional Screening: Speech fx screen: No needs identified  Rehab to be ordered: Rehab to be ordered: No needs identified        Result Review :  The following data was reviewed by: CHU Pedraza on 05/24/2023:  Lab Results   Component Value Date    HGB 11.4 (L) 05/04/2023    HCT 34.4 05/04/2023    MCV 77.1 (L) 05/04/2023     05/04/2023    WBC 9.03 05/04/2023    NEUTROABS 3.70 05/04/2023    LYMPHSABS 4.42 (H) 05/04/2023    MONOSABS 0.59 05/04/2023    EOSABS 0.20 05/04/2023    BASOSABS 0.09 05/04/2023     Lab Results   Component Value Date    GLUCOSE 165 (H) 05/04/2023    BUN 8 05/04/2023    CREATININE 0.86 05/04/2023     05/04/2023    K 3.6 05/04/2023     05/04/2023    CO2 23.7 05/04/2023    CALCIUM 10.0 05/04/2023    PROTEINTOT 8.3 05/04/2023    ALBUMIN 3.9 05/04/2023    BILITOT 0.4 05/04/2023    ALKPHOS 135 (H) 05/04/2023    AST 80 (H) 05/04/2023    ALT 62 (H) 05/04/2023     Lab Results    Component Value Date    FERRITIN 48.20 05/04/2023    [id number] 946 03/23/2022    FOLATE >20.00 03/23/2022     Lab Results   Component Value Date    IRON 52 05/04/2023    LABIRON 11 (L) 05/04/2023    TRANSFERRIN 331 05/04/2023    TIBC 493 05/04/2023     Lab Results   Component Value Date    FERRITIN 48.20 05/04/2023    [id number] 946 03/23/2022    FOLATE >20.00 03/23/2022     Lab Results   Component Value Date    AFP 2.15 01/20/2023       US Non-ob Transvaginal    Result Date: 5/11/2023    1. Irregular heterogeneous mass within the endometrial canal.  Malignancy should be excluded.  Recommend consultation with GYN for further management, biopsy 2. Questionable irregularity of the cervix versus artifact.  Correlation with Pap smear results and direct visualization recommended     CAROLE SRINIVASAN MD       Electronically Signed and Approved By: CAROLE SRINIVASAN MD on 5/11/2023 at 7:08             CT Chest Low Dose Cancer Screening WO    Result Date: 1/27/2023    1. Interstitial changes more peripherally within the lungs that while nonspecific could be reflective of more chronic interstitial lung disease. 2. Small nodular area left lower lobe only seen on 1 sequence that may be reflective of some scarring. 3. Lung rads category 2.  Annual low-dose screening CT suggested.      GERALDO WIGGINS MD       Electronically Signed and Approved By: GERALDO WIGGINS MD on 1/27/2023 at 14:18                    Assessment and Plan:  Diagnoses and all orders for this visit:    1. Anemia, unspecified type (Primary)  -     CBC & Differential; Future  -     Comprehensive Metabolic Panel; Future  -     Vitamin B12; Future  -     Folate; Future  -     Iron Profile; Future  -     Ferritin; Future  -     Peripheral Blood Smear; Future    2. Endometrial thickening on ultrasound    mild microcytic anemia since around mid April with hemoglobin ranging from 10.8 to 11.4. Iron studies are actually improved with iron sat 7% and now 11%. Iron level  improved from 32 to 52. She will continue eating iron rich foods which she is trying to do now. We did discuss taking a MVI with iron in it to see if she can tolerate this. We will recheck CBC, peripheral smear, iron panel, ferritin, CMP, folate and b-12 in about 6 weeks and then follow up with MD 1 week after labs completed.     Denies any vaginal or GI blood loss.     Underlying mild anemia may be secondary to underlying GI chronic disease.     Continue close follow up with GYN for the endometrial thickening. Has follow up with GYN in later June.         I spent 30 minutes caring for Anika on this date of service. This time includes time spent by me in the following activities:preparing for the visit, reviewing tests, obtaining and/or reviewing a separately obtained history, performing a medically appropriate examination and/or evaluation , counseling and educating the patient/family/caregiver, ordering medications, tests, or procedures, referring and communicating with other health care professionals , documenting information in the medical record and independently interpreting results and communicating that information with the patient/family/caregiver    Patient Follow Up:  6 weeks with MD 1 week after labs.     Patient was given instructions and counseling regarding her condition or for health maintenance advice. Please see specific information pulled into the AVS if appropriate.     Maday Queen, APRN    5/24/2023    .tob

## 2023-06-05 DIAGNOSIS — I10 ESSENTIAL HYPERTENSION: ICD-10-CM

## 2023-06-07 RX ORDER — METOPROLOL TARTRATE 50 MG/1
75 TABLET, FILM COATED ORAL 2 TIMES DAILY
Qty: 270 TABLET | Refills: 1 | Status: SHIPPED | OUTPATIENT
Start: 2023-06-07

## 2023-06-07 NOTE — PROGRESS NOTES
GYN Visit    Chief Complaint   Patient presents with   • Endometrial Mass       HPI:   62 y.o. s/p menopause here for pelvic mass and ?abnormality in cervix on ultrasound.  Pt states had sono due to hx of gastrophoresis.  Pt denies bleeding.  S/P menopause since age 54-53. +tobacco use.  Takes eliquis for a fib.  Last pap 2019 in [place] nml . + hx abn pap many years ago had cryo with neg f/u.      History: PMHx, Meds, Allergies, PSHx, Social Hx, and POBHx all reviewed and updated.    PHYSICAL EXAM:  /78   Pulse 81   Wt 103 kg (228 lb)   Breastfeeding No   BMI 37.94 kg/m²   General- NAD, alert and oriented, appropriate  Psych- Normal mood, good memory        External genitalia- Normal female, no lesions  Urethra/meatus- Normal, no masses, non tender, no prolapse  Bladder- Normal, no masses, non tender, no prolapse  Vagina- Normal, no atrophy, no lesions, no discharge, no prolapse  Cvx- Normal, no lesions, no discharge, No cervical motion tenderness  Uterus- Normal size, shape & consistency.  Non tender, mobile, & no prolapse  Adnexa- No mass, non tender  Anus/Rectum/Perineum- Not performed    Lymphatic- No palpable neck, axillary, or groin nodes  Ext- No edema, no cyanosis    Skin- No lesions, no rashes, no acanthosis nigricans        US Non-ob Transvaginal (05/10/2023 16:51)    ASSESSMENT AND PLAN:  Diagnoses and all orders for this visit:    1. Endometrial mass (Primary)  -     IgP, Aptima HPV   Will f/u with patient after results of pap obtained and decide if needs hysteroscopy d&c            Follow Up:  Return in about 4 weeks (around 2023).          Ashleigh Harding DO  2023    [facility]  [facility] [place] [address]  Dept: [phone]  Dept Fax: [phone]  Loc: [phone]  Loc Fax: [phone]

## 2023-06-08 ENCOUNTER — OFFICE VISIT (OUTPATIENT)
Dept: OBSTETRICS AND GYNECOLOGY | Facility: CLINIC | Age: 63
End: 2023-06-08
Payer: COMMERCIAL

## 2023-06-08 VITALS
HEART RATE: 81 BPM | DIASTOLIC BLOOD PRESSURE: 78 MMHG | SYSTOLIC BLOOD PRESSURE: 148 MMHG | BODY MASS INDEX: 37.94 KG/M2 | WEIGHT: 228 LBS

## 2023-06-08 DIAGNOSIS — N94.89 ENDOMETRIAL MASS: Primary | ICD-10-CM

## 2023-06-08 PROCEDURE — G0123 SCREEN CERV/VAG THIN LAYER: HCPCS | Performed by: OBSTETRICS & GYNECOLOGY

## 2023-06-08 PROCEDURE — 99204 OFFICE O/P NEW MOD 45 MIN: CPT | Performed by: OBSTETRICS & GYNECOLOGY

## 2023-06-08 PROCEDURE — 87624 HPV HI-RISK TYP POOLED RSLT: CPT | Performed by: OBSTETRICS & GYNECOLOGY

## 2023-06-08 RX ORDER — AMLODIPINE BESYLATE 10 MG/1
10 TABLET ORAL DAILY
Qty: 90 TABLET | Refills: 3 | Status: SHIPPED | OUTPATIENT
Start: 2023-06-08

## 2023-06-13 LAB
CYTOLOGIST CVX/VAG CYTO: NORMAL
CYTOLOGY CVX/VAG DOC CYTO: NORMAL
CYTOLOGY CVX/VAG DOC THIN PREP: NORMAL
DX ICD CODE: NORMAL
HIV 1 & 2 AB SER-IMP: NORMAL
HPV I/H RISK 4 DNA CVX QL PROBE+SIG AMP: NEGATIVE
OTHER STN SPEC: NORMAL
STAT OF ADQ CVX/VAG CYTO-IMP: NORMAL

## 2023-06-19 ENCOUNTER — TELEPHONE (OUTPATIENT)
Dept: CARDIOLOGY | Facility: CLINIC | Age: 63
End: 2023-06-19
Payer: COMMERCIAL

## 2023-06-19 NOTE — TELEPHONE ENCOUNTER
[employer] APPROVED    PA Case: [id number], Status: Approved, Coverage Starts on: 6/19/2023 12:00:00 AM, Coverage Ends on: 6/18/2024 12:00:00 AM.

## 2023-06-19 NOTE — TELEPHONE ENCOUNTER
[facility] received a fax that requires your attention. The document has been indexed to the patient’s chart for your review.      Reason for sending: COVERAGE DETERMINATION REQUEST STATUS    Documents Description: EXT MED RECS_[employer] [id number]    Name of Sender: [employer]     Date Indexed: 6-19-23

## 2023-07-07 PROBLEM — N85.00 ENDOMETRIAL HYPERPLASIA: Status: ACTIVE | Noted: 2023-07-07

## 2023-07-12 PROBLEM — K90.9 IRON MALABSORPTION: Status: ACTIVE | Noted: 2023-07-12

## 2023-07-18 RX ORDER — SODIUM CHLORIDE 9 MG/ML
250 INJECTION, SOLUTION INTRAVENOUS ONCE
Status: CANCELLED | OUTPATIENT
Start: 2023-07-25

## 2023-07-18 RX ORDER — SODIUM CHLORIDE 9 MG/ML
250 INJECTION, SOLUTION INTRAVENOUS ONCE
Status: CANCELLED | OUTPATIENT
Start: 2023-07-27

## 2023-07-18 RX ORDER — SODIUM CHLORIDE 9 MG/ML
250 INJECTION, SOLUTION INTRAVENOUS ONCE
Status: CANCELLED | OUTPATIENT
Start: 2023-07-29

## 2023-07-18 RX ORDER — SODIUM CHLORIDE 9 MG/ML
250 INJECTION, SOLUTION INTRAVENOUS ONCE
Status: CANCELLED | OUTPATIENT
Start: 2023-07-28

## 2023-07-21 RX ORDER — ACETAMINOPHEN 160 MG
6000 TABLET,DISINTEGRATING ORAL DAILY
COMMUNITY

## 2023-07-21 RX ORDER — INSULIN GLARGINE 300 U/ML
122 INJECTION, SOLUTION SUBCUTANEOUS EVERY MORNING
COMMUNITY

## 2023-07-21 NOTE — PRE-PROCEDURE INSTRUCTIONS
PRE-OP INSTRUCTIONS REVIEWED WITH PATIENT: FASTING/BATHING/ARRIVAL PROCEDURES.  INSTRUCTED TO TAKE A.M. DAY OF SURGERY: PROTONIX, AMLODIPINE, LEXAPRO, TRELEGY INHALER,  VASCEPA, TOUJEO INSULIN AS DIRECTED PER ANESTHESIA PROTOCOL, METOPROLOL. ALBUTEROL INHALER AS NEEDED  UNDERSTANDING VERBALIZED.

## 2023-07-24 ENCOUNTER — HOSPITAL ENCOUNTER (OUTPATIENT)
Dept: INFUSION THERAPY | Facility: HOSPITAL | Age: 63
Discharge: HOME | End: 2023-07-24
Admitting: INTERNAL MEDICINE
Payer: COMMERCIAL

## 2023-07-24 VITALS
SYSTOLIC BLOOD PRESSURE: 145 MMHG | TEMPERATURE: 97.6 F | OXYGEN SATURATION: 99 % | RESPIRATION RATE: 20 BRPM | HEART RATE: 81 BPM | HEIGHT: 65 IN | WEIGHT: 225.09 LBS | BODY MASS INDEX: 37.5 KG/M2 | DIASTOLIC BLOOD PRESSURE: 64 MMHG

## 2023-07-24 DIAGNOSIS — K31.84 DIABETIC GASTROPARESIS: ICD-10-CM

## 2023-07-24 DIAGNOSIS — D50.9 IRON DEFICIENCY ANEMIA, UNSPECIFIED IRON DEFICIENCY ANEMIA TYPE: ICD-10-CM

## 2023-07-24 DIAGNOSIS — K90.9 IRON MALABSORPTION: Primary | ICD-10-CM

## 2023-07-24 DIAGNOSIS — E11.43 DIABETIC GASTROPARESIS: ICD-10-CM

## 2023-07-24 PROCEDURE — 96365 THER/PROPH/DIAG IV INF INIT: CPT

## 2023-07-24 PROCEDURE — S0260 H&P FOR SURGERY: HCPCS | Performed by: OBSTETRICS & GYNECOLOGY

## 2023-07-24 PROCEDURE — 96374 THER/PROPH/DIAG INJ IV PUSH: CPT

## 2023-07-24 PROCEDURE — 25010000002 IRON SUCROSE PER 1 MG: Performed by: INTERNAL MEDICINE

## 2023-07-24 RX ORDER — SODIUM CHLORIDE 9 MG/ML
250 INJECTION, SOLUTION INTRAVENOUS ONCE
Status: DISCONTINUED | OUTPATIENT
Start: 2023-07-24 | End: 2023-07-27 | Stop reason: HOSPADM

## 2023-07-24 RX ADMIN — IRON SUCROSE 200 MG: 20 INJECTION, SOLUTION INTRAVENOUS at 16:23

## 2023-07-24 NOTE — H&P
[facility]   HISTORY AND PHYSICAL    Patient Name:Anika Casillas  : 1960  MRN: [id number]  Primary Care Physician: Elena Henderson PA  Date of admission: (Not on file)    Subjective   Subjective     Chief Complaint: here for surgery    History of Present Illness   Anika Casillas is a 63 y.o. female  s/p menopause here for hysteroscopy D&C due to pelvic mass on ultrasound. Pt states had sono due to hx of gastrophoresis. Pt denies bleeding. S/P menopause since age [age]. +tobacco use. Takes eliquis for a fib. Last pap 2023 negative.    Review of Systems   Constitutional: Negative.    HENT: Negative.     Eyes: Negative.    Respiratory: Negative.     Cardiovascular: Negative.    Gastrointestinal: Negative.    Endocrine: Negative.    Genitourinary: Negative.    Musculoskeletal: Negative.    Skin: Negative.    Allergic/Immunologic: Negative.    Neurological: Negative.    Hematological: Negative.    Psychiatric/Behavioral: Negative.         Personal History     Past Medical History:   Diagnosis Date    Abnormal Pap smear of cervix     Atrial fibrillation     ON ELIQUIS/FOLLOWS SERGO    Colindres's esophagus     Depression     Diabetes mellitus     Emphysema lung     INHALER    Endometrial hyperplasia     D&C SCHEDULED 23    Esophageal varices     Gastroparesis     TAKES XIFAXAN    GERD (gastroesophageal reflux disease)     Hyperlipidemia     Hypertension     Iron deficiency anemia 2023    IRON INFUSION LAST 23    Irritable bowel syndrome     W/CONSTIPATION    BERKOWITZ (nonalcoholic steatohepatitis)     NO S/S CURRENTLY, ELEVATED ENZYMES    Obstructive sleep apnea syndrome     S/P exploratory laparotomy, lysis of adhesions, resection of necrotic colon without anastomosis 2022       Past Surgical History:   Procedure Laterality Date    BLADDER SLING MODIFIED, ANTERIOR AND POSTERIOR VAGINAL REPAIR      BREAST LUMPECTOMY Left     BENIGN    CARDIAC CATHETERIZATION      NO  INTERVENTION    COLON RESECTION      PERFORATED COLON, EEA    ENDOSCOPY N/A 06/03/2022    Procedure: ESOPHAGOGASTRODUODENOSCOPY;  Surgeon: Shannan Charles MD;  Location: [facility] ENDOSCOPY;  Service: Gastroenterology;  Laterality: N/A;  RETAINED FOOD IN STOMACH  ESOPHAGEAL VARICIES    EXPLORATORY LAPAROTOMY N/A 04/16/2022    Procedure: EXPLORATORY LAPAROTOMY, LYSIS OF ADHESIONS, RESECTION OF NECTROIC COLON;  Surgeon: Cande Stanley MD;  Location: [facility] MAIN OR;  Service: General;  Laterality: N/A;    GALLBLADDER SURGERY      LAPAROSCOPIC    TUBAL ABDOMINAL LIGATION         Family History: Her family history is not on file. She was adopted.     Social History: She  reports that she has been smoking cigarettes. She started smoking about 48 years ago. She has a 10.25 pack-year smoking history. She has never used smokeless tobacco. She reports current alcohol use of about 1.0 standard drink per week. She reports that she does not use drugs.    Home Medications:  Accu-Chek Guide, Dexcom G6 Sensor, Dexcom G6 Transmitter, Evolocumab, Fluticasone-Umeclidin-Vilant, Insulin Glargine (2 Unit Dial), Insulin Pen Needle, Semaglutide(0.25 or 0.5MG/DOS), Vitamin D3, accu-chek soft touch, albuterol sulfate HFA, amLODIPine, apixaban, buPROPion XL, empagliflozin, escitalopram, famotidine, glucose blood, glucose monitor, icosapent ethyl, insulin aspart, metoprolol tartrate, multivitamin with minerals, olmesartan, pantoprazole, polyethylene glycol, and riFAXIMin    Allergies:  She is allergic to liraglutide and metformin.    Objective    Objective     Vitals:         Physical Exam  Vitals and nursing note reviewed. Exam conducted with a chaperone present.   Constitutional:       Appearance: Normal appearance.   HENT:      Head: Normocephalic.   Cardiovascular:      Rate and Rhythm: Normal rate and regular rhythm.      Pulses: Normal pulses.      Heart sounds: Normal heart sounds.   Pulmonary:      Effort: Pulmonary effort  is normal.      Breath sounds: Normal breath sounds.   Abdominal:      General: Bowel sounds are normal.      Palpations: Abdomen is soft.   Genitourinary:     Comments: Nml female external genitalia.  Cervix is parous. Uterus axial normal size shape and consistency.  No adnexal masses are appreciated    Musculoskeletal:         General: Normal range of motion.      Cervical back: Normal range of motion.   Skin:     General: Skin is warm and dry.   Neurological:      General: No focal deficit present.      Mental Status: She is alert and oriented to person, place, and time.   Psychiatric:         Mood and Affect: Mood normal.         Behavior: Behavior normal.        Result Review    Result Review:  I have personally reviewed the results from the time of this admission to 7/24/2023 15:17 EDT and agree with these findings:  []  Laboratory list / accordion  []  Microbiology  []  Radiology  []  EKG/Telemetry   []  Cardiology/Vascular   []  Pathology  []  Old records  []  Other:  Most notable findings include:       Assessment & Plan   Assessment / Plan     Brief Patient Summary:  Anika Casillas is a 63 y.o. female here for Hysteroscopy D&C    Active Hospital Problems:  Active Hospital Problems    Diagnosis     **Endometrial hyperplasia      Plan:   Hysteroscopy D&C  The patient was counseled to the risks and benefits of this procedure to include infection, bleeding, damage to internal organs, bowels, bladder, blood vessels, other internal organs requiring additional surgery to repair or remove any damaged structures.   The patient was given the opportunity to ask questions and her questions were answered to her satisfaction. The patient has obtained surgical clearance prior to the procedure in regards to her eliquis medication.     DVT prophylaxis:  No DVT prophylaxis order currently exists.    Ashleigh Harding DO

## 2023-07-25 ENCOUNTER — ANESTHESIA (OUTPATIENT)
Dept: PERIOP | Facility: HOSPITAL | Age: 63
End: 2023-07-25
Payer: COMMERCIAL

## 2023-07-25 ENCOUNTER — ANESTHESIA EVENT (OUTPATIENT)
Dept: PERIOP | Facility: HOSPITAL | Age: 63
End: 2023-07-25
Payer: COMMERCIAL

## 2023-07-25 ENCOUNTER — HOSPITAL ENCOUNTER (OUTPATIENT)
Facility: HOSPITAL | Age: 63
Setting detail: HOSPITAL OUTPATIENT SURGERY
Discharge: HOME | End: 2023-07-25
Attending: OBSTETRICS & GYNECOLOGY | Admitting: OBSTETRICS & GYNECOLOGY
Payer: COMMERCIAL

## 2023-07-25 VITALS
DIASTOLIC BLOOD PRESSURE: 62 MMHG | TEMPERATURE: 97.9 F | RESPIRATION RATE: 16 BRPM | BODY MASS INDEX: 37.1 KG/M2 | HEART RATE: 78 BPM | OXYGEN SATURATION: 94 % | HEIGHT: 65 IN | WEIGHT: 222.66 LBS | SYSTOLIC BLOOD PRESSURE: 117 MMHG

## 2023-07-25 DIAGNOSIS — N84.0 ENDOMETRIAL POLYP: Primary | ICD-10-CM

## 2023-07-25 DIAGNOSIS — N85.00 ENDOMETRIAL HYPERPLASIA: ICD-10-CM

## 2023-07-25 LAB
ANION GAP SERPL CALCULATED.3IONS-SCNC: 10.6 MMOL/L (ref 5–15)
ANISOCYTOSIS BLD QL: NORMAL
BASOPHILS # BLD AUTO: 0.07 10*3/MM3 (ref 0–0.2)
BASOPHILS NFR BLD AUTO: 1 % (ref 0–1.5)
BUN SERPL-MCNC: 10 MG/DL (ref 8–23)
BUN/CREAT SERPL: 11 (ref 7–25)
CALCIUM SPEC-SCNC: 10 MG/DL (ref 8.6–10.5)
CHLORIDE SERPL-SCNC: 105 MMOL/L (ref 98–107)
CO2 SERPL-SCNC: 22.4 MMOL/L (ref 22–29)
CREAT SERPL-MCNC: 0.91 MG/DL (ref 0.57–1)
DEPRECATED RDW RBC AUTO: 48.7 FL (ref 37–54)
EGFRCR SERPLBLD CKD-EPI 2021: 71.9 ML/MIN/1.73
EOSINOPHIL # BLD AUTO: 0.1 10*3/MM3 (ref 0–0.4)
EOSINOPHIL NFR BLD AUTO: 1.4 % (ref 0.3–6.2)
ERYTHROCYTE [DISTWIDTH] IN BLOOD BY AUTOMATED COUNT: 19.4 % (ref 12.3–15.4)
GLUCOSE BLDC GLUCOMTR-MCNC: 106 MG/DL (ref 70–99)
GLUCOSE BLDC GLUCOMTR-MCNC: 130 MG/DL (ref 70–99)
GLUCOSE SERPL-MCNC: 149 MG/DL (ref 65–99)
HCT VFR BLD AUTO: 33.3 % (ref 34–46.6)
HGB BLD-MCNC: 11.2 G/DL (ref 12–15.9)
HYPOCHROMIA BLD QL: NORMAL
IMM GRANULOCYTES # BLD AUTO: 0.01 10*3/MM3 (ref 0–0.05)
IMM GRANULOCYTES NFR BLD AUTO: 0.1 % (ref 0–0.5)
LYMPHOCYTES # BLD AUTO: 2.72 10*3/MM3 (ref 0.7–3.1)
LYMPHOCYTES NFR BLD AUTO: 37.3 % (ref 19.6–45.3)
MCH RBC QN AUTO: 23.9 PG (ref 26.6–33)
MCHC RBC AUTO-ENTMCNC: 33.6 G/DL (ref 31.5–35.7)
MCV RBC AUTO: 71 FL (ref 79–97)
MICROCYTES BLD QL: NORMAL
MONOCYTES # BLD AUTO: 0.45 10*3/MM3 (ref 0.1–0.9)
MONOCYTES NFR BLD AUTO: 6.2 % (ref 5–12)
NEUTROPHILS NFR BLD AUTO: 3.94 10*3/MM3 (ref 1.7–7)
NEUTROPHILS NFR BLD AUTO: 54 % (ref 42.7–76)
NRBC BLD AUTO-RTO: 0 /100 WBC (ref 0–0.2)
PLATELET # BLD AUTO: 336 10*3/MM3 (ref 140–450)
PMV BLD AUTO: 9.6 FL (ref 6–12)
POTASSIUM SERPL-SCNC: 3.9 MMOL/L (ref 3.5–5.2)
RBC # BLD AUTO: 4.69 10*6/MM3 (ref 3.77–5.28)
SMALL PLATELETS BLD QL SMEAR: ADEQUATE
SODIUM SERPL-SCNC: 138 MMOL/L (ref 136–145)
TARGETS BLD QL SMEAR: NORMAL
WBC MORPH BLD: NORMAL
WBC NRBC COR # BLD: 7.29 10*3/MM3 (ref 3.4–10.8)

## 2023-07-25 PROCEDURE — C1782 MORCELLATOR: HCPCS | Performed by: OBSTETRICS & GYNECOLOGY

## 2023-07-25 PROCEDURE — 25010000002 ONDANSETRON PER 1 MG: Performed by: NURSE ANESTHETIST, CERTIFIED REGISTERED

## 2023-07-25 PROCEDURE — 85025 COMPLETE CBC W/AUTO DIFF WBC: CPT | Performed by: OBSTETRICS & GYNECOLOGY

## 2023-07-25 PROCEDURE — 25010000002 DEXAMETHASONE PER 1 MG: Performed by: NURSE ANESTHETIST, CERTIFIED REGISTERED

## 2023-07-25 PROCEDURE — 85007 BL SMEAR W/DIFF WBC COUNT: CPT | Performed by: OBSTETRICS & GYNECOLOGY

## 2023-07-25 PROCEDURE — 88305 TISSUE EXAM BY PATHOLOGIST: CPT | Performed by: OBSTETRICS & GYNECOLOGY

## 2023-07-25 PROCEDURE — 25010000002 FENTANYL CITRATE (PF) 50 MCG/ML SOLUTION: Performed by: NURSE ANESTHETIST, CERTIFIED REGISTERED

## 2023-07-25 PROCEDURE — 25010000002 PROPOFOL 10 MG/ML EMULSION: Performed by: NURSE ANESTHETIST, CERTIFIED REGISTERED

## 2023-07-25 PROCEDURE — 25010000002 MIDAZOLAM PER 1MG: Performed by: ANESTHESIOLOGY

## 2023-07-25 PROCEDURE — 82948 REAGENT STRIP/BLOOD GLUCOSE: CPT

## 2023-07-25 PROCEDURE — 80048 BASIC METABOLIC PNL TOTAL CA: CPT | Performed by: OBSTETRICS & GYNECOLOGY

## 2023-07-25 PROCEDURE — 58558 HYSTEROSCOPY BIOPSY: CPT | Performed by: OBSTETRICS & GYNECOLOGY

## 2023-07-25 PROCEDURE — 25010000002 SUGAMMADEX 200 MG/2ML SOLUTION: Performed by: NURSE ANESTHETIST, CERTIFIED REGISTERED

## 2023-07-25 RX ORDER — LIDOCAINE HYDROCHLORIDE 20 MG/ML
INJECTION, SOLUTION EPIDURAL; INFILTRATION; INTRACAUDAL; PERINEURAL AS NEEDED
Status: DISCONTINUED | OUTPATIENT
Start: 2023-07-25 | End: 2023-07-25 | Stop reason: SURG

## 2023-07-25 RX ORDER — ROCURONIUM BROMIDE 10 MG/ML
INJECTION, SOLUTION INTRAVENOUS AS NEEDED
Status: DISCONTINUED | OUTPATIENT
Start: 2023-07-25 | End: 2023-07-25 | Stop reason: SURG

## 2023-07-25 RX ORDER — MAGNESIUM HYDROXIDE 1200 MG/15ML
LIQUID ORAL AS NEEDED
Status: DISCONTINUED | OUTPATIENT
Start: 2023-07-25 | End: 2023-07-25 | Stop reason: HOSPADM

## 2023-07-25 RX ORDER — ONDANSETRON 4 MG/1
4 TABLET, FILM COATED ORAL ONCE AS NEEDED
Status: DISCONTINUED | OUTPATIENT
Start: 2023-07-25 | End: 2023-07-25 | Stop reason: HOSPADM

## 2023-07-25 RX ORDER — ONDANSETRON 2 MG/ML
INJECTION INTRAMUSCULAR; INTRAVENOUS AS NEEDED
Status: DISCONTINUED | OUTPATIENT
Start: 2023-07-25 | End: 2023-07-25 | Stop reason: SURG

## 2023-07-25 RX ORDER — SODIUM CHLORIDE, SODIUM LACTATE, POTASSIUM CHLORIDE, CALCIUM CHLORIDE 600; 310; 30; 20 MG/100ML; MG/100ML; MG/100ML; MG/100ML
9 INJECTION, SOLUTION INTRAVENOUS CONTINUOUS PRN
Status: DISCONTINUED | OUTPATIENT
Start: 2023-07-25 | End: 2023-07-25 | Stop reason: HOSPADM

## 2023-07-25 RX ORDER — PHENYLEPHRINE HCL IN 0.9% NACL 1 MG/10 ML
SYRINGE (ML) INTRAVENOUS AS NEEDED
Status: DISCONTINUED | OUTPATIENT
Start: 2023-07-25 | End: 2023-07-25 | Stop reason: SURG

## 2023-07-25 RX ORDER — PROPOFOL 10 MG/ML
VIAL (ML) INTRAVENOUS AS NEEDED
Status: DISCONTINUED | OUTPATIENT
Start: 2023-07-25 | End: 2023-07-25 | Stop reason: SURG

## 2023-07-25 RX ORDER — HYDROCODONE BITARTRATE AND ACETAMINOPHEN 5; 325 MG/1; MG/1
1 TABLET ORAL EVERY 6 HOURS PRN
Qty: 6 TABLET | Refills: 0 | Status: SHIPPED | OUTPATIENT
Start: 2023-07-25

## 2023-07-25 RX ORDER — DEXAMETHASONE SODIUM PHOSPHATE 4 MG/ML
INJECTION, SOLUTION INTRA-ARTICULAR; INTRALESIONAL; INTRAMUSCULAR; INTRAVENOUS; SOFT TISSUE AS NEEDED
Status: DISCONTINUED | OUTPATIENT
Start: 2023-07-25 | End: 2023-07-25 | Stop reason: SURG

## 2023-07-25 RX ORDER — ACETAMINOPHEN 325 MG/1
650 TABLET ORAL EVERY 4 HOURS PRN
Qty: 30 TABLET | Refills: 1 | Status: SHIPPED | OUTPATIENT
Start: 2023-07-25 | End: 2024-07-24

## 2023-07-25 RX ORDER — ACETAMINOPHEN 500 MG
1000 TABLET ORAL ONCE
Status: COMPLETED | OUTPATIENT
Start: 2023-07-25 | End: 2023-07-25

## 2023-07-25 RX ORDER — FENTANYL CITRATE 50 UG/ML
INJECTION, SOLUTION INTRAMUSCULAR; INTRAVENOUS AS NEEDED
Status: DISCONTINUED | OUTPATIENT
Start: 2023-07-25 | End: 2023-07-25 | Stop reason: SURG

## 2023-07-25 RX ORDER — OXYCODONE HYDROCHLORIDE 5 MG/1
5 TABLET ORAL
Status: DISCONTINUED | OUTPATIENT
Start: 2023-07-25 | End: 2023-07-25 | Stop reason: HOSPADM

## 2023-07-25 RX ORDER — MIDAZOLAM HYDROCHLORIDE 2 MG/2ML
2 INJECTION, SOLUTION INTRAMUSCULAR; INTRAVENOUS ONCE
Status: COMPLETED | OUTPATIENT
Start: 2023-07-25 | End: 2023-07-25

## 2023-07-25 RX ORDER — EPHEDRINE SULFATE 50 MG/ML
INJECTION, SOLUTION INTRAVENOUS AS NEEDED
Status: DISCONTINUED | OUTPATIENT
Start: 2023-07-25 | End: 2023-07-25 | Stop reason: SURG

## 2023-07-25 RX ORDER — ONDANSETRON 2 MG/ML
4 INJECTION INTRAMUSCULAR; INTRAVENOUS ONCE AS NEEDED
Status: DISCONTINUED | OUTPATIENT
Start: 2023-07-25 | End: 2023-07-25 | Stop reason: HOSPADM

## 2023-07-25 RX ORDER — SUCCINYLCHOLINE/SOD CL,ISO/PF 100 MG/5ML
SYRINGE (ML) INTRAVENOUS AS NEEDED
Status: DISCONTINUED | OUTPATIENT
Start: 2023-07-25 | End: 2023-07-25 | Stop reason: SURG

## 2023-07-25 RX ADMIN — ROCURONIUM BROMIDE 20 MG: 10 SOLUTION INTRAVENOUS at 12:05

## 2023-07-25 RX ADMIN — DEXAMETHASONE SODIUM PHOSPHATE 4 MG: 4 INJECTION, SOLUTION INTRAMUSCULAR; INTRAVENOUS at 12:10

## 2023-07-25 RX ADMIN — EPHEDRINE SULFATE 5 MG: 50 INJECTION INTRAVENOUS at 12:19

## 2023-07-25 RX ADMIN — Medication 100 MCG: at 12:26

## 2023-07-25 RX ADMIN — EPHEDRINE SULFATE 5 MG: 50 INJECTION INTRAVENOUS at 12:26

## 2023-07-25 RX ADMIN — PROPOFOL 200 MG: 10 INJECTION, EMULSION INTRAVENOUS at 11:58

## 2023-07-25 RX ADMIN — MIDAZOLAM HYDROCHLORIDE 2 MG: 1 INJECTION, SOLUTION INTRAMUSCULAR; INTRAVENOUS at 11:48

## 2023-07-25 RX ADMIN — Medication 150 MCG: at 12:14

## 2023-07-25 RX ADMIN — EPHEDRINE SULFATE 5 MG: 50 INJECTION INTRAVENOUS at 12:35

## 2023-07-25 RX ADMIN — Medication 100 MCG: at 12:09

## 2023-07-25 RX ADMIN — Medication 100 MCG: at 12:35

## 2023-07-25 RX ADMIN — FENTANYL CITRATE 50 MCG: 50 INJECTION, SOLUTION INTRAMUSCULAR; INTRAVENOUS at 11:55

## 2023-07-25 RX ADMIN — SUGAMMADEX 200 MG: 100 INJECTION, SOLUTION INTRAVENOUS at 12:39

## 2023-07-25 RX ADMIN — ACETAMINOPHEN 1000 MG: 500 TABLET ORAL at 11:46

## 2023-07-25 RX ADMIN — EPHEDRINE SULFATE 5 MG: 50 INJECTION INTRAVENOUS at 12:23

## 2023-07-25 RX ADMIN — SODIUM CHLORIDE, POTASSIUM CHLORIDE, SODIUM LACTATE AND CALCIUM CHLORIDE 9 ML/HR: 600; 310; 30; 20 INJECTION, SOLUTION INTRAVENOUS at 11:48

## 2023-07-25 RX ADMIN — LIDOCAINE HYDROCHLORIDE 100 MG: 20 INJECTION, SOLUTION EPIDURAL; INFILTRATION; INTRACAUDAL; PERINEURAL at 11:58

## 2023-07-25 RX ADMIN — Medication 100 MCG: at 12:23

## 2023-07-25 RX ADMIN — ONDANSETRON 4 MG: 2 INJECTION INTRAMUSCULAR; INTRAVENOUS at 12:10

## 2023-07-25 RX ADMIN — Medication 100 MG: at 11:58

## 2023-07-25 NOTE — OP NOTE
DILATATION AND CURETTAGE HYSTEROSCOPY  Procedure Report    Patient Name:  Anika Casillas  YOB: 1960    Date of Surgery:  7/25/2023     Indications: Endometrial mass    Pre-op Diagnosis:   Endometrial hyperplasia [N85.00]       Post-Op Diagnosis Codes:     * Endometrial hyperplasia [N85.00]  Endometrial polyp    Procedure/CPT® Codes:      Procedure(s):  resection of endometrial polyp with myosure    Staff:  Surgeon(s):  Ashleigh Harding DO         Anesthesia: General    Estimated Blood Loss: 5 mL    Implants:    Nothing was implanted during the procedure    Specimen:          Specimens       ID Source Type Tests Collected By Collected At Frozen?    A Endometrium Tissue TISSUE PATHOLOGY EXAM   Ashleigh Harding DO 7/25/23 1238     Description: endometrial polyp                Findings: Large polypoid mass originating from the fundus of the uterus.  Normal tubal ostia    Complications: None    Description of Procedure: After the appropriate consents had been obtained, the patient was taken to the operative suite where she was placed in supine position and underwent general endotracheal intubation.  After an adequate level of anesthesia been obtained, the patient was placed in the high lithotomy position and prepped and draped in usual sterile fashion.  Exam under anesthesia revealed an axial uterus no adnexal masses were appreciated.  A Taylor retractor was placed in the anterior and posterior vaginal vault to view the cervix. The anterior lip of the cervix was grasped with single-tooth tenaculum.  The cervix was dilated to 15 mm and the 5 mm hysteroscope previously attached to saline was easily introduced into the intrauterine cavity.  The cavity was distended and revealed a large endometrial polyp originating from the fundus of the uterus..   Normal tubal ostia were seen.  The hysteroscope was removed.  The cervix was dilated up to 20 mm and the MyoSure was introduced under direct visualization.  The  polyp was removed using the MyoSure.  The procedure took 1 minute and 28 seconds.  The specimen was handed off the field as an endometrial polyp.  The Taylor retractor and single-tooth tenaculum were removed.  The patient was taken out of the lithotomy position and awakened and extubated and transferred to the recovery room in stable and satisfactory condition.  The sponge counts and instrument counts were verified as correct.          Ashleigh Harding,      Date: 7/25/2023  Time: 13:08 EDT

## 2023-07-25 NOTE — ANESTHESIA POSTPROCEDURE EVALUATION
Patient: Anika Casillas    Procedure Summary       Date: 07/25/23 Room / Location: [facility] OR 05 / [facility] MAIN OR    Anesthesia Start: 1153 Anesthesia Stop: 1252    Procedure: resection of endometrial polyp with myosure (Uterus) Diagnosis:       Endometrial hyperplasia      (Endometrial hyperplasia [N85.00])    Surgeons: Ashleigh Harding DO Provider: Rzea Tony MD    Anesthesia Type: general ASA Status: 3            Anesthesia Type: general    Vitals  Vitals Value Taken Time   /64 07/25/23 1321   Temp 36.6 °C (97.8 °F) 07/25/23 1250   Pulse 79 07/25/23 1324   Resp 17 07/25/23 1320   SpO2 92 % 07/25/23 1324   Vitals shown include unvalidated device data.        Post Anesthesia Care and Evaluation    Patient location during evaluation: bedside  Patient participation: complete - patient participated  Level of consciousness: awake  Pain management: adequate    Airway patency: patent  PONV Status: none  Cardiovascular status: acceptable and stable  Respiratory status: acceptable  Hydration status: acceptable    Comments: An Anesthesiologist personally participated in the most demanding procedures (including induction and emergence if applicable) in the anesthesia plan, monitored the course of anesthesia administration at frequent intervals and remained physically present and available for immediate diagnosis and treatment of emergencies.

## 2023-07-25 NOTE — ANESTHESIA PREPROCEDURE EVALUATION
Anesthesia Evaluation     Patient summary reviewed and Nursing notes reviewed   no history of anesthetic complications:   NPO Solid Status: > 8 hours  NPO Liquid Status: > 2 hours           Airway   Mallampati: II  TM distance: >3 FB  Neck ROM: full  No difficulty expected  Dental          Pulmonary - normal exam    breath sounds clear to auscultation  (+) COPD,sleep apnea  Cardiovascular - normal exam  Exercise tolerance: good (4-7 METS)    Rhythm: regular  Rate: normal    (+) hypertensiondysrhythmias Paroxysmal Atrial Fib, hyperlipidemia      Neuro/Psych- negative ROS  GI/Hepatic/Renal/Endo    (+) obesity, liver disease fatty liver disease, diabetes mellitus, thyroid problem     Musculoskeletal     Abdominal    Substance History      OB/GYN          Other        ROS/Med Hx Other: <4METS, SOAE,DECREASED MOBILITY.HX AFIB,HTN,GERD,BERKOWITZ,  DM,ESOPHAGEAL VARICES,MOON'S ESOPH.ECHO 1/23 EF 66-70%,NO VALVE STENOSIS.CATH11/20 NONOBS.CAD,NORMAL LV FX.  CARDS OV 7/23COMPRESS HOSE,LOW Na+,ELEV.LEGS FOR EDEMA.PULM OV 7/23PFTSNORMAL,F/U6MTHS.                 Anesthesia Plan    ASA 3     general     (Patient understands anesthesia not responsible for dental damage.)  intravenous induction     Anesthetic plan, risks, benefits, and alternatives have been provided, discussed and informed consent has been obtained with: patient.    Use of blood products discussed with patient .    Plan discussed with CRNA.    CODE STATUS:

## 2023-07-25 NOTE — DISCHARGE INSTRUCTIONS
DISCHARGE INSTRUCTIONS  GYNECOLOGICAL  PROCEDURES      For your surgery you had:  General anesthesia (you may have a sore throat for the first 24 hours)    You may experience dizziness, drowsiness, or lightheadedness for several hours following surgery.  Do not stay alone today or tonight.  Limit your activity for 24 hours.  Resume your diet slowly.  Follow any special dietary instructions you may have been given by your doctor.  You should not drive or operate machinery, drink alcohol, or sign legally binding documents for 24 hours or while you are taking pain medication.    NOTIFY YOUR DOCTOR IF YOU EXPERIENCE ANY OF THE FOLLOWING:  Temperature greater than 101 degrees Fahrenheit  Shaking Chills  Redness or excessive drainage from incision  Nausea, vomiting and/or pain that is not controlled by prescribed medications  Increase in bleeding or bleeding that is excessive  Unable to urinate in 6 hours after surgery  If unable to reach your doctor, please go to the closest Emergency Room [] Remove dressing:      [] Skin adhesive will flake off in 10-14 days.    [] Nothing in the vagina for 2 weeks to include intercourse, douches, or tampons.  [] You may resume intercourse and the use of tampons as your physician has instructed you.      Vaginal bleeding may be expected for several days with flow decreasing with time and never any heavier than a normal  period.  If you have an ablation, vaginal discharge is expected after bleeding stops.   If you have foul smelling discharge, notify your physician.  Medications per physician instructions as indicated on After Visit Summary.    Last dose of pain medication was given at:   Tylenol 1000mg given at 11:46, wait 4 hours to take any tylenol. Do not take tylenol while also taking norco because it has tylenol in it.  .    SPECIAL INSTRUCTIONS:   Ok to resume Eliquis tomorrow

## 2023-07-26 ENCOUNTER — HOSPITAL ENCOUNTER (OUTPATIENT)
Dept: INFUSION THERAPY | Facility: HOSPITAL | Age: 63
Discharge: HOME | End: 2023-07-26
Admitting: INTERNAL MEDICINE
Payer: COMMERCIAL

## 2023-07-26 ENCOUNTER — OFFICE VISIT (OUTPATIENT)
Dept: SLEEP MEDICINE | Facility: HOSPITAL | Age: 63
End: 2023-07-26
Payer: COMMERCIAL

## 2023-07-26 VITALS
DIASTOLIC BLOOD PRESSURE: 73 MMHG | HEART RATE: 90 BPM | RESPIRATION RATE: 20 BRPM | TEMPERATURE: 98 F | SYSTOLIC BLOOD PRESSURE: 152 MMHG | OXYGEN SATURATION: 98 %

## 2023-07-26 VITALS
HEART RATE: 94 BPM | HEIGHT: 65 IN | DIASTOLIC BLOOD PRESSURE: 67 MMHG | WEIGHT: 230.4 LBS | OXYGEN SATURATION: 97 % | BODY MASS INDEX: 38.39 KG/M2 | SYSTOLIC BLOOD PRESSURE: 156 MMHG

## 2023-07-26 DIAGNOSIS — D50.9 IRON DEFICIENCY ANEMIA, UNSPECIFIED IRON DEFICIENCY ANEMIA TYPE: ICD-10-CM

## 2023-07-26 DIAGNOSIS — Z99.89 OSA ON CPAP: Primary | ICD-10-CM

## 2023-07-26 DIAGNOSIS — E11.43 DIABETIC GASTROPARESIS: Primary | ICD-10-CM

## 2023-07-26 DIAGNOSIS — G47.33 OSA ON CPAP: Primary | ICD-10-CM

## 2023-07-26 DIAGNOSIS — E66.9 CLASS 2 OBESITY: ICD-10-CM

## 2023-07-26 DIAGNOSIS — K90.9 IRON MALABSORPTION: ICD-10-CM

## 2023-07-26 DIAGNOSIS — K31.84 DIABETIC GASTROPARESIS: Primary | ICD-10-CM

## 2023-07-26 PROCEDURE — 25010000002 IRON SUCROSE PER 1 MG: Performed by: INTERNAL MEDICINE

## 2023-07-26 PROCEDURE — G0463 HOSPITAL OUTPT CLINIC VISIT: HCPCS

## 2023-07-26 PROCEDURE — 96374 THER/PROPH/DIAG INJ IV PUSH: CPT

## 2023-07-26 RX ADMIN — IRON SUCROSE 200 MG: 20 INJECTION, SOLUTION INTRAVENOUS at 12:18

## 2023-07-26 NOTE — PROGRESS NOTES
"  [address] [address]  [place]   [place] [address]  Phone: [phone]  Fax: [phone]      SLEEP CLINIC FOLLOW UP PROGRESS NOTE.    Anika Casillas  [id number]   1960  63 y.o.  female      PCP: Elena Henderson PA      Date of visit: 7/26/2023    Chief Complaint   Patient presents with    Sleep Apnea    Obesity       HPI:  This is a 63 y.o. years old patient is here for the management of obstructive sleep apnea.  Sleep apnea is severe in severity with a AHI of 70/hr. Patient is using positive airway pressure therapy with the CPAP and the symptoms of sleep apnea have improved significantly on the therapy. Normally patient goes to bed at 10-11 PM and wakes up at 6 AM .  The patient wakes up 2  time(s) during the night and has no problem going back to sleep.  Feels refreshed after waking up.  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  During her last visit her compliance was excellent and today her compliance is poor because of large air leak.  Patient states that it wakes her up.    Medications and allergies are reviewed by me and documented in the encounter.     SOCIAL (habits pertaining to sleep medicine)  History tobacco use:Yes   History of alcohol use: 0 per week  Caffeine use: 1     REVIEW OF SYSTEMS:   Pertaining positive symptoms are:  [place] Sleepiness Scale :Total score: 10   Abdominal bloating  Anxiety      PHYSICAL EXAMINATION:  CONSTITUTIONAL:  Vitals:    07/26/23 1500   BP: 156/67   Pulse: 94   SpO2: 97%   Weight: 105 kg (230 lb 6.4 oz)   Height: 165.1 cm (65\")    Body mass index is 38.34 kg/m².   NOSE: nasal passages are clear, No deformities noted   RESP SYSTEM: Not in any respiratory distress, no chest deformities noted,   CARDIOVASULAR: No edema noted  NEURO: Oriented x 3, gait normal,  Mood and affect appeared appropriate      Data reviewed:  The Smart card downloaded on 7/26/2023 has been reviewed independently by me for " compliance and discussed the data with the patient.   Poor compliance  Large air leak  Mask type: Needs a mask fit  Device: ResMed  DME: [employer]      ASSESSMENT AND PLAN:  Obstructive sleep apnea ( G 47.33).  Patient has had large air leak needs a mask fit.  I have sent an order to [employer] to get a mask fit.  If she has any problems she is advised to call me but otherwise I will see her in 1 year. Without proper control of sleep apnea and good compliance there is a increased risk for hypertension, diabetes mellitus and nonrestorative sleep with hypersomnia which can increase risk for motor vehicle accidents.  Untreated sleep apnea is also a risk factor for development of atrial fibrillation, pulmonary hypertension, insulin resistance and stroke. The patient is also instructed to get the supplies from the DME company and and change them on a regular basis.  A prescription for supplies has been sent to the DME company.  I have also discussed the good sleep hygiene habits and adequate amount of sleep needed for good health.  Obesity  2 with BMI is Body mass index is 38.34 kg/m².. I have discuss the relationship between the weight and sleep apnea. The benefit of weight loss in reducing severity of sleep apnea was discussed. Discussed diet and exercise with the patient to achieve ideal BMI.   Return in about 1 year (around 7/26/2024) for with smart card down load. . Patient's questions were answered.    7/26/2023  Luis Johnson MD  Sleep Medicine.  Medical Director,   [facility], [facility] sleep centers.

## 2023-07-27 ENCOUNTER — HOSPITAL ENCOUNTER (OUTPATIENT)
Dept: INFUSION THERAPY | Facility: HOSPITAL | Age: 63
Discharge: HOME | End: 2023-07-27
Payer: COMMERCIAL

## 2023-07-27 VITALS
OXYGEN SATURATION: 96 % | SYSTOLIC BLOOD PRESSURE: 162 MMHG | TEMPERATURE: 98.1 F | DIASTOLIC BLOOD PRESSURE: 78 MMHG | HEART RATE: 88 BPM | RESPIRATION RATE: 20 BRPM

## 2023-07-27 DIAGNOSIS — K31.84 DIABETIC GASTROPARESIS: Primary | ICD-10-CM

## 2023-07-27 DIAGNOSIS — K90.9 IRON MALABSORPTION: ICD-10-CM

## 2023-07-27 DIAGNOSIS — D50.9 IRON DEFICIENCY ANEMIA, UNSPECIFIED IRON DEFICIENCY ANEMIA TYPE: ICD-10-CM

## 2023-07-27 DIAGNOSIS — E11.43 DIABETIC GASTROPARESIS: Primary | ICD-10-CM

## 2023-07-27 PROCEDURE — 96374 THER/PROPH/DIAG INJ IV PUSH: CPT

## 2023-07-27 PROCEDURE — 25010000002 IRON SUCROSE PER 1 MG: Performed by: INTERNAL MEDICINE

## 2023-07-27 RX ORDER — SODIUM CHLORIDE 9 MG/ML
250 INJECTION, SOLUTION INTRAVENOUS ONCE
Status: DISCONTINUED | OUTPATIENT
Start: 2023-07-27 | End: 2023-07-29 | Stop reason: HOSPADM

## 2023-07-27 RX ADMIN — IRON SUCROSE 200 MG: 20 INJECTION, SOLUTION INTRAVENOUS at 17:05

## 2023-07-28 ENCOUNTER — HOSPITAL ENCOUNTER (OUTPATIENT)
Dept: INFUSION THERAPY | Facility: HOSPITAL | Age: 63
Discharge: HOME | End: 2023-07-28
Payer: COMMERCIAL

## 2023-07-28 ENCOUNTER — TELEPHONE (OUTPATIENT)
Dept: OBSTETRICS AND GYNECOLOGY | Facility: CLINIC | Age: 63
End: 2023-07-28
Payer: COMMERCIAL

## 2023-07-28 VITALS
HEART RATE: 70 BPM | SYSTOLIC BLOOD PRESSURE: 175 MMHG | DIASTOLIC BLOOD PRESSURE: 87 MMHG | RESPIRATION RATE: 14 BRPM | TEMPERATURE: 97.5 F | OXYGEN SATURATION: 100 %

## 2023-07-28 DIAGNOSIS — K90.9 IRON MALABSORPTION: ICD-10-CM

## 2023-07-28 DIAGNOSIS — E11.43 DIABETIC GASTROPARESIS: Primary | ICD-10-CM

## 2023-07-28 DIAGNOSIS — D50.9 IRON DEFICIENCY ANEMIA, UNSPECIFIED IRON DEFICIENCY ANEMIA TYPE: ICD-10-CM

## 2023-07-28 DIAGNOSIS — K31.84 DIABETIC GASTROPARESIS: Primary | ICD-10-CM

## 2023-07-28 PROCEDURE — 25010000002 IRON SUCROSE PER 1 MG: Performed by: INTERNAL MEDICINE

## 2023-07-28 PROCEDURE — 96374 THER/PROPH/DIAG INJ IV PUSH: CPT

## 2023-07-28 RX ORDER — SODIUM CHLORIDE 9 MG/ML
250 INJECTION, SOLUTION INTRAVENOUS ONCE
Status: DISCONTINUED | OUTPATIENT
Start: 2023-07-28 | End: 2023-07-30 | Stop reason: HOSPADM

## 2023-07-28 RX ADMIN — IRON SUCROSE 200 MG: 20 INJECTION, SOLUTION INTRAVENOUS at 17:06

## 2023-07-29 ENCOUNTER — HOSPITAL ENCOUNTER (OUTPATIENT)
Dept: INFUSION THERAPY | Facility: HOSPITAL | Age: 63
Discharge: HOME | End: 2023-07-29
Payer: COMMERCIAL

## 2023-07-29 VITALS
SYSTOLIC BLOOD PRESSURE: 140 MMHG | OXYGEN SATURATION: 95 % | DIASTOLIC BLOOD PRESSURE: 70 MMHG | HEART RATE: 112 BPM | RESPIRATION RATE: 16 BRPM | TEMPERATURE: 97.9 F

## 2023-07-29 DIAGNOSIS — D50.9 IRON DEFICIENCY ANEMIA, UNSPECIFIED IRON DEFICIENCY ANEMIA TYPE: ICD-10-CM

## 2023-07-29 DIAGNOSIS — K31.84 DIABETIC GASTROPARESIS: Primary | ICD-10-CM

## 2023-07-29 DIAGNOSIS — E11.43 DIABETIC GASTROPARESIS: Primary | ICD-10-CM

## 2023-07-29 DIAGNOSIS — K90.9 IRON MALABSORPTION: ICD-10-CM

## 2023-07-29 PROCEDURE — 96374 THER/PROPH/DIAG INJ IV PUSH: CPT

## 2023-07-29 PROCEDURE — 25010000002 IRON SUCROSE PER 1 MG: Performed by: INTERNAL MEDICINE

## 2023-07-29 RX ORDER — SODIUM CHLORIDE 9 MG/ML
250 INJECTION, SOLUTION INTRAVENOUS ONCE
Status: COMPLETED | OUTPATIENT
Start: 2023-07-29 | End: 2023-07-29

## 2023-07-29 RX ADMIN — SODIUM CHLORIDE 250 ML: 9 INJECTION, SOLUTION INTRAVENOUS at 11:23

## 2023-07-29 RX ADMIN — IRON SUCROSE 200 MG: 20 INJECTION, SOLUTION INTRAVENOUS at 11:22

## 2023-07-31 ENCOUNTER — TELEPHONE (OUTPATIENT)
Dept: GASTROENTEROLOGY | Facility: CLINIC | Age: 63
End: 2023-07-31
Payer: COMMERCIAL

## 2023-08-01 ENCOUNTER — OFFICE VISIT (OUTPATIENT)
Dept: CARDIOLOGY | Facility: CLINIC | Age: 63
End: 2023-08-01
Payer: COMMERCIAL

## 2023-08-01 VITALS
HEART RATE: 77 BPM | BODY MASS INDEX: 37.92 KG/M2 | DIASTOLIC BLOOD PRESSURE: 73 MMHG | WEIGHT: 227.6 LBS | SYSTOLIC BLOOD PRESSURE: 128 MMHG | HEIGHT: 65 IN

## 2023-08-01 DIAGNOSIS — I48.0 PAROXYSMAL ATRIAL FIBRILLATION: Primary | ICD-10-CM

## 2023-08-01 DIAGNOSIS — E78.2 MIXED HYPERLIPIDEMIA: ICD-10-CM

## 2023-08-01 DIAGNOSIS — R06.02 SHORTNESS OF BREATH: ICD-10-CM

## 2023-08-01 DIAGNOSIS — I10 HYPERTENSION, ESSENTIAL: ICD-10-CM

## 2023-08-01 RX ORDER — POTASSIUM CHLORIDE 750 MG/1
10 TABLET, FILM COATED, EXTENDED RELEASE ORAL DAILY
Qty: 90 TABLET | Refills: 3 | Status: SHIPPED | OUTPATIENT
Start: 2023-08-01

## 2023-08-01 RX ORDER — FUROSEMIDE 20 MG/1
20 TABLET ORAL DAILY
Qty: 90 TABLET | Refills: 3 | Status: SHIPPED | OUTPATIENT
Start: 2023-08-01

## 2023-08-07 ENCOUNTER — TELEPHONE (OUTPATIENT)
Dept: GASTROENTEROLOGY | Facility: CLINIC | Age: 63
End: 2023-08-07
Payer: COMMERCIAL

## 2023-08-07 RX ORDER — SODIUM, POTASSIUM,MAG SULFATES 17.5-3.13G
1 SOLUTION, RECONSTITUTED, ORAL ORAL EVERY 12 HOURS
Qty: 354 ML | Refills: 0 | Status: ON HOLD | OUTPATIENT
Start: 2023-08-07 | End: 2023-08-14

## 2023-08-07 NOTE — PRE-PROCEDURE INSTRUCTIONS
"Instructed on date and arrival time of 0700. Come to entrance \"C\". Must have  over age 18 to drive home.  May have two visitors; however, children under 12 must stay in waiting room.  Discussed clear liquid diet (no red or purple) and bowel prep.  May take medications as usual except for blood thinners, diabetic medications, and weight loss medications.  Bring list of medications.  Verbalized understanding of instructions given.  Phone number given for questions or concerns.  Hold Eliquis 2-3 days prior to procedure.  Cardiac and pulmonary clearance noted in chart.  "

## 2023-08-07 NOTE — TELEPHONE ENCOUNTER
Patient stopped by the office today, she had gone by [facility] to  her bowel prep however they did not have it.  It appears that [facility] sent in Suprep at the time of appointment in May 2023. Can we resend this for patient to .

## 2023-08-14 ENCOUNTER — ANESTHESIA (OUTPATIENT)
Dept: GASTROENTEROLOGY | Facility: HOSPITAL | Age: 63
End: 2023-08-14
Payer: COMMERCIAL

## 2023-08-14 ENCOUNTER — ANESTHESIA EVENT (OUTPATIENT)
Dept: GASTROENTEROLOGY | Facility: HOSPITAL | Age: 63
End: 2023-08-14
Payer: COMMERCIAL

## 2023-08-14 ENCOUNTER — HOSPITAL ENCOUNTER (OUTPATIENT)
Facility: HOSPITAL | Age: 63
Setting detail: HOSPITAL OUTPATIENT SURGERY
Discharge: HOME | End: 2023-08-14
Attending: INTERNAL MEDICINE | Admitting: INTERNAL MEDICINE
Payer: COMMERCIAL

## 2023-08-14 VITALS
SYSTOLIC BLOOD PRESSURE: 115 MMHG | BODY MASS INDEX: 37.69 KG/M2 | OXYGEN SATURATION: 95 % | RESPIRATION RATE: 16 BRPM | TEMPERATURE: 98.4 F | DIASTOLIC BLOOD PRESSURE: 69 MMHG | WEIGHT: 226.19 LBS | HEIGHT: 65 IN | HEART RATE: 80 BPM

## 2023-08-14 DIAGNOSIS — Z86.010 HISTORY OF COLON POLYPS: ICD-10-CM

## 2023-08-14 DIAGNOSIS — D50.9 IRON DEFICIENCY ANEMIA, UNSPECIFIED IRON DEFICIENCY ANEMIA TYPE: ICD-10-CM

## 2023-08-14 LAB — GLUCOSE BLDC GLUCOMTR-MCNC: 177 MG/DL (ref 70–99)

## 2023-08-14 PROCEDURE — 88305 TISSUE EXAM BY PATHOLOGIST: CPT | Performed by: INTERNAL MEDICINE

## 2023-08-14 PROCEDURE — 82948 REAGENT STRIP/BLOOD GLUCOSE: CPT

## 2023-08-14 PROCEDURE — 25010000002 PROPOFOL 10 MG/ML EMULSION: Performed by: NURSE ANESTHETIST, CERTIFIED REGISTERED

## 2023-08-14 PROCEDURE — 45380 COLONOSCOPY AND BIOPSY: CPT | Performed by: INTERNAL MEDICINE

## 2023-08-14 PROCEDURE — 45385 COLONOSCOPY W/LESION REMOVAL: CPT | Performed by: INTERNAL MEDICINE

## 2023-08-14 PROCEDURE — 45381 COLONOSCOPY SUBMUCOUS NJX: CPT | Performed by: INTERNAL MEDICINE

## 2023-08-14 RX ORDER — SODIUM CHLORIDE, SODIUM LACTATE, POTASSIUM CHLORIDE, CALCIUM CHLORIDE 600; 310; 30; 20 MG/100ML; MG/100ML; MG/100ML; MG/100ML
30 INJECTION, SOLUTION INTRAVENOUS CONTINUOUS
Status: DISCONTINUED | OUTPATIENT
Start: 2023-08-14 | End: 2023-08-14 | Stop reason: HOSPADM

## 2023-08-14 RX ORDER — LIDOCAINE HYDROCHLORIDE 20 MG/ML
INJECTION, SOLUTION EPIDURAL; INFILTRATION; INTRACAUDAL; PERINEURAL AS NEEDED
Status: DISCONTINUED | OUTPATIENT
Start: 2023-08-14 | End: 2023-08-14 | Stop reason: SURG

## 2023-08-14 RX ADMIN — PROPOFOL 250 MCG/KG/MIN: 10 INJECTION, EMULSION INTRAVENOUS at 08:33

## 2023-08-14 RX ADMIN — SODIUM CHLORIDE, POTASSIUM CHLORIDE, SODIUM LACTATE AND CALCIUM CHLORIDE 30 ML/HR: 600; 310; 30; 20 INJECTION, SOLUTION INTRAVENOUS at 07:31

## 2023-08-14 RX ADMIN — LIDOCAINE HYDROCHLORIDE 40 MG: 20 INJECTION, SOLUTION EPIDURAL; INFILTRATION; INTRACAUDAL; PERINEURAL at 08:33

## 2023-08-14 NOTE — ANESTHESIA POSTPROCEDURE EVALUATION
Patient: Anika Casillas    Procedure Summary       Date: 08/14/23 Room / Location: [facility] ENDOSCOPY 1 / [facility] ENDOSCOPY    Anesthesia Start: 0832 Anesthesia Stop: 0915    Procedure: COLONOSCOPY WITH POLYPECTOMY, TATTOO Diagnosis:       Iron deficiency anemia, unspecified iron deficiency anemia type      History of colon polyps      (Iron deficiency anemia, unspecified iron deficiency anemia type [D50.9])      (History of colon polyps [Z86.010])    Surgeons: Shannan Charles MD Provider: Salo Ocampo MD    Anesthesia Type: general ASA Status: 3            Anesthesia Type: general    Vitals  Vitals Value Taken Time   /69 08/14/23 0934   Temp 36.9 øC (98.4 øF) 08/14/23 0933   Pulse 80 08/14/23 0936   Resp 16 08/14/23 0933   SpO2 95 % 08/14/23 0936   Vitals shown include unvalidated device data.        Post Anesthesia Care and Evaluation    Patient location during evaluation: bedside  Patient participation: complete - patient participated  Level of consciousness: awake  Pain management: adequate    Airway patency: patent  PONV Status: none  Cardiovascular status: acceptable and stable  Respiratory status: acceptable  Hydration status: acceptable    Comments: An Anesthesiologist personally participated in the most demanding procedures (including induction and emergence if applicable) in the anesthesia plan, monitored the course of anesthesia administration at frequent intervals and remained physically present and available for immediate diagnosis and treatment of emergencies.

## 2023-08-14 NOTE — H&P
Pre Procedure History & Physical    Chief Complaint:   Iron deficiency anemia    Subjective     HPI:   62 yo F here for eval of iron deficiency anemia.    Past Medical History:   Past Medical History:   Diagnosis Date    Abnormal Pap smear of cervix     Atrial fibrillation     ON ELIQUIS/FOLLOWS TROTTER    Colindres's esophagus     Depression     Diabetes mellitus     Emphysema lung     INHALER    Endometrial hyperplasia     D&C SCHEDULED 7/25/23    Esophageal varices     Gastroparesis     TAKES XIFAXAN    GERD (gastroesophageal reflux disease) 1995    Hyperlipidemia     Hypertension     Iron deficiency anemia 05/02/2023    IRON INFUSION LAST 7/24/23    Irritable bowel syndrome     W/CONSTIPATION    BERKOWITZ (nonalcoholic steatohepatitis)     NO S/S CURRENTLY, ELEVATED ENZYMES    Obstructive sleep apnea syndrome     S/P exploratory laparotomy, lysis of adhesions, resection of necrotic colon without anastomosis 04/18/2022       Past Surgical History:  Past Surgical History:   Procedure Laterality Date    BLADDER SLING MODIFIED, ANTERIOR AND POSTERIOR VAGINAL REPAIR      BREAST LUMPECTOMY Left     BENIGN    CARDIAC CATHETERIZATION      NO INTERVENTION    COLON RESECTION      PERFORATED COLON, EEA    D & C HYSTEROSCOPY N/A 7/25/2023    Procedure: resection of endometrial polyp with myosure;  Surgeon: Ashleigh Harding DO;  Location: [facility] MAIN OR;  Service: Gynecology;  Laterality: N/A;    ENDOSCOPY N/A 06/03/2022    Procedure: ESOPHAGOGASTRODUODENOSCOPY;  Surgeon: Shannan Charles MD;  Location: [facility] ENDOSCOPY;  Service: Gastroenterology;  Laterality: N/A;  RETAINED FOOD IN STOMACH  ESOPHAGEAL VARICIES    EXPLORATORY LAPAROTOMY N/A 04/16/2022    Procedure: EXPLORATORY LAPAROTOMY, LYSIS OF ADHESIONS, RESECTION OF NECTROIC COLON;  Surgeon: Cande Stanley MD;  Location: [facility] MAIN OR;  Service: General;  Laterality: N/A;    GALLBLADDER SURGERY      LAPAROSCOPIC    TUBAL ABDOMINAL LIGATION         Family  History:  Family History   Adopted: Yes   Problem Relation Age of Onset    Malig Hyperthermia Neg Hx        Social History:   reports that she has been smoking cigarettes. She started smoking about 48 years ago. She has a 10.25 pack-year smoking history. She has never used smokeless tobacco. She reports current alcohol use of about 1.0 standard drink per week. She reports that she does not use drugs.    Medications:   Medications Prior to Admission   Medication Sig Dispense Refill Last Dose    acetaminophen (Tylenol) 325 MG tablet Take 2 tablets by mouth Every 4 (Four) Hours As Needed for Mild Pain. 30 tablet 1 8/13/2023    albuterol sulfate  (90 Base) MCG/ACT inhaler Inhale 2 puffs Every 4 (Four) Hours As Needed.   8/13/2023    amLODIPine (NORVASC) 10 MG tablet Take 1 tablet by mouth Daily. 90 tablet 3 8/14/2023    B-D UF III MINI PEN NEEDLES 31G X 5 MM misc    8/13/2023    Blood Glucose Monitoring Suppl (Accu-Chek Guide) w/Device kit    8/13/2023    buPROPion XL (WELLBUTRIN XL) 150 MG 24 hr tablet TAKE 1 TABLET TWICE DAILY 180 tablet 0 8/13/2023    Cholecalciferol (Vitamin D3) 50 MCG (2000 UT) capsule Take 3 capsules by mouth Daily. LAST DOSE 7/21/23 8/13/2023    Continuous Blood Gluc Sensor (Dexcom G6 Sensor) APPLY 1 EACH TOPICALLY EVERY 10 (TEN) DAYS.   8/13/2023    Continuous Blood Gluc Transmit (Dexcom G6 Transmitter) misc USE FOR 90 DAYS.   8/13/2023    empagliflozin (JARDIANCE) 25 MG tablet tablet Take 1 tablet by mouth Daily.   8/13/2023    escitalopram (Lexapro) 10 MG tablet Take 1 tablet by mouth Daily. 90 tablet 1 8/13/2023    Evolocumab (Repatha) solution prefilled syringe injection Inject 1 mL under the skin into the appropriate area as directed Every 14 (Fourteen) Days. 6 each 4 8/13/2023    famotidine (PEPCID) 40 MG tablet Take 1 tablet by mouth At Night As Needed for Heartburn. 90 tablet 2 8/13/2023    Fluticasone-Umeclidin-Vilant (Trelegy Ellipta) 100-62.5-25 MCG/INH inhaler Inhale 1 puff  Daily. 2 each 0 8/13/2023    furosemide (LASIX) 20 MG tablet Take 1 tablet by mouth Daily. 90 tablet 3 8/13/2023    glucose blood test strip Check blood glucose twice daily 60 each 12 8/13/2023    glucose monitor monitoring kit 1 each Daily. Patient prefers Accu Chek Meter. 1 each 0 8/13/2023    HYDROcodone-acetaminophen (NORCO) 5-325 MG per tablet Take 1 tablet by mouth Every 6 (Six) Hours As Needed (Pain). 6 tablet 0 8/13/2023    icosapent ethyl (VASCEPA) 1 g capsule capsule Take 2 g by mouth 2 (Two) Times a Day With Meals. 360 capsule 1 8/13/2023    Insulin Glargine, 2 Unit Dial, (Toujeo Max SoloStar) 300 UNIT/ML solution pen-injector injection Inject 122 Units under the skin into the appropriate area as directed Every Morning. INST PER ANESTHESIA PROTOCOL   8/13/2023    Lancets (accu-chek soft touch) lancets Check blood sugar twice daily 100 each 12 8/13/2023    metoprolol tartrate (LOPRESSOR) 50 MG tablet Take 1.5 tablets by mouth 2 (Two) Times a Day. TAKE 1 1/2 TABLETS  tablet 1 8/14/2023    multivitamin with minerals tablet tablet Take 1 tablet by mouth Daily. LAST DOSE 7/21/23 8/13/2023    NovoLOG FlexPen 100 UNIT/ML solution pen-injector sc pen Inject 60 Units under the skin into the appropriate area as directed 2 (Two) Times a Day. PER SLIDING SCALE  INST PER ANESTHESIA PROTOCOL   8/13/2023    olmesartan (BENICAR) 20 MG tablet Take 1 tablet by mouth Daily. (Patient taking differently: Take 1 tablet by mouth Daily. I) 90 tablet 1 8/14/2023    pantoprazole (PROTONIX) 40 MG EC tablet Take 1 tablet by mouth Daily. 90 tablet 2 8/13/2023    potassium chloride 10 MEQ CR tablet Take 1 tablet by mouth Daily. 90 tablet 3 8/13/2023    riFAXIMin (Xifaxan) 550 MG tablet Take 1 tablet by mouth Every 12 (Twelve) Hours. (Patient taking differently: Take 1 tablet by mouth Every 12 (Twelve) Hours. TAKES FOR LIVER AND GASTROPORESIS) 180 tablet 1 8/13/2023    Semaglutide, 1 MG/DOSE, (OZEMPIC) 4 MG/3ML solution  "pen-injector Inject 1 mg under the skin into the appropriate area as directed Every 7 (Seven) Days. 3 mL 2 8/13/2023    apixaban (ELIQUIS) 5 MG tablet tablet Take 1 tablet by mouth 2 (Two) Times a Day. 180 tablet 3 8/10/2023 at 0800       Allergies:  Liraglutide and Metformin    ROS:    Pertinent items are noted in HPI, all other systems reviewed and negative     Objective     Blood pressure 128/74, pulse 79, temperature 96.8 øF (36 øC), temperature source Temporal, resp. rate 16, height 165.1 cm (65\"), weight 103 kg (226 lb 3.1 oz), SpO2 98 %, not currently breastfeeding.    Physical Exam   Constitutional: Pt is oriented to person, place, and time and well-developed, well-nourished, and in no distress.   Mouth/Throat: Oropharynx is clear and moist.   Neck: Normal range of motion.   Cardiovascular: Normal rate, regular rhythm and normal heart sounds.    Pulmonary/Chest: Effort normal and breath sounds normal.   Abdominal: Soft. Nontender  Skin: Skin is warm and dry.   Psychiatric: Mood, memory, affect and judgment normal.     Assessment & Plan     Diagnosis:  Iron deficiency anemia    Anticipated Surgical Procedure:  Colonoscopy    The risks, benefits, and alternatives of this procedure have been discussed with the patient or the responsible party- the patient understands and agrees to proceed.           "

## 2023-08-14 NOTE — ANESTHESIA PREPROCEDURE EVALUATION
Anesthesia Evaluation     Patient summary reviewed and Nursing notes reviewed   no history of anesthetic complications:   NPO Solid Status: > 8 hours  NPO Liquid Status: > 2 hours           Airway   Mallampati: II  TM distance: >3 FB  Neck ROM: full  No difficulty expected  Dental          Pulmonary - normal exam    breath sounds clear to auscultation  (+) COPD,sleep apnea  Cardiovascular - normal exam  Exercise tolerance: good (4-7 METS)    Rhythm: regular  Rate: normal    (+) hypertensiondysrhythmias Atrial Fib      Neuro/Psych- negative ROS  GI/Hepatic/Renal/Endo    (+) GERD, liver disease cirrhosis, diabetes mellitus, thyroid problem     Musculoskeletal (-) negative ROS    Abdominal    Substance History - negative use     OB/GYN negative ob/gyn ROS         Other - negative ROS       ROS/Med Hx Other: PAT Nursing Notes unavailable.                 Anesthesia Plan    ASA 3     general       Anesthetic plan, risks, benefits, and alternatives have been provided, discussed and informed consent has been obtained with: patient and spouse/significant other.    CODE STATUS:

## 2023-08-15 ENCOUNTER — TRANSCRIBE ORDERS (OUTPATIENT)
Dept: ADMINISTRATIVE | Facility: HOSPITAL | Age: 63
End: 2023-08-15
Payer: COMMERCIAL

## 2023-08-15 DIAGNOSIS — Z12.31 SCREENING MAMMOGRAM, ENCOUNTER FOR: Primary | ICD-10-CM

## 2023-08-22 ENCOUNTER — HOSPITAL ENCOUNTER (OUTPATIENT)
Dept: MAMMOGRAPHY | Facility: HOSPITAL | Age: 63
Discharge: HOME | End: 2023-08-22
Admitting: PHYSICIAN ASSISTANT
Payer: COMMERCIAL

## 2023-08-22 DIAGNOSIS — Z12.31 SCREENING MAMMOGRAM, ENCOUNTER FOR: ICD-10-CM

## 2023-08-22 PROCEDURE — 77063 BREAST TOMOSYNTHESIS BI: CPT

## 2023-08-22 PROCEDURE — 77067 SCR MAMMO BI INCL CAD: CPT

## 2023-08-25 NOTE — PROGRESS NOTES
Post Operative Visit        Chief Complaint   Patient presents with    Post-op     resection of endometrial polyp with myosure     HPI:   Pain:   Pelvic Pressure  Vaginal bleeding:  no  Vaginal discharge:  no  Fever/chills:  no  Good appetite:  yes  Normal bladder function:  yes  Normal bowel function:  yes  Hot flashes: no    PHYSICAL EXAM:  /72   Pulse 84   Wt 103 kg (228 lb)   Breastfeeding No   BMI 37.94 kg/mý   General- NAD, alert and oriented, appropriate  Psych- Normal mood, good memory    Abdomen- Soft, non distended, non tender, no masses      External genitalia- Normal, no lesions  Urethra- Normal, no masses, non tender  Vagina- NL no atrophy, no discharge, no prolapse  Bladder- Normal, no masses, non tender, no prolapse  Cervix- Normal, no lesions, no discharge, No cervical motion tenderness  Uterus- Normal size, shape & consistency.  Non tender, mobile, & no prolapse  Adnexa- No mass, non tender    Lymphatic- No palpable groin nodes  Extremities- No edema, no cyanosis   Skin- No lesions, no rashes      CLINICAL PHOTOGRAPHS - SCAN - D&C/HYSTEROSCOPY PROCEDURE PHOTOS, JENY, 07/25/2023 (07/25/2023)    Tissue Pathology Exam (07/25/2023 12:38)    Op Note by Ashleigh Harding DO (07/25/2023 12:21)    ASSESSMENT and PLAN:  Post-operative exam    Diagnoses and all orders for this visit:    1. Postoperative follow-up (Primary)        Operative report, surgical findings and any pathology/photos reviewed.  All questions answered.     Counseling:   Ok to resume normal activities  Ok to resume intercourse      Follow Up:  No follow-ups on file.            Ashleigh Harding DO  08/31/2023    [facility] [place] [facility]  [facility] [place] [address]  Dept: [phone]  Dept Fax: [phone]  Loc: [phone]  Loc Fax: [phone]

## 2023-08-29 ENCOUNTER — OFFICE VISIT (OUTPATIENT)
Dept: GASTROENTEROLOGY | Facility: CLINIC | Age: 63
End: 2023-08-29
Payer: COMMERCIAL

## 2023-08-29 VITALS
BODY MASS INDEX: 38.25 KG/M2 | DIASTOLIC BLOOD PRESSURE: 61 MMHG | HEIGHT: 65 IN | SYSTOLIC BLOOD PRESSURE: 145 MMHG | HEART RATE: 88 BPM | WEIGHT: 229.6 LBS

## 2023-08-29 DIAGNOSIS — K76.82 HEPATIC ENCEPHALOPATHY: ICD-10-CM

## 2023-08-29 DIAGNOSIS — I85.10 SECONDARY ESOPHAGEAL VARICES WITHOUT BLEEDING: ICD-10-CM

## 2023-08-29 DIAGNOSIS — K22.70 BARRETT'S ESOPHAGUS WITHOUT DYSPLASIA: ICD-10-CM

## 2023-08-29 DIAGNOSIS — K75.81 NASH (NONALCOHOLIC STEATOHEPATITIS): ICD-10-CM

## 2023-08-29 DIAGNOSIS — Z86.010 HISTORY OF ADENOMATOUS POLYP OF COLON: ICD-10-CM

## 2023-08-29 DIAGNOSIS — K31.84 DIABETIC GASTROPARESIS: Primary | ICD-10-CM

## 2023-08-29 DIAGNOSIS — E11.43 DIABETIC GASTROPARESIS: Primary | ICD-10-CM

## 2023-08-29 DIAGNOSIS — K74.60 NON-ALCOHOLIC CIRRHOSIS: ICD-10-CM

## 2023-08-29 PROCEDURE — 99214 OFFICE O/P EST MOD 30 MIN: CPT | Performed by: NURSE PRACTITIONER

## 2023-08-29 NOTE — PROGRESS NOTES
Chief Complaint   Cirrhosis and Gastroparesis     History of Present Illness       Anika Casillas is a 63 y.o. female who presents to [facility] for follow-up for cirrhosis.  She was last seen in the office by me on 5/2/2023.    Has a history of GERD with Colindres's esophagus without dysplasia. Is taking Protonix 40 mg every morning and Pepcid 40 mg at night. Denies any dysphagia, rectal bleeding or melena.  Admits her appetite is okay.  Has to be really careful with what she eats due to her gastroparesis.     Has a history of Kline cirrhosis.  With grade 2 esophageal varices noted on most recent EGD 6/22.  Has a history of hepatic encephalopathy.  Unable to tolerate lactulose.  On Xifaxan twice daily.  Was previously referred to Dr. Lugo for her gastroparesis.is frustrated she has not heard from their office.  Still having nausea and vomiting. Admits her burps smell so bad she's embarrassed to burp at work. Doesn't want to take reglan. Is afraid of the side effects. Cannot tolerate lactulose. Does occasionally drink a beer.  Denies any NSAIDs.  Denies any ascites, itching or confusion.     Had a CT scan of the abdomen pelvis done on April 21 of this year that showed:     IMPRESSION:      1. The endometrium has an abnormally thickened heterogeneous appearance.  Recommend a pelvic   ultrasound examination to evaluate for endometrial hyperplasia or endometrial carcinoma.     2. Cirrhosis.  Splenorenal shunt.     3. Colonic diverticulosis.  No CT evidence of colitis or diverticulitis.  Is waiting for results of the pelvic ultrasound she had done.     Has history of constipation.  Bowels move pretty well. Takes miralax 1-2 times a day as needed.      Underwent colon resection with lysis of adhesions by Dr. Guidry on 4/16/2022.  Does feel like she has had more abdominal pain ever since that surgery.     Last EGD was on 6/3/2022 with Dr. Charles.  EGD showed retained food and grade 2  esophageal varices.  Path negative.     Does have history of cholecystectomy.     Hx tobacco use. Smokes 1-3 a day.      Has hx iron def anemia. Hx a-fib on eliquis.       GI FI--adopted.     Underwent colonoscopy with Dr. Charles on 8/14/2023.  She was found to have moderate diverticulosis and nonbleeding external hemorrhoids.  4 mm cecal polyp removed, 3 4-5 ascending colon polyps removed, 2 to 3 mm ascending colon polyps removed, 6 mm transverse colon removed and a polypoid lesion in the ascending colon completely removed and injected.  Four 4 to 6 mm sigmoid colon polyps were removed.  Recall colonoscopy in 1 year for surveillance. Path + Tubular adenoma.    Has TELEHEALTH visit with  GI motility tomorrow. She has seen hematology and gotten iron infusions. Has been having some confusion and brain fog. Denies any pedal edema or ascites. She did see Dr. Chen (neuro-psychologist) and was diagnosed with mild cognitive impairment. Still having sulfur burps. Using miralax PRN. Is on ozempic for DM. Does not have renal Dr. Would like referral to Renal.  Denies any breakthrough reflux at this time.  Reports bowels seem to be moving better currently.  Results       Result Review :       CMP          5/4/2023    17:22 7/7/2023    16:49 7/25/2023    10:54   CMP   Glucose 165  140  149    BUN 8  10  10    Creatinine 0.86  0.79  0.91    EGFR 77.5  85.2  71.9    Sodium 137  141  138    Potassium 3.6  3.5  3.9    Chloride 104  108  105    Calcium 10.0  9.3  10.0    Total Protein 8.3  8.2     Albumin 3.9  3.7     Globulin 4.4  4.5     Total Bilirubin 0.4  0.3     Alkaline Phosphatase 135  118     AST (SGOT) 80  72     ALT (SGPT) 62  58     Albumin/Globulin Ratio 0.9  0.8     BUN/Creatinine Ratio 9.3  12.7  11.0    Anion Gap 9.3  10.1  10.6      CBC          5/4/2023    17:22 7/7/2023    16:50 7/25/2023    10:54   CBC   WBC 9.03  7.23  7.29    RBC 4.46  4.27  4.69    Hemoglobin 11.4  10.3  11.2    Hematocrit 34.4  30.9   33.3    MCV 77.1  72.4  71.0    MCH 25.6  24.1  23.9    MCHC 33.1  33.3  33.6    RDW 17.1  19.6  19.4    Platelets 277  300  336      Lipid Panel          1/4/2023    09:37 4/21/2023    09:53   Lipid Panel   Total Cholesterol 207  174    Triglycerides 171  157    HDL Cholesterol 31  36    VLDL Cholesterol 31  28    LDL Cholesterol  145  110    LDL/HDL Ratio 4.57  2.96      TSH          1/4/2023    09:37   TSH   TSH 2.160        Iron Profile   Iron   Date Value Ref Range Status   07/07/2023 33 (L) 37 - 145 mcg/dL Final     TIBC   Date Value Ref Range Status   07/07/2023 453 298 - 536 mcg/dL Final     Iron Saturation (TSAT)   Date Value Ref Range Status   07/07/2023 7 (L) 20 - 50 % Final     Transferrin   Date Value Ref Range Status   07/07/2023 304 200 - 360 mg/dL Final     Ferritin   Ferritin   Date Value Ref Range Status   07/07/2023 33.97 13.00 - 150.00 ng/mL Final     Liver Workup   Ferritin   Date Value Ref Range Status   07/07/2023 33.97 13.00 - 150.00 ng/mL Final     Iron   Date Value Ref Range Status   07/07/2023 33 (L) 37 - 145 mcg/dL Final     TIBC   Date Value Ref Range Status   07/07/2023 453 298 - 536 mcg/dL Final     Iron Saturation (TSAT)   Date Value Ref Range Status   07/07/2023 7 (L) 20 - 50 % Final     Transferrin   Date Value Ref Range Status   07/07/2023 304 200 - 360 mg/dL Final     Protime   Date Value Ref Range Status   01/20/2023 15.9 (H) 11.8 - 14.9 Seconds Final   11/22/2022 11.1 9.7 - 11.5 Second Final     Comment:     When using the PT to screen for a coagulopathy, please refer to the PT reference range to evaluate results.     INR   Date Value Ref Range Status   01/20/2023 1.25 (H) 0.86 - 1.15 Final   11/22/2022 1.0  Final     Comment:     To monitor warfarin, the INR calculated from the PT is used, according to current published guidelines:    *Most indications, moderate intensity INR (2.0-3.0) is effective.    *Patients with recurrent thromboembolic events with a therapeutic INR as  "above, or other additional risk factors for thromboembolic events, high intensity INR (2.5-3.5) is recommended.    *Optimal target range for the INR is not likely to be the same for all indications, and lower INR targets may be prudent for patients at very high risk of bleeding.    The INR is used to manage patients on warfarin, and thus is not compared to a \"normal\" INR range. The validity of the INR in other conditions of impaired coagulation has not been fully evaluated, and the PT recorded in seconds is recommended in these   instances. Ref:  CHEST June 2008 supplement.     ALPHA-FETOPROTEIN   Date Value Ref Range Status   01/20/2023 2.15 0 - 8.3 ng/mL Final     AFP   ALPHA-FETOPROTEIN   Date Value Ref Range Status   01/20/2023 2.15 0 - 8.3 ng/mL Final      PT/INR   Protime   Date Value Ref Range Status   01/20/2023 15.9 (H) 11.8 - 14.9 Seconds Final   11/22/2022 11.1 9.7 - 11.5 Second Final     Comment:     When using the PT to screen for a coagulopathy, please refer to the PT reference range to evaluate results.     INR   Date Value Ref Range Status   01/20/2023 1.25 (H) 0.86 - 1.15 Final   11/22/2022 1.0  Final     Comment:     To monitor warfarin, the INR calculated from the PT is used, according to current published guidelines:    *Most indications, moderate intensity INR (2.0-3.0) is effective.    *Patients with recurrent thromboembolic events with a therapeutic INR as above, or other additional risk factors for thromboembolic events, high intensity INR (2.5-3.5) is recommended.    *Optimal target range for the INR is not likely to be the same for all indications, and lower INR targets may be prudent for patients at very high risk of bleeding.    The INR is used to manage patients on warfarin, and thus is not compared to a \"normal\" INR range. The validity of the INR in other conditions of impaired coagulation has not been fully evaluated, and the PT recorded in seconds is recommended in these   instances. " Ref:  CHEST June 2008 supplement.     Ammonia   Ammonia   Date Value Ref Range Status   11/16/2022 27 11 - 51 umol/L Final               Past Medical History       Past Medical History:   Diagnosis Date    Abnormal Pap smear of cervix     Atrial fibrillation     ON ELIQUIS/FOLLOWS TROTTER    Colindres's esophagus     Depression     Diabetes mellitus     Emphysema lung     INHALER    Endometrial hyperplasia     D&C SCHEDULED 7/25/23    Esophageal varices     Fatty liver 2015    Gastroparesis     TAKES XIFAXAN    GERD (gastroesophageal reflux disease) 1995    Hyperlipidemia     Hypertension     Iron deficiency anemia 05/02/2023    IRON INFUSION LAST 7/24/23    Irritable bowel syndrome     W/CONSTIPATION    BERKOWITZ (nonalcoholic steatohepatitis)     NO S/S CURRENTLY, ELEVATED ENZYMES    Obstructive sleep apnea syndrome     S/P exploratory laparotomy, lysis of adhesions, resection of necrotic colon without anastomosis 04/18/2022       Past Surgical History:   Procedure Laterality Date    ABDOMINAL SURGERY  4/16/2022    BLADDER SLING MODIFIED, ANTERIOR AND POSTERIOR VAGINAL REPAIR      BREAST LUMPECTOMY Left     BENIGN    CARDIAC CATHETERIZATION      NO INTERVENTION    CHOLECYSTECTOMY  5/12/2015    COLON RESECTION      PERFORATED COLON, EEA    COLONOSCOPY N/A 08/14/2023    Procedure: COLONOSCOPY WITH POLYPECTOMY, TATTOO;  Surgeon: Shannan Charles MD;  Location: [facility];  Service: Gastroenterology;  Laterality: N/A;  COLON POLYPS  DIVERTICULOSIS  HEMORRHOIDS    D & C HYSTEROSCOPY N/A 07/25/2023    Procedure: resection of endometrial polyp with myosure;  Surgeon: Ashleigh Harding DO;  Location: [facility] MAIN OR;  Service: Gynecology;  Laterality: N/A;    ENDOSCOPY N/A 06/03/2022    Procedure: ESOPHAGOGASTRODUODENOSCOPY;  Surgeon: Shannan Charles MD;  Location: [facility] ENDOSCOPY;  Service: Gastroenterology;  Laterality: N/A;  RETAINED FOOD IN STOMACH  ESOPHAGEAL VARICIES    EXPLORATORY LAPAROTOMY N/A  04/16/2022    Procedure: EXPLORATORY LAPAROTOMY, LYSIS OF ADHESIONS, RESECTION OF NECTROIC COLON;  Surgeon: Cande Stanley MD;  Location: [facility] MAIN OR;  Service: General;  Laterality: N/A;    GALLBLADDER SURGERY      LAPAROSCOPIC    LIVER BIOPSY  2021    TUBAL ABDOMINAL LIGATION      UPPER GASTROINTESTINAL ENDOSCOPY  6/3/2022         Current Outpatient Medications:     acetaminophen (Tylenol) 325 MG tablet, Take 2 tablets by mouth Every 4 (Four) Hours As Needed for Mild Pain., Disp: 30 tablet, Rfl: 1    albuterol sulfate  (90 Base) MCG/ACT inhaler, Inhale 2 puffs Every 4 (Four) Hours As Needed., Disp: , Rfl:     amLODIPine (NORVASC) 10 MG tablet, Take 1 tablet by mouth Daily., Disp: 90 tablet, Rfl: 3    apixaban (ELIQUIS) 5 MG tablet tablet, Take 1 tablet by mouth 2 (Two) Times a Day., Disp: 180 tablet, Rfl: 3    B-D UF III MINI PEN NEEDLES 31G X 5 MM misc, , Disp: , Rfl:     Blood Glucose Monitoring Suppl (Accu-Chek Guide) w/Device kit, , Disp: , Rfl:     buPROPion XL (WELLBUTRIN XL) 150 MG 24 hr tablet, TAKE 1 TABLET TWICE DAILY, Disp: 180 tablet, Rfl: 0    Cholecalciferol (Vitamin D3) 50 MCG (2000 UT) capsule, Take 3 capsules by mouth Daily. LAST DOSE 7/21/23, Disp: , Rfl:     Continuous Blood Gluc Sensor (Dexcom G6 Sensor), APPLY 1 EACH TOPICALLY EVERY 10 (TEN) DAYS., Disp: , Rfl:     Continuous Blood Gluc Transmit (Dexcom G6 Transmitter) misc, USE FOR 90 DAYS., Disp: , Rfl:     empagliflozin (JARDIANCE) 25 MG tablet tablet, Take 1 tablet by mouth Daily., Disp: , Rfl:     escitalopram (Lexapro) 10 MG tablet, Take 1 tablet by mouth Daily., Disp: 90 tablet, Rfl: 1    Evolocumab (Repatha) solution prefilled syringe injection, Inject 1 mL under the skin into the appropriate area as directed Every 14 (Fourteen) Days., Disp: 6 each, Rfl: 4    famotidine (PEPCID) 40 MG tablet, Take 1 tablet by mouth At Night As Needed for Heartburn., Disp: 90 tablet, Rfl: 2    Fluticasone-Umeclidin-Vilant (Trelegy  Ellipta) 100-62.5-25 MCG/INH inhaler, Inhale 1 puff Daily., Disp: 2 each, Rfl: 0    furosemide (LASIX) 20 MG tablet, Take 1 tablet by mouth Daily., Disp: 90 tablet, Rfl: 3    glucose blood test strip, Check blood glucose twice daily, Disp: 60 each, Rfl: 12    glucose monitor monitoring kit, 1 each Daily. Patient prefers Accu Chek Meter., Disp: 1 each, Rfl: 0    HYDROcodone-acetaminophen (NORCO) 5-325 MG per tablet, Take 1 tablet by mouth Every 6 (Six) Hours As Needed (Pain)., Disp: 6 tablet, Rfl: 0    icosapent ethyl (VASCEPA) 1 g capsule capsule, Take 2 g by mouth 2 (Two) Times a Day With Meals., Disp: 360 capsule, Rfl: 1    Insulin Glargine, 2 Unit Dial, (Toujeo Max SoloStar) 300 UNIT/ML solution pen-injector injection, Inject 122 Units under the skin into the appropriate area as directed Every Morning. INST PER ANESTHESIA PROTOCOL, Disp: , Rfl:     Lancets (accu-chek soft touch) lancets, Check blood sugar twice daily, Disp: 100 each, Rfl: 12    metoprolol tartrate (LOPRESSOR) 50 MG tablet, Take 1.5 tablets by mouth 2 (Two) Times a Day. TAKE 1 1/2 TABLETS BID, Disp: 270 tablet, Rfl: 1    multivitamin with minerals tablet tablet, Take 1 tablet by mouth Daily. LAST DOSE 7/21/23, Disp: , Rfl:     NovoLOG FlexPen 100 UNIT/ML solution pen-injector sc pen, Inject 60 Units under the skin into the appropriate area as directed 2 (Two) Times a Day. PER SLIDING SCALE INST PER ANESTHESIA PROTOCOL, Disp: , Rfl:     olmesartan (BENICAR) 20 MG tablet, Take 1 tablet by mouth Daily. (Patient taking differently: Take 1 tablet by mouth Daily. I), Disp: 90 tablet, Rfl: 1    pantoprazole (PROTONIX) 40 MG EC tablet, Take 1 tablet by mouth Daily., Disp: 90 tablet, Rfl: 2    potassium chloride 10 MEQ CR tablet, Take 1 tablet by mouth Daily., Disp: 90 tablet, Rfl: 3    riFAXIMin (Xifaxan) 550 MG tablet, Take 1 tablet by mouth Every 12 (Twelve) Hours., Disp: 180 tablet, Rfl: 1    Semaglutide, 1 MG/DOSE, (OZEMPIC) 4 MG/3ML solution  "pen-injector, Inject 1 mg under the skin into the appropriate area as directed Every 7 (Seven) Days., Disp: 3 mL, Rfl: 2     Allergies   Allergen Reactions    Liraglutide Nausea And Vomiting    Metformin Nausea Only     GI Upset       Family History   Adopted: Yes   Problem Relation Age of Onset    Malig Hyperthermia Neg Hx         Social History     Social History Narrative    Exercises 1 x week    Lives at home with spouse       Objective       Review of Systems   Constitutional:  Negative for appetite change, fatigue, fever, unexpected weight gain and unexpected weight loss.   HENT:  Negative for trouble swallowing.    Respiratory:  Negative for cough, choking, chest tightness, shortness of breath, wheezing and stridor.    Cardiovascular:  Negative for chest pain, palpitations and leg swelling.   Gastrointestinal:  Positive for abdominal distention, constipation and nausea. Negative for abdominal pain, anal bleeding, blood in stool, diarrhea, rectal pain, vomiting, GERD and indigestion.      Vital Signs:   /61 (BP Location: Left arm, Patient Position: Sitting, Cuff Size: Adult)   Pulse 88   Ht 165.1 cm (65\")   Wt 104 kg (229 lb 9.6 oz)   BMI 38.21 kg/mý       Physical Exam  Constitutional:       General: She is not in acute distress.     Appearance: She is well-developed. She is not ill-appearing.   HENT:      Head: Normocephalic.   Eyes:      Pupils: Pupils are equal, round, and reactive to light.   Cardiovascular:      Rate and Rhythm: Normal rate and regular rhythm.      Heart sounds: Normal heart sounds.   Pulmonary:      Effort: Pulmonary effort is normal.      Breath sounds: Normal breath sounds.   Abdominal:      General: Bowel sounds are normal. There is distension.      Palpations: Abdomen is soft. There is no mass.      Tenderness: There is no abdominal tenderness. There is no guarding or rebound.      Hernia: No hernia is present.   Musculoskeletal:         General: Normal range of motion. "   Skin:     General: Skin is warm and dry.   Neurological:      Mental Status: She is alert and oriented to person, place, and time.   Psychiatric:         Speech: Speech normal.         Behavior: Behavior normal.         Judgment: Judgment normal.         Assessment & Plan          Assessment and Plan    Diagnoses and all orders for this visit:    1. Diabetic gastroparesis (Primary)  -     Ambulatory Referral to Nephrology    2. Hepatic encephalopathy  -     riFAXIMin (Xifaxan) 550 MG tablet; Take 1 tablet by mouth Every 12 (Twelve) Hours.  Dispense: 180 tablet; Refill: 1  -     Ambulatory Referral to Nephrology    3. BERKOWITZ (nonalcoholic steatohepatitis)  -     Ambulatory Referral to Nephrology    4. Non-alcoholic cirrhosis  -     Ambulatory Referral to Nephrology    5. Colindres's esophagus without dysplasia    6. Secondary esophageal varices without bleeding    7. History of adenomatous polyp of colon      Cirrhosis: BERKOWITZ MELD 9  Ascites: None noted  Varices: Grade 2 esophageal varices noted on most recent EGD.  Encephalopathy: Patient reports she is unable to tolerate lactulose.   On Xifaxan and MiraLAX.  Health maintenance: Next surveillance EGD due 6/24.  Next colonoscopy due in 1 year.    Reviewed most recent colonoscopy results with her today.  Due to the number of polyps found she will need repeat colonoscopy in 1 year.  Next surveillance EGD will be due in June of next year.  It makes sense to do a double at that time.  Cirrhosis appears stable at this time.  No ascites or pedal edema noted.  Continue diuretics.  Continue 2 g sodium diet.  Patient to follow-up with hematology as planned.  Given her history would recommend referral to nephrology.  Patient is agreeable to the referral.  Patient to keep telehealth visit with the [facility] GI motility clinic tomorrow as planned.  Gastroparesis still does not sound well controlled.  Still having sulfur burps.  Recommend she take the MiraLAX daily.   Okay to use Pepto-Bismol OTC as needed.  GERD seems well controlled on current medications.  Continue GERD precautions.  Patient is on Ozempic and I do wonder how much that is affecting her gastroparesis and constipation?  Patient to talk to endocrine about this.  Continue Xifaxan twice daily.  Labs are stable.  Imaging stable.  Patient to call the office with any issues.  Patient to follow-up with me in 3 months.  Patient is agreeable to the plan.      Follow Up       Follow Up   Return in about 3 months (around 11/29/2023) for GASTROPARESIS, CIRRHOSIS.  Patient was given instructions and counseling regarding her condition or for health maintenance advice. Please see specific information pulled into the AVS if appropriate.

## 2023-08-30 RX ORDER — EVOLOCUMAB 140 MG/ML
140 INJECTION, SOLUTION SUBCUTANEOUS
Qty: 6 EACH | Refills: 4 | Status: SHIPPED | OUTPATIENT
Start: 2023-08-30

## 2023-08-31 ENCOUNTER — OFFICE VISIT (OUTPATIENT)
Dept: OBSTETRICS AND GYNECOLOGY | Facility: CLINIC | Age: 63
End: 2023-08-31
Payer: COMMERCIAL

## 2023-08-31 VITALS
SYSTOLIC BLOOD PRESSURE: 143 MMHG | WEIGHT: 228 LBS | DIASTOLIC BLOOD PRESSURE: 72 MMHG | HEART RATE: 84 BPM | BODY MASS INDEX: 37.94 KG/M2

## 2023-08-31 DIAGNOSIS — Z09 POSTOPERATIVE FOLLOW-UP: Primary | ICD-10-CM

## 2023-08-31 PROCEDURE — 99024 POSTOP FOLLOW-UP VISIT: CPT | Performed by: OBSTETRICS & GYNECOLOGY

## 2023-09-05 ENCOUNTER — LAB (OUTPATIENT)
Dept: LAB | Facility: HOSPITAL | Age: 63
End: 2023-09-05
Payer: COMMERCIAL

## 2023-09-05 DIAGNOSIS — D64.9 ANEMIA, UNSPECIFIED TYPE: ICD-10-CM

## 2023-09-05 DIAGNOSIS — I48.0 PAROXYSMAL ATRIAL FIBRILLATION: ICD-10-CM

## 2023-09-05 DIAGNOSIS — R06.02 SHORTNESS OF BREATH: ICD-10-CM

## 2023-09-05 LAB
ALBUMIN SERPL-MCNC: 4.1 G/DL (ref 3.5–5.2)
ALBUMIN/GLOB SERPL: 0.8 G/DL
ALP SERPL-CCNC: 120 U/L (ref 39–117)
ALT SERPL W P-5'-P-CCNC: 76 U/L (ref 1–33)
ANION GAP SERPL CALCULATED.3IONS-SCNC: 12.6 MMOL/L (ref 5–15)
AST SERPL-CCNC: 83 U/L (ref 1–32)
BASOPHILS # BLD AUTO: 0.06 10*3/MM3 (ref 0–0.2)
BASOPHILS NFR BLD AUTO: 0.7 % (ref 0–1.5)
BILIRUB SERPL-MCNC: 0.4 MG/DL (ref 0–1.2)
BUN SERPL-MCNC: 9 MG/DL (ref 8–23)
BUN/CREAT SERPL: 10.2 (ref 7–25)
CALCIUM SPEC-SCNC: 9.6 MG/DL (ref 8.6–10.5)
CHLORIDE SERPL-SCNC: 104 MMOL/L (ref 98–107)
CO2 SERPL-SCNC: 21.4 MMOL/L (ref 22–29)
CREAT SERPL-MCNC: 0.88 MG/DL (ref 0.57–1)
DEPRECATED RDW RBC AUTO: 72.2 FL (ref 37–54)
EGFRCR SERPLBLD CKD-EPI 2021: 74.9 ML/MIN/1.73
EOSINOPHIL # BLD AUTO: 0.12 10*3/MM3 (ref 0–0.4)
EOSINOPHIL NFR BLD AUTO: 1.3 % (ref 0.3–6.2)
ERYTHROCYTE [DISTWIDTH] IN BLOOD BY AUTOMATED COUNT: 25 % (ref 12.3–15.4)
FERRITIN SERPL-MCNC: 151 NG/ML (ref 13–150)
GLOBULIN UR ELPH-MCNC: 4.9 GM/DL
GLUCOSE SERPL-MCNC: 148 MG/DL (ref 65–99)
HCT VFR BLD AUTO: 41 % (ref 34–46.6)
HGB BLD-MCNC: 13.6 G/DL (ref 12–15.9)
IMM GRANULOCYTES # BLD AUTO: 0.02 10*3/MM3 (ref 0–0.05)
IMM GRANULOCYTES NFR BLD AUTO: 0.2 % (ref 0–0.5)
IRON 24H UR-MRATE: 56 MCG/DL (ref 37–145)
IRON SATN MFR SERPL: 14 % (ref 20–50)
LYMPHOCYTES # BLD AUTO: 3.84 10*3/MM3 (ref 0.7–3.1)
LYMPHOCYTES NFR BLD AUTO: 42.8 % (ref 19.6–45.3)
MCH RBC QN AUTO: 27.1 PG (ref 26.6–33)
MCHC RBC AUTO-ENTMCNC: 33.2 G/DL (ref 31.5–35.7)
MCV RBC AUTO: 81.7 FL (ref 79–97)
MONOCYTES # BLD AUTO: 0.59 10*3/MM3 (ref 0.1–0.9)
MONOCYTES NFR BLD AUTO: 6.6 % (ref 5–12)
NEUTROPHILS NFR BLD AUTO: 4.34 10*3/MM3 (ref 1.7–7)
NEUTROPHILS NFR BLD AUTO: 48.4 % (ref 42.7–76)
NRBC BLD AUTO-RTO: 0 /100 WBC (ref 0–0.2)
PLATELET # BLD AUTO: 275 10*3/MM3 (ref 140–450)
PMV BLD AUTO: 10.7 FL (ref 6–12)
POTASSIUM SERPL-SCNC: 4.1 MMOL/L (ref 3.5–5.2)
PROT SERPL-MCNC: 9 G/DL (ref 6–8.5)
RBC # BLD AUTO: 5.02 10*6/MM3 (ref 3.77–5.28)
SODIUM SERPL-SCNC: 138 MMOL/L (ref 136–145)
TIBC SERPL-MCNC: 396 MCG/DL (ref 298–536)
TRANSFERRIN SERPL-MCNC: 266 MG/DL (ref 200–360)
WBC NRBC COR # BLD: 8.97 10*3/MM3 (ref 3.4–10.8)

## 2023-09-05 PROCEDURE — 80053 COMPREHEN METABOLIC PANEL: CPT

## 2023-09-05 PROCEDURE — 85025 COMPLETE CBC W/AUTO DIFF WBC: CPT

## 2023-09-05 PROCEDURE — 82728 ASSAY OF FERRITIN: CPT

## 2023-09-05 PROCEDURE — 84466 ASSAY OF TRANSFERRIN: CPT

## 2023-09-05 PROCEDURE — 83540 ASSAY OF IRON: CPT

## 2023-09-05 PROCEDURE — 36415 COLL VENOUS BLD VENIPUNCTURE: CPT

## 2023-09-13 ENCOUNTER — TELEPHONE (OUTPATIENT)
Dept: GASTROENTEROLOGY | Facility: CLINIC | Age: 63
End: 2023-09-13
Payer: COMMERCIAL

## 2023-09-13 ENCOUNTER — OFFICE VISIT (OUTPATIENT)
Dept: FAMILY MEDICINE CLINIC | Facility: CLINIC | Age: 63
End: 2023-09-13
Payer: COMMERCIAL

## 2023-09-13 VITALS
DIASTOLIC BLOOD PRESSURE: 79 MMHG | SYSTOLIC BLOOD PRESSURE: 135 MMHG | BODY MASS INDEX: 38.19 KG/M2 | RESPIRATION RATE: 18 BRPM | WEIGHT: 229.2 LBS | HEIGHT: 65 IN | HEART RATE: 80 BPM | OXYGEN SATURATION: 99 %

## 2023-09-13 DIAGNOSIS — Z00.00 ANNUAL PHYSICAL EXAM: ICD-10-CM

## 2023-09-13 DIAGNOSIS — K21.9 GASTROESOPHAGEAL REFLUX DISEASE WITHOUT ESOPHAGITIS: Primary | ICD-10-CM

## 2023-09-13 DIAGNOSIS — F32.5 MAJOR DEPRESSIVE DISORDER WITH SINGLE EPISODE, IN FULL REMISSION: Chronic | ICD-10-CM

## 2023-09-13 RX ORDER — BUPROPION HYDROCHLORIDE 150 MG/1
150 TABLET ORAL 2 TIMES DAILY
Qty: 180 TABLET | Refills: 1 | Status: SHIPPED | OUTPATIENT
Start: 2023-09-13

## 2023-09-13 RX ORDER — FAMOTIDINE 40 MG/1
40 TABLET, FILM COATED ORAL NIGHTLY PRN
Qty: 90 TABLET | Refills: 1 | Status: SHIPPED | OUTPATIENT
Start: 2023-09-13

## 2023-09-13 RX ORDER — ESCITALOPRAM OXALATE 20 MG/1
20 TABLET ORAL DAILY
Qty: 90 TABLET | Refills: 1 | Status: SHIPPED | OUTPATIENT
Start: 2023-09-13

## 2023-09-13 NOTE — PROGRESS NOTES
Chief Complaint  Chief Complaint   Patient presents with    Annual Exam       Subjective          Anika Casillas presents to [facility] for annual physical.     History of Present Illness    Patient has had numerous specialty appts since our last followup:  --5 iron infusions/Dr. Ortega Hem/onc  --D&C  --needs cataract surgery  --has upcoming appt with motility clinic (EGD with trial of Botox)  --last A1c in July 8.2  --referred to Dr. Chen for neuropsych testing: mild cognitive impairment per patient report    HTN: Patient presents for hypertension management. Patient is currently taking Amlodipine, Lopressor and is consistent with medication. Patient denies chest pain, shortness of air and edema. Patient does not check blood pressure at home.    Depression: Patient is currently on Wellbutrin, Lexapro. Patient states that symptoms of depression are not well controlled. Patient is feeling tired with all of her medical appts and conditions.    A-fib: Pt is currently on Eliquis for A-fib. Pt is established with Cardiology.    DMII: Patient is compliant with medications. Patient has had a recent eye exam (2 weeks ago). Patient denies extreme high and low blood sugars. Patient denies any unhealing sores. Patient attempts to monitor carbohydrate/ sugar intake in diet. Patient is monitored by Endo.    HL: Patient presents for management of hyperlipidemia. Patient is currently on Vascepa. Patient denies muscle cramps and muscle weakness. Patient is monitoring diet to help lower lipids.    Colon: 8/14/23  Mammo: 8/22/23    Medical History: has a past medical history of Abnormal Pap smear of cervix, Atrial fibrillation, Colindres's esophagus, Depression, Diabetes mellitus, Emphysema lung, Endometrial hyperplasia, Esophageal varices, Fatty liver (2015), Gastroparesis, GERD (gastroesophageal reflux disease) (1995), Hyperlipidemia, Hypertension, Iron deficiency anemia (05/02/2023), Irritable  bowel syndrome, BERKOWITZ (nonalcoholic steatohepatitis), Obstructive sleep apnea syndrome, and S/P exploratory laparotomy, lysis of adhesions, resection of necrotic colon without anastomosis (04/18/2022).   Surgical History: has a past surgical history that includes Gallbladder surgery; Breast lumpectomy (Left); Cardiac catheterization; bladder sling modified, anterior and posterior vaginal repair; Tubal ligation; Exploratory Laparotomy (N/A, 04/16/2022); Esophagogastroduodenoscopy (N/A, 06/03/2022); Colectomy; d & c hysteroscopy (N/A, 07/25/2023); Colonoscopy (N/A, 08/14/2023); Abdominal surgery (4/16/2022); Cholecystectomy (5/12/2015); Upper gastrointestinal endoscopy (6/3/2022); and Liver biopsy (2021).   Family History: family history is not on file. She was adopted.   Social History: reports that she has been smoking cigarettes. She started smoking about 48 years ago. She has a 10.25 pack-year smoking history. She has never used smokeless tobacco. She reports current alcohol use of about 1.0 standard drink per week. She reports that she does not use drugs.    Allergies: Liraglutide and Metformin    Health Maintenance Due   Topic Date Due    ANNUAL PHYSICAL  Never done    DIABETIC FOOT EXAM  Never done    DIABETIC EYE EXAM  12/08/2021       Immunization History   Administered Date(s) Administered    COVID-19 (PFIZER) Purple Cap Monovalent 02/06/2021, 03/06/2021, 12/04/2021    Flu Vaccine Intradermal Quad 18-64YR 09/12/2021, 09/12/2021    Fluzone (or Fluarix & Flulaval for VFC) >6mos 10/29/2019, 09/27/2020, 10/03/2022    Influenza Split Preservative Free ID 09/12/2021    Influenza, Unspecified 11/12/2018, 09/27/2020    Pneumococcal Conjugate 13-Valent (PCV13) 01/09/2017    Pneumococcal Polysaccharide (PPSV23) 10/12/2020    flucelvax quad pfs =>4 YRS 09/27/2020       Objective     Vitals:    09/13/23 1208   BP: 135/79   BP Location: Right arm   Patient Position: Sitting   Cuff Size: Adult   Pulse: 80   Resp: 18  "  SpO2: 99%   Weight: 104 kg (229 lb 3.2 oz)   Height: 165.1 cm (65\")     Body mass index is 38.14 kg/m².     Wt Readings from Last 3 Encounters:   09/13/23 104 kg (229 lb 3.2 oz)   08/31/23 103 kg (228 lb)   08/29/23 104 kg (229 lb 9.6 oz)     BP Readings from Last 3 Encounters:   09/13/23 135/79   08/31/23 143/72   08/29/23 145/61       Patient Care Team:  Elena Henderson PA as PCP - General (Family Medicine)  KATHARINE Webb MD as Consulting Physician (Cardiology)  Cande Stanley MD as Consulting Physician (General Surgery)  Migdalia Sanon APRN as Nurse Practitioner (Nurse Practitioner)    Physical Exam  Vitals and nursing note reviewed.   Constitutional:       Appearance: Normal appearance. She is obese.   HENT:      Head: Normocephalic and atraumatic.   Neck:      Vascular: No carotid bruit.      Comments: Thyroid : gland size normal, nontender, no nodules or masses present on palpation   Cardiovascular:      Rate and Rhythm: Normal rate and regular rhythm.      Pulses: Normal pulses.      Heart sounds: Normal heart sounds.   Pulmonary:      Effort: Pulmonary effort is normal.      Breath sounds: Normal breath sounds.   Musculoskeletal:      Cervical back: Neck supple. No tenderness.      Right lower leg: No edema.      Left lower leg: No edema.   Lymphadenopathy:      Cervical: No cervical adenopathy.   Neurological:      Mental Status: She is alert.   Psychiatric:         Mood and Affect: Mood normal.         Behavior: Behavior normal.         Result Review :                      Lab on 09/05/2023   Component Date Value Ref Range Status    Glucose 09/05/2023 148 (H)  65 - 99 mg/dL Final    BUN 09/05/2023 9  8 - 23 mg/dL Final    Creatinine 09/05/2023 0.88  0.57 - 1.00 mg/dL Final    Sodium 09/05/2023 138  136 - 145 mmol/L Final    Potassium 09/05/2023 4.1  3.5 - 5.2 mmol/L Final    Chloride 09/05/2023 104  98 - 107 mmol/L Final    CO2 09/05/2023 21.4 (L)  22.0 - 29.0 mmol/L Final    " Calcium 09/05/2023 9.6  8.6 - 10.5 mg/dL Final    Total Protein 09/05/2023 9.0 (H)  6.0 - 8.5 g/dL Final    Albumin 09/05/2023 4.1  3.5 - 5.2 g/dL Final    ALT (SGPT) 09/05/2023 76 (H)  1 - 33 U/L Final    AST (SGOT) 09/05/2023 83 (H)  1 - 32 U/L Final    Alkaline Phosphatase 09/05/2023 120 (H)  39 - 117 U/L Final    Total Bilirubin 09/05/2023 0.4  0.0 - 1.2 mg/dL Final    Globulin 09/05/2023 4.9  gm/dL Final    A/G Ratio 09/05/2023 0.8  g/dL Final    BUN/Creatinine Ratio 09/05/2023 10.2  7.0 - 25.0 Final    Anion Gap 09/05/2023 12.6  5.0 - 15.0 mmol/L Final    eGFR 09/05/2023 74.9  >60.0 mL/min/1.73 Final    Ferritin 09/05/2023 151.00 (H)  13.00 - 150.00 ng/mL Final    Iron 09/05/2023 56  37 - 145 mcg/dL Final    Iron Saturation (TSAT) 09/05/2023 14 (L)  20 - 50 % Final    Transferrin 09/05/2023 266  200 - 360 mg/dL Final    TIBC 09/05/2023 396  298 - 536 mcg/dL Final    WBC 09/05/2023 8.97  3.40 - 10.80 10*3/mm3 Final    RBC 09/05/2023 5.02  3.77 - 5.28 10*6/mm3 Final    Hemoglobin 09/05/2023 13.6  12.0 - 15.9 g/dL Final    Hematocrit 09/05/2023 41.0  34.0 - 46.6 % Final    MCV 09/05/2023 81.7  79.0 - 97.0 fL Final    MCH 09/05/2023 27.1  26.6 - 33.0 pg Final    MCHC 09/05/2023 33.2  31.5 - 35.7 g/dL Final    RDW 09/05/2023 25.0 (H)  12.3 - 15.4 % Final    RDW-SD 09/05/2023 72.2 (H)  37.0 - 54.0 fl Final    MPV 09/05/2023 10.7  6.0 - 12.0 fL Final    Platelets 09/05/2023 275  140 - 450 10*3/mm3 Final    Neutrophil % 09/05/2023 48.4  42.7 - 76.0 % Final    Lymphocyte % 09/05/2023 42.8  19.6 - 45.3 % Final    Monocyte % 09/05/2023 6.6  5.0 - 12.0 % Final    Eosinophil % 09/05/2023 1.3  0.3 - 6.2 % Final    Basophil % 09/05/2023 0.7  0.0 - 1.5 % Final    Immature Grans % 09/05/2023 0.2  0.0 - 0.5 % Final    Neutrophils, Absolute 09/05/2023 4.34  1.70 - 7.00 10*3/mm3 Final    Lymphocytes, Absolute 09/05/2023 3.84 (H)  0.70 - 3.10 10*3/mm3 Final    Monocytes, Absolute 09/05/2023 0.59  0.10 - 0.90 10*3/mm3 Final     Eosinophils, Absolute 09/05/2023 0.12  0.00 - 0.40 10*3/mm3 Final    Basophils, Absolute 09/05/2023 0.06  0.00 - 0.20 10*3/mm3 Final    Immature Grans, Absolute 09/05/2023 0.02  0.00 - 0.05 10*3/mm3 Final    nRBC 09/05/2023 0.0  0.0 - 0.2 /100 WBC Final   Admission on 08/14/2023, Discharged on 08/14/2023   Component Date Value Ref Range Status    Glucose 08/14/2023 177 (H)  70 - 99 mg/dL Final    Serial Number: [id number]:  [id number]    Case Report 08/14/2023    Final                    Value:Surgical Pathology Report                         Case: [id number]                                  Authorizing Provider:  Shannan Charles MD Collected:           08/14/2023 08:52 AM          Ordering Location:     [facility] Received:            08/14/2023 09:44 AM                                 SUITES                                                                       Pathologist:           Willow Marcus DO                                                       Specimens:   1) - Large Intestine, Cecum, Cecal Polyp                                                            2) - Large Intestine, Right / Ascending Colon, Ascending Colon Polyps                               3) - Large Intestine, Transverse Colon, Transverse Colon Polyps                                     4) - Large Intestine, Left / Descending Colon, DESCENDING COLON POLYP                               5) - Large Intestine, Sigmoid Colon, SIGMOID COLON POLYPS                                  Clinical Information 08/14/2023    Final                    Value:This result contains rich text formatting which cannot be displayed here.    Final Diagnosis 08/14/2023    Final                    Value:This result contains rich text formatting which cannot be displayed here.    Gross Description 08/14/2023    Final                    Value:This result contains rich text formatting which cannot be displayed here.    Microscopic  Description 08/14/2023    Final                    Value:This result contains rich text formatting which cannot be displayed here.          Assessment and Plan      Diagnoses and all orders for this visit:    1. Gastroesophageal reflux disease without esophagitis (Primary)  Comments:  Stable on Protonix 40mg and Pepcid 40mg daily; continue and continue with GI.  Orders:  -     famotidine (PEPCID) 40 MG tablet; Take 1 tablet by mouth At Night As Needed for Heartburn.  Dispense: 90 tablet; Refill: 1    2. Major depressive disorder with single episode, in full remission  Comments:  Not stable; continue with Wellbutrin XL 150mg twice daily; increase Lexapro to 20mg daily.  Orders:  -     escitalopram (Lexapro) 20 MG tablet; Take 1 tablet by mouth Daily.  Dispense: 90 tablet; Refill: 1  -     buPROPion XL (WELLBUTRIN XL) 150 MG 24 hr tablet; Take 1 tablet by mouth 2 (Two) Times a Day.  Dispense: 180 tablet; Refill: 1    3. Annual physical exam      The patient is advised to quit smoking, begin progressive daily aerobic exercise program, follow a low fat, low cholesterol diet, attempt to lose weight, reduce exposure to stress, improve dietary compliance, continue current medications, continue current healthy lifestyle patterns, and return for routine annual checkups.      Follow Up     No follow-ups on file.    Patient was given instructions and counseling regarding her condition or for health maintenance advice. Please see specific information pulled into the AVS if appropriate.

## 2023-09-19 ENCOUNTER — TELEPHONE (OUTPATIENT)
Dept: CARDIOLOGY | Facility: CLINIC | Age: 63
End: 2023-09-19
Payer: COMMERCIAL

## 2023-09-19 NOTE — TELEPHONE ENCOUNTER
[facility] received a fax that requires your attention. The document has been indexed to the patient’s chart for your review.      Reason for sending: EXTERNAL MEDICAL RECORD NOTIFICATION     Documents Description: MEDS-AMGEN PRESCRIPTION FORM-9.19.23    Name of Sender: [facility]     Date Indexed: 9.19.23

## 2023-09-26 ENCOUNTER — TELEPHONE (OUTPATIENT)
Dept: CARDIOLOGY | Facility: CLINIC | Age: 63
End: 2023-09-26
Payer: COMMERCIAL

## 2023-09-26 RX ORDER — EVOLOCUMAB 140 MG/ML
140 INJECTION, SOLUTION SUBCUTANEOUS
Qty: 6 EACH | Refills: 4 | Status: SHIPPED | OUTPATIENT
Start: 2023-09-26

## 2023-09-26 NOTE — TELEPHONE ENCOUNTER
Incomig call from sharda hendrickson the care coordinator for [facility] and asked if we could change pt repatha to go throught [facility] ([place]) - [address] [phone] instead of [facility]acy #[id number] - [place], [place] - [address] - [phone]     Matches lov 8/1/23  Nov 12/6/23

## 2023-10-05 ENCOUNTER — OFFICE VISIT (OUTPATIENT)
Dept: FAMILY MEDICINE CLINIC | Facility: CLINIC | Age: 63
End: 2023-10-05
Payer: COMMERCIAL

## 2023-10-05 VITALS
RESPIRATION RATE: 20 BRPM | BODY MASS INDEX: 38.35 KG/M2 | WEIGHT: 230.2 LBS | HEART RATE: 84 BPM | SYSTOLIC BLOOD PRESSURE: 152 MMHG | OXYGEN SATURATION: 100 % | HEIGHT: 65 IN | DIASTOLIC BLOOD PRESSURE: 81 MMHG

## 2023-10-05 DIAGNOSIS — Z23 NEED FOR INFLUENZA VACCINATION: ICD-10-CM

## 2023-10-05 DIAGNOSIS — F32.A DEPRESSION, UNSPECIFIED DEPRESSION TYPE: Primary | Chronic | ICD-10-CM

## 2023-10-05 PROBLEM — N28.1 SIMPLE RENAL CYST: Status: ACTIVE | Noted: 2023-09-15

## 2023-10-05 PROBLEM — R60.0 LOCALIZED EDEMA: Status: ACTIVE | Noted: 2023-09-15

## 2023-10-05 NOTE — PROGRESS NOTES
Chief Complaint  Chief Complaint   Patient presents with    Depression       Subjective          Anika Casillas presents to [facility] for worsening depression.     History of Present Illness    Depression: Patient is currently on Wellbutrin and Lexapro. Patient states that her depressive symptoms have worsened.  Lexapro increased to 20 mg on 9/13/23; at last office visit.  Patient states that she has made mistakes at work which is causing her increased anxiety.  Patient had to have a conversation with boss over mistake on Monday, October 2nd.  Patient got quite upset.  However after discussing today patient realizes that increased dose of Lexapro is still in progress of working.  Patient denies thoughts of self-harm. Discussed symptoms with patient at length. Discussed counseling or f/u here as needed. Patient encouraged that she is needed at work and doing a good job.      Medical History: has a past medical history of Abnormal Pap smear of cervix, Atrial fibrillation, Colindres's esophagus, Depression, Diabetes mellitus, Emphysema lung, Endometrial hyperplasia, Esophageal varices, Fatty liver (2015), Gastroparesis, GERD (gastroesophageal reflux disease) (1995), Hyperlipidemia, Hypertension, Iron deficiency anemia (05/02/2023), Irritable bowel syndrome, BERKOWITZ (nonalcoholic steatohepatitis), Obstructive sleep apnea syndrome, and S/P exploratory laparotomy, lysis of adhesions, resection of necrotic colon without anastomosis (04/18/2022).   Surgical History: has a past surgical history that includes Gallbladder surgery; Breast lumpectomy (Left); Cardiac catheterization; bladder sling modified, anterior and posterior vaginal repair; Tubal ligation; Exploratory Laparotomy (N/A, 04/16/2022); Esophagogastroduodenoscopy (N/A, 06/03/2022); Colectomy; d & c hysteroscopy (N/A, 07/25/2023); Colonoscopy (N/A, 08/14/2023); Abdominal surgery (4/16/2022); Cholecystectomy (5/12/2015); Upper  "gastrointestinal endoscopy (6/3/2022); and Liver biopsy (2021).   Family History: family history is not on file. She was adopted.   Social History: reports that she has been smoking cigarettes. She started smoking about 48 years ago. She has a 10.25 pack-year smoking history. She has never used smokeless tobacco. She reports current alcohol use of about 1.0 standard drink of alcohol per week. She reports that she does not use drugs.    Allergies: Lisinopril, Liraglutide, and Metformin    Health Maintenance Due   Topic Date Due    DIABETIC FOOT EXAM  Never done    DIABETIC EYE EXAM  12/08/2021    Hepatitis B (1 of 3 - Risk 3-dose series) Never done       Immunization History   Administered Date(s) Administered    COVID-19 (PFIZER) Purple Cap Monovalent 02/06/2021, 03/06/2021, 12/04/2021    Flu Vaccine Intradermal Quad 18-64YR 09/12/2021, 09/12/2021    Fluzone (or Fluarix & Flulaval for VFC) >6mos 10/29/2019, 09/27/2020, 10/03/2022, 10/05/2023    Influenza Split Preservative Free ID 09/12/2021    Influenza, Unspecified 11/12/2018, 09/27/2020    Pneumococcal Conjugate 13-Valent (PCV13) 01/09/2017    Pneumococcal Polysaccharide (PPSV23) 10/12/2020    flucelvax quad pfs =>4 YRS 09/27/2020       Objective     Vitals:    10/05/23 0859   BP: 152/81   BP Location: Right arm   Patient Position: Sitting   Cuff Size: Large Adult   Pulse: 84   Resp: 20   SpO2: 100%   Weight: 104 kg (230 lb 3.2 oz)   Height: 165.1 cm (65\")     Body mass index is 38.31 kg/m².     Wt Readings from Last 3 Encounters:   10/05/23 104 kg (230 lb 3.2 oz)   09/13/23 104 kg (229 lb 3.2 oz)   08/31/23 103 kg (228 lb)     BP Readings from Last 3 Encounters:   10/05/23 152/81   09/13/23 135/79   08/31/23 143/72       Patient Care Team:  Elena Henderson PA as PCP - General (Family Medicine)  KATHARINE Webb MD as Consulting Physician (Cardiology)  Cande Stanley MD as Consulting Physician (General Surgery)  Migdalia Sanon APRN as Nurse " Practitioner (Nurse Practitioner)    Physical Exam  Vitals and nursing note reviewed.   Constitutional:       Appearance: Normal appearance. She is obese.   HENT:      Head: Normocephalic and atraumatic.   Neck:      Vascular: No carotid bruit.      Comments: Thyroid : gland size normal, nontender, no nodules or masses present on palpation   Cardiovascular:      Rate and Rhythm: Normal rate and regular rhythm.      Pulses: Normal pulses.      Heart sounds: Normal heart sounds.   Pulmonary:      Effort: Pulmonary effort is normal.      Breath sounds: Normal breath sounds.   Musculoskeletal:      Cervical back: Neck supple. No tenderness.   Lymphadenopathy:      Cervical: No cervical adenopathy.   Neurological:      Mental Status: She is alert.   Psychiatric:         Mood and Affect: Mood normal. Mood is depressed.         Behavior: Behavior normal.           Result Review :                           Assessment and Plan      Diagnoses and all orders for this visit:    1. Depression, unspecified depression type (Primary)  Comments:  Not stable: Continue with Wellbutrin  mg twice daily; increased Lexapro to 20 Mg daily at last ov.  Cont. w/ current regimen, consider therapy. F/u prn    2. Need for influenza vaccination  -     Fluzone (or Fluarix & Flulaval for VFC) >6mos        I spent 26 minutes caring for Anika on this date of service. This time includes time spent by me in the following activities:preparing for the visit, obtaining and/or reviewing a separately obtained history, performing a medically appropriate examination and/or evaluation , counseling and educating the patient/family/caregiver, documenting information in the medical record, and care coordination    Follow Up     Return as needed.    Patient was given instructions and counseling regarding her condition or for health maintenance advice. Please see specific information pulled into the AVS if appropriate.

## 2023-10-25 DIAGNOSIS — K22.70 BARRETT'S ESOPHAGUS WITHOUT DYSPLASIA: ICD-10-CM

## 2023-10-25 RX ORDER — PANTOPRAZOLE SODIUM 40 MG/1
40 TABLET, DELAYED RELEASE ORAL DAILY
Qty: 90 TABLET | Refills: 2 | Status: SHIPPED | OUTPATIENT
Start: 2023-10-25

## 2023-10-26 ENCOUNTER — TELEPHONE (OUTPATIENT)
Dept: CARDIOLOGY | Facility: CLINIC | Age: 63
End: 2023-10-26
Payer: COMMERCIAL

## 2023-11-01 ENCOUNTER — LAB (OUTPATIENT)
Dept: ONCOLOGY | Facility: HOSPITAL | Age: 63
End: 2023-11-01
Payer: COMMERCIAL

## 2023-11-01 ENCOUNTER — OFFICE VISIT (OUTPATIENT)
Dept: ONCOLOGY | Facility: HOSPITAL | Age: 63
End: 2023-11-01
Payer: COMMERCIAL

## 2023-11-01 VITALS
RESPIRATION RATE: 18 BRPM | DIASTOLIC BLOOD PRESSURE: 75 MMHG | HEIGHT: 65 IN | OXYGEN SATURATION: 95 % | TEMPERATURE: 97.6 F | WEIGHT: 233.91 LBS | BODY MASS INDEX: 38.97 KG/M2 | SYSTOLIC BLOOD PRESSURE: 149 MMHG | HEART RATE: 78 BPM

## 2023-11-01 DIAGNOSIS — D64.9 ANEMIA, UNSPECIFIED TYPE: ICD-10-CM

## 2023-11-01 DIAGNOSIS — D64.9 ANEMIA, UNSPECIFIED TYPE: Primary | ICD-10-CM

## 2023-11-01 PROBLEM — I85.00 ESOPHAGEAL VARICES WITHOUT BLEEDING: Status: ACTIVE | Noted: 2022-01-13

## 2023-11-01 LAB
BASOPHILS # BLD AUTO: 0.05 10*3/MM3 (ref 0–0.2)
BASOPHILS NFR BLD AUTO: 0.7 % (ref 0–1.5)
DEPRECATED RDW RBC AUTO: 56.2 FL (ref 37–54)
EOSINOPHIL # BLD AUTO: 0.11 10*3/MM3 (ref 0–0.4)
EOSINOPHIL NFR BLD AUTO: 1.5 % (ref 0.3–6.2)
ERYTHROCYTE [DISTWIDTH] IN BLOOD BY AUTOMATED COUNT: 17.9 % (ref 12.3–15.4)
FERRITIN SERPL-MCNC: 104.3 NG/ML (ref 13–150)
HCT VFR BLD AUTO: 35.9 % (ref 34–46.6)
HGB BLD-MCNC: 12.5 G/DL (ref 12–15.9)
IMM GRANULOCYTES # BLD AUTO: 0.01 10*3/MM3 (ref 0–0.05)
IMM GRANULOCYTES NFR BLD AUTO: 0.1 % (ref 0–0.5)
IRON 24H UR-MRATE: 57 MCG/DL (ref 37–145)
IRON SATN MFR SERPL: 14 % (ref 20–50)
LYMPHOCYTES # BLD AUTO: 2.37 10*3/MM3 (ref 0.7–3.1)
LYMPHOCYTES NFR BLD AUTO: 32.8 % (ref 19.6–45.3)
MCH RBC QN AUTO: 29.5 PG (ref 26.6–33)
MCHC RBC AUTO-ENTMCNC: 34.8 G/DL (ref 31.5–35.7)
MCV RBC AUTO: 84.7 FL (ref 79–97)
MONOCYTES # BLD AUTO: 0.44 10*3/MM3 (ref 0.1–0.9)
MONOCYTES NFR BLD AUTO: 6.1 % (ref 5–12)
NEUTROPHILS NFR BLD AUTO: 4.24 10*3/MM3 (ref 1.7–7)
NEUTROPHILS NFR BLD AUTO: 58.8 % (ref 42.7–76)
PLATELET # BLD AUTO: 243 10*3/MM3 (ref 140–450)
PMV BLD AUTO: 10 FL (ref 6–12)
RBC # BLD AUTO: 4.24 10*6/MM3 (ref 3.77–5.28)
TIBC SERPL-MCNC: 417 MCG/DL (ref 298–536)
TRANSFERRIN SERPL-MCNC: 280 MG/DL (ref 200–360)
WBC NRBC COR # BLD: 7.22 10*3/MM3 (ref 3.4–10.8)

## 2023-11-01 PROCEDURE — 82728 ASSAY OF FERRITIN: CPT

## 2023-11-01 PROCEDURE — 85025 COMPLETE CBC W/AUTO DIFF WBC: CPT

## 2023-11-01 PROCEDURE — 84466 ASSAY OF TRANSFERRIN: CPT

## 2023-11-01 PROCEDURE — 36415 COLL VENOUS BLD VENIPUNCTURE: CPT

## 2023-11-01 PROCEDURE — G0463 HOSPITAL OUTPT CLINIC VISIT: HCPCS | Performed by: INTERNAL MEDICINE

## 2023-11-01 PROCEDURE — 83540 ASSAY OF IRON: CPT

## 2023-11-01 RX ORDER — PRUCALOPRIDE 2 MG/1
1 TABLET, FILM COATED ORAL
COMMUNITY
Start: 2023-09-15

## 2023-11-01 NOTE — PROGRESS NOTES
Chief Complaint/Care Team   Anemia    Elena Henderson,*  Elena Henderson, [place]    History of Present Illness     Diagnosis: Iron deficiency Anemia secondary to GI blood loss (s/p c-scope in 8/2023 showed hemorrhoids, diverticulosis and removal of several polyps)    Current Treatment: IV iron prn, last IV Iron infusion on 7/2023  Previous Treatment: None    Anika Casillas is a 63 y.o. female who presents to [facility] HEMATOLOGY & ONCOLOGY for Anemia.    HPI  Ms. Anika Casillas is a very pleasant 62 year old  female presenting as referral for mild anemia as referral from gastroenterology. PMH is BERKOWITZ, type II DM, hypertension, IBS, gastroparesis, GERD, esophageal varices, Colindres's esophagus, diverticulosis, elevated liver enzymes. Tobacco user of 1 PPD since the age of 19 years old but recently cut down to 4-5 cigarettes / day. Recent low dose CT screening with a small LLL nodular area noted and interstitial changes noted.      Recent CT abdomen on 4/21/23 with an abnormal thickened heterogenous appearance noted and abdominal US completed. Vaginal US on 5/10/23 with an irregular hetergeneous mass within the endometrial canal and questionable irregularity of the cervix. Patient has follow up with GYN in June.      Recent lab work on 5/4/23: alk phos elevated 135, previously 120. Elevated ALT / AST 62 / 80, previously elevated and stable. Iron 52 up from 32, ferritin 48, not measured previously. Iron st 11%, up from 7%. Hemoglobin 11.4 which is up from 10.8. MCV 77. Fecal OB have been negative x 3 at the end of April. Prior scope done in            6/2022 with grade II esophageal varices.      Currently, not taking any oral iron. Has been increasing iron in her diet with liver and red meat. Reports fatigue 6/10 and craving ice now for the last 4 months. Denies any GI blood loss or other associated blood loss.      Previously adopted and does not know her family  "history.    Interval History: Pt here to discuss lab work obtained for assessment of her anemia. Pt denies any blood loss, lightheadedness/dizziness, but does report fatigue and pica. Pt unable to take iron tablets by mouth secondary to gastroparesis and GI upset. Pt with upcoming c-scope in 8/2023 which showed hemorrhoids, diverticulosis and removal of several polyps.   Pt pending endometrial biopsy by GI and ob/gyn respectively. Pt is pending cataract surgery in [place] and repeat EGD with botox injection for gastroparesis.      Review of Systems   Constitutional:  Negative for appetite change, diaphoresis, fatigue, fever, unexpected weight gain and unexpected weight loss.   HENT:  Negative for hearing loss, mouth sores, sore throat, swollen glands, trouble swallowing and voice change.    Eyes:  Negative for blurred vision.   Respiratory:  Negative for cough, shortness of breath and wheezing.    Cardiovascular:  Negative for chest pain and palpitations.   Gastrointestinal:  Negative for abdominal pain, blood in stool, constipation, diarrhea, nausea and vomiting.   Endocrine: Negative for cold intolerance and heat intolerance.   Genitourinary:  Negative for difficulty urinating, dysuria, frequency, hematuria and urinary incontinence.   Musculoskeletal:  Negative for arthralgias, back pain and myalgias.   Skin:  Negative for rash, skin lesions and wound.   Neurological:  Negative for dizziness, seizures, weakness, numbness and headache.   Hematological:  Does not bruise/bleed easily.   Psychiatric/Behavioral:  Negative for depressed mood. The patient is not nervous/anxious.    All other systems reviewed and are negative.       Oncology/Hematology History    No history exists.       Objective     Vitals:    11/01/23 0834   BP: 149/75   Pulse: 78   Resp: 18   Temp: 97.6 °F (36.4 °C)   TempSrc: Temporal   SpO2: 95%   Weight: 106 kg (233 lb 14.5 oz)   Height: 165.1 cm (65\")   PainSc: 0-No pain       ECOG score: 0     "     PHQ-9 Total Score:         Physical Exam  Vitals reviewed.   Constitutional:       General: She is not in acute distress.     Appearance: Normal appearance.   HENT:      Head: Normocephalic and atraumatic.   Eyes:      Extraocular Movements: Extraocular movements intact.      Conjunctiva/sclera: Conjunctivae normal.   Pulmonary:      Effort: Pulmonary effort is normal.   Musculoskeletal:      Cervical back: Normal range of motion and neck supple.   Skin:     General: Skin is warm and dry.      Findings: No bruising.   Neurological:      Mental Status: She is oriented to person, place, and time.           Past Medical History     Past Medical History:   Diagnosis Date    Abnormal Pap smear of cervix     Atrial fibrillation     ON ELIQUIS/FOLLOWS SERGO    Colindres's esophagus     Depression     Diabetes mellitus     Emphysema lung     INHALER    Endometrial hyperplasia     D&C SCHEDULED 7/25/23    Esophageal varices     Fatty liver 2015    Gastroparesis     TAKES XIFAXAN    GERD (gastroesophageal reflux disease) 1995    Hyperlipidemia     Hypertension     Iron deficiency anemia 05/02/2023    IRON INFUSION LAST 7/24/23    Irritable bowel syndrome     W/CONSTIPATION    BERKOWITZ (nonalcoholic steatohepatitis)     NO S/S CURRENTLY, ELEVATED ENZYMES    Obstructive sleep apnea syndrome     S/P exploratory laparotomy, lysis of adhesions, resection of necrotic colon without anastomosis 04/18/2022     Current Outpatient Medications on File Prior to Visit   Medication Sig Dispense Refill    Motegrity 2 MG tablet Take 1 tablet by mouth.      acetaminophen (Tylenol) 325 MG tablet Take 2 tablets by mouth Every 4 (Four) Hours As Needed for Mild Pain. 30 tablet 1    albuterol sulfate  (90 Base) MCG/ACT inhaler Inhale 2 puffs Every 4 (Four) Hours As Needed.      amLODIPine (NORVASC) 10 MG tablet Take 1 tablet by mouth Daily. 90 tablet 3    apixaban (ELIQUIS) 5 MG tablet tablet Take 1 tablet by mouth 2 (Two) Times a Day. 180  tablet 3    B-D UF III MINI PEN NEEDLES 31G X 5 MM misc       Blood Glucose Monitoring Suppl (Accu-Chek Guide) w/Device kit       buPROPion XL (WELLBUTRIN XL) 150 MG 24 hr tablet Take 1 tablet by mouth 2 (Two) Times a Day. 180 tablet 1    Cholecalciferol (Vitamin D3) 50 MCG (2000 UT) capsule Take 3 capsules by mouth Daily. LAST DOSE 7/21/23      Continuous Blood Gluc Sensor (Dexcom G6 Sensor) APPLY 1 EACH TOPICALLY EVERY 10 (TEN) DAYS.      Continuous Blood Gluc Transmit (Dexcom G6 Transmitter) misc USE FOR 90 DAYS.      empagliflozin (JARDIANCE) 25 MG tablet tablet Take 1 tablet by mouth Daily.      escitalopram (Lexapro) 20 MG tablet Take 1 tablet by mouth Daily. 90 tablet 1    Evolocumab (Repatha) solution prefilled syringe injection Inject 1 mL under the skin into the appropriate area as directed Every 14 (Fourteen) Days. 6 each 4    famotidine (PEPCID) 40 MG tablet Take 1 tablet by mouth At Night As Needed for Heartburn. 90 tablet 1    Fluticasone-Umeclidin-Vilant (TRELEGY) 100-62.5-25 MCG/ACT inhaler Inhale 1 puff Daily. 1 each 5    furosemide (LASIX) 20 MG tablet Take 1 tablet by mouth Daily. 90 tablet 3    glucose blood test strip Check blood glucose twice daily 60 each 12    glucose monitor monitoring kit 1 each Daily. Patient prefers Accu Chek Meter. 1 each 0    HYDROcodone-acetaminophen (NORCO) 5-325 MG per tablet Take 1 tablet by mouth Every 6 (Six) Hours As Needed (Pain). 6 tablet 0    icosapent ethyl (VASCEPA) 1 g capsule capsule Take 2 g by mouth 2 (Two) Times a Day With Meals. 360 capsule 1    Insulin Glargine, 2 Unit Dial, (Toujeo Max SoloStar) 300 UNIT/ML solution pen-injector injection Inject 122 Units under the skin into the appropriate area as directed Every Morning. INST PER ANESTHESIA PROTOCOL      Lancets (accu-chek soft touch) lancets Check blood sugar twice daily 100 each 12    metoprolol tartrate (LOPRESSOR) 50 MG tablet Take 1.5 tablets by mouth 2 (Two) Times a Day. TAKE 1 1/2 TABLETS BID  270 tablet 1    multivitamin with minerals tablet tablet Take 1 tablet by mouth Daily. LAST DOSE 7/21/23      NovoLOG FlexPen 100 UNIT/ML solution pen-injector sc pen Inject 60 Units under the skin into the appropriate area as directed 2 (Two) Times a Day. PER SLIDING SCALE  INST PER ANESTHESIA PROTOCOL      olmesartan (BENICAR) 20 MG tablet Take 1 tablet by mouth Daily. (Patient taking differently: Take 1 tablet by mouth Daily. I) 90 tablet 1    pantoprazole (PROTONIX) 40 MG EC tablet Take 1 tablet by mouth Daily. 90 tablet 2    potassium chloride 10 MEQ CR tablet Take 1 tablet by mouth Daily. 90 tablet 3    riFAXIMin (Xifaxan) 550 MG tablet Take 1 tablet by mouth Every 12 (Twelve) Hours. 180 tablet 1     No current facility-administered medications on file prior to visit.      Allergies   Allergen Reactions    Lisinopril Other (See Comments)     Cough    Liraglutide Nausea And Vomiting    Metformin Nausea Only     GI Upset     Past Surgical History:   Procedure Laterality Date    ABDOMINAL SURGERY  4/16/2022    BLADDER SLING MODIFIED, ANTERIOR AND POSTERIOR VAGINAL REPAIR      BREAST LUMPECTOMY Left     BENIGN    CARDIAC CATHETERIZATION      NO INTERVENTION    CHOLECYSTECTOMY  5/12/2015    COLON RESECTION      PERFORATED COLON, EEA    COLONOSCOPY N/A 08/14/2023    Procedure: COLONOSCOPY WITH POLYPECTOMY, TATTOO;  Surgeon: Shannan Charles MD;  Location: [facility] ENDOSCOPY;  Service: Gastroenterology;  Laterality: N/A;  COLON POLYPS  DIVERTICULOSIS  HEMORRHOIDS    D & C HYSTEROSCOPY N/A 07/25/2023    Procedure: resection of endometrial polyp with myosure;  Surgeon: Ashleigh Harding DO;  Location: [facility] MAIN OR;  Service: Gynecology;  Laterality: N/A;    ENDOSCOPY N/A 06/03/2022    Procedure: ESOPHAGOGASTRODUODENOSCOPY;  Surgeon: Shannan Charles MD;  Location: [facility] ENDOSCOPY;  Service: Gastroenterology;  Laterality: N/A;  RETAINED FOOD IN STOMACH  ESOPHAGEAL VARICIES    EXPLORATORY LAPAROTOMY N/A  04/16/2022    Procedure: EXPLORATORY LAPAROTOMY, LYSIS OF ADHESIONS, RESECTION OF NECTROIC COLON;  Surgeon: Cande Stanley MD;  Location: [facility] MAIN OR;  Service: General;  Laterality: N/A;    GALLBLADDER SURGERY      LAPAROSCOPIC    LIVER BIOPSY  2021    TUBAL ABDOMINAL LIGATION      UPPER GASTROINTESTINAL ENDOSCOPY  6/3/2022     Social History     Socioeconomic History    Marital status:      Spouse name: fannie   Tobacco Use    Smoking status: Every Day     Packs/day: 0.25     Years: 41.00     Additional pack years: 0.00     Total pack years: 10.25     Types: Cigarettes     Start date: 6/17/1975    Smokeless tobacco: Never    Tobacco comments:     INST PER ANESTHESIA PROTOCOL   Vaping Use    Vaping Use: Never used   Substance and Sexual Activity    Alcohol use: Yes     Alcohol/week: 1.0 standard drink of alcohol     Types: 1 Drinks containing 0.5 oz of alcohol per week     Comment: 1 a month    Drug use: Never    Sexual activity: Yes     Partners: Male     Birth control/protection: Post-menopausal, Tubal ligation     Family History   Adopted: Yes   Problem Relation Age of Onset    Malig Hyperthermia Neg Hx        Results     Result Review   The following data was reviewed by: Tamela Ortega MD on 07/12/2023:  Lab Results   Component Value Date    HGB 13.6 09/05/2023    HCT 41.0 09/05/2023    MCV 81.7 09/05/2023     09/05/2023    WBC 8.97 09/05/2023    NEUTROABS 4.34 09/05/2023    LYMPHSABS 3.84 (H) 09/05/2023    MONOSABS 0.59 09/05/2023    EOSABS 0.12 09/05/2023    BASOSABS 0.06 09/05/2023     Lab Results   Component Value Date    GLUCOSE 148 (H) 09/05/2023    BUN 9 09/05/2023    CREATININE 0.88 09/05/2023     09/05/2023    K 4.1 09/05/2023     09/05/2023    CO2 21.4 (L) 09/05/2023    CALCIUM 9.6 09/05/2023    PROTEINTOT 9.0 (H) 09/05/2023    ALBUMIN 4.1 09/05/2023    BILITOT 0.4 09/05/2023    ALKPHOS 120 (H) 09/05/2023    AST 83 (H) 09/05/2023    ALT 76 (H) 09/05/2023     Lab  Results   Component Value Date    PHOS 2.9 04/18/2022    FREET4 1.10 01/04/2023    TSH 2.160 01/04/2023           No radiology results for the last day       Assessment & Plan     Diagnoses and all orders for this visit:    1. Anemia, unspecified type (Primary)  -     CBC & Differential; Future  -     Ferritin; Future  -     Iron Profile; Future  -     CBC & Differential; Future  -     Ferritin; Future  -     Iron Profile; Future          Anika Casillas is a 63 y.o. female who presents to [facility] HEMATOLOGY & ONCOLOGY for Iron deficiency Anemia secondary to GI blood loss (s/p c-scope in 8/2023 showed hemorrhoids, diverticulosis and removal of several polyps).  Pt receiving IV Iron infusions prn, last infusion in 7/2023, pt not able to tolerate oral iron due to gastroparesis.    Pt symptomatic from Iron deficiency with pica and fatigue, agree with these evaluations to determine if pt is losing blood from these organs.    Recommend continued evaluation by GI for BERKOWITZ cirrhosis and esophageal varices, pt with repeat EGD scheduled in the next few days.    -will recheck CBC, Iron profile, and ferritin today, will notify pt if she requires IV Iron infusion.    Discussed that labs suggestive of iron deficiency anemia and since pt is not able to take oral iron secondary to severe gastroparesis will place orders for IV Iron infusion with injectafer pending insurance approval (discussed chance of reaction to IV Iron infusion with ferrelecit, but shared plan to administer premedications).    Pt agreeable with plan, plan for pt follow up in 3 months with CBC, Iron profile and ferritin.    Please note that portions of this note were completed with a voice recognition program.    Electronically signed by Tamela Ortega MD, 11/01/23, 9:03 AM EDT.          Follow Up     I spent 30 minutes caring for Anika on this date of service. This time includes time spent by me in the following activities:preparing for the  visit, reviewing tests, obtaining and/or reviewing a separately obtained history, performing a medically appropriate examination and/or evaluation , counseling and educating the patient/family/caregiver, ordering medications, tests, or procedures, referring and communicating with other health care professionals , documenting information in the medical record, independently interpreting results and communicating that information with the patient/family/caregiver, and care coordination.    This is an acute or chronic illness that poses a threat to life or bodily function. The above treatment plan involves a high risk of complications and/or mortality of patient management.    The patient was seen and examined. Work by the provider also included review and/or ordering of lab tests, review and/or ordering of radiology tests, review and/or ordering of medicine tests, discussion with other physicians or providers, independent review of data, obtaining old records, review/summation of old records, and/or other review.    I have reviewed the family history, social history, and past medical history for this patient. Previous information and data has been reviewed and updated as needed. I have reviewed and verified the chief complaint, history, and other documentation. The patient was interviewed and examined in the clinic and the chart reviewed. The previous observations, recommendations, and conclusions were reviewed including those of other providers.     The plan was discussed with the patient and/or family. The patient was given time to ask questions and these questions were answered. At the conclusion of their visit they had no additional questions or concerns and all questions were answered to their satisfaction.    Patient was given instructions and counseling regarding her condition or for health maintenance advice. Please see specific information pulled into the AVS if appropriate.

## 2023-11-08 ENCOUNTER — TRANSCRIBE ORDERS (OUTPATIENT)
Dept: ADMINISTRATIVE | Facility: HOSPITAL | Age: 63
End: 2023-11-08
Payer: COMMERCIAL

## 2023-11-08 DIAGNOSIS — N28.1 ACQUIRED CYST OF KIDNEY: ICD-10-CM

## 2023-11-08 DIAGNOSIS — K74.60 HEPATIC CIRRHOSIS, UNSPECIFIED HEPATIC CIRRHOSIS TYPE, UNSPECIFIED WHETHER ASCITES PRESENT: ICD-10-CM

## 2023-11-08 DIAGNOSIS — E55.9 AVITAMINOSIS D: ICD-10-CM

## 2023-11-08 DIAGNOSIS — Z72.0 TOBACCO ABUSE: ICD-10-CM

## 2023-11-08 DIAGNOSIS — K31.84 DIABETIC GASTROPARESIS: ICD-10-CM

## 2023-11-08 DIAGNOSIS — K20.90 BARRETT'S ESOPHAGUS WITH ESOPHAGITIS: ICD-10-CM

## 2023-11-08 DIAGNOSIS — E11.43 DIABETIC GASTROPARESIS: ICD-10-CM

## 2023-11-08 DIAGNOSIS — R60.0 LOCALIZED EDEMA: Primary | ICD-10-CM

## 2023-11-08 DIAGNOSIS — K29.50 CHRONIC ANTRAL GASTRITIS: ICD-10-CM

## 2023-11-08 DIAGNOSIS — I85.00 ESOPHAGEAL VARICES WITHOUT BLEEDING, UNSPECIFIED ESOPHAGEAL VARICES TYPE: ICD-10-CM

## 2023-11-08 DIAGNOSIS — I48.0 PAROXYSMAL ATRIAL FIBRILLATION: ICD-10-CM

## 2023-11-08 DIAGNOSIS — I10 ESSENTIAL HYPERTENSION, MALIGNANT: ICD-10-CM

## 2023-11-08 DIAGNOSIS — G47.33 OBSTRUCTIVE SLEEP APNEA (ADULT) (PEDIATRIC): ICD-10-CM

## 2023-11-08 DIAGNOSIS — K22.70 BARRETT'S ESOPHAGUS WITH ESOPHAGITIS: ICD-10-CM

## 2023-11-08 DIAGNOSIS — E11.3212 DIABETIC MACULOPATHY OF LEFT EYE WITH MILD NONPROLIFERATIVE RETINOPATHY AND MACULAR EDEMA DETERMINED BY EXAMINATION ASSOCIATED WITH TYPE 2 DIABETES MELLITUS: ICD-10-CM

## 2023-11-14 ENCOUNTER — LAB (OUTPATIENT)
Dept: LAB | Facility: HOSPITAL | Age: 63
End: 2023-11-14
Payer: COMMERCIAL

## 2023-11-14 DIAGNOSIS — G47.33 OBSTRUCTIVE SLEEP APNEA (ADULT) (PEDIATRIC): ICD-10-CM

## 2023-11-14 DIAGNOSIS — R60.0 LOCALIZED EDEMA: ICD-10-CM

## 2023-11-14 DIAGNOSIS — K74.60 HEPATIC CIRRHOSIS, UNSPECIFIED HEPATIC CIRRHOSIS TYPE, UNSPECIFIED WHETHER ASCITES PRESENT: ICD-10-CM

## 2023-11-14 DIAGNOSIS — I10 ESSENTIAL HYPERTENSION, MALIGNANT: ICD-10-CM

## 2023-11-14 DIAGNOSIS — E11.43 DIABETIC GASTROPARESIS: ICD-10-CM

## 2023-11-14 DIAGNOSIS — Z72.0 TOBACCO ABUSE: ICD-10-CM

## 2023-11-14 DIAGNOSIS — E11.3212 DIABETIC MACULOPATHY OF LEFT EYE WITH MILD NONPROLIFERATIVE RETINOPATHY AND MACULAR EDEMA DETERMINED BY EXAMINATION ASSOCIATED WITH TYPE 2 DIABETES MELLITUS: ICD-10-CM

## 2023-11-14 DIAGNOSIS — N28.1 ACQUIRED CYST OF KIDNEY: ICD-10-CM

## 2023-11-14 DIAGNOSIS — K29.50 CHRONIC ANTRAL GASTRITIS: ICD-10-CM

## 2023-11-14 DIAGNOSIS — I48.0 PAROXYSMAL ATRIAL FIBRILLATION: ICD-10-CM

## 2023-11-14 DIAGNOSIS — I85.00 ESOPHAGEAL VARICES WITHOUT BLEEDING, UNSPECIFIED ESOPHAGEAL VARICES TYPE: ICD-10-CM

## 2023-11-14 DIAGNOSIS — K22.70 BARRETT'S ESOPHAGUS WITH ESOPHAGITIS: ICD-10-CM

## 2023-11-14 DIAGNOSIS — E55.9 AVITAMINOSIS D: ICD-10-CM

## 2023-11-14 DIAGNOSIS — K31.84 DIABETIC GASTROPARESIS: ICD-10-CM

## 2023-11-14 DIAGNOSIS — K20.90 BARRETT'S ESOPHAGUS WITH ESOPHAGITIS: ICD-10-CM

## 2023-11-14 LAB
ALBUMIN SERPL-MCNC: 3.8 G/DL (ref 3.5–5.2)
ANION GAP SERPL CALCULATED.3IONS-SCNC: 12.2 MMOL/L (ref 5–15)
BACTERIA UR QL AUTO: NORMAL /HPF
BASOPHILS # BLD AUTO: 0.07 10*3/MM3 (ref 0–0.2)
BASOPHILS NFR BLD AUTO: 0.9 % (ref 0–1.5)
BILIRUB UR QL STRIP: NEGATIVE
BUN SERPL-MCNC: 11 MG/DL (ref 8–23)
BUN/CREAT SERPL: 11.6 (ref 7–25)
CALCIUM SPEC-SCNC: 9.6 MG/DL (ref 8.6–10.5)
CHLORIDE SERPL-SCNC: 106 MMOL/L (ref 98–107)
CLARITY UR: CLEAR
CO2 SERPL-SCNC: 20.8 MMOL/L (ref 22–29)
COLOR UR: YELLOW
CREAT SERPL-MCNC: 0.95 MG/DL (ref 0.57–1)
CREAT UR-MCNC: 37.3 MG/DL
DEPRECATED RDW RBC AUTO: 51.7 FL (ref 37–54)
EGFRCR SERPLBLD CKD-EPI 2021: 68.3 ML/MIN/1.73
EOSINOPHIL # BLD AUTO: 0.09 10*3/MM3 (ref 0–0.4)
EOSINOPHIL NFR BLD AUTO: 1.1 % (ref 0.3–6.2)
ERYTHROCYTE [DISTWIDTH] IN BLOOD BY AUTOMATED COUNT: 16.4 % (ref 12.3–15.4)
GLUCOSE SERPL-MCNC: 364 MG/DL (ref 65–99)
GLUCOSE UR STRIP-MCNC: ABNORMAL MG/DL
HCT VFR BLD AUTO: 37.2 % (ref 34–46.6)
HGB BLD-MCNC: 12.7 G/DL (ref 12–15.9)
HGB UR QL STRIP.AUTO: NEGATIVE
HYALINE CASTS UR QL AUTO: NORMAL /LPF
IMM GRANULOCYTES # BLD AUTO: 0.02 10*3/MM3 (ref 0–0.05)
IMM GRANULOCYTES NFR BLD AUTO: 0.2 % (ref 0–0.5)
KETONES UR QL STRIP: NEGATIVE
LEUKOCYTE ESTERASE UR QL STRIP.AUTO: NEGATIVE
LYMPHOCYTES # BLD AUTO: 2.93 10*3/MM3 (ref 0.7–3.1)
LYMPHOCYTES NFR BLD AUTO: 36.6 % (ref 19.6–45.3)
MCH RBC QN AUTO: 29.9 PG (ref 26.6–33)
MCHC RBC AUTO-ENTMCNC: 34.1 G/DL (ref 31.5–35.7)
MCV RBC AUTO: 87.5 FL (ref 79–97)
MONOCYTES # BLD AUTO: 0.47 10*3/MM3 (ref 0.1–0.9)
MONOCYTES NFR BLD AUTO: 5.9 % (ref 5–12)
NEUTROPHILS NFR BLD AUTO: 4.43 10*3/MM3 (ref 1.7–7)
NEUTROPHILS NFR BLD AUTO: 55.3 % (ref 42.7–76)
NITRITE UR QL STRIP: NEGATIVE
NRBC BLD AUTO-RTO: 0 /100 WBC (ref 0–0.2)
PH UR STRIP.AUTO: 6.5 [PH] (ref 5–8)
PHOSPHATE SERPL-MCNC: 3.5 MG/DL (ref 2.5–4.5)
PLATELET # BLD AUTO: 241 10*3/MM3 (ref 140–450)
PMV BLD AUTO: 12.2 FL (ref 6–12)
POTASSIUM SERPL-SCNC: 4.2 MMOL/L (ref 3.5–5.2)
PROT ?TM UR-MCNC: <4 MG/DL
PROT UR QL STRIP: NEGATIVE
PROT/CREAT UR: NORMAL MG/G{CREAT}
RBC # BLD AUTO: 4.25 10*6/MM3 (ref 3.77–5.28)
RBC # UR STRIP: NORMAL /HPF
REF LAB TEST METHOD: NORMAL
SODIUM SERPL-SCNC: 139 MMOL/L (ref 136–145)
SP GR UR STRIP: >=1.03 (ref 1–1.03)
SQUAMOUS #/AREA URNS HPF: NORMAL /HPF
UROBILINOGEN UR QL STRIP: ABNORMAL
WBC # UR STRIP: NORMAL /HPF
WBC NRBC COR # BLD: 8.01 10*3/MM3 (ref 3.4–10.8)

## 2023-11-14 PROCEDURE — 84156 ASSAY OF PROTEIN URINE: CPT

## 2023-11-14 PROCEDURE — 80069 RENAL FUNCTION PANEL: CPT

## 2023-11-14 PROCEDURE — 85025 COMPLETE CBC W/AUTO DIFF WBC: CPT

## 2023-11-14 PROCEDURE — 82570 ASSAY OF URINE CREATININE: CPT

## 2023-11-14 PROCEDURE — 36415 COLL VENOUS BLD VENIPUNCTURE: CPT

## 2023-11-14 PROCEDURE — 81001 URINALYSIS AUTO W/SCOPE: CPT

## 2023-11-19 RX ORDER — OLMESARTAN MEDOXOMIL 20 MG/1
20 TABLET ORAL DAILY
Qty: 90 TABLET | Refills: 1 | Status: SHIPPED | OUTPATIENT
Start: 2023-11-19

## 2023-12-05 ENCOUNTER — TELEPHONE (OUTPATIENT)
Dept: GASTROENTEROLOGY | Facility: CLINIC | Age: 63
End: 2023-12-05

## 2023-12-05 NOTE — TELEPHONE ENCOUNTER
I contacted Anika Casillas 1960 regarding the appointment no show with [facility] on 12/05/23@8:00. Patient is aware that there is a 24-hour cancellation policy and understands that a no-show letter will be mailed to them at the address on file. I left a voice message if patient would like to reschedule her appointment, please call the office.

## 2023-12-06 ENCOUNTER — OFFICE VISIT (OUTPATIENT)
Dept: CARDIOLOGY | Facility: CLINIC | Age: 63
End: 2023-12-06
Payer: COMMERCIAL

## 2023-12-06 VITALS
WEIGHT: 233.6 LBS | HEART RATE: 77 BPM | HEIGHT: 65 IN | DIASTOLIC BLOOD PRESSURE: 79 MMHG | SYSTOLIC BLOOD PRESSURE: 133 MMHG | BODY MASS INDEX: 38.92 KG/M2

## 2023-12-06 DIAGNOSIS — E78.2 MIXED HYPERLIPIDEMIA: Primary | ICD-10-CM

## 2023-12-06 DIAGNOSIS — I48.0 PAROXYSMAL ATRIAL FIBRILLATION: Chronic | ICD-10-CM

## 2023-12-06 DIAGNOSIS — I10 HYPERTENSION, ESSENTIAL: ICD-10-CM

## 2023-12-06 DIAGNOSIS — E66.01 MORBID (SEVERE) OBESITY DUE TO EXCESS CALORIES: ICD-10-CM

## 2023-12-06 NOTE — PROGRESS NOTES
Chief Complaint  Atrial Fibrillation, Hyperlipidemia, and Hypertension    Subjective            Anika Casillas presents to [facility] CARDIOLOGY      Ms. Casillas is here for follow-up evaluation management of mixed hyperlipidemia, paroxysmal atrial fibrillation, essential hypertension, tobacco use.  She has chronic dyspnea on exertion.  She denies palpitations or chest pain.  No bleeding problems on anticoagulation.    PMH  Past Medical History:   Diagnosis Date    Abnormal Pap smear of cervix     Atrial fibrillation     ON ELIQUIS/FOLLOWS TROTTER    Colindres's esophagus     Depression     Diabetes mellitus     Emphysema lung     INHALER    Endometrial hyperplasia     D&C SCHEDULED 7/25/23    Esophageal varices     Fatty liver 2015    Gastroparesis     TAKES XIFAXAN    GERD (gastroesophageal reflux disease) 1995    Hyperlipidemia     Hypertension     Iron deficiency anemia 05/02/2023    IRON INFUSION LAST 7/24/23    Irritable bowel syndrome     W/CONSTIPATION    BERKOWITZ (nonalcoholic steatohepatitis)     NO S/S CURRENTLY, ELEVATED ENZYMES    Obstructive sleep apnea syndrome     Pulmonary emphysema 11/22/2021    S/P exploratory laparotomy, lysis of adhesions, resection of necrotic colon without anastomosis 04/18/2022         SURGICALHX  Past Surgical History:   Procedure Laterality Date    ABDOMINAL SURGERY  4/16/2022    BLADDER SLING MODIFIED, ANTERIOR AND POSTERIOR VAGINAL REPAIR      BREAST LUMPECTOMY Left     BENIGN    CARDIAC CATHETERIZATION      NO INTERVENTION    CHOLECYSTECTOMY  5/12/2015    COLON RESECTION      PERFORATED COLON, EEA    COLONOSCOPY N/A 08/14/2023    Procedure: COLONOSCOPY WITH POLYPECTOMY, TATTOO;  Surgeon: Shannan Charles MD;  Location: [facility] ENDOSCOPY;  Service: Gastroenterology;  Laterality: N/A;  COLON POLYPS  DIVERTICULOSIS  HEMORRHOIDS    D & C HYSTEROSCOPY N/A 07/25/2023    Procedure: resection of endometrial polyp with myosure;  Surgeon: Ashleigh Harding DO;   Location: [facility] MAIN OR;  Service: Gynecology;  Laterality: N/A;    ENDOSCOPY N/A 06/03/2022    Procedure: ESOPHAGOGASTRODUODENOSCOPY;  Surgeon: Shannan Charles MD;  Location: [facility] ENDOSCOPY;  Service: Gastroenterology;  Laterality: N/A;  RETAINED FOOD IN STOMACH  ESOPHAGEAL VARICIES    EXPLORATORY LAPAROTOMY N/A 04/16/2022    Procedure: EXPLORATORY LAPAROTOMY, LYSIS OF ADHESIONS, RESECTION OF NECTROIC COLON;  Surgeon: Cande Stanley MD;  Location: [facility] MAIN OR;  Service: General;  Laterality: N/A;    GALLBLADDER SURGERY      LAPAROSCOPIC    LIVER BIOPSY  2021    TUBAL ABDOMINAL LIGATION      UPPER GASTROINTESTINAL ENDOSCOPY  6/3/2022        SOC  Social History     Socioeconomic History    Marital status:      Spouse name: fannie   Tobacco Use    Smoking status: Every Day     Packs/day: 0.25     Years: 41.00     Additional pack years: 0.00     Total pack years: 10.25     Types: Cigarettes     Start date: 6/17/1975    Smokeless tobacco: Never    Tobacco comments:     INST PER ANESTHESIA PROTOCOL   Vaping Use    Vaping Use: Never used   Substance and Sexual Activity    Alcohol use: Yes     Alcohol/week: 1.0 standard drink of alcohol     Types: 1 Drinks containing 0.5 oz of alcohol per week     Comment: 1 a month    Drug use: Never    Sexual activity: Yes     Partners: Male     Birth control/protection: Post-menopausal, Tubal ligation         FAMHX  Family History   Adopted: Yes   Problem Relation Age of Onset    Malig Hyperthermia Neg Hx           ALLERGY  Allergies   Allergen Reactions    Lisinopril Other (See Comments)     Cough    Liraglutide Nausea And Vomiting    Metformin Nausea Only     GI Upset        MEDSCURRENT    Current Outpatient Medications:     acetaminophen (Tylenol) 325 MG tablet, Take 2 tablets by mouth Every 4 (Four) Hours As Needed for Mild Pain., Disp: 30 tablet, Rfl: 1    albuterol sulfate  (90 Base) MCG/ACT inhaler, Inhale 2 puffs Every 4 (Four) Hours As  Needed., Disp: , Rfl:     amLODIPine (NORVASC) 10 MG tablet, Take 1 tablet by mouth Daily., Disp: 90 tablet, Rfl: 3    apixaban (ELIQUIS) 5 MG tablet tablet, Take 1 tablet by mouth 2 (Two) Times a Day., Disp: 180 tablet, Rfl: 3    B-D UF III MINI PEN NEEDLES 31G X 5 MM misc, , Disp: , Rfl:     Blood Glucose Monitoring Suppl (Accu-Chek Guide) w/Device kit, , Disp: , Rfl:     buPROPion XL (WELLBUTRIN XL) 150 MG 24 hr tablet, Take 1 tablet by mouth 2 (Two) Times a Day., Disp: 180 tablet, Rfl: 1    Cholecalciferol (Vitamin D3) 50 MCG (2000 UT) capsule, Take 3 capsules by mouth Daily. LAST DOSE 7/21/23, Disp: , Rfl:     Continuous Blood Gluc Sensor (Dexcom G6 Sensor), APPLY 1 EACH TOPICALLY EVERY 10 (TEN) DAYS., Disp: , Rfl:     Continuous Blood Gluc Transmit (Dexcom G6 Transmitter) misc, USE FOR 90 DAYS., Disp: , Rfl:     empagliflozin (JARDIANCE) 25 MG tablet tablet, Take 1 tablet by mouth Daily., Disp: , Rfl:     escitalopram (Lexapro) 20 MG tablet, Take 1 tablet by mouth Daily., Disp: 90 tablet, Rfl: 1    Evolocumab (Repatha) solution prefilled syringe injection, Inject 1 mL under the skin into the appropriate area as directed Every 14 (Fourteen) Days., Disp: 6 each, Rfl: 4    famotidine (PEPCID) 40 MG tablet, Take 1 tablet by mouth At Night As Needed for Heartburn., Disp: 90 tablet, Rfl: 1    Fluticasone-Umeclidin-Vilant (TRELEGY) 100-62.5-25 MCG/ACT inhaler, Inhale 1 puff Daily., Disp: 1 each, Rfl: 5    furosemide (LASIX) 20 MG tablet, Take 1 tablet by mouth Daily., Disp: 90 tablet, Rfl: 3    glucose blood test strip, Check blood glucose twice daily, Disp: 60 each, Rfl: 12    glucose monitor monitoring kit, 1 each Daily. Patient prefers Accu Chek Meter., Disp: 1 each, Rfl: 0    HYDROcodone-acetaminophen (NORCO) 5-325 MG per tablet, Take 1 tablet by mouth Every 6 (Six) Hours As Needed (Pain)., Disp: 6 tablet, Rfl: 0    icosapent ethyl (VASCEPA) 1 g capsule capsule, Take 2 g by mouth 2 (Two) Times a Day With Meals.,  "Disp: 360 capsule, Rfl: 1    Insulin Glargine, 2 Unit Dial, (Toujeo Max SoloStar) 300 UNIT/ML solution pen-injector injection, Inject 122 Units under the skin into the appropriate area as directed Every Morning. INST PER ANESTHESIA PROTOCOL, Disp: , Rfl:     Lancets (accu-chek soft touch) lancets, Check blood sugar twice daily, Disp: 100 each, Rfl: 12    metoprolol tartrate (LOPRESSOR) 50 MG tablet, Take 1.5 tablets by mouth 2 (Two) Times a Day. TAKE 1 1/2 TABLETS BID, Disp: 270 tablet, Rfl: 1    Motegrity 2 MG tablet, Take 1 tablet by mouth., Disp: , Rfl:     multivitamin with minerals tablet tablet, Take 1 tablet by mouth Daily. LAST DOSE 7/21/23, Disp: , Rfl:     NovoLOG FlexPen 100 UNIT/ML solution pen-injector sc pen, Inject 60 Units under the skin into the appropriate area as directed 2 (Two) Times a Day. PER SLIDING SCALE INST PER ANESTHESIA PROTOCOL, Disp: , Rfl:     olmesartan (BENICAR) 20 MG tablet, TAKE 1 TABLET BY MOUTH EVERY DAY, Disp: 90 tablet, Rfl: 1    pantoprazole (PROTONIX) 40 MG EC tablet, Take 1 tablet by mouth Daily., Disp: 90 tablet, Rfl: 2    potassium chloride 10 MEQ CR tablet, Take 1 tablet by mouth Daily., Disp: 90 tablet, Rfl: 3    riFAXIMin (Xifaxan) 550 MG tablet, Take 1 tablet by mouth Every 12 (Twelve) Hours., Disp: 180 tablet, Rfl: 1      Review of Systems   Constitutional: Negative for malaise/fatigue.   Cardiovascular:  Positive for dyspnea on exertion and leg swelling. Negative for chest pain, irregular heartbeat and palpitations.   Respiratory:  Positive for shortness of breath.         Objective     /79   Pulse 77   Ht 165.1 cm (65\")   Wt 106 kg (233 lb 9.6 oz)   BMI 38.87 kg/m²       General Appearance:   well developed  well nourished  HENT:   oropharynx moist  lips not cyanotic  Neck:  thyroid not enlarged  supple  Respiratory:  no respiratory distress  normal breath sounds  no rales  Cardiovascular:  no jugular venous distention  regular rhythm  apical impulse " normal  S1 normal, S2 normal  no S3, no S4   no murmur  no rub, no thrill  carotid pulses normal; no bruit  pedal pulses normal  lower extremity edema: Trace, more on the left nonpitting generalized  Musculoskeletal:  no clubbing of fingers.   normocephalic, head atraumatic  Skin:   warm, dry  Psychiatric:  judgement and insight appropriate  normal mood and affect      Result Review :     The following data was reviewed by: Mandeep Webb MD on 08/01/2023:    CMP          7/25/2023    10:54 9/5/2023    14:11 11/14/2023    11:54   CMP   Glucose 149  148  364    BUN 10  9  11    Creatinine 0.91  0.88  0.95    EGFR 71.9  74.9  68.3    Sodium 138  138  139    Potassium 3.9  4.1  4.2    Chloride 105  104  106    Calcium 10.0  9.6  9.6    Total Protein  9.0     Albumin  4.1  3.8    Globulin  4.9     Total Bilirubin  0.4     Alkaline Phosphatase  120     AST (SGOT)  83     ALT (SGPT)  76     Albumin/Globulin Ratio  0.8     BUN/Creatinine Ratio 11.0  10.2  11.6    Anion Gap 10.6  12.6  12.2      CBC          9/5/2023    14:11 11/1/2023    09:17 11/14/2023    11:54   CBC   WBC 8.97  7.22  8.01    RBC 5.02  4.24  4.25    Hemoglobin 13.6  12.5  12.7    Hematocrit 41.0  35.9  37.2    MCV 81.7  84.7  87.5    MCH 27.1  29.5  29.9    MCHC 33.2  34.8  34.1    RDW 25.0  17.9  16.4    Platelets 275  243  241      Lipid Panel          1/4/2023    09:37 4/21/2023    09:53   Lipid Panel   Total Cholesterol 207  174    Triglycerides 171  157    HDL Cholesterol 31  36    VLDL Cholesterol 31  28    LDL Cholesterol  145  110    LDL/HDL Ratio 4.57  2.96      TSH          1/4/2023    09:37   TSH   TSH 2.160            Procedures      Anika Casillas  reports that she has been smoking cigarettes. She started smoking about 48 years ago. She has a 10.25 pack-year smoking history. She has never used smokeless tobacco.. I have educated her on the risk of diseases from using tobacco products such as cancer, COPD, and heart disease.     I  advised her to quit and she is not willing to quit.    I spent 3  minutes counseling the patient.                Assessment and Plan        ASSESSMENT:  Encounter Diagnoses   Name Primary?    Mixed hyperlipidemia Yes    Paroxysmal atrial fibrillation     Hypertension, essential     Morbid (severe) obesity due to excess calories          PLAN:    1.  Chronic shortness of breath likely secondary to deconditioning, obesity, diastolic dysfunction.  In addition ongoing smoking.  Is clinically stable.  No additional cardiac work-up is needed at this time  2.  Paroxysmal atrial fibrillation-she is maintaining sinus rhythm, no significant symptoms of breakthrough.  Continue current medical therapy.  3.  Essential hypertension-controlled, continue current medical therapy.  4.  Mixed hyperlipidemia-recently started Repatha.  We will check a lipid panel at this point.  She is statin intolerant.    Follow-up annually  Patient was given instructions and counseling regarding her condition or for health maintenance advice. Please see specific information pulled into the AVS if appropriate.           Mandeep Webb MD   12/6/2023  13:35 EST

## 2023-12-07 ENCOUNTER — PATIENT MESSAGE (OUTPATIENT)
Dept: FAMILY MEDICINE CLINIC | Facility: CLINIC | Age: 63
End: 2023-12-07
Payer: COMMERCIAL

## 2023-12-07 NOTE — TELEPHONE ENCOUNTER
From: Anika Casillas  To: Elena Henderson  Sent: 12/7/2023 10:18 AM EST  Subject: FMLA    My thinking is getting worse. I making mistakes that I know I shouldn't be making. And it's jeopardizing my job. I really need this FMLA.Or a excuse to be off until I can get in to see my neurologist. Thanks for your help.

## 2023-12-18 ENCOUNTER — TRANSCRIBE ORDERS (OUTPATIENT)
Dept: ADMINISTRATIVE | Facility: HOSPITAL | Age: 63
End: 2023-12-18
Payer: COMMERCIAL

## 2023-12-18 DIAGNOSIS — K74.60 CIRRHOSIS, NON-ALCOHOLIC: Primary | ICD-10-CM

## 2023-12-19 ENCOUNTER — OFFICE VISIT (OUTPATIENT)
Dept: GASTROENTEROLOGY | Facility: CLINIC | Age: 63
End: 2023-12-19
Payer: COMMERCIAL

## 2023-12-19 VITALS
WEIGHT: 237 LBS | HEART RATE: 77 BPM | BODY MASS INDEX: 39.49 KG/M2 | HEIGHT: 65 IN | DIASTOLIC BLOOD PRESSURE: 75 MMHG | SYSTOLIC BLOOD PRESSURE: 126 MMHG

## 2023-12-19 DIAGNOSIS — K21.9 GASTROESOPHAGEAL REFLUX DISEASE WITHOUT ESOPHAGITIS: ICD-10-CM

## 2023-12-19 DIAGNOSIS — K22.70 BARRETT'S ESOPHAGUS WITHOUT DYSPLASIA: Primary | ICD-10-CM

## 2023-12-19 DIAGNOSIS — K74.60 CIRRHOSIS OF LIVER WITHOUT ASCITES, UNSPECIFIED HEPATIC CIRRHOSIS TYPE: ICD-10-CM

## 2023-12-19 DIAGNOSIS — K76.82 HEPATIC ENCEPHALOPATHY: ICD-10-CM

## 2023-12-19 DIAGNOSIS — K75.81 NASH (NONALCOHOLIC STEATOHEPATITIS): ICD-10-CM

## 2023-12-19 PROCEDURE — 99214 OFFICE O/P EST MOD 30 MIN: CPT | Performed by: NURSE PRACTITIONER

## 2023-12-19 RX ORDER — FAMOTIDINE 40 MG/1
40 TABLET, FILM COATED ORAL NIGHTLY PRN
Qty: 90 TABLET | Refills: 3 | Status: SHIPPED | OUTPATIENT
Start: 2023-12-19

## 2023-12-19 RX ORDER — PANTOPRAZOLE SODIUM 40 MG/1
40 TABLET, DELAYED RELEASE ORAL DAILY
Qty: 90 TABLET | Refills: 3 | Status: SHIPPED | OUTPATIENT
Start: 2023-12-19

## 2023-12-19 NOTE — PROGRESS NOTES
Chief Complaint   Cirrhosis and Gastroparesis     History of Present Illness       Anika Casillas is a 63 y.o. female who presents to [facility] for follow-up for gastroparesis. She was last seen in the office by me on 8/29/23.    Has a history of GERD with Colindres's esophagus without dysplasia. Is taking Protonix 40 mg every morning and Pepcid 40 mg at night. Denies any dysphagia, rectal bleeding or melena.  Admits her appetite is okay.  Has to be really careful with what she eats due to her gastroparesis.     Has a history of Kline cirrhosis.  With grade 2 esophageal varices noted on most recent EGD 6/22.  Has a history of hepatic encephalopathy.  Unable to tolerate lactulose.  On Xifaxan twice daily.  Was previously referred to Dr. Lugo for her gastroparesis.is frustrated she has not heard from their office.  Still having nausea and vomiting. Admits her burps smell so bad she's embarrassed to burp at work. Doesn't want to take reglan. Is afraid of the side effects. Cannot tolerate lactulose. Does occasionally drink a beer.  Denies any NSAIDs.  Denies any ascites, itching or confusion.     Had a CT scan of the abdomen pelvis done on April 21 of this year that showed:     IMPRESSION:      1. The endometrium has an abnormally thickened heterogeneous appearance.  Recommend a pelvic   ultrasound examination to evaluate for endometrial hyperplasia or endometrial carcinoma.     2. Cirrhosis.  Splenorenal shunt.     3. Colonic diverticulosis.  No CT evidence of colitis or diverticulitis.  Is waiting for results of the pelvic ultrasound she had done.     Has history of constipation.  Bowels move pretty well. Takes miralax 1-2 times a day as needed.      Underwent colon resection with lysis of adhesions by Dr. Guidry on 4/16/2022.  Does feel like she has had more abdominal pain ever since that surgery.     Last EGD was on 6/3/2022 with Dr. Charles.  EGD showed retained food and grade  2 esophageal varices.  Path negative.     Does have history of cholecystectomy.     Hx tobacco use. Smokes 1-3 a day.      Has hx iron def anemia. Hx a-fib on eliquis.       GI FI--adopted.      Underwent colonoscopy with Dr. Charles on 8/14/2023.  She was found to have moderate diverticulosis and nonbleeding external hemorrhoids.  4 mm cecal polyp removed, 3 4-5 ascending colon polyps removed, 2 to 3 mm ascending colon polyps removed, 6 mm transverse colon removed and a polypoid lesion in the ascending colon completely removed and injected.  Four 4 to 6 mm sigmoid colon polyps were removed.  Recall colonoscopy in 1 year for surveillance. Path + Tubular adenoma.     She did meet with [facility]. She underwent EGD with botox for her stomach. She is happy to report she is noticeable better since botox. Having less abd pain and N&V. She also admits her bowels are moving well since the botox.  She is working with gynecology about her endometrial hyperplasia.    She has hx bladder sling with urinary incontinence and wants to see UROLOGY. On motegrity EOD. Still on miralax PRN. Still on OZEMPIC. Denies any pedal edema or ascites. Still having some memory issues. She is seeing neurology. Sees Dr. Taran EDMOND for cirrhosis.       Results       Result Review :       CMP          7/25/2023    10:54 9/5/2023    14:11 11/14/2023    11:54   CMP   Glucose 149  148  364    BUN 10  9  11    Creatinine 0.91  0.88  0.95    EGFR 71.9  74.9  68.3    Sodium 138  138  139    Potassium 3.9  4.1  4.2    Chloride 105  104  106    Calcium 10.0  9.6  9.6    Total Protein  9.0     Albumin  4.1  3.8    Globulin  4.9     Total Bilirubin  0.4     Alkaline Phosphatase  120     AST (SGOT)  83     ALT (SGPT)  76     Albumin/Globulin Ratio  0.8     BUN/Creatinine Ratio 11.0  10.2  11.6    Anion Gap 10.6  12.6  12.2      CBC          9/5/2023    14:11 11/1/2023    09:17 11/14/2023    11:54   CBC   WBC 8.97  7.22  8.01    RBC 5.02  4.24  4.25    Hemoglobin  "13.6  12.5  12.7    Hematocrit 41.0  35.9  37.2    MCV 81.7  84.7  87.5    MCH 27.1  29.5  29.9    MCHC 33.2  34.8  34.1    RDW 25.0  17.9  16.4    Platelets 275  243  241      Lipid Panel          1/4/2023    09:37 4/21/2023    09:53   Lipid Panel   Total Cholesterol 207  174    Triglycerides 171  157    HDL Cholesterol 31  36    VLDL Cholesterol 31  28    LDL Cholesterol  145  110    LDL/HDL Ratio 4.57  2.96      TSH          1/4/2023    09:37   TSH   TSH 2.160        Lipase   Lipase   Date Value Ref Range Status   01/09/2023 20 13 - 60 U/L Final   01/06/2019 124 73 - 393 U/L Final     Amylase No results found for: \"AMYLASE\"            Past Medical History       Past Medical History:   Diagnosis Date    Abnormal Pap smear of cervix     Atrial fibrillation     ON ELIQUIS/FOLLOWS TROTTER    Colindres's esophagus     Depression     Diabetes mellitus     Emphysema lung     INHALER    Endometrial hyperplasia     D&C SCHEDULED 7/25/23    Esophageal varices     Fatty liver 2015    Gastroparesis     TAKES XIFAXAN    GERD (gastroesophageal reflux disease) 1995    Hyperlipidemia     Hypertension     Iron deficiency anemia 05/02/2023    IRON INFUSION LAST 7/24/23    Irritable bowel syndrome     W/CONSTIPATION    BERKOWITZ (nonalcoholic steatohepatitis)     NO S/S CURRENTLY, ELEVATED ENZYMES    Obstructive sleep apnea syndrome     Pulmonary emphysema 11/22/2021    S/P exploratory laparotomy, lysis of adhesions, resection of necrotic colon without anastomosis 04/18/2022       Past Surgical History:   Procedure Laterality Date    ABDOMINAL SURGERY  4/16/2022    BLADDER SLING MODIFIED, ANTERIOR AND POSTERIOR VAGINAL REPAIR      BREAST LUMPECTOMY Left     BENIGN    CARDIAC CATHETERIZATION      NO INTERVENTION    CHOLECYSTECTOMY  5/12/2015    COLON RESECTION      PERFORATED COLON, EEA    COLONOSCOPY N/A 08/14/2023    Procedure: COLONOSCOPY WITH POLYPECTOMY, TATTOO;  Surgeon: Shannan Charles MD;  Location: [facility] ENDOSCOPY;  " Service: Gastroenterology;  Laterality: N/A;  COLON POLYPS  DIVERTICULOSIS  HEMORRHOIDS    D & C HYSTEROSCOPY N/A 07/25/2023    Procedure: resection of endometrial polyp with myosure;  Surgeon: Ashleigh Harding DO;  Location: [facility] MAIN OR;  Service: Gynecology;  Laterality: N/A;    ENDOSCOPY N/A 06/03/2022    Procedure: ESOPHAGOGASTRODUODENOSCOPY;  Surgeon: Shannan Charles MD;  Location: [facility] ENDOSCOPY;  Service: Gastroenterology;  Laterality: N/A;  RETAINED FOOD IN STOMACH  ESOPHAGEAL VARICIES    EXPLORATORY LAPAROTOMY N/A 04/16/2022    Procedure: EXPLORATORY LAPAROTOMY, LYSIS OF ADHESIONS, RESECTION OF NECTROIC COLON;  Surgeon: Cande Stanley MD;  Location: [facility] MAIN OR;  Service: General;  Laterality: N/A;    GALLBLADDER SURGERY      LAPAROSCOPIC    LIVER BIOPSY  2021    TUBAL ABDOMINAL LIGATION      UPPER GASTROINTESTINAL ENDOSCOPY  6/3/2022         Current Outpatient Medications:     acetaminophen (Tylenol) 325 MG tablet, Take 2 tablets by mouth Every 4 (Four) Hours As Needed for Mild Pain., Disp: 30 tablet, Rfl: 1    albuterol sulfate  (90 Base) MCG/ACT inhaler, Inhale 2 puffs Every 4 (Four) Hours As Needed., Disp: , Rfl:     amLODIPine (NORVASC) 10 MG tablet, Take 1 tablet by mouth Daily., Disp: 90 tablet, Rfl: 3    apixaban (ELIQUIS) 5 MG tablet tablet, Take 1 tablet by mouth 2 (Two) Times a Day., Disp: 180 tablet, Rfl: 3    B-D UF III MINI PEN NEEDLES 31G X 5 MM misc, , Disp: , Rfl:     Blood Glucose Monitoring Suppl (Accu-Chek Guide) w/Device kit, , Disp: , Rfl:     buPROPion XL (WELLBUTRIN XL) 150 MG 24 hr tablet, Take 1 tablet by mouth 2 (Two) Times a Day., Disp: 180 tablet, Rfl: 1    Cholecalciferol (Vitamin D3) 50 MCG (2000 UT) capsule, Take 3 capsules by mouth Daily. LAST DOSE 7/21/23, Disp: , Rfl:     Continuous Blood Gluc Sensor (Dexcom G6 Sensor), APPLY 1 EACH TOPICALLY EVERY 10 (TEN) DAYS., Disp: , Rfl:     Continuous Blood Gluc Transmit (Dexcom G6 Transmitter) misc,  USE FOR 90 DAYS., Disp: , Rfl:     empagliflozin (JARDIANCE) 25 MG tablet tablet, Take 1 tablet by mouth Daily., Disp: , Rfl:     escitalopram (Lexapro) 20 MG tablet, Take 1 tablet by mouth Daily., Disp: 90 tablet, Rfl: 1    Evolocumab (Repatha) solution prefilled syringe injection, Inject 1 mL under the skin into the appropriate area as directed Every 14 (Fourteen) Days., Disp: 6 each, Rfl: 4    famotidine (PEPCID) 40 MG tablet, Take 1 tablet by mouth At Night As Needed for Heartburn., Disp: 90 tablet, Rfl: 3    Fluticasone-Umeclidin-Vilant (TRELEGY) 100-62.5-25 MCG/ACT inhaler, Inhale 1 puff Daily., Disp: 1 each, Rfl: 5    furosemide (LASIX) 20 MG tablet, Take 1 tablet by mouth Daily., Disp: 90 tablet, Rfl: 3    glucose blood test strip, Check blood glucose twice daily, Disp: 60 each, Rfl: 12    glucose monitor monitoring kit, 1 each Daily. Patient prefers Accu Chek Meter., Disp: 1 each, Rfl: 0    HYDROcodone-acetaminophen (NORCO) 5-325 MG per tablet, Take 1 tablet by mouth Every 6 (Six) Hours As Needed (Pain)., Disp: 6 tablet, Rfl: 0    icosapent ethyl (VASCEPA) 1 g capsule capsule, Take 2 g by mouth 2 (Two) Times a Day With Meals., Disp: 360 capsule, Rfl: 1    Insulin Glargine, 2 Unit Dial, (Toujeo Max SoloStar) 300 UNIT/ML solution pen-injector injection, Inject 122 Units under the skin into the appropriate area as directed Every Morning. INST PER ANESTHESIA PROTOCOL, Disp: , Rfl:     Lancets (accu-chek soft touch) lancets, Check blood sugar twice daily, Disp: 100 each, Rfl: 12    metoprolol tartrate (LOPRESSOR) 50 MG tablet, Take 1.5 tablets by mouth 2 (Two) Times a Day. TAKE 1 1/2 TABLETS BID, Disp: 270 tablet, Rfl: 1    Motegrity 2 MG tablet, Take 1 tablet by mouth., Disp: , Rfl:     multivitamin with minerals tablet tablet, Take 1 tablet by mouth Daily. LAST DOSE 7/21/23, Disp: , Rfl:     NovoLOG FlexPen 100 UNIT/ML solution pen-injector sc pen, Inject 60 Units under the skin into the appropriate area as  "directed 2 (Two) Times a Day. PER SLIDING SCALE INST PER ANESTHESIA PROTOCOL, Disp: , Rfl:     olmesartan (BENICAR) 20 MG tablet, TAKE 1 TABLET BY MOUTH EVERY DAY, Disp: 90 tablet, Rfl: 1    pantoprazole (PROTONIX) 40 MG EC tablet, Take 1 tablet by mouth Daily., Disp: 90 tablet, Rfl: 3    potassium chloride 10 MEQ CR tablet, Take 1 tablet by mouth Daily., Disp: 90 tablet, Rfl: 3    riFAXIMin (Xifaxan) 550 MG tablet, Take 1 tablet by mouth Every 12 (Twelve) Hours., Disp: 180 tablet, Rfl: 3     Allergies   Allergen Reactions    Lisinopril Other (See Comments)     Cough    Liraglutide Nausea And Vomiting    Metformin Nausea Only     GI Upset       Family History   Adopted: Yes   Problem Relation Age of Onset    Malig Hyperthermia Neg Hx         Social History     Social History Narrative    Exercises 1 x week    Lives at home with spouse       Objective       Review of Systems   Constitutional:  Negative for appetite change, fatigue, fever, unexpected weight gain and unexpected weight loss.   HENT:  Negative for trouble swallowing.    Respiratory:  Negative for cough, choking, chest tightness, shortness of breath, wheezing and stridor.    Cardiovascular:  Negative for chest pain, palpitations and leg swelling.   Gastrointestinal:  Positive for abdominal distention. Negative for abdominal pain, anal bleeding, blood in stool, constipation, diarrhea, nausea, rectal pain, vomiting, GERD and indigestion.        Vital Signs:   /75 (BP Location: Left arm, Patient Position: Sitting, Cuff Size: Adult)   Pulse 77   Ht 165.1 cm (65\")   Wt 108 kg (237 lb)   BMI 39.44 kg/m²       Physical Exam  Constitutional:       General: She is not in acute distress.     Appearance: She is well-developed. She is not ill-appearing.   HENT:      Head: Normocephalic.   Eyes:      Pupils: Pupils are equal, round, and reactive to light.   Cardiovascular:      Rate and Rhythm: Normal rate and regular rhythm.      Heart sounds: Normal heart " sounds.   Pulmonary:      Effort: Pulmonary effort is normal.      Breath sounds: Normal breath sounds.   Abdominal:      General: Bowel sounds are normal. There is no distension.      Palpations: Abdomen is soft. There is no mass.      Tenderness: There is no abdominal tenderness. There is no guarding or rebound.      Hernia: No hernia is present.   Musculoskeletal:         General: Normal range of motion.   Skin:     General: Skin is warm and dry.   Neurological:      Mental Status: She is alert and oriented to person, place, and time.   Psychiatric:         Speech: Speech normal.         Behavior: Behavior normal.         Judgment: Judgment normal.           Assessment & Plan          Assessment and Plan    Diagnoses and all orders for this visit:    1. Colindres's esophagus without dysplasia (Primary)  -     pantoprazole (PROTONIX) 40 MG EC tablet; Take 1 tablet by mouth Daily.  Dispense: 90 tablet; Refill: 3    2. Cirrhosis of liver without ascites, unspecified hepatic cirrhosis type    3. Hepatic encephalopathy  -     riFAXIMin (Xifaxan) 550 MG tablet; Take 1 tablet by mouth Every 12 (Twelve) Hours.  Dispense: 180 tablet; Refill: 3    4. BERKOWITZ (nonalcoholic steatohepatitis)    5. Gastroesophageal reflux disease without esophagitis  Comments:  Stable on Protonix 40mg and Pepcid 40mg daily; continue and continue with GI.  Orders:  -     famotidine (PEPCID) 40 MG tablet; Take 1 tablet by mouth At Night As Needed for Heartburn.  Dispense: 90 tablet; Refill: 3    Cirrhosis: BERKOWITZ MELD 9  Ascites: None noted  Varices: Grade 2 esophageal varices noted on most recent EGD.  Encephalopathy: Patient reports she is unable to tolerate lactulose.   On Xifaxan and MiraLAX.  Health maintenance: Next surveillance EGD due 6/24.  Next colonoscopy due in 1 year.    Reviewed most recent lab and imaging results with her today.  Her nausea and vomiting and abdominal pain are significantly better since her EGD with Botox with the  [facility] GI specialist.  Bowels moving well currently.  We did talk about avoiding high fiber foods like raw vegetables and salads.  Reflux seems well-controlled currently.  I will refill the Protonix and the Pepcid.  LFTs are stable.  No signs of confusion or memory loss today.  Continue Xifaxan.  Patient to follow-up with Dr. Chirinos as planned.  She will need repeat EGD and colonoscopy next summer.  Patient to call the office with any issues.  Patient to follow-up with me in 3 months.  Patient is agreeable to the plan.      Follow Up       Follow Up   Return in about 3 months (around 3/19/2024) for GASTROPARESIS, CIRRHOSIS.  Patient was given instructions and counseling regarding her condition or for health maintenance advice. Please see specific information pulled into the AVS if appropriate.

## 2023-12-21 ENCOUNTER — TELEPHONE (OUTPATIENT)
Dept: CARDIOLOGY | Facility: CLINIC | Age: 63
End: 2023-12-21
Payer: COMMERCIAL

## 2023-12-21 RX ORDER — EVOLOCUMAB 140 MG/ML
140 INJECTION, SOLUTION SUBCUTANEOUS
Qty: 6 EACH | Refills: 3 | Status: SHIPPED | OUTPATIENT
Start: 2023-12-21

## 2023-12-21 NOTE — TELEPHONE ENCOUNTER
RECEIVED CALL FROM [employer].  [facility] REQUESTED FOR PT ASSISTANCE PROGRAM TO BE SENT TO [facility] [phone]

## 2023-12-27 ENCOUNTER — HOSPITAL ENCOUNTER (OUTPATIENT)
Dept: CARDIOLOGY | Facility: HOSPITAL | Age: 63
Discharge: HOME | End: 2023-12-27
Payer: COMMERCIAL

## 2023-12-27 ENCOUNTER — TELEPHONE (OUTPATIENT)
Dept: CARDIOLOGY | Facility: CLINIC | Age: 63
End: 2023-12-27
Payer: COMMERCIAL

## 2023-12-27 ENCOUNTER — HOSPITAL ENCOUNTER (OUTPATIENT)
Dept: ULTRASOUND IMAGING | Facility: HOSPITAL | Age: 63
Discharge: HOME | End: 2023-12-27
Payer: COMMERCIAL

## 2023-12-27 DIAGNOSIS — K74.60 CIRRHOSIS, NON-ALCOHOLIC: ICD-10-CM

## 2023-12-27 LAB
BH CV ECHO MEAS - DIST REN A EDV LEFT: 13 CM/S
BH CV ECHO MEAS - DIST REN A PSV LEFT: 60 CM/S
BH CV ECHO MEAS - MID REN A EDV LEFT: 8 CM/S
BH CV ECHO MEAS - MID REN A PSV LEFT: 47 CM/S
BH CV ECHO MEAS - PROX REN A EDV LEFT: 12 CM/S
BH CV ECHO MEAS - PROX REN A PSV LEFT: 74 CM/S
BH CV VAS BP LEFT ARM: NORMAL MMHG
BH CV VAS BP RIGHT ARM: NORMAL MMHG
BH CV VAS KIDNEY HEIGHT LEFT: 5 CM
BH CV VAS RENAL AORTIC MID EDV: 8 CM/S
BH CV VAS RENAL AORTIC MID PSV: 41 CM/S
BH CV VAS RENAL HILUM LEFT EDV: 9 CM/S
BH CV VAS RENAL HILUM LEFT PSV: 52 CM/S
BH CV VAS RENAL HILUM RIGHT EDV: 10 CM/S
BH CV VAS RENAL HILUM RIGHT PSV: 30 CM/S
BH CV XLRA MEAS - KID L LEFT: 13 CM
BH CV XLRA MEAS DIST REN A EDV RIGHT: 19 CM/S
BH CV XLRA MEAS DIST REN A PSV RIGHT: 85 CM/S
BH CV XLRA MEAS KID H RIGHT: 6 CM
BH CV XLRA MEAS KID L RIGHT: 13 CM
BH CV XLRA MEAS KID W RIGHT: 5 CM
BH CV XLRA MEAS MID REN A EDV RIGHT: 20 CM/S
BH CV XLRA MEAS MID REN A PSV RIGHT: 96 CM/S
BH CV XLRA MEAS PROX REN A EDV RIGHT: 11 CM/S
BH CV XLRA MEAS PROX REN A PSV RIGHT: 44 CM/S
BH CV XLRA MEAS RAR LEFT: 1.8
BH CV XLRA MEAS RAR RIGHT: 2.3
BH CV XLRA MEAS RENAL A ORG EDV LEFT: 9 CM/S
BH CV XLRA MEAS RENAL A ORG EDV RIGHT: 12 CM/S
BH CV XLRA MEAS RENAL A ORG PSV LEFT: 63 CM/S
BH CV XLRA MEAS RENAL A ORG PSV RIGHT: 76 CM/S
LEFT KIDNEY WIDTH: 7 CM
LEFT RENAL UPPER PARENCHYMA MAX: 29 CM/S
LEFT RENAL UPPER PARENCHYMA MIN: 6 CM/S
LEFT RENAL UPPER PARENCHYMA RI: 0.79
RIGHT ACCESSORY RENAL DIST DIAS: 14 CM/S
RIGHT ACCESSORY RENAL DIST SYS: 77 CM/S
RIGHT ACCESSORY RENAL MID DIAS: 15 CM/S
RIGHT ACCESSORY RENAL MID SYS: 90 CM/S
RIGHT ACCESSORY RENAL ORIGIN DIAS: 12 CM/S
RIGHT ACCESSORY RENAL ORIGIN SYS: 72 CM/S
RIGHT ACCESSORY RENAL PROX DIAS: 14 CM/S
RIGHT ACCESSORY RENAL PROX SYS: 71 CM/S
RIGHT RENAL UPPER PARENCHYMA MAX: 20 CM/S
RIGHT RENAL UPPER PARENCHYMA MIN: 7 CM/S
RIGHT RENAL UPPER PARENCHYMA RI: 0.65

## 2023-12-27 PROCEDURE — 76705 ECHO EXAM OF ABDOMEN: CPT

## 2023-12-27 PROCEDURE — 93975 VASCULAR STUDY: CPT

## 2023-12-27 NOTE — TELEPHONE ENCOUNTER
[facility] received a fax that requires your attention. The document has been indexed to the patient’s chart for your review.      Reason for sending: EXTERNAL MEDICAL RECORD NOTIFICATION    Documents Description: MED ASSISTANCE     Name of Sender: MICHAEL    Date Indexed: 12/27/23    Notes (if needed): SEE FAX IN CHART UNDER MEDICATIONS

## 2024-01-04 ENCOUNTER — TELEPHONE (OUTPATIENT)
Dept: OBSTETRICS AND GYNECOLOGY | Facility: CLINIC | Age: 64
End: 2024-01-04
Payer: COMMERCIAL

## 2024-01-04 DIAGNOSIS — B37.31 VAGINAL YEAST INFECTION: Primary | ICD-10-CM

## 2024-01-04 RX ORDER — FLUCONAZOLE 150 MG/1
150 TABLET ORAL
Qty: 2 TABLET | Refills: 1 | Status: SHIPPED | OUTPATIENT
Start: 2024-01-04

## 2024-01-04 NOTE — TELEPHONE ENCOUNTER
----- Message from Anika Casillas sent at 1/2/2024  9:47 PM EST -----  Regarding: Appointment Request  Contact: [phone]  Appointment Request From: Anika Casillas    With Provider: Ashleigh Harding [[facility]]    Preferred Date Range: Any    Preferred Times: Any Time    Reason for visit: In-Office Procedure    Comments:  I have a rash with itching. I think it is the same thing that I had Diana last year. Was wanting you to call me something to [facility].

## 2024-01-17 DIAGNOSIS — I10 ESSENTIAL HYPERTENSION: ICD-10-CM

## 2024-01-17 RX ORDER — METOPROLOL TARTRATE 50 MG/1
75 TABLET, FILM COATED ORAL 2 TIMES DAILY
Qty: 270 TABLET | Refills: 1 | Status: SHIPPED | OUTPATIENT
Start: 2024-01-17

## 2024-01-17 NOTE — PROGRESS NOTES
GYN Problem/Follow Up Visit    Chief Complaint   Patient presents with    Vaginal Itching           HPI  Anika Casillas is a 63 y.o. female, , who presents for vaginal itching for the past 2 months.   No discharge changes. No recent antibiotic use. History of diabetes, blood sugar running high. Hx of BV and yeast.     Hx of urinary incontinence, bladder sling 6 years ago in [place], efficacious in the beginning, now leakage all day long. No pain with urination, no visible blood in urine. Limiting activities of daily living, social life.    Sexually active, denies dyspareunia   No vaginal bleeding    Additional OB/GYN History   No LMP recorded. Patient is postmenopausal.      Past Medical History:   Diagnosis Date    Abnormal Pap smear of cervix     Atrial fibrillation     ON ELIQUIS/FOLLOWS SERGO    Colindres's esophagus     Depression     Diabetes mellitus     Emphysema lung     INHALER    Endometrial hyperplasia     D&C SCHEDULED 23    Esophageal varices     Fatty liver     Gastroparesis     TAKES XIFAXAN    GERD (gastroesophageal reflux disease)     Hyperlipidemia     Hypertension     Iron deficiency anemia 2023    IRON INFUSION LAST 23    Irritable bowel syndrome     W/CONSTIPATION    BERKOWITZ (nonalcoholic steatohepatitis)     NO S/S CURRENTLY, ELEVATED ENZYMES    Obstructive sleep apnea syndrome     Pulmonary emphysema 2021    S/P exploratory laparotomy, lysis of adhesions, resection of necrotic colon without anastomosis 2022      Past Surgical History:   Procedure Laterality Date    ABDOMINAL SURGERY  2022    BLADDER SLING MODIFIED, ANTERIOR AND POSTERIOR VAGINAL REPAIR      [place]    BOTOX INJECTION      In abdomen    BREAST LUMPECTOMY Left     BENIGN    CARDIAC CATHETERIZATION      NO INTERVENTION    CHOLECYSTECTOMY  2015    COLON RESECTION      PERFORATED COLON, EEA    COLONOSCOPY N/A 2023    Procedure: COLONOSCOPY WITH POLYPECTOMY,  "TATTOO;  Surgeon: Shannan Charles MD;  Location: [facility] ENDOSCOPY;  Service: Gastroenterology;  Laterality: N/A;  COLON POLYPS  DIVERTICULOSIS  HEMORRHOIDS    D & C HYSTEROSCOPY N/A 07/25/2023    Procedure: resection of endometrial polyp with myosure;  Surgeon: Ashleigh Harding DO;  Location: [facility] MAIN OR;  Service: Gynecology;  Laterality: N/A;    ENDOSCOPY N/A 06/03/2022    Procedure: ESOPHAGOGASTRODUODENOSCOPY;  Surgeon: Shannan Charles MD;  Location: [facility] ENDOSCOPY;  Service: Gastroenterology;  Laterality: N/A;  RETAINED FOOD IN STOMACH  ESOPHAGEAL VARICIES    EXPLORATORY LAPAROTOMY N/A 04/16/2022    Procedure: EXPLORATORY LAPAROTOMY, LYSIS OF ADHESIONS, RESECTION OF NECTROIC COLON;  Surgeon: Cande Stanley MD;  Location: [facility] MAIN OR;  Service: General;  Laterality: N/A;    GALLBLADDER SURGERY      LAPAROSCOPIC    LIVER BIOPSY  2021    TUBAL ABDOMINAL LIGATION      UPPER GASTROINTESTINAL ENDOSCOPY  6/3/2022      Family History   Adopted: Yes   Problem Relation Age of Onset    Malig Hyperthermia Neg Hx      Allergies as of 01/18/2024 - Reviewed 01/18/2024   Allergen Reaction Noted    Lisinopril Other (See Comments) 09/15/2023    Liraglutide Nausea And Vomiting 02/03/2020    Metformin Nausea Only 10/27/2021      The additional following portions of the patient's history were reviewed and updated as appropriate: allergies, current medications, past family history, past medical history, past social history, past surgical history, and problem list.    Review of Systems    See HPI for pertinent ROS    Objective   /78   Pulse 74   Ht 165.1 cm (65\")   Wt 108 kg (239 lb)   BMI 39.77 kg/m²     Physical Exam  Vitals and nursing note reviewed. Exam conducted with a chaperone present.   Constitutional:       Appearance: Normal appearance.   Cardiovascular:      Rate and Rhythm: Normal rate.   Pulmonary:      Effort: Pulmonary effort is normal.   Genitourinary:     General: Normal " vulva.      Vagina: Vaginal discharge (small amount yellow, thin) present.      Cervix: Normal.      Uterus: Normal.       Adnexa: Right adnexa normal and left adnexa normal.   Lymphadenopathy:      Lower Body: No right inguinal adenopathy. No left inguinal adenopathy.   Skin:     General: Skin is warm and dry.   Neurological:      Mental Status: She is alert and oriented to person, place, and time.              Assessment and Plan    Diagnoses and all orders for this visit:    1. Vaginal irritation (Primary)  -     Cancel: POC Urinalysis Dipstick  -     Gardnerella vaginalis, Trichomonas vaginalis, Candida albicans, DNA - Swab, Vagina    2. Frequent urinary incontinence  -     Ambulatory Referral to Gynecologic Urology  -     Urinalysis With Culture If Indicated -      Counseling:  I recommend she use a moisture barrier such as desitin/zinc oxide to prevent skin breakdown from the constant moisture to the vulva from constant urinary leakage. Await vaginal cultures. Her blood sugar has been elevated per patient which may increase risk for candida/bv- better glucose management.   Referral to urogyn to discuss management of failed bladder sling.   BV precautions.         Follow Up:  Return if symptoms worsen or fail to improve.        Michelle Castro, APRN  01/18/2024

## 2024-01-18 ENCOUNTER — OFFICE VISIT (OUTPATIENT)
Dept: OBSTETRICS AND GYNECOLOGY | Facility: CLINIC | Age: 64
End: 2024-01-18
Payer: COMMERCIAL

## 2024-01-18 VITALS
SYSTOLIC BLOOD PRESSURE: 126 MMHG | WEIGHT: 239 LBS | DIASTOLIC BLOOD PRESSURE: 78 MMHG | BODY MASS INDEX: 39.82 KG/M2 | HEART RATE: 74 BPM | HEIGHT: 65 IN

## 2024-01-18 DIAGNOSIS — N39.498 FREQUENT URINARY INCONTINENCE: ICD-10-CM

## 2024-01-18 DIAGNOSIS — N89.8 VAGINAL IRRITATION: Primary | ICD-10-CM

## 2024-01-18 LAB
BILIRUB UR QL STRIP: NEGATIVE
CANDIDA SPECIES: POSITIVE
CLARITY UR: CLEAR
COLOR UR: YELLOW
GARDNERELLA VAGINALIS: POSITIVE
GLUCOSE UR STRIP-MCNC: ABNORMAL MG/DL
HGB UR QL STRIP.AUTO: NEGATIVE
HOLD SPECIMEN: NORMAL
KETONES UR QL STRIP: NEGATIVE
LEUKOCYTE ESTERASE UR QL STRIP.AUTO: NEGATIVE
NITRITE UR QL STRIP: NEGATIVE
PH UR STRIP.AUTO: 6 [PH] (ref 5–8)
PROT UR QL STRIP: ABNORMAL
SP GR UR STRIP: >=1.03 (ref 1–1.03)
T VAGINALIS DNA VAG QL PROBE+SIG AMP: NEGATIVE
UROBILINOGEN UR QL STRIP: ABNORMAL

## 2024-01-18 PROCEDURE — 87660 TRICHOMONAS VAGIN DIR PROBE: CPT | Performed by: NURSE PRACTITIONER

## 2024-01-18 PROCEDURE — 87510 GARDNER VAG DNA DIR PROBE: CPT | Performed by: NURSE PRACTITIONER

## 2024-01-18 PROCEDURE — 81003 URINALYSIS AUTO W/O SCOPE: CPT | Performed by: NURSE PRACTITIONER

## 2024-01-18 PROCEDURE — 87480 CANDIDA DNA DIR PROBE: CPT | Performed by: NURSE PRACTITIONER

## 2024-01-19 DIAGNOSIS — N76.0 BV (BACTERIAL VAGINOSIS): ICD-10-CM

## 2024-01-19 DIAGNOSIS — B96.89 BV (BACTERIAL VAGINOSIS): ICD-10-CM

## 2024-01-19 DIAGNOSIS — B37.31 CANDIDA VAGINITIS: Primary | ICD-10-CM

## 2024-01-19 RX ORDER — FLUCONAZOLE 100 MG/1
100 TABLET ORAL
Qty: 3 TABLET | Refills: 0 | Status: SHIPPED | OUTPATIENT
Start: 2024-01-19 | End: 2024-01-26

## 2024-01-19 RX ORDER — METRONIDAZOLE 7.5 MG/G
GEL VAGINAL
Qty: 70 G | Refills: 0 | Status: SHIPPED | OUTPATIENT
Start: 2024-01-19 | End: 2024-01-26

## 2024-01-22 ENCOUNTER — OFFICE VISIT (OUTPATIENT)
Dept: PULMONOLOGY | Facility: CLINIC | Age: 64
End: 2024-01-22
Payer: COMMERCIAL

## 2024-01-22 VITALS
WEIGHT: 235 LBS | HEIGHT: 65 IN | RESPIRATION RATE: 18 BRPM | OXYGEN SATURATION: 98 % | DIASTOLIC BLOOD PRESSURE: 64 MMHG | BODY MASS INDEX: 39.15 KG/M2 | TEMPERATURE: 97.3 F | HEART RATE: 71 BPM | SYSTOLIC BLOOD PRESSURE: 123 MMHG

## 2024-01-22 DIAGNOSIS — R06.02 SHORTNESS OF BREATH: ICD-10-CM

## 2024-01-22 DIAGNOSIS — J43.9 PULMONARY EMPHYSEMA, UNSPECIFIED EMPHYSEMA TYPE: Primary | ICD-10-CM

## 2024-01-22 DIAGNOSIS — G47.33 OSA ON CPAP: ICD-10-CM

## 2024-01-22 PROCEDURE — 99214 OFFICE O/P EST MOD 30 MIN: CPT | Performed by: STUDENT IN AN ORGANIZED HEALTH CARE EDUCATION/TRAINING PROGRAM

## 2024-01-22 RX ORDER — ALBUTEROL SULFATE 90 UG/1
2 AEROSOL, METERED RESPIRATORY (INHALATION) EVERY 4 HOURS PRN
Qty: 1 G | Refills: 5 | Status: SHIPPED | OUTPATIENT
Start: 2024-01-22

## 2024-01-22 NOTE — PROGRESS NOTES
Primary Care Provider  Elena Henderson PA   Referring Provider  No ref. provider found      Patient Complaint  Follow-up (6 Months)      Subjective          Anika Casillas presents to [facility]      History of Presenting Illness  Anika Casillas is a 63 y.o. female with history of memory impairment, RUKHSANA on CPAP, hypertension, diabetes, COPD here for follow up.     Initial Visit: PFTs done 7/5/2022 showed normal spirometry, mild hyperinflation with signs of air trapping, and moderate reduction in DLCO.  There was no response to bronchodilator therapy. Patient reports having chronic cough and chronic shortness of breath.  She reports that even with just sitting she has some shortness of breath.  He also reports dyspnea on exertion with minimal exertion.'  With 15 steps or going to the mailbox, get shortness of breath'.  Patient is currently on trilogy Ellipta daily and as needed albuterol.  She reports that she does not use albuterol as much.  She is a current smoker, smoking about 5 cigarettes/day.  She has been smoking since she was 19 years old.  In the past she has smoked 1 pack/day for many years.  Patient reports that her cough is worse at night, mostly nonproductive but sometimes yellow to green in color in the morning.  She is on PPI for her acid reflux.  She has not been hospitalized within the last year for any of her symptoms. I have personally reviewed patients past family, social, medical and surgical histories and agree with their findings.    Interval Update: Patient is doing okay.  She has a chronic dyspnea on exertion especially when she is walking up her stairs from the basement.  She has a chronic dry cough.  She continues to smoke 3 to 4 cigarettes/day.  She is on Wellbutrin at this time and is deferring Chantix at this time.  She also reports trace edema in her lower extremities.  She has Lasix but due to increased urination she does not  take it all the time.  She continues to use Trelegy daily with albuterol as needed, 2-3 times per week.  We discussed the possibility of bronchoscopy for airway clearance and she is understanding.  No other acute issues at this    Review of Systems  Constitutional:  No fever. No chills. No weakness.  Eyes: No pain, erythema, or discharge. No blurring of vision.  ENT:  No sore throat, URI symptoms.   Cardiovascular:  No chest pain. No palpitations. No lower extremity edema.  Respiratory:  +chronic cough; no pleuritic chest pain. No hemoptysis. +chronic dyspnea on exertion   GI:  Normal appetite. No nausea, vomiting, diarrhea. No pain. No melena.  Musculoskeletal:  No arthralgias or myalgias.  Neurologic:  No headache. No weakness.    Family History   Adopted: Yes   Problem Relation Age of Onset    Malig Hyperthermia Neg Hx         Social History     Socioeconomic History    Marital status:      Spouse name: fannie   Tobacco Use    Smoking status: Every Day     Packs/day: 0.25     Years: 41.00     Additional pack years: 0.00     Total pack years: 10.25     Types: Cigarettes     Start date: 6/17/1975    Smokeless tobacco: Never    Tobacco comments:     INST PER ANESTHESIA PROTOCOL   Vaping Use    Vaping Use: Never used   Substance and Sexual Activity    Alcohol use: Yes     Alcohol/week: 1.0 standard drink of alcohol     Types: 1 Drinks containing 0.5 oz of alcohol per week     Comment: 1 a month    Drug use: Never    Sexual activity: Yes     Partners: Male     Birth control/protection: Post-menopausal, Tubal ligation        Past Medical History:   Diagnosis Date    Abnormal Pap smear of cervix     Atrial fibrillation     ON ELIQUIS/FOLLOWS SERGO    Colindres's esophagus     Depression     Diabetes mellitus     Emphysema lung     INHALER    Endometrial hyperplasia     D&C SCHEDULED 7/25/23    Esophageal varices     Fatty liver 2015    Gastroparesis     TAKES XIFAXAN    GERD (gastroesophageal reflux disease) 1995     Hyperlipidemia     Hypertension     Iron deficiency anemia 05/02/2023    IRON INFUSION LAST 7/24/23    Irritable bowel syndrome     W/CONSTIPATION    BERKOWITZ (nonalcoholic steatohepatitis)     NO S/S CURRENTLY, ELEVATED ENZYMES    Obstructive sleep apnea syndrome     Pulmonary emphysema 11/22/2021    S/P exploratory laparotomy, lysis of adhesions, resection of necrotic colon without anastomosis 04/18/2022        Immunization History   Administered Date(s) Administered    COVID-19 (PFIZER) Purple Cap Monovalent 02/06/2021, 03/06/2021, 12/04/2021    Flu Vaccine Intradermal Quad 18-64YR 09/12/2021, 09/12/2021    Fluzone (or Fluarix & Flulaval for VFC) >6mos 10/29/2019, 09/27/2020, 10/03/2022, 10/05/2023    Influenza Split Preservative Free ID 09/12/2021    Influenza, Unspecified 11/12/2018, 09/27/2020    Pneumococcal Conjugate 13-Valent (PCV13) 01/09/2017    Pneumococcal Polysaccharide (PPSV23) 10/12/2020    flucelvax quad pfs =>4 YRS 09/27/2020       Allergies   Allergen Reactions    Lisinopril Other (See Comments)     Cough    Cough   Cough    Liraglutide Nausea And Vomiting    Metformin Nausea Only     GI Upset          Current Outpatient Medications:     acetaminophen (Tylenol) 325 MG tablet, Take 2 tablets by mouth Every 4 (Four) Hours As Needed for Mild Pain., Disp: 30 tablet, Rfl: 1    albuterol sulfate  (90 Base) MCG/ACT inhaler, Inhale 2 puffs Every 4 (Four) Hours As Needed., Disp: , Rfl:     amLODIPine (NORVASC) 10 MG tablet, Take 1 tablet by mouth Daily., Disp: 90 tablet, Rfl: 3    apixaban (ELIQUIS) 5 MG tablet tablet, Take 1 tablet by mouth 2 (Two) Times a Day., Disp: 180 tablet, Rfl: 3    B-D UF III MINI PEN NEEDLES 31G X 5 MM misc, , Disp: , Rfl:     Blood Glucose Monitoring Suppl (Accu-Chek Guide) w/Device kit, , Disp: , Rfl:     buPROPion XL (WELLBUTRIN XL) 150 MG 24 hr tablet, Take 1 tablet by mouth 2 (Two) Times a Day., Disp: 180 tablet, Rfl: 1    Cholecalciferol (Vitamin D3) 50 MCG (2000 UT)  capsule, Take 3 capsules by mouth Daily. LAST DOSE 7/21/23, Disp: , Rfl:     Continuous Blood Gluc Sensor (Dexcom G6 Sensor), APPLY 1 EACH TOPICALLY EVERY 10 (TEN) DAYS., Disp: , Rfl:     Continuous Blood Gluc Transmit (Dexcom G6 Transmitter) misc, USE FOR 90 DAYS., Disp: , Rfl:     empagliflozin (JARDIANCE) 25 MG tablet tablet, Take 1 tablet by mouth Daily., Disp: , Rfl:     escitalopram (Lexapro) 20 MG tablet, Take 1 tablet by mouth Daily., Disp: 90 tablet, Rfl: 1    Evolocumab (Repatha) solution prefilled syringe injection, Inject 1 mL under the skin into the appropriate area as directed Every 14 (Fourteen) Days., Disp: 6 each, Rfl: 3    famotidine (PEPCID) 40 MG tablet, Take 1 tablet by mouth At Night As Needed for Heartburn., Disp: 90 tablet, Rfl: 3    fluconazole (Diflucan) 100 MG tablet, Take 1 tablet by mouth Every 3 (Three) Days for 3 doses., Disp: 3 tablet, Rfl: 0    Fluticasone-Umeclidin-Vilant (TRELEGY) 100-62.5-25 MCG/ACT inhaler, Inhale 1 puff Daily., Disp: 1 each, Rfl: 5    furosemide (LASIX) 20 MG tablet, Take 1 tablet by mouth Daily., Disp: 90 tablet, Rfl: 3    glucose blood test strip, Check blood glucose twice daily, Disp: 60 each, Rfl: 12    glucose monitor monitoring kit, 1 each Daily. Patient prefers Accu Chek Meter., Disp: 1 each, Rfl: 0    HYDROcodone-acetaminophen (NORCO) 5-325 MG per tablet, Take 1 tablet by mouth Every 6 (Six) Hours As Needed (Pain)., Disp: 6 tablet, Rfl: 0    icosapent ethyl (VASCEPA) 1 g capsule capsule, Take 2 g by mouth 2 (Two) Times a Day With Meals., Disp: 360 capsule, Rfl: 1    Insulin Glargine, 2 Unit Dial, (Toujeo Max SoloStar) 300 UNIT/ML solution pen-injector injection, Inject 122 Units under the skin into the appropriate area as directed Every Morning. INST PER ANESTHESIA PROTOCOL, Disp: , Rfl:     Lancets (accu-chek soft touch) lancets, Check blood sugar twice daily, Disp: 100 each, Rfl: 12    metoprolol tartrate (LOPRESSOR) 50 MG tablet, TAKE 1 AND 1/2  "TABLETS BY MOUTH TWICE DAILY, Disp: 270 tablet, Rfl: 1    metroNIDAZOLE (METROGEL) 0.75 % vaginal gel, Insert  into the vagina every night at bedtime for 7 days., Disp: 70 g, Rfl: 0    Motegrity 2 MG tablet, Take 1 tablet by mouth., Disp: , Rfl:     multivitamin with minerals tablet tablet, Take 1 tablet by mouth Daily. LAST DOSE 7/21/23, Disp: , Rfl:     NovoLOG FlexPen 100 UNIT/ML solution pen-injector sc pen, Inject 60 Units under the skin into the appropriate area as directed 2 (Two) Times a Day. PER SLIDING SCALE INST PER ANESTHESIA PROTOCOL, Disp: , Rfl:     olmesartan (BENICAR) 20 MG tablet, TAKE 1 TABLET BY MOUTH EVERY DAY, Disp: 90 tablet, Rfl: 1    pantoprazole (PROTONIX) 40 MG EC tablet, Take 1 tablet by mouth Daily., Disp: 90 tablet, Rfl: 3    potassium chloride 10 MEQ CR tablet, Take 1 tablet by mouth Daily., Disp: 90 tablet, Rfl: 3    riFAXIMin (Xifaxan) 550 MG tablet, Take 1 tablet by mouth Every 12 (Twelve) Hours., Disp: 180 tablet, Rfl: 3     Objective     Vital Signs:   /64 (BP Location: Left arm, Patient Position: Sitting, Cuff Size: Adult)   Pulse 71   Temp 97.3 °F (36.3 °C) (Temporal)   Resp 18   Ht 165.1 cm (65\")   Wt 107 kg (235 lb)   SpO2 98% Comment: Room air  BMI 39.11 kg/m²     Physical Exam  Vitals:    01/22/24 1058   BP: 123/64   Pulse: 71   Resp: 18   Temp: 97.3 °F (36.3 °C)   SpO2: 98%       General: Alert, NAD  HEENT:  EOMI, no sinus tenderness  Neck:  Supple, no thyromegaly,  no JVD  Lymph: no cervical, supraclavicular lymphadenopathy bilaterally  Chest:  clear to auscultation bilaterally, no wheezing or crackles; no work of breathing noted on room air  CV: RRR, no M/G/R, pulses 2+, equal.  Abd:  Soft, NT, ND, +BS, obese  EXT:  no clubbing, no cyanosis, no edema b/l  Neuro:  A&Ox3, CN grossly intact, no focal deficits  Skin: No rashes or lesions noted       Result Review :   I have personally reviewed patient's labs and images.          Assessment and Plan      Patient " Active Problem List   Diagnosis    Diabetic gastroparesis    Diverticulosis of colon    Elevated transaminase level    Essential hypertension    Gastroesophageal reflux disease without esophagitis    Hx of colonic polyp    Hypothyroidism    Major depressive disorder with single episode, in full remission    Mixed hyperlipidemia    BERKOWITZ (nonalcoholic steatohepatitis)    Class 2 severe obesity due to excess calories with serious comorbidity and body mass index (BMI) of 36.0 to 36.9 in adult    OAB (overactive bladder)    Paroxysmal atrial fibrillation    Tobacco abuse    Type 2 diabetes mellitus with hyperglycemia, with long-term current use of insulin    Pulmonary emphysema    Colindres's esophagus without dysplasia    Secondary esophageal varices without bleeding    Encounter for long-term (current) use of insulin    Hyperinsulinism    Uncontrolled type 2 diabetes mellitus with neurologic complication    Class 2 obesity    Chronic gastritis without bleeding, unspecified gastritis type    Pneumoperitoneum of unknown etiology    S/P exploratory laparotomy, lysis of adhesions, resection of necrotic colon without anastomosis    Memory loss    RUKHSANA on CPAP    Abnormal finding on MRI of brain    Iron deficiency anemia    Non-alcoholic cirrhosis    Depression    Encephalopathy, portal systemic    Esophageal varices in cirrhosis    Alkaline phosphatase raised    Iron deficiency    Other specified postprocedural states    Shortness of breath    Endometrial hyperplasia    Iron malabsorption    Endometrial polyp    Localized edema    Simple renal cyst    Esophageal varices without bleeding       Impression and Plan:    COPD  Active smoker (4 cigs/day)  Proximal A. fib on Eliquis  History of hypertension  RUKHSANA on CPAP    -Continue Trelegy Ellipta daily and albuterol as needed.  Medications refilled.  - PFTs done 7/5/2022 showed normal spirometry, mild hyperinflation with signs of air trapping, and moderate reduction in DLCO.  There  was no response to bronchodilator therapy.   -2D echo done 1/27/23 showed EF 66-70%, LVH, G1DD  -Patient has Lasix as needed for lower extremity edema.  She does not take it due to increased urination.   -Low-dose CT for cancer screening done 1/27/23 with nonspecific chronic ILD changes and scarring.  We will repeat 01/2024 (ordered)  -Continue PPI for acid reflux  -Continue jonathan pot for postnasal drip if beneficial  -Continue CPAP with [employer] for RUKHSANA  -Continue discussion regarding smoking cessation; patient is currently on Wellbutrin and cannot take Chantix at this time.  -Follow-up in 5-6 months or earlier as needed     Vaccination status: Patient is up-to-date with her vaccinations  Medications personally reviewed.    Follow Up   No follow-ups on file.  Patient was given instructions and counseling regarding her condition or for health maintenance advice. Please see specific information pulled into the AVS if appropriate.

## 2024-01-25 ENCOUNTER — TRANSCRIBE ORDERS (OUTPATIENT)
Dept: ADMINISTRATIVE | Facility: HOSPITAL | Age: 64
End: 2024-01-25
Payer: COMMERCIAL

## 2024-01-25 DIAGNOSIS — K74.60 NON-ALCOHOLIC CIRRHOSIS: Primary | ICD-10-CM

## 2024-01-25 DIAGNOSIS — F32.5 MAJOR DEPRESSIVE DISORDER WITH SINGLE EPISODE, IN FULL REMISSION: Chronic | ICD-10-CM

## 2024-01-25 RX ORDER — BUPROPION HYDROCHLORIDE 150 MG/1
150 TABLET ORAL 2 TIMES DAILY
Qty: 180 TABLET | Refills: 1 | OUTPATIENT
Start: 2024-01-25

## 2024-02-09 DIAGNOSIS — J43.9 PULMONARY EMPHYSEMA, UNSPECIFIED EMPHYSEMA TYPE: ICD-10-CM

## 2024-02-09 NOTE — TELEPHONE ENCOUNTER
Patients mail order is needing the prescription sent to [facility] at the least 2 times before they will fill it and send it to the patient.

## 2024-02-13 ENCOUNTER — LAB (OUTPATIENT)
Dept: LAB | Facility: HOSPITAL | Age: 64
End: 2024-02-13
Payer: COMMERCIAL

## 2024-02-13 DIAGNOSIS — D64.9 ANEMIA, UNSPECIFIED TYPE: ICD-10-CM

## 2024-02-13 DIAGNOSIS — E78.2 MIXED HYPERLIPIDEMIA: ICD-10-CM

## 2024-02-13 LAB
BASOPHILS # BLD AUTO: 0.06 10*3/MM3 (ref 0–0.2)
BASOPHILS NFR BLD AUTO: 0.8 % (ref 0–1.5)
CHOLEST SERPL-MCNC: 130 MG/DL (ref 0–200)
DEPRECATED RDW RBC AUTO: 49.7 FL (ref 37–54)
EOSINOPHIL # BLD AUTO: 0.13 10*3/MM3 (ref 0–0.4)
EOSINOPHIL NFR BLD AUTO: 1.8 % (ref 0.3–6.2)
ERYTHROCYTE [DISTWIDTH] IN BLOOD BY AUTOMATED COUNT: 16.5 % (ref 12.3–15.4)
FERRITIN SERPL-MCNC: 55.38 NG/ML (ref 13–150)
HCT VFR BLD AUTO: 36.5 % (ref 34–46.6)
HDLC SERPL-MCNC: 45 MG/DL (ref 40–60)
HGB BLD-MCNC: 12.3 G/DL (ref 12–15.9)
IMM GRANULOCYTES # BLD AUTO: 0.02 10*3/MM3 (ref 0–0.05)
IMM GRANULOCYTES NFR BLD AUTO: 0.3 % (ref 0–0.5)
IRON 24H UR-MRATE: 63 MCG/DL (ref 37–145)
IRON SATN MFR SERPL: 14 % (ref 20–50)
LDLC SERPL CALC-MCNC: 66 MG/DL (ref 0–100)
LDLC/HDLC SERPL: 1.44 {RATIO}
LYMPHOCYTES # BLD AUTO: 3.02 10*3/MM3 (ref 0.7–3.1)
LYMPHOCYTES NFR BLD AUTO: 41.4 % (ref 19.6–45.3)
MCH RBC QN AUTO: 28.3 PG (ref 26.6–33)
MCHC RBC AUTO-ENTMCNC: 33.7 G/DL (ref 31.5–35.7)
MCV RBC AUTO: 84.1 FL (ref 79–97)
MONOCYTES # BLD AUTO: 0.64 10*3/MM3 (ref 0.1–0.9)
MONOCYTES NFR BLD AUTO: 8.8 % (ref 5–12)
NEUTROPHILS NFR BLD AUTO: 3.42 10*3/MM3 (ref 1.7–7)
NEUTROPHILS NFR BLD AUTO: 46.9 % (ref 42.7–76)
NRBC BLD AUTO-RTO: 0.1 /100 WBC (ref 0–0.2)
PLATELET # BLD AUTO: 273 10*3/MM3 (ref 140–450)
PMV BLD AUTO: 11.1 FL (ref 6–12)
RBC # BLD AUTO: 4.34 10*6/MM3 (ref 3.77–5.28)
TIBC SERPL-MCNC: 454 MCG/DL (ref 298–536)
TRANSFERRIN SERPL-MCNC: 305 MG/DL (ref 200–360)
TRIGL SERPL-MCNC: 100 MG/DL (ref 0–150)
VLDLC SERPL-MCNC: 19 MG/DL (ref 5–40)
WBC NRBC COR # BLD AUTO: 7.29 10*3/MM3 (ref 3.4–10.8)

## 2024-02-13 PROCEDURE — 36415 COLL VENOUS BLD VENIPUNCTURE: CPT

## 2024-02-13 PROCEDURE — 83540 ASSAY OF IRON: CPT

## 2024-02-13 PROCEDURE — 80061 LIPID PANEL: CPT

## 2024-02-13 PROCEDURE — 84466 ASSAY OF TRANSFERRIN: CPT

## 2024-02-13 PROCEDURE — 82728 ASSAY OF FERRITIN: CPT

## 2024-02-13 PROCEDURE — 85025 COMPLETE CBC W/AUTO DIFF WBC: CPT

## 2024-02-19 ENCOUNTER — TRANSCRIBE ORDERS (OUTPATIENT)
Dept: ADMINISTRATIVE | Facility: HOSPITAL | Age: 64
End: 2024-02-19
Payer: COMMERCIAL

## 2024-02-19 ENCOUNTER — LAB (OUTPATIENT)
Dept: LAB | Facility: HOSPITAL | Age: 64
End: 2024-02-19
Payer: COMMERCIAL

## 2024-02-19 DIAGNOSIS — N39.41 URGE INCONTINENCE OF URINE: Primary | ICD-10-CM

## 2024-02-19 DIAGNOSIS — N39.41 URGE INCONTINENCE OF URINE: ICD-10-CM

## 2024-02-19 PROCEDURE — 87086 URINE CULTURE/COLONY COUNT: CPT

## 2024-02-20 LAB — BACTERIA SPEC AEROBE CULT: NO GROWTH

## 2024-02-21 ENCOUNTER — HOSPITAL ENCOUNTER (OUTPATIENT)
Dept: CT IMAGING | Facility: HOSPITAL | Age: 64
Discharge: HOME | End: 2024-02-21
Admitting: STUDENT IN AN ORGANIZED HEALTH CARE EDUCATION/TRAINING PROGRAM
Payer: COMMERCIAL

## 2024-02-21 ENCOUNTER — OFFICE VISIT (OUTPATIENT)
Dept: FAMILY MEDICINE CLINIC | Facility: CLINIC | Age: 64
End: 2024-02-21
Payer: COMMERCIAL

## 2024-02-21 VITALS
OXYGEN SATURATION: 98 % | WEIGHT: 237.6 LBS | BODY MASS INDEX: 39.54 KG/M2 | SYSTOLIC BLOOD PRESSURE: 123 MMHG | RESPIRATION RATE: 18 BRPM | HEART RATE: 72 BPM | DIASTOLIC BLOOD PRESSURE: 73 MMHG

## 2024-02-21 DIAGNOSIS — F32.A DEPRESSION, UNSPECIFIED DEPRESSION TYPE: Chronic | ICD-10-CM

## 2024-02-21 DIAGNOSIS — E11.65 TYPE 2 DIABETES MELLITUS WITH HYPERGLYCEMIA, WITH LONG-TERM CURRENT USE OF INSULIN: Chronic | ICD-10-CM

## 2024-02-21 DIAGNOSIS — J43.9 PULMONARY EMPHYSEMA, UNSPECIFIED EMPHYSEMA TYPE: ICD-10-CM

## 2024-02-21 DIAGNOSIS — E55.9 VITAMIN D DEFICIENCY: Chronic | ICD-10-CM

## 2024-02-21 DIAGNOSIS — E66.01 CLASS 2 SEVERE OBESITY DUE TO EXCESS CALORIES WITH SERIOUS COMORBIDITY AND BODY MASS INDEX (BMI) OF 36.0 TO 36.9 IN ADULT: Primary | ICD-10-CM

## 2024-02-21 DIAGNOSIS — R41.3 MEMORY LOSS: ICD-10-CM

## 2024-02-21 DIAGNOSIS — Z79.4 TYPE 2 DIABETES MELLITUS WITH HYPERGLYCEMIA, WITH LONG-TERM CURRENT USE OF INSULIN: Chronic | ICD-10-CM

## 2024-02-21 PROBLEM — N18.2 CHRONIC KIDNEY DISEASE, STAGE 2 (MILD): Chronic | Status: ACTIVE | Noted: 2023-11-16

## 2024-02-21 PROBLEM — E66.9 OBESITY (BMI 35.0-39.9 WITHOUT COMORBIDITY): Status: ACTIVE | Noted: 2024-02-21

## 2024-02-21 LAB
EXPIRATION DATE: ABNORMAL
HBA1C MFR BLD: 9.9 % (ref 4.5–5.7)
Lab: ABNORMAL

## 2024-02-21 PROCEDURE — 71250 CT THORAX DX C-: CPT

## 2024-02-21 RX ORDER — VIBEGRON 75 MG/1
75 TABLET, FILM COATED ORAL DAILY
COMMUNITY

## 2024-02-21 RX ORDER — TIRZEPATIDE 2.5 MG/.5ML
2.5 INJECTION, SOLUTION SUBCUTANEOUS WEEKLY
COMMUNITY

## 2024-02-21 NOTE — PROGRESS NOTES
Chief Complaint  Chief Complaint   Patient presents with    Depression       Subjective          Anika Casillas presents to [facility] for chronic conditions and to discuss FMLA.    History of Present Illness    Patient is complaining of increased memory issues, decreased concentration and fatigue.  Patient has been seen by neuro who then referred to neuropsych; patient had neuropsych eval in [place], [place] on 3.29.23 and was diagnosed with mild cognitive impairment; monitor for progressive dementia, vascular, AD, or mixed type, metabolic encephalopathy at that point she was encouraged to pursue counseling again with a follow-up in assessment and about a year.  Patient was given corrective action at work recently which is bothersome to her. She is concerned with her job performance. She does note daytime sleepiness; on cpap, consistent, seen within past year by sleep medicine.  Patient is requesting time off; I discussed intermittent FLMA with her so she could have time available for medical appointments and testing. Patient is feeling down on herself.      Medical History: has a past medical history of Abnormal Pap smear of cervix, Atrial fibrillation, Colindres's esophagus, Depression, Diabetes mellitus, Emphysema lung, Endometrial hyperplasia, Esophageal varices, Fatty liver (2015), Gastroparesis, GERD (gastroesophageal reflux disease) (1995), Hyperlipidemia, Hypertension, Iron deficiency anemia (05/02/2023), Irritable bowel syndrome, BERKOWITZ (nonalcoholic steatohepatitis), Obstructive sleep apnea syndrome, Pulmonary emphysema (11/22/2021), and S/P exploratory laparotomy, lysis of adhesions, resection of necrotic colon without anastomosis (04/18/2022).   Surgical History: has a past surgical history that includes Gallbladder surgery; Breast lumpectomy (Left); Cardiac catheterization; bladder sling modified, anterior and posterior vaginal repair (2018); Tubal ligation; Exploratory  Laparotomy (N/A, 04/16/2022); Esophagogastroduodenoscopy (N/A, 06/03/2022); Colectomy; d & c hysteroscopy (N/A, 07/25/2023); Colonoscopy (N/A, 08/14/2023); Abdominal surgery (4/16/2022); Cholecystectomy (5/12/2015); Upper gastrointestinal endoscopy (6/3/2022); Liver biopsy (2021); and botox injection.   Family History: family history is not on file. She was adopted.   Social History: reports that she has been smoking cigarettes. She started smoking about 48 years ago. She has a 10.25 pack-year smoking history. She has never used smokeless tobacco. She reports current alcohol use of about 1.0 standard drink of alcohol per week. She reports that she does not use drugs.    Allergies: Lisinopril, Liraglutide, and Metformin    Health Maintenance Due   Topic Date Due    TDAP/TD VACCINES (1 - Tdap) Never done    DIABETIC FOOT EXAM  Never done    DIABETIC EYE EXAM  12/08/2021    Hepatitis B (1 of 3 - Risk 3-dose series) Never done    RSV Vaccine - Adults (1 - 1-dose 60+ series) Never done       Immunization History   Administered Date(s) Administered    COVID-19 (PFIZER) Purple Cap Monovalent 02/06/2021, 03/06/2021, 12/04/2021    Flu Vaccine Intradermal Quad 18-64YR 09/12/2021, 09/12/2021    Fluzone (or Fluarix & Flulaval for VFC) >6mos 10/29/2019, 09/27/2020, 10/03/2022, 10/05/2023    Influenza Split Preservative Free ID 09/12/2021    Influenza, Unspecified 11/12/2018, 09/27/2020    Pneumococcal Conjugate 13-Valent (PCV13) 01/09/2017    Pneumococcal Polysaccharide (PPSV23) 10/12/2020    flucelvax quad pfs =>4 YRS 09/27/2020       Objective     Vitals:    02/21/24 1242   BP: 123/73   Pulse: 72   Resp: 18   SpO2: 98%   Weight: 108 kg (237 lb 9.6 oz)     Body mass index is 39.54 kg/m².     Wt Readings from Last 3 Encounters:   02/21/24 108 kg (237 lb 9.6 oz)   01/22/24 107 kg (235 lb)   01/18/24 108 kg (239 lb)     BP Readings from Last 3 Encounters:   02/21/24 123/73   01/22/24 123/64   01/18/24 126/78       Class 2 Severe  Obesity (BMI >=35 and <=39.9). Obesity-related health conditions include the following: obstructive sleep apnea, hypertension, diabetes mellitus, dyslipidemias, GERD, lower extremity venous stasis disease, and urinary stress incontinence. Obesity is unchanged. BMI is is above average; BMI management plan is completed. We discussed portion control and increasing exercise.      Patient Care Team:  Elena Henderson PA as PCP - General (Family Medicine)  KATHARINE Webb MD as Consulting Physician (Cardiology)  Cande Stanley MD as Consulting Physician (General Surgery)  Migdalia Sanon APRN as Nurse Practitioner (Nurse Practitioner)    Physical Exam  Vitals and nursing note reviewed.   Constitutional:       Appearance: Normal appearance.   HENT:      Head: Normocephalic and atraumatic.   Neck:      Vascular: No carotid bruit.      Comments: Thyroid : gland size normal, nontender, no nodules or masses present on palpation   Cardiovascular:      Rate and Rhythm: Normal rate and regular rhythm.      Pulses: Normal pulses.      Heart sounds: Normal heart sounds.   Pulmonary:      Effort: Pulmonary effort is normal.      Breath sounds: Normal breath sounds.   Musculoskeletal:      Cervical back: Neck supple. No tenderness.      Right lower leg: No edema.      Left lower leg: No edema.   Lymphadenopathy:      Cervical: No cervical adenopathy.   Neurological:      Mental Status: She is alert.   Psychiatric:         Mood and Affect: Affect normal. Mood is depressed.         Behavior: Behavior normal.           Result Review :   Office Visit on 02/21/2024   Component Date Value Ref Range Status    Hemoglobin A1C 02/21/2024 9.9 (A)  4.5 - 5.7 % Final    Lot Number 02/21/2024 [id number]   Final    Expiration Date 02/21/2024 07/30/2025   Final   Lab on 02/19/2024   Component Date Value Ref Range Status    Urine Culture 02/19/2024 No growth   Final                             Assessment and Plan      Diagnoses  and all orders for this visit:    1. Class 2 severe obesity due to excess calories with serious comorbidity and body mass index (BMI) of 36.0 to 36.9 in adult (Primary)    2. Type 2 diabetes mellitus with hyperglycemia, with long-term current use of insulin  Comments:  Not controlled; followed by endocrinology; ensure follow up.  Orders:  -     POC Glycosylated Hemoglobin (Hb A1C)  -     Comprehensive metabolic panel; Future    3. Memory loss  Comments:  Unsure if related to uncontrolled depression; patient has seen neuro; will refer to psych for further eval.  Orders:  -     Ambulatory Referral to Psychiatry  -     TSH+Free T4; Future  -     Vitamin B12; Future  -     Folate; Future    4. Depression, unspecified depression type  Comments:  Not controlled; continue current medication and refer to psychiatry.  Orders:  -     Ambulatory Referral to Psychiatry    5. Vitamin D deficiency  Comments:  Check labs for stability.  Orders:  -     Vitamin D 25 hydroxy; Future        I spent 41 minutes caring for Anika on this date of service. This time includes time spent by me in the following activities:preparing for the visit, reviewing tests, obtaining and/or reviewing a separately obtained history, performing a medically appropriate examination and/or evaluation , counseling and educating the patient/family/caregiver, ordering medications, tests, or procedures, referring and communicating with other health care professionals , documenting information in the medical record, and care coordination    Follow Up     Return in about 6 months (around 8/21/2024), or if symptoms worsen or fail to improve.    Patient was given instructions and counseling regarding her condition or for health maintenance advice. Please see specific information pulled into the AVS if appropriate.

## 2024-02-29 ENCOUNTER — LAB (OUTPATIENT)
Dept: LAB | Facility: HOSPITAL | Age: 64
End: 2024-02-29
Payer: COMMERCIAL

## 2024-02-29 DIAGNOSIS — Z79.4 TYPE 2 DIABETES MELLITUS WITH HYPERGLYCEMIA, WITH LONG-TERM CURRENT USE OF INSULIN: ICD-10-CM

## 2024-02-29 DIAGNOSIS — E55.9 VITAMIN D DEFICIENCY: ICD-10-CM

## 2024-02-29 DIAGNOSIS — E11.65 TYPE 2 DIABETES MELLITUS WITH HYPERGLYCEMIA, WITH LONG-TERM CURRENT USE OF INSULIN: ICD-10-CM

## 2024-02-29 DIAGNOSIS — R41.3 MEMORY LOSS: ICD-10-CM

## 2024-02-29 LAB
25(OH)D3 SERPL-MCNC: 41.1 NG/ML (ref 30–100)
ALBUMIN SERPL-MCNC: 4 G/DL (ref 3.5–5.2)
ALBUMIN/GLOB SERPL: 0.8 G/DL
ALP SERPL-CCNC: 133 U/L (ref 39–117)
ALT SERPL W P-5'-P-CCNC: 87 U/L (ref 1–33)
ANION GAP SERPL CALCULATED.3IONS-SCNC: 12 MMOL/L (ref 5–15)
AST SERPL-CCNC: 95 U/L (ref 1–32)
BILIRUB SERPL-MCNC: 0.3 MG/DL (ref 0–1.2)
BUN SERPL-MCNC: 17 MG/DL (ref 8–23)
BUN/CREAT SERPL: 18.5 (ref 7–25)
CALCIUM SPEC-SCNC: 10 MG/DL (ref 8.6–10.5)
CHLORIDE SERPL-SCNC: 108 MMOL/L (ref 98–107)
CO2 SERPL-SCNC: 22 MMOL/L (ref 22–29)
CREAT SERPL-MCNC: 0.92 MG/DL (ref 0.57–1)
EGFRCR SERPLBLD CKD-EPI 2021: 71 ML/MIN/1.73
FOLATE SERPL-MCNC: >20 NG/ML (ref 4.78–24.2)
GLOBULIN UR ELPH-MCNC: 4.8 GM/DL
GLUCOSE SERPL-MCNC: 82 MG/DL (ref 65–99)
POTASSIUM SERPL-SCNC: 4.4 MMOL/L (ref 3.5–5.2)
PROT SERPL-MCNC: 8.8 G/DL (ref 6–8.5)
SODIUM SERPL-SCNC: 142 MMOL/L (ref 136–145)
T4 FREE SERPL-MCNC: 0.97 NG/DL (ref 0.93–1.7)
TSH SERPL DL<=0.05 MIU/L-ACNC: 2.57 UIU/ML (ref 0.27–4.2)
VIT B12 BLD-MCNC: 1669 PG/ML (ref 211–946)

## 2024-02-29 PROCEDURE — 82607 VITAMIN B-12: CPT

## 2024-02-29 PROCEDURE — 82306 VITAMIN D 25 HYDROXY: CPT

## 2024-02-29 PROCEDURE — 84443 ASSAY THYROID STIM HORMONE: CPT

## 2024-02-29 PROCEDURE — 84439 ASSAY OF FREE THYROXINE: CPT

## 2024-02-29 PROCEDURE — 82746 ASSAY OF FOLIC ACID SERUM: CPT

## 2024-02-29 PROCEDURE — 80053 COMPREHEN METABOLIC PANEL: CPT

## 2024-02-29 PROCEDURE — 36415 COLL VENOUS BLD VENIPUNCTURE: CPT

## 2024-03-05 ENCOUNTER — HOSPITAL ENCOUNTER (OUTPATIENT)
Dept: ULTRASOUND IMAGING | Facility: HOSPITAL | Age: 64
Discharge: HOME | End: 2024-03-05
Admitting: INTERNAL MEDICINE
Payer: COMMERCIAL

## 2024-03-05 DIAGNOSIS — K74.60 NON-ALCOHOLIC CIRRHOSIS: ICD-10-CM

## 2024-03-05 PROCEDURE — 76705 ECHO EXAM OF ABDOMEN: CPT

## 2024-03-06 ENCOUNTER — OFFICE VISIT (OUTPATIENT)
Dept: ONCOLOGY | Facility: HOSPITAL | Age: 64
End: 2024-03-06
Payer: COMMERCIAL

## 2024-03-06 VITALS
OXYGEN SATURATION: 97 % | SYSTOLIC BLOOD PRESSURE: 131 MMHG | DIASTOLIC BLOOD PRESSURE: 69 MMHG | BODY MASS INDEX: 39.67 KG/M2 | TEMPERATURE: 97.5 F | WEIGHT: 238.1 LBS | RESPIRATION RATE: 18 BRPM | HEIGHT: 65 IN | HEART RATE: 89 BPM

## 2024-03-06 DIAGNOSIS — E61.1 IRON DEFICIENCY: Primary | ICD-10-CM

## 2024-03-06 DIAGNOSIS — F32.5 MAJOR DEPRESSIVE DISORDER WITH SINGLE EPISODE, IN FULL REMISSION: Chronic | ICD-10-CM

## 2024-03-06 DIAGNOSIS — K21.9 GASTROESOPHAGEAL REFLUX DISEASE WITHOUT ESOPHAGITIS: ICD-10-CM

## 2024-03-06 PROCEDURE — G0463 HOSPITAL OUTPT CLINIC VISIT: HCPCS | Performed by: INTERNAL MEDICINE

## 2024-03-06 RX ORDER — FAMOTIDINE 40 MG/1
40 TABLET, FILM COATED ORAL NIGHTLY PRN
Qty: 90 TABLET | Refills: 1 | OUTPATIENT
Start: 2024-03-06

## 2024-03-06 RX ORDER — ESCITALOPRAM OXALATE 20 MG/1
20 TABLET ORAL DAILY
Qty: 90 TABLET | Refills: 1 | Status: SHIPPED | OUTPATIENT
Start: 2024-03-06

## 2024-03-06 NOTE — PROGRESS NOTES
Chief Complaint/Care Team    FOLLOW UP 2 - Iron defieciency anemia    Elena Henderson,*  Elena Henderson, [place]    History of Present Illness     Diagnosis: Iron deficiency Anemia secondary to GI blood loss (s/p c-scope in 8/2023 showed hemorrhoids, diverticulosis and removal of several polyps)    Current Treatment: IV iron prn, last IV Iron infusion on 7/2023    Previous Treatment: None    Anika Casillas is a 63 y.o. female who presents to [facility] HEMATOLOG[facility]OGY for Anemia.    HPI  Ms. Anika Casillas is a very pleasant 62 year old  female presenting as referral for mild anemia as referral from gastroenterology. PMH is BERKOWITZ, type II DM, hypertension, IBS, gastroparesis, GERD, esophageal varices, Colindres's esophagus, diverticulosis, elevated liver enzymes. Tobacco user of 1 PPD since the age of 19 years old but recently cut down to 4-5 cigarettes / day. Recent low dose CT screening with a small LLL nodular area noted and interstitial changes noted.      Recent CT abdomen on 4/21/23 with an abnormal thickened heterogenous appearance noted and abdominal US completed. Vaginal US on 5/10/23 with an irregular hetergeneous mass within the endometrial canal and questionable irregularity of the cervix. Patient has follow up with GYN in June.      Recent lab work on 5/4/23: alk phos elevated 135, previously 120. Elevated ALT / AST 62 / 80, previously elevated and stable. Iron 52 up from 32, ferritin 48, not measured previously. Iron st 11%, up from 7%. Hemoglobin 11.4 which is up from 10.8. MCV 77. Fecal OB have been negative x 3 at the end of April. Prior scope done in            6/2022 with grade II esophageal varices.      Currently, not taking any oral iron. Has been increasing iron in her diet with liver and red meat. Reports fatigue 6/10 and craving ice now for the last 4 months. Denies any GI blood loss or other associated blood loss.      Previously adopted and does  not know her family history.    Interval History: Pt here to discuss lab work obtained for assessment of her anemia. Pt denies any blood loss, or melena but does report fatigue, pica and occasional lightheadedness and dizziness. Pt unable to take iron tablets by mouth secondary to gastroparesis and GI upset. Pt underwent c-scope in 8/2023 which showed hemorrhoids, diverticulosis and removal of several polyps.   Pt pending endometrial biopsy by GI and ob/gyn respectively. Pt is pending cataract surgery in [place] and repeat EGD with botox injection for gastroparesis. Pt underwent US of liver which revealed cirrhosis and low dose CT chest which was negative for lung cancer.       Review of Systems   Constitutional:  Negative for appetite change, diaphoresis, fatigue, fever, unexpected weight gain and unexpected weight loss.   HENT:  Negative for hearing loss, mouth sores, sore throat, swollen glands, trouble swallowing and voice change.    Eyes:  Negative for blurred vision.   Respiratory:  Negative for cough, shortness of breath and wheezing.    Cardiovascular:  Negative for chest pain and palpitations.   Gastrointestinal:  Negative for abdominal pain, blood in stool, constipation, diarrhea, nausea and vomiting.   Endocrine: Negative for cold intolerance and heat intolerance.   Genitourinary:  Negative for difficulty urinating, dysuria, frequency, hematuria and urinary incontinence.   Musculoskeletal:  Negative for arthralgias, back pain and myalgias.   Skin:  Negative for rash, skin lesions and wound.   Neurological:  Negative for dizziness, seizures, weakness, numbness and headache.   Hematological:  Does not bruise/bleed easily.   Psychiatric/Behavioral:  Negative for depressed mood. The patient is not nervous/anxious.    All other systems reviewed and are negative.       Oncology/Hematology History    No history exists.       Objective     Vitals:    03/06/24 0818   BP: 131/69   Pulse: 89   Resp: 18   Temp:  "97.5 °F (36.4 °C)   TempSrc: Temporal   SpO2: 97%   Weight: 108 kg (238 lb 1.6 oz)   Height: 165.1 cm (65\")   PainSc: 0-No pain         ECOG score: 0         PHQ-9 Total Score:         Physical Exam  Vitals reviewed. Exam conducted with a chaperone present.   Constitutional:       General: She is not in acute distress.     Appearance: Normal appearance.   HENT:      Head: Normocephalic and atraumatic.   Eyes:      Extraocular Movements: Extraocular movements intact.      Conjunctiva/sclera: Conjunctivae normal.   Pulmonary:      Effort: Pulmonary effort is normal.   Musculoskeletal:      Cervical back: Normal range of motion and neck supple.   Skin:     General: Skin is warm and dry.      Findings: No bruising.   Neurological:      Mental Status: She is oriented to person, place, and time.           Past Medical History     Past Medical History:   Diagnosis Date    Abnormal Pap smear of cervix     Atrial fibrillation     ON ELIQUIS/FOLLOWS TROTTER    Colindres's esophagus     Depression     Diabetes mellitus     Emphysema lung     INHALER    Endometrial hyperplasia     D&C SCHEDULED 7/25/23    Esophageal varices     Fatty liver 2015    Gastroparesis     TAKES XIFAXAN    GERD (gastroesophageal reflux disease) 1995    Hyperlipidemia     Hypertension     Iron deficiency anemia 05/02/2023    IRON INFUSION LAST 7/24/23    Irritable bowel syndrome     W/CONSTIPATION    BERKOWITZ (nonalcoholic steatohepatitis)     NO S/S CURRENTLY, ELEVATED ENZYMES    Obstructive sleep apnea syndrome     Pulmonary emphysema 11/22/2021    S/P exploratory laparotomy, lysis of adhesions, resection of necrotic colon without anastomosis 04/18/2022     Current Outpatient Medications on File Prior to Visit   Medication Sig Dispense Refill    acetaminophen (Tylenol) 325 MG tablet Take 2 tablets by mouth Every 4 (Four) Hours As Needed for Mild Pain. 30 tablet 1    albuterol sulfate  (90 Base) MCG/ACT inhaler Inhale 2 puffs Every 4 (Four) Hours As " Needed for Shortness of Air or Wheezing. 1 g 5    amLODIPine (NORVASC) 10 MG tablet Take 1 tablet by mouth Daily. 90 tablet 3    apixaban (ELIQUIS) 5 MG tablet tablet Take 1 tablet by mouth 2 (Two) Times a Day. 180 tablet 3    B-D UF III MINI PEN NEEDLES 31G X 5 MM misc       Blood Glucose Monitoring Suppl (Accu-Chek Guide) w/Device kit       buPROPion XL (WELLBUTRIN XL) 150 MG 24 hr tablet Take 1 tablet by mouth 2 (Two) Times a Day. 180 tablet 1    Cholecalciferol (Vitamin D3) 50 MCG (2000 UT) capsule Take 3 capsules by mouth Daily. LAST DOSE 7/21/23      Continuous Blood Gluc Sensor (Dexcom G6 Sensor) APPLY 1 EACH TOPICALLY EVERY 10 (TEN) DAYS.      Continuous Blood Gluc Transmit (Dexcom G6 Transmitter) misc USE FOR 90 DAYS.      empagliflozin (JARDIANCE) 25 MG tablet tablet Take 1 tablet by mouth Daily.      escitalopram (Lexapro) 20 MG tablet Take 1 tablet by mouth Daily. 90 tablet 1    Evolocumab (Repatha) solution prefilled syringe injection Inject 1 mL under the skin into the appropriate area as directed Every 14 (Fourteen) Days. 6 each 3    famotidine (PEPCID) 40 MG tablet Take 1 tablet by mouth At Night As Needed for Heartburn. 90 tablet 3    Fluticasone-Umeclidin-Vilant (TRELEGY) 100-62.5-25 MCG/ACT inhaler Inhale 1 puff Daily. 1 each 5    furosemide (LASIX) 20 MG tablet Take 1 tablet by mouth Daily. 90 tablet 3    glucose blood test strip Check blood glucose twice daily 60 each 12    glucose monitor monitoring kit 1 each Daily. Patient prefers Accu Chek Meter. 1 each 0    HYDROcodone-acetaminophen (NORCO) 5-325 MG per tablet Take 1 tablet by mouth Every 6 (Six) Hours As Needed (Pain). 6 tablet 0    icosapent ethyl (VASCEPA) 1 g capsule capsule Take 2 g by mouth 2 (Two) Times a Day With Meals. 360 capsule 1    Insulin Glargine, 2 Unit Dial, (Toujeo Max SoloStar) 300 UNIT/ML solution pen-injector injection Inject 122 Units under the skin into the appropriate area as directed Every Morning. INST PER  ANESTHESIA PROTOCOL      Lancets (accu-chek soft touch) lancets Check blood sugar twice daily 100 each 12    metoprolol tartrate (LOPRESSOR) 50 MG tablet TAKE 1 AND 1/2 TABLETS BY MOUTH TWICE DAILY 270 tablet 1    Motegrity 2 MG tablet Take 1 tablet by mouth.      Mounjaro 2.5 MG/0.5ML solution pen-injector pen Inject 0.5 mL under the skin into the appropriate area as directed 1 (One) Time Per Week.      multivitamin with minerals tablet tablet Take 1 tablet by mouth Daily. LAST DOSE 7/21/23      NovoLOG FlexPen 100 UNIT/ML solution pen-injector sc pen Inject 60 Units under the skin into the appropriate area as directed 2 (Two) Times a Day. PER SLIDING SCALE  INST PER ANESTHESIA PROTOCOL      olmesartan (BENICAR) 20 MG tablet TAKE 1 TABLET BY MOUTH EVERY DAY 90 tablet 1    pantoprazole (PROTONIX) 40 MG EC tablet Take 1 tablet by mouth Daily. 90 tablet 3    potassium chloride 10 MEQ CR tablet Take 1 tablet by mouth Daily. 90 tablet 3    riFAXIMin (Xifaxan) 550 MG tablet Take 1 tablet by mouth Every 12 (Twelve) Hours. 180 tablet 3    Vibegron (Gemtesa) 75 MG tablet Take 1 tablet by mouth Daily.       No current facility-administered medications on file prior to visit.      Allergies   Allergen Reactions    Lisinopril Other (See Comments)     Cough    Cough   Cough    Liraglutide Nausea And Vomiting    Metformin Nausea Only     GI Upset     Past Surgical History:   Procedure Laterality Date    ABDOMINAL SURGERY  4/16/2022    BLADDER SLING MODIFIED, ANTERIOR AND POSTERIOR VAGINAL REPAIR  2018    [place]    BOTOX INJECTION      In abdomen    BREAST LUMPECTOMY Left     BENIGN    CARDIAC CATHETERIZATION      NO INTERVENTION    CHOLECYSTECTOMY  5/12/2015    COLON RESECTION      PERFORATED COLON, EEA    COLONOSCOPY N/A 08/14/2023    Procedure: COLONOSCOPY WITH POLYPECTOMY, TATTOO;  Surgeon: Shannan Charles MD;  Location: [facility] ENDOSCOPY;  Service: Gastroenterology;  Laterality: N/A;  COLON  POLYPS  DIVERTICULOSIS  HEMORRHOIDS    D & C HYSTEROSCOPY N/A 07/25/2023    Procedure: resection of endometrial polyp with myosure;  Surgeon: Ashleigh Harding DO;  Location: [facility] MAIN OR;  Service: Gynecology;  Laterality: N/A;    ENDOSCOPY N/A 06/03/2022    Procedure: ESOPHAGOGASTRODUODENOSCOPY;  Surgeon: Shannan Charles MD;  Location: [facility] ENDOSCOPY;  Service: Gastroenterology;  Laterality: N/A;  RETAINED FOOD IN STOMACH  ESOPHAGEAL VARICIES    EXPLORATORY LAPAROTOMY N/A 04/16/2022    Procedure: EXPLORATORY LAPAROTOMY, LYSIS OF ADHESIONS, RESECTION OF NECTROIC COLON;  Surgeon: Cande Stanley MD;  Location: [facility] MAIN OR;  Service: General;  Laterality: N/A;    GALLBLADDER SURGERY      LAPAROSCOPIC    LIVER BIOPSY  2021    TUBAL ABDOMINAL LIGATION      UPPER GASTROINTESTINAL ENDOSCOPY  6/3/2022     Social History     Socioeconomic History    Marital status:      Spouse name: fannie   Tobacco Use    Smoking status: Every Day     Current packs/day: 0.25     Average packs/day: 0.3 packs/day for 48.7 years (12.2 ttl pk-yrs)     Types: Cigarettes     Start date: 6/17/1975    Smokeless tobacco: Never    Tobacco comments:     INST PER ANESTHESIA PROTOCOL   Vaping Use    Vaping status: Never Used   Substance and Sexual Activity    Alcohol use: Yes     Alcohol/week: 1.0 standard drink of alcohol     Types: 1 Drinks containing 0.5 oz of alcohol per week     Comment: 1 a month    Drug use: Never    Sexual activity: Yes     Partners: Male     Birth control/protection: Post-menopausal, Tubal ligation     Family History   Adopted: Yes   Problem Relation Age of Onset    Malig Hyperthermia Neg Hx        Results     Result Review   The following data was reviewed by: Tamela Ortega MD on 07/12/2023:  Lab Results   Component Value Date    HGB 12.3 02/13/2024    HCT 36.5 02/13/2024    MCV 84.1 02/13/2024     02/13/2024    WBC 7.29 02/13/2024    NEUTROABS 3.42 02/13/2024    LYMPHSABS 3.02 02/13/2024     MONOSABS 0.64 02/13/2024    EOSABS 0.13 02/13/2024    BASOSABS 0.06 02/13/2024     Lab Results   Component Value Date    GLUCOSE 82 02/29/2024    BUN 17 02/29/2024    CREATININE 0.92 02/29/2024     02/29/2024    K 4.4 02/29/2024     (H) 02/29/2024    CO2 22.0 02/29/2024    CALCIUM 10.0 02/29/2024    PROTEINTOT 8.8 (H) 02/29/2024    ALBUMIN 4.0 02/29/2024    BILITOT 0.3 02/29/2024    ALKPHOS 133 (H) 02/29/2024    AST 95 (H) 02/29/2024    ALT 87 (H) 02/29/2024     Lab Results   Component Value Date    PHOS 3.5 11/14/2023    FREET4 0.97 02/29/2024    TSH 2.570 02/29/2024           US Abdomen Limited    Result Date: 3/5/2024    1. Enlarged heterogeneous appearance of the liver with nodular contour compatible with cirrhosis.  No lesion noted within the visualized liver parenchyma by sonographic evaluation     CAROLE SRINIVASAN MD       ELECTRONICALLY SIGNED AND APPROVED BY: CAROLE SRINIVASAN MD ON 3/05/2024 AT 10:07            US Abdomen Limited    Result Date: 3/5/2024  Impression:   1. Enlarged heterogeneous appearance of the liver with nodular contour compatible with cirrhosis.  No lesion noted within the visualized liver parenchyma by sonographic evaluation     CAROLE SRINIVASAN MD       ELECTRONICALLY SIGNED AND APPROVED BY: CAROLE SRINIVASAN MD ON 3/05/2024 AT 10:07             CT Chest Low Dose Follow Up Without Contrast    Result Date: 2/22/2024  Impression:   1. No evidence of lung cancer. 2. Previously identified nodular density in left lower lobe has resolved, and was likely infectious or inflammatory. 3. No acute cardiopulmonary process. 4. Lung RADS category 1 (negative, less than 1% chance of malignancy) 5. Recommend continued annual screening with low-dose CT chest.      CLOVIS NORTH MD       Electronically Signed and Approved By: CLOVIS NORTH MD on 2/22/2024 at 9:21              Assessment & Plan     Diagnoses and all orders for this visit:    1. Iron deficiency (Primary)  -     CBC &  Differential; Future  -     Ferritin; Future  -     Iron Profile; Future  -     Ambulatory Referral to [facility] For Infusion Treatment            Anika Casillas is a 63 y.o. female who presents to [facility] HEMATOLOGY [facility] ONCOLOGY for Iron deficiency Anemia secondary to GI blood loss (s/p c-scope in 8/2023 showed hemorrhoids, diverticulosis and removal of several polyps).  Pt receiving IV Iron infusions prn, last infusion in 7/2023, pt not able to tolerate oral iron due to gastroparesis.    Pt symptomatic from Iron deficiency with pica, fatigue and lightheadedness/dizziness, pt with continued decrease in iron saturation. Placed orders for IV Iron today.    Recommend continued evaluation by GI for BERKOWITZ cirrhosis and esophageal varices. Pt also due for repeat c-scope in 1 year (I.e. 8/2024).    -will plan for pt follow up in 3 months with recheck CBC, Iron profile, and ferritin and to assess need for additional IV Iron infusion.    Please note that portions of this note were completed with a voice recognition program.    Electronically signed by Tamela Ortega MD, 03/06/24, 8:46 AM EST.        Follow Up     I spent 30 minutes caring for Anika on this date of service. This time includes time spent by me in the following activities:preparing for the visit, reviewing tests, obtaining and/or reviewing a separately obtained history, performing a medically appropriate examination and/or evaluation , counseling and educating the patient/family/caregiver, ordering medications, tests, or procedures, referring and communicating with other health care professionals , documenting information in the medical record, independently interpreting results and communicating that information with the patient/family/caregiver, and care coordination.    This is an acute or chronic illness that poses a threat to life or bodily function. The above treatment plan involves a high risk of complications and/or mortality of patient  management.    The patient was seen and examined. Work by the provider also included review and/or ordering of lab tests, review and/or ordering of radiology tests, review and/or ordering of medicine tests, discussion with other physicians or providers, independent review of data, obtaining old records, review/summation of old records, and/or other review.    I have reviewed the family history, social history, and past medical history for this patient. Previous information and data has been reviewed and updated as needed. I have reviewed and verified the chief complaint, history, and other documentation. The patient was interviewed and examined in the clinic and the chart reviewed. The previous observations, recommendations, and conclusions were reviewed including those of other providers.     The plan was discussed with the patient and/or family. The patient was given time to ask questions and these questions were answered. At the conclusion of their visit they had no additional questions or concerns and all questions were answered to their satisfaction.    Patient was given instructions and counseling regarding her condition or for health maintenance advice. Please see specific information pulled into the AVS if appropriate.

## 2024-03-07 DIAGNOSIS — D50.9 IRON DEFICIENCY ANEMIA, UNSPECIFIED IRON DEFICIENCY ANEMIA TYPE: ICD-10-CM

## 2024-03-07 DIAGNOSIS — K90.9 IRON MALABSORPTION: Primary | ICD-10-CM

## 2024-03-07 DIAGNOSIS — K31.84 DIABETIC GASTROPARESIS: ICD-10-CM

## 2024-03-07 DIAGNOSIS — E11.43 DIABETIC GASTROPARESIS: ICD-10-CM

## 2024-03-07 RX ORDER — SODIUM CHLORIDE 9 MG/ML
20 INJECTION, SOLUTION INTRAVENOUS ONCE
OUTPATIENT
Start: 2024-03-07

## 2024-03-07 RX ORDER — SODIUM CHLORIDE 9 MG/ML
20 INJECTION, SOLUTION INTRAVENOUS ONCE
OUTPATIENT
Start: 2024-03-08

## 2024-03-12 DIAGNOSIS — F32.5 MAJOR DEPRESSIVE DISORDER WITH SINGLE EPISODE, IN FULL REMISSION: Chronic | ICD-10-CM

## 2024-03-12 RX ORDER — BUPROPION HYDROCHLORIDE 150 MG/1
150 TABLET ORAL 2 TIMES DAILY
Qty: 180 TABLET | Refills: 1 | Status: SHIPPED | OUTPATIENT
Start: 2024-03-12

## 2024-03-19 ENCOUNTER — HOSPITAL ENCOUNTER (OUTPATIENT)
Dept: INFUSION THERAPY | Facility: HOSPITAL | Age: 64
Discharge: ANOTHER HOSPITAL | End: 2024-03-19
Admitting: INTERNAL MEDICINE
Payer: COMMERCIAL

## 2024-03-19 VITALS
BODY MASS INDEX: 40.62 KG/M2 | HEIGHT: 65 IN | RESPIRATION RATE: 20 BRPM | DIASTOLIC BLOOD PRESSURE: 71 MMHG | OXYGEN SATURATION: 96 % | TEMPERATURE: 98.1 F | WEIGHT: 243.83 LBS | HEART RATE: 86 BPM | SYSTOLIC BLOOD PRESSURE: 153 MMHG

## 2024-03-19 DIAGNOSIS — K31.84 DIABETIC GASTROPARESIS: Primary | ICD-10-CM

## 2024-03-19 DIAGNOSIS — E11.43 DIABETIC GASTROPARESIS: Primary | ICD-10-CM

## 2024-03-19 DIAGNOSIS — D50.9 IRON DEFICIENCY ANEMIA, UNSPECIFIED IRON DEFICIENCY ANEMIA TYPE: ICD-10-CM

## 2024-03-19 DIAGNOSIS — K90.9 IRON MALABSORPTION: ICD-10-CM

## 2024-03-19 PROCEDURE — 96366 THER/PROPH/DIAG IV INF ADDON: CPT

## 2024-03-19 PROCEDURE — 96365 THER/PROPH/DIAG IV INF INIT: CPT

## 2024-03-19 PROCEDURE — 25010000002 IRON SUCROSE PER 1 MG: Performed by: INTERNAL MEDICINE

## 2024-03-19 PROCEDURE — 25810000003 SODIUM CHLORIDE 0.9 % SOLUTION: Performed by: INTERNAL MEDICINE

## 2024-03-19 RX ADMIN — IRON SUCROSE 300 MG: 20 INJECTION, SOLUTION INTRAVENOUS at 16:42

## 2024-03-20 ENCOUNTER — OFFICE VISIT (OUTPATIENT)
Dept: GASTROENTEROLOGY | Facility: CLINIC | Age: 64
End: 2024-03-20
Payer: COMMERCIAL

## 2024-03-20 ENCOUNTER — HOSPITAL ENCOUNTER (OUTPATIENT)
Dept: INFUSION THERAPY | Facility: HOSPITAL | Age: 64
Discharge: HOME | End: 2024-03-20
Admitting: INTERNAL MEDICINE
Payer: COMMERCIAL

## 2024-03-20 ENCOUNTER — TELEPHONE (OUTPATIENT)
Dept: GASTROENTEROLOGY | Facility: CLINIC | Age: 64
End: 2024-03-20
Payer: COMMERCIAL

## 2024-03-20 VITALS
HEART RATE: 86 BPM | DIASTOLIC BLOOD PRESSURE: 62 MMHG | BODY MASS INDEX: 40.65 KG/M2 | WEIGHT: 244 LBS | HEIGHT: 65 IN | SYSTOLIC BLOOD PRESSURE: 117 MMHG

## 2024-03-20 VITALS
RESPIRATION RATE: 20 BRPM | OXYGEN SATURATION: 98 % | DIASTOLIC BLOOD PRESSURE: 67 MMHG | HEART RATE: 84 BPM | TEMPERATURE: 98.2 F | SYSTOLIC BLOOD PRESSURE: 143 MMHG

## 2024-03-20 DIAGNOSIS — K22.70 BARRETT'S ESOPHAGUS WITHOUT DYSPLASIA: ICD-10-CM

## 2024-03-20 DIAGNOSIS — K76.82 HEPATIC ENCEPHALOPATHY: ICD-10-CM

## 2024-03-20 DIAGNOSIS — Z86.010 HISTORY OF COLON POLYPS: ICD-10-CM

## 2024-03-20 DIAGNOSIS — K74.60 CIRRHOSIS OF LIVER WITHOUT ASCITES, UNSPECIFIED HEPATIC CIRRHOSIS TYPE: ICD-10-CM

## 2024-03-20 DIAGNOSIS — E11.43 DIABETIC GASTROPARESIS: Primary | ICD-10-CM

## 2024-03-20 DIAGNOSIS — K21.9 GASTROESOPHAGEAL REFLUX DISEASE WITHOUT ESOPHAGITIS: Primary | ICD-10-CM

## 2024-03-20 DIAGNOSIS — K90.9 IRON MALABSORPTION: ICD-10-CM

## 2024-03-20 DIAGNOSIS — D50.9 IRON DEFICIENCY ANEMIA, UNSPECIFIED IRON DEFICIENCY ANEMIA TYPE: ICD-10-CM

## 2024-03-20 DIAGNOSIS — K75.81 NASH (NONALCOHOLIC STEATOHEPATITIS): ICD-10-CM

## 2024-03-20 DIAGNOSIS — K31.84 DIABETIC GASTROPARESIS: Primary | ICD-10-CM

## 2024-03-20 PROBLEM — Z86.0100 HISTORY OF COLON POLYPS: Status: ACTIVE | Noted: 2024-03-20

## 2024-03-20 PROCEDURE — 25810000003 SODIUM CHLORIDE 0.9 % SOLUTION: Performed by: INTERNAL MEDICINE

## 2024-03-20 PROCEDURE — 25010000002 IRON SUCROSE PER 1 MG: Performed by: INTERNAL MEDICINE

## 2024-03-20 PROCEDURE — 99214 OFFICE O/P EST MOD 30 MIN: CPT | Performed by: NURSE PRACTITIONER

## 2024-03-20 PROCEDURE — 96365 THER/PROPH/DIAG IV INF INIT: CPT

## 2024-03-20 PROCEDURE — 96366 THER/PROPH/DIAG IV INF ADDON: CPT

## 2024-03-20 RX ORDER — FAMOTIDINE 40 MG/1
40 TABLET, FILM COATED ORAL NIGHTLY PRN
Qty: 90 TABLET | Refills: 3 | Status: SHIPPED | OUTPATIENT
Start: 2024-03-20

## 2024-03-20 RX ORDER — PANTOPRAZOLE SODIUM 40 MG/1
40 TABLET, DELAYED RELEASE ORAL DAILY
Qty: 90 TABLET | Refills: 3 | Status: SHIPPED | OUTPATIENT
Start: 2024-03-20

## 2024-03-20 RX ORDER — SODIUM, POTASSIUM,MAG SULFATES 17.5-3.13G
2 SOLUTION, RECONSTITUTED, ORAL ORAL TAKE AS DIRECTED
Qty: 177 ML | Refills: 0 | Status: SHIPPED | OUTPATIENT
Start: 2024-03-20

## 2024-03-20 RX ORDER — PRUCALOPRIDE 2 MG/1
1 TABLET, FILM COATED ORAL DAILY
Qty: 90 TABLET | Refills: 3 | Status: SHIPPED | OUTPATIENT
Start: 2024-03-20

## 2024-03-20 RX ADMIN — IRON SUCROSE 300 MG: 20 INJECTION, SOLUTION INTRAVENOUS at 16:29

## 2024-03-20 NOTE — PROGRESS NOTES
Chief Complaint   Gastroparesis  and Cirrhosis    History of Present Illness       Anika Casillas is a 63 y.o. female who presents to [facility] for follow-up for cirrhosis.  She was last seen in the office by me on 12/19/2023.    Has a history of GERD with Colindres's esophagus without dysplasia. Is taking Protonix 40 mg every morning and Pepcid 40 mg at night. Denies any dysphagia, rectal bleeding or melena.  Admits her appetite is okay.  Has to be really careful with what she eats due to her gastroparesis.     Has a history of Kline cirrhosis.  With grade 2 esophageal varices noted on most recent EGD 6/22.  Has a history of hepatic encephalopathy.  Unable to tolerate lactulose.  On Xifaxan twice daily.  Was previously referred to Dr. Lugo for her gastroparesis.is frustrated she has not heard from their office.  Still having nausea and vomiting. Admits her burps smell so bad she's embarrassed to burp at work. Doesn't want to take reglan. Is afraid of the side effects. Cannot tolerate lactulose. Does occasionally drink a beer.  Denies any NSAIDs.  Denies any ascites, itching or confusion.     Had a CT scan of the abdomen pelvis done on April 21 of this year that showed:     IMPRESSION:      1. The endometrium has an abnormally thickened heterogeneous appearance.  Recommend a pelvic   ultrasound examination to evaluate for endometrial hyperplasia or endometrial carcinoma.     2. Cirrhosis.  Splenorenal shunt.     3. Colonic diverticulosis.  No CT evidence of colitis or diverticulitis.  Is waiting for results of the pelvic ultrasound she had done.     Has history of constipation.  Bowels move pretty well. Takes miralax 1-2 times a day as needed.      Underwent colon resection with lysis of adhesions by Dr. Guidry on 4/16/2022.  Does feel like she has had more abdominal pain ever since that surgery.     Last EGD was on 6/3/2022 with Dr. Charles.  EGD showed retained food and grade  2 esophageal varices.  Path negative.     Does have history of cholecystectomy.     Hx tobacco use. Smokes 1-3 a day.      Has hx iron def anemia. Hx a-fib on eliquis.       GI FI--adopted.      Underwent colonoscopy with Dr. Charles on 8/14/2023.  She was found to have moderate diverticulosis and nonbleeding external hemorrhoids.  4 mm cecal polyp removed, 3 4-5 ascending colon polyps removed, 2 to 3 mm ascending colon polyps removed, 6 mm transverse colon removed and a polypoid lesion in the ascending colon completely removed and injected.  Four 4 to 6 mm sigmoid colon polyps were removed.  Recall colonoscopy in 1 year for surveillance. Path + Tubular adenoma.     She did meet with  GI. She underwent EGD with botox for her stomach. She is happy to report she is noticeable better since botox.   She is working with gynecology about her endometrial hyperplasia.     Sees Dr. Chirinos  for cirrhosis.      Had LIVER US done earlier this month at  and it showed stable cirrhosis with no NEW LIVER lesions. She is due for EGD and colonoscopy this summer. Will schedule those today. Doing well with protonix 40 mg AM and pepcid 20 mg at night. Doing well with xifaxan. Still doing well with miralax and motegrity. Is having 2 Bms most days. She is still having memory and confusion issues. But she feels its better on the xifaxan BID. She is planning bladder stimulator surgery next month for urinary incontinence and urgency. She is getting iron infusions per hematology. Off OZEMPIC. ON MOUNJARO.      Results       Result Review :       CMP          9/5/2023    14:11 11/14/2023    11:54 2/29/2024    12:03   CMP   Glucose 148  364  82    BUN 9  11  17    Creatinine 0.88  0.95  0.92    EGFR 74.9  68.3  71.0    Sodium 138  139  142    Potassium 4.1  4.2  4.4    Chloride 104  106  108    Calcium 9.6  9.6  10.0    Total Protein 9.0   8.8    Albumin 4.1  3.8  4.0    Globulin 4.9   4.8    Total Bilirubin 0.4   0.3    Alkaline  "Phosphatase 120   133    AST (SGOT) 83   95    ALT (SGPT) 76   87    Albumin/Globulin Ratio 0.8   0.8    BUN/Creatinine Ratio 10.2  11.6  18.5    Anion Gap 12.6  12.2  12.0      CBC          11/1/2023    09:17 11/14/2023    11:54 2/13/2024    14:00   CBC   WBC 7.22  8.01  7.29    RBC 4.24  4.25  4.34    Hemoglobin 12.5  12.7  12.3    Hematocrit 35.9  37.2  36.5    MCV 84.7  87.5  84.1    MCH 29.5  29.9  28.3    MCHC 34.8  34.1  33.7    RDW 17.9  16.4  16.5    Platelets 243  241  273      Lipid Panel          4/21/2023    09:53 2/13/2024    14:00   Lipid Panel   Total Cholesterol 174  130    Triglycerides 157  100    HDL Cholesterol 36  45    VLDL Cholesterol 28  19    LDL Cholesterol  110  66    LDL/HDL Ratio 2.96  1.44      TSH          2/29/2024    12:03   TSH   TSH 2.570        Lipase   Lipase   Date Value Ref Range Status   01/09/2023 20 13 - 60 U/L Final   01/06/2019 124 73 - 393 U/L Final     Amylase No results found for: \"AMYLASE\"  Iron Profile   Iron   Date Value Ref Range Status   02/13/2024 63 37 - 145 mcg/dL Final     TIBC   Date Value Ref Range Status   02/13/2024 454 298 - 536 mcg/dL Final     Iron Saturation (TSAT)   Date Value Ref Range Status   02/13/2024 14 (L) 20 - 50 % Final     Transferrin   Date Value Ref Range Status   02/13/2024 305 200 - 360 mg/dL Final     Ferritin   Ferritin   Date Value Ref Range Status   02/13/2024 55.38 13.00 - 150.00 ng/mL Final     Liver Workup   Ferritin   Date Value Ref Range Status   02/13/2024 55.38 13.00 - 150.00 ng/mL Final     Iron   Date Value Ref Range Status   02/13/2024 63 37 - 145 mcg/dL Final     TIBC   Date Value Ref Range Status   02/13/2024 454 298 - 536 mcg/dL Final     Iron Saturation (TSAT)   Date Value Ref Range Status   02/13/2024 14 (L) 20 - 50 % Final     Transferrin   Date Value Ref Range Status   02/13/2024 305 200 - 360 mg/dL Final     Protime   Date Value Ref Range Status   01/20/2023 15.9 (H) 11.8 - 14.9 Seconds Final   11/22/2022 11.1 9.7 - " "11.5 Second Final     Comment:     When using the PT to screen for a coagulopathy, please refer to the PT reference range to evaluate results.     INR   Date Value Ref Range Status   01/20/2023 1.25 (H) 0.86 - 1.15 Final   11/22/2022 1.0  Final     Comment:     To monitor warfarin, the INR calculated from the PT is used, according to current published guidelines:    *Most indications, moderate intensity INR (2.0-3.0) is effective.    *Patients with recurrent thromboembolic events with a therapeutic INR as above, or other additional risk factors for thromboembolic events, high intensity INR (2.5-3.5) is recommended.    *Optimal target range for the INR is not likely to be the same for all indications, and lower INR targets may be prudent for patients at very high risk of bleeding.    The INR is used to manage patients on warfarin, and thus is not compared to a \"normal\" INR range. The validity of the INR in other conditions of impaired coagulation has not been fully evaluated, and the PT recorded in seconds is recommended in these   instances. Ref:  CHEST June 2008 supplement.     ALPHA-FETOPROTEIN   Date Value Ref Range Status   01/20/2023 2.15 0 - 8.3 ng/mL Final     AFP   ALPHA-FETOPROTEIN   Date Value Ref Range Status   01/20/2023 2.15 0 - 8.3 ng/mL Final      PT/INR   Protime   Date Value Ref Range Status   01/20/2023 15.9 (H) 11.8 - 14.9 Seconds Final   11/22/2022 11.1 9.7 - 11.5 Second Final     Comment:     When using the PT to screen for a coagulopathy, please refer to the PT reference range to evaluate results.     INR   Date Value Ref Range Status   01/20/2023 1.25 (H) 0.86 - 1.15 Final   11/22/2022 1.0  Final     Comment:     To monitor warfarin, the INR calculated from the PT is used, according to current published guidelines:    *Most indications, moderate intensity INR (2.0-3.0) is effective.    *Patients with recurrent thromboembolic events with a therapeutic INR as above, or other additional risk " "factors for thromboembolic events, high intensity INR (2.5-3.5) is recommended.    *Optimal target range for the INR is not likely to be the same for all indications, and lower INR targets may be prudent for patients at very high risk of bleeding.    The INR is used to manage patients on warfarin, and thus is not compared to a \"normal\" INR range. The validity of the INR in other conditions of impaired coagulation has not been fully evaluated, and the PT recorded in seconds is recommended in these   instances. Ref:  CHEST June 2008 supplement.     Ammonia   Ammonia   Date Value Ref Range Status   11/16/2022 27 11 - 51 umol/L Final               Past Medical History       Past Medical History:   Diagnosis Date    Abnormal Pap smear of cervix     Atrial fibrillation     ON ELIQUIS/FOLLOWS SERGO    Colindres's esophagus     Depression     Diabetes mellitus     Emphysema lung     INHALER    Endometrial hyperplasia     D&C SCHEDULED 7/25/23    Esophageal varices     Fatty liver 2015    Gastroparesis     TAKES XIFAXAN    GERD (gastroesophageal reflux disease) 1995    Hyperlipidemia     Hypertension     Iron deficiency anemia 05/02/2023    IRON INFUSION LAST 7/24/23    Irritable bowel syndrome     W/CONSTIPATION    BERKOWITZ (nonalcoholic steatohepatitis)     NO S/S CURRENTLY, ELEVATED ENZYMES    Obstructive sleep apnea syndrome     Pulmonary emphysema 11/22/2021    S/P exploratory laparotomy, lysis of adhesions, resection of necrotic colon without anastomosis 04/18/2022       Past Surgical History:   Procedure Laterality Date    ABDOMINAL SURGERY  4/16/2022    BLADDER SLING MODIFIED, ANTERIOR AND POSTERIOR VAGINAL REPAIR  2018    [place]    BOTOX INJECTION      In abdomen    BREAST LUMPECTOMY Left     BENIGN    CARDIAC CATHETERIZATION      NO INTERVENTION    CHOLECYSTECTOMY  5/12/2015    COLON RESECTION      PERFORATED COLON, EEA    COLONOSCOPY N/A 08/14/2023    Procedure: COLONOSCOPY WITH POLYPECTOMY, TATTOO;  Surgeon: " Shannan Charles MD;  Location: [facility] ENDOSCOPY;  Service: Gastroenterology;  Laterality: N/A;  COLON POLYPS  DIVERTICULOSIS  HEMORRHOIDS    D & C HYSTEROSCOPY N/A 07/25/2023    Procedure: resection of endometrial polyp with myosure;  Surgeon: Ashleigh Harding DO;  Location: [facility] MAIN OR;  Service: Gynecology;  Laterality: N/A;    ENDOSCOPY N/A 06/03/2022    Procedure: ESOPHAGOGASTRODUODENOSCOPY;  Surgeon: Shannan Charles MD;  Location: [facility] ENDOSCOPY;  Service: Gastroenterology;  Laterality: N/A;  RETAINED FOOD IN STOMACH  ESOPHAGEAL VARICIES    EXPLORATORY LAPAROTOMY N/A 04/16/2022    Procedure: EXPLORATORY LAPAROTOMY, LYSIS OF ADHESIONS, RESECTION OF NECTROIC COLON;  Surgeon: Cande Stanley MD;  Location: [facility] MAIN OR;  Service: General;  Laterality: N/A;    GALLBLADDER SURGERY      LAPAROSCOPIC    LIVER BIOPSY  2021    TUBAL ABDOMINAL LIGATION      UPPER GASTROINTESTINAL ENDOSCOPY  6/3/2022         Current Outpatient Medications:     acetaminophen (Tylenol) 325 MG tablet, Take 2 tablets by mouth Every 4 (Four) Hours As Needed for Mild Pain., Disp: 30 tablet, Rfl: 1    albuterol sulfate  (90 Base) MCG/ACT inhaler, Inhale 2 puffs Every 4 (Four) Hours As Needed for Shortness of Air or Wheezing., Disp: 1 g, Rfl: 5    amLODIPine (NORVASC) 10 MG tablet, Take 1 tablet by mouth Daily., Disp: 90 tablet, Rfl: 3    apixaban (ELIQUIS) 5 MG tablet tablet, Take 1 tablet by mouth 2 (Two) Times a Day., Disp: 180 tablet, Rfl: 3    B-D UF III MINI PEN NEEDLES 31G X 5 MM misc, , Disp: , Rfl:     Blood Glucose Monitoring Suppl (Accu-Chek Guide) w/Device kit, , Disp: , Rfl:     buPROPion XL (WELLBUTRIN XL) 150 MG 24 hr tablet, Take 1 tablet by mouth 2 (Two) Times a Day., Disp: 180 tablet, Rfl: 1    Cholecalciferol (Vitamin D3) 50 MCG (2000 UT) capsule, Take 3 capsules by mouth Daily. LAST DOSE 7/21/23, Disp: , Rfl:     Continuous Blood Gluc Sensor (Dexcom G6 Sensor), APPLY 1 EACH TOPICALLY EVERY  10 (TEN) DAYS., Disp: , Rfl:     Continuous Blood Gluc Transmit (Dexcom G6 Transmitter) misc, USE FOR 90 DAYS., Disp: , Rfl:     empagliflozin (JARDIANCE) 25 MG tablet tablet, Take 1 tablet by mouth Daily., Disp: , Rfl:     escitalopram (Lexapro) 20 MG tablet, Take 1 tablet by mouth Daily., Disp: 90 tablet, Rfl: 1    Evolocumab (Repatha) solution prefilled syringe injection, Inject 1 mL under the skin into the appropriate area as directed Every 14 (Fourteen) Days., Disp: 6 each, Rfl: 3    famotidine (PEPCID) 40 MG tablet, Take 1 tablet by mouth At Night As Needed for Heartburn., Disp: 90 tablet, Rfl: 3    Fluticasone-Umeclidin-Vilant (TRELEGY) 100-62.5-25 MCG/ACT inhaler, Inhale 1 puff Daily., Disp: 1 each, Rfl: 5    furosemide (LASIX) 20 MG tablet, Take 1 tablet by mouth Daily., Disp: 90 tablet, Rfl: 3    glucose blood test strip, Check blood glucose twice daily, Disp: 60 each, Rfl: 12    glucose monitor monitoring kit, 1 each Daily. Patient prefers Accu Chek Meter., Disp: 1 each, Rfl: 0    HYDROcodone-acetaminophen (NORCO) 5-325 MG per tablet, Take 1 tablet by mouth Every 6 (Six) Hours As Needed (Pain)., Disp: 6 tablet, Rfl: 0    icosapent ethyl (VASCEPA) 1 g capsule capsule, Take 2 g by mouth 2 (Two) Times a Day With Meals., Disp: 360 capsule, Rfl: 1    Insulin Glargine, 2 Unit Dial, (Toujeo Max SoloStar) 300 UNIT/ML solution pen-injector injection, Inject 122 Units under the skin into the appropriate area as directed Every Morning. INST PER ANESTHESIA PROTOCOL, Disp: , Rfl:     Lancets (accu-chek soft touch) lancets, Check blood sugar twice daily, Disp: 100 each, Rfl: 12    metoprolol tartrate (LOPRESSOR) 50 MG tablet, TAKE 1 AND 1/2 TABLETS BY MOUTH TWICE DAILY, Disp: 270 tablet, Rfl: 1    Motegrity 2 MG tablet, Take 1 tablet by mouth Daily., Disp: 90 tablet, Rfl: 3    Mounjaro 2.5 MG/0.5ML solution pen-injector pen, Inject 0.5 mL under the skin into the appropriate area as directed 1 (One) Time Per Week.,  Disp: , Rfl:     multivitamin with minerals tablet tablet, Take 1 tablet by mouth Daily. LAST DOSE 7/21/23, Disp: , Rfl:     NovoLOG FlexPen 100 UNIT/ML solution pen-injector sc pen, Inject 60 Units under the skin into the appropriate area as directed 2 (Two) Times a Day. PER SLIDING SCALE INST PER ANESTHESIA PROTOCOL, Disp: , Rfl:     olmesartan (BENICAR) 20 MG tablet, TAKE 1 TABLET BY MOUTH EVERY DAY, Disp: 90 tablet, Rfl: 1    pantoprazole (PROTONIX) 40 MG EC tablet, Take 1 tablet by mouth Daily., Disp: 90 tablet, Rfl: 3    potassium chloride 10 MEQ CR tablet, Take 1 tablet by mouth Daily., Disp: 90 tablet, Rfl: 3    riFAXIMin (Xifaxan) 550 MG tablet, Take 1 tablet by mouth Every 12 (Twelve) Hours., Disp: 180 tablet, Rfl: 3    Vibegron (Gemtesa) 75 MG tablet, Take 1 tablet by mouth Daily., Disp: , Rfl:     sodium-potassium-magnesium sulfates (Suprep Bowel Prep Kit) 17.5-3.13-1.6 GM/177ML solution oral solution, Take 2 bottles by mouth Take As Directed., Disp: 177 mL, Rfl: 0  No current facility-administered medications for this visit.     Allergies   Allergen Reactions    Liraglutide Nausea And Vomiting    Lisinopril Other (See Comments)     Cough    Metformin Nausea Only     GI Upset       Family History   Adopted: Yes   Problem Relation Age of Onset    Malig Hyperthermia Neg Hx         Social History     Social History Narrative    Exercises 1 x week    Lives at home with spouse       Objective       Review of Systems   Constitutional:  Negative for appetite change, fatigue, fever, unexpected weight gain and unexpected weight loss.   HENT:  Negative for trouble swallowing.    Respiratory:  Negative for cough, choking, chest tightness, shortness of breath, wheezing and stridor.    Cardiovascular:  Positive for leg swelling. Negative for chest pain and palpitations.   Gastrointestinal:  Positive for abdominal distention. Negative for abdominal pain, anal bleeding, blood in stool, constipation, diarrhea, nausea,  "rectal pain, vomiting, GERD and indigestion.        Vital Signs:   /62 (BP Location: Left arm, Patient Position: Sitting, Cuff Size: Adult)   Pulse 86   Ht 165.1 cm (65\")   Wt 111 kg (244 lb)   BMI 40.60 kg/m²       Physical Exam  Constitutional:       General: She is not in acute distress.     Appearance: She is well-developed. She is not ill-appearing.   HENT:      Head: Normocephalic.   Eyes:      Pupils: Pupils are equal, round, and reactive to light.   Cardiovascular:      Rate and Rhythm: Normal rate and regular rhythm.      Heart sounds: Normal heart sounds.   Pulmonary:      Effort: Pulmonary effort is normal.      Breath sounds: Normal breath sounds.   Abdominal:      General: Bowel sounds are normal. There is distension.      Palpations: Abdomen is soft. There is no mass.      Tenderness: There is no abdominal tenderness. There is no guarding or rebound.      Hernia: No hernia is present.      Comments: Minimal ascites noted on exam today   Musculoskeletal:         General: Normal range of motion.      Right lower leg: Edema present.      Left lower leg: Edema present.   Skin:     General: Skin is warm and dry.   Neurological:      Mental Status: She is alert and oriented to person, place, and time.   Psychiatric:         Speech: Speech normal.         Behavior: Behavior normal.         Judgment: Judgment normal.           Assessment & Plan          Assessment and Plan    Diagnoses and all orders for this visit:    1. Gastroesophageal reflux disease without esophagitis (Primary)  Comments:  Stable on Protonix 40mg and Pepcid 40mg daily; continue and continue with GI.  Orders:  -     famotidine (PEPCID) 40 MG tablet; Take 1 tablet by mouth At Night As Needed for Heartburn.  Dispense: 90 tablet; Refill: 3  -     Case Request; Standing  -     Follow Anesthesia Guidelines / Protocol; Future  -     Obtain Informed Consent; Future  -     Case Request  -     sodium-potassium-magnesium sulfates (Suprep " Bowel Prep Kit) 17.5-3.13-1.6 GM/177ML solution oral solution; Take 2 bottles by mouth Take As Directed.  Dispense: 177 mL; Refill: 0    2. Colindres's esophagus without dysplasia  -     pantoprazole (PROTONIX) 40 MG EC tablet; Take 1 tablet by mouth Daily.  Dispense: 90 tablet; Refill: 3  -     Case Request; Standing  -     Follow Anesthesia Guidelines / Protocol; Future  -     Obtain Informed Consent; Future  -     Case Request  -     sodium-potassium-magnesium sulfates (Suprep Bowel Prep Kit) 17.5-3.13-1.6 GM/177ML solution oral solution; Take 2 bottles by mouth Take As Directed.  Dispense: 177 mL; Refill: 0    3. Cirrhosis of liver without ascites, unspecified hepatic cirrhosis type  -     Case Request; Standing  -     Follow Anesthesia Guidelines / Protocol; Future  -     Obtain Informed Consent; Future  -     Case Request  -     sodium-potassium-magnesium sulfates (Suprep Bowel Prep Kit) 17.5-3.13-1.6 GM/177ML solution oral solution; Take 2 bottles by mouth Take As Directed.  Dispense: 177 mL; Refill: 0    4. Hepatic encephalopathy  -     riFAXIMin (Xifaxan) 550 MG tablet; Take 1 tablet by mouth Every 12 (Twelve) Hours.  Dispense: 180 tablet; Refill: 3  -     Case Request; Standing  -     Follow Anesthesia Guidelines / Protocol; Future  -     Obtain Informed Consent; Future  -     Case Request  -     sodium-potassium-magnesium sulfates (Suprep Bowel Prep Kit) 17.5-3.13-1.6 GM/177ML solution oral solution; Take 2 bottles by mouth Take As Directed.  Dispense: 177 mL; Refill: 0    5. BERKOWITZ (nonalcoholic steatohepatitis)  -     Case Request; Standing  -     Follow Anesthesia Guidelines / Protocol; Future  -     Obtain Informed Consent; Future  -     Case Request  -     sodium-potassium-magnesium sulfates (Suprep Bowel Prep Kit) 17.5-3.13-1.6 GM/177ML solution oral solution; Take 2 bottles by mouth Take As Directed.  Dispense: 177 mL; Refill: 0    6. History of colon polyps  -     Case Request; Standing  -     Follow  Anesthesia Guidelines / Protocol; Future  -     Obtain Informed Consent; Future  -     Case Request  -     sodium-potassium-magnesium sulfates (Suprep Bowel Prep Kit) 17.5-3.13-1.6 GM/177ML solution oral solution; Take 2 bottles by mouth Take As Directed.  Dispense: 177 mL; Refill: 0    Other orders  -     Motegrity 2 MG tablet; Take 1 tablet by mouth Daily.  Dispense: 90 tablet; Refill: 3  -     Verify NPO; Standing  -     Verify Bowel Prep Was Successful; Standing  -     Give Tap Water Enema If Bowel Prep Insufficient; Standing  -     Obtain Informed Consent; Standing    Cirrhosis: BERKOWITZ MELD 9  Ascites: Minimal ascites noted today.  Patient to restart Lasix.  Continue 2 g sodium diet.  Continue compression stockings.  Varices: Grade 2 esophageal varices noted on most recent EGD.  Encephalopathy: On Xifaxan, Motegrity and MiraLAX.  Health maintenance: Next surveillance EGD and colonoscopy due this summer.    Reviewed most recent lab and imaging results with her today.  She does follow with Dr. Chirinos at the [facility] liver clinic.  Gastroparesis has been significantly improved since her EGD with Botox with Dr. Simon.  She is having less abdominal pain with nausea and vomiting now.  Bowels moving much better on Motegrity and MiraLAX daily.  Continue Xifaxan twice daily.She is having some swelling in her abdomen and legs and feet today.  Patient had not been religiously taking Lasix due to her other health conditions.  I advised her for the next 3 days to take her Lasix as directed.  Continue a 2 g sodium diet with her compression stockings.  Patient to follow-up with neurology, hematology, nephrology and hepatology as planned.  Overall I think she is doing well.  Patient to go ahead and get scheduled today for her surveillance EGD and screening colonoscopy.  Patient is agreeable to the scopes.  Since she does have a history of constipation we will do a 2-day extended bowel prep.  And a low residue  diet 1 week prior to procedures.  She will need Eliquis/cardiac clearance.  She is to hold her Mounjaro the week prior to her scopes as well.  Patient to call the office with any issues.  Patient to follow-up with me in 6 months.  Patient is agreeable to the plan.        Follow Up       Follow Up   Return in about 6 months (around 9/20/2024) for CIRRHOSIS, CONSTIPATION.  Patient was given instructions and counseling regarding her condition or for health maintenance advice. Please see specific information pulled into the AVS if appropriate.

## 2024-03-20 NOTE — TELEPHONE ENCOUNTER
3/20/2024    Dear Dr Frey,      Patient Name: Anika Casillas  : 1960      This patient is waiting to have a Colonoscopy and/or Esophagogastroduodenoscopy which I will perform at [facility] on _2024__. Please respond to this request noting your recommendations regarding clearance from a Pulmonary  standpoint.  You may contact our office at [phone] option 3 with any questions. I appreciate your prompt response in this matter. Please return this form to our office as soon as possible to [phone].    ____ I approve my patient from a Pulmonary  standpoint    ____ I do NOT approve my patient from a Pulmonary  standpoint at this time      Please specify clearance expiration date:____________________________________      Approving physician name (please print): _____________________________________________      Approving physician signature: ________________________________ Date:________________  Sincerely,  [facility] - Gastroenterology   Dr. Charles          Please fax approval or denial to our office as soon as possible.

## 2024-03-20 NOTE — TELEPHONE ENCOUNTER
3/20/2024    Dear Dr Webb,     Patient: Anika Casillas   YOB: 1960        This patient is waiting to have a Colonoscopy and Esophagogastroduodenoscopy which I will perform at [facility] on 04/22/2024 date .     Our records indicate this patient is currently taking Eliquis. This procedure requires the patient to suspend their anticoagulant medication prior to surgery.     Please respond to this request noting your recommendations. You may contact our office at [phone] Option 3 with any questions. I appreciate your prompt response in this matter.     Please return this form to our office no later than two weeks prior to the procedure date listed above. Please return form to [phone].     ____ I approve my patient to stop taking their Anticoagulant Therapy medication 2 days prior to the scheduled procedure.    ____ I do NOT approve my patient to stop taking their Anticoagulant Therapy medication at this time.    ____ I approve my patient from a Cardiac  standpoint    ____ I do NOT approve my patient from a Cardiac  standpoint at this time    Please specify clearance expiration date:_____________________________    Approving physician name (please print):     _____________________________________________    Approving physician signature:     ________________________________    Date:________________        Sincerely,  [facility]   Gastroenterology -

## 2024-03-21 ENCOUNTER — HOSPITAL ENCOUNTER (OUTPATIENT)
Dept: INFUSION THERAPY | Facility: HOSPITAL | Age: 64
Discharge: HOME | End: 2024-03-21
Payer: COMMERCIAL

## 2024-03-21 VITALS
TEMPERATURE: 97.6 F | HEART RATE: 78 BPM | HEIGHT: 65 IN | DIASTOLIC BLOOD PRESSURE: 75 MMHG | OXYGEN SATURATION: 97 % | WEIGHT: 244.93 LBS | BODY MASS INDEX: 40.81 KG/M2 | SYSTOLIC BLOOD PRESSURE: 137 MMHG | RESPIRATION RATE: 20 BRPM

## 2024-03-21 DIAGNOSIS — K90.9 IRON MALABSORPTION: ICD-10-CM

## 2024-03-21 DIAGNOSIS — K31.84 DIABETIC GASTROPARESIS: Primary | ICD-10-CM

## 2024-03-21 DIAGNOSIS — D50.9 IRON DEFICIENCY ANEMIA, UNSPECIFIED IRON DEFICIENCY ANEMIA TYPE: ICD-10-CM

## 2024-03-21 DIAGNOSIS — E11.43 DIABETIC GASTROPARESIS: Primary | ICD-10-CM

## 2024-03-21 PROCEDURE — 96366 THER/PROPH/DIAG IV INF ADDON: CPT

## 2024-03-21 PROCEDURE — 25810000003 SODIUM CHLORIDE 0.9 % SOLUTION: Performed by: INTERNAL MEDICINE

## 2024-03-21 PROCEDURE — 96365 THER/PROPH/DIAG IV INF INIT: CPT

## 2024-03-21 PROCEDURE — 25010000002 IRON SUCROSE PER 1 MG: Performed by: INTERNAL MEDICINE

## 2024-03-21 RX ORDER — SODIUM CHLORIDE 9 MG/ML
20 INJECTION, SOLUTION INTRAVENOUS ONCE
Status: DISCONTINUED | OUTPATIENT
Start: 2024-03-21 | End: 2024-03-23 | Stop reason: HOSPADM

## 2024-03-21 RX ADMIN — IRON SUCROSE 300 MG: 20 INJECTION, SOLUTION INTRAVENOUS at 16:49

## 2024-03-21 NOTE — TELEPHONE ENCOUNTER
Procedure: Colonoscopy and/or EGD     Med Directive: Eliquis     PMH: PAF, HTN,HLD     Last Seen: 12/06/23

## 2024-03-22 NOTE — TELEPHONE ENCOUNTER
Patient is moderate, stable cardiovascular risk for endoscopy.  May hold Eliquis for 3 days prior.  No additional testing necessary.

## 2024-04-02 RX ORDER — TIRZEPATIDE 2.5 MG/.5ML
2.5 INJECTION, SOLUTION SUBCUTANEOUS WEEKLY
OUTPATIENT
Start: 2024-04-02

## 2024-04-02 NOTE — TELEPHONE ENCOUNTER
Called and spoke with patient, she accidentally sent this over. I did confirm with her this was never prescribed by her PCP. Pt aware and ok to refuse at this time.

## 2024-04-15 NOTE — PRE-PROCEDURE INSTRUCTIONS
Left message:  Reminded of arrival time at   1000 , Entrance C of the [facility] hospital. Instructed to bring or have a  over the age of 18 set up to drive you home the day of procedure.    Instructed on clear liquid diet the day before, nothing red or purple. Call with any questions about the prep or if in need of the prep.  Reminded them not to eat or drink anything am of procedure unless its a sip of water with medications.  Hold eluquis 3 days prior to procedure, diabetic meds, lasix am of procedure. Hold mounjaro for 1 week prior to procedure.

## 2024-04-17 ENCOUNTER — TELEPHONE (OUTPATIENT)
Dept: GASTROENTEROLOGY | Facility: CLINIC | Age: 64
End: 2024-04-17
Payer: COMMERCIAL

## 2024-04-19 ENCOUNTER — TELEPHONE (OUTPATIENT)
Dept: GASTROENTEROLOGY | Facility: CLINIC | Age: 64
End: 2024-04-19
Payer: COMMERCIAL

## 2024-04-19 ENCOUNTER — ANESTHESIA EVENT (OUTPATIENT)
Dept: GASTROENTEROLOGY | Facility: HOSPITAL | Age: 64
End: 2024-04-19
Payer: COMMERCIAL

## 2024-04-19 DIAGNOSIS — K76.82 HEPATIC ENCEPHALOPATHY: ICD-10-CM

## 2024-04-19 DIAGNOSIS — K22.70 BARRETT'S ESOPHAGUS WITHOUT DYSPLASIA: ICD-10-CM

## 2024-04-19 DIAGNOSIS — K74.60 CIRRHOSIS OF LIVER WITHOUT ASCITES, UNSPECIFIED HEPATIC CIRRHOSIS TYPE: ICD-10-CM

## 2024-04-19 DIAGNOSIS — K75.81 NASH (NONALCOHOLIC STEATOHEPATITIS): ICD-10-CM

## 2024-04-19 DIAGNOSIS — K21.9 GASTROESOPHAGEAL REFLUX DISEASE WITHOUT ESOPHAGITIS: ICD-10-CM

## 2024-04-19 DIAGNOSIS — Z86.010 HISTORY OF COLON POLYPS: ICD-10-CM

## 2024-04-19 RX ORDER — SODIUM, POTASSIUM,MAG SULFATES 17.5-3.13G
2 SOLUTION, RECONSTITUTED, ORAL ORAL TAKE AS DIRECTED
Qty: 177 ML | Refills: 0 | Status: ON HOLD | OUTPATIENT
Start: 2024-04-19 | End: 2024-04-22

## 2024-04-19 NOTE — ANESTHESIA PREPROCEDURE EVALUATION
Anesthesia Evaluation     Patient summary reviewed and Nursing notes reviewed   NPO Solid Status: > 8 hours  NPO Liquid Status: > 2 hours           Airway   Mallampati: I  TM distance: >3 FB  Neck ROM: full  No difficulty expected  Dental - normal exam   (+) partials        Pulmonary     breath sounds clear to auscultation  (+) a smoker Current, cigarettes, COPD (uses trelegy inhaler daily and rescue prn) mild,sleep apnea on CPAP  Cardiovascular   Exercise tolerance: poor (<4 METS)    ECG reviewed  PT is on anticoagulation therapy  Patient on routine beta blocker  Rhythm: regular  Rate: normal    (+) hypertension well controlled 2 medications or greater, dysrhythmias Atrial Fib, hyperlipidemia      Neuro/Psych  (+) psychiatric history Depression  GI/Hepatic/Renal/Endo    (+) obesity, GERD well controlled, hepatitis (BERKOWITZ), liver disease cirrhosis, renal disease (CKD stage 2)- CRI, diabetes mellitus type 2 well controlled, thyroid problem hypothyroidism    Musculoskeletal     Abdominal    Substance History      OB/GYN          Other        ROS/Med Hx Other: Cardiac Clearance- Dr. Webb- moderate risk  Toujeo- last taken 4/13/24  Mounjaro last dose 4/13   Last dose Eliquis 4/19      - NORMAL ECG -  Sinus rhythm  When compared with ECG of 15-Apr-2022 20:30:55,  Significant repolarization change  Significant axis, voltage or hypertrophy change  Electronically Signed By: Jun Acevedo ([facility]) 16-Apr-2022 15:00:19  Date and Time of Study: 2022-04-15 23:02:08    1/27/23 Echo  ·  Left ventricular systolic function is normal. Left ventricular ejection fraction appears to be 66 - 70%.  ·  Left ventricular wall thickness is consistent with concentric hypertrophy.  ·  Left ventricular diastolic function is consistent with (grade I) impaired relaxation.  ·  Estimated right ventricular systolic pressure from tricuspid regurgitation is normal (<35 mmHg).        Phys Exam Other: Pt instructed to use inhaler prior to  procedure   94% on room air               Anesthesia Plan    ASA 3     general   total IV anesthesia  (Total IV Anesthesia    Patient understands anesthesia not responsible for dental damage.  )  intravenous induction     Anesthetic plan, risks, benefits, and alternatives have been provided, discussed and informed consent has been obtained with: patient.  Pre-procedure education provided  Plan discussed with CRNA.      CODE STATUS:

## 2024-04-19 NOTE — TELEPHONE ENCOUNTER
Patient came into the office and asked to have her prep sent over to [facility] for her scope on Monday. I verified her information then advised that the prep had been sent to [facility] on 03/20/24, patient stated that they never received it and would like it to be sent to [facility]. Patient was advised that I would send a message to Renu to get it sent over.

## 2024-04-22 ENCOUNTER — HOSPITAL ENCOUNTER (OUTPATIENT)
Facility: HOSPITAL | Age: 64
Setting detail: HOSPITAL OUTPATIENT SURGERY
Discharge: HOME | End: 2024-04-22
Attending: INTERNAL MEDICINE | Admitting: INTERNAL MEDICINE
Payer: COMMERCIAL

## 2024-04-22 ENCOUNTER — ANESTHESIA (OUTPATIENT)
Dept: GASTROENTEROLOGY | Facility: HOSPITAL | Age: 64
End: 2024-04-22
Payer: COMMERCIAL

## 2024-04-22 VITALS
SYSTOLIC BLOOD PRESSURE: 103 MMHG | WEIGHT: 239.86 LBS | BODY MASS INDEX: 39.96 KG/M2 | HEART RATE: 77 BPM | RESPIRATION RATE: 16 BRPM | OXYGEN SATURATION: 100 % | HEIGHT: 65 IN | TEMPERATURE: 98.7 F | DIASTOLIC BLOOD PRESSURE: 67 MMHG

## 2024-04-22 DIAGNOSIS — K75.81 NASH (NONALCOHOLIC STEATOHEPATITIS): ICD-10-CM

## 2024-04-22 DIAGNOSIS — K21.9 GASTROESOPHAGEAL REFLUX DISEASE WITHOUT ESOPHAGITIS: ICD-10-CM

## 2024-04-22 DIAGNOSIS — K76.82 HEPATIC ENCEPHALOPATHY: ICD-10-CM

## 2024-04-22 DIAGNOSIS — Z86.010 HISTORY OF COLON POLYPS: ICD-10-CM

## 2024-04-22 DIAGNOSIS — K74.60 CIRRHOSIS OF LIVER WITHOUT ASCITES, UNSPECIFIED HEPATIC CIRRHOSIS TYPE: ICD-10-CM

## 2024-04-22 DIAGNOSIS — K22.70 BARRETT'S ESOPHAGUS WITHOUT DYSPLASIA: ICD-10-CM

## 2024-04-22 LAB — GLUCOSE BLDC GLUCOMTR-MCNC: 172 MG/DL (ref 70–99)

## 2024-04-22 PROCEDURE — 88305 TISSUE EXAM BY PATHOLOGIST: CPT | Performed by: INTERNAL MEDICINE

## 2024-04-22 PROCEDURE — 43239 EGD BIOPSY SINGLE/MULTIPLE: CPT | Performed by: INTERNAL MEDICINE

## 2024-04-22 PROCEDURE — 25010000002 PROPOFOL 10 MG/ML EMULSION: Performed by: NURSE ANESTHETIST, CERTIFIED REGISTERED

## 2024-04-22 PROCEDURE — 45390 COLONOSCOPY W/RESECTION: CPT | Performed by: INTERNAL MEDICINE

## 2024-04-22 PROCEDURE — 25810000003 SODIUM CHLORIDE 0.9 % SOLUTION

## 2024-04-22 PROCEDURE — 82948 REAGENT STRIP/BLOOD GLUCOSE: CPT

## 2024-04-22 PROCEDURE — 45385 COLONOSCOPY W/LESION REMOVAL: CPT | Performed by: INTERNAL MEDICINE

## 2024-04-22 DEVICE — DEV CLIP ENDO RESOLUTION360 CONTRL ROT 235CM: Type: IMPLANTABLE DEVICE | Site: DESCENDING COLON | Status: FUNCTIONAL

## 2024-04-22 RX ORDER — SODIUM CHLORIDE 9 MG/ML
9 INJECTION, SOLUTION INTRAVENOUS CONTINUOUS
Status: DISCONTINUED | OUTPATIENT
Start: 2024-04-22 | End: 2024-04-22 | Stop reason: HOSPADM

## 2024-04-22 RX ORDER — PROPOFOL 10 MG/ML
VIAL (ML) INTRAVENOUS AS NEEDED
Status: DISCONTINUED | OUTPATIENT
Start: 2024-04-22 | End: 2024-04-22 | Stop reason: SURG

## 2024-04-22 RX ORDER — LIDOCAINE HYDROCHLORIDE 20 MG/ML
INJECTION, SOLUTION EPIDURAL; INFILTRATION; INTRACAUDAL; PERINEURAL AS NEEDED
Status: DISCONTINUED | OUTPATIENT
Start: 2024-04-22 | End: 2024-04-22 | Stop reason: SURG

## 2024-04-22 RX ORDER — SODIUM CHLORIDE, SODIUM LACTATE, POTASSIUM CHLORIDE, CALCIUM CHLORIDE 600; 310; 30; 20 MG/100ML; MG/100ML; MG/100ML; MG/100ML
INJECTION, SOLUTION INTRAVENOUS CONTINUOUS PRN
Status: DISCONTINUED | OUTPATIENT
Start: 2024-04-22 | End: 2024-04-22

## 2024-04-22 RX ADMIN — LIDOCAINE HYDROCHLORIDE 40 MG: 20 INJECTION, SOLUTION INTRAVENOUS at 12:03

## 2024-04-22 RX ADMIN — PROPOFOL 200 MCG/KG/MIN: 10 INJECTION, EMULSION INTRAVENOUS at 12:03

## 2024-04-22 RX ADMIN — SODIUM CHLORIDE 100 ML: 9 INJECTION, SOLUTION INTRAVENOUS at 11:38

## 2024-04-22 RX ADMIN — PROPOFOL 100 MG: 10 INJECTION, EMULSION INTRAVENOUS at 12:03

## 2024-04-22 NOTE — H&P
Pre Procedure History & Physical    Chief Complaint:   F/u of Colindres's esophagus, surveillance colonoscopy    Subjective     HPI:   64 yo F here for f/u of Colindres's esophagus, surveillance colonoscopy.    Past Medical History:   Past Medical History:   Diagnosis Date    Abnormal Pap smear of cervix     Atrial fibrillation     ON ELIQUIS/FOLLOWS TROTTER    Colindres's esophagus     Depression     Diabetes mellitus     Emphysema lung     INHALER    Endometrial hyperplasia     D&C SCHEDULED 7/25/23    Esophageal varices     Fatty liver 2015    Gastroparesis     TAKES XIFAXAN    GERD (gastroesophageal reflux disease) 1995    Hyperlipidemia     Hypertension     Iron deficiency anemia 05/02/2023    IRON INFUSION LAST 7/24/23    Irritable bowel syndrome     W/CONSTIPATION    BERKOWITZ (nonalcoholic steatohepatitis)     NO S/S CURRENTLY, ELEVATED ENZYMES    Obstructive sleep apnea syndrome     Pulmonary emphysema 11/22/2021    S/P exploratory laparotomy, lysis of adhesions, resection of necrotic colon without anastomosis 04/18/2022       Past Surgical History:  Past Surgical History:   Procedure Laterality Date    ABDOMINAL SURGERY  4/16/2022    BLADDER SLING MODIFIED, ANTERIOR AND POSTERIOR VAGINAL REPAIR  2018    [place]    BOTOX INJECTION      In abdomen    BREAST LUMPECTOMY Left     BENIGN    CARDIAC CATHETERIZATION      NO INTERVENTION    CHOLECYSTECTOMY  5/12/2015    COLON RESECTION      PERFORATED COLON, EEA    COLONOSCOPY N/A 08/14/2023    Procedure: COLONOSCOPY WITH POLYPECTOMY, TATTOO;  Surgeon: Shannan Charles MD;  Location: [facility] ENDOSCOPY;  Service: Gastroenterology;  Laterality: N/A;  COLON POLYPS  DIVERTICULOSIS  HEMORRHOIDS    D & C HYSTEROSCOPY N/A 07/25/2023    Procedure: resection of endometrial polyp with myosure;  Surgeon: Ashleigh Harding DO;  Location: [facility] MAIN OR;  Service: Gynecology;  Laterality: N/A;    ENDOSCOPY N/A 06/03/2022    Procedure: ESOPHAGOGASTRODUODENOSCOPY;  Surgeon:  Shannan Charles MD;  Location: [facility] ENDOSCOPY;  Service: Gastroenterology;  Laterality: N/A;  RETAINED FOOD IN STOMACH  ESOPHAGEAL VARICIES    EXPLORATORY LAPAROTOMY N/A 04/16/2022    Procedure: EXPLORATORY LAPAROTOMY, LYSIS OF ADHESIONS, RESECTION OF NECTROIC COLON;  Surgeon: Cande Stanley MD;  Location: [facility] MAIN OR;  Service: General;  Laterality: N/A;    GALLBLADDER SURGERY      LAPAROSCOPIC    LIVER BIOPSY  2021    TUBAL ABDOMINAL LIGATION      UPPER GASTROINTESTINAL ENDOSCOPY  6/3/2022       Family History:  Family History   Adopted: Yes   Problem Relation Age of Onset    Malig Hyperthermia Neg Hx        Social History:   reports that she has been smoking cigarettes. She started smoking about 48 years ago. She has a 12.2 pack-year smoking history. She has never used smokeless tobacco. She reports that she does not currently use alcohol after a past usage of about 1.0 standard drink of alcohol per week. She reports that she does not use drugs.    Medications:   Medications Prior to Admission   Medication Sig Dispense Refill Last Dose    acetaminophen (Tylenol) 325 MG tablet Take 2 tablets by mouth Every 4 (Four) Hours As Needed for Mild Pain. 30 tablet 1     albuterol sulfate  (90 Base) MCG/ACT inhaler Inhale 2 puffs Every 4 (Four) Hours As Needed for Shortness of Air or Wheezing. 1 g 5     amLODIPine (NORVASC) 10 MG tablet Take 1 tablet by mouth Daily. 90 tablet 3     apixaban (ELIQUIS) 5 MG tablet tablet Take 1 tablet by mouth 2 (Two) Times a Day. 180 tablet 3     B-D UF III MINI PEN NEEDLES 31G X 5 MM misc        Blood Glucose Monitoring Suppl (Accu-Chek Guide) w/Device kit        buPROPion XL (WELLBUTRIN XL) 150 MG 24 hr tablet Take 1 tablet by mouth 2 (Two) Times a Day. 180 tablet 1     Cholecalciferol (Vitamin D3) 50 MCG (2000 UT) capsule Take 3 capsules by mouth Daily. LAST DOSE 7/21/23       Continuous Blood Gluc Sensor (Dexcom G6 Sensor) APPLY 1 EACH TOPICALLY EVERY 10 (TEN)  DAYS.       Continuous Blood Gluc Transmit (Dexcom G6 Transmitter) misc USE FOR 90 DAYS.       empagliflozin (JARDIANCE) 25 MG tablet tablet Take 1 tablet by mouth Daily.       escitalopram (Lexapro) 20 MG tablet Take 1 tablet by mouth Daily. 90 tablet 1     Evolocumab (Repatha) solution prefilled syringe injection Inject 1 mL under the skin into the appropriate area as directed Every 14 (Fourteen) Days. 6 each 3     famotidine (PEPCID) 40 MG tablet Take 1 tablet by mouth At Night As Needed for Heartburn. 90 tablet 3     Fluticasone-Umeclidin-Vilant (TRELEGY) 100-62.5-25 MCG/ACT inhaler Inhale 1 puff Daily. 1 each 5     furosemide (LASIX) 20 MG tablet Take 1 tablet by mouth Daily. 90 tablet 3     glucose blood test strip Check blood glucose twice daily 60 each 12     glucose monitor monitoring kit 1 each Daily. Patient prefers Accu Chek Meter. 1 each 0     HYDROcodone-acetaminophen (NORCO) 5-325 MG per tablet Take 1 tablet by mouth Every 6 (Six) Hours As Needed (Pain). 6 tablet 0     icosapent ethyl (VASCEPA) 1 g capsule capsule Take 2 g by mouth 2 (Two) Times a Day With Meals. 360 capsule 1     Insulin Glargine, 2 Unit Dial, (Toujeo Max SoloStar) 300 UNIT/ML solution pen-injector injection Inject 122 Units under the skin into the appropriate area as directed Every Morning. INST PER ANESTHESIA PROTOCOL       Lancets (accu-chek soft touch) lancets Check blood sugar twice daily 100 each 12     metoprolol tartrate (LOPRESSOR) 50 MG tablet TAKE 1 AND 1/2 TABLETS BY MOUTH TWICE DAILY 270 tablet 1     Motegrity 2 MG tablet Take 1 tablet by mouth Daily. 90 tablet 3     Mounjaro 2.5 MG/0.5ML solution pen-injector pen Inject 0.5 mL under the skin into the appropriate area as directed 1 (One) Time Per Week.       multivitamin with minerals tablet tablet Take 1 tablet by mouth Daily. LAST DOSE 7/21/23       NovoLOG FlexPen 100 UNIT/ML solution pen-injector sc pen Inject 60 Units under the skin into the appropriate area as  "directed 2 (Two) Times a Day. PER SLIDING SCALE  INST PER ANESTHESIA PROTOCOL       olmesartan (BENICAR) 20 MG tablet TAKE 1 TABLET BY MOUTH EVERY DAY 90 tablet 1     pantoprazole (PROTONIX) 40 MG EC tablet Take 1 tablet by mouth Daily. 90 tablet 3     potassium chloride 10 MEQ CR tablet Take 1 tablet by mouth Daily. 90 tablet 3     riFAXIMin (Xifaxan) 550 MG tablet Take 1 tablet by mouth Every 12 (Twelve) Hours. 180 tablet 3     sodium-potassium-magnesium sulfates (Suprep Bowel Prep Kit) 17.5-3.13-1.6 GM/177ML solution oral solution Take 2 bottles by mouth Take As Directed. 177 mL 0     Vibegron (Gemtesa) 75 MG tablet Take 1 tablet by mouth Daily.          Allergies:  Liraglutide, Lisinopril, and Metformin    ROS:    Pertinent items are noted in HPI     Objective     Blood pressure 122/73, pulse 74, temperature 97.9 °F (36.6 °C), temperature source Temporal, resp. rate 20, height 165.1 cm (65\"), weight 109 kg (239 lb 13.8 oz), SpO2 94%, not currently breastfeeding.    Physical Exam   Constitutional: Pt is oriented to person, place, and time and well-developed, well-nourished, and in no distress.   Mouth/Throat: Oropharynx is clear and moist.   Neck: Normal range of motion.   Cardiovascular: Normal rate, regular rhythm and normal heart sounds.    Pulmonary/Chest: Effort normal and breath sounds normal.   Abdominal: Soft. Nontender  Skin: Skin is warm and dry.   Psychiatric: Mood, memory, affect and judgment normal.     Assessment & Plan     Diagnosis:  F/u of Colindres's esophagus, surveillance colonoscopy    Anticipated Surgical Procedure:  EGD/colonoscopy    The risks, benefits, and alternatives of this procedure have been discussed with the patient or the responsible party- the patient understands and agrees to proceed.           "

## 2024-04-22 NOTE — ANESTHESIA POSTPROCEDURE EVALUATION
Patient: Anika Casillas    Procedure Summary       Date: 04/22/24 Room / Location: [facility] ENDOSCOPY 1 / [facility] ENDOSCOPY    Anesthesia Start: 1201 Anesthesia Stop: 1255    Procedures:       ESOPHAGOGASTRODUODENOSCOPY WITH BIOPSIES      COLONOSCOPY COLD SNARE POLYPECTOMIES WITH CLIP PLACEMENT X3 Diagnosis:       Gastroesophageal reflux disease without esophagitis      Colindres's esophagus without dysplasia      Cirrhosis of liver without ascites, unspecified hepatic cirrhosis type      Hepatic encephalopathy      BERKOWITZ (nonalcoholic steatohepatitis)      History of colon polyps      (Gastroesophageal reflux disease without esophagitis [K21.9])      (Colindres's esophagus without dysplasia [K22.70])      (Cirrhosis of liver without ascites, unspecified hepatic cirrhosis type [K74.60])      (Hepatic encephalopathy [K76.82])      (BERKOWITZ (nonalcoholic steatohepatitis) [K75.81])      (History of colon polyps [Z86.010])    Surgeons: Shannan Charles MD Provider: Claudette Rao CRNA    Anesthesia Type: general ASA Status: 3            Anesthesia Type: general    Vitals  Vitals Value Taken Time   /67 04/22/24 1305   Temp 37.1 °C (98.7 °F) 04/22/24 1305   Pulse 77 04/22/24 1305   Resp 16 04/22/24 1305   SpO2 100 % 04/22/24 1305           Post Anesthesia Care and Evaluation    Patient location during evaluation: bedside  Patient participation: complete - patient participated  Level of consciousness: awake  Pain management: adequate    Airway patency: patent  Anesthetic complications: No anesthetic complications  PONV Status: controlled  Cardiovascular status: acceptable and stable  Respiratory status: acceptable

## 2024-04-25 ENCOUNTER — OFFICE VISIT (OUTPATIENT)
Dept: CARDIOLOGY | Facility: CLINIC | Age: 64
End: 2024-04-25
Payer: COMMERCIAL

## 2024-04-25 ENCOUNTER — HOSPITAL ENCOUNTER (OUTPATIENT)
Dept: GENERAL RADIOLOGY | Facility: HOSPITAL | Age: 64
Discharge: HOME | End: 2024-04-25
Admitting: NURSE PRACTITIONER
Payer: COMMERCIAL

## 2024-04-25 VITALS
SYSTOLIC BLOOD PRESSURE: 133 MMHG | BODY MASS INDEX: 39.49 KG/M2 | DIASTOLIC BLOOD PRESSURE: 70 MMHG | WEIGHT: 237 LBS | HEIGHT: 65 IN | HEART RATE: 77 BPM

## 2024-04-25 DIAGNOSIS — R60.0 LOWER EXTREMITY EDEMA: ICD-10-CM

## 2024-04-25 DIAGNOSIS — I48.0 PAROXYSMAL ATRIAL FIBRILLATION: Primary | ICD-10-CM

## 2024-04-25 DIAGNOSIS — R09.89 ABNORMAL LUNG SOUNDS: ICD-10-CM

## 2024-04-25 DIAGNOSIS — E78.2 MIXED HYPERLIPIDEMIA: ICD-10-CM

## 2024-04-25 DIAGNOSIS — I10 HYPERTENSION, ESSENTIAL: ICD-10-CM

## 2024-04-25 PROCEDURE — 71046 X-RAY EXAM CHEST 2 VIEWS: CPT

## 2024-04-25 RX ORDER — FUROSEMIDE 40 MG/1
40 TABLET ORAL DAILY
Qty: 90 TABLET | Refills: 3 | Status: SHIPPED | OUTPATIENT
Start: 2024-04-25

## 2024-04-25 NOTE — PROGRESS NOTES
Chief Complaint  Atrial Fibrillation, Edema, Follow-up, Shortness of Breath, and Fatigue    Subjective            Anika Casillas presents to [facility] CARDIOLOGY  History of Present Illness    Ms. Casillas is here for follow-up evaluation management of mixed hyperlipidemia, paroxysmal atrial fibrillation, essential hypertension, smoking.  She is here for early follow-up due to an increase in lower extremity edema.  She takes 20 mg of Lasix a day and is not having a good diuretic effect.  She denies shortness of breath.  Denies chest pain.  She reports a cough.  No bleeding problems on anticoagulation.    PMH  Past Medical History:   Diagnosis Date    Abnormal Pap smear of cervix     Atrial fibrillation     ON ELIQUIS/FOLLOWS SERGO    Colindres's esophagus     Depression     Diabetes mellitus     Emphysema lung     INHALER    Endometrial hyperplasia     D&C SCHEDULED 7/25/23    Esophageal varices     Fatty liver 2015    Gastroparesis     TAKES XIFAXAN    GERD (gastroesophageal reflux disease) 1995    Hyperlipidemia     Hypertension     Iron deficiency anemia 05/02/2023    IRON INFUSION LAST 7/24/23    Irritable bowel syndrome     W/CONSTIPATION    BERKOWITZ (nonalcoholic steatohepatitis)     NO S/S CURRENTLY, ELEVATED ENZYMES    Obstructive sleep apnea syndrome     Pulmonary emphysema 11/22/2021    S/P exploratory laparotomy, lysis of adhesions, resection of necrotic colon without anastomosis 04/18/2022         SURGICALHX  Past Surgical History:   Procedure Laterality Date    ABDOMINAL SURGERY  4/16/2022    BLADDER SLING MODIFIED, ANTERIOR AND POSTERIOR VAGINAL REPAIR  2018    [place]    BOTOX INJECTION      In abdomen    BREAST LUMPECTOMY Left     BENIGN    CARDIAC CATHETERIZATION      NO INTERVENTION    CHOLECYSTECTOMY  5/12/2015    COLON RESECTION      PERFORATED COLON, EEA    COLONOSCOPY N/A 08/14/2023    Procedure: COLONOSCOPY WITH POLYPECTOMY, TATTOO;  Surgeon: Shannan Charles MD;   Location: [facility] ENDOSCOPY;  Service: Gastroenterology;  Laterality: N/A;  COLON POLYPS  DIVERTICULOSIS  HEMORRHOIDS    COLONOSCOPY N/A 4/22/2024    Procedure: COLONOSCOPY COLD SNARE POLYPECTOMIES WITH CLIP PLACEMENT X3;  Surgeon: Shannan Charles MD;  Location: [facility] ENDOSCOPY;  Service: Gastroenterology;  Laterality: N/A;  DIVERTICULOSIS  COLON POLYPS    D & C HYSTEROSCOPY N/A 07/25/2023    Procedure: resection of endometrial polyp with myosure;  Surgeon: Ashleigh Harding DO;  Location: [facility] MAIN OR;  Service: Gynecology;  Laterality: N/A;    ENDOSCOPY N/A 06/03/2022    Procedure: ESOPHAGOGASTRODUODENOSCOPY;  Surgeon: Shannan Charles MD;  Location: [facility] ENDOSCOPY;  Service: Gastroenterology;  Laterality: N/A;  RETAINED FOOD IN STOMACH  ESOPHAGEAL VARICIES    ENDOSCOPY N/A 4/22/2024    Procedure: ESOPHAGOGASTRODUODENOSCOPY WITH BIOPSIES;  Surgeon: Shannan Charles MD;  Location: [facility] ENDOSCOPY;  Service: Gastroenterology;  Laterality: N/A;  ESOPHAGEAL BIOPSIES    EXPLORATORY LAPAROTOMY N/A 04/16/2022    Procedure: EXPLORATORY LAPAROTOMY, LYSIS OF ADHESIONS, RESECTION OF NECTROIC COLON;  Surgeon: Cande Stanley MD;  Location: [facility] MAIN OR;  Service: General;  Laterality: N/A;    GALLBLADDER SURGERY      LAPAROSCOPIC    LIVER BIOPSY  2021    TUBAL ABDOMINAL LIGATION      UPPER GASTROINTESTINAL ENDOSCOPY  6/3/2022        SOC  Social History     Socioeconomic History    Marital status:      Spouse name: fannie   Tobacco Use    Smoking status: Every Day     Current packs/day: 0.25     Average packs/day: 0.2 packs/day for 48.9 years (12.2 ttl pk-yrs)     Types: Cigarettes     Start date: 6/17/1975    Smokeless tobacco: Never    Tobacco comments:     INST PER ANESTHESIA PROTOCOL   Vaping Use    Vaping status: Never Used   Substance and Sexual Activity    Alcohol use: Not Currently     Alcohol/week: 1.0 standard drink of alcohol     Types: 1 Drinks containing 0.5 oz of alcohol  per week     Comment: 1 a month    Drug use: Never    Sexual activity: Yes     Partners: Male     Birth control/protection: Post-menopausal, Tubal ligation         FAMHX  Family History   Adopted: Yes   Problem Relation Age of Onset    Malig Hyperthermia Neg Hx           ALLERGY  Allergies   Allergen Reactions    Liraglutide Nausea And Vomiting    Lisinopril Other (See Comments)     Cough    Metformin Nausea Only     GI Upset        MEDSCURRENT    Current Outpatient Medications:     acetaminophen (Tylenol) 325 MG tablet, Take 2 tablets by mouth Every 4 (Four) Hours As Needed for Mild Pain., Disp: 30 tablet, Rfl: 1    albuterol sulfate  (90 Base) MCG/ACT inhaler, Inhale 2 puffs Every 4 (Four) Hours As Needed for Shortness of Air or Wheezing., Disp: 1 g, Rfl: 5    amLODIPine (NORVASC) 10 MG tablet, Take 1 tablet by mouth Daily., Disp: 90 tablet, Rfl: 3    apixaban (ELIQUIS) 5 MG tablet tablet, Take 1 tablet by mouth 2 (Two) Times a Day., Disp: 180 tablet, Rfl: 3    B-D UF III MINI PEN NEEDLES 31G X 5 MM misc, , Disp: , Rfl:     Blood Glucose Monitoring Suppl (Accu-Chek Guide) w/Device kit, , Disp: , Rfl:     buPROPion XL (WELLBUTRIN XL) 150 MG 24 hr tablet, Take 1 tablet by mouth 2 (Two) Times a Day., Disp: 180 tablet, Rfl: 1    Cholecalciferol (Vitamin D3) 50 MCG (2000 UT) capsule, Take 3 capsules by mouth Daily. LAST DOSE 7/21/23, Disp: , Rfl:     Continuous Blood Gluc Sensor (Dexcom G6 Sensor), APPLY 1 EACH TOPICALLY EVERY 10 (TEN) DAYS., Disp: , Rfl:     Continuous Blood Gluc Transmit (Dexcom G6 Transmitter) misc, USE FOR 90 DAYS., Disp: , Rfl:     empagliflozin (JARDIANCE) 25 MG tablet tablet, Take 1 tablet by mouth Daily., Disp: , Rfl:     escitalopram (Lexapro) 20 MG tablet, Take 1 tablet by mouth Daily., Disp: 90 tablet, Rfl: 1    Evolocumab (Repatha) solution prefilled syringe injection, Inject 1 mL under the skin into the appropriate area as directed Every 14 (Fourteen) Days., Disp: 6 each, Rfl: 3     famotidine (PEPCID) 40 MG tablet, Take 1 tablet by mouth At Night As Needed for Heartburn., Disp: 90 tablet, Rfl: 3    Fluticasone-Umeclidin-Vilant (TRELEGY) 100-62.5-25 MCG/ACT inhaler, Inhale 1 puff Daily., Disp: 1 each, Rfl: 5    furosemide (LASIX) 40 MG tablet, Take 1 tablet by mouth Daily., Disp: 90 tablet, Rfl: 3    glucose blood test strip, Check blood glucose twice daily, Disp: 60 each, Rfl: 12    glucose monitor monitoring kit, 1 each Daily. Patient prefers Accu Chek Meter., Disp: 1 each, Rfl: 0    icosapent ethyl (VASCEPA) 1 g capsule capsule, Take 2 g by mouth 2 (Two) Times a Day With Meals., Disp: 360 capsule, Rfl: 1    Insulin Glargine, 2 Unit Dial, (Toujeo Max SoloStar) 300 UNIT/ML solution pen-injector injection, Inject 122 Units under the skin into the appropriate area as directed Every Morning. INST PER ANESTHESIA PROTOCOL, Disp: , Rfl:     Lancets (accu-chek soft touch) lancets, Check blood sugar twice daily, Disp: 100 each, Rfl: 12    metoprolol tartrate (LOPRESSOR) 50 MG tablet, TAKE 1 AND 1/2 TABLETS BY MOUTH TWICE DAILY, Disp: 270 tablet, Rfl: 1    Motegrity 2 MG tablet, Take 1 tablet by mouth Daily., Disp: 90 tablet, Rfl: 3    Mounjaro 2.5 MG/0.5ML solution pen-injector pen, Inject 0.5 mL under the skin into the appropriate area as directed 1 (One) Time Per Week., Disp: , Rfl:     multivitamin with minerals tablet tablet, Take 1 tablet by mouth Daily. LAST DOSE 7/21/23, Disp: , Rfl:     NovoLOG FlexPen 100 UNIT/ML solution pen-injector sc pen, Inject 60 Units under the skin into the appropriate area as directed 2 (Two) Times a Day. PER SLIDING SCALE INST PER ANESTHESIA PROTOCOL, Disp: , Rfl:     olmesartan (BENICAR) 20 MG tablet, TAKE 1 TABLET BY MOUTH EVERY DAY, Disp: 90 tablet, Rfl: 1    pantoprazole (PROTONIX) 40 MG EC tablet, Take 1 tablet by mouth Daily., Disp: 90 tablet, Rfl: 3    potassium chloride 10 MEQ CR tablet, Take 1 tablet by mouth Daily., Disp: 90 tablet, Rfl: 3    riFAXIMin  "(Xifaxan) 550 MG tablet, Take 1 tablet by mouth Every 12 (Twelve) Hours., Disp: 180 tablet, Rfl: 3    Vibegron (Gemtesa) 75 MG tablet, Take 1 tablet by mouth Daily., Disp: , Rfl:       Review of Systems   Cardiovascular:  Positive for leg swelling. Negative for chest pain, dyspnea on exertion, orthopnea, palpitations and syncope.   Respiratory:  Positive for cough.         Objective     /70   Pulse 77   Ht 165.1 cm (65\")   Wt 108 kg (237 lb)   BMI 39.44 kg/m²       General Appearance:   well developed  well nourished  HENT:   oropharynx moist  lips not cyanotic  Neck:  thyroid not enlarged  supple  Respiratory:  no respiratory distress  Rhonchi bilaterally  no rales  Cardiovascular:  no jugular venous distention  regular rhythm  apical impulse normal  S1 normal, S2 normal  no S3, no S4   no murmur  no rub, no thrill  carotid pulses normal; no bruit  pedal pulses normal  lower extremity edema: 2+ BLE  Musculoskeletal:  no clubbing of fingers.   normocephalic, head atraumatic  Skin:   warm, dry  Psychiatric:  judgement and insight appropriate  normal mood and affect      Result Review :     The following data was reviewed by: CHU Gayle on 04/25/2024:    CMP          9/5/2023    14:11 11/14/2023    11:54 2/29/2024    12:03   CMP   Glucose 148  364  82    BUN 9  11  17    Creatinine 0.88  0.95  0.92    EGFR 74.9  68.3  71.0    Sodium 138  139  142    Potassium 4.1  4.2  4.4    Chloride 104  106  108    Calcium 9.6  9.6  10.0    Total Protein 9.0   8.8    Albumin 4.1  3.8  4.0    Globulin 4.9   4.8    Total Bilirubin 0.4   0.3    Alkaline Phosphatase 120   133    AST (SGOT) 83   95    ALT (SGPT) 76   87    Albumin/Globulin Ratio 0.8   0.8    BUN/Creatinine Ratio 10.2  11.6  18.5    Anion Gap 12.6  12.2  12.0      CBC          11/1/2023    09:17 11/14/2023    11:54 2/13/2024    14:00   CBC   WBC 7.22  8.01  7.29    RBC 4.24  4.25  4.34    Hemoglobin 12.5  12.7  12.3    Hematocrit 35.9  37.2  " 36.5    MCV 84.7  87.5  84.1    MCH 29.5  29.9  28.3    MCHC 34.8  34.1  33.7    RDW 17.9  16.4  16.5    Platelets 243  241  273      Lipid Panel          2/13/2024    14:00   Lipid Panel   Total Cholesterol 130    Triglycerides 100    HDL Cholesterol 45    VLDL Cholesterol 19    LDL Cholesterol  66    LDL/HDL Ratio 1.44      TSH          2/29/2024    12:03   TSH   TSH 2.570        Data reviewed : Cardiology studies previous echocardiogram reviewed normal LV systolic function, LVH with mild diastolic dysfunction.  CT of the chest reviewed from February, no acute processes.      Procedures      Anika Casillas  reports that she has been smoking cigarettes. She started smoking about 48 years ago. She has a 12.2 pack-year smoking history. She has never used smokeless tobacco. I have educated her on the risk of diseases from using tobacco products such as cancer, COPD, and heart disease.     I advised her to quit and she is not willing to quit.    I spent 3  minutes counseling the patient.                Assessment and Plan        ASSESSMENT:  Encounter Diagnoses   Name Primary?    Paroxysmal atrial fibrillation Yes    Hypertension, essential     Mixed hyperlipidemia     Lower extremity edema     Abnormal lung sounds          PLAN:    1.  Lower extremity edema-increase Lasix to 40 mg a day.  Check a BMP in 2 weeks.  Continue with sodium restriction and compression socks.  Normal LV function on echo from last year.  Mild diastolic dysfunction.  2.  She does have abnormal lung sounds on exam today and a cough-will check a chest x-ray.  3.  Paroxysmal atrial fibrillation-clinically maintaining sinus rhythm.  Continue beta-blocker therapy and anticoagulation.  4.  Mixed hyperlipidemia stable.    Follow-up as scheduled in December.  Will call with results once available.      Patient was given instructions and counseling regarding her condition or for health maintenance advice. Please see specific information pulled into the  AVS if appropriate.           Maryann Zamorano, APRN   4/25/2024  10:28 EDT

## 2024-04-26 ENCOUNTER — TELEPHONE (OUTPATIENT)
Dept: GASTROENTEROLOGY | Facility: CLINIC | Age: 64
End: 2024-04-26
Payer: COMMERCIAL

## 2024-04-26 NOTE — TELEPHONE ENCOUNTER
Patient was notified of her results and recommendations. EGD & Colonoscopy recalls paced, care gaps updated, and letters sent. Patient verbalized understanding.

## 2024-04-26 NOTE — TELEPHONE ENCOUNTER
----- Message from Renu JULIAN sent at 4/23/2024  2:34 PM EDT -----  Colon biopsies benign.  Recommend repeat colonoscopy in 6 months due to piecemeal polyp removal.  EGD biopsies positive for gastritis.  No evidence of Colindres's esophagus this time.  Recall EGD in 2 years for surveillance of varices.  Please send letter to patient and PCP.

## 2024-05-01 ENCOUNTER — OFFICE VISIT (OUTPATIENT)
Dept: PSYCHIATRY | Facility: CLINIC | Age: 64
End: 2024-05-01
Payer: COMMERCIAL

## 2024-05-01 VITALS
WEIGHT: 240.8 LBS | DIASTOLIC BLOOD PRESSURE: 72 MMHG | BODY MASS INDEX: 40.12 KG/M2 | HEART RATE: 89 BPM | SYSTOLIC BLOOD PRESSURE: 145 MMHG | HEIGHT: 65 IN

## 2024-05-01 DIAGNOSIS — F41.1 GENERALIZED ANXIETY DISORDER: ICD-10-CM

## 2024-05-01 DIAGNOSIS — F33.1 MAJOR DEPRESSIVE DISORDER, RECURRENT EPISODE, MODERATE: Primary | ICD-10-CM

## 2024-05-01 DIAGNOSIS — F51.05 INSOMNIA DUE TO MENTAL CONDITION: ICD-10-CM

## 2024-05-01 RX ORDER — BUPROPION HYDROCHLORIDE 150 MG/1
300 TABLET ORAL DAILY
COMMUNITY

## 2024-05-01 RX ORDER — DULOXETIN HYDROCHLORIDE 30 MG/1
30 CAPSULE, DELAYED RELEASE ORAL DAILY
Qty: 30 CAPSULE | Refills: 2 | Status: SHIPPED | OUTPATIENT
Start: 2024-05-01

## 2024-05-01 NOTE — TREATMENT PLAN
Multi-Disciplinary Problems (from Behavioral Health Treatment Plan)      Active Problems       Problem: Anxiety  Start Date: 05/01/24      Problem Details: The patient self-scales this problem as a 7 with 10 being the worst.          Goal Priority Start Date Expected End Date End Date    Patient will develop and implement behavioral and cognitive strategies to reduce anxiety and irrational fears. -- 05/01/24 -- --    Goal Details: Progress toward goal:  Not appropriate to rate progress toward goal since this is the initial treatment plan.          Goal Intervention Frequency Start Date End Date    Help patient explore past emotional issues in relation to present anxiety. Q Month 05/01/24 --    Intervention Details: Duration of treatment until until remission of symptoms.          Goal Intervention Frequency Start Date End Date    Help patient develop an awareness of their cognitive and physical responses to anxiety. Q Month 05/01/24 --    Intervention Details: Duration of treatment until until remission of symptoms.                  Problem: Depression  Start Date: 05/01/24      Problem Details: The patient self-scales this problem as a 8 with 10 being the worst.          Goal Priority Start Date Expected End Date End Date    Patient will demonstrate the ability to initiate new constructive life skills outside of sessions on a consistent basis. -- 05/01/24 -- --    Goal Details: Progress toward goal:  Not appropriate to rate progress toward goal since this is the initial treatment plan.          Goal Intervention Frequency Start Date End Date    Assist patient in setting attainable activities of daily living goals. PRN 05/01/24 --      Goal Intervention Frequency Start Date End Date    Provide education about depression Q Month 05/01/24 --    Intervention Details: Duration of treatment until until remission of symptoms.          Goal Intervention Frequency Start Date End Date    Assist patient in developing healthy  coping strategies. Q Month 05/01/24 --    Intervention Details: Duration of treatment until until remission of symptoms.                          Reviewed By       Demetrius Calvert MD 05/01/24 1370                     I have discussed and reviewed this treatment plan with the patient.

## 2024-05-01 NOTE — PROGRESS NOTES
"Subjective   Anika Casillas is a 63 y.o. female who presents today for initial evaluation     Referring Provider:  Elena Henderson PA  [address]  [address]  [place] [address]    Chief Complaint:  depression    History of Present Illness:     2024: INITIAL VISIT Chart review:     Shabbir: Old prescription for hydrocodone in 2023  Care Everywhere: a few non behavioral health notes, nephrology, urged incontinence, nonalcoholic cirrhosis, chronic kidney disease    Psychotropic medication chart review:  Present:  Wellbutrin  mg a day  Lexapro 20 mg a day    Previously:  None    EK: Rate 88, sinus, QTc 456  Procedures: Recent colonoscopy and upper GI  Head imagin: MRI of the brain shows no acute, 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   Per  note by neurology, this abnormality is benign and incidental.  Labs: 2024: CMP shows elevated alk phos 133, mildly elevated AST 95, ALT 87, reassuring thyroid studies, B12, folate, vitamin D  Initial Chart Review Notes: Sent to us in February by primary care for memory loss, fatigue, poor concentration.  Neuropsychological evaluation diagnosed her with mild cognitive impairment, monitor for progressive dementia, vascular AD, or mixed type, metabolic encephalopathy.  Recently given corrective action at work.  Concerned with her job performance.  She is on CPAP.  Seen this past year by sleep medicine.  She wants to take time off.  Depression.      Patient Psychotherapy Notes:  Patient goals:  Misc:  Nonalcoholic cirrhosis  Chronic kidney disease  Type 2 diabetes      Chart Review By Dates:      VISITS/APPOINTMENTS (BELOW):    \"Anika\"    2024: In person.  Interview:  His/Her Story: \"I made a mistake... 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  \"This really bothers me.\"  It's been slowly " progressing. I have to think these days.  Lexapro for a few months.  I've been on antidepressants all my life.  Son dx'd with ADHD at 8 yo (Imtiaz), FOB is not , unsure if FOB has dx of ADHD  I didn't know my parents  Insomnia: sometimes won't sleep at all, other times sleeps too much  Depression/Mood:  Depressed mood, anhedonia, hopelessness or guilt, poor energy, poor concentration, insomnia.  Seasonal pattern:  Severity: Moderate  Duration: all of life  Anxiety:  Uncontrolled worrying, muscle tension, fatigue, poor concentration, feeling on edge or restless, irritability, insomnia.  Severity: Moderate  Duration: all of life  Panic attacks: one in her life 40's  ADHD:   Elementary school:   Grades? good  Special classes or failures? denies  Got in trouble? no  Referral for ADHD testing? no  Fhx: Son  Psych ROS: Positive for depression, anxiety.  Negative for psychosis and shamika.  PTSD: def  No SI HI AVH.  Medication compliant: y    Access to Firearms: denies    PHQ-9 Depression Screening  PHQ-9 Total Score: 21    Little interest or pleasure in doing things? 3-->nearly every day   Feeling down, depressed, or hopeless? 3-->nearly every day   Trouble falling or staying asleep, or sleeping too much? 3-->nearly every day   Feeling tired or having little energy? 3-->nearly every day   Poor appetite or overeating? 3-->nearly every day   Feeling bad about yourself - or that you are a failure or have let yourself or your family down? 3-->nearly every day   Trouble concentrating on things, such as reading the newspaper or watching television? 2-->more than half the days   Moving or speaking so slowly that other people could have noticed? Or the opposite - being so fidgety or restless that you have been moving around a lot more than usual? 0-->not at all   Thoughts that you would be better off dead, or of hurting yourself in some way? 1-->several days   PHQ-9 Total Score 21     KERRY-7  Feeling nervous, anxious or on  edge: Nearly every day  Not being able to stop or control worrying: Nearly every day  Worrying too much about different things: Nearly every day  Trouble Relaxing: Nearly every day  Being so restless that it is hard to sit still: Not at all  Feeling afraid as if something awful might happen: More than half the days  Becoming easily annoyed or irritable: Not at all  KERRY 7 Total Score: 14  If you checked any problems, how difficult have these problems made it for you to do your work, take care of things at home, or get along with other people: Very difficult    Past Surgical History:  Past Surgical History:   Procedure Laterality Date    ABDOMINAL SURGERY  4/16/2022    BLADDER SLING MODIFIED, ANTERIOR AND POSTERIOR VAGINAL REPAIR  2018    [place]    BOTOX INJECTION      In abdomen    BREAST LUMPECTOMY Left     BENIGN    CARDIAC CATHETERIZATION      NO INTERVENTION    CHOLECYSTECTOMY  5/12/2015    COLON RESECTION      PERFORATED COLON, EEA    COLONOSCOPY N/A 08/14/2023    Procedure: COLONOSCOPY WITH POLYPECTOMY, TATTOO;  Surgeon: Shannan Charles MD;  Location: [facility] ENDOSCOPY;  Service: Gastroenterology;  Laterality: N/A;  COLON POLYPS  DIVERTICULOSIS  HEMORRHOIDS    COLONOSCOPY N/A 4/22/2024    Procedure: COLONOSCOPY COLD SNARE POLYPECTOMIES WITH CLIP PLACEMENT X3;  Surgeon: Shannan Charles MD;  Location: [facility] ENDOSCOPY;  Service: Gastroenterology;  Laterality: N/A;  DIVERTICULOSIS  COLON POLYPS    D & C HYSTEROSCOPY N/A 07/25/2023    Procedure: resection of endometrial polyp with myosure;  Surgeon: Ashleigh Harding DO;  Location: [facility] MAIN OR;  Service: Gynecology;  Laterality: N/A;    ENDOSCOPY N/A 06/03/2022    Procedure: ESOPHAGOGASTRODUODENOSCOPY;  Surgeon: Shannan Charles MD;  Location: [facility] ENDOSCOPY;  Service: Gastroenterology;  Laterality: N/A;  RETAINED FOOD IN STOMACH  ESOPHAGEAL VARICIES    ENDOSCOPY N/A 4/22/2024    Procedure: ESOPHAGOGASTRODUODENOSCOPY WITH  BIOPSIES;  Surgeon: Shannan Charles MD;  Location: [facility] ENDOSCOPY;  Service: Gastroenterology;  Laterality: N/A;  ESOPHAGEAL BIOPSIES    EXPLORATORY LAPAROTOMY N/A 04/16/2022    Procedure: EXPLORATORY LAPAROTOMY, LYSIS OF ADHESIONS, RESECTION OF NECTROIC COLON;  Surgeon: Cande Stanley MD;  Location: [facility] MAIN OR;  Service: General;  Laterality: N/A;    GALLBLADDER SURGERY      LAPAROSCOPIC    LIVER BIOPSY  2021    TUBAL ABDOMINAL LIGATION      UPPER GASTROINTESTINAL ENDOSCOPY  6/3/2022       Problem List:  Patient Active Problem List   Diagnosis    Diabetic gastroparesis    Diverticulosis of colon    Elevated transaminase level    Essential hypertension    Gastroesophageal reflux disease without esophagitis    Hx of colonic polyp    Hypothyroidism    Major depressive disorder with single episode, in full remission    Mixed hyperlipidemia    BERKOWITZ (nonalcoholic steatohepatitis)    Class 2 severe obesity due to excess calories with serious comorbidity and body mass index (BMI) of 36.0 to 36.9 in adult    OAB (overactive bladder)    Paroxysmal atrial fibrillation    Tobacco abuse    Type 2 diabetes mellitus with hyperglycemia, with long-term current use of insulin    Pulmonary emphysema    Colindres's esophagus without dysplasia    Secondary esophageal varices without bleeding    Encounter for long-term (current) use of insulin    Hyperinsulinism    Uncontrolled type 2 diabetes mellitus with neurologic complication    Class 2 obesity    Chronic gastritis without bleeding, unspecified gastritis type    Pneumoperitoneum of unknown etiology    S/P exploratory laparotomy, lysis of adhesions, resection of necrotic colon without anastomosis    Memory loss    RUKHSANA on CPAP    Abnormal finding on MRI of brain    Iron deficiency anemia    Non-alcoholic cirrhosis    Depression    Encephalopathy, portal systemic    Esophageal varices in cirrhosis    Alkaline phosphatase raised    Iron deficiency    Other specified  postprocedural states    Endometrial hyperplasia    Iron malabsorption    Endometrial polyp    Localized edema    Simple renal cyst    Esophageal varices without bleeding    Chronic kidney disease, stage 2 (mild)    Obesity (BMI 35.0-39.9 without comorbidity)    Cirrhosis of liver without ascites    Hepatic encephalopathy    History of colon polyps       Allergy:   Allergies   Allergen Reactions    Liraglutide Nausea And Vomiting    Lisinopril Other (See Comments)     Cough    Metformin Nausea Only     GI Upset        Discontinued Medications:  Medications Discontinued During This Encounter   Medication Reason    buPROPion XL (WELLBUTRIN XL) 150 MG 24 hr tablet Duplicate order    escitalopram (Lexapro) 20 MG tablet Not Efficacious       Current Medications:   Current Outpatient Medications   Medication Sig Dispense Refill    acetaminophen (Tylenol) 325 MG tablet Take 2 tablets by mouth Every 4 (Four) Hours As Needed for Mild Pain. 30 tablet 1    albuterol sulfate  (90 Base) MCG/ACT inhaler Inhale 2 puffs Every 4 (Four) Hours As Needed for Shortness of Air or Wheezing. 1 g 5    amLODIPine (NORVASC) 10 MG tablet Take 1 tablet by mouth Daily. 90 tablet 3    apixaban (ELIQUIS) 5 MG tablet tablet Take 1 tablet by mouth 2 (Two) Times a Day. 180 tablet 3    B-D UF III MINI PEN NEEDLES 31G X 5 MM misc       Blood Glucose Monitoring Suppl (Accu-Chek Guide) w/Device kit       Cholecalciferol (Vitamin D3) 50 MCG (2000 UT) capsule Take 3 capsules by mouth Daily. LAST DOSE 7/21/23      Continuous Blood Gluc Sensor (Dexcom G6 Sensor) APPLY 1 EACH TOPICALLY EVERY 10 (TEN) DAYS.      Continuous Blood Gluc Transmit (Dexcom G6 Transmitter) misc USE FOR 90 DAYS.      Evolocumab (Repatha) solution prefilled syringe injection Inject 1 mL under the skin into the appropriate area as directed Every 14 (Fourteen) Days. 6 each 3    famotidine (PEPCID) 40 MG tablet Take 1 tablet by mouth At Night As Needed for Heartburn. 90 tablet 3     Fluticasone-Umeclidin-Vilant (TRELEGY) 100-62.5-25 MCG/ACT inhaler Inhale 1 puff Daily. 1 each 5    furosemide (LASIX) 40 MG tablet Take 1 tablet by mouth Daily. 90 tablet 3    glucose blood test strip Check blood glucose twice daily 60 each 12    glucose monitor monitoring kit 1 each Daily. Patient prefers Accu Chek Meter. 1 each 0    icosapent ethyl (VASCEPA) 1 g capsule capsule Take 2 g by mouth 2 (Two) Times a Day With Meals. 360 capsule 1    Insulin Glargine, 2 Unit Dial, (Toujeo Max SoloStar) 300 UNIT/ML solution pen-injector injection Inject 122 Units under the skin into the appropriate area as directed Every Morning. INST PER ANESTHESIA PROTOCOL      Lancets (accu-chek soft touch) lancets Check blood sugar twice daily 100 each 12    metoprolol tartrate (LOPRESSOR) 50 MG tablet TAKE 1 AND 1/2 TABLETS BY MOUTH TWICE DAILY 270 tablet 1    Motegrity 2 MG tablet Take 1 tablet by mouth Daily. 90 tablet 3    Mounjaro 2.5 MG/0.5ML solution pen-injector pen Inject 0.5 mL under the skin into the appropriate area as directed 1 (One) Time Per Week.      multivitamin with minerals tablet tablet Take 1 tablet by mouth Daily. LAST DOSE 7/21/23      NovoLOG FlexPen 100 UNIT/ML solution pen-injector sc pen Inject 60 Units under the skin into the appropriate area as directed 2 (Two) Times a Day. PER SLIDING SCALE  INST PER ANESTHESIA PROTOCOL      olmesartan (BENICAR) 20 MG tablet TAKE 1 TABLET BY MOUTH EVERY DAY 90 tablet 1    pantoprazole (PROTONIX) 40 MG EC tablet Take 1 tablet by mouth Daily. 90 tablet 3    potassium chloride 10 MEQ CR tablet Take 1 tablet by mouth Daily. 90 tablet 3    riFAXIMin (Xifaxan) 550 MG tablet Take 1 tablet by mouth Every 12 (Twelve) Hours. 180 tablet 3    Vibegron (Gemtesa) 75 MG tablet Take 1 tablet by mouth Daily.      buPROPion XL (WELLBUTRIN XL) 150 MG 24 hr tablet Take 2 tablets by mouth Daily. Two Xls in the morning.      DULoxetine (CYMBALTA) 30 MG capsule Take 1 capsule by mouth Daily.  30 capsule 2    empagliflozin (JARDIANCE) 25 MG tablet tablet Take 1 tablet by mouth Daily. (Patient not taking: Reported on 2024)       No current facility-administered medications for this visit.       Past Medical History:  Past Medical History:   Diagnosis Date    Abnormal Pap smear of cervix     Atrial fibrillation     ON ELIQUIS/FOLLOWS TROTTER    Colindres's esophagus     Depression     Diabetes mellitus     Emphysema lung     INHALER    Endometrial hyperplasia     D&C SCHEDULED 23    Esophageal varices     Fatty liver     Gastroparesis     TAKES XIFAXAN    GERD (gastroesophageal reflux disease)     Hyperlipidemia     Hypertension     Iron deficiency anemia 2023    IRON INFUSION LAST 23    Irritable bowel syndrome     W/CONSTIPATION    BERKOWITZ (nonalcoholic steatohepatitis)     NO S/S CURRENTLY, ELEVATED ENZYMES    Obstructive sleep apnea syndrome     Pulmonary emphysema 2021    S/P exploratory laparotomy, lysis of adhesions, resection of necrotic colon without anastomosis 2022       Past Psychiatric History:  Began Treatment: all my life, 20's  Diagnoses: MDD KERRY  Psychiatrist: 20's, then in 40's  Therapist:Denies  Admission History:Denies  Medication Trials:    Celexa worked for a while, then stopped    lexapro, bupropion    Cymbalta, might have worked    Self Harm: Denies  Suicide Attempts:Denies   Psychosis, Anxiety, Depression: denies    Substance Abuse History:   Types: 1/4 ppd, alcohol very rarely  Withdrawal Symptoms:Denies  Longest Period Sober:Not Applicable   AA: Not applicable     Social History:  Martial Status:  Employed:No  Kids:Yes  House:Lives in a house   History: Denies    Social History     Socioeconomic History    Marital status:      Spouse name: fannie   Tobacco Use    Smoking status: Every Day     Current packs/day: 0.25     Average packs/day: 0.2 packs/day for 48.9 years (12.2 ttl pk-yrs)     Types: Cigarettes     Start  "date: 6/17/1975    Smokeless tobacco: Never    Tobacco comments:     INST PER ANESTHESIA PROTOCOL   Vaping Use    Vaping status: Never Used   Substance and Sexual Activity    Alcohol use: Not Currently     Alcohol/week: 1.0 standard drink of alcohol     Types: 1 Drinks containing 0.5 oz of alcohol per week     Comment: 1 a month    Drug use: Never    Sexual activity: Yes     Partners: Male     Birth control/protection: Post-menopausal, Tubal ligation       Family History:   Suicide Attempts: Denies  Suicide Completions:Denies      Family History   Adopted: Yes   Problem Relation Age of Onset    No Known Problems Mother     No Known Problems Father     No Known Problems Sister     No Known Problems Brother     No Known Problems Maternal Aunt     No Known Problems Paternal Aunt     No Known Problems Maternal Uncle     No Known Problems Paternal Uncle     No Known Problems Maternal Grandfather     No Known Problems Maternal Grandmother     No Known Problems Paternal Grandfather     No Known Problems Paternal Grandmother     No Known Problems Cousin     No Known Problems Other     ADD / ADHD Son     Malig Hyperthermia Neg Hx     Alcohol abuse Neg Hx     Anxiety disorder Neg Hx     Bipolar disorder Neg Hx     Dementia Neg Hx     Depression Neg Hx     Drug abuse Neg Hx     OCD Neg Hx     Paranoid behavior Neg Hx     Schizophrenia Neg Hx     Seizures Neg Hx     Self-Injurious Behavior  Neg Hx     Suicide Attempts Neg Hx        Developmental History:       Childhood: denies  High School:Completed  College: Nurse    Mental Status Exam  Appearance  : groomed, good eye contact, normal street clothes  Behavior  : pleasant and cooperative  Motor  : No abnormal  Speech  :normal rhythm, rate, volume, tone, not hyperverbal, not pressured, normal prosidy  Mood  : \"I'm depressed, not really anxious\"  Affect  : mild depression, mood congruent, good variability  Thought Content  : negative suicidal ideations, negative homicidal " "ideations, negative obsessions  Perceptions  : negative auditory hallucinations, negative visual hallucinations  Thought Process  : linear  Insight/Judgement  : Fair/fair  Cognition  : grossly intact  Attention   : intact      Review of Systems:  Review of Systems   Constitutional:  Negative for diaphoresis and fatigue.   HENT:  Negative for drooling.    Eyes:  Negative for visual disturbance.   Respiratory:  Negative for cough and shortness of breath.    Cardiovascular:  Positive for leg swelling. Negative for chest pain and palpitations.   Gastrointestinal:  Negative for nausea and vomiting.   Endocrine: Negative for cold intolerance and heat intolerance.   Genitourinary:  Negative for difficulty urinating.   Musculoskeletal:  Negative for joint swelling.   Allergic/Immunologic: Negative for immunocompromised state.   Neurological:  Negative for dizziness, seizures, speech difficulty and numbness.       Physical Exam:  Physical Exam    Vital Signs:   /72   Pulse 89   Ht 165.1 cm (65\")   Wt 109 kg (240 lb 12.8 oz)   BMI 40.07 kg/m²      Lab Results:   Admission on 04/22/2024, Discharged on 04/22/2024   Component Date Value Ref Range Status    Glucose 04/22/2024 172 (H)  70 - 99 mg/dL Final    Serial Number: [id number]:  [id number]    Case Report 04/22/2024    Final                    Value:Surgical Pathology Report                         Case: [id number]                                  Authorizing Provider:  Shannan Charles MD Collected:           04/22/2024 12:19 PM          Ordering Location:     [facility] Received:            04/22/2024 01:10 PM                                 SUITES                                                                       Pathologist:           Gregory Cedillo MD                                                            Specimens:   1) - Large Intestine, Right / Ascending Colon, Ascending Colon Polyps                               2) - " Large Intestine, Left / Descending Colon, Descending Colon Polyps                              3) - Large Intestine, Sigmoid Colon, Sigmoid Colon Polyps                                           4) - Gastric, Antrum, Antrum Biopsies                                                      Clinical Information 04/22/2024    Final                    Value:This result contains rich text formatting which cannot be displayed here.    Final Diagnosis 04/22/2024    Final                    Value:This result contains rich text formatting which cannot be displayed here.    Gross Description 04/22/2024    Final                    Value:This result contains rich text formatting which cannot be displayed here.    Microscopic Description 04/22/2024    Final                    Value:This result contains rich text formatting which cannot be displayed here.   Lab on 02/29/2024   Component Date Value Ref Range Status    Glucose 02/29/2024 82  65 - 99 mg/dL Final    BUN 02/29/2024 17  8 - 23 mg/dL Final    Creatinine 02/29/2024 0.92  0.57 - 1.00 mg/dL Final    Sodium 02/29/2024 142  136 - 145 mmol/L Final    Potassium 02/29/2024 4.4  3.5 - 5.2 mmol/L Final    Chloride 02/29/2024 108 (H)  98 - 107 mmol/L Final    CO2 02/29/2024 22.0  22.0 - 29.0 mmol/L Final    Calcium 02/29/2024 10.0  8.6 - 10.5 mg/dL Final    Total Protein 02/29/2024 8.8 (H)  6.0 - 8.5 g/dL Final    Albumin 02/29/2024 4.0  3.5 - 5.2 g/dL Final    ALT (SGPT) 02/29/2024 87 (H)  1 - 33 U/L Final    AST (SGOT) 02/29/2024 95 (H)  1 - 32 U/L Final    Alkaline Phosphatase 02/29/2024 133 (H)  39 - 117 U/L Final    Total Bilirubin 02/29/2024 0.3  0.0 - 1.2 mg/dL Final    Globulin 02/29/2024 4.8  gm/dL Final    A/G Ratio 02/29/2024 0.8  g/dL Final    BUN/Creatinine Ratio 02/29/2024 18.5  7.0 - 25.0 Final    Anion Gap 02/29/2024 12.0  5.0 - 15.0 mmol/L Final    eGFR 02/29/2024 71.0  >60.0 mL/min/1.73 Final    TSH 02/29/2024 2.570  0.270 - 4.200 uIU/mL Final    Free T4 02/29/2024  0.97  0.93 - 1.70 ng/dL Final    T4 results may be falsely increased if patient taking Biotin.    Vitamin B-12 02/29/2024 1,669 (H)  211 - 946 pg/mL Final    Folate 02/29/2024 >20.00  4.78 - 24.20 ng/mL Final    25 Hydroxy, Vitamin D 02/29/2024 41.1  30.0 - 100.0 ng/ml Final   Office Visit on 02/21/2024   Component Date Value Ref Range Status    Hemoglobin A1C 02/21/2024 9.9 (A)  4.5 - 5.7 % Final    Lot Number 02/21/2024 [id number]   Final    Expiration Date 02/21/2024 07/30/2025   Final   Lab on 02/19/2024   Component Date Value Ref Range Status    Urine Culture 02/19/2024 No growth   Final   Lab on 02/13/2024   Component Date Value Ref Range Status    WBC 02/13/2024 7.29  3.40 - 10.80 10*3/mm3 Final    RBC 02/13/2024 4.34  3.77 - 5.28 10*6/mm3 Final    Hemoglobin 02/13/2024 12.3  12.0 - 15.9 g/dL Final    Hematocrit 02/13/2024 36.5  34.0 - 46.6 % Final    MCV 02/13/2024 84.1  79.0 - 97.0 fL Final    MCH 02/13/2024 28.3  26.6 - 33.0 pg Final    MCHC 02/13/2024 33.7  31.5 - 35.7 g/dL Final    RDW 02/13/2024 16.5 (H)  12.3 - 15.4 % Final    RDW-SD 02/13/2024 49.7  37.0 - 54.0 fl Final    MPV 02/13/2024 11.1  6.0 - 12.0 fL Final    Platelets 02/13/2024 273  140 - 450 10*3/mm3 Final    Neutrophil % 02/13/2024 46.9  42.7 - 76.0 % Final    Lymphocyte % 02/13/2024 41.4  19.6 - 45.3 % Final    Monocyte % 02/13/2024 8.8  5.0 - 12.0 % Final    Eosinophil % 02/13/2024 1.8  0.3 - 6.2 % Final    Basophil % 02/13/2024 0.8  0.0 - 1.5 % Final    Immature Grans % 02/13/2024 0.3  0.0 - 0.5 % Final    Neutrophils, Absolute 02/13/2024 3.42  1.70 - 7.00 10*3/mm3 Final    Lymphocytes, Absolute 02/13/2024 3.02  0.70 - 3.10 10*3/mm3 Final    Monocytes, Absolute 02/13/2024 0.64  0.10 - 0.90 10*3/mm3 Final    Eosinophils, Absolute 02/13/2024 0.13  0.00 - 0.40 10*3/mm3 Final    Basophils, Absolute 02/13/2024 0.06  0.00 - 0.20 10*3/mm3 Final    Immature Grans, Absolute 02/13/2024 0.02  0.00 - 0.05 10*3/mm3 Final    nRBC 02/13/2024 0.1  0.0 -  0.2 /100 WBC Final   Lab on 02/13/2024   Component Date Value Ref Range Status    Total Cholesterol 02/13/2024 130  0 - 200 mg/dL Final    Triglycerides 02/13/2024 100  0 - 150 mg/dL Final    HDL Cholesterol 02/13/2024 45  40 - 60 mg/dL Final    LDL Cholesterol  02/13/2024 66  0 - 100 mg/dL Final    VLDL Cholesterol 02/13/2024 19  5 - 40 mg/dL Final    LDL/HDL Ratio 02/13/2024 1.44   Final    Ferritin 02/13/2024 55.38  13.00 - 150.00 ng/mL Final    Iron 02/13/2024 63  37 - 145 mcg/dL Final    Iron Saturation (TSAT) 02/13/2024 14 (L)  20 - 50 % Final    Transferrin 02/13/2024 305  200 - 360 mg/dL Final    TIBC 02/13/2024 454  298 - 536 mcg/dL Final   Office Visit on 01/18/2024   Component Date Value Ref Range Status    GARDNERELLA VAGINALIS 01/18/2024 Positive (A)  Negative Final    TRICHOMONAS VAGINALIS 01/18/2024 Negative  Negative Final    ANSON SPECIES 01/18/2024 Positive (A)  Negative Final    Color, UA 01/18/2024 Yellow  Yellow, Straw Final    Appearance, UA 01/18/2024 Clear  Clear Final    pH, UA 01/18/2024 6.0  5.0 - 8.0 Final    Specific Gravity, UA 01/18/2024 >=1.030  1.005 - 1.030 Final    Glucose, UA 01/18/2024 >=1000 mg/dL (3+) (A)  Negative Final    Ketones, UA 01/18/2024 Negative  Negative Final    Bilirubin, UA 01/18/2024 Negative  Negative Final    Blood, UA 01/18/2024 Negative  Negative Final    Protein, UA 01/18/2024 Trace (A)  Negative Final    Leuk Esterase, UA 01/18/2024 Negative  Negative Final    Nitrite, UA 01/18/2024 Negative  Negative Final    Urobilinogen, UA 01/18/2024 1.0 E.U./dL  0.2 - 1.0 E.U./dL Final    Extra Tube 01/18/2024 Hold for add-ons.   Final    Auto resulted.   Hospital Outpatient Visit on 12/27/2023   Component Date Value Ref Range Status    KID L RIGHT 12/27/2023 13.00  cm Final    KID H RIGHT 12/27/2023 6.00  cm Final    KID W RIGHT 12/27/2023 5.00  cm Final    Kid L 12/27/2023 13.00  cm Final    Kidney Height Left 12/27/2023 5.00  cm Final    Left kidney width  12/27/2023 7.00  cm Final    RENAL A ORG PSV RIGHT 12/27/2023 76.00  cm/s Final    RENAL A ORG EDV RIGHT 12/27/2023 12.00  cm/s Final    PROX AZALIA A PSV RIGHT 12/27/2023 44.00  cm/s Final    PROX AZALIA A EDV RIGHT 12/27/2023 11.00  cm/s Final    MID AZALIA A PSV RIGHT 12/27/2023 96.00  cm/s Final    MID AZALIA A EDV RIGHT 12/27/2023 20.00  cm/s Final    DIST AZALIA A PSV RIGHT 12/27/2023 85.00  cm/s Final    DIST AZALIA A EDV RIGHT 12/27/2023 19.00  cm/s Final    Right renal upper parenchyma max 12/27/2023 20.00  cm/s Final    Right renal upper parenchyma min 12/27/2023 7.00  cm/s Final    Hilum Right PSV 12/27/2023 30.00  cm/s Final    Hilum Right EDV 12/27/2023 10.00  cm/s Final    Aortic Mid PSV 12/27/2023 41.00  cm/s Final    Aortic Mid EDV 12/27/2023 8.00  cm/s Final    RAR RIGHT 12/27/2023 2.30   Final    Right accessory renal origin sys 12/27/2023 72.00  cm/s Final    Right accessory renal origin wade 12/27/2023 12.00  cm/s Final    Right accessory renal prox sys 12/27/2023 71.00  cm/s Final    Right accessory renal prox wade 12/27/2023 14.00  cm/s Final    Right accessory renal mid sys 12/27/2023 90.00  cm/s Final    Right accessory renal mid wade 12/27/2023 15.00  cm/s Final    Right accessory renal dist sys 12/27/2023 77.00  cm/s Final    Right accessory renal dist wade 12/27/2023 14.00  cm/s Final    BH CV VAS BP RIGHT ARM 12/27/2023 145/70  mmHg Final    BH CV VAS BP LEFT ARM 12/27/2023 148/72  mmHg Final    RENAL A ORG PSV LEFT 12/27/2023 63.00  cm/s Final    RENAL A ORG EDV LEFT 12/27/2023 9.00  cm/s Final    PROX AZALIA A PSV LEFT 12/27/2023 74.00  cm/s Final    PROX AZALIA A EDV LEFT 12/27/2023 12.00  cm/s Final    MID AZALIA A PSV LEFT 12/27/2023 47.00  cm/s Final    MID AZALIA A EDV LEFT 12/27/2023 8.00  cm/s Final    DIST AZALIA A PSV LEFT 12/27/2023 60.00  cm/s Final    DIST AZALIA A EDV LEFT 12/27/2023 13.00  cm/s Final    Left renal upper parenchyma max 12/27/2023 29.00  cm/s Final    Left renal upper parenchyma min 12/27/2023  6.00  cm/s Final    Hilum Left PSV 12/27/2023 52.00  cm/s Final    Hilum Left EDV 12/27/2023 9.00  cm/s Final    RAR LEFT 12/27/2023 1.80   Final    Left renal upper parenchyma RI 12/27/2023 0.79   Final    Right renal upper parenchyma RI 12/27/2023 0.65   Final   Lab on 11/14/2023   Component Date Value Ref Range Status    Glucose 11/14/2023 364 (H)  65 - 99 mg/dL Final    BUN 11/14/2023 11  8 - 23 mg/dL Final    Creatinine 11/14/2023 0.95  0.57 - 1.00 mg/dL Final    Sodium 11/14/2023 139  136 - 145 mmol/L Final    Potassium 11/14/2023 4.2  3.5 - 5.2 mmol/L Final    Chloride 11/14/2023 106  98 - 107 mmol/L Final    CO2 11/14/2023 20.8 (L)  22.0 - 29.0 mmol/L Final    Calcium 11/14/2023 9.6  8.6 - 10.5 mg/dL Final    Albumin 11/14/2023 3.8  3.5 - 5.2 g/dL Final    Phosphorus 11/14/2023 3.5  2.5 - 4.5 mg/dL Final    Anion Gap 11/14/2023 12.2  5.0 - 15.0 mmol/L Final    BUN/Creatinine Ratio 11/14/2023 11.6  7.0 - 25.0 Final    eGFR 11/14/2023 68.3  >60.0 mL/min/1.73 Final    Creatinine, Urine 11/14/2023 37.3  mg/dL Final    Total Protein, Urine 11/14/2023 <4.0  mg/dL Final    Protein/Creatinine Ratio, Urine 11/14/2023    Final    Unable to calculate.    WBC 11/14/2023 8.01  3.40 - 10.80 10*3/mm3 Final    RBC 11/14/2023 4.25  3.77 - 5.28 10*6/mm3 Final    Hemoglobin 11/14/2023 12.7  12.0 - 15.9 g/dL Final    Hematocrit 11/14/2023 37.2  34.0 - 46.6 % Final    MCV 11/14/2023 87.5  79.0 - 97.0 fL Final    MCH 11/14/2023 29.9  26.6 - 33.0 pg Final    MCHC 11/14/2023 34.1  31.5 - 35.7 g/dL Final    RDW 11/14/2023 16.4 (H)  12.3 - 15.4 % Final    RDW-SD 11/14/2023 51.7  37.0 - 54.0 fl Final    MPV 11/14/2023 12.2 (H)  6.0 - 12.0 fL Final    Platelets 11/14/2023 241  140 - 450 10*3/mm3 Final    Neutrophil % 11/14/2023 55.3  42.7 - 76.0 % Final    Lymphocyte % 11/14/2023 36.6  19.6 - 45.3 % Final    Monocyte % 11/14/2023 5.9  5.0 - 12.0 % Final    Eosinophil % 11/14/2023 1.1  0.3 - 6.2 % Final    Basophil % 11/14/2023 0.9  0.0  - 1.5 % Final    Immature Grans % 11/14/2023 0.2  0.0 - 0.5 % Final    Neutrophils, Absolute 11/14/2023 4.43  1.70 - 7.00 10*3/mm3 Final    Lymphocytes, Absolute 11/14/2023 2.93  0.70 - 3.10 10*3/mm3 Final    Monocytes, Absolute 11/14/2023 0.47  0.10 - 0.90 10*3/mm3 Final    Eosinophils, Absolute 11/14/2023 0.09  0.00 - 0.40 10*3/mm3 Final    Basophils, Absolute 11/14/2023 0.07  0.00 - 0.20 10*3/mm3 Final    Immature Grans, Absolute 11/14/2023 0.02  0.00 - 0.05 10*3/mm3 Final    nRBC 11/14/2023 0.0  0.0 - 0.2 /100 WBC Final    Color, UA 11/14/2023 Yellow  Yellow, Straw Final    Appearance, UA 11/14/2023 Clear  Clear Final    pH, UA 11/14/2023 6.5  5.0 - 8.0 Final    Specific Gravity, UA 11/14/2023 >=1.030  1.005 - 1.030 Final    Glucose, UA 11/14/2023 >=1000 mg/dL (3+) (A)  Negative Final    Ketones, UA 11/14/2023 Negative  Negative Final    Bilirubin, UA 11/14/2023 Negative  Negative Final    Blood, UA 11/14/2023 Negative  Negative Final    Protein, UA 11/14/2023 Negative  Negative Final    Leuk Esterase, UA 11/14/2023 Negative  Negative Final    Nitrite, UA 11/14/2023 Negative  Negative Final    Urobilinogen, UA 11/14/2023 0.2 E.U./dL  0.2 - 1.0 E.U./dL Final    RBC, UA 11/14/2023 0-2  None Seen, 0-2 /HPF Final    WBC, UA 11/14/2023 0-2  None Seen, 0-2 /HPF Final    Bacteria, UA 11/14/2023 None Seen  None Seen /HPF Final    Squamous Epithelial Cells, UA 11/14/2023 0-2  None Seen, 0-2 /HPF Final    Hyaline Casts, UA 11/14/2023 None Seen  None Seen /LPF Final    Methodology 11/14/2023 Automated Microscopy   Final       EKG Results:  No orders to display       Imaging Results:  XR Chest 2 View    Result Date: 4/25/2024  No acute process.  Electronically Signed By-Graham Quiles MD On:4/25/2024 12:49 PM      US Abdomen Limited    Result Date: 3/5/2024    1. Enlarged heterogeneous appearance of the liver with nodular contour compatible with cirrhosis.  No lesion noted within the visualized liver parenchyma by sonographic  evaluation     CAROLE SRINIVASAN MD       ELECTRONICALLY SIGNED AND APPROVED BY: CAROLE SRINIVASAN MD ON 3/05/2024 AT 10:07             CT Chest Low Dose Follow Up Without Contrast    Result Date: 2/22/2024    1. No evidence of lung cancer. 2. Previously identified nodular density in left lower lobe has resolved, and was likely infectious or inflammatory. 3. No acute cardiopulmonary process. 4. Lung RADS category 1 (negative, less than 1% chance of malignancy) 5. Recommend continued annual screening with low-dose CT chest.      CLOVIS NORTH MD       Electronically Signed and Approved By: CLOVIS NORTH MD on 2/22/2024 at 9:21                 Assessment & Plan   Diagnoses and all orders for this visit:    1. Major depressive disorder, recurrent episode, moderate (Primary)  -     DULoxetine (CYMBALTA) 30 MG capsule; Take 1 capsule by mouth Daily.  Dispense: 30 capsule; Refill: 2    2. Generalized anxiety disorder  -     DULoxetine (CYMBALTA) 30 MG capsule; Take 1 capsule by mouth Daily.  Dispense: 30 capsule; Refill: 2    3. Insomnia due to mental condition  -     DULoxetine (CYMBALTA) 30 MG capsule; Take 1 capsule by mouth Daily.  Dispense: 30 capsule; Refill: 2        Visit Diagnoses:    ICD-10-CM ICD-9-CM   1. Major depressive disorder, recurrent episode, moderate  F33.1 296.32   2. Generalized anxiety disorder  F41.1 300.02   3. Insomnia due to mental condition  F51.05 300.9     327.02     5/1/24: Taper off lexapro and start cymbalta. Cognitive issues leading to depression. Bupropion XL in am only (both). 6w    PLAN:  Safety: No acute safety concerns  Therapy: None  Risk Assessment: Risk of self-harm acutely is moderate.  Risk factors include anxiety disorder, mood disorder, and recent psychosocial stressors (pandemic). Protective factors include no family history, denies access to guns/weapons, no present SI, no history of suicide attempts or self-harm in the past, minimal AODA, healthcare seeking, future  orientation, willingness to engage in care.  Risk of self-harm chronically is also moderate, but could be further elevated in the event of treatment noncompliance and/or AODA.  Meds:  CONTINUE Bupropion XL, but take both in am (300 mg). Risks, benefits, alternatives discussed with patient including nausea, GI upset, increased energy, exacerbation of irritability, insomnia, lowering of seizure threshold.  After discussion of these risks and benefits, the patient voiced understanding and agreed to proceed.  Taper off lexapro: half tab x 3d, quarter tab x3d, then STOP. Risks, benefits, alternatives discussed with patient including GI upset, nausea vomiting diarrhea, theoretical decrease of seizure threshold predisposing the patient to a slightly higher seizure risk, headaches, sexual dysfunction, serotonin syndrome, bleeding risk, increased suicidality in patients 24 years and younger, switching to shamika/hypomania.  After discussion of these risks and benefits, the patient voiced understanding and agreed to proceed.  START cymbalta 30 mg qam. Risks, benefits, alternatives discussed with patient including GI upset, nausea vomiting diarrhea, theoretical decrease of seizure threshold predisposing the patient to a slightly higher seizure risk, headaches, sexual dysfunction, serotonin syndrome, bleeding risk, increased suicidality in patients 24 years and younger, switching to shamika/hypomania.  Also constipation and urinary retention.  After discussion of these risks and benefits, the patient voiced understanding and agreed to proceed.  Labs: none    Patient screened positive for depression based on a PHQ-9 score of 21 on 5/1/2024. Follow-up recommendations include: Prescribed antidepressant medication treatment and Suicide Risk Assessment performed.           TREATMENT PLAN/GOALS: Continue supportive psychotherapy efforts and medications as indicated. Treatment and medication options discussed during today's visit. Patient  acknowledged and verbally consented to continue with current treatment plan and was educated on the importance of compliance with treatment and follow-up appointments.    MEDICATION ISSUES:  ORTIZ reviewed as expected.  Discussed medication options and treatment plan of prescribed medication as well as the risks, benefits, and side effects including potential falls, possible impaired driving and metabolic adversities among others. Patient is agreeable to call the office with any worsening of symptoms or onset of side effects. Patient is agreeable to call 911 or go to the nearest ER should he/she begin having SI/HI. No medication side effects or related complaints today.     MEDS ORDERED DURING VISIT:  New Medications Ordered This Visit   Medications    DULoxetine (CYMBALTA) 30 MG capsule     Sig: Take 1 capsule by mouth Daily.     Dispense:  30 capsule     Refill:  2       Return in about 6 weeks (around 6/12/2024).         This document has been electronically signed by Demetrius Calvert MD  May 1, 2024 09:42 EDT    Dictated Utilizing Dragon Dictation: Part of this note may be an electronic transcription/translation of spoken language to printed text using the Dragon Dictation System.

## 2024-05-01 NOTE — PATIENT INSTRUCTIONS
1.  Please return to clinic at your next scheduled visit.  Contact the clinic ([phone]) at least 24 hours prior in the event you need to cancel.  2.  Do no harm to yourself or others.    3.  Avoid alcohol and drugs.    4.  Take all medications as prescribed.  Please contact the clinic with any concerns. If you are in need of medication refills, please call the clinic at [phone].    5. Should you want to get in touch with your provider, Dr. Demetrius Calvert, please utilize [employer] or contact the office ([phone]), and staff will be able to page Dr. Calvert directly.  6.  In the event you have personal crisis, contact the following crisis numbers: Suicide Prevention Hotline 1-356.679.1595; [facility] Helpline 0-355-030-AKBAR; [facility] Emergency Room [phone]; text HELLO to 402661; or 126.     Lexapro: half tab daily x3d, then quarter tab daily x3d then STOP.

## 2024-06-03 NOTE — PROGRESS NOTES
Chief Complaint/Care Team   Iron deficiency    Elena Henderson,*  Elena Henderson, PA    History of Present Illness     Diagnosis: Iron deficiency Anemia secondary to GI blood loss (s/p c-scope in 8/2023 showed hemorrhoids, diverticulosis and removal of several polyps)    Current Treatment: IV iron prn, last IV Iron infusion on 3/2024    Previous Treatment: None    Anika Casillas is a 63 y.o. female who presents to [facility]EMAT[facility]Y [facility]COL[facility]Y for Anemia.    HPI  Ms. Anika Casillas is a very pleasant 62 year old  female presenting as referral for mild anemia as referral from gastroenterology. PMH is BERKOWITZ, type II DM, hypertension, IBS, gastroparesis, GERD, esophageal varices, Colindres's esophagus, diverticulosis, elevated liver enzymes. Tobacco user of 1 PPD since the age of 19 years old but recently cut down to 4-5 cigarettes / day. Recent low dose CT screening with a small LLL nodular area noted and interstitial changes noted.      Recent CT abdomen on 4/21/23 with an abnormal thickened heterogenous appearance noted and abdominal US completed. Vaginal US on 5/10/23 with an irregular hetergeneous mass within the endometrial canal and questionable irregularity of the cervix. Patient has follow up with GYN in June.      Recent lab work on 5/4/23: alk phos elevated 135, previously 120. Elevated ALT / AST 62 / 80, previously elevated and stable. Iron 52 up from 32, ferritin 48, not measured previously. Iron st 11%, up from 7%. Hemoglobin 11.4 which is up from 10.8. MCV 77. Fecal OB have been negative x 3 at the end of April. Prior scope done in            6/2022 with grade II esophageal varices.      Currently, not taking any oral iron. Has been increasing iron in her diet with liver and red meat. Reports fatigue 6/10 and craving ice now for the last 4 months. Denies any GI blood loss or other associated blood loss.      Previously adopted and does not know her family  history.    Interval History: Pt here to discuss lab work obtained for assessment of her anemia. Pt denies any blood loss, or melena but does report fatigue, pica. She underwent placement of bladders stimulator a couple of weeks ago. Pt unable to take iron tablets by mouth secondary to gastroparesis and GI upset. Pt underwent c-scope in 8/2023 which showed hemorrhoids, diverticulosis and removal of several polyps.   Pt pending endometrial biopsy by GI and ob/gyn respectively. Pt is pending cataract surgery in [place] and repeat EGD with botox injection for gastroparesis. Pt underwent US of liver which revealed cirrhosis and low dose CT chest which was negative for lung cancer.       Review of Systems   Constitutional:  Negative for appetite change, diaphoresis, fatigue, fever, unexpected weight gain and unexpected weight loss.   HENT:  Negative for hearing loss, mouth sores, sore throat, swollen glands, trouble swallowing and voice change.    Eyes:  Negative for blurred vision.   Respiratory:  Negative for cough, shortness of breath and wheezing.    Cardiovascular:  Negative for chest pain and palpitations.   Gastrointestinal:  Negative for abdominal pain, blood in stool, constipation, diarrhea, nausea and vomiting.   Endocrine: Negative for cold intolerance and heat intolerance.   Genitourinary:  Negative for difficulty urinating, dysuria, frequency, hematuria and urinary incontinence.   Musculoskeletal:  Negative for arthralgias, back pain and myalgias.   Skin:  Negative for rash, skin lesions and wound.   Neurological:  Negative for dizziness, seizures, weakness, numbness and headache.   Hematological:  Does not bruise/bleed easily.   Psychiatric/Behavioral:  Negative for depressed mood. The patient is not nervous/anxious.    All other systems reviewed and are negative.       Oncology/Hematology History    No history exists.       Objective     Vitals:    06/05/24 1519   BP: 145/66   Pulse: 86   Resp: 18  "  Temp: 97 °F (36.1 °C)   TempSrc: Temporal   SpO2: 94%   Weight: 108 kg (237 lb 12.8 oz)   Height: 165.1 cm (65\")   PainSc: 0-No pain           ECOG score: 0         PHQ-9 Total Score:         Physical Exam  Vitals reviewed. Exam conducted with a chaperone present.   Constitutional:       General: She is not in acute distress.     Appearance: Normal appearance.   HENT:      Head: Normocephalic and atraumatic.   Eyes:      Extraocular Movements: Extraocular movements intact.      Conjunctiva/sclera: Conjunctivae normal.   Pulmonary:      Effort: Pulmonary effort is normal.   Musculoskeletal:      Cervical back: Normal range of motion and neck supple.   Skin:     General: Skin is warm and dry.      Findings: No bruising.   Neurological:      Mental Status: She is oriented to person, place, and time.           Past Medical History     Past Medical History:   Diagnosis Date    Abnormal Pap smear of cervix     Atrial fibrillation     ON ELIQUIS/FOLLOWS TROTTER    Colindres's esophagus     Depression     Diabetes mellitus     Emphysema lung     INHALER    Endometrial hyperplasia     D&C SCHEDULED 7/25/23    Esophageal varices     Fatty liver 2015    Gastroparesis     TAKES XIFAXAN    GERD (gastroesophageal reflux disease) 1995    Hyperlipidemia     Hypertension     Iron deficiency anemia 05/02/2023    IRON INFUSION LAST 7/24/23    Irritable bowel syndrome     W/CONSTIPATION    BERKOWITZ (nonalcoholic steatohepatitis)     NO S/S CURRENTLY, ELEVATED ENZYMES    Obstructive sleep apnea syndrome     Pulmonary emphysema 11/22/2021    S/P exploratory laparotomy, lysis of adhesions, resection of necrotic colon without anastomosis 04/18/2022     Current Outpatient Medications on File Prior to Visit   Medication Sig Dispense Refill    albuterol sulfate  (90 Base) MCG/ACT inhaler Inhale 2 puffs Every 4 (Four) Hours As Needed for Shortness of Air or Wheezing. 1 g 5    amLODIPine (NORVASC) 10 MG tablet Take 1 tablet by mouth Daily. 90 " tablet 3    apixaban (ELIQUIS) 5 MG tablet tablet Take 1 tablet by mouth 2 (Two) Times a Day. 180 tablet 3    B-D UF III MINI PEN NEEDLES 31G X 5 MM misc       Blood Glucose Monitoring Suppl (Accu-Chek Guide) w/Device kit       buPROPion XL (WELLBUTRIN XL) 150 MG 24 hr tablet Take 2 tablets by mouth Daily. Two Xls in the morning.      Cholecalciferol (Vitamin D3) 50 MCG (2000 UT) capsule Take 3 capsules by mouth Daily. LAST DOSE 7/21/23      Continuous Blood Gluc Sensor (Dexcom G6 Sensor) APPLY 1 EACH TOPICALLY EVERY 10 (TEN) DAYS.      Continuous Blood Gluc Transmit (Dexcom G6 Transmitter) misc USE FOR 90 DAYS.      DULoxetine (CYMBALTA) 30 MG capsule Take 1 capsule by mouth Daily. 30 capsule 2    Evolocumab (Repatha) solution prefilled syringe injection Inject 1 mL under the skin into the appropriate area as directed Every 14 (Fourteen) Days. 6 each 3    famotidine (PEPCID) 40 MG tablet Take 1 tablet by mouth At Night As Needed for Heartburn. 90 tablet 3    Fluticasone-Umeclidin-Vilant (TRELEGY) 100-62.5-25 MCG/ACT inhaler Inhale 1 puff Daily. 1 each 5    furosemide (LASIX) 40 MG tablet Take 1 tablet by mouth Daily. 90 tablet 3    glucose blood test strip Check blood glucose twice daily 60 each 12    glucose monitor monitoring kit 1 each Daily. Patient prefers Accu Chek Meter. 1 each 0    icosapent ethyl (VASCEPA) 1 g capsule capsule Take 2 g by mouth 2 (Two) Times a Day With Meals. 360 capsule 1    Insulin Glargine, 2 Unit Dial, (Toujeo Max SoloStar) 300 UNIT/ML solution pen-injector injection Inject 122 Units under the skin into the appropriate area as directed Every Morning. INST PER ANESTHESIA PROTOCOL      Lancets (accu-chek soft touch) lancets Check blood sugar twice daily 100 each 12    metoprolol tartrate (LOPRESSOR) 50 MG tablet TAKE 1 AND 1/2 TABLETS BY MOUTH TWICE DAILY 270 tablet 1    metroNIDAZOLE (METROGEL) 0.75 % vaginal gel INSERT INTO THE VAGINA EVERY NIGHT AT BEDTIME FOR 7 DAYS.      Motegrity 2  MG tablet Take 1 tablet by mouth Daily. 90 tablet 3    Mounjaro 2.5 MG/0.5ML solution pen-injector pen Inject 0.5 mL under the skin into the appropriate area as directed 1 (One) Time Per Week.      multivitamin with minerals tablet tablet Take 1 tablet by mouth Daily. LAST DOSE 7/21/23      NovoLOG FlexPen 100 UNIT/ML solution pen-injector sc pen Inject 60 Units under the skin into the appropriate area as directed 2 (Two) Times a Day. PER SLIDING SCALE  INST PER ANESTHESIA PROTOCOL      olmesartan (BENICAR) 20 MG tablet TAKE 1 TABLET BY MOUTH EVERY DAY 90 tablet 1    pantoprazole (PROTONIX) 40 MG EC tablet Take 1 tablet by mouth Daily. 90 tablet 3    potassium chloride 10 MEQ CR tablet Take 1 tablet by mouth Daily. 90 tablet 3    riFAXIMin (Xifaxan) 550 MG tablet Take 1 tablet by mouth Every 12 (Twelve) Hours. 180 tablet 3    acetaminophen (Tylenol) 325 MG tablet Take 2 tablets by mouth Every 4 (Four) Hours As Needed for Mild Pain. (Patient not taking: Reported on 6/5/2024) 30 tablet 1    empagliflozin (JARDIANCE) 25 MG tablet tablet Take 1 tablet by mouth Daily. (Patient not taking: Reported on 5/1/2024)      Semaglutide, 1 MG/DOSE, (OZEMPIC) 4 MG/3ML solution pen-injector Inject 1 mg under the skin into the appropriate area as directed. (Patient not taking: Reported on 6/5/2024)      Vibegron (Gemtesa) 75 MG tablet Take 1 tablet by mouth Daily. (Patient not taking: Reported on 6/5/2024)       No current facility-administered medications on file prior to visit.      Allergies   Allergen Reactions    Liraglutide Nausea And Vomiting    Lisinopril Other (See Comments)     Cough    Metformin Nausea Only     GI Upset     Past Surgical History:   Procedure Laterality Date    ABDOMINAL SURGERY  4/16/2022    BLADDER SLING MODIFIED, ANTERIOR AND POSTERIOR VAGINAL REPAIR  2018    [place]    BLADDER SURGERY  05/01/2024    stimulator  @ [facility]    BOTOX INJECTION      In abdomen    BREAST LUMPECTOMY Left     BENIGN     CARDIAC CATHETERIZATION      NO INTERVENTION    CHOLECYSTECTOMY  5/12/2015    COLON RESECTION      PERFORATED COLON, EEA    COLONOSCOPY N/A 08/14/2023    Procedure: COLONOSCOPY WITH POLYPECTOMY, TATTOO;  Surgeon: Shannan Charles MD;  Location: [facility] ENDOSCOPY;  Service: Gastroenterology;  Laterality: N/A;  COLON POLYPS  DIVERTICULOSIS  HEMORRHOIDS    COLONOSCOPY N/A 04/22/2024    Procedure: COLONOSCOPY COLD SNARE POLYPECTOMIES WITH CLIP PLACEMENT X3;  Surgeon: Shannan Charles MD;  Location: [facility] ENDOSCOPY;  Service: Gastroenterology;  Laterality: N/A;  DIVERTICULOSIS  COLON POLYPS    D & C HYSTEROSCOPY N/A 07/25/2023    Procedure: resection of endometrial polyp with myosure;  Surgeon: Ashleigh Harding DO;  Location: [facility] MAIN OR;  Service: Gynecology;  Laterality: N/A;    ENDOSCOPY N/A 06/03/2022    Procedure: ESOPHAGOGASTRODUODENOSCOPY;  Surgeon: Shannan Charles MD;  Location: [facility] ENDOSCOPY;  Service: Gastroenterology;  Laterality: N/A;  RETAINED FOOD IN STOMACH  ESOPHAGEAL VARICIES    ENDOSCOPY N/A 04/22/2024    Procedure: ESOPHAGOGASTRODUODENOSCOPY WITH BIOPSIES;  Surgeon: Shannan Charles MD;  Location: [facility] ENDOSCOPY;  Service: Gastroenterology;  Laterality: N/A;  ESOPHAGEAL BIOPSIES    EXPLORATORY LAPAROTOMY N/A 04/16/2022    Procedure: EXPLORATORY LAPAROTOMY, LYSIS OF ADHESIONS, RESECTION OF NECTROIC COLON;  Surgeon: Cande Stanley MD;  Location: [facility] MAIN OR;  Service: General;  Laterality: N/A;    GALLBLADDER SURGERY      LAPAROSCOPIC    LIVER BIOPSY  2021    TUBAL ABDOMINAL LIGATION      UPPER GASTROINTESTINAL ENDOSCOPY  6/3/2022     Social History     Socioeconomic History    Marital status:      Spouse name: fannie   Tobacco Use    Smoking status: Every Day     Current packs/day: 0.25     Average packs/day: 0.3 packs/day for 49.0 years (12.2 ttl pk-yrs)     Types: Cigarettes     Start date: 6/17/1975    Smokeless tobacco: Never    Tobacco  comments:     INST PER ANESTHESIA PROTOCOL   Vaping Use    Vaping status: Never Used   Substance and Sexual Activity    Alcohol use: Not Currently     Alcohol/week: 1.0 standard drink of alcohol     Types: 1 Drinks containing 0.5 oz of alcohol per week     Comment: 1 a month    Drug use: Never    Sexual activity: Yes     Partners: Male     Birth control/protection: Post-menopausal, Tubal ligation     Family History   Adopted: Yes   Problem Relation Age of Onset    No Known Problems Mother     No Known Problems Father     No Known Problems Sister     No Known Problems Brother     No Known Problems Maternal Aunt     No Known Problems Paternal Aunt     No Known Problems Maternal Uncle     No Known Problems Paternal Uncle     No Known Problems Maternal Grandfather     No Known Problems Maternal Grandmother     No Known Problems Paternal Grandfather     No Known Problems Paternal Grandmother     No Known Problems Cousin     No Known Problems Other     ADD / ADHD Son     Malig Hyperthermia Neg Hx     Alcohol abuse Neg Hx     Anxiety disorder Neg Hx     Bipolar disorder Neg Hx     Dementia Neg Hx     Depression Neg Hx     Drug abuse Neg Hx     OCD Neg Hx     Paranoid behavior Neg Hx     Schizophrenia Neg Hx     Seizures Neg Hx     Self-Injurious Behavior  Neg Hx     Suicide Attempts Neg Hx        Results     Result Review   The following data was reviewed by: Tamela Ortega MD on 07/12/2023:  Lab Results   Component Value Date    HGB 12.4 05/03/2024    HCT 36.8 05/03/2024    MCV 86.9 05/03/2024     05/03/2024    WBC 7.8 05/03/2024    NEUTROABS 4.3 05/03/2024    LYMPHSABS 3.02 02/13/2024    MONOSABS 0.7 05/03/2024    EOSABS 0.1 (L) 05/03/2024    BASOSABS 0.0 05/03/2024     Lab Results   Component Value Date    GLUCOSE 82 02/29/2024    BUN 17 02/29/2024    CREATININE 0.92 02/29/2024     02/29/2024    K 4.4 02/29/2024     (H) 02/29/2024    CO2 22.0 02/29/2024    CALCIUM 10.0 02/29/2024    PROTEINTOT 9.0 (H)  05/03/2024    ALBUMIN 4.1 05/03/2024    BILITOT 0.3 02/29/2024    ALKPHOS 133 (H) 02/29/2024    AST 95 (H) 02/29/2024    ALT 87 (H) 02/29/2024     Lab Results   Component Value Date    PHOS 3.5 11/14/2023    FREET4 0.97 02/29/2024    TSH 2.570 02/29/2024           No radiology results for the last day       Assessment & Plan     Diagnoses and all orders for this visit:    1. Iron deficiency anemia, unspecified iron deficiency anemia type (Primary)  -     CBC & Differential; Future  -     Ferritin; Future  -     Iron Profile; Future              Anika Casillas is a 63 y.o. female who presents to [facility] HEMATOLOGY [facility] ON[facility]OGY for Iron deficiency Anemia secondary to GI blood loss (s/p c-scope in 8/2023 showed hemorrhoids, diverticulosis and removal of several polyps).  Pt receiving IV Iron infusions prn, last infusion in 3/2024, pt not able to tolerate oral iron due to gastroparesis.    Pt symptomatic from Iron deficiency with pica and fatigue. Placed orders for recheck of CBC and iron studies to assess for additional requirement for IV Iron.    Recommend continued evaluation by GI for BERKOWITZ cirrhosis and esophageal varices. Pt also due for repeat c-scope in 1 year (I.e. 8/2024).    -will plan for pt follow up in 3 months with recheck CBC, Iron profile, and ferritin and to assess need for additional IV Iron infusion.    Please note that portions of this note were completed with a voice recognition program.    Electronically signed by Tamela Ortega MD, 06/05/24, 3:45 PM EDT.        Follow Up     I spent 30 minutes caring for Anika on this date of service. This time includes time spent by me in the following activities:preparing for the visit, reviewing tests, obtaining and/or reviewing a separately obtained history, performing a medically appropriate examination and/or evaluation , counseling and educating the patient/family/caregiver, ordering medications, tests, or procedures, referring and  communicating with other health care professionals , documenting information in the medical record, independently interpreting results and communicating that information with the patient/family/caregiver, and care coordination.    This is an acute or chronic illness that poses a threat to life or bodily function. The above treatment plan involves a high risk of complications and/or mortality of patient management.    The patient was seen and examined. Work by the provider also included review and/or ordering of lab tests, review and/or ordering of radiology tests, review and/or ordering of medicine tests, discussion with other physicians or providers, independent review of data, obtaining old records, review/summation of old records, and/or other review.    I have reviewed the family history, social history, and past medical history for this patient. Previous information and data has been reviewed and updated as needed. I have reviewed and verified the chief complaint, history, and other documentation. The patient was interviewed and examined in the clinic and the chart reviewed. The previous observations, recommendations, and conclusions were reviewed including those of other providers.     The plan was discussed with the patient and/or family. The patient was given time to ask questions and these questions were answered. At the conclusion of their visit they had no additional questions or concerns and all questions were answered to their satisfaction.    Patient was given instructions and counseling regarding her condition or for health maintenance advice. Please see specific information pulled into the AVS if appropriate.

## 2024-06-05 ENCOUNTER — OFFICE VISIT (OUTPATIENT)
Dept: ONCOLOGY | Facility: HOSPITAL | Age: 64
End: 2024-06-05
Payer: COMMERCIAL

## 2024-06-05 ENCOUNTER — LAB (OUTPATIENT)
Dept: ONCOLOGY | Facility: HOSPITAL | Age: 64
End: 2024-06-05
Payer: COMMERCIAL

## 2024-06-05 VITALS
RESPIRATION RATE: 18 BRPM | TEMPERATURE: 97 F | BODY MASS INDEX: 39.62 KG/M2 | HEIGHT: 65 IN | DIASTOLIC BLOOD PRESSURE: 66 MMHG | SYSTOLIC BLOOD PRESSURE: 145 MMHG | OXYGEN SATURATION: 94 % | WEIGHT: 237.8 LBS | HEART RATE: 86 BPM

## 2024-06-05 DIAGNOSIS — E61.1 IRON DEFICIENCY: ICD-10-CM

## 2024-06-05 DIAGNOSIS — D50.9 IRON DEFICIENCY ANEMIA, UNSPECIFIED IRON DEFICIENCY ANEMIA TYPE: Primary | ICD-10-CM

## 2024-06-05 LAB
BASOPHILS # BLD AUTO: 0.07 10*3/MM3 (ref 0–0.2)
BASOPHILS NFR BLD AUTO: 0.9 % (ref 0–1.5)
DEPRECATED RDW RBC AUTO: 56.6 FL (ref 37–54)
EOSINOPHIL # BLD AUTO: 0.16 10*3/MM3 (ref 0–0.4)
EOSINOPHIL NFR BLD AUTO: 2 % (ref 0.3–6.2)
ERYTHROCYTE [DISTWIDTH] IN BLOOD BY AUTOMATED COUNT: 18.3 % (ref 12.3–15.4)
FERRITIN SERPL-MCNC: 153.9 NG/ML (ref 13–150)
HCT VFR BLD AUTO: 33.9 % (ref 34–46.6)
HGB BLD-MCNC: 11.8 G/DL (ref 12–15.9)
IMM GRANULOCYTES # BLD AUTO: 0.01 10*3/MM3 (ref 0–0.05)
IMM GRANULOCYTES NFR BLD AUTO: 0.1 % (ref 0–0.5)
IRON 24H UR-MRATE: 55 MCG/DL (ref 37–145)
IRON SATN MFR SERPL: 15 % (ref 20–50)
LYMPHOCYTES # BLD AUTO: 3.27 10*3/MM3 (ref 0.7–3.1)
LYMPHOCYTES NFR BLD AUTO: 40.8 % (ref 19.6–45.3)
MCH RBC QN AUTO: 29.6 PG (ref 26.6–33)
MCHC RBC AUTO-ENTMCNC: 34.8 G/DL (ref 31.5–35.7)
MCV RBC AUTO: 85 FL (ref 79–97)
MONOCYTES # BLD AUTO: 0.65 10*3/MM3 (ref 0.1–0.9)
MONOCYTES NFR BLD AUTO: 8.1 % (ref 5–12)
NEUTROPHILS NFR BLD AUTO: 3.85 10*3/MM3 (ref 1.7–7)
NEUTROPHILS NFR BLD AUTO: 48.1 % (ref 42.7–76)
NRBC BLD AUTO-RTO: 0 /100 WBC (ref 0–0.2)
PLATELET # BLD AUTO: 233 10*3/MM3 (ref 140–450)
PMV BLD AUTO: 11 FL (ref 6–12)
RBC # BLD AUTO: 3.99 10*6/MM3 (ref 3.77–5.28)
TIBC SERPL-MCNC: 365 MCG/DL (ref 298–536)
TRANSFERRIN SERPL-MCNC: 245 MG/DL (ref 200–360)
WBC NRBC COR # BLD AUTO: 8.01 10*3/MM3 (ref 3.4–10.8)

## 2024-06-05 PROCEDURE — 82728 ASSAY OF FERRITIN: CPT

## 2024-06-05 PROCEDURE — 84466 ASSAY OF TRANSFERRIN: CPT

## 2024-06-05 PROCEDURE — 83540 ASSAY OF IRON: CPT

## 2024-06-05 PROCEDURE — 36415 COLL VENOUS BLD VENIPUNCTURE: CPT

## 2024-06-05 PROCEDURE — 85025 COMPLETE CBC W/AUTO DIFF WBC: CPT

## 2024-06-05 PROCEDURE — G0463 HOSPITAL OUTPT CLINIC VISIT: HCPCS | Performed by: INTERNAL MEDICINE

## 2024-06-05 RX ORDER — METRONIDAZOLE 7.5 MG/G
GEL VAGINAL
COMMUNITY
Start: 2024-01-19

## 2024-06-08 ENCOUNTER — TELEPHONE (OUTPATIENT)
Dept: SLEEP MEDICINE | Facility: HOSPITAL | Age: 64
End: 2024-06-08
Payer: COMMERCIAL

## 2024-06-08 NOTE — TELEPHONE ENCOUNTER
Left message for patient to schedule annual follow up visit at [facility] at [phone], option 1 to schedule.

## 2024-06-10 ENCOUNTER — TELEPHONE (OUTPATIENT)
Dept: ONCOLOGY | Facility: HOSPITAL | Age: 64
End: 2024-06-10
Payer: COMMERCIAL

## 2024-06-10 NOTE — TELEPHONE ENCOUNTER
----- Message from Tamela Ortega sent at 6/10/2024 10:12 AM EDT -----  Regarding: RE: labs  Please let pt know that labs from last week showed low iron and decrease in her hemoglobin, recommend IV Iron with feraheme. Pt can't get venofer due to shortage and she isn't pregnant. Please place these orders and work to get her scheduled. Thanks.  ----- Message -----  From: Tamela Ortega MD  Sent: 6/5/2024   3:43 PM EDT  To: Tamela Ortega MD  Subject: labs                                             Follow up on labs from 6/5/2024 CBC, Iron studies, pt had recent placement of bladder stimulator and pica

## 2024-06-10 NOTE — TELEPHONE ENCOUNTER
Left message for patient, advised that labs show low iron and hemoglobin. Dr. Ortega is ordering iron infusions. This will be sent to insurance for authorization and once received the scheduling group will call you to schedule the infusion visits. With these infusions premedications are given that include Benadryl so you will want to have a  for these visits. Instructed to maintain all follow up appointments and to contact the office with any questions or concerns.

## 2024-06-19 ENCOUNTER — TELEPHONE (OUTPATIENT)
Dept: CARDIOLOGY | Facility: CLINIC | Age: 64
End: 2024-06-19
Payer: COMMERCIAL

## 2024-06-20 DIAGNOSIS — K31.84 DIABETIC GASTROPARESIS: ICD-10-CM

## 2024-06-20 DIAGNOSIS — E11.43 DIABETIC GASTROPARESIS: ICD-10-CM

## 2024-06-20 DIAGNOSIS — D50.9 IRON DEFICIENCY ANEMIA, UNSPECIFIED IRON DEFICIENCY ANEMIA TYPE: ICD-10-CM

## 2024-06-20 DIAGNOSIS — E61.1 IRON DEFICIENCY: Primary | ICD-10-CM

## 2024-06-20 DIAGNOSIS — K90.9 IRON MALABSORPTION: ICD-10-CM

## 2024-06-26 ENCOUNTER — HOSPITAL ENCOUNTER (EMERGENCY)
Age: 64
Discharge: HOME | End: 2024-06-26
Attending: EMERGENCY MEDICINE
Payer: COMMERCIAL

## 2024-06-26 ENCOUNTER — APPOINTMENT (OUTPATIENT)
Dept: CT IMAGING | Age: 64
End: 2024-06-26
Payer: COMMERCIAL

## 2024-06-26 VITALS
DIASTOLIC BLOOD PRESSURE: 78 MMHG | HEIGHT: 65 IN | BODY MASS INDEX: 39.49 KG/M2 | RESPIRATION RATE: 16 BRPM | HEART RATE: 82 BPM | WEIGHT: 237 LBS | SYSTOLIC BLOOD PRESSURE: 141 MMHG | OXYGEN SATURATION: 96 % | TEMPERATURE: 97.6 F

## 2024-06-26 DIAGNOSIS — R42 VERTIGO: Primary | ICD-10-CM

## 2024-06-26 LAB
ALBUMIN SERPL-MCNC: 3.2 G/DL (ref 3.2–4.6)
ALBUMIN/GLOB SERPL: 0.7 (ref 1–1.9)
ALP SERPL-CCNC: 125 U/L (ref 35–104)
ALT SERPL-CCNC: 76 U/L (ref 12–65)
ANION GAP SERPL CALC-SCNC: 13 MMOL/L (ref 9–18)
AST SERPL-CCNC: 89 U/L (ref 15–37)
BASOPHILS # BLD: 0 K/UL (ref 0–0.2)
BASOPHILS NFR BLD: 1 % (ref 0–2)
BILIRUB SERPL-MCNC: 0.3 MG/DL (ref 0–1.2)
BUN SERPL-MCNC: 8 MG/DL (ref 8–23)
CALCIUM SERPL-MCNC: 9.3 MG/DL (ref 8.8–10.2)
CHLORIDE SERPL-SCNC: 106 MMOL/L (ref 98–107)
CO2 SERPL-SCNC: 21 MMOL/L (ref 20–28)
CREAT SERPL-MCNC: 0.75 MG/DL (ref 0.6–1.1)
DIFFERENTIAL METHOD BLD: ABNORMAL
EKG ATRIAL RATE: 80 BPM
EKG DIAGNOSIS: NORMAL
EKG P AXIS: 64 DEGREES
EKG P-R INTERVAL: 178 MS
EKG Q-T INTERVAL: 379 MS
EKG QRS DURATION: 73 MS
EKG QTC CALCULATION (BAZETT): 438 MS
EKG R AXIS: 63 DEGREES
EKG T AXIS: 90 DEGREES
EKG VENTRICULAR RATE: 80 BPM
EOSINOPHIL # BLD: 0.1 K/UL (ref 0–0.8)
EOSINOPHIL NFR BLD: 1 % (ref 0.5–7.8)
ERYTHROCYTE [DISTWIDTH] IN BLOOD BY AUTOMATED COUNT: 16.5 % (ref 11.9–14.6)
GLOBULIN SER CALC-MCNC: 4.6 G/DL (ref 2.3–3.5)
GLUCOSE BLD STRIP.AUTO-MCNC: 246 MG/DL (ref 65–100)
GLUCOSE SERPL-MCNC: 240 MG/DL (ref 70–99)
HCT VFR BLD AUTO: 34.3 % (ref 35.8–46.3)
HGB BLD-MCNC: 11.9 G/DL (ref 11.7–15.4)
IMM GRANULOCYTES # BLD AUTO: 0 K/UL (ref 0–0.5)
IMM GRANULOCYTES NFR BLD AUTO: 0 % (ref 0–5)
INR PPP: 1.2
LYMPHOCYTES # BLD: 2.1 K/UL (ref 0.5–4.6)
LYMPHOCYTES NFR BLD: 38 % (ref 13–44)
MCH RBC QN AUTO: 29.4 PG (ref 26.1–32.9)
MCHC RBC AUTO-ENTMCNC: 34.7 G/DL (ref 31.4–35)
MCV RBC AUTO: 84.7 FL (ref 82–102)
MONOCYTES # BLD: 0.4 K/UL (ref 0.1–1.3)
MONOCYTES NFR BLD: 6 % (ref 4–12)
NEUTS SEG # BLD: 3 K/UL (ref 1.7–8.2)
NEUTS SEG NFR BLD: 53 % (ref 43–78)
NRBC # BLD: 0 K/UL (ref 0–0.2)
PLATELET # BLD AUTO: 215 K/UL (ref 150–450)
PMV BLD AUTO: 10.4 FL (ref 9.4–12.3)
POTASSIUM SERPL-SCNC: 3.7 MMOL/L (ref 3.5–5.1)
PROT SERPL-MCNC: 7.8 G/DL (ref 6.3–8.2)
PROTHROMBIN TIME: 15.7 SEC (ref 11.3–14.9)
RBC # BLD AUTO: 4.05 M/UL (ref 4.05–5.2)
SERVICE CMNT-IMP: ABNORMAL
SODIUM SERPL-SCNC: 140 MMOL/L (ref 136–145)
WBC # BLD AUTO: 5.6 K/UL (ref 4.3–11.1)

## 2024-06-26 PROCEDURE — 70450 CT HEAD/BRAIN W/O DYE: CPT

## 2024-06-26 PROCEDURE — 70496 CT ANGIOGRAPHY HEAD: CPT

## 2024-06-26 PROCEDURE — 70498 CT ANGIOGRAPHY NECK: CPT | Performed by: RADIOLOGY

## 2024-06-26 PROCEDURE — 6370000000 HC RX 637 (ALT 250 FOR IP): Performed by: STUDENT IN AN ORGANIZED HEALTH CARE EDUCATION/TRAINING PROGRAM

## 2024-06-26 PROCEDURE — 70496 CT ANGIOGRAPHY HEAD: CPT | Performed by: RADIOLOGY

## 2024-06-26 PROCEDURE — 80053 COMPREHEN METABOLIC PANEL: CPT

## 2024-06-26 PROCEDURE — 93005 ELECTROCARDIOGRAM TRACING: CPT | Performed by: STUDENT IN AN ORGANIZED HEALTH CARE EDUCATION/TRAINING PROGRAM

## 2024-06-26 PROCEDURE — 85610 PROTHROMBIN TIME: CPT

## 2024-06-26 PROCEDURE — 96374 THER/PROPH/DIAG INJ IV PUSH: CPT

## 2024-06-26 PROCEDURE — 82962 GLUCOSE BLOOD TEST: CPT

## 2024-06-26 PROCEDURE — 2580000003 HC RX 258: Performed by: STUDENT IN AN ORGANIZED HEALTH CARE EDUCATION/TRAINING PROGRAM

## 2024-06-26 PROCEDURE — 6360000002 HC RX W HCPCS: Performed by: STUDENT IN AN ORGANIZED HEALTH CARE EDUCATION/TRAINING PROGRAM

## 2024-06-26 PROCEDURE — 85025 COMPLETE CBC W/AUTO DIFF WBC: CPT

## 2024-06-26 PROCEDURE — 99285 EMERGENCY DEPT VISIT HI MDM: CPT

## 2024-06-26 PROCEDURE — 93010 ELECTROCARDIOGRAM REPORT: CPT | Performed by: INTERNAL MEDICINE

## 2024-06-26 PROCEDURE — 6360000004 HC RX CONTRAST MEDICATION: Performed by: STUDENT IN AN ORGANIZED HEALTH CARE EDUCATION/TRAINING PROGRAM

## 2024-06-26 RX ORDER — 0.9 % SODIUM CHLORIDE 0.9 %
1000 INTRAVENOUS SOLUTION INTRAVENOUS
Status: COMPLETED | OUTPATIENT
Start: 2024-06-26 | End: 2024-06-26

## 2024-06-26 RX ORDER — LORAZEPAM 1 MG/1
1 TABLET ORAL 2 TIMES DAILY
Qty: 10 TABLET | Refills: 0 | Status: SHIPPED | OUTPATIENT
Start: 2024-06-26 | End: 2024-07-26

## 2024-06-26 RX ORDER — MECLIZINE HYDROCHLORIDE 25 MG/1
25 TABLET ORAL 3 TIMES DAILY PRN
Qty: 60 TABLET | Refills: 0 | Status: SHIPPED | OUTPATIENT
Start: 2024-06-26 | End: 2024-07-16

## 2024-06-26 RX ORDER — ONDANSETRON 4 MG/1
4 TABLET, ORALLY DISINTEGRATING ORAL
Status: COMPLETED | OUTPATIENT
Start: 2024-06-26 | End: 2024-06-26

## 2024-06-26 RX ORDER — ONDANSETRON 4 MG/1
4 TABLET, FILM COATED ORAL 3 TIMES DAILY PRN
Qty: 15 TABLET | Refills: 0 | Status: SHIPPED | OUTPATIENT
Start: 2024-06-26

## 2024-06-26 RX ORDER — MECLIZINE HYDROCHLORIDE 25 MG/1
50 TABLET ORAL
Status: COMPLETED | OUTPATIENT
Start: 2024-06-26 | End: 2024-06-26

## 2024-06-26 RX ORDER — DEXAMETHASONE SODIUM PHOSPHATE 10 MG/ML
10 INJECTION INTRAMUSCULAR; INTRAVENOUS ONCE
Status: COMPLETED | OUTPATIENT
Start: 2024-06-26 | End: 2024-06-26

## 2024-06-26 RX ADMIN — DEXAMETHASONE SODIUM PHOSPHATE 10 MG: 10 INJECTION INTRAMUSCULAR; INTRAVENOUS at 14:20

## 2024-06-26 RX ADMIN — IOPAMIDOL 100 ML: 755 INJECTION, SOLUTION INTRAVENOUS at 14:01

## 2024-06-26 RX ADMIN — SODIUM CHLORIDE 1000 ML: 9 INJECTION, SOLUTION INTRAVENOUS at 13:04

## 2024-06-26 RX ADMIN — MECLIZINE HYDROCHLORIDE 50 MG: 25 TABLET ORAL at 13:02

## 2024-06-26 RX ADMIN — ONDANSETRON 4 MG: 4 TABLET, ORALLY DISINTEGRATING ORAL at 13:02

## 2024-06-26 ASSESSMENT — ENCOUNTER SYMPTOMS
PHOTOPHOBIA: 0
ABDOMINAL PAIN: 0
COUGH: 0
SHORTNESS OF BREATH: 0
FACIAL SWELLING: 0
NAUSEA: 1
VOMITING: 0
TROUBLE SWALLOWING: 0

## 2024-06-26 ASSESSMENT — PAIN SCALES - GENERAL: PAINLEVEL_OUTOF10: 0

## 2024-06-26 ASSESSMENT — LIFESTYLE VARIABLES
HOW MANY STANDARD DRINKS CONTAINING ALCOHOL DO YOU HAVE ON A TYPICAL DAY: PATIENT DOES NOT DRINK
HOW OFTEN DO YOU HAVE A DRINK CONTAINING ALCOHOL: NEVER

## 2024-06-26 NOTE — ED NOTES
Attempted to ambulate x1.  Pt became dizzy going from lying to sitting and unable to stand at side of bed.  Provider notified.  Pt returned to lying position, fall precautions in place.

## 2024-06-26 NOTE — ED PROVIDER NOTES
63-year-old female visiting family from [place].  Patient felt fine until she woke up this morning with dizziness which she describes as feeling off balance and the room spinning.  She has a history of hypertension and diabetes.  Blood sugar really runs about 200.  History of atrial fibrillation and takes Eliquis.  She denies any double vision or fuzzy vision.  She has had nausea but no vomiting.  No focal numbness or weakness in arms or legs.  No trouble talking or swallowing.  Denies any history of stroke.  On exam, alert.  Pupils equal round react to light extraocular muscles are intact.  Good visual field.  Has cataract surgery bilaterally.  Neck is supple.  No facial asymmetry.  Heart is regular.  No drift.  Normal finger-nose testing.  Normal heel-to-shin.  Head CT was negative.  CT angio of the neck is pending.  My overall impression is peripheral vertigo.  There is no nystagmus to perform hints exam.  Please see physician assistant note for final disposition     Juan Carlos Reeves MD  06/26/24 [id number]    
tablet, R-0Print      LORazepam (ATIVAN) 1 MG tablet Take 1 tablet by mouth 2 times daily for 30 days. Max Daily Amount: 2 mg, Disp-10 tablet, R-0Print              Past Medical History:   Diagnosis Date    A-fib (HCC)     atrial fibrillation    COVID-19 vaccine series completed 03/06/2021    Pfizer 2nd dose 3/6/21    GERD (gastroesophageal reflux disease)     hiatal hernia, medication    Hypercholesterolemia 07/14/2020    Hypertension     managed with meds    Insulin dependent diabetes mellitus     oral and insulin, -285, A1c 10.9, denies hypoglycemia    Morbid obesity (HCC)     HOBBS (nonalcoholic steatohepatitis)     Sleep apnea     does not use CPAP        Past Surgical History:   Procedure Laterality Date    CHOLECYSTECTOMY, LAPAROSCOPIC  2010    COLONOSCOPY N/A 3/26/2019    COLONOSCOPY/ 36 performed by Siddharth Kumari MD at [facility] ENDOSCOPY    COLONOSCOPY N/A 12/14/2017    COLONOSCOPY / BMI=36 performed by Siddharth Kumari MD at [facility] ENDOSCOPY    TUBAL LIGATION  1980    UROLOGICAL SURGERY  09/2017    bladder sling     US GUIDED LIVER BIOPSY PERCUTANEOUS  2/20/2018    US GUIDED LIVER BIOPSY PERCUTANEOUS 2/20/2018 [facility] RADIOLOGY US        Social History     Socioeconomic History    Marital status:      Spouse name: None    Number of children: None    Years of education: None    Highest education level: None   Tobacco Use    Smoking status: Every Day     Current packs/day: 1.00     Types: Cigarettes    Smokeless tobacco: Never    Tobacco comments:     Quit smoking: working on quitting   Substance and Sexual Activity    Alcohol use: No    Drug use: No        Discharge Medication List as of 6/26/2024  3:37 PM        CONTINUE these medications which have NOT CHANGED    Details   amLODIPine (NORVASC) 10 MG tablet Take 10 mg by mouth dailyHistorical Med      apixaban (ELIQUIS) 5 MG TABS tablet Take 5 mg by mouth 2 times dailyHistorical Med      cyclobenzaprine (FLEXERIL) 10 MG tablet Take 10 mg by mouth 3

## 2024-06-26 NOTE — DISCHARGE INSTRUCTIONS
Your blood work here was reassuring overall.  You have been diagnosed with vertigo.  Use prescribed occasions to control your symptoms.  Follow-up with ENT.  Return here for new or worsening symptoms    As we discussed, I did not find a life threatening cause of your symptoms today. However, THAT DOES NOT MEAN IT COULD NOT DEVELOP. If you develop ANY new or worsening symptoms, it is critical that you return for re-evaluation. This includes any symptoms that are concerning to you, especially symptoms such as constant dizziness that does not resolve, worst headache of your life, unilateral weakness or numbness, double vision.  If you do not return for re-evaluation, you risk serious complications, including death.

## 2024-06-26 NOTE — ED TRIAGE NOTES
Pt states she woke today and when she went to get up she felt like the room was spinning and she was falling. Pt denies chest pain or shortness of breath.    Nikkie Cotto RN

## 2024-06-26 NOTE — ED NOTES
Patient mobility status  with mild difficulty. Provider aware     I have reviewed discharge instructions with the patient.  The patient verbalized understanding.    Patient left ED via Discharge Method: wheelchair to Home with  family .    Opportunity for questions and clarification provided.     Patient given 3 scripts.

## 2024-07-02 ENCOUNTER — HOSPITAL ENCOUNTER (OUTPATIENT)
Dept: INFUSION THERAPY | Facility: HOSPITAL | Age: 64
Discharge: HOME | End: 2024-07-02
Admitting: INTERNAL MEDICINE
Payer: COMMERCIAL

## 2024-07-02 VITALS
WEIGHT: 236.33 LBS | HEIGHT: 65 IN | TEMPERATURE: 98.6 F | BODY MASS INDEX: 39.38 KG/M2 | RESPIRATION RATE: 20 BRPM | DIASTOLIC BLOOD PRESSURE: 70 MMHG | HEART RATE: 82 BPM | OXYGEN SATURATION: 97 % | SYSTOLIC BLOOD PRESSURE: 133 MMHG

## 2024-07-02 DIAGNOSIS — E11.43 DIABETIC GASTROPARESIS: ICD-10-CM

## 2024-07-02 DIAGNOSIS — K31.84 DIABETIC GASTROPARESIS: ICD-10-CM

## 2024-07-02 DIAGNOSIS — K90.9 IRON MALABSORPTION: Primary | ICD-10-CM

## 2024-07-02 DIAGNOSIS — D50.9 IRON DEFICIENCY ANEMIA, UNSPECIFIED IRON DEFICIENCY ANEMIA TYPE: ICD-10-CM

## 2024-07-02 PROCEDURE — 96365 THER/PROPH/DIAG IV INF INIT: CPT

## 2024-07-02 PROCEDURE — 96375 TX/PRO/DX INJ NEW DRUG ADDON: CPT

## 2024-07-02 PROCEDURE — 96366 THER/PROPH/DIAG IV INF ADDON: CPT

## 2024-07-02 PROCEDURE — 25810000003 SODIUM CHLORIDE 0.9 % SOLUTION: Performed by: INTERNAL MEDICINE

## 2024-07-02 PROCEDURE — 25010000002 DIPHENHYDRAMINE PER 50 MG: Performed by: INTERNAL MEDICINE

## 2024-07-02 PROCEDURE — 96374 THER/PROPH/DIAG INJ IV PUSH: CPT

## 2024-07-02 PROCEDURE — 25010000002 NA FERRIC GLUC CPLX PER 12.5 MG: Performed by: INTERNAL MEDICINE

## 2024-07-02 RX ORDER — ACETAMINOPHEN 325 MG/1
650 TABLET ORAL ONCE
Status: COMPLETED | OUTPATIENT
Start: 2024-07-02 | End: 2024-07-02

## 2024-07-02 RX ORDER — FAMOTIDINE 10 MG/ML
20 INJECTION, SOLUTION INTRAVENOUS ONCE
Status: COMPLETED | OUTPATIENT
Start: 2024-07-02 | End: 2024-07-02

## 2024-07-02 RX ORDER — DIPHENHYDRAMINE HYDROCHLORIDE 50 MG/ML
25 INJECTION INTRAMUSCULAR; INTRAVENOUS ONCE
Status: COMPLETED | OUTPATIENT
Start: 2024-07-02 | End: 2024-07-02

## 2024-07-02 RX ADMIN — SODIUM CHLORIDE 250 MG: 9 INJECTION, SOLUTION INTRAVENOUS at 15:27

## 2024-07-02 RX ADMIN — ACETAMINOPHEN 650 MG: 325 TABLET ORAL at 15:22

## 2024-07-02 RX ADMIN — DIPHENHYDRAMINE HYDROCHLORIDE 25 MG: 50 INJECTION, SOLUTION INTRAMUSCULAR; INTRAVENOUS at 15:21

## 2024-07-02 RX ADMIN — FAMOTIDINE 20 MG: 10 INJECTION INTRAVENOUS at 15:22

## 2024-07-09 ENCOUNTER — HOSPITAL ENCOUNTER (OUTPATIENT)
Dept: INFUSION THERAPY | Facility: HOSPITAL | Age: 64
Discharge: HOME | End: 2024-07-09
Admitting: INTERNAL MEDICINE
Payer: COMMERCIAL

## 2024-07-09 VITALS
OXYGEN SATURATION: 99 % | DIASTOLIC BLOOD PRESSURE: 68 MMHG | RESPIRATION RATE: 20 BRPM | HEART RATE: 88 BPM | SYSTOLIC BLOOD PRESSURE: 140 MMHG | TEMPERATURE: 98.1 F

## 2024-07-09 DIAGNOSIS — E11.43 DIABETIC GASTROPARESIS: ICD-10-CM

## 2024-07-09 DIAGNOSIS — K90.9 IRON MALABSORPTION: Primary | ICD-10-CM

## 2024-07-09 DIAGNOSIS — D50.9 IRON DEFICIENCY ANEMIA, UNSPECIFIED IRON DEFICIENCY ANEMIA TYPE: ICD-10-CM

## 2024-07-09 DIAGNOSIS — K31.84 DIABETIC GASTROPARESIS: ICD-10-CM

## 2024-07-09 PROCEDURE — 25010000002 DIPHENHYDRAMINE PER 50 MG: Performed by: INTERNAL MEDICINE

## 2024-07-09 PROCEDURE — 96365 THER/PROPH/DIAG IV INF INIT: CPT

## 2024-07-09 PROCEDURE — 25810000003 SODIUM CHLORIDE 0.9 % SOLUTION: Performed by: INTERNAL MEDICINE

## 2024-07-09 PROCEDURE — 96375 TX/PRO/DX INJ NEW DRUG ADDON: CPT

## 2024-07-09 PROCEDURE — 25010000002 NA FERRIC GLUC CPLX PER 12.5 MG: Performed by: INTERNAL MEDICINE

## 2024-07-09 PROCEDURE — 96366 THER/PROPH/DIAG IV INF ADDON: CPT

## 2024-07-09 RX ORDER — SODIUM CHLORIDE 9 MG/ML
20 INJECTION, SOLUTION INTRAVENOUS ONCE
Status: DISCONTINUED | OUTPATIENT
Start: 2024-07-09 | End: 2024-07-11 | Stop reason: HOSPADM

## 2024-07-09 RX ORDER — FAMOTIDINE 10 MG/ML
20 INJECTION, SOLUTION INTRAVENOUS ONCE
Status: COMPLETED | OUTPATIENT
Start: 2024-07-09 | End: 2024-07-09

## 2024-07-09 RX ORDER — DIPHENHYDRAMINE HYDROCHLORIDE 50 MG/ML
25 INJECTION INTRAMUSCULAR; INTRAVENOUS ONCE
Status: COMPLETED | OUTPATIENT
Start: 2024-07-09 | End: 2024-07-09

## 2024-07-09 RX ORDER — ACETAMINOPHEN 325 MG/1
650 TABLET ORAL ONCE
Status: COMPLETED | OUTPATIENT
Start: 2024-07-09 | End: 2024-07-09

## 2024-07-09 RX ADMIN — ACETAMINOPHEN 650 MG: 325 TABLET ORAL at 15:30

## 2024-07-09 RX ADMIN — SODIUM CHLORIDE 250 MG: 9 INJECTION, SOLUTION INTRAVENOUS at 15:39

## 2024-07-09 RX ADMIN — DIPHENHYDRAMINE HYDROCHLORIDE 25 MG: 50 INJECTION, SOLUTION INTRAMUSCULAR; INTRAVENOUS at 15:30

## 2024-07-09 RX ADMIN — FAMOTIDINE 20 MG: 10 INJECTION INTRAVENOUS at 15:30

## 2024-07-11 ENCOUNTER — OFFICE VISIT (OUTPATIENT)
Dept: FAMILY MEDICINE CLINIC | Facility: CLINIC | Age: 64
End: 2024-07-11
Payer: COMMERCIAL

## 2024-07-11 VITALS
SYSTOLIC BLOOD PRESSURE: 128 MMHG | WEIGHT: 228.4 LBS | RESPIRATION RATE: 20 BRPM | HEART RATE: 95 BPM | OXYGEN SATURATION: 98 % | BODY MASS INDEX: 38.05 KG/M2 | HEIGHT: 65 IN | DIASTOLIC BLOOD PRESSURE: 64 MMHG

## 2024-07-11 DIAGNOSIS — R42 VERTIGO: ICD-10-CM

## 2024-07-11 DIAGNOSIS — I10 ESSENTIAL HYPERTENSION: Chronic | ICD-10-CM

## 2024-07-11 DIAGNOSIS — R11.0 NAUSEA: Primary | ICD-10-CM

## 2024-07-11 PROCEDURE — 99214 OFFICE O/P EST MOD 30 MIN: CPT | Performed by: PHYSICIAN ASSISTANT

## 2024-07-11 RX ORDER — PROMETHAZINE HYDROCHLORIDE 25 MG/1
25 TABLET ORAL EVERY 6 HOURS PRN
Qty: 30 TABLET | Refills: 0 | Status: SHIPPED | OUTPATIENT
Start: 2024-07-11

## 2024-07-11 RX ORDER — OLMESARTAN MEDOXOMIL 20 MG/1
20 TABLET ORAL DAILY
Qty: 90 TABLET | Refills: 1 | Status: SHIPPED | OUTPATIENT
Start: 2024-07-11

## 2024-07-11 RX ORDER — AMLODIPINE BESYLATE 10 MG/1
10 TABLET ORAL DAILY
Qty: 90 TABLET | Refills: 1 | Status: SHIPPED | OUTPATIENT
Start: 2024-07-11

## 2024-07-11 RX ORDER — LORAZEPAM 1 MG/1
1 TABLET ORAL
COMMUNITY
Start: 2024-06-26 | End: 2024-07-26

## 2024-07-11 RX ORDER — ONDANSETRON 4 MG/1
4 TABLET, FILM COATED ORAL
COMMUNITY
Start: 2024-06-26

## 2024-07-11 RX ORDER — MECLIZINE HYDROCHLORIDE 25 MG/1
25 TABLET ORAL
COMMUNITY
Start: 2024-06-26 | End: 2024-07-16

## 2024-07-11 NOTE — PROGRESS NOTES
"Chief Complaint  Chief Complaint   Patient presents with    ER follow up     Vertigo         Subjective          Anika Casillas presents to [facility] for ER follow up.    History of Present Illness    Patient was seen in ER on 6.26.24.  ER course as below:  \"In summary this is a 63-year-old female patient present for evaluation of symptoms that I feel are most consistent with peripheral vertigo. She does not have any constant symptoms or neurodeficits to suggest an acute CVA anterior CT of her head and CTA of her head and neck was grossly unremarkable. Her symptoms are very positional. Consider vestibular neuritis versus labyrinthitis. We will send her home with meclizine, Ativan, Zofran and referred to ENT. Counseled on signs to return here for. Patient has verbalized understanding and agreed to the plan. Discharged in stable condition. Patient history, ROS, physical exam, labs, radiological workup and all pertinent findings were discussed with attending Dr. Schroeder.   ED Course as of 06/26/24 1644   Wed Jun 26, 2024   1303 1:03 PM  Bilateral [place]-Hallpike utilized. No nystagmus noted but the patient did note dizziness when turning the head. This resolved after about 30 seconds of having the patient lay still. Bilateral Epley maneuver utilized. No resolution of symptoms \"    Patient is doing better today.  Patient does have an appointment with ENT on 7/25.    Patient has been seeing uro-GYN/Dr. Christie in [place].  Patient now has neurostimulator and is doing better    In regards to mental health, patient is seeing Dr. Calvert.  Lexapro was discontinued and changed to Cymbalta.    Patient is seeing Dr. Ortega for iron deficiency and iron infusions have been started again.  Patient is feeling better.    HTN: Patient is on Norvasc 10 mg daily, Benicar 20 mg daily, Lasix 40 mg daily, metoprolol 50 mg 1.5 tablets twice daily.  Blood pressure is under control.  Patient denies chest pain, " shortness of air and edema.    Medical History: has a past medical history of Abnormal Pap smear of cervix, Atrial fibrillation, Colindres's esophagus, Depression, Diabetes mellitus, Emphysema lung, Endometrial hyperplasia, Esophageal varices, Fatty liver (2015), Gastroparesis, GERD (gastroesophageal reflux disease) (1995), Hyperlipidemia, Hypertension, Iron deficiency anemia (05/02/2023), Irritable bowel syndrome, BERKOWITZ (nonalcoholic steatohepatitis), Obstructive sleep apnea syndrome, Pulmonary emphysema (11/22/2021), and S/P exploratory laparotomy, lysis of adhesions, resection of necrotic colon without anastomosis (04/18/2022).   Surgical History: has a past surgical history that includes Gallbladder surgery; Breast lumpectomy (Left); Cardiac catheterization; bladder sling modified, anterior and posterior vaginal repair (2018); Tubal ligation; Exploratory Laparotomy (N/A, 04/16/2022); Esophagogastroduodenoscopy (N/A, 06/03/2022); Colectomy; d & c hysteroscopy (N/A, 07/25/2023); Colonoscopy (N/A, 08/14/2023); Abdominal surgery (4/16/2022); Cholecystectomy (5/12/2015); Upper gastrointestinal endoscopy (6/3/2022); Liver biopsy (2021); botox injection; Esophagogastroduodenoscopy (N/A, 04/22/2024); Colonoscopy (N/A, 04/22/2024); and Bladder surgery (05/01/2024).   Family History: family history includes ADD / ADHD in her son; No Known Problems in her brother, cousin, father, maternal aunt, maternal grandfather, maternal grandmother, maternal uncle, mother, paternal aunt, paternal grandfather, paternal grandmother, paternal uncle, sister, and another family member. She was adopted.   Social History: reports that she has been smoking cigarettes. She started smoking about 49 years ago. She has a 12.3 pack-year smoking history. She has never used smokeless tobacco. She reports that she does not currently use alcohol after a past usage of about 1.0 standard drink of alcohol per week. She reports that she does not use  "drugs.    Allergies: Liraglutide, Lisinopril, and Metformin    Health Maintenance Due   Topic Date Due    TDAP/TD VACCINES (1 - Tdap) Never done    DIABETIC FOOT EXAM  Never done    DIABETIC EYE EXAM  12/08/2021    Hepatitis B (1 of 3 - Risk 3-dose series) Never done    COLORECTAL CANCER SCREENING  10/22/2024       Objective     Vitals:    07/11/24 0859 07/11/24 0930   BP: 143/79 128/64   Pulse: 95    Resp: 20    SpO2: 98%    Weight: 104 kg (228 lb 6.4 oz)    Height: 165.1 cm (65\")      Body mass index is 38.01 kg/m².     Wt Readings from Last 3 Encounters:   07/16/24 108 kg (238 lb 8.6 oz)   07/11/24 104 kg (228 lb 6.4 oz)   07/02/24 107 kg (236 lb 5.3 oz)     BP Readings from Last 3 Encounters:   07/16/24 117/67   07/11/24 128/64   07/09/24 140/68     Patient Care Team:  Elena Henderson PA as PCP - General (Family Medicine)  KATHARINE Webb MD as Consulting Physician (Cardiology)  Cande Stanley MD as Consulting Physician (General Surgery)  Migdalia Sanon APRN as Nurse Practitioner (Nurse Practitioner)    Physical Exam  Vitals and nursing note reviewed.   Constitutional:       Appearance: Normal appearance. She is obese.   HENT:      Head: Normocephalic and atraumatic.   Neck:      Vascular: No carotid bruit.      Comments: Thyroid : gland size normal, nontender, no nodules or masses present on palpation   Cardiovascular:      Rate and Rhythm: Normal rate and regular rhythm.      Pulses: Normal pulses.      Heart sounds: Normal heart sounds.   Pulmonary:      Effort: Pulmonary effort is normal.      Breath sounds: Normal breath sounds.   Musculoskeletal:      Cervical back: Neck supple. No tenderness.      Right lower leg: No edema.      Left lower leg: No edema.   Lymphadenopathy:      Cervical: No cervical adenopathy.   Neurological:      Mental Status: She is alert.   Psychiatric:         Mood and Affect: Mood normal.         Behavior: Behavior normal.           Result Review :            "   Assessment and Plan      Diagnoses and all orders for this visit:    1. Nausea (Primary)  Comments:  Still persisting on occasion; acute sx associated with vertigo; refill phenergan; warned of sedation.  Orders:  -     promethazine (PHENERGAN) 25 MG tablet; Take 1 tablet by mouth Every 6 (Six) Hours As Needed for Nausea or Vomiting.  Dispense: 30 tablet; Refill: 0    2. Essential hypertension  Comments:  Stable: continue with current regimen and f/u in 6mth; continue with cardiology.  Orders:  -     amLODIPine (NORVASC) 10 MG tablet; Take 1 tablet by mouth Daily.  Dispense: 90 tablet; Refill: 1  -     olmesartan (BENICAR) 20 MG tablet; Take 1 tablet by mouth Daily.  Dispense: 90 tablet; Refill: 1    3. Vertigo  Comments:  Improved; patient has appt with ENT.            Follow Up     Return in about 6 months (around 1/11/2025).    Patient was given instructions and counseling regarding her condition or for health maintenance advice. Please see specific information pulled into the AVS if appropriate.

## 2024-07-12 ENCOUNTER — TELEPHONE (OUTPATIENT)
Dept: SLEEP MEDICINE | Facility: HOSPITAL | Age: 64
End: 2024-07-12
Payer: COMMERCIAL

## 2024-07-12 NOTE — TELEPHONE ENCOUNTER
Left message for patient to schedule annual follow up visit at [facility] at [phone], option 1 to schedule.

## 2024-07-16 ENCOUNTER — HOSPITAL ENCOUNTER (OUTPATIENT)
Dept: INFUSION THERAPY | Facility: HOSPITAL | Age: 64
Discharge: HOME | End: 2024-07-16
Admitting: INTERNAL MEDICINE
Payer: COMMERCIAL

## 2024-07-16 VITALS
HEART RATE: 86 BPM | DIASTOLIC BLOOD PRESSURE: 67 MMHG | HEIGHT: 65 IN | BODY MASS INDEX: 39.74 KG/M2 | OXYGEN SATURATION: 98 % | RESPIRATION RATE: 20 BRPM | TEMPERATURE: 98.7 F | WEIGHT: 238.54 LBS | SYSTOLIC BLOOD PRESSURE: 117 MMHG

## 2024-07-16 DIAGNOSIS — K31.84 DIABETIC GASTROPARESIS: ICD-10-CM

## 2024-07-16 DIAGNOSIS — D50.9 IRON DEFICIENCY ANEMIA, UNSPECIFIED IRON DEFICIENCY ANEMIA TYPE: ICD-10-CM

## 2024-07-16 DIAGNOSIS — K90.9 IRON MALABSORPTION: Primary | ICD-10-CM

## 2024-07-16 DIAGNOSIS — E11.43 DIABETIC GASTROPARESIS: ICD-10-CM

## 2024-07-16 PROCEDURE — 25810000003 SODIUM CHLORIDE 0.9 % SOLUTION: Performed by: INTERNAL MEDICINE

## 2024-07-16 PROCEDURE — 25010000002 DIPHENHYDRAMINE PER 50 MG: Performed by: INTERNAL MEDICINE

## 2024-07-16 PROCEDURE — 25010000002 NA FERRIC GLUC CPLX PER 12.5 MG: Performed by: INTERNAL MEDICINE

## 2024-07-16 PROCEDURE — 96367 TX/PROPH/DG ADDL SEQ IV INF: CPT

## 2024-07-16 PROCEDURE — 96365 THER/PROPH/DIAG IV INF INIT: CPT

## 2024-07-16 PROCEDURE — 96375 TX/PRO/DX INJ NEW DRUG ADDON: CPT

## 2024-07-16 PROCEDURE — 96366 THER/PROPH/DIAG IV INF ADDON: CPT

## 2024-07-16 PROCEDURE — 96374 THER/PROPH/DIAG INJ IV PUSH: CPT

## 2024-07-16 RX ORDER — FAMOTIDINE 10 MG/ML
20 INJECTION, SOLUTION INTRAVENOUS ONCE
Status: COMPLETED | OUTPATIENT
Start: 2024-07-16 | End: 2024-07-16

## 2024-07-16 RX ORDER — ACETAMINOPHEN 325 MG/1
650 TABLET ORAL ONCE
Status: COMPLETED | OUTPATIENT
Start: 2024-07-16 | End: 2024-07-16

## 2024-07-16 RX ADMIN — FAMOTIDINE 20 MG: 10 INJECTION INTRAVENOUS at 14:45

## 2024-07-16 RX ADMIN — ACETAMINOPHEN 650 MG: 325 TABLET ORAL at 14:45

## 2024-07-16 RX ADMIN — SODIUM CHLORIDE 250 MG: 9 INJECTION, SOLUTION INTRAVENOUS at 15:04

## 2024-07-16 RX ADMIN — DIPHENHYDRAMINE HYDROCHLORIDE 25 MG: 50 INJECTION, SOLUTION INTRAMUSCULAR; INTRAVENOUS at 14:44

## 2024-07-26 DIAGNOSIS — I48.0 PAROXYSMAL ATRIAL FIBRILLATION: Chronic | ICD-10-CM

## 2024-07-29 RX ORDER — APIXABAN 5 MG/1
5 TABLET, FILM COATED ORAL 2 TIMES DAILY
Qty: 180 TABLET | Refills: 0 | Status: SHIPPED | OUTPATIENT
Start: 2024-07-29

## 2024-07-30 ENCOUNTER — HOSPITAL ENCOUNTER (OUTPATIENT)
Dept: INFUSION THERAPY | Facility: HOSPITAL | Age: 64
Discharge: HOME | End: 2024-07-30
Admitting: INTERNAL MEDICINE
Payer: COMMERCIAL

## 2024-07-30 ENCOUNTER — OFFICE VISIT (OUTPATIENT)
Dept: PSYCHIATRY | Facility: CLINIC | Age: 64
End: 2024-07-30
Payer: COMMERCIAL

## 2024-07-30 VITALS
HEART RATE: 78 BPM | BODY MASS INDEX: 39.38 KG/M2 | WEIGHT: 236.33 LBS | SYSTOLIC BLOOD PRESSURE: 130 MMHG | RESPIRATION RATE: 18 BRPM | DIASTOLIC BLOOD PRESSURE: 62 MMHG | TEMPERATURE: 98.7 F | HEIGHT: 65 IN | OXYGEN SATURATION: 99 %

## 2024-07-30 VITALS
SYSTOLIC BLOOD PRESSURE: 130 MMHG | HEART RATE: 84 BPM | WEIGHT: 237.4 LBS | DIASTOLIC BLOOD PRESSURE: 64 MMHG | BODY MASS INDEX: 39.55 KG/M2 | HEIGHT: 65 IN

## 2024-07-30 DIAGNOSIS — K31.84 DIABETIC GASTROPARESIS: Primary | ICD-10-CM

## 2024-07-30 DIAGNOSIS — E11.43 DIABETIC GASTROPARESIS: Primary | ICD-10-CM

## 2024-07-30 DIAGNOSIS — F41.1 GENERALIZED ANXIETY DISORDER: ICD-10-CM

## 2024-07-30 DIAGNOSIS — D50.9 IRON DEFICIENCY ANEMIA, UNSPECIFIED IRON DEFICIENCY ANEMIA TYPE: ICD-10-CM

## 2024-07-30 DIAGNOSIS — F51.05 INSOMNIA DUE TO MENTAL CONDITION: ICD-10-CM

## 2024-07-30 DIAGNOSIS — K90.9 IRON MALABSORPTION: ICD-10-CM

## 2024-07-30 DIAGNOSIS — F33.1 MAJOR DEPRESSIVE DISORDER, RECURRENT EPISODE, MODERATE: Primary | ICD-10-CM

## 2024-07-30 PROCEDURE — 96375 TX/PRO/DX INJ NEW DRUG ADDON: CPT

## 2024-07-30 PROCEDURE — 96365 THER/PROPH/DIAG IV INF INIT: CPT

## 2024-07-30 PROCEDURE — 90833 PSYTX W PT W E/M 30 MIN: CPT | Performed by: STUDENT IN AN ORGANIZED HEALTH CARE EDUCATION/TRAINING PROGRAM

## 2024-07-30 PROCEDURE — 25010000002 NA FERRIC GLUC CPLX PER 12.5 MG: Performed by: INTERNAL MEDICINE

## 2024-07-30 PROCEDURE — 99214 OFFICE O/P EST MOD 30 MIN: CPT | Performed by: STUDENT IN AN ORGANIZED HEALTH CARE EDUCATION/TRAINING PROGRAM

## 2024-07-30 PROCEDURE — 25810000003 SODIUM CHLORIDE 0.9 % SOLUTION: Performed by: INTERNAL MEDICINE

## 2024-07-30 PROCEDURE — 96366 THER/PROPH/DIAG IV INF ADDON: CPT

## 2024-07-30 RX ORDER — FAMOTIDINE 10 MG/ML
20 INJECTION, SOLUTION INTRAVENOUS ONCE
Status: COMPLETED | OUTPATIENT
Start: 2024-07-30 | End: 2024-07-30

## 2024-07-30 RX ORDER — ACETAMINOPHEN 325 MG/1
650 TABLET ORAL ONCE
Status: COMPLETED | OUTPATIENT
Start: 2024-07-30 | End: 2024-07-30

## 2024-07-30 RX ORDER — BUPROPION HYDROCHLORIDE 300 MG/1
300 TABLET ORAL DAILY
Qty: 90 TABLET | Refills: 1 | Status: SHIPPED | OUTPATIENT
Start: 2024-07-30

## 2024-07-30 RX ORDER — DULOXETIN HYDROCHLORIDE 60 MG/1
60 CAPSULE, DELAYED RELEASE ORAL DAILY
Qty: 90 CAPSULE | Refills: 1 | Status: SHIPPED | OUTPATIENT
Start: 2024-07-30

## 2024-07-30 RX ORDER — TRAZODONE HYDROCHLORIDE 50 MG/1
50 TABLET ORAL NIGHTLY
Qty: 30 TABLET | Refills: 2 | Status: SHIPPED | OUTPATIENT
Start: 2024-07-30

## 2024-07-30 RX ADMIN — ACETAMINOPHEN 650 MG: 325 TABLET ORAL at 08:44

## 2024-07-30 RX ADMIN — FAMOTIDINE 20 MG: 10 INJECTION INTRAVENOUS at 08:44

## 2024-07-30 RX ADMIN — SODIUM CHLORIDE 250 MG: 9 INJECTION, SOLUTION INTRAVENOUS at 08:44

## 2024-07-30 NOTE — PATIENT INSTRUCTIONS
1.  Please return to clinic at your next scheduled visit.  Contact the clinic ([phone]) at least 24 hours prior in the event you need to cancel.  2.  Do no harm to yourself or others.    3.  Avoid alcohol and drugs.    4.  Take all medications as prescribed.  Please contact the clinic with any concerns. If you are in need of medication refills, please call the clinic at [phone].    5. Should you want to get in touch with your provider, Dr. Demetrius Calvert, please utilize [employer] or contact the office ([phone]), and staff will be able to page Dr. Calvert directly.  6.  In the event you have personal crisis, contact the following crisis numbers: Suicide Prevention Hotline 1-195.608.6587; AKBAR Helpline 8-273-570-AKBAR; [facility] Emergency Room [phone]; text HELLO to 906548; or 430.

## 2024-07-30 NOTE — PROGRESS NOTES
Subjective   Anika Casillas is a 64 y.o. female who presents today for initial evaluation     Referring Provider:  No referring provider defined for this encounter.    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   Per  note by neurology, this abnormality is benign and incidental.  Labs: 2024: CMP shows elevated alk phos 133, mildly elevated AST 95, ALT 87, reassuring thyroid studies, B12, folate, vitamin D  Initial Chart Review Notes: Sent to us in February by primary care for memory loss, fatigue, poor concentration.  Neuropsychological evaluation diagnosed her with mild cognitive impairment, monitor for progressive dementia, vascular AD, or mixed type, metabolic encephalopathy.  Recently given corrective action at work.  Concerned with her job performance.  She is on CPAP.  Seen this past year by sleep medicine.  She wants to take time off.  Depression.      Patient Psychotherapy Notes:  Patient goals:  Misc:  Nonalcoholic cirrhosis  Chronic kidney disease  Type 2 diabetes      Chart Review By Dates:  2024:  seen in ED for vertigo, urogyn x2, infusion x4, fam med, endocrine, onc. Neg CTOH. High ferritin, abnl iron profile, hgb 11.8 (anemia).    Plannin24: Taper off lexapro and start cymbalta. Cognitive issues leading to depression. Bupropion XL  "in am only (both). 6w    VISITS/APPOINTMENTS (BELOW):    \"Anika\"    07/30/2024: In person interview:  \"I'm good.\"  The cymbalta helped quite a bit.  Better but not where I need to be  Not sleeping, 3-4 hours a night  I want to cry but I just can't  Vertigo is gone  MDD: depressed mood  KERRY: worrying uncontrollably, on edge, irritable  Panic attacks: none  Energy: down  Concentration: down  Insomnia: initial  Eating/Weight: 237, 228 lbs  Refills: y  Substances: def  Therapy: n  Medication compliant: y  SE: n  No SI HI AVH.      05/01/2024: In person.  Interview:  His/Her Story: \"I made a mistake... 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  \"This really bothers me.\"  It's been slowly progressing. I have to think these days.  Lexapro for a few months.  I've been on antidepressants all my life.  Son dx'd with ADHD at 6 yo (Imtiaz), FOB is not , unsure if FOB has dx of ADHD  I didn't know my parents  Insomnia: sometimes won't sleep at all, other times sleeps too much  Depression/Mood:  Depressed mood, anhedonia, hopelessness or guilt, poor energy, poor concentration, insomnia.  Seasonal pattern:  Severity: Moderate  Duration: all of life  Anxiety:  Uncontrolled worrying, muscle tension, fatigue, poor concentration, feeling on edge or restless, irritability, insomnia.  Severity: Moderate  Duration: all of life  Panic attacks: one in her life 40's  ADHD:   Elementary school:   Grades? good  Special classes or failures? denies  Got in trouble? no  Referral for ADHD testing? no  Fhx: Son  Psych ROS: Positive for depression, anxiety.  Negative for psychosis and shamika.  PTSD: def  No SI HI AVH.  Medication compliant: y    Access to Firearms: denies    PHQ-9 Depression Screening  PHQ-9 Total Score:      Little interest or pleasure in doing things?     Feeling down, depressed, or hopeless?     Trouble falling or staying asleep, or sleeping too much?     Feeling tired or having little " energy?     Poor appetite or overeating?     Feeling bad about yourself - or that you are a failure or have let yourself or your family down?     Trouble concentrating on things, such as reading the newspaper or watching television?     Moving or speaking so slowly that other people could have noticed? Or the opposite - being so fidgety or restless that you have been moving around a lot more than usual?     Thoughts that you would be better off dead, or of hurting yourself in some way?     PHQ-9 Total Score       KERRY-7       Past Surgical History:  Past Surgical History:   Procedure Laterality Date    ABDOMINAL SURGERY  4/16/2022    BLADDER SLING MODIFIED, ANTERIOR AND POSTERIOR VAGINAL REPAIR  2018    [place]    BLADDER SURGERY  05/01/2024    stimulator  @ [facility]    BOTOX INJECTION      In abdomen    BREAST LUMPECTOMY Left     BENIGN    CARDIAC CATHETERIZATION      NO INTERVENTION    CHOLECYSTECTOMY  5/12/2015    COLON RESECTION      PERFORATED COLON, EEA    COLONOSCOPY N/A 08/14/2023    Procedure: COLONOSCOPY WITH POLYPECTOMY, TATTOO;  Surgeon: Shannan Charles MD;  Location: [facility] ENDOSCOPY;  Service: Gastroenterology;  Laterality: N/A;  COLON POLYPS  DIVERTICULOSIS  HEMORRHOIDS    COLONOSCOPY N/A 04/22/2024    Procedure: COLONOSCOPY COLD SNARE POLYPECTOMIES WITH CLIP PLACEMENT X3;  Surgeon: Shannan Charles MD;  Location: [facility] ENDOSCOPY;  Service: Gastroenterology;  Laterality: N/A;  DIVERTICULOSIS  COLON POLYPS    D & C HYSTEROSCOPY N/A 07/25/2023    Procedure: resection of endometrial polyp with myosure;  Surgeon: Ashleigh Harding DO;  Location: [facility] MAIN OR;  Service: Gynecology;  Laterality: N/A;    ENDOSCOPY N/A 06/03/2022    Procedure: ESOPHAGOGASTRODUODENOSCOPY;  Surgeon: Shannan Charles MD;  Location: [facility] ENDOSCOPY;  Service: Gastroenterology;  Laterality: N/A;  RETAINED FOOD IN STOMACH  ESOPHAGEAL VARICIES    ENDOSCOPY N/A 04/22/2024    Procedure:  ESOPHAGOGASTRODUODENOSCOPY WITH BIOPSIES;  Surgeon: Shannan Charles MD;  Location: [facility] ENDOSCOPY;  Service: Gastroenterology;  Laterality: N/A;  ESOPHAGEAL BIOPSIES    EXPLORATORY LAPAROTOMY N/A 04/16/2022    Procedure: EXPLORATORY LAPAROTOMY, LYSIS OF ADHESIONS, RESECTION OF NECTROIC COLON;  Surgeon: Cande Stanley MD;  Location: [facility] MAIN OR;  Service: General;  Laterality: N/A;    EYE SURGERY  2023    GALLBLADDER SURGERY      LAPAROSCOPIC    LIVER BIOPSY  2021    TUBAL ABDOMINAL LIGATION      UPPER GASTROINTESTINAL ENDOSCOPY  6/3/2022       Problem List:  Patient Active Problem List   Diagnosis    Diabetic gastroparesis    Diverticulosis of colon    Elevated transaminase level    Essential hypertension    Gastroesophageal reflux disease without esophagitis    Hx of colonic polyp    Hypothyroidism    Major depressive disorder with single episode, in full remission    Mixed hyperlipidemia    BERKOWITZ (nonalcoholic steatohepatitis)    Class 2 severe obesity due to excess calories with serious comorbidity and body mass index (BMI) of 36.0 to 36.9 in adult    OAB (overactive bladder)    Paroxysmal atrial fibrillation    Tobacco abuse    Type 2 diabetes mellitus with hyperglycemia, with long-term current use of insulin    Pulmonary emphysema    Colindres's esophagus without dysplasia    Secondary esophageal varices without bleeding    Encounter for long-term (current) use of insulin    Hyperinsulinism    Uncontrolled type 2 diabetes mellitus with neurologic complication    Class 2 obesity    Chronic gastritis without bleeding, unspecified gastritis type    Pneumoperitoneum of unknown etiology    S/P exploratory laparotomy, lysis of adhesions, resection of necrotic colon without anastomosis    Memory loss    RUKHSANA on CPAP    Abnormal finding on MRI of brain    Iron deficiency anemia    Non-alcoholic cirrhosis    Depression    Encephalopathy, portal systemic    Esophageal varices in cirrhosis    Alkaline  phosphatase raised    Iron deficiency    Other specified postprocedural states    Endometrial hyperplasia    Iron malabsorption    Endometrial polyp    Localized edema    Simple renal cyst    Esophageal varices without bleeding    Chronic kidney disease, stage 2 (mild)    Obesity (BMI 35.0-39.9 without comorbidity)    Cirrhosis of liver without ascites    Hepatic encephalopathy    History of colon polyps       Allergy:   Allergies   Allergen Reactions    Liraglutide Nausea And Vomiting    Lisinopril Other (See Comments)     Cough    Metformin Nausea Only     GI Upset        Discontinued Medications:  Medications Discontinued During This Encounter   Medication Reason    buPROPion XL (WELLBUTRIN XL) 150 MG 24 hr tablet Reorder    DULoxetine (CYMBALTA) 30 MG capsule Reorder         Current Medications:   Current Outpatient Medications   Medication Sig Dispense Refill    albuterol sulfate  (90 Base) MCG/ACT inhaler Inhale 2 puffs Every 4 (Four) Hours As Needed for Shortness of Air or Wheezing. 1 g 5    amLODIPine (NORVASC) 10 MG tablet Take 1 tablet by mouth Daily. 90 tablet 1    apixaban (Eliquis) 5 MG tablet tablet TAKE 1 TABLET BY MOUTH TWICE A  tablet 0    B-D UF III MINI PEN NEEDLES 31G X 5 MM misc       Blood Glucose Monitoring Suppl (Accu-Chek Guide) w/Device kit       buPROPion XL (WELLBUTRIN XL) 300 MG 24 hr tablet Take 1 tablet by mouth Daily. Two Xls in the morning. 90 tablet 1    Cholecalciferol (Vitamin D3) 50 MCG (2000 UT) capsule Take 3 capsules by mouth Daily. LAST DOSE 7/21/23      Continuous Blood Gluc Sensor (Dexcom G6 Sensor) APPLY 1 EACH TOPICALLY EVERY 10 (TEN) DAYS.      Continuous Blood Gluc Transmit (Dexcom G6 Transmitter) misc USE FOR 90 DAYS.      DULoxetine (CYMBALTA) 60 MG capsule Take 1 capsule by mouth Daily. 90 capsule 1    Evolocumab (Repatha) solution prefilled syringe injection Inject 1 mL under the skin into the appropriate area as directed Every 14 (Fourteen) Days. 6  each 3    famotidine (PEPCID) 40 MG tablet Take 1 tablet by mouth At Night As Needed for Heartburn. 90 tablet 3    Fluticasone-Umeclidin-Vilant (TRELEGY) 100-62.5-25 MCG/ACT inhaler Inhale 1 puff Daily. 1 each 5    furosemide (LASIX) 40 MG tablet Take 1 tablet by mouth Daily. 90 tablet 3    glucose blood test strip Check blood glucose twice daily 60 each 12    glucose monitor monitoring kit 1 each Daily. Patient prefers Accu Chek Meter. 1 each 0    icosapent ethyl (VASCEPA) 1 g capsule capsule Take 2 g by mouth 2 (Two) Times a Day With Meals. 360 capsule 1    Insulin Glargine, 2 Unit Dial, (Toujeo Max SoloStar) 300 UNIT/ML solution pen-injector injection Inject 122 Units under the skin into the appropriate area as directed Every Morning. INST PER ANESTHESIA PROTOCOL      Lancets (accu-chek soft touch) lancets Check blood sugar twice daily 100 each 12    metoprolol tartrate (LOPRESSOR) 50 MG tablet TAKE 1 AND 1/2 TABLETS BY MOUTH TWICE DAILY 270 tablet 1    metroNIDAZOLE (METROGEL) 0.75 % vaginal gel INSERT INTO THE VAGINA EVERY NIGHT AT BEDTIME FOR 7 DAYS.      Motegrity 2 MG tablet Take 1 tablet by mouth Daily. 90 tablet 3    Mounjaro 2.5 MG/0.5ML solution pen-injector pen Inject 0.5 mL under the skin into the appropriate area as directed 1 (One) Time Per Week.      multivitamin with minerals tablet tablet Take 1 tablet by mouth Daily. LAST DOSE 7/21/23      NovoLOG FlexPen 100 UNIT/ML solution pen-injector sc pen Inject 60 Units under the skin into the appropriate area as directed 2 (Two) Times a Day. PER SLIDING SCALE  INST PER ANESTHESIA PROTOCOL      olmesartan (BENICAR) 20 MG tablet Take 1 tablet by mouth Daily. 90 tablet 1    ondansetron (ZOFRAN) 4 MG tablet Take 1 tablet by mouth.      pantoprazole (PROTONIX) 40 MG EC tablet Take 1 tablet by mouth Daily. 90 tablet 3    potassium chloride 10 MEQ CR tablet Take 1 tablet by mouth Daily. 90 tablet 3    promethazine (PHENERGAN) 25 MG tablet Take 1 tablet by mouth  Every 6 (Six) Hours As Needed for Nausea or Vomiting. 30 tablet 0    riFAXIMin (Xifaxan) 550 MG tablet Take 1 tablet by mouth Every 12 (Twelve) Hours. 180 tablet 3    Semaglutide, 1 MG/DOSE, (OZEMPIC) 4 MG/3ML solution pen-injector Inject 1 mg under the skin into the appropriate area as directed. (Patient not taking: Reported on 2024)      traZODone (DESYREL) 50 MG tablet Take 1 tablet by mouth Every Night. 30 tablet 2    Vibegron (Gemtesa) 75 MG tablet Take 1 tablet by mouth Daily. (Patient not taking: Reported on 2024)       No current facility-administered medications for this visit.       Past Medical History:  Past Medical History:   Diagnosis Date    Abnormal Pap smear of cervix     Atrial fibrillation     ON ELIQUIS/FOLLOWS TROTTER    Colindres's esophagus     Depression     Diabetes mellitus     Emphysema lung     INHALER    Endometrial hyperplasia     D&C SCHEDULED 23    Esophageal varices     Fatty liver     Gastroparesis     TAKES XIFAXAN    GERD (gastroesophageal reflux disease)     Hyperlipidemia     Hypertension     Iron deficiency anemia 2023    IRON INFUSION LAST 23    Irritable bowel syndrome     W/CONSTIPATION    BERKOWITZ (nonalcoholic steatohepatitis)     NO S/S CURRENTLY, ELEVATED ENZYMES    Obstructive sleep apnea syndrome     Pulmonary emphysema 2021    S/P exploratory laparotomy, lysis of adhesions, resection of necrotic colon without anastomosis 2022       Past Psychiatric History:  Began Treatment: all my life, 20's  Diagnoses: MDD KERRY  Psychiatrist: 20's, then in 40's  Therapist:Denies  Admission History:Denies  Medication Trials:    Celexa worked for a while, then stopped    lexapro, bupropion    Cymbalta, might have worked    Self Harm: Denies  Suicide Attempts:Denies   Psychosis, Anxiety, Depression: denies    Substance Abuse History:   Types: 1/4 ppd, alcohol very rarely  Withdrawal Symptoms:Denies  Longest Period Sober:Not Applicable   AA:  Not applicable     Social History:  Martial Status:  Employed:No  Kids:Yes  House:Lives in a house   History: Denies    Social History     Socioeconomic History    Marital status:      Spouse name: fannie   Tobacco Use    Smoking status: Every Day     Current packs/day: 0.25     Average packs/day: 0.3 packs/day for 49.1 years (12.3 ttl pk-yrs)     Types: Cigarettes     Start date: 6/17/1975    Smokeless tobacco: Never    Tobacco comments:     INST PER ANESTHESIA PROTOCOL   Vaping Use    Vaping status: Never Used   Substance and Sexual Activity    Alcohol use: Not Currently     Alcohol/week: 1.0 standard drink of alcohol     Types: 1 Drinks containing 0.5 oz of alcohol per week     Comment: 1 a month    Drug use: Never    Sexual activity: Yes     Partners: Male     Birth control/protection: Post-menopausal, Tubal ligation       Family History:   Suicide Attempts: Denies  Suicide Completions:Denies      Family History   Adopted: Yes   Problem Relation Age of Onset    No Known Problems Mother     No Known Problems Father     No Known Problems Sister     No Known Problems Brother     No Known Problems Maternal Aunt     No Known Problems Paternal Aunt     No Known Problems Maternal Uncle     No Known Problems Paternal Uncle     No Known Problems Maternal Grandfather     No Known Problems Maternal Grandmother     No Known Problems Paternal Grandfather     No Known Problems Paternal Grandmother     No Known Problems Cousin     No Known Problems Other     ADD / ADHD Son     Malig Hyperthermia Neg Hx     Alcohol abuse Neg Hx     Anxiety disorder Neg Hx     Bipolar disorder Neg Hx     Dementia Neg Hx     Depression Neg Hx     Drug abuse Neg Hx     OCD Neg Hx     Paranoid behavior Neg Hx     Schizophrenia Neg Hx     Seizures Neg Hx     Self-Injurious Behavior  Neg Hx     Suicide Attempts Neg Hx        Developmental History:       Childhood: denies  High School:Completed  College: Nurse    Mental Status  "Exam  Appearance  : groomed, good eye contact, normal street clothes  Behavior  : pleasant and cooperative  Motor  : No abnormal  Speech  :normal rhythm, rate, volume, tone, not hyperverbal, not pressured, normal prosidy  Mood  : \"Better, but not where I need to be\"  Affect  : mild depression, mood congruent, good variability  Thought Content  : negative suicidal ideations, negative homicidal ideations, negative obsessions  Perceptions  : negative auditory hallucinations, negative visual hallucinations  Thought Process  : linear  Insight/Judgement  : Fair/fair  Cognition  : grossly intact  Attention   : intact      Review of Systems:  Review of Systems   Constitutional:  Negative for diaphoresis and fatigue.   HENT:  Negative for drooling.    Eyes:  Negative for visual disturbance.   Respiratory:  Negative for cough and shortness of breath.    Cardiovascular:  Positive for leg swelling. Negative for chest pain and palpitations.   Gastrointestinal:  Negative for nausea and vomiting.   Endocrine: Negative for cold intolerance and heat intolerance.   Genitourinary:  Negative for difficulty urinating.   Musculoskeletal:  Negative for joint swelling.   Allergic/Immunologic: Negative for immunocompromised state.   Neurological:  Negative for dizziness, seizures, speech difficulty and numbness.   Psychiatric/Behavioral:  Positive for confusion.        Physical Exam:  Physical Exam    Vital Signs:   /64   Pulse 84   Ht 165.1 cm (65\")   Wt 108 kg (237 lb 6.4 oz)   BMI 39.51 kg/m²      Lab Results:   Lab on 06/05/2024   Component Date Value Ref Range Status    Ferritin 06/05/2024 153.90 (H)  13.00 - 150.00 ng/mL Final    Iron 06/05/2024 55  37 - 145 mcg/dL Final    Iron Saturation (TSAT) 06/05/2024 15 (L)  20 - 50 % Final    Transferrin 06/05/2024 245  200 - 360 mg/dL Final    TIBC 06/05/2024 365  298 - 536 mcg/dL Final    WBC 06/05/2024 8.01  3.40 - 10.80 10*3/mm3 Final    RBC 06/05/2024 3.99  3.77 - 5.28 10*6/mm3 " Final    Hemoglobin 06/05/2024 11.8 (L)  12.0 - 15.9 g/dL Final    Hematocrit 06/05/2024 33.9 (L)  34.0 - 46.6 % Final    MCV 06/05/2024 85.0  79.0 - 97.0 fL Final    MCH 06/05/2024 29.6  26.6 - 33.0 pg Final    MCHC 06/05/2024 34.8  31.5 - 35.7 g/dL Final    RDW 06/05/2024 18.3 (H)  12.3 - 15.4 % Final    RDW-SD 06/05/2024 56.6 (H)  37.0 - 54.0 fl Final    MPV 06/05/2024 11.0  6.0 - 12.0 fL Final    Platelets 06/05/2024 233  140 - 450 10*3/mm3 Final    Neutrophil % 06/05/2024 48.1  42.7 - 76.0 % Final    Lymphocyte % 06/05/2024 40.8  19.6 - 45.3 % Final    Monocyte % 06/05/2024 8.1  5.0 - 12.0 % Final    Eosinophil % 06/05/2024 2.0  0.3 - 6.2 % Final    Basophil % 06/05/2024 0.9  0.0 - 1.5 % Final    Immature Grans % 06/05/2024 0.1  0.0 - 0.5 % Final    Neutrophils, Absolute 06/05/2024 3.85  1.70 - 7.00 10*3/mm3 Final    Lymphocytes, Absolute 06/05/2024 3.27 (H)  0.70 - 3.10 10*3/mm3 Final    Monocytes, Absolute 06/05/2024 0.65  0.10 - 0.90 10*3/mm3 Final    Eosinophils, Absolute 06/05/2024 0.16  0.00 - 0.40 10*3/mm3 Final    Basophils, Absolute 06/05/2024 0.07  0.00 - 0.20 10*3/mm3 Final    Immature Grans, Absolute 06/05/2024 0.01  0.00 - 0.05 10*3/mm3 Final    nRBC 06/05/2024 0.0  0.0 - 0.2 /100 WBC Final   Admission on 04/22/2024, Discharged on 04/22/2024   Component Date Value Ref Range Status    Glucose 04/22/2024 172 (H)  70 - 99 mg/dL Final    Serial Number: [id number]:  [id number]    Case Report 04/22/2024    Final                    Value:Surgical Pathology Report                         Case: [id number]                                  Authorizing Provider:  Shannan Charles MD Collected:           04/22/2024 12:19 PM          Ordering Location:     [facility] Received:            04/22/2024 01:10 PM                                 SUITES                                                                       Pathologist:           Gregory Cedillo MD                                                             Specimens:   1) - Large Intestine, Right / Ascending Colon, Ascending Colon Polyps                               2) - Large Intestine, Left / Descending Colon, Descending Colon Polyps                              3) - Large Intestine, Sigmoid Colon, Sigmoid Colon Polyps                                           4) - Gastric, Antrum, Antrum Biopsies                                                      Clinical Information 04/22/2024    Final                    Value:This result contains rich text formatting which cannot be displayed here.    Final Diagnosis 04/22/2024    Final                    Value:This result contains rich text formatting which cannot be displayed here.    Gross Description 04/22/2024    Final                    Value:This result contains rich text formatting which cannot be displayed here.    Microscopic Description 04/22/2024    Final                    Value:This result contains rich text formatting which cannot be displayed here.   Lab on 02/29/2024   Component Date Value Ref Range Status    Glucose 02/29/2024 82  65 - 99 mg/dL Final    BUN 02/29/2024 17  8 - 23 mg/dL Final    Creatinine 02/29/2024 0.92  0.57 - 1.00 mg/dL Final    Sodium 02/29/2024 142  136 - 145 mmol/L Final    Potassium 02/29/2024 4.4  3.5 - 5.2 mmol/L Final    Chloride 02/29/2024 108 (H)  98 - 107 mmol/L Final    CO2 02/29/2024 22.0  22.0 - 29.0 mmol/L Final    Calcium 02/29/2024 10.0  8.6 - 10.5 mg/dL Final    Total Protein 02/29/2024 8.8 (H)  6.0 - 8.5 g/dL Final    Albumin 02/29/2024 4.0  3.5 - 5.2 g/dL Final    ALT (SGPT) 02/29/2024 87 (H)  1 - 33 U/L Final    AST (SGOT) 02/29/2024 95 (H)  1 - 32 U/L Final    Alkaline Phosphatase 02/29/2024 133 (H)  39 - 117 U/L Final    Total Bilirubin 02/29/2024 0.3  0.0 - 1.2 mg/dL Final    Globulin 02/29/2024 4.8  gm/dL Final    A/G Ratio 02/29/2024 0.8  g/dL Final    BUN/Creatinine Ratio 02/29/2024 18.5  7.0 - 25.0 Final    Anion Gap 02/29/2024 12.0   5.0 - 15.0 mmol/L Final    eGFR 02/29/2024 71.0  >60.0 mL/min/1.73 Final    TSH 02/29/2024 2.570  0.270 - 4.200 uIU/mL Final    Free T4 02/29/2024 0.97  0.93 - 1.70 ng/dL Final    T4 results may be falsely increased if patient taking Biotin.    Vitamin B-12 02/29/2024 1,669 (H)  211 - 946 pg/mL Final    Folate 02/29/2024 >20.00  4.78 - 24.20 ng/mL Final    25 Hydroxy, Vitamin D 02/29/2024 41.1  30.0 - 100.0 ng/ml Final   Office Visit on 02/21/2024   Component Date Value Ref Range Status    Hemoglobin A1C 02/21/2024 9.9 (A)  4.5 - 5.7 % Final    Lot Number 02/21/2024 [id number]   Final    Expiration Date 02/21/2024 07/30/2025   Final   Lab on 02/19/2024   Component Date Value Ref Range Status    Urine Culture 02/19/2024 No growth   Final   Lab on 02/13/2024   Component Date Value Ref Range Status    WBC 02/13/2024 7.29  3.40 - 10.80 10*3/mm3 Final    RBC 02/13/2024 4.34  3.77 - 5.28 10*6/mm3 Final    Hemoglobin 02/13/2024 12.3  12.0 - 15.9 g/dL Final    Hematocrit 02/13/2024 36.5  34.0 - 46.6 % Final    MCV 02/13/2024 84.1  79.0 - 97.0 fL Final    MCH 02/13/2024 28.3  26.6 - 33.0 pg Final    MCHC 02/13/2024 33.7  31.5 - 35.7 g/dL Final    RDW 02/13/2024 16.5 (H)  12.3 - 15.4 % Final    RDW-SD 02/13/2024 49.7  37.0 - 54.0 fl Final    MPV 02/13/2024 11.1  6.0 - 12.0 fL Final    Platelets 02/13/2024 273  140 - 450 10*3/mm3 Final    Neutrophil % 02/13/2024 46.9  42.7 - 76.0 % Final    Lymphocyte % 02/13/2024 41.4  19.6 - 45.3 % Final    Monocyte % 02/13/2024 8.8  5.0 - 12.0 % Final    Eosinophil % 02/13/2024 1.8  0.3 - 6.2 % Final    Basophil % 02/13/2024 0.8  0.0 - 1.5 % Final    Immature Grans % 02/13/2024 0.3  0.0 - 0.5 % Final    Neutrophils, Absolute 02/13/2024 3.42  1.70 - 7.00 10*3/mm3 Final    Lymphocytes, Absolute 02/13/2024 3.02  0.70 - 3.10 10*3/mm3 Final    Monocytes, Absolute 02/13/2024 0.64  0.10 - 0.90 10*3/mm3 Final    Eosinophils, Absolute 02/13/2024 0.13  0.00 - 0.40 10*3/mm3 Final    Basophils,  Absolute 02/13/2024 0.06  0.00 - 0.20 10*3/mm3 Final    Immature Grans, Absolute 02/13/2024 0.02  0.00 - 0.05 10*3/mm3 Final    nRBC 02/13/2024 0.1  0.0 - 0.2 /100 WBC Final   Lab on 02/13/2024   Component Date Value Ref Range Status    Total Cholesterol 02/13/2024 130  0 - 200 mg/dL Final    Triglycerides 02/13/2024 100  0 - 150 mg/dL Final    HDL Cholesterol 02/13/2024 45  40 - 60 mg/dL Final    LDL Cholesterol  02/13/2024 66  0 - 100 mg/dL Final    VLDL Cholesterol 02/13/2024 19  5 - 40 mg/dL Final    LDL/HDL Ratio 02/13/2024 1.44   Final    Ferritin 02/13/2024 55.38  13.00 - 150.00 ng/mL Final    Iron 02/13/2024 63  37 - 145 mcg/dL Final    Iron Saturation (TSAT) 02/13/2024 14 (L)  20 - 50 % Final    Transferrin 02/13/2024 305  200 - 360 mg/dL Final    TIBC 02/13/2024 454  298 - 536 mcg/dL Final       EKG Results:  No orders to display       Imaging Results:  XR Chest 2 View    Result Date: 4/25/2024  No acute process.  Electronically Signed By-Graham Quiles MD On:4/25/2024 12:49 PM      US Abdomen Limited    Result Date: 3/5/2024    1. Enlarged heterogeneous appearance of the liver with nodular contour compatible with cirrhosis.  No lesion noted within the visualized liver parenchyma by sonographic evaluation     CAROLE SRINIVASAN MD       ELECTRONICALLY SIGNED AND APPROVED BY: CAROLE SRINIVASAN MD ON 3/05/2024 AT 10:07             CT Chest Low Dose Follow Up Without Contrast    Result Date: 2/22/2024    1. No evidence of lung cancer. 2. Previously identified nodular density in left lower lobe has resolved, and was likely infectious or inflammatory. 3. No acute cardiopulmonary process. 4. Lung RADS category 1 (negative, less than 1% chance of malignancy) 5. Recommend continued annual screening with low-dose CT chest.      CLOVIS NORTH MD       Electronically Signed and Approved By: CLOVIS NORTH MD on 2/22/2024 at 9:21                 Assessment & Plan   Diagnoses and all orders for this visit:    1. Major  depressive disorder, recurrent episode, moderate (Primary)  -     buPROPion XL (WELLBUTRIN XL) 300 MG 24 hr tablet; Take 1 tablet by mouth Daily. Two Xls in the morning.  Dispense: 90 tablet; Refill: 1  -     DULoxetine (CYMBALTA) 60 MG capsule; Take 1 capsule by mouth Daily.  Dispense: 90 capsule; Refill: 1    2. Generalized anxiety disorder  -     buPROPion XL (WELLBUTRIN XL) 300 MG 24 hr tablet; Take 1 tablet by mouth Daily. Two Xls in the morning.  Dispense: 90 tablet; Refill: 1  -     DULoxetine (CYMBALTA) 60 MG capsule; Take 1 capsule by mouth Daily.  Dispense: 90 capsule; Refill: 1    3. Insomnia due to mental condition  -     buPROPion XL (WELLBUTRIN XL) 300 MG 24 hr tablet; Take 1 tablet by mouth Daily. Two Xls in the morning.  Dispense: 90 tablet; Refill: 1  -     traZODone (DESYREL) 50 MG tablet; Take 1 tablet by mouth Every Night.  Dispense: 30 tablet; Refill: 2  -     DULoxetine (CYMBALTA) 60 MG capsule; Take 1 capsule by mouth Daily.  Dispense: 90 capsule; Refill: 1        Visit Diagnoses:    ICD-10-CM ICD-9-CM   1. Major depressive disorder, recurrent episode, moderate  F33.1 296.32   2. Generalized anxiety disorder  F41.1 300.02   3. Insomnia due to mental condition  F51.05 300.9     327.02     07/30/2024: Improving, increase cymbalta, start trazodone for insomnia. Wants to start volunteering at [employer].    Allowed patient to freely discuss and process issues, such as:  Anxiety and depression regarding medical conditions.  ... using Rogerian psychotherapeutic techniques including unconditional positive regard, reflective listening, and demonstrating clear empathy, with the goal of ameliorating symptoms and maintaining, restoring, or improving self-esteem, adaptive skills, and ego or psychological functions (Alexis et al. 1991).  Time (minutes) spent providing supportive psychotherapy: 16  (This time is exclusive to the therapy session and separate from the time spent on activities used to meet  the criteria for the E/M service (history, exam, medical decision-making).)  Start: 2:57  Stop: 3:13  Functional status: mild impairment  Treatment plan: Medication management and supportive psychotherapy  Prognosis: good  Progress: improving  4w    5/1/24: Taper off lexapro and start cymbalta. Cognitive issues leading to depression. Bupropion XL in am only (both). 6w    PLAN:  Safety: No acute safety concerns  Therapy: None  Risk Assessment: Risk of self-harm acutely is moderate.  Risk factors include anxiety disorder, mood disorder, and recent psychosocial stressors (pandemic). Protective factors include no family history, denies access to guns/weapons, no present SI, no history of suicide attempts or self-harm in the past, minimal AODA, healthcare seeking, future orientation, willingness to engage in care.  Risk of self-harm chronically is also moderate, but could be further elevated in the event of treatment noncompliance and/or AODA.  Meds:  CONTINUE Bupropion  mg daily. Risks, benefits, alternatives discussed with patient including nausea, GI upset, increased energy, exacerbation of irritability, insomnia, lowering of seizure threshold.  After discussion of these risks and benefits, the patient voiced understanding and agreed to proceed.  INCREASE cymbalta 30 to 60 mg qam. Risks, benefits, alternatives discussed with patient including GI upset, nausea vomiting diarrhea, theoretical decrease of seizure threshold predisposing the patient to a slightly higher seizure risk, headaches, sexual dysfunction, serotonin syndrome, bleeding risk, increased suicidality in patients 24 years and younger, switching to shamika/hypomania.  Also constipation and urinary retention.  After discussion of these risks and benefits, the patient voiced understanding and agreed to proceed.  START trazodone 50 mg qhs. Risks, benefits, side effects discussed with patient including GI upset, sedation, dizziness/falls risk, grogginess  the following day, prolongation of the QTc interval.  Do not use before operating vehicle, vessel, or machine. After discussion of these risks and benefits, the patient voiced understanding and agreed to proceed.    S/P:  Lexapro 10 mg qday. Ineffective 7/24  Labs: none    Patient screened positive for depression based on a PHQ-9 score of 21 on 5/1/2024. Follow-up recommendations include: Prescribed antidepressant medication treatment and Suicide Risk Assessment performed.           TREATMENT PLAN/GOALS: Continue supportive psychotherapy efforts and medications as indicated. Treatment and medication options discussed during today's visit. Patient acknowledged and verbally consented to continue with current treatment plan and was educated on the importance of compliance with treatment and follow-up appointments.    MEDICATION ISSUES:  ORTIZ reviewed as expected.  Discussed medication options and treatment plan of prescribed medication as well as the risks, benefits, and side effects including potential falls, possible impaired driving and metabolic adversities among others. Patient is agreeable to call the office with any worsening of symptoms or onset of side effects. Patient is agreeable to call 911 or go to the nearest ER should he/she begin having SI/HI. No medication side effects or related complaints today.     MEDS ORDERED DURING VISIT:  New Medications Ordered This Visit   Medications    buPROPion XL (WELLBUTRIN XL) 300 MG 24 hr tablet     Sig: Take 1 tablet by mouth Daily. Two Xls in the morning.     Dispense:  90 tablet     Refill:  1    traZODone (DESYREL) 50 MG tablet     Sig: Take 1 tablet by mouth Every Night.     Dispense:  30 tablet     Refill:  2    DULoxetine (CYMBALTA) 60 MG capsule     Sig: Take 1 capsule by mouth Daily.     Dispense:  90 capsule     Refill:  1     Replaces 30 mg dose       Return in about 4 weeks (around 8/27/2024).         This document has been electronically signed by Demetrius LIN  MD Enrike  July 30, 2024 15:11 EDT    Dictated Utilizing Dragon Dictation: Part of this note may be an electronic transcription/translation of spoken language to printed text using the Dragon Dictation System.

## 2024-08-10 DIAGNOSIS — I10 ESSENTIAL HYPERTENSION: ICD-10-CM

## 2024-08-12 ENCOUNTER — OFFICE VISIT (OUTPATIENT)
Dept: PULMONOLOGY | Facility: CLINIC | Age: 64
End: 2024-08-12
Payer: COMMERCIAL

## 2024-08-12 VITALS
DIASTOLIC BLOOD PRESSURE: 65 MMHG | OXYGEN SATURATION: 95 % | HEIGHT: 65 IN | SYSTOLIC BLOOD PRESSURE: 114 MMHG | WEIGHT: 234 LBS | RESPIRATION RATE: 18 BRPM | TEMPERATURE: 98.4 F | HEART RATE: 79 BPM | BODY MASS INDEX: 38.99 KG/M2

## 2024-08-12 DIAGNOSIS — Z72.0 TOBACCO ABUSE: ICD-10-CM

## 2024-08-12 DIAGNOSIS — J43.9 PULMONARY EMPHYSEMA, UNSPECIFIED EMPHYSEMA TYPE: Primary | ICD-10-CM

## 2024-08-12 DIAGNOSIS — G47.33 OSA ON CPAP: ICD-10-CM

## 2024-08-12 PROCEDURE — 99214 OFFICE O/P EST MOD 30 MIN: CPT | Performed by: STUDENT IN AN ORGANIZED HEALTH CARE EDUCATION/TRAINING PROGRAM

## 2024-08-12 RX ORDER — IPRATROPIUM BROMIDE AND ALBUTEROL SULFATE 2.5; .5 MG/3ML; MG/3ML
3 SOLUTION RESPIRATORY (INHALATION) 4 TIMES DAILY PRN
Qty: 360 ML | Refills: 3 | Status: SHIPPED | OUTPATIENT
Start: 2024-08-12

## 2024-08-12 RX ORDER — METOPROLOL TARTRATE 50 MG/1
75 TABLET, FILM COATED ORAL 2 TIMES DAILY
Qty: 270 TABLET | Refills: 1 | OUTPATIENT
Start: 2024-08-12

## 2024-08-12 NOTE — PROGRESS NOTES
Primary Care Provider  Elena Henderson PA   Referring Provider  No ref. provider found      Patient Complaint  Follow-up (5 Months) and Pulmonary emphysema, unspecified emphysema type      Subjective          Anika Casillas presents to [facility]      History of Presenting Illness  Anika Casillas is a 64 y.o. female with history of memory impairment, RUKHSANA on CPAP, hypertension, diabetes, COPD, tobacco use here for follow up.     Interval Update: Patient is doing fair today.  She continues to have chronic cough and chronic dyspnea on exertion.  Her cough is worse in the morning and late at night.  No new symptoms at this time.  No fever, chills, worsening shortness of breath or sputum production.  She was previously taking Wellbutrin but due to depression this was changed to Cymbalta 60 mg daily.  She continues to wear her CPAP nightly.  She continues to smoke quarter pack per day.  She is trying to quit but finds it difficult.    Review of Systems  Constitutional:  No fever. No chills. No weakness.  Eyes: No pain, erythema, or discharge. No blurring of vision.  ENT:  No sore throat, URI symptoms.   Cardiovascular:  No chest pain. No palpitations. No lower extremity edema.  Respiratory:  +chronic cough; no pleuritic chest pain. No hemoptysis. +chronic dyspnea on exertion   GI:  Normal appetite. No nausea, vomiting, diarrhea. No pain. No melena.  Musculoskeletal:  No arthralgias or myalgias.  Neurologic:  No headache. No weakness.    Family History   Adopted: Yes   Problem Relation Age of Onset    No Known Problems Mother     No Known Problems Father     No Known Problems Sister     No Known Problems Brother     No Known Problems Maternal Aunt     No Known Problems Paternal Aunt     No Known Problems Maternal Uncle     No Known Problems Paternal Uncle     No Known Problems Maternal Grandfather     No Known Problems Maternal Grandmother     No Known Problems  Paternal Grandfather     No Known Problems Paternal Grandmother     No Known Problems Cousin     No Known Problems Other     ADD / ADHD Son     Malig Hyperthermia Neg Hx     Alcohol abuse Neg Hx     Anxiety disorder Neg Hx     Bipolar disorder Neg Hx     Dementia Neg Hx     Depression Neg Hx     Drug abuse Neg Hx     OCD Neg Hx     Paranoid behavior Neg Hx     Schizophrenia Neg Hx     Seizures Neg Hx     Self-Injurious Behavior  Neg Hx     Suicide Attempts Neg Hx         Social History     Socioeconomic History    Marital status:      Spouse name: fannie   Tobacco Use    Smoking status: Every Day     Current packs/day: 0.25     Average packs/day: 0.3 packs/day for 49.2 years (12.3 ttl pk-yrs)     Types: Cigarettes     Start date: 6/17/1975    Smokeless tobacco: Never    Tobacco comments:     INST PER ANESTHESIA PROTOCOL   Vaping Use    Vaping status: Never Used   Substance and Sexual Activity    Alcohol use: Yes     Alcohol/week: 1.0 standard drink of alcohol     Types: 1 Drinks containing 0.5 oz of alcohol per week     Comment: 1 a month    Drug use: Never    Sexual activity: Yes     Partners: Male     Birth control/protection: Post-menopausal, Tubal ligation        Past Medical History:   Diagnosis Date    Abnormal Pap smear of cervix     Atrial fibrillation     ON ELIQUIS/FOLLOWS TROTTER    Colindres's esophagus     Depression     Diabetes mellitus     Emphysema lung     INHALER    Endometrial hyperplasia     D&C SCHEDULED 7/25/23    Esophageal varices     Fatty liver 2015    Gastroparesis     TAKES XIFAXAN    GERD (gastroesophageal reflux disease) 1995    Hyperlipidemia     Hypertension     Iron deficiency anemia 05/02/2023    IRON INFUSION LAST 7/24/23    Irritable bowel syndrome     W/CONSTIPATION    BERKOWITZ (nonalcoholic steatohepatitis)     NO S/S CURRENTLY, ELEVATED ENZYMES    Obstructive sleep apnea syndrome     Pulmonary emphysema 11/22/2021    S/P exploratory laparotomy, lysis of adhesions, resection  of necrotic colon without anastomosis 04/18/2022        Immunization History   Administered Date(s) Administered    COVID-19 (PFIZER) Purple Cap Monovalent 02/06/2021, 03/06/2021, 12/04/2021    Flu Vaccine Intradermal Quad 18-64YR 09/12/2021, 09/12/2021    Fluzone (or Fluarix & Flulaval for VFC) >6mos 10/29/2019, 09/27/2020, 10/03/2022, 10/05/2023    Influenza Split Preservative Free ID 09/12/2021    Influenza, Unspecified 11/12/2018, 09/27/2020    Pneumococcal Conjugate 13-Valent (PCV13) 01/09/2017    Pneumococcal Polysaccharide (PPSV23) 10/12/2020    flucelvax quad pfs =>4 YRS 09/27/2020       Allergies   Allergen Reactions    Liraglutide Nausea And Vomiting    Lisinopril Other (See Comments)     Cough    Metformin Nausea Only     GI Upset          Current Outpatient Medications:     albuterol sulfate  (90 Base) MCG/ACT inhaler, Inhale 2 puffs Every 4 (Four) Hours As Needed for Shortness of Air or Wheezing., Disp: 1 g, Rfl: 5    amLODIPine (NORVASC) 10 MG tablet, Take 1 tablet by mouth Daily., Disp: 90 tablet, Rfl: 1    apixaban (Eliquis) 5 MG tablet tablet, TAKE 1 TABLET BY MOUTH TWICE A DAY, Disp: 180 tablet, Rfl: 0    B-D UF III MINI PEN NEEDLES 31G X 5 MM misc, , Disp: , Rfl:     Blood Glucose Monitoring Suppl (Accu-Chek Guide) w/Device kit, , Disp: , Rfl:     buPROPion XL (WELLBUTRIN XL) 300 MG 24 hr tablet, Take 1 tablet by mouth Daily. Two Xls in the morning., Disp: 90 tablet, Rfl: 1    Cholecalciferol (Vitamin D3) 50 MCG (2000 UT) capsule, Take 3 capsules by mouth Daily. LAST DOSE 7/21/23, Disp: , Rfl:     Continuous Blood Gluc Sensor (Dexcom G6 Sensor), APPLY 1 EACH TOPICALLY EVERY 10 (TEN) DAYS., Disp: , Rfl:     Continuous Blood Gluc Transmit (Dexcom G6 Transmitter) misc, USE FOR 90 DAYS., Disp: , Rfl:     DULoxetine (CYMBALTA) 60 MG capsule, Take 1 capsule by mouth Daily., Disp: 90 capsule, Rfl: 1    Evolocumab (Repatha) solution prefilled syringe injection, Inject 1 mL under the skin into the  appropriate area as directed Every 14 (Fourteen) Days., Disp: 6 each, Rfl: 3    famotidine (PEPCID) 40 MG tablet, Take 1 tablet by mouth At Night As Needed for Heartburn., Disp: 90 tablet, Rfl: 3    Fluticasone-Umeclidin-Vilant (TRELEGY) 100-62.5-25 MCG/ACT inhaler, Inhale 1 puff Daily., Disp: 1 each, Rfl: 5    furosemide (LASIX) 40 MG tablet, Take 1 tablet by mouth Daily., Disp: 90 tablet, Rfl: 3    glucose blood test strip, Check blood glucose twice daily, Disp: 60 each, Rfl: 12    glucose monitor monitoring kit, 1 each Daily. Patient prefers Accu Chek Meter., Disp: 1 each, Rfl: 0    icosapent ethyl (VASCEPA) 1 g capsule capsule, Take 2 g by mouth 2 (Two) Times a Day With Meals., Disp: 360 capsule, Rfl: 1    Insulin Glargine, 2 Unit Dial, (Toujeo Max SoloStar) 300 UNIT/ML solution pen-injector injection, Inject 122 Units under the skin into the appropriate area as directed Every Morning. INST PER ANESTHESIA PROTOCOL, Disp: , Rfl:     Lancets (accu-chek soft touch) lancets, Check blood sugar twice daily, Disp: 100 each, Rfl: 12    metoprolol tartrate (LOPRESSOR) 50 MG tablet, TAKE 1 AND 1/2 TABLETS BY MOUTH TWICE DAILY, Disp: 270 tablet, Rfl: 1    metroNIDAZOLE (METROGEL) 0.75 % vaginal gel, INSERT INTO THE VAGINA EVERY NIGHT AT BEDTIME FOR 7 DAYS., Disp: , Rfl:     Motegrity 2 MG tablet, Take 1 tablet by mouth Daily., Disp: 90 tablet, Rfl: 3    Mounjaro 2.5 MG/0.5ML solution pen-injector pen, Inject 0.5 mL under the skin into the appropriate area as directed 1 (One) Time Per Week., Disp: , Rfl:     multivitamin with minerals tablet tablet, Take 1 tablet by mouth Daily. LAST DOSE 7/21/23, Disp: , Rfl:     NovoLOG FlexPen 100 UNIT/ML solution pen-injector sc pen, Inject 60 Units under the skin into the appropriate area as directed 2 (Two) Times a Day. PER SLIDING SCALE INST PER ANESTHESIA PROTOCOL, Disp: , Rfl:     olmesartan (BENICAR) 20 MG tablet, Take 1 tablet by mouth Daily., Disp: 90 tablet, Rfl: 1     ondansetron (ZOFRAN) 4 MG tablet, Take 1 tablet by mouth., Disp: , Rfl:     pantoprazole (PROTONIX) 40 MG EC tablet, Take 1 tablet by mouth Daily., Disp: 90 tablet, Rfl: 3    potassium chloride 10 MEQ CR tablet, Take 1 tablet by mouth Daily., Disp: 90 tablet, Rfl: 3    promethazine (PHENERGAN) 25 MG tablet, Take 1 tablet by mouth Every 6 (Six) Hours As Needed for Nausea or Vomiting., Disp: 30 tablet, Rfl: 0    riFAXIMin (Xifaxan) 550 MG tablet, Take 1 tablet by mouth Every 12 (Twelve) Hours., Disp: 180 tablet, Rfl: 3    Semaglutide, 1 MG/DOSE, (OZEMPIC) 4 MG/3ML solution pen-injector, Inject 1 mg under the skin into the appropriate area as directed., Disp: , Rfl:     traZODone (DESYREL) 50 MG tablet, Take 1 tablet by mouth Every Night., Disp: 30 tablet, Rfl: 2    Vibegron (Gemtesa) 75 MG tablet, Take 1 tablet by mouth Daily., Disp: , Rfl:      Objective     Vital Signs:   There were no vitals taken for this visit.    Physical Exam  There were no vitals filed for this visit.      General: Alert, NAD  HEENT:  EOMI, no sinus tenderness  Neck:  Supple, no thyromegaly,  no JVD  Lymph: no cervical, supraclavicular lymphadenopathy bilaterally  Chest:  clear to auscultation bilaterally, no wheezing or crackles; no work of breathing noted on room air  CV: RRR, no M/G/R, pulses 2+, equal.  Abd:  Soft, NT, ND, +BS, obese  EXT:  no clubbing, no cyanosis, no edema b/l  Neuro:  A&Ox3, CN grossly intact, no focal deficits  Skin: No rashes or lesions noted       Result Review :   I have personally reviewed patient's labs and images.          Assessment and Plan      Patient Active Problem List   Diagnosis    Diabetic gastroparesis    Diverticulosis of colon    Elevated transaminase level    Essential hypertension    Gastroesophageal reflux disease without esophagitis    Hx of colonic polyp    Hypothyroidism    Major depressive disorder with single episode, in full remission    Mixed hyperlipidemia    BERKOWITZ (nonalcoholic  steatohepatitis)    Class 2 severe obesity due to excess calories with serious comorbidity and body mass index (BMI) of 36.0 to 36.9 in adult    OAB (overactive bladder)    Paroxysmal atrial fibrillation    Tobacco abuse    Type 2 diabetes mellitus with hyperglycemia, with long-term current use of insulin    Pulmonary emphysema    Colindres's esophagus without dysplasia    Secondary esophageal varices without bleeding    Encounter for long-term (current) use of insulin    Hyperinsulinism    Uncontrolled type 2 diabetes mellitus with neurologic complication    Class 2 obesity    Chronic gastritis without bleeding, unspecified gastritis type    Pneumoperitoneum of unknown etiology    S/P exploratory laparotomy, lysis of adhesions, resection of necrotic colon without anastomosis    Memory loss    RUKHSANA on CPAP    Abnormal finding on MRI of brain    Iron deficiency anemia    Non-alcoholic cirrhosis    Depression    Encephalopathy, portal systemic    Esophageal varices in cirrhosis    Alkaline phosphatase raised    Iron deficiency    Other specified postprocedural states    Endometrial hyperplasia    Iron malabsorption    Endometrial polyp    Localized edema    Simple renal cyst    Esophageal varices without bleeding    Chronic kidney disease, stage 2 (mild)    Obesity (BMI 35.0-39.9 without comorbidity)    Cirrhosis of liver without ascites    Hepatic encephalopathy    History of colon polyps       Impression and Plan:    COPD/emphysema  Active smoker (4 cigs/day)  Proximal A. fib on Eliquis  History of hypertension  RUKHSANA on CPAP    -Continue Trelegy Ellipta daily and albuterol as needed.    -Will give nebulizer machine and start DuoNeb as needed  - PFTs done 7/5/2022 showed normal spirometry, mild hyperinflation with signs of air trapping, and moderate reduction in DLCO.  There was no response to bronchodilator therapy.   -2D echo done 1/27/23 showed EF 66-70%, LVH, G1DD  -Patient has Lasix as needed for lower extremity  edema.  She does not take it due to increased urination.  Last CMP showed potassium 3.7.  Continue to monitor.  -Low-dose CT for cancer screening done 2/21/2024 without evidence of lung cancer.  Previous nodular density in the left lower lobe resolved.  Will continue annual screening, next due 02/2025  -Continue PPI for acid reflux  -Continue jonathan pot for postnasal drip if beneficial  -Continue CPAP with [employer] for RUKHSANA  -Continue discussion regarding smoking cessation; recently switched to Cymbalta (from Wellbutrin); will defer Chantix at this time  -Follow-up in 5-6 months or earlier as needed     Vaccination status: Patient is up-to-date with her vaccinations  Medications personally reviewed.    Follow Up   No follow-ups on file.  Patient was given instructions and counseling regarding her condition or for health maintenance advice. Please see specific information pulled into the AVS if appropriate.

## 2024-09-03 ENCOUNTER — LAB (OUTPATIENT)
Dept: LAB | Facility: HOSPITAL | Age: 64
End: 2024-09-03
Payer: COMMERCIAL

## 2024-09-03 DIAGNOSIS — D50.9 IRON DEFICIENCY ANEMIA, UNSPECIFIED IRON DEFICIENCY ANEMIA TYPE: ICD-10-CM

## 2024-09-03 DIAGNOSIS — R74.8 ELEVATED LIVER ENZYMES: ICD-10-CM

## 2024-09-03 LAB
BASOPHILS # BLD AUTO: 0.07 10*3/MM3 (ref 0–0.2)
BASOPHILS NFR BLD AUTO: 0.8 % (ref 0–1.5)
DEPRECATED RDW RBC AUTO: 50.5 FL (ref 37–54)
EOSINOPHIL # BLD AUTO: 0.12 10*3/MM3 (ref 0–0.4)
EOSINOPHIL NFR BLD AUTO: 1.4 % (ref 0.3–6.2)
ERYTHROCYTE [DISTWIDTH] IN BLOOD BY AUTOMATED COUNT: 15.6 % (ref 12.3–15.4)
FERRITIN SERPL-MCNC: 571.9 NG/ML (ref 13–150)
HCT VFR BLD AUTO: 35.9 % (ref 34–46.6)
HGB BLD-MCNC: 12.3 G/DL (ref 12–15.9)
IMM GRANULOCYTES # BLD AUTO: 0.02 10*3/MM3 (ref 0–0.05)
IMM GRANULOCYTES NFR BLD AUTO: 0.2 % (ref 0–0.5)
IRON 24H UR-MRATE: 56 MCG/DL (ref 37–145)
IRON SATN MFR SERPL: 19 % (ref 20–50)
LYMPHOCYTES # BLD AUTO: 3.41 10*3/MM3 (ref 0.7–3.1)
LYMPHOCYTES NFR BLD AUTO: 40.1 % (ref 19.6–45.3)
MCH RBC QN AUTO: 30.8 PG (ref 26.6–33)
MCHC RBC AUTO-ENTMCNC: 34.3 G/DL (ref 31.5–35.7)
MCV RBC AUTO: 89.8 FL (ref 79–97)
MONOCYTES # BLD AUTO: 0.79 10*3/MM3 (ref 0.1–0.9)
MONOCYTES NFR BLD AUTO: 9.3 % (ref 5–12)
NEUTROPHILS NFR BLD AUTO: 4.09 10*3/MM3 (ref 1.7–7)
NEUTROPHILS NFR BLD AUTO: 48.2 % (ref 42.7–76)
NRBC BLD AUTO-RTO: 0 /100 WBC (ref 0–0.2)
PLATELET # BLD AUTO: 186 10*3/MM3 (ref 140–450)
PMV BLD AUTO: 12.2 FL (ref 6–12)
RBC # BLD AUTO: 4 10*6/MM3 (ref 3.77–5.28)
TIBC SERPL-MCNC: 297 MCG/DL (ref 298–536)
TRANSFERRIN SERPL-MCNC: 199 MG/DL (ref 200–360)
WBC NRBC COR # BLD AUTO: 8.5 10*3/MM3 (ref 3.4–10.8)

## 2024-09-03 PROCEDURE — 84466 ASSAY OF TRANSFERRIN: CPT

## 2024-09-03 PROCEDURE — 80053 COMPREHEN METABOLIC PANEL: CPT

## 2024-09-03 PROCEDURE — 83540 ASSAY OF IRON: CPT

## 2024-09-03 PROCEDURE — 82728 ASSAY OF FERRITIN: CPT

## 2024-09-03 PROCEDURE — 36415 COLL VENOUS BLD VENIPUNCTURE: CPT

## 2024-09-03 PROCEDURE — 85025 COMPLETE CBC W/AUTO DIFF WBC: CPT

## 2024-09-04 ENCOUNTER — OFFICE VISIT (OUTPATIENT)
Dept: ONCOLOGY | Facility: HOSPITAL | Age: 64
End: 2024-09-04

## 2024-09-04 VITALS
HEART RATE: 87 BPM | HEIGHT: 65 IN | RESPIRATION RATE: 20 BRPM | TEMPERATURE: 97.6 F | OXYGEN SATURATION: 98 % | DIASTOLIC BLOOD PRESSURE: 74 MMHG | BODY MASS INDEX: 39.22 KG/M2 | SYSTOLIC BLOOD PRESSURE: 148 MMHG | WEIGHT: 235.4 LBS

## 2024-09-04 DIAGNOSIS — R74.8 ELEVATED LIVER ENZYMES: ICD-10-CM

## 2024-09-04 DIAGNOSIS — D50.9 IRON DEFICIENCY ANEMIA, UNSPECIFIED IRON DEFICIENCY ANEMIA TYPE: Primary | ICD-10-CM

## 2024-09-04 LAB
ALBUMIN SERPL-MCNC: 3.7 G/DL (ref 3.5–5.2)
ALBUMIN/GLOB SERPL: 0.8 G/DL
ALP SERPL-CCNC: 141 U/L (ref 39–117)
ALT SERPL W P-5'-P-CCNC: 82 U/L (ref 1–33)
ANION GAP SERPL CALCULATED.3IONS-SCNC: 15 MMOL/L (ref 5–15)
AST SERPL-CCNC: 98 U/L (ref 1–32)
BILIRUB SERPL-MCNC: 0.3 MG/DL (ref 0–1.2)
BUN SERPL-MCNC: 15 MG/DL (ref 8–23)
BUN/CREAT SERPL: 16.7 (ref 7–25)
CALCIUM SPEC-SCNC: 10.2 MG/DL (ref 8.6–10.5)
CHLORIDE SERPL-SCNC: 107 MMOL/L (ref 98–107)
CO2 SERPL-SCNC: 20 MMOL/L (ref 22–29)
CREAT SERPL-MCNC: 0.9 MG/DL (ref 0.57–1)
EGFRCR SERPLBLD CKD-EPI 2021: 72.4 ML/MIN/1.73
GLOBULIN UR ELPH-MCNC: 4.4 GM/DL
GLUCOSE SERPL-MCNC: 124 MG/DL (ref 65–99)
POTASSIUM SERPL-SCNC: 3.7 MMOL/L (ref 3.5–5.2)
PROT SERPL-MCNC: 8.1 G/DL (ref 6–8.5)
SODIUM SERPL-SCNC: 142 MMOL/L (ref 136–145)

## 2024-09-04 PROCEDURE — 99214 OFFICE O/P EST MOD 30 MIN: CPT | Performed by: NURSE PRACTITIONER

## 2024-09-04 PROCEDURE — G0463 HOSPITAL OUTPT CLINIC VISIT: HCPCS | Performed by: NURSE PRACTITIONER

## 2024-09-04 NOTE — PROGRESS NOTES
Chief Complaint/Reason for Referral:  Follow-up (Anemia/labs/)    Elena Henderson,*  Elena Henderson, [place]    Records Obtained:  Records of the patients history including those obtained from  [facility] and patient information  were reviewed and summarized in detail.    Subjective    History of Present Illness    Ms. Anika Casillas presents for follow up for RUMA. Completed IV iron infusions back in July with Ferrlecit. Had Venofer infusions in March of this year.     History of BERKOWITZ cirrhosis / esophageal varices. Denies any bleeding upper or lower she is aware of. Reports ongoing fatiuge. Reports shortness of breath with exertion. Occasionally has CP. Follows with cardiology. Follows with GI. Mild trace swelling in the lower extremities. She reports pulmonary put her on an inhaler.       Labs 9/3/2023: hemoglobin of 12.3 which is normal. Normal WBC and normal platelet count. CMP with alk phos elevated 133 and stable. AST / ALT elevated in February of 2024. Iron stable at 56, ferritin elevated 571, iron sat 19%. TIBC 297.         Oncology/Hematology History    No history exists.       Review of Systems   Constitutional: Negative.    HENT: Negative.     Eyes: Negative.    Respiratory: Negative.     Cardiovascular: Negative.    Gastrointestinal:  Positive for nausea and vomiting.   Endocrine: Negative.    Genitourinary: Negative.    Musculoskeletal:         Ankle swelling   Skin: Negative.    Allergic/Immunologic: Negative.    Neurological: Negative.  Positive for dizziness.   Hematological: Negative.    Psychiatric/Behavioral: Negative.     All other systems reviewed and are negative.      Current Outpatient Medications on File Prior to Visit   Medication Sig Dispense Refill    albuterol sulfate  (90 Base) MCG/ACT inhaler Inhale 2 puffs Every 4 (Four) Hours As Needed for Shortness of Air or Wheezing. 1 g 5    amLODIPine (NORVASC) 10 MG tablet Take 1 tablet by mouth Daily. 90 tablet 1    apixaban (Eliquis)  5 MG tablet tablet TAKE 1 TABLET BY MOUTH TWICE A  tablet 0    B-D UF III MINI PEN NEEDLES 31G X 5 MM misc       Blood Glucose Monitoring Suppl (Accu-Chek Guide) w/Device kit       buPROPion XL (WELLBUTRIN XL) 300 MG 24 hr tablet Take 1 tablet by mouth Daily. Two Xls in the morning. 90 tablet 1    Cholecalciferol (Vitamin D3) 50 MCG (2000 UT) capsule Take 3 capsules by mouth Daily. LAST DOSE 7/21/23      Continuous Blood Gluc Sensor (Dexcom G6 Sensor) APPLY 1 EACH TOPICALLY EVERY 10 (TEN) DAYS.      Continuous Blood Gluc Transmit (Dexcom G6 Transmitter) misc USE FOR 90 DAYS.      DULoxetine (CYMBALTA) 60 MG capsule Take 1 capsule by mouth Daily. 90 capsule 1    Evolocumab (Repatha) solution prefilled syringe injection Inject 1 mL under the skin into the appropriate area as directed Every 14 (Fourteen) Days. 6 each 3    famotidine (PEPCID) 40 MG tablet Take 1 tablet by mouth At Night As Needed for Heartburn. 90 tablet 3    Fluticasone-Umeclidin-Vilant (TRELEGY) 100-62.5-25 MCG/ACT inhaler Inhale 1 puff Daily. 1 each 5    furosemide (LASIX) 40 MG tablet Take 1 tablet by mouth Daily. 90 tablet 3    glucose blood test strip Check blood glucose twice daily 60 each 12    glucose monitor monitoring kit 1 each Daily. Patient prefers Accu Chek Meter. 1 each 0    icosapent ethyl (VASCEPA) 1 g capsule capsule Take 2 g by mouth 2 (Two) Times a Day With Meals. 360 capsule 1    Insulin Glargine, 2 Unit Dial, (Toujeo Max SoloStar) 300 UNIT/ML solution pen-injector injection Inject 122 Units under the skin into the appropriate area as directed Every Morning. INST PER ANESTHESIA PROTOCOL      ipratropium-albuterol (DUO-NEB) 0.5-2.5 mg/3 ml nebulizer Take 3 mL by nebulization 4 (Four) Times a Day As Needed for Wheezing or Shortness of Air. 360 mL 3    Lancets (accu-chek soft touch) lancets Check blood sugar twice daily 100 each 12    metoprolol tartrate (LOPRESSOR) 50 MG tablet TAKE 1 AND 1/2 TABLETS BY MOUTH TWICE DAILY 270  tablet 1    Mounjaro 2.5 MG/0.5ML solution pen-injector pen Inject 0.5 mL under the skin into the appropriate area as directed 1 (One) Time Per Week.      multivitamin with minerals tablet tablet Take 1 tablet by mouth Daily. LAST DOSE 7/21/23      NovoLOG FlexPen 100 UNIT/ML solution pen-injector sc pen Inject 60 Units under the skin into the appropriate area as directed 2 (Two) Times a Day. PER SLIDING SCALE  INST PER ANESTHESIA PROTOCOL      olmesartan (BENICAR) 20 MG tablet Take 1 tablet by mouth Daily. 90 tablet 1    pantoprazole (PROTONIX) 40 MG EC tablet Take 1 tablet by mouth Daily. 90 tablet 3    potassium chloride 10 MEQ CR tablet Take 1 tablet by mouth Daily. 90 tablet 3    promethazine (PHENERGAN) 25 MG tablet Take 1 tablet by mouth Every 6 (Six) Hours As Needed for Nausea or Vomiting. 30 tablet 0    riFAXIMin (Xifaxan) 550 MG tablet Take 1 tablet by mouth Every 12 (Twelve) Hours. 180 tablet 3    traZODone (DESYREL) 50 MG tablet Take 1 tablet by mouth Every Night. 30 tablet 2    metroNIDAZOLE (METROGEL) 0.75 % vaginal gel INSERT INTO THE VAGINA EVERY NIGHT AT BEDTIME FOR 7 DAYS. (Patient not taking: Reported on 9/4/2024)      Motegrity 2 MG tablet Take 1 tablet by mouth Daily. (Patient not taking: Reported on 9/4/2024) 90 tablet 3    ondansetron (ZOFRAN) 4 MG tablet Take 1 tablet by mouth. (Patient not taking: Reported on 9/4/2024)      Semaglutide, 1 MG/DOSE, (OZEMPIC) 4 MG/3ML solution pen-injector Inject 1 mg under the skin into the appropriate area as directed. (Patient not taking: Reported on 9/4/2024)      Vibegron (Gemtesa) 75 MG tablet Take 1 tablet by mouth Daily. (Patient not taking: Reported on 9/4/2024)       No current facility-administered medications on file prior to visit.       Allergies   Allergen Reactions    Liraglutide Nausea And Vomiting    Lisinopril Other (See Comments)     Cough    Metformin Nausea Only     GI Upset     Past Medical History:   Diagnosis Date    Abnormal Pap smear  of cervix     Atrial fibrillation     ON ELIQUIS/FOLLOWS TROTTER    Colindres's esophagus     Depression     Diabetes mellitus     Emphysema lung     INHALER    Endometrial hyperplasia     D&C SCHEDULED 7/25/23    Esophageal varices     Fatty liver 2015    Gastroparesis     TAKES XIFAXAN    GERD (gastroesophageal reflux disease) 1995    Hyperlipidemia     Hypertension     Iron deficiency anemia 05/02/2023    IRON INFUSION LAST 7/24/23    Irritable bowel syndrome     W/CONSTIPATION    BERKOWITZ (nonalcoholic steatohepatitis)     NO S/S CURRENTLY, ELEVATED ENZYMES    Obstructive sleep apnea syndrome     Pulmonary emphysema 11/22/2021    S/P exploratory laparotomy, lysis of adhesions, resection of necrotic colon without anastomosis 04/18/2022     Past Surgical History:   Procedure Laterality Date    ABDOMINAL SURGERY  4/16/2022    BLADDER SLING MODIFIED, ANTERIOR AND POSTERIOR VAGINAL REPAIR  2018    [place]    BLADDER SURGERY  05/01/2024    stimulator  @ [facility]    BOTOX INJECTION      In abdomen    BREAST LUMPECTOMY Left     BENIGN    CARDIAC CATHETERIZATION      NO INTERVENTION    CHOLECYSTECTOMY  5/12/2015    COLON RESECTION      PERFORATED COLON, EEA    COLONOSCOPY N/A 08/14/2023    Procedure: COLONOSCOPY WITH POLYPECTOMY, TATTOO;  Surgeon: Shannan Charles MD;  Location: [facility] ENDOSCOPY;  Service: Gastroenterology;  Laterality: N/A;  COLON POLYPS  DIVERTICULOSIS  HEMORRHOIDS    COLONOSCOPY N/A 04/22/2024    Procedure: COLONOSCOPY COLD SNARE POLYPECTOMIES WITH CLIP PLACEMENT X3;  Surgeon: Shannan Charles MD;  Location: [facility] ENDOSCOPY;  Service: Gastroenterology;  Laterality: N/A;  DIVERTICULOSIS  COLON POLYPS    D & C HYSTEROSCOPY N/A 07/25/2023    Procedure: resection of endometrial polyp with myosure;  Surgeon: Ashleigh Harding DO;  Location: [facility] MAIN OR;  Service: Gynecology;  Laterality: N/A;    ENDOSCOPY N/A 06/03/2022    Procedure: ESOPHAGOGASTRODUODENOSCOPY;  Surgeon: Shannan Charles  MD Taisha;  Location: [facility] ENDOSCOPY;  Service: Gastroenterology;  Laterality: N/A;  RETAINED FOOD IN STOMACH  ESOPHAGEAL VARICIES    ENDOSCOPY N/A 04/22/2024    Procedure: ESOPHAGOGASTRODUODENOSCOPY WITH BIOPSIES;  Surgeon: Shannan Charles MD;  Location: [facility] ENDOSCOPY;  Service: Gastroenterology;  Laterality: N/A;  ESOPHAGEAL BIOPSIES    EXPLORATORY LAPAROTOMY N/A 04/16/2022    Procedure: EXPLORATORY LAPAROTOMY, LYSIS OF ADHESIONS, RESECTION OF NECTROIC COLON;  Surgeon: Cande Stanley MD;  Location: [facility] MAIN OR;  Service: General;  Laterality: N/A;    EYE SURGERY  2023    GALLBLADDER SURGERY      LAPAROSCOPIC    LIVER BIOPSY  2021    TUBAL ABDOMINAL LIGATION      UPPER GASTROINTESTINAL ENDOSCOPY  6/3/2022     Social History     Socioeconomic History    Marital status:      Spouse name: fannie   Tobacco Use    Smoking status: Every Day     Current packs/day: 0.25     Average packs/day: 0.3 packs/day for 49.2 years (12.3 ttl pk-yrs)     Types: Cigarettes     Start date: 6/17/1975    Smokeless tobacco: Never    Tobacco comments:     INST PER ANESTHESIA PROTOCOL   Vaping Use    Vaping status: Never Used   Substance and Sexual Activity    Alcohol use: Yes     Alcohol/week: 1.0 standard drink of alcohol     Types: 1 Drinks containing 0.5 oz of alcohol per week     Comment: 1 a month    Drug use: Never    Sexual activity: Yes     Partners: Male     Birth control/protection: Post-menopausal, Tubal ligation     Family History   Adopted: Yes   Problem Relation Age of Onset    No Known Problems Mother     No Known Problems Father     No Known Problems Sister     No Known Problems Brother     No Known Problems Maternal Aunt     No Known Problems Paternal Aunt     No Known Problems Maternal Uncle     No Known Problems Paternal Uncle     No Known Problems Maternal Grandfather     No Known Problems Maternal Grandmother     No Known Problems Paternal Grandfather     No Known Problems Paternal  Grandmother     No Known Problems Cousin     No Known Problems Other     ADD / ADHD Son     Vic Hyperthermia Neg Hx     Alcohol abuse Neg Hx     Anxiety disorder Neg Hx     Bipolar disorder Neg Hx     Dementia Neg Hx     Depression Neg Hx     Drug abuse Neg Hx     OCD Neg Hx     Paranoid behavior Neg Hx     Schizophrenia Neg Hx     Seizures Neg Hx     Self-Injurious Behavior  Neg Hx     Suicide Attempts Neg Hx      Immunization History   Administered Date(s) Administered    COVID-19 (PFIZER) Purple Cap Monovalent 02/06/2021, 03/06/2021, 12/04/2021    Flu Vaccine Intradermal Quad 18-64YR 09/12/2021, 09/12/2021    Fluzone (or Fluarix & Flulaval for VFC) >6mos 10/29/2019, 09/27/2020, 10/03/2022, 10/05/2023    Influenza Split Preservative Free ID 09/12/2021    Influenza, Unspecified 11/12/2018, 09/27/2020    Pneumococcal Conjugate 13-Valent (PCV13) 01/09/2017    Pneumococcal Polysaccharide (PPSV23) 10/12/2020    flucelvax quad pfs =>4 YRS 09/27/2020       Tobacco Use: High Risk (9/4/2024)    Patient History     Smoking Tobacco Use: Every Day     Smokeless Tobacco Use: Never     Passive Exposure: Not on file       Objective     Physical Exam  Vitals and nursing note reviewed.   Constitutional:       Appearance: Normal appearance.   HENT:      Head: Normocephalic.      Mouth/Throat:      Mouth: Mucous membranes are moist.   Eyes:      Pupils: Pupils are equal, round, and reactive to light.   Cardiovascular:      Rate and Rhythm: Normal rate and regular rhythm.      Pulses: Normal pulses.      Heart sounds: Normal heart sounds. No murmur heard.  Pulmonary:      Effort: Pulmonary effort is normal. No respiratory distress.      Breath sounds: Normal breath sounds.   Abdominal:      Palpations: Abdomen is soft.   Musculoskeletal:         General: Normal range of motion.      Cervical back: Normal range of motion and neck supple.      Right lower leg: Edema (trace) present.      Left lower leg: Edema (trace) present.  "  Skin:     General: Skin is warm and dry.      Capillary Refill: Capillary refill takes less than 2 seconds.   Neurological:      General: No focal deficit present.      Mental Status: She is alert and oriented to person, place, and time.   Psychiatric:         Mood and Affect: Mood normal.         Behavior: Behavior normal.         Thought Content: Thought content normal.         Judgment: Judgment normal.         Vitals:    09/04/24 1531   BP: 148/74   Pulse: 87   Resp: 20   Temp: 97.6 °F (36.4 °C)   TempSrc: Temporal   SpO2: 98%   Weight: 107 kg (235 lb 6.4 oz)   Height: 165.1 cm (65\")   PainSc: 0-No pain       Wt Readings from Last 3 Encounters:   09/04/24 107 kg (235 lb 6.4 oz)   08/12/24 106 kg (234 lb)   07/30/24 107 kg (236 lb 5.3 oz)          ECOG score: 0         ECOG: (0) Fully Active - Able to Carry On All Pre-disease Performance Without Restriction  Fall Risk Assessment was completed, and patient is at low risk for falls.  PHQ-9 Total Score: 0       The patient is  experiencing fatigue. Fatigue score: 6    PT/OT Functional Screening: PT fx screen : No needs identified  Speech Functional Screening: Speech fx screen : No needs identified  Rehab to be ordered: Rehab to be ordered : No needs identified        Result Review :  The following data was reviewed by: CHU Pedraza on 09/04/2024:  Lab Results   Component Value Date    HGB 12.3 09/03/2024    HCT 35.9 09/03/2024    MCV 89.8 09/03/2024     09/03/2024    WBC 8.50 09/03/2024    NEUTROABS 4.09 09/03/2024    LYMPHSABS 3.41 (H) 09/03/2024    MONOSABS 0.79 09/03/2024    EOSABS 0.12 09/03/2024    BASOSABS 0.07 09/03/2024     Lab Results   Component Value Date    GLUCOSE 2+ (A) 07/01/2024    BUN 17 02/29/2024    CREATININE 0.92 02/29/2024     02/29/2024    K 4.4 02/29/2024     (H) 02/29/2024    CO2 22.0 02/29/2024    CALCIUM 10.0 02/29/2024    PROTEINTOT 9.0 (H) 05/03/2024    ALBUMIN 4.1 05/03/2024    BILITOT 0.3 02/29/2024 "    ALKPHOS 133 (H) 02/29/2024    AST 95 (H) 02/29/2024    ALT 87 (H) 02/29/2024     Lab Results   Component Value Date    FERRITIN 571.90 (H) 09/03/2024    KPFCGPOL50 1,669 (H) 02/29/2024    FOLATE >20.00 02/29/2024     Lab Results   Component Value Date    IRON 56 09/03/2024    LABIRON 19 (L) 09/03/2024    TRANSFERRIN 199 (L) 09/03/2024    TIBC 297 (L) 09/03/2024     Lab Results   Component Value Date    FERRITIN 571.90 (H) 09/03/2024    HOTTGIQR20 1,669 (H) 02/29/2024    FOLATE >20.00 02/29/2024     Lab Results   Component Value Date    AFP 2.15 01/20/2023       CTA HEAD NECK W WO CONTRAST    Result Date: 6/26/2024  No evidence of large vessel occlusion. Electronically signed by DAVI CUEVA    CT Head Without Contrast    Result Date: 6/26/2024  No CT evidence of acute intracranial abnormality. Electronically signed by López Bonilla           Assessment and Plan:  Diagnoses and all orders for this visit:    1. Iron deficiency anemia, unspecified iron deficiency anemia type (Primary)  -     CBC & Differential; Future  -     Comprehensive Metabolic Panel; Future  -     Iron Profile; Future  -     Ferritin; Future    2. Elevated liver enzymes  -     Comprehensive Metabolic Panel; Future  -     CBC & Differential; Future  -     Comprehensive Metabolic Panel; Future  -     Iron Profile; Future  -     Ferritin; Future    RUMA secondary to GI blood loss. History of Berkowitz and esophageal varices. Follows with GI. Last IV iron infusions in July with ferrlecit infusion x 4. Tolerated well. Unable to take oral iron due to gastroparesis. Plan to recheck iron panel, ferritin, CBC in 3 months or sooner if needed.     Liver enzymes elevated to BERKOWITZ cirrhosis. Denies any GI blood loss upper or lower. If any acute bleeding, she should go to ER for evaluation.     Repeat iron panel, ferritin, CBC in 3 months to see if additional IV iron is needed.     Follow up with GI as scheduled.     Follow up with cardiology as scheduled.      Folow up with PCP as scheduled.       I spent 30 minutes caring for Anika on this date of service. This time includes time spent by me in the following activities:preparing for the visit, reviewing tests, obtaining and/or reviewing a separately obtained history, performing a medically appropriate examination and/or evaluation , counseling and educating the patient/family/caregiver, ordering medications, tests, or procedures, referring and communicating with other health care professionals , documenting information in the medical record, and independently interpreting results and communicating that information with the patient/family/caregiver    Patient Follow Up: 3 months. Labs 1-2 days prior to 3 month follow up with Dr. Ortega.     Patient was given instructions and counseling regarding her condition or for health maintenance advice. Please see specific information pulled into the AVS if appropriate.     Maday Queen, APRN    9/4/2024    .[email or web address]

## 2024-09-05 ENCOUNTER — HOSPITAL ENCOUNTER (EMERGENCY)
Facility: HOSPITAL | Age: 64
Discharge: HOME | End: 2024-09-05
Attending: EMERGENCY MEDICINE
Payer: MEDICAID

## 2024-09-05 ENCOUNTER — APPOINTMENT (OUTPATIENT)
Facility: HOSPITAL | Age: 64
End: 2024-09-05
Payer: MEDICAID

## 2024-09-05 VITALS
BODY MASS INDEX: 39.16 KG/M2 | WEIGHT: 235.01 LBS | HEIGHT: 65 IN | SYSTOLIC BLOOD PRESSURE: 107 MMHG | TEMPERATURE: 98 F | OXYGEN SATURATION: 98 % | HEART RATE: 77 BPM | DIASTOLIC BLOOD PRESSURE: 78 MMHG | RESPIRATION RATE: 18 BRPM

## 2024-09-05 DIAGNOSIS — S80.12XA CONTUSION OF LEFT LOWER EXTREMITY, INITIAL ENCOUNTER: Primary | ICD-10-CM

## 2024-09-05 DIAGNOSIS — R60.0 PEDAL EDEMA: ICD-10-CM

## 2024-09-05 PROCEDURE — 93971 EXTREMITY STUDY: CPT

## 2024-09-05 PROCEDURE — 93971 EXTREMITY STUDY: CPT | Performed by: SURGERY

## 2024-09-05 PROCEDURE — 99284 EMERGENCY DEPT VISIT MOD MDM: CPT

## 2024-09-06 LAB
BH CV LOWER VASCULAR LEFT COMMON FEMORAL AUGMENT: NORMAL
BH CV LOWER VASCULAR LEFT COMMON FEMORAL COMPETENT: NORMAL
BH CV LOWER VASCULAR LEFT COMMON FEMORAL COMPRESS: NORMAL
BH CV LOWER VASCULAR LEFT COMMON FEMORAL PHASIC: NORMAL
BH CV LOWER VASCULAR LEFT COMMON FEMORAL SPONT: NORMAL
BH CV LOWER VASCULAR LEFT DISTAL FEMORAL COMPRESS: NORMAL
BH CV LOWER VASCULAR LEFT GASTRONEMIUS COMPRESS: NORMAL
BH CV LOWER VASCULAR LEFT GREATER SAPH AK COMPRESS: NORMAL
BH CV LOWER VASCULAR LEFT GREATER SAPH BK COMPRESS: NORMAL
BH CV LOWER VASCULAR LEFT LESSER SAPH COMPRESS: NORMAL
BH CV LOWER VASCULAR LEFT MID FEMORAL AUGMENT: NORMAL
BH CV LOWER VASCULAR LEFT MID FEMORAL COMPETENT: NORMAL
BH CV LOWER VASCULAR LEFT MID FEMORAL COMPRESS: NORMAL
BH CV LOWER VASCULAR LEFT MID FEMORAL PHASIC: NORMAL
BH CV LOWER VASCULAR LEFT MID FEMORAL SPONT: NORMAL
BH CV LOWER VASCULAR LEFT PERONEAL COMPRESS: NORMAL
BH CV LOWER VASCULAR LEFT POPLITEAL AUGMENT: NORMAL
BH CV LOWER VASCULAR LEFT POPLITEAL COMPETENT: NORMAL
BH CV LOWER VASCULAR LEFT POPLITEAL COMPRESS: NORMAL
BH CV LOWER VASCULAR LEFT POPLITEAL PHASIC: NORMAL
BH CV LOWER VASCULAR LEFT POPLITEAL SPONT: NORMAL
BH CV LOWER VASCULAR LEFT POSTERIOR TIBIAL COMPRESS: NORMAL
BH CV LOWER VASCULAR LEFT PROXIMAL FEMORAL COMPRESS: NORMAL
BH CV LOWER VASCULAR LEFT SAPHENOFEMORAL JUNCTION COMPRESS: NORMAL
BH CV LOWER VASCULAR RIGHT COMMON FEMORAL AUGMENT: NORMAL
BH CV LOWER VASCULAR RIGHT COMMON FEMORAL COMPETENT: NORMAL
BH CV LOWER VASCULAR RIGHT COMMON FEMORAL COMPRESS: NORMAL
BH CV LOWER VASCULAR RIGHT COMMON FEMORAL PHASIC: NORMAL
BH CV LOWER VASCULAR RIGHT COMMON FEMORAL SPONT: NORMAL
BH CV VAS PRELIMINARY FINDINGS SCRIPTING: 1

## 2024-09-06 NOTE — ED PROVIDER NOTES
Time: 10:29 PM EDT  Date of encounter:  9/5/2024  Independent Historian/Clinical History and Information was obtained by:   Patient    History is limited by: N/A    Chief Complaint: Leg pain      History of Present Illness:  Patient is a 64 y.o. year old female who presents to the emergency department for evaluation of left leg pain    Patient states that she has a history of paroxysmal vertigo and while getting the mail from the mailbox she became dizzy and fell on Tuesday 3 days ago.  States she has some mild pain the next day but then noticed today that her leg seems more swollen with pain behind her left knee and tightness in her left calf.  She denies any chest pain or shortness of breath.  No numbness or weakness.  She has had no treatment prior to arrival.    Patient Care Team  Primary Care Provider: Elena Henderson PA    Past Medical History:     Allergies   Allergen Reactions    Liraglutide Nausea And Vomiting    Lisinopril Other (See Comments)     Cough    Metformin Nausea Only     GI Upset     Past Medical History:   Diagnosis Date    Abnormal Pap smear of cervix     Atrial fibrillation     ON ELIQUIS/FOLLOWS SERGO    Colindres's esophagus     Depression     Diabetes mellitus     Emphysema lung     INHALER    Endometrial hyperplasia     D&C SCHEDULED 7/25/23    Esophageal varices     Fatty liver 2015    Gastroparesis     TAKES XIFAXAN    GERD (gastroesophageal reflux disease) 1995    Hyperlipidemia     Hypertension     Iron deficiency anemia 05/02/2023    IRON INFUSION LAST 7/24/23    Irritable bowel syndrome     W/CONSTIPATION    BERKOWITZ (nonalcoholic steatohepatitis)     NO S/S CURRENTLY, ELEVATED ENZYMES    Obstructive sleep apnea syndrome     Pulmonary emphysema 11/22/2021    S/P exploratory laparotomy, lysis of adhesions, resection of necrotic colon without anastomosis 04/18/2022     Past Surgical History:   Procedure Laterality Date    ABDOMINAL SURGERY  4/16/2022    BLADDER SLING MODIFIED,  ANTERIOR AND POSTERIOR VAGINAL REPAIR  2018    [place]    BLADDER SURGERY  05/01/2024    stimulator  @ [facility]    BOTOX INJECTION      In abdomen    BREAST LUMPECTOMY Left     BENIGN    CARDIAC CATHETERIZATION      NO INTERVENTION    CHOLECYSTECTOMY  5/12/2015    COLON RESECTION      PERFORATED COLON, EEA    COLONOSCOPY N/A 08/14/2023    Procedure: COLONOSCOPY WITH POLYPECTOMY, TATTOO;  Surgeon: Shannan Charles MD;  Location: [facility] ENDOSCOPY;  Service: Gastroenterology;  Laterality: N/A;  COLON POLYPS  DIVERTICULOSIS  HEMORRHOIDS    COLONOSCOPY N/A 04/22/2024    Procedure: COLONOSCOPY COLD SNARE POLYPECTOMIES WITH CLIP PLACEMENT X3;  Surgeon: Shannan Charles MD;  Location: [facility] ENDOSCOPY;  Service: Gastroenterology;  Laterality: N/A;  DIVERTICULOSIS  COLON POLYPS    D & C HYSTEROSCOPY N/A 07/25/2023    Procedure: resection of endometrial polyp with myosure;  Surgeon: Ashleigh Harding DO;  Location: [facility] MAIN OR;  Service: Gynecology;  Laterality: N/A;    ENDOSCOPY N/A 06/03/2022    Procedure: ESOPHAGOGASTRODUODENOSCOPY;  Surgeon: Shannan Charles MD;  Location: [facility] ENDOSCOPY;  Service: Gastroenterology;  Laterality: N/A;  RETAINED FOOD IN STOMACH  ESOPHAGEAL VARICIES    ENDOSCOPY N/A 04/22/2024    Procedure: ESOPHAGOGASTRODUODENOSCOPY WITH BIOPSIES;  Surgeon: Shannan Charles MD;  Location: [facility] ENDOSCOPY;  Service: Gastroenterology;  Laterality: N/A;  ESOPHAGEAL BIOPSIES    EXPLORATORY LAPAROTOMY N/A 04/16/2022    Procedure: EXPLORATORY LAPAROTOMY, LYSIS OF ADHESIONS, RESECTION OF NECTROIC COLON;  Surgeon: Cande Stanley MD;  Location: [facility] MAIN OR;  Service: General;  Laterality: N/A;    EYE SURGERY  2023    GALLBLADDER SURGERY      LAPAROSCOPIC    LIVER BIOPSY  2021    TUBAL ABDOMINAL LIGATION      UPPER GASTROINTESTINAL ENDOSCOPY  6/3/2022     Family History   Adopted: Yes   Problem Relation Age of Onset    No Known Problems Mother     No Known Problems Father      No Known Problems Sister     No Known Problems Brother     No Known Problems Maternal Aunt     No Known Problems Paternal Aunt     No Known Problems Maternal Uncle     No Known Problems Paternal Uncle     No Known Problems Maternal Grandfather     No Known Problems Maternal Grandmother     No Known Problems Paternal Grandfather     No Known Problems Paternal Grandmother     No Known Problems Cousin     No Known Problems Other     ADD / ADHD Son     Malig Hyperthermia Neg Hx     Alcohol abuse Neg Hx     Anxiety disorder Neg Hx     Bipolar disorder Neg Hx     Dementia Neg Hx     Depression Neg Hx     Drug abuse Neg Hx     OCD Neg Hx     Paranoid behavior Neg Hx     Schizophrenia Neg Hx     Seizures Neg Hx     Self-Injurious Behavior  Neg Hx     Suicide Attempts Neg Hx        Home Medications:  Prior to Admission medications    Medication Sig Start Date End Date Taking? Authorizing Provider   albuterol sulfate  (90 Base) MCG/ACT inhaler Inhale 2 puffs Every 4 (Four) Hours As Needed for Shortness of Air or Wheezing. 1/22/24   Mil Frey MD   amLODIPine (NORVASC) 10 MG tablet Take 1 tablet by mouth Daily. 7/11/24   Elena Henderson PA   apixaban (Eliquis) 5 MG tablet tablet TAKE 1 TABLET BY MOUTH TWICE A DAY 7/29/24   KATHARINE Webb MD   B-D UF III MINI PEN NEEDLES 31G X 5 MM misc  10/8/21   Joyce Colmenares MD   Blood Glucose Monitoring Suppl (Accu-Chek Guide) w/Device kit  2/14/22   Joyce Colmenares MD   buPROPion XL (WELLBUTRIN XL) 300 MG 24 hr tablet Take 1 tablet by mouth Daily. Two Xls in the morning. 7/30/24   Demetrius Calvert MD   Cholecalciferol (Vitamin D3) 50 MCG (2000 UT) capsule Take 3 capsules by mouth Daily. LAST DOSE 7/21/23    Joyce Colmenares MD   Continuous Blood Gluc Sensor (Dexcom G6 Sensor) APPLY 1 EACH TOPICALLY EVERY 10 (TEN) DAYS. 6/5/23   Joyce Colmenares MD   Continuous Blood Gluc Transmit (Dexcom G6 Transmitter) misc USE FOR 90 DAYS. 6/5/23   Dedra  MD Joyce   DULoxetine (CYMBALTA) 60 MG capsule Take 1 capsule by mouth Daily. 7/30/24   Demetrius Calvert MD   Evolocumab (Repatha) solution prefilled syringe injection Inject 1 mL under the skin into the appropriate area as directed Every 14 (Fourteen) Days. 12/21/23   KATHARINE Webb MD   famotidine (PEPCID) 40 MG tablet Take 1 tablet by mouth At Night As Needed for Heartburn. 3/20/24   Migdalia Sanon APRN   Fluticasone-Umeclidin-Vilant (TRELEGY) 100-62.5-25 MCG/ACT inhaler Inhale 1 puff Daily. 2/9/24   Mil Frey MD   furosemide (LASIX) 40 MG tablet Take 1 tablet by mouth Daily. 4/25/24   Maryann Zamorano APRN   glucose blood test strip Check blood glucose twice daily 1/27/22   Elena Henderson PA   glucose monitor monitoring kit 1 each Daily. Patient prefers Accu Chek Meter. 2/14/22   Elena Henderson PA   icosapent ethyl (VASCEPA) 1 g capsule capsule Take 2 g by mouth 2 (Two) Times a Day With Meals. 5/23/23   Elena Henderson PA   Insulin Glargine, 2 Unit Dial, (Toujeo Max SoloStar) 300 UNIT/ML solution pen-injector injection Inject 122 Units under the skin into the appropriate area as directed Every Morning. INST PER ANESTHESIA PROTOCOL    Provider, MD Joyce   ipratropium-albuterol (DUO-NEB) 0.5-2.5 mg/3 ml nebulizer Take 3 mL by nebulization 4 (Four) Times a Day As Needed for Wheezing or Shortness of Air. 8/12/24   Mil Frey MD   Lancets (accu-chek soft touch) lancets Check blood sugar twice daily 2/14/22   Elena Henderson PA   metoprolol tartrate (LOPRESSOR) 50 MG tablet TAKE 1 AND 1/2 TABLETS BY MOUTH TWICE DAILY 1/17/24   Elena Henderson PA   metroNIDAZOLE (METROGEL) 0.75 % vaginal gel INSERT INTO THE VAGINA EVERY NIGHT AT BEDTIME FOR 7 DAYS.  Patient not taking: Reported on 9/4/2024 1/19/24   Provider, Historical, MD   Motegrity 2 MG tablet Take 1 tablet by mouth Daily.  Patient not taking: Reported on 9/4/2024 3/20/24   Migdalia Sanon  [facility]   Mounjaro 2.5 MG/0.5ML solution pen-injector pen Inject 0.5 mL under the skin into the appropriate area as directed 1 (One) Time Per Week.    Joyce Colmenares MD   multivitamin with minerals tablet tablet Take 1 tablet by mouth Daily. LAST DOSE 7/21/23    Joyce Colmenares MD   NovoLOG FlexPen 100 UNIT/ML solution pen-injector sc pen Inject 60 Units under the skin into the appropriate area as directed 2 (Two) Times a Day. PER SLIDING SCALE  INST PER ANESTHESIA PROTOCOL 3/14/22   Joyce Colmenares MD   olmesartan (BENICAR) 20 MG tablet Take 1 tablet by mouth Daily. 7/11/24   Elena Henderson PA   ondansetron (ZOFRAN) 4 MG tablet Take 1 tablet by mouth.  Patient not taking: Reported on 9/4/2024 6/26/24   Joyce Colmenares MD   pantoprazole (PROTONIX) 40 MG EC tablet Take 1 tablet by mouth Daily. 3/20/24   Migdalia Sanon APRN   potassium chloride 10 MEQ CR tablet Take 1 tablet by mouth Daily. 8/1/23   Maryann Zamorano APRN   promethazine (PHENERGAN) 25 MG tablet Take 1 tablet by mouth Every 6 (Six) Hours As Needed for Nausea or Vomiting. 7/11/24   Elena Henderson PA   riFAXIMin (Xifaxan) 550 MG tablet Take 1 tablet by mouth Every 12 (Twelve) Hours. 3/20/24   Migdalia Sanon APRN   Semaglutide, 1 MG/DOSE, (OZEMPIC) 4 MG/3ML solution pen-injector Inject 1 mg under the skin into the appropriate area as directed.  Patient not taking: Reported on 9/4/2024    Joyce Colmenares MD   traZODone (DESYREL) 50 MG tablet Take 1 tablet by mouth Every Night. 7/30/24   Demetrius Calvert MD   Vibegron (Gemtesa) 75 MG tablet Take 1 tablet by mouth Daily.  Patient not taking: Reported on 9/4/2024    Joyce Colmenares MD        Social History:   Social History     Tobacco Use    Smoking status: Every Day     Current packs/day: 0.25     Average packs/day: 0.3 packs/day for 49.2 years (12.3 ttl pk-yrs)     Types: Cigarettes     Start date: 6/17/1975    Smokeless  "tobacco: Never    Tobacco comments:     INST PER ANESTHESIA PROTOCOL   Vaping Use    Vaping status: Never Used   Substance Use Topics    Alcohol use: Yes     Alcohol/week: 1.0 standard drink of alcohol     Types: 1 Drinks containing 0.5 oz of alcohol per week     Comment: 1 a month    Drug use: Never         Review of Systems:  Review of Systems   Constitutional:  Negative for chills and fever.   HENT:  Negative for congestion, ear pain and sore throat.    Eyes:  Negative for pain.   Respiratory:  Negative for cough, chest tightness and shortness of breath.    Cardiovascular:  Negative for chest pain.   Gastrointestinal:  Negative for abdominal pain, diarrhea, nausea and vomiting.   Genitourinary:  Negative for flank pain and hematuria.   Musculoskeletal:  Positive for arthralgias and myalgias. Negative for joint swelling.   Skin:  Negative for pallor.   Neurological:  Negative for seizures and headaches.   All other systems reviewed and are negative.       Physical Exam:  /78 (BP Location: Left arm, Patient Position: Sitting)   Pulse 77   Temp 98 °F (36.7 °C) (Oral)   Resp 18   Ht 165.1 cm (65\")   Wt 107 kg (235 lb 0.2 oz)   SpO2 98%   BMI 39.11 kg/m²     Physical Exam  Vitals and nursing note reviewed.   Constitutional:       General: She is not in acute distress.     Appearance: Normal appearance. She is not toxic-appearing.   HENT:      Head: Normocephalic and atraumatic.      Jaw: There is normal jaw occlusion.   Eyes:      General: Lids are normal.      Extraocular Movements: Extraocular movements intact.      Conjunctiva/sclera: Conjunctivae normal.      Pupils: Pupils are equal, round, and reactive to light.   Cardiovascular:      Rate and Rhythm: Normal rate and regular rhythm.      Pulses: Normal pulses.      Heart sounds: Normal heart sounds.   Pulmonary:      Effort: Pulmonary effort is normal. No respiratory distress.      Breath sounds: Normal breath sounds. No wheezing or rhonchi. "   Abdominal:      General: Abdomen is flat.      Palpations: Abdomen is soft.      Tenderness: There is no abdominal tenderness. There is no guarding or rebound.   Musculoskeletal:         General: Normal range of motion.      Cervical back: Normal range of motion and neck supple.      Right lower leg: No edema.      Left lower leg: No edema.      Comments: Mild tenderness over the left calf and mild edema over the left lower extremity.   Skin:     General: Skin is warm and dry.   Neurological:      Mental Status: She is alert and oriented to person, place, and time. Mental status is at baseline.   Psychiatric:         Mood and Affect: Mood normal.               Procedures:  Procedures      Medical Decision Making:      Comorbidities that affect care:    Diabetes, atrial fibrillation, gastroparesis, GERD, OAS, iron deficiency, emphysema    External Notes reviewed:    Previous Clinic Note: Outpatient hematology visit 9/4/2024 for iron deficiency anemia      The following orders were placed and all results were independently analyzed by me:  No orders of the defined types were placed in this encounter.      Medications Given in the Emergency Department:  Medications - No data to display     ED Course:    ED Course as of 09/06/24 0628   Thu Sep 05, 2024   0532 Vascular technician reports the duplex scan is negative for DVT and SVT. [JS]      ED Course User Index  [JS] Shady Jamil MD       Labs:    Lab Results (last 24 hours)       ** No results found for the last 24 hours. **             Imaging:    No Radiology Exams Resulted Within Past 24 Hours      Differential Diagnosis and Discussion:    Extremity Pain: Differential diagnosis includes but is not limited to soft tissue sprain, tendonitis, tendon injury, dislocation, fracture, deep vein thrombosis, arterial insufficiency, osteoarthritis, bursitis, and ligamentous damage.    Ultrasound impression was interpreted by me.     [facility]               Patient Care  Considerations:    ANTIBIOTICS: I considered prescribing antibiotics as an outpatient however no bacterial focus of infection was found.      Consultants/Shared Management Plan:    None    Social Determinants of Health:    Patient is independent, reliable, and has access to care.       Disposition and Care Coordination:    Discharged: The patient is suitable and stable for discharge with no need for consideration of admission.        Final diagnoses:   Contusion of left lower extremity, initial encounter   Pedal edema        ED Disposition       ED Disposition   Discharge    Condition   Stable    Comment   --               This medical record created using voice recognition software.             Shady Jamil MD  09/06/24 0628

## 2024-09-06 NOTE — DISCHARGE INSTRUCTIONS
Please elevate your legs is much as possible above the level of your heart.  You may take a double dose of Lasix and potassium tomorrow.  Please follow-up with your primary care provider.

## 2024-09-25 ENCOUNTER — TELEPHONE (OUTPATIENT)
Dept: GASTROENTEROLOGY | Facility: CLINIC | Age: 64
End: 2024-09-25

## 2024-11-15 ENCOUNTER — TELEPHONE (OUTPATIENT)
Dept: ONCOLOGY | Facility: HOSPITAL | Age: 64
End: 2024-11-15
Payer: MEDICAID

## 2024-12-12 ENCOUNTER — OFFICE VISIT (OUTPATIENT)
Dept: CARDIOLOGY | Facility: CLINIC | Age: 64
End: 2024-12-12
Payer: COMMERCIAL

## 2024-12-12 VITALS
HEIGHT: 65 IN | WEIGHT: 239.4 LBS | BODY MASS INDEX: 39.89 KG/M2 | DIASTOLIC BLOOD PRESSURE: 55 MMHG | HEART RATE: 71 BPM | SYSTOLIC BLOOD PRESSURE: 121 MMHG | OXYGEN SATURATION: 99 %

## 2024-12-12 DIAGNOSIS — R60.0 LOWER EXTREMITY EDEMA: ICD-10-CM

## 2024-12-12 DIAGNOSIS — I48.0 PAROXYSMAL ATRIAL FIBRILLATION: Chronic | ICD-10-CM

## 2024-12-12 DIAGNOSIS — I48.0 PAROXYSMAL ATRIAL FIBRILLATION: ICD-10-CM

## 2024-12-12 DIAGNOSIS — I10 HYPERTENSION, ESSENTIAL: ICD-10-CM

## 2024-12-12 DIAGNOSIS — E78.2 MIXED HYPERLIPIDEMIA: ICD-10-CM

## 2024-12-12 PROCEDURE — 99214 OFFICE O/P EST MOD 30 MIN: CPT | Performed by: NURSE PRACTITIONER

## 2024-12-12 RX ORDER — POTASSIUM CHLORIDE 750 MG/1
10 TABLET, EXTENDED RELEASE ORAL EVERY OTHER DAY
Qty: 30 TABLET | Refills: 3 | Status: SHIPPED | OUTPATIENT
Start: 2024-12-12

## 2024-12-12 RX ORDER — FUROSEMIDE 40 MG/1
40 TABLET ORAL EVERY OTHER DAY
Qty: 30 TABLET | Refills: 3 | Status: SHIPPED | OUTPATIENT
Start: 2024-12-12

## 2024-12-12 NOTE — PROGRESS NOTES
Chief Complaint  Atrial Fibrillation and Follow-up (1 year - leg edema x2 months. Has had to stop eliquis and repatha due to cost and not hearing from assistance programs)    Subjective            Anika Casillas presents to [facility] CARDIOLOGY  History of Present Illness    Ms. Casillas is here for follow-up evaluation and management of paroxysmal atrial fibrillation, essential hypertension, mixed hyperlipidemia, lower extremity edema.  She denies chest pain, shortness of breath, or palpitations.  She has stopped both Eliquis and Repatha due to cost.  She states she is applied for the assistance program but has not heard back.  She reports worsening lower extremity edema.    PMH  Past Medical History:   Diagnosis Date    Abnormal Pap smear of cervix     Atrial fibrillation     ON ELIQUIS/FOLLOWS SERGO    Colindres's esophagus     Depression     Diabetes mellitus     Emphysema lung     INHALER    Endometrial hyperplasia     D&C SCHEDULED 7/25/23    Esophageal varices     Fatty liver 2015    Gastroparesis     TAKES XIFAXAN    GERD (gastroesophageal reflux disease) 1995    Hyperlipidemia     Hypertension     Iron deficiency anemia 05/02/2023    IRON INFUSION LAST 7/24/23    Irritable bowel syndrome     W/CONSTIPATION    BERKOWITZ (nonalcoholic steatohepatitis)     NO S/S CURRENTLY, ELEVATED ENZYMES    Obstructive sleep apnea syndrome     Pulmonary emphysema 11/22/2021    S/P exploratory laparotomy, lysis of adhesions, resection of necrotic colon without anastomosis 04/18/2022         SURGICALHX  Past Surgical History:   Procedure Laterality Date    ABDOMINAL SURGERY  4/16/2022    BLADDER SLING MODIFIED, ANTERIOR AND POSTERIOR VAGINAL REPAIR  2018    [place]    BLADDER SURGERY  05/01/2024    stimulator  @ [facility]    BOTOX INJECTION      In abdomen    BREAST LUMPECTOMY Left     BENIGN    CARDIAC CATHETERIZATION      NO INTERVENTION    CHOLECYSTECTOMY  5/12/2015    COLON RESECTION      PERFORATED COLON, EEA     COLONOSCOPY N/A 08/14/2023    Procedure: COLONOSCOPY WITH POLYPECTOMY, TATTOO;  Surgeon: Shannan Charles MD;  Location: [facility] ENDOSCOPY;  Service: Gastroenterology;  Laterality: N/A;  COLON POLYPS  DIVERTICULOSIS  HEMORRHOIDS    COLONOSCOPY N/A 04/22/2024    Procedure: COLONOSCOPY COLD SNARE POLYPECTOMIES WITH CLIP PLACEMENT X3;  Surgeon: Shannan Charles MD;  Location: [facility] ENDOSCOPY;  Service: Gastroenterology;  Laterality: N/A;  DIVERTICULOSIS  COLON POLYPS    D & C HYSTEROSCOPY N/A 07/25/2023    Procedure: resection of endometrial polyp with myosure;  Surgeon: Ashleigh Harding DO;  Location: [facility] MAIN OR;  Service: Gynecology;  Laterality: N/A;    ENDOSCOPY N/A 06/03/2022    Procedure: ESOPHAGOGASTRODUODENOSCOPY;  Surgeon: Shannan Charles MD;  Location: [facility] ENDOSCOPY;  Service: Gastroenterology;  Laterality: N/A;  RETAINED FOOD IN STOMACH  ESOPHAGEAL VARICIES    ENDOSCOPY N/A 04/22/2024    Procedure: ESOPHAGOGASTRODUODENOSCOPY WITH BIOPSIES;  Surgeon: Shannan Charles MD;  Location: [facility] ENDOSCOPY;  Service: Gastroenterology;  Laterality: N/A;  ESOPHAGEAL BIOPSIES    EXPLORATORY LAPAROTOMY N/A 04/16/2022    Procedure: EXPLORATORY LAPAROTOMY, LYSIS OF ADHESIONS, RESECTION OF NECTROIC COLON;  Surgeon: Cande Stanley MD;  Location: [facility] MAIN OR;  Service: General;  Laterality: N/A;    EYE SURGERY  2023    GALLBLADDER SURGERY      LAPAROSCOPIC    LIVER BIOPSY  2021    TUBAL ABDOMINAL LIGATION      UPPER GASTROINTESTINAL ENDOSCOPY  6/3/2022        SOC  Social History     Socioeconomic History    Marital status:      Spouse name: fannie   Tobacco Use    Smoking status: Every Day     Current packs/day: 0.25     Average packs/day: 0.2 packs/day for 49.5 years (12.4 ttl pk-yrs)     Types: Cigarettes     Start date: 6/17/1975    Smokeless tobacco: Never    Tobacco comments:     INST PER ANESTHESIA PROTOCOL   Vaping Use    Vaping status: Never Used   Substance  and Sexual Activity    Alcohol use: Yes     Alcohol/week: 1.0 standard drink of alcohol     Types: 1 Drinks containing 0.5 oz of alcohol per week     Comment: 1 a month    Drug use: Never    Sexual activity: Yes     Partners: Male     Birth control/protection: Post-menopausal, Tubal ligation         FAMHX  Family History   Adopted: Yes   Problem Relation Age of Onset    No Known Problems Mother     No Known Problems Father     No Known Problems Sister     No Known Problems Brother     No Known Problems Maternal Aunt     No Known Problems Paternal Aunt     No Known Problems Maternal Uncle     No Known Problems Paternal Uncle     No Known Problems Maternal Grandfather     No Known Problems Maternal Grandmother     No Known Problems Paternal Grandfather     No Known Problems Paternal Grandmother     No Known Problems Cousin     No Known Problems Other     ADD / ADHD Son     Malig Hyperthermia Neg Hx     Alcohol abuse Neg Hx     Anxiety disorder Neg Hx     Bipolar disorder Neg Hx     Dementia Neg Hx     Depression Neg Hx     Drug abuse Neg Hx     OCD Neg Hx     Paranoid behavior Neg Hx     Schizophrenia Neg Hx     Seizures Neg Hx     Self-Injurious Behavior  Neg Hx     Suicide Attempts Neg Hx           ALLERGY  Allergies   Allergen Reactions    Liraglutide Nausea And Vomiting    Lisinopril Other (See Comments)     Cough    Metformin Nausea Only     GI Upset        MEDSCURRENT    Current Outpatient Medications:     albuterol sulfate  (90 Base) MCG/ACT inhaler, Inhale 2 puffs Every 4 (Four) Hours As Needed for Shortness of Air or Wheezing., Disp: 1 g, Rfl: 5    amLODIPine (NORVASC) 10 MG tablet, Take 1 tablet by mouth Daily., Disp: 90 tablet, Rfl: 1    B-D UF III MINI PEN NEEDLES 31G X 5 MM misc, , Disp: , Rfl:     Blood Glucose Monitoring Suppl (Accu-Chek Guide) w/Device kit, , Disp: , Rfl:     buPROPion XL (WELLBUTRIN XL) 300 MG 24 hr tablet, Take 1 tablet by mouth Daily. Two Xls in the morning., Disp: 90 tablet,  Rfl: 1    Cholecalciferol (Vitamin D3) 50 MCG (2000 UT) capsule, Take 3 capsules by mouth Daily. LAST DOSE 7/21/23, Disp: , Rfl:     Continuous Blood Gluc Sensor (Dexcom G6 Sensor), APPLY 1 EACH TOPICALLY EVERY 10 (TEN) DAYS., Disp: , Rfl:     Continuous Blood Gluc Transmit (Dexcom G6 Transmitter) misc, USE FOR 90 DAYS., Disp: , Rfl:     DULoxetine (CYMBALTA) 60 MG capsule, Take 1 capsule by mouth Daily., Disp: 90 capsule, Rfl: 1    famotidine (PEPCID) 40 MG tablet, Take 1 tablet by mouth At Night As Needed for Heartburn., Disp: 90 tablet, Rfl: 3    Fluticasone-Umeclidin-Vilant (TRELEGY) 100-62.5-25 MCG/ACT inhaler, Inhale 1 puff Daily., Disp: 1 each, Rfl: 5    furosemide (LASIX) 40 MG tablet, Take 1 tablet by mouth Every Other Day., Disp: 30 tablet, Rfl: 3    glucose blood test strip, Check blood glucose twice daily, Disp: 60 each, Rfl: 12    glucose monitor monitoring kit, 1 each Daily. Patient prefers Accu Chek Meter., Disp: 1 each, Rfl: 0    icosapent ethyl (VASCEPA) 1 g capsule capsule, Take 2 g by mouth 2 (Two) Times a Day With Meals., Disp: 360 capsule, Rfl: 1    Insulin Glargine, 2 Unit Dial, (Toujeo Max SoloStar) 300 UNIT/ML solution pen-injector injection, Inject 122 Units under the skin into the appropriate area as directed Every Morning. INST PER ANESTHESIA PROTOCOL, Disp: , Rfl:     ipratropium-albuterol (DUO-NEB) 0.5-2.5 mg/3 ml nebulizer, Take 3 mL by nebulization 4 (Four) Times a Day As Needed for Wheezing or Shortness of Air., Disp: 360 mL, Rfl: 3    Lancets (accu-chek soft touch) lancets, Check blood sugar twice daily, Disp: 100 each, Rfl: 12    metoprolol tartrate (LOPRESSOR) 50 MG tablet, TAKE 1 AND 1/2 TABLETS BY MOUTH TWICE DAILY, Disp: 270 tablet, Rfl: 1    Mounjaro 2.5 MG/0.5ML solution pen-injector pen, Inject 0.5 mL under the skin into the appropriate area as directed 1 (One) Time Per Week., Disp: , Rfl:     multivitamin with minerals tablet tablet, Take 1 tablet by mouth Daily. LAST DOSE  "7/21/23, Disp: , Rfl:     NovoLOG FlexPen 100 UNIT/ML solution pen-injector sc pen, Inject 60 Units under the skin into the appropriate area as directed 2 (Two) Times a Day. PER SLIDING SCALE INST PER ANESTHESIA PROTOCOL, Disp: , Rfl:     olmesartan (BENICAR) 20 MG tablet, Take 1 tablet by mouth Daily., Disp: 90 tablet, Rfl: 1    pantoprazole (PROTONIX) 40 MG EC tablet, Take 1 tablet by mouth Daily., Disp: 90 tablet, Rfl: 3    potassium chloride 10 MEQ CR tablet, Take 1 tablet by mouth Every Other Day. With lasix., Disp: 30 tablet, Rfl: 3    promethazine (PHENERGAN) 25 MG tablet, Take 1 tablet by mouth Every 6 (Six) Hours As Needed for Nausea or Vomiting., Disp: 30 tablet, Rfl: 0    riFAXIMin (Xifaxan) 550 MG tablet, Take 1 tablet by mouth Every 12 (Twelve) Hours., Disp: 180 tablet, Rfl: 3    traZODone (DESYREL) 50 MG tablet, Take 1 tablet by mouth Every Night., Disp: 30 tablet, Rfl: 2    apixaban (Eliquis) 5 MG tablet tablet, TAKE 1 TABLET BY MOUTH TWICE A DAY (Patient not taking: Reported on 12/12/2024), Disp: 180 tablet, Rfl: 0    Evolocumab (Repatha) solution prefilled syringe injection, Inject 1 mL under the skin into the appropriate area as directed Every 14 (Fourteen) Days. (Patient not taking: Reported on 12/12/2024), Disp: 6 each, Rfl: 3      Review of Systems   Cardiovascular:  Positive for leg swelling. Negative for chest pain, dyspnea on exertion, palpitations and syncope.        Objective     /55   Pulse 71   Ht 165.1 cm (65\")   Wt 109 kg (239 lb 6.4 oz)   SpO2 99%   BMI 39.84 kg/m²       General Appearance:   well developed  well nourished  HENT:   oropharynx moist  lips not cyanotic  Neck:  thyroid not enlarged  supple  Respiratory:  no respiratory distress  normal breath sounds  no rales  Cardiovascular:  no jugular venous distention  regular rhythm  apical impulse normal  S1 normal, S2 normal  no S3, no S4   no murmur  no rub, no thrill  carotid pulses normal; no bruit  pedal pulses " normal  lower extremity edema: none    Musculoskeletal:  no clubbing of fingers.   normocephalic, head atraumatic  Skin:   warm, dry  Psychiatric:  judgement and insight appropriate  normal mood and affect      Result Review :     The following data was reviewed by: CHU Gayle on 12/12/2024:    CMP          5/3/2024    15:17 7/1/2024    14:27 9/3/2024    08:44   CMP   Glucose  2+     124    BUN   15    Creatinine   0.90    EGFR   72.4    Sodium   142    Potassium   3.7    Chloride   107    Calcium   10.2    Total Protein 9.0      8.1    Albumin 4.1      3.7    Globulin   4.4    Total Bilirubin   0.3    Alkaline Phosphatase   141    AST (SGOT)   98    ALT (SGPT)   82    Albumin/Globulin Ratio   0.8    BUN/Creatinine Ratio   16.7    Anion Gap   15.0       Details          This result is from an external source.             CBC          6/5/2024    15:50 7/1/2024    14:27 9/3/2024    08:44   CBC   WBC 8.01  NONE SEEN     8.50    RBC 3.99   4.00    Hemoglobin 11.8   12.3    Hematocrit 33.9   35.9    MCV 85.0   89.8    MCH 29.6   30.8    MCHC 34.8   34.3    RDW 18.3   15.6    Platelets 233   186       Details          This result is from an external source.             Lipid Panel          2/13/2024    14:00   Lipid Panel   Total Cholesterol 130    Triglycerides 100    HDL Cholesterol 45    VLDL Cholesterol 19    LDL Cholesterol  66    LDL/HDL Ratio 1.44      TSH          2/29/2024    12:03   TSH   TSH 2.570             Procedures      Anika Casillas  reports that she has been smoking cigarettes. She started smoking about 49 years ago. She has a 12.4 pack-year smoking history. She has never used smokeless tobacco. I have educated her on the risk of diseases from using tobacco products such as cancer, COPD, and heart disease.     I advised her to quit and she is not willing to quit.    I spent 3  minutes counseling the patient.                Assessment and Plan        ASSESSMENT:  Encounter Diagnoses    Name Primary?    Paroxysmal atrial fibrillation     Hypertension, essential     Mixed hyperlipidemia     Lower extremity edema          PLAN:    1.  Paroxysmal atrial fibrillation, clinically maintaining sinus rhythm.  We will check in on her status for cost assistance for the Eliquis today.  Will also provide samples.  2.  Essential hypertension, controlled, continue current medical therapy.  3.  Mixed hyperlipidemia, previously well-controlled on Repatha.  Will also check on the status of her cost assistance for this as well.  4.  Lower extremity edema, she is currently just taking Lasix 1-2 times a week.  Will have her take that every other day, 40 mg along with potassium.  Will check a BMP in 2 weeks.    Follow-up annually.      Patient was given instructions and counseling regarding her condition or for health maintenance advice. Please see specific information pulled into the AVS if appropriate.           Maryann Zamorano, APRN   12/12/2024  13:36 EST

## 2024-12-26 ENCOUNTER — HOSPITAL ENCOUNTER (EMERGENCY)
Facility: HOSPITAL | Age: 64
Discharge: HOME | End: 2024-12-26
Attending: EMERGENCY MEDICINE | Admitting: EMERGENCY MEDICINE
Payer: COMMERCIAL

## 2024-12-26 ENCOUNTER — APPOINTMENT (OUTPATIENT)
Dept: GENERAL RADIOLOGY | Facility: HOSPITAL | Age: 64
End: 2024-12-26
Payer: COMMERCIAL

## 2024-12-26 VITALS
TEMPERATURE: 99.3 F | BODY MASS INDEX: 38.71 KG/M2 | RESPIRATION RATE: 20 BRPM | DIASTOLIC BLOOD PRESSURE: 70 MMHG | OXYGEN SATURATION: 98 % | WEIGHT: 232.37 LBS | SYSTOLIC BLOOD PRESSURE: 167 MMHG | HEIGHT: 65 IN | HEART RATE: 89 BPM

## 2024-12-26 DIAGNOSIS — U07.1 COVID: Primary | ICD-10-CM

## 2024-12-26 LAB
ALBUMIN SERPL-MCNC: 3.7 G/DL (ref 3.5–5.2)
ALBUMIN/GLOB SERPL: 0.7 G/DL
ALP SERPL-CCNC: 174 U/L (ref 39–117)
ALT SERPL W P-5'-P-CCNC: 148 U/L (ref 1–33)
ANION GAP SERPL CALCULATED.3IONS-SCNC: 9.6 MMOL/L (ref 5–15)
AST SERPL-CCNC: 253 U/L (ref 1–32)
BASOPHILS # BLD AUTO: 0.04 10*3/MM3 (ref 0–0.2)
BASOPHILS NFR BLD AUTO: 0.6 % (ref 0–1.5)
BILIRUB SERPL-MCNC: 0.6 MG/DL (ref 0–1.2)
BUN SERPL-MCNC: 10 MG/DL (ref 8–23)
BUN/CREAT SERPL: 12.3 (ref 7–25)
CALCIUM SPEC-SCNC: 9.8 MG/DL (ref 8.6–10.5)
CHLORIDE SERPL-SCNC: 99 MMOL/L (ref 98–107)
CO2 SERPL-SCNC: 24.4 MMOL/L (ref 22–29)
CREAT SERPL-MCNC: 0.81 MG/DL (ref 0.57–1)
DEPRECATED RDW RBC AUTO: 48.7 FL (ref 37–54)
EGFRCR SERPLBLD CKD-EPI 2021: 82.2 ML/MIN/1.73
EOSINOPHIL # BLD AUTO: 0.05 10*3/MM3 (ref 0–0.4)
EOSINOPHIL NFR BLD AUTO: 0.7 % (ref 0.3–6.2)
ERYTHROCYTE [DISTWIDTH] IN BLOOD BY AUTOMATED COUNT: 15.5 % (ref 12.3–15.4)
FLUAV SUBTYP SPEC NAA+PROBE: NOT DETECTED
FLUBV RNA ISLT QL NAA+PROBE: NOT DETECTED
GEN 5 1HR TROPONIN T REFLEX: <6 NG/L
GLOBULIN UR ELPH-MCNC: 5 GM/DL
GLUCOSE SERPL-MCNC: 279 MG/DL (ref 65–99)
HCT VFR BLD AUTO: 38.1 % (ref 34–46.6)
HGB BLD-MCNC: 13.5 G/DL (ref 12–15.9)
HOLD SPECIMEN: NORMAL
HOLD SPECIMEN: NORMAL
IMM GRANULOCYTES # BLD AUTO: 0.03 10*3/MM3 (ref 0–0.05)
IMM GRANULOCYTES NFR BLD AUTO: 0.4 % (ref 0–0.5)
LYMPHOCYTES # BLD AUTO: 1.03 10*3/MM3 (ref 0.7–3.1)
LYMPHOCYTES NFR BLD AUTO: 14.3 % (ref 19.6–45.3)
MCH RBC QN AUTO: 30.3 PG (ref 26.6–33)
MCHC RBC AUTO-ENTMCNC: 35.4 G/DL (ref 31.5–35.7)
MCV RBC AUTO: 85.6 FL (ref 79–97)
MONOCYTES # BLD AUTO: 0.48 10*3/MM3 (ref 0.1–0.9)
MONOCYTES NFR BLD AUTO: 6.6 % (ref 5–12)
NEUTROPHILS NFR BLD AUTO: 5.59 10*3/MM3 (ref 1.7–7)
NEUTROPHILS NFR BLD AUTO: 77.4 % (ref 42.7–76)
NRBC BLD AUTO-RTO: 0 /100 WBC (ref 0–0.2)
NT-PROBNP SERPL-MCNC: 72.9 PG/ML (ref 0–900)
PLATELET # BLD AUTO: 180 10*3/MM3 (ref 140–450)
PMV BLD AUTO: 10.7 FL (ref 6–12)
POTASSIUM SERPL-SCNC: 4 MMOL/L (ref 3.5–5.2)
PROT SERPL-MCNC: 8.7 G/DL (ref 6–8.5)
QT INTERVAL: 366 MS
QTC INTERVAL: 466 MS
RBC # BLD AUTO: 4.45 10*6/MM3 (ref 3.77–5.28)
RSV RNA NPH QL NAA+NON-PROBE: NOT DETECTED
SARS-COV-2 RNA RESP QL NAA+PROBE: DETECTED
SODIUM SERPL-SCNC: 133 MMOL/L (ref 136–145)
TROPONIN T NUMERIC DELTA: NORMAL
TROPONIN T SERPL HS-MCNC: <6 NG/L
WBC NRBC COR # BLD AUTO: 7.22 10*3/MM3 (ref 3.4–10.8)
WHOLE BLOOD HOLD COAG: NORMAL
WHOLE BLOOD HOLD SPECIMEN: NORMAL

## 2024-12-26 PROCEDURE — 84484 ASSAY OF TROPONIN QUANT: CPT

## 2024-12-26 PROCEDURE — 83880 ASSAY OF NATRIURETIC PEPTIDE: CPT

## 2024-12-26 PROCEDURE — 99284 EMERGENCY DEPT VISIT MOD MDM: CPT

## 2024-12-26 PROCEDURE — 71045 X-RAY EXAM CHEST 1 VIEW: CPT

## 2024-12-26 PROCEDURE — 80053 COMPREHEN METABOLIC PANEL: CPT

## 2024-12-26 PROCEDURE — 87637 SARSCOV2&INF A&B&RSV AMP PRB: CPT

## 2024-12-26 PROCEDURE — 25010000002 DEXAMETHASONE SODIUM PHOSPHATE 10 MG/ML SOLUTION

## 2024-12-26 PROCEDURE — 85025 COMPLETE CBC W/AUTO DIFF WBC: CPT

## 2024-12-26 PROCEDURE — 93005 ELECTROCARDIOGRAM TRACING: CPT

## 2024-12-26 PROCEDURE — 36415 COLL VENOUS BLD VENIPUNCTURE: CPT

## 2024-12-26 PROCEDURE — 96374 THER/PROPH/DIAG INJ IV PUSH: CPT

## 2024-12-26 RX ORDER — DEXAMETHASONE SODIUM PHOSPHATE 10 MG/ML
6 INJECTION, SOLUTION INTRAMUSCULAR; INTRAVENOUS ONCE
Status: COMPLETED | OUTPATIENT
Start: 2024-12-26 | End: 2024-12-26

## 2024-12-26 RX ORDER — SODIUM CHLORIDE 0.9 % (FLUSH) 0.9 %
10 SYRINGE (ML) INJECTION AS NEEDED
Status: DISCONTINUED | OUTPATIENT
Start: 2024-12-26 | End: 2024-12-26 | Stop reason: HOSPADM

## 2024-12-26 RX ORDER — BENZONATATE 100 MG/1
100 CAPSULE ORAL 3 TIMES DAILY PRN
Qty: 21 CAPSULE | Refills: 0 | Status: SHIPPED | OUTPATIENT
Start: 2024-12-26 | End: 2025-01-02

## 2024-12-26 RX ORDER — ACETAMINOPHEN 500 MG
1000 TABLET ORAL ONCE
Status: COMPLETED | OUTPATIENT
Start: 2024-12-26 | End: 2024-12-26

## 2024-12-26 RX ORDER — DEXAMETHASONE SODIUM PHOSPHATE 10 MG/ML
6 INJECTION, SOLUTION INTRAMUSCULAR; INTRAVENOUS ONCE
Status: DISCONTINUED | OUTPATIENT
Start: 2024-12-26 | End: 2024-12-26

## 2024-12-26 RX ADMIN — ACETAMINOPHEN 1000 MG: 500 TABLET ORAL at 18:53

## 2024-12-26 RX ADMIN — DEXAMETHASONE SODIUM PHOSPHATE 6 MG: 10 INJECTION INTRAMUSCULAR; INTRAVENOUS at 19:32

## 2024-12-26 NOTE — ED PROVIDER NOTES
Time: 2:12 PM EST  Date of encounter:  12/26/2024  Independent Historian/Clinical History and Information was obtained by:   Patient    History is limited by: N/A    Chief Complaint: SOA      History of Present Illness:  Patient is a 64 y.o. year old female who presents to the emergency department for evaluation of SOA.  Patient states she was diagnosed with pneumonia a week ago and has not been getting any better.   Patient reports a dry cough with bodyaches.  Patient reports intermittent fever.  Patient denies chest pain at this time.      Patient Care Team  Primary Care Provider: Elena Henderson PA    Past Medical History:     Allergies   Allergen Reactions    Liraglutide Nausea And Vomiting    Lisinopril Other (See Comments)     Cough    Metformin Nausea Only     GI Upset     Past Medical History:   Diagnosis Date    Abnormal Pap smear of cervix     Atrial fibrillation     ON ELIQUIS/FOLLOWS TROTTER    Colindres's esophagus     Depression     Diabetes mellitus     Emphysema lung     INHALER    Endometrial hyperplasia     D&C SCHEDULED 7/25/23    Esophageal varices     Fatty liver 2015    Gastroparesis     TAKES XIFAXAN    GERD (gastroesophageal reflux disease) 1995    Hyperlipidemia     Hypertension     Iron deficiency anemia 05/02/2023    IRON INFUSION LAST 7/24/23    Irritable bowel syndrome     W/CONSTIPATION    BERKOWITZ (nonalcoholic steatohepatitis)     NO S/S CURRENTLY, ELEVATED ENZYMES    Obstructive sleep apnea syndrome     Pulmonary emphysema 11/22/2021    S/P exploratory laparotomy, lysis of adhesions, resection of necrotic colon without anastomosis 04/18/2022     Past Surgical History:   Procedure Laterality Date    ABDOMINAL SURGERY  4/16/2022    BLADDER SLING MODIFIED, ANTERIOR AND POSTERIOR VAGINAL REPAIR  2018    [place]    BLADDER SURGERY  05/01/2024    stimulator  @ [facility]    BOTOX INJECTION      In abdomen    BREAST LUMPECTOMY Left     BENIGN    CARDIAC CATHETERIZATION      NO INTERVENTION     CHOLECYSTECTOMY  5/12/2015    COLON RESECTION      PERFORATED COLON, EEA    COLONOSCOPY N/A 08/14/2023    Procedure: COLONOSCOPY WITH POLYPECTOMY, TATTOO;  Surgeon: Shannan Charles MD;  Location: [facility] ENDOSCOPY;  Service: Gastroenterology;  Laterality: N/A;  COLON POLYPS  DIVERTICULOSIS  HEMORRHOIDS    COLONOSCOPY N/A 04/22/2024    Procedure: COLONOSCOPY COLD SNARE POLYPECTOMIES WITH CLIP PLACEMENT X3;  Surgeon: Shannan Charles MD;  Location: [facility] ENDOSCOPY;  Service: Gastroenterology;  Laterality: N/A;  DIVERTICULOSIS  COLON POLYPS    D & C HYSTEROSCOPY N/A 07/25/2023    Procedure: resection of endometrial polyp with myosure;  Surgeon: Ashleigh Harding DO;  Location: [facility] MAIN OR;  Service: Gynecology;  Laterality: N/A;    ENDOSCOPY N/A 06/03/2022    Procedure: ESOPHAGOGASTRODUODENOSCOPY;  Surgeon: Shannan Charles MD;  Location: [facility] ENDOSCOPY;  Service: Gastroenterology;  Laterality: N/A;  RETAINED FOOD IN STOMACH  ESOPHAGEAL VARICIES    ENDOSCOPY N/A 04/22/2024    Procedure: ESOPHAGOGASTRODUODENOSCOPY WITH BIOPSIES;  Surgeon: Shannan Charles MD;  Location: [facility] ENDOSCOPY;  Service: Gastroenterology;  Laterality: N/A;  ESOPHAGEAL BIOPSIES    EXPLORATORY LAPAROTOMY N/A 04/16/2022    Procedure: EXPLORATORY LAPAROTOMY, LYSIS OF ADHESIONS, RESECTION OF NECTROIC COLON;  Surgeon: Cande Stanley MD;  Location: [facility] MAIN OR;  Service: General;  Laterality: N/A;    EYE SURGERY  2023    GALLBLADDER SURGERY      LAPAROSCOPIC    LIVER BIOPSY  2021    TUBAL ABDOMINAL LIGATION      UPPER GASTROINTESTINAL ENDOSCOPY  6/3/2022     Family History   Adopted: Yes   Problem Relation Age of Onset    No Known Problems Mother     No Known Problems Father     No Known Problems Sister     No Known Problems Brother     No Known Problems Maternal Aunt     No Known Problems Paternal Aunt     No Known Problems Maternal Uncle     No Known Problems Paternal Uncle     No Known Problems  Maternal Grandfather     No Known Problems Maternal Grandmother     No Known Problems Paternal Grandfather     No Known Problems Paternal Grandmother     No Known Problems Cousin     No Known Problems Other     ADD / ADHD Son     Malzachary Hyperthermia Neg Hx     Alcohol abuse Neg Hx     Anxiety disorder Neg Hx     Bipolar disorder Neg Hx     Dementia Neg Hx     Depression Neg Hx     Drug abuse Neg Hx     OCD Neg Hx     Paranoid behavior Neg Hx     Schizophrenia Neg Hx     Seizures Neg Hx     Self-Injurious Behavior  Neg Hx     Suicide Attempts Neg Hx        Home Medications:  Prior to Admission medications    Medication Sig Start Date End Date Taking? Authorizing Provider   albuterol sulfate  (90 Base) MCG/ACT inhaler Inhale 2 puffs Every 4 (Four) Hours As Needed for Shortness of Air or Wheezing. 1/22/24   Mil Frey MD   amLODIPine (NORVASC) 10 MG tablet Take 1 tablet by mouth Daily. 7/11/24   Elena Henderson PA   apixaban (Eliquis) 5 MG tablet tablet Take 1 tablet by mouth 2 (Two) Times a Day. 12/12/24   Maryann Zamorano APRN   B-D UF III MINI PEN NEEDLES 31G X 5 MM misc  10/8/21   Joyce Colmenares MD   Blood Glucose Monitoring Suppl (Accu-Chek Guide) w/Device kit  2/14/22   Joyce Colmenares MD   buPROPion XL (WELLBUTRIN XL) 300 MG 24 hr tablet Take 1 tablet by mouth Daily. Two Xls in the morning. 7/30/24   Demetrius Calvert MD   Cholecalciferol (Vitamin D3) 50 MCG (2000 UT) capsule Take 3 capsules by mouth Daily. LAST DOSE 7/21/23    Joyce Colmenares MD   Continuous Blood Gluc Sensor (Dexcom G6 Sensor) APPLY 1 EACH TOPICALLY EVERY 10 (TEN) DAYS. 6/5/23   Joyce Colmenares MD   Continuous Blood Gluc Transmit (Dexcom G6 Transmitter) misc USE FOR 90 DAYS. 6/5/23   Joyce Colmenares MD   DULoxetine (CYMBALTA) 60 MG capsule Take 1 capsule by mouth Daily. 7/30/24   Demetrius Calvert MD   Evolocumab (Repatha) solution prefilled syringe injection Inject 1 mL under the skin into  the appropriate area as directed Every 14 (Fourteen) Days.  Patient not taking: Reported on 12/12/2024 12/21/23   KATHARINE Webb MD   famotidine (PEPCID) 40 MG tablet Take 1 tablet by mouth At Night As Needed for Heartburn. 3/20/24   Migdalia Sanon APRN   Fluticasone-Umeclidin-Vilant (TRELEGY) 100-62.5-25 MCG/ACT inhaler Inhale 1 puff Daily. 2/9/24   Mil Frey MD   furosemide (LASIX) 40 MG tablet Take 1 tablet by mouth Every Other Day. 12/12/24   Maryann Zamorano APRN   glucose blood test strip Check blood glucose twice daily 1/27/22   Elena Henderson PA   glucose monitor monitoring kit 1 each Daily. Patient prefers Accu Chek Meter. 2/14/22   Elena Henderson PA   icosapent ethyl (VASCEPA) 1 g capsule capsule Take 2 g by mouth 2 (Two) Times a Day With Meals. 5/23/23   Elena Henderson PA   Insulin Glargine, 2 Unit Dial, (Toujeo Max SoloStar) 300 UNIT/ML solution pen-injector injection Inject 122 Units under the skin into the appropriate area as directed Every Morning. INST PER ANESTHESIA PROTOCOL    Joyce Colmenares MD   ipratropium-albuterol (DUO-NEB) 0.5-2.5 mg/3 ml nebulizer Take 3 mL by nebulization 4 (Four) Times a Day As Needed for Wheezing or Shortness of Air. 8/12/24   Mil Frey MD   Lancets (accu-chek soft touch) lancets Check blood sugar twice daily 2/14/22   Elena Henderson PA   metoprolol tartrate (LOPRESSOR) 50 MG tablet TAKE 1 AND 1/2 TABLETS BY MOUTH TWICE DAILY 1/17/24   Elena Henderson PA   Mounjaro 2.5 MG/0.5ML solution pen-injector pen Inject 0.5 mL under the skin into the appropriate area as directed 1 (One) Time Per Week.    Joyce Colmenares MD   multivitamin with minerals tablet tablet Take 1 tablet by mouth Daily. LAST DOSE 7/21/23    Joyce Colmenares MD   NovoLOG FlexPen 100 UNIT/ML solution pen-injector sc pen Inject 60 Units under the skin into the appropriate area as directed 2 (Two) Times a Day. PER SLIDING  SCALE  INST PER ANESTHESIA PROTOCOL 3/14/22   Provider, MD Joyce   olmesartan (BENICAR) 20 MG tablet Take 1 tablet by mouth Daily. 7/11/24   Elena Henderson PA   pantoprazole (PROTONIX) 40 MG EC tablet Take 1 tablet by mouth Daily. 3/20/24   Migdalia Sanon APRN   potassium chloride 10 MEQ CR tablet Take 1 tablet by mouth Every Other Day. With lasix. 12/12/24   Maryann Zamorano APRN   promethazine (PHENERGAN) 25 MG tablet Take 1 tablet by mouth Every 6 (Six) Hours As Needed for Nausea or Vomiting. 7/11/24   Elena Henderson PA   riFAXIMin (Xifaxan) 550 MG tablet Take 1 tablet by mouth Every 12 (Twelve) Hours. 3/20/24   Migdalia Sanon APRN   traZODone (DESYREL) 50 MG tablet Take 1 tablet by mouth Every Night. 7/30/24   Demetrius Calvert MD        Social History:   Social History     Tobacco Use    Smoking status: Every Day     Current packs/day: 0.25     Average packs/day: 0.2 packs/day for 49.5 years (12.4 ttl pk-yrs)     Types: Cigarettes     Start date: 6/17/1975    Smokeless tobacco: Never    Tobacco comments:     INST PER ANESTHESIA PROTOCOL   Vaping Use    Vaping status: Never Used   Substance Use Topics    Alcohol use: Yes     Alcohol/week: 1.0 standard drink of alcohol     Types: 1 Drinks containing 0.5 oz of alcohol per week     Comment: 1 a month    Drug use: Never         Review of Systems:  Review of Systems   Constitutional:  Positive for fatigue and fever. Negative for chills.   HENT:  Positive for congestion. Negative for rhinorrhea and sore throat.    Eyes:  Negative for pain and visual disturbance.   Respiratory:  Positive for cough. Negative for apnea, chest tightness and shortness of breath.    Cardiovascular:  Negative for chest pain and palpitations.   Gastrointestinal:  Positive for nausea. Negative for abdominal pain, diarrhea and vomiting.   Genitourinary:  Negative for difficulty urinating and dysuria.   Musculoskeletal:  Positive for myalgias.  "Negative for joint swelling.   Skin:  Negative for color change.   Neurological:  Negative for seizures and headaches.   Psychiatric/Behavioral: Negative.     All other systems reviewed and are negative.       Physical Exam:  /65   Pulse 94   Temp (!) 102.8 °F (39.3 °C) (Oral)   Resp 19   Ht 165.1 cm (65\")   Wt 105 kg (232 lb 5.8 oz)   SpO2 100%   BMI 38.67 kg/m²     Physical Exam  Vitals and nursing note reviewed.   Constitutional:       General: She is not in acute distress.     Appearance: Normal appearance. She is not toxic-appearing.   HENT:      Head: Normocephalic and atraumatic.      Jaw: There is normal jaw occlusion.      Mouth/Throat:      Mouth: Mucous membranes are moist.   Eyes:      General: Lids are normal.      Extraocular Movements: Extraocular movements intact.      Conjunctiva/sclera: Conjunctivae normal.      Pupils: Pupils are equal, round, and reactive to light.   Cardiovascular:      Rate and Rhythm: Normal rate and regular rhythm.      Pulses: Normal pulses.      Heart sounds: Normal heart sounds.   Pulmonary:      Effort: Pulmonary effort is normal.      Breath sounds: Decreased breath sounds present. No wheezing or rhonchi.   Abdominal:      General: Abdomen is flat. There is no distension.      Palpations: Abdomen is soft.      Tenderness: There is no abdominal tenderness. There is no guarding or rebound.   Musculoskeletal:         General: Normal range of motion.      Cervical back: Normal range of motion and neck supple.      Right lower leg: No edema.      Left lower leg: No edema.   Skin:     General: Skin is warm and dry.   Neurological:      General: No focal deficit present.      Mental Status: She is alert and oriented to person, place, and time. Mental status is at baseline.   Psychiatric:         Mood and Affect: Mood normal.         Behavior: Behavior normal.                    Medical Decision Making:      Comorbidities that affect care:    A-fib, diabetes, " hypertension    External Notes reviewed:          The following orders were placed and all results were independently analyzed by me:  Orders Placed This Encounter   Procedures    COVID-19, FLU A/B, RSV PCR 1 HR TAT - Swab, Nasopharynx    XR Chest 1 View    Wiley Draw    Comprehensive Metabolic Panel    BNP    High Sensitivity Troponin T    CBC Auto Differential    High Sensitivity Troponin T 1Hr    Continuous Pulse Oximetry    Vital Signs    Oxygen Therapy- Nasal Cannula; Titrate 1-6 LPM Per SpO2; 90 - 95%    ECG 12 Lead Dyspnea    Insert Peripheral IV    CBC & Differential    Green Top (Gel)    Lavender Top    Gold Top - SST    Light Blue Top       Medications Given in the Emergency Department:  Medications   sodium chloride 0.9 % flush 10 mL (has no administration in time range)   dexAMETHasone sodium phosphate injection 6 mg (has no administration in time range)   acetaminophen (TYLENOL) tablet 1,000 mg (1,000 mg Oral Given 12/26/24 1853)        ED Course:    ED Course as of 12/26/24 1931   u Dec 26, 2024   1412 -- PROVIDER IN TRIAGE NOTE ---    The patient was evaluated by me, CHU Horta, in triage. Orders were placed and the patient is currently awaiting disposition.    [CB]      ED Course User Index  [CB] Yamilex Michael APRN       Labs:    Lab Results (last 24 hours)       Procedure Component Value Units Date/Time    CBC & Differential [[id number]]  (Abnormal) Collected: 12/26/24 1421    Specimen: Blood from Arm, Left Updated: 12/26/24 1429    Narrative:      The following orders were created for panel order CBC & Differential.  Procedure                               Abnormality         Status                     ---------                               -----------         ------                     CBC Auto Differential[[id number]]        Abnormal            Final result                 Please view results for these tests on the individual orders.    Comprehensive Metabolic Panel  [[id number]]  (Abnormal) Collected: 12/26/24 1421    Specimen: Blood from Arm, Left Updated: 12/26/24 1453     Glucose 279 mg/dL      BUN 10 mg/dL      Creatinine 0.81 mg/dL      Sodium 133 mmol/L      Potassium 4.0 mmol/L      Chloride 99 mmol/L      CO2 24.4 mmol/L      Calcium 9.8 mg/dL      Total Protein 8.7 g/dL      Albumin 3.7 g/dL      ALT (SGPT) 148 U/L      AST (SGOT) 253 U/L      Alkaline Phosphatase 174 U/L      Total Bilirubin 0.6 mg/dL      Globulin 5.0 gm/dL      A/G Ratio 0.7 g/dL      BUN/Creatinine Ratio 12.3     Anion Gap 9.6 mmol/L      eGFR 82.2 mL/min/1.73     Narrative:      GFR Categories in Chronic Kidney Disease (CKD)      GFR Category          GFR (mL/min/1.73)    Interpretation  G1                     90 or greater         Normal or high (1)  G2                      60-89                Mild decrease (1)  G3a                   45-59                Mild to moderate decrease  G3b                   30-44                Moderate to severe decrease  G4                    15-29                Severe decrease  G5                    14 or less           Kidney failure          (1)In the absence of evidence of kidney disease, neither GFR category G1 or G2 fulfill the criteria for CKD.    eGFR calculation 2021 CKD-EPI creatinine equation, which does not include race as a factor    BNP [[id number]]  (Normal) Collected: 12/26/24 1421    Specimen: Blood from Arm, Left Updated: 12/26/24 1449     proBNP 72.9 pg/mL     Narrative:      This assay is used as an aid in the diagnosis of individuals suspected of having heart failure. It can be used as an aid in the diagnosis of acute decompensated heart failure (ADHF) in patients presenting with signs and symptoms of ADHF to the emergency department (ED). In addition, NT-proBNP of <300 pg/mL indicates ADHF is not likely.    Age Range Result Interpretation  NT-proBNP Concentration (pg/mL:      <50             Positive            >450                   [place]                  300-450                    Negative             <300    50-75           Positive            >900                  Gray                300-900                  Negative            <300      >75             Positive            >1800                  Gray                300-1800                  Negative            <300    High Sensitivity Troponin T [[id number]]  (Normal) Collected: 12/26/24 1421    Specimen: Blood from Arm, Left Updated: 12/26/24 1452     HS Troponin T <6 ng/L     Narrative:      High Sensitive Troponin T Reference Range:  <14.0 ng/L- Negative Female for AMI  <22.0 ng/L- Negative Male for AMI  >=14 - Abnormal Female indicating possible myocardial injury.  >=22 - Abnormal Male indicating possible myocardial injury.   Clinicians would have to utilize clinical acumen, EKG, Troponin, and serial changes to determine if it is an Acute Myocardial Infarction or myocardial injury due to an underlying chronic condition.         CBC Auto Differential [[id number]]  (Abnormal) Collected: 12/26/24 1421    Specimen: Blood from Arm, Left Updated: 12/26/24 1429     WBC 7.22 10*3/mm3      RBC 4.45 10*6/mm3      Hemoglobin 13.5 g/dL      Hematocrit 38.1 %      MCV 85.6 fL      MCH 30.3 pg      MCHC 35.4 g/dL      RDW 15.5 %      RDW-SD 48.7 fl      MPV 10.7 fL      Platelets 180 10*3/mm3      Neutrophil % 77.4 %      Lymphocyte % 14.3 %      Monocyte % 6.6 %      Eosinophil % 0.7 %      Basophil % 0.6 %      Immature Grans % 0.4 %      Neutrophils, Absolute 5.59 10*3/mm3      Lymphocytes, Absolute 1.03 10*3/mm3      Monocytes, Absolute 0.48 10*3/mm3      Eosinophils, Absolute 0.05 10*3/mm3      Basophils, Absolute 0.04 10*3/mm3      Immature Grans, Absolute 0.03 10*3/mm3      nRBC 0.0 /100 WBC     COVID-19, FLU A/B, RSV PCR 1 HR TAT - Swab, Nasopharynx [[id number]]  (Abnormal) Collected: 12/26/24 1421    Specimen: Swab from Nasopharynx Updated: 12/26/24 1503     COVID19 Detected     Influenza A PCR Not Detected      Influenza B PCR Not Detected     RSV, PCR Not Detected    Narrative:      Fact sheet for providers: https://www.fda.gov/media/472314/download    Fact sheet for patients: https://www.fda.gov/media/993146/download    Test performed by PCR.    High Sensitivity Troponin T 1Hr [[id number]] Collected: 12/26/24 1843    Specimen: Blood Updated: 12/26/24 1909     HS Troponin T <6 ng/L      Troponin T Numeric Delta --     Comment: Unable to calculate.       Narrative:      High Sensitive Troponin T Reference Range:  <14.0 ng/L- Negative Female for AMI  <22.0 ng/L- Negative Male for AMI  >=14 - Abnormal Female indicating possible myocardial injury.  >=22 - Abnormal Male indicating possible myocardial injury.   Clinicians would have to utilize clinical acumen, EKG, Troponin, and serial changes to determine if it is an Acute Myocardial Infarction or myocardial injury due to an underlying chronic condition.                  Imaging:    XR Chest 1 View    Result Date: 12/26/2024  XR CHEST 1 VW Date of Exam: 12/26/2024 2:40 PM EST Indication: CHF/COPD Protocol Comparison: 4/25/2024 Findings: Pulmonary consolidations. No pneumothorax or pleural fluid identified. Pulmonary vascularity appears within normal limits. Heart size and mediastinal contour are within normal limits. Aortic vascular calcification is noted.     Impression: No acute cardiopulmonary findings. Electronically Signed: Lowell Collins MD  12/26/2024 2:54 PM EST  Workstation ID: [id number]       Differential Diagnosis and Discussion:    Chest Pain:  Based on the patient's signs and symptoms, I considered aortic dissection, myocardial infaction, pulmonary embolism, cardiac tamponade, pericarditis, pneumothorax, musculoskeletal chest pain and other differential diagnosis as an etiology of the patient's chest pain.   Cough: Differential diagnosis includes but is not limited to pneumonia, acute bronchitis, upper respiratory infection, ACE inhibitor use, allergic reaction,  epiglottitis, seasonal allergies, chemical irritants, exercise-induced asthma, viral syndrome.    PROCEDURES:    Labs were collected in the emergency department and all labs were reviewed and interpreted by me.  X-ray were performed in the emergency department and all X-ray impressions were independently interpreted by me.  An EKG was performed and the EKG was interpreted by supervising attending.    ECG 12 Lead Dyspnea   Final Result   HEART RATE=97  bpm   RR Jyssenzp=980  ms   MI Interval=99  ms   P Horizontal Axis=-50  deg   P Front Axis=-2  deg   QRSD Interval=76  ms   QT Uubhbsgk=877  ms   ECqD=602  ms   QRS Axis=51  deg   T Wave Axis=60  deg   - BORDERLINE ECG -   Sinus rhythm   Short MI interval   When compared with ECG of 15-Apr-2022 23:02:08,   No significant interval change   Electronically Signed By: Earle Webb ([facility]) 2024-12-26 16:08:48   Date and Time of Study:2024-12-26 14:35:12          Procedures    MDM     CBC shows no evidence of leukocytosis.  Patient tested positive for COVID..  Troponin x 2 within normal limits.  We gave patient Tylenol for fever.  Chest x-ray shows no evidence of pneumonia.  Oxygen saturation 100% on room air.  I gave patient a steroid shot in the ED.  I emphasized the importance of oral intake of fluid.  I instructed patient to alternate Tylenol/Motrin as needed for fever.  I instructed patient to return to ED if she develops any new or worsening symptoms.  Otherwise follow-up PCP.  Patient states she understands and agrees with plan of care.                Patient Care Considerations:          Consultants/Shared Management Plan:    None    Social Determinants of Health:    Patient is independent, reliable, and has access to care.       Disposition and Care Coordination:    Discharged: The patient is suitable and stable for discharge with no need for consideration of admission.    I have explained the patient´s condition, diagnoses and treatment plan based on the information  available to me at this time. I have answered questions and addressed any concerns. The patient has a good  understanding of the patient´s diagnosis, condition, and treatment plan as can be expected at this point. The vital signs have been stable. The patient´s condition is stable and appropriate for discharge from the emergency department.      The patient will pursue further outpatient evaluation with the primary care physician or other designated or consulting physician as outlined in the discharge instructions. They are agreeable to this plan of care and follow-up instructions have been explained in detail. The patient has received these instructions in written format and has expressed an understanding of the discharge instructions. The patient is aware that any significant change in condition or worsening of symptoms should prompt an immediate return to this or the closest emergency department or call to 911.  I have explained discharge medications and the need for follow up with the patient/caretakers. This was also printed in the discharge instructions. Patient was discharged with the following medications and follow up:      Medication List      No changes were made to your prescriptions during this visit.      Elena Henderson PA  [address] [address]  [phone]    Call in 1 day  To schedule follow-up       Final diagnoses:   COVID        ED Disposition       ED Disposition   Discharge    Condition   Stable    Comment   --               This medical record created using voice recognition software.             Reza Gill PA-C  12/26/24 1615

## 2024-12-29 NOTE — ED NOTES
By Alicia Galloway NP    Mr. Flakito Mcginnis is a 71 y.o. man w/ HFrEF (30% 8/2024 from 20-25% 6/2024), NICM, MR, ESRD on HD, chronic respiratory failure on home 3L NC, Crohn's s/p colostomy, h/o R nephrectomy. [place] [facility] [place]19 due to hypotension and ongoing shortness of breath. He usually receives his dialysis on T, R, S and went on Wednesday for an extra session for volume removal. He arrived on Thursday for his usually scheduled dialysis session and was directed to the ED due to hypotension. On arrival in the ED his blood pressure was 78/51. He was found to have a BNP >4900 and CXR concerning for volume overload. High sensitivity troponin 923. Critical care medicine was consulted for hypotension and admitted to MICU.    Gave the patient some water.

## 2025-01-16 ENCOUNTER — OFFICE VISIT (OUTPATIENT)
Dept: FAMILY MEDICINE CLINIC | Facility: CLINIC | Age: 65
End: 2025-01-16
Payer: COMMERCIAL

## 2025-01-16 ENCOUNTER — HOSPITAL ENCOUNTER (OUTPATIENT)
Dept: GENERAL RADIOLOGY | Facility: HOSPITAL | Age: 65
Discharge: HOME | End: 2025-01-16
Admitting: PHYSICIAN ASSISTANT
Payer: COMMERCIAL

## 2025-01-16 VITALS
BODY MASS INDEX: 39.39 KG/M2 | RESPIRATION RATE: 20 BRPM | OXYGEN SATURATION: 99 % | HEART RATE: 72 BPM | DIASTOLIC BLOOD PRESSURE: 72 MMHG | SYSTOLIC BLOOD PRESSURE: 148 MMHG | HEIGHT: 65 IN | WEIGHT: 236.4 LBS

## 2025-01-16 DIAGNOSIS — I10 ESSENTIAL HYPERTENSION: Chronic | ICD-10-CM

## 2025-01-16 DIAGNOSIS — Z12.31 ENCOUNTER FOR SCREENING MAMMOGRAM FOR MALIGNANT NEOPLASM OF BREAST: ICD-10-CM

## 2025-01-16 DIAGNOSIS — J12.82 PNEUMONIA DUE TO COVID-19 VIRUS: ICD-10-CM

## 2025-01-16 DIAGNOSIS — U07.1 PNEUMONIA DUE TO COVID-19 VIRUS: ICD-10-CM

## 2025-01-16 DIAGNOSIS — Z00.00 ANNUAL PHYSICAL EXAM: ICD-10-CM

## 2025-01-16 DIAGNOSIS — R42 DIZZINESS AFTER EXTENSION OF NECK: ICD-10-CM

## 2025-01-16 DIAGNOSIS — Z23 NEED FOR INFLUENZA VACCINATION: Primary | ICD-10-CM

## 2025-01-16 PROCEDURE — 90656 IIV3 VACC NO PRSV 0.5 ML IM: CPT | Performed by: PHYSICIAN ASSISTANT

## 2025-01-16 PROCEDURE — 71046 X-RAY EXAM CHEST 2 VIEWS: CPT

## 2025-01-16 PROCEDURE — 99396 PREV VISIT EST AGE 40-64: CPT | Performed by: PHYSICIAN ASSISTANT

## 2025-01-16 PROCEDURE — 99214 OFFICE O/P EST MOD 30 MIN: CPT | Performed by: PHYSICIAN ASSISTANT

## 2025-01-16 PROCEDURE — 90471 IMMUNIZATION ADMIN: CPT | Performed by: PHYSICIAN ASSISTANT

## 2025-01-16 RX ORDER — AMLODIPINE BESYLATE 10 MG/1
10 TABLET ORAL DAILY
Qty: 90 TABLET | Refills: 1 | Status: SHIPPED | OUTPATIENT
Start: 2025-01-16

## 2025-01-16 RX ORDER — OLMESARTAN MEDOXOMIL 20 MG/1
20 TABLET ORAL DAILY
Qty: 90 TABLET | Refills: 1 | Status: SHIPPED | OUTPATIENT
Start: 2025-01-16

## 2025-01-16 NOTE — PROGRESS NOTES
Chief Complaint  Chief Complaint   Patient presents with    Annual Exam       Subjective          Anika Casillas presents to [facility] for annual exam.    History of Present Illness  HTN: Patient presents for hypertension management. Patient is currently taking olmesartan 20 Mg daily and amlodipine 10 Mg daily and is consistent with medication. Patient denies chest pain, shortness of air and edema.  Blood pressure is a bit elevated today however patient states that she has had no medication today.    Afib: on Eliquis    Patient c/o dizziness with lying down especially on right side. Goes away after about 1-2 minutes. Also, dizziness with extension of neck.  Patient states that she has had maneuver for vertigo without improvement of symptoms    Patient sees Rad Stoll for diabetic management    She has been approved for disability.    She is due a colonoscopy with Dr. Charles.    Patient was diagnosed with COVID and pneumonia in December 2025.  Patient still has lingering cough.  Patient did not have follow-up chest x-ray after pneumonia.  Will order today.      Medical History: has a past medical history of Abnormal Pap smear of cervix, Atrial fibrillation, Colindres's esophagus, Depression, Diabetes mellitus, Emphysema lung, Endometrial hyperplasia, Esophageal varices, Fatty liver (2015), Gastroparesis, GERD (gastroesophageal reflux disease) (1995), Hyperlipidemia, Hypertension, Iron deficiency anemia (05/02/2023), Irritable bowel syndrome, BERKOWITZ (nonalcoholic steatohepatitis), Obstructive sleep apnea syndrome, Pulmonary emphysema (11/22/2021), and S/P exploratory laparotomy, lysis of adhesions, resection of necrotic colon without anastomosis (04/18/2022).   Surgical History: has a past surgical history that includes Gallbladder surgery; Breast lumpectomy (Left); Cardiac catheterization; bladder sling modified, anterior and posterior vaginal repair (2018); Tubal ligation;  "Exploratory Laparotomy (N/A, 04/16/2022); Esophagogastroduodenoscopy (N/A, 06/03/2022); Colectomy; d & c hysteroscopy (N/A, 07/25/2023); Colonoscopy (N/A, 08/14/2023); Abdominal surgery (4/16/2022); Cholecystectomy (5/12/2015); Upper gastrointestinal endoscopy (6/3/2022); Liver biopsy (2021); botox injection; Esophagogastroduodenoscopy (N/A, 04/22/2024); Colonoscopy (N/A, 04/22/2024); Bladder surgery (05/01/2024); and Eye surgery (2023).   Family History: family history includes ADD / ADHD in her son; No Known Problems in her brother, cousin, father, maternal aunt, maternal grandfather, maternal grandmother, maternal uncle, mother, paternal aunt, paternal grandfather, paternal grandmother, paternal uncle, sister, and another family member. She was adopted.   Social History: reports that she has been smoking cigarettes. She started smoking about 49 years ago. She has a 12.4 pack-year smoking history. She has never used smokeless tobacco. She reports current alcohol use of about 1.0 standard drink of alcohol per week. She reports that she does not use drugs.    Allergies: Liraglutide, Lisinopril, and Metformin    Health Maintenance Due   Topic Date Due    TDAP/TD VACCINES (1 - Tdap) Never done    DIABETIC EYE EXAM  12/08/2021    Hepatitis B (1 of 3 - Risk 3-dose series) Never done    ANNUAL PHYSICAL  09/13/2024    COLORECTAL CANCER SCREENING  10/22/2024    LIPID PANEL  02/13/2025       Objective     Vitals:    01/16/25 1151   BP: 148/72   Pulse: 72   Resp: 20   SpO2: 99%   Weight: 107 kg (236 lb 6.4 oz)   Height: 165.1 cm (65\")     Body mass index is 39.34 kg/m².     Wt Readings from Last 3 Encounters:   01/16/25 107 kg (236 lb 6.4 oz)   12/26/24 105 kg (232 lb 5.8 oz)   12/12/24 109 kg (239 lb 6.4 oz)     BP Readings from Last 3 Encounters:   01/16/25 148/72   12/26/24 167/70   12/12/24 121/55         Patient Care Team:  Elena Henderson PA as PCP - General (Family Medicine)  KATHARINE Webb MD as Consulting " Physician (Cardiology)  Cande Stanley MD as Consulting Physician (General Surgery)  Migdalia Sanon APRN as Nurse Practitioner (Nurse Practitioner)    Physical Exam  Vitals and nursing note reviewed.   Constitutional:       Appearance: Normal appearance. She is obese.   HENT:      Head: Normocephalic and atraumatic.   Neck:      Vascular: No carotid bruit.      Comments: Thyroid : gland size normal, nontender, no nodules or masses present on palpation   Cardiovascular:      Rate and Rhythm: Normal rate and regular rhythm.      Pulses: Normal pulses.      Heart sounds: Normal heart sounds.   Pulmonary:      Effort: Pulmonary effort is normal.      Breath sounds: Normal breath sounds.   Musculoskeletal:      Cervical back: Neck supple. No tenderness.      Right lower leg: No edema.      Left lower leg: No edema.   Lymphadenopathy:      Cervical: No cervical adenopathy.   Neurological:      Mental Status: She is alert.   Psychiatric:         Mood and Affect: Mood normal.         Behavior: Behavior normal.           Result Review :              Assessment and Plan      Diagnoses and all orders for this visit:    1. Need for influenza vaccination (Primary)  -     Fluzone >6mos ([id number])    2. Pneumonia due to COVID-19 virus  -     XR Chest PA & Lateral; Future    3. Essential hypertension  Comments:  A bit elevated today however patient did not have BP medication: continue with current regimen and f/u in 6mth; continue with cardiology.  Orders:  -     amLODIPine (NORVASC) 10 MG tablet; Take 1 tablet by mouth Daily.  Dispense: 90 tablet; Refill: 1  -     olmesartan (BENICAR) 20 MG tablet; Take 1 tablet by mouth Daily.  Dispense: 90 tablet; Refill: 1    4. Dizziness after extension of neck  Comments:  Ongoing problem without improvement and needing further evaluation; order bilateral carotid ultrasound.  Orders:  -     Duplex Carotid Ultrasound CAR; Future    5. Encounter for screening mammogram for  malignant neoplasm of breast  -     Mammo Screening Digital Tomosynthesis Bilateral With CAD; Future    6. Annual physical exam    The patient is advised to quit smoking, begin progressive daily aerobic exercise program, follow a low fat, low cholesterol diet, attempt to lose weight, improve dietary compliance, continue current medications, and return for routine annual checkups.        Follow Up     Return for After labs/tests.    Patient was given instructions and counseling regarding her condition or for health maintenance advice. Please see specific information pulled into the AVS if appropriate.

## 2025-01-20 ENCOUNTER — LAB (OUTPATIENT)
Dept: ONCOLOGY | Facility: HOSPITAL | Age: 65
End: 2025-01-20
Payer: COMMERCIAL

## 2025-01-20 DIAGNOSIS — R74.8 ELEVATED LIVER ENZYMES: ICD-10-CM

## 2025-01-20 DIAGNOSIS — D50.9 IRON DEFICIENCY ANEMIA, UNSPECIFIED IRON DEFICIENCY ANEMIA TYPE: ICD-10-CM

## 2025-01-20 LAB
ALBUMIN SERPL-MCNC: 3.8 G/DL (ref 3.5–5.2)
ALBUMIN/GLOB SERPL: 0.8 G/DL
ALP SERPL-CCNC: 160 U/L (ref 39–117)
ALT SERPL W P-5'-P-CCNC: 74 U/L (ref 1–33)
ANION GAP SERPL CALCULATED.3IONS-SCNC: 10.2 MMOL/L (ref 5–15)
AST SERPL-CCNC: 78 U/L (ref 1–32)
BASOPHILS # BLD AUTO: 0.05 10*3/MM3 (ref 0–0.2)
BASOPHILS NFR BLD AUTO: 0.7 % (ref 0–1.5)
BILIRUB SERPL-MCNC: 0.3 MG/DL (ref 0–1.2)
BUN SERPL-MCNC: 14 MG/DL (ref 8–23)
BUN/CREAT SERPL: 15.2 (ref 7–25)
CALCIUM SPEC-SCNC: 9.9 MG/DL (ref 8.6–10.5)
CHLORIDE SERPL-SCNC: 105 MMOL/L (ref 98–107)
CO2 SERPL-SCNC: 20.8 MMOL/L (ref 22–29)
CREAT SERPL-MCNC: 0.92 MG/DL (ref 0.57–1)
DEPRECATED RDW RBC AUTO: 51.2 FL (ref 37–54)
EGFRCR SERPLBLD CKD-EPI 2021: 70.5 ML/MIN/1.73
EOSINOPHIL # BLD AUTO: 0.09 10*3/MM3 (ref 0–0.4)
EOSINOPHIL NFR BLD AUTO: 1.3 % (ref 0.3–6.2)
ERYTHROCYTE [DISTWIDTH] IN BLOOD BY AUTOMATED COUNT: 16.2 % (ref 12.3–15.4)
FERRITIN SERPL-MCNC: 369.9 NG/ML (ref 13–150)
GLOBULIN UR ELPH-MCNC: 4.6 GM/DL
GLUCOSE SERPL-MCNC: 264 MG/DL (ref 65–99)
HCT VFR BLD AUTO: 32.6 % (ref 34–46.6)
HGB BLD-MCNC: 11.3 G/DL (ref 12–15.9)
IMM GRANULOCYTES # BLD AUTO: 0.02 10*3/MM3 (ref 0–0.05)
IMM GRANULOCYTES NFR BLD AUTO: 0.3 % (ref 0–0.5)
IRON 24H UR-MRATE: 89 MCG/DL (ref 37–145)
IRON SATN MFR SERPL: 29 % (ref 20–50)
LYMPHOCYTES # BLD AUTO: 2.7 10*3/MM3 (ref 0.7–3.1)
LYMPHOCYTES NFR BLD AUTO: 40.1 % (ref 19.6–45.3)
MCH RBC QN AUTO: 30.3 PG (ref 26.6–33)
MCHC RBC AUTO-ENTMCNC: 34.7 G/DL (ref 31.5–35.7)
MCV RBC AUTO: 87.4 FL (ref 79–97)
MONOCYTES # BLD AUTO: 0.6 10*3/MM3 (ref 0.1–0.9)
MONOCYTES NFR BLD AUTO: 8.9 % (ref 5–12)
NEUTROPHILS NFR BLD AUTO: 3.27 10*3/MM3 (ref 1.7–7)
NEUTROPHILS NFR BLD AUTO: 48.7 % (ref 42.7–76)
NRBC BLD AUTO-RTO: 0 /100 WBC (ref 0–0.2)
PLATELET # BLD AUTO: 192 10*3/MM3 (ref 140–450)
PMV BLD AUTO: 10.7 FL (ref 6–12)
POTASSIUM SERPL-SCNC: 4.6 MMOL/L (ref 3.5–5.2)
PROT SERPL-MCNC: 8.4 G/DL (ref 6–8.5)
RBC # BLD AUTO: 3.73 10*6/MM3 (ref 3.77–5.28)
SODIUM SERPL-SCNC: 136 MMOL/L (ref 136–145)
TIBC SERPL-MCNC: 311 MCG/DL (ref 298–536)
TRANSFERRIN SERPL-MCNC: 209 MG/DL (ref 200–360)
WBC NRBC COR # BLD AUTO: 6.73 10*3/MM3 (ref 3.4–10.8)

## 2025-01-20 PROCEDURE — 82728 ASSAY OF FERRITIN: CPT

## 2025-01-20 PROCEDURE — 80053 COMPREHEN METABOLIC PANEL: CPT

## 2025-01-20 PROCEDURE — 36415 COLL VENOUS BLD VENIPUNCTURE: CPT

## 2025-01-20 PROCEDURE — 85025 COMPLETE CBC W/AUTO DIFF WBC: CPT

## 2025-01-20 PROCEDURE — 84466 ASSAY OF TRANSFERRIN: CPT

## 2025-01-20 PROCEDURE — 83540 ASSAY OF IRON: CPT

## 2025-01-22 ENCOUNTER — OFFICE VISIT (OUTPATIENT)
Dept: ONCOLOGY | Facility: HOSPITAL | Age: 65
End: 2025-01-22
Payer: COMMERCIAL

## 2025-01-22 VITALS
DIASTOLIC BLOOD PRESSURE: 74 MMHG | RESPIRATION RATE: 18 BRPM | BODY MASS INDEX: 39.75 KG/M2 | HEIGHT: 65 IN | WEIGHT: 238.6 LBS | TEMPERATURE: 97.3 F | SYSTOLIC BLOOD PRESSURE: 134 MMHG | HEART RATE: 73 BPM | OXYGEN SATURATION: 98 %

## 2025-01-22 DIAGNOSIS — K74.60 CIRRHOSIS OF LIVER WITHOUT ASCITES, UNSPECIFIED HEPATIC CIRRHOSIS TYPE: ICD-10-CM

## 2025-01-22 DIAGNOSIS — I85.00 ESOPHAGEAL VARICES WITHOUT BLEEDING, UNSPECIFIED ESOPHAGEAL VARICES TYPE: ICD-10-CM

## 2025-01-22 DIAGNOSIS — D50.9 IRON DEFICIENCY ANEMIA, UNSPECIFIED IRON DEFICIENCY ANEMIA TYPE: Primary | ICD-10-CM

## 2025-01-22 PROCEDURE — G0463 HOSPITAL OUTPT CLINIC VISIT: HCPCS | Performed by: NURSE PRACTITIONER

## 2025-01-22 NOTE — PROGRESS NOTES
Chief Complaint/Reason for Referral:  Follow-up (Iron defieciency anemia/)    Elena Henderson,*  Elena Henderson, PA    Records Obtained:  Records of the patients history including those obtained from  [facility] and patient information were reviewed and summarized in detail.    Subjective      History of Present Illness  The patient is a 64-year-old -American female who presents today for iron deficiency anemia.    She reports no visible signs of bleeding but acknowledges a decrease in her hemoglobin levels, as indicated by recent blood work.  Fatigue is stable. She does not experience any discomfort or adverse symptoms. Reports she will crave ice when she needs an iron infusion. Last IV iron infusions were in July of 2024 with Ferrlecit infusions. Reports she tolerated well. Lab work dated on 1/20/2025 with normal iron of 89, ferritin normal at 369, iron sat of 29%. Hemoglobin is decreased at 11.3. Previously was 13.5 in December and 12.3 in September.     She has been under the care of a gastroenterologist, with her next appointment scheduled for March 2024. Denies any acute bleeding at this time. Prior scopes were in April of 2024 with EGD and colonoscopy.     She abstains from alcohol consumption and has no history of sickle cell disease. She underwent a colonoscopy in the past and was due for a follow-up procedure six months later.  She is unable to tolerate oral iron supplements.    SOCIAL HISTORY  She does not drink alcohol.    MEDICATIONS  Past: Feraheme      Oncology/Hematology History    No history exists.       Review of Systems   Constitutional: Negative.    HENT: Negative.     Eyes: Negative.    Respiratory: Negative.     Cardiovascular: Negative.    Gastrointestinal: Negative.    Endocrine: Negative.    Genitourinary: Negative.    Musculoskeletal: Negative.    Skin: Negative.    Allergic/Immunologic: Negative.    Neurological: Negative.    Hematological: Negative.     Psychiatric/Behavioral: Negative.     All other systems reviewed and are negative.      Current Outpatient Medications on File Prior to Visit   Medication Sig Dispense Refill    albuterol sulfate  (90 Base) MCG/ACT inhaler Inhale 2 puffs Every 4 (Four) Hours As Needed for Shortness of Air or Wheezing. 1 g 5    amLODIPine (NORVASC) 10 MG tablet Take 1 tablet by mouth Daily. 90 tablet 1    apixaban (Eliquis) 5 MG tablet tablet Take 1 tablet by mouth 2 (Two) Times a Day. 24 tablet 0    ascorbic acid (VITAMIN C) 250 MG tablet Take 1 tablet by mouth Daily.      B-D UF III MINI PEN NEEDLES 31G X 5 MM misc       Blood Glucose Monitoring Suppl (Accu-Chek Guide) w/Device kit       buPROPion XL (WELLBUTRIN XL) 300 MG 24 hr tablet Take 1 tablet by mouth Daily. Two Xls in the morning. 90 tablet 1    Cholecalciferol (Vitamin D3) 50 MCG (2000 UT) capsule Take 3 capsules by mouth Daily. LAST DOSE 7/21/23      DULoxetine (CYMBALTA) 60 MG capsule Take 1 capsule by mouth Daily. 90 capsule 1    Evolocumab (Repatha) solution prefilled syringe injection Inject 1 mL under the skin into the appropriate area as directed Every 14 (Fourteen) Days. 6 each 3    famotidine (PEPCID) 40 MG tablet Take 1 tablet by mouth At Night As Needed for Heartburn. 90 tablet 3    Fluticasone-Umeclidin-Vilant (TRELEGY) 100-62.5-25 MCG/ACT inhaler Inhale 1 puff Daily. 1 each 5    furosemide (LASIX) 40 MG tablet Take 1 tablet by mouth Every Other Day. 30 tablet 3    glucose blood test strip Check blood glucose twice daily 60 each 12    glucose monitor monitoring kit 1 each Daily. Patient prefers Accu Chek Meter. 1 each 0    Insulin Glargine, 2 Unit Dial, (Toujeo Max SoloStar) 300 UNIT/ML solution pen-injector injection Inject 122 Units under the skin into the appropriate area as directed Every Morning. INST PER ANESTHESIA PROTOCOL      ipratropium-albuterol (DUO-NEB) 0.5-2.5 mg/3 ml nebulizer Take 3 mL by nebulization 4 (Four) Times a Day As Needed for  Wheezing or Shortness of Air. 360 mL 3    Lancets (accu-chek soft touch) lancets Check blood sugar twice daily 100 each 12    metoprolol tartrate (LOPRESSOR) 50 MG tablet TAKE 1 AND 1/2 TABLETS BY MOUTH TWICE DAILY 270 tablet 1    Mounjaro 2.5 MG/0.5ML solution pen-injector pen Inject 0.5 mL under the skin into the appropriate area as directed 1 (One) Time Per Week.      multivitamin with minerals tablet tablet Take 1 tablet by mouth Daily. LAST DOSE 7/21/23      NovoLOG FlexPen 100 UNIT/ML solution pen-injector sc pen Inject 60 Units under the skin into the appropriate area as directed 2 (Two) Times a Day. PER SLIDING SCALE  INST PER ANESTHESIA PROTOCOL      olmesartan (BENICAR) 20 MG tablet Take 1 tablet by mouth Daily. 90 tablet 1    pantoprazole (PROTONIX) 40 MG EC tablet Take 1 tablet by mouth Daily. 90 tablet 3    potassium chloride 10 MEQ CR tablet Take 1 tablet by mouth Every Other Day. With lasix. 30 tablet 3    promethazine (PHENERGAN) 25 MG tablet Take 1 tablet by mouth Every 6 (Six) Hours As Needed for Nausea or Vomiting. 30 tablet 0    traZODone (DESYREL) 50 MG tablet Take 1 tablet by mouth Every Night. 30 tablet 2    riFAXIMin (Xifaxan) 550 MG tablet Take 1 tablet by mouth Every 12 (Twelve) Hours. (Patient not taking: Reported on 1/22/2025) 180 tablet 3     No current facility-administered medications on file prior to visit.       Allergies   Allergen Reactions    Liraglutide Nausea And Vomiting    Lisinopril Other (See Comments)     Cough    Metformin Nausea Only     GI Upset     Past Medical History:   Diagnosis Date    Abnormal Pap smear of cervix     Atrial fibrillation     ON ELIQUIS/FOLLOWS SERGO    Colindres's esophagus     Depression     Diabetes mellitus     Emphysema lung     INHALER    Endometrial hyperplasia     D&C SCHEDULED 7/25/23    Esophageal varices     Fatty liver 2015    Gastroparesis     TAKES XIFAXAN    GERD (gastroesophageal reflux disease) 1995    Hyperlipidemia     Hypertension      Iron deficiency anemia 05/02/2023    IRON INFUSION LAST 7/24/23    Irritable bowel syndrome     W/CONSTIPATION    BERKOWITZ (nonalcoholic steatohepatitis)     NO S/S CURRENTLY, ELEVATED ENZYMES    Obstructive sleep apnea syndrome     Pulmonary emphysema 11/22/2021    S/P exploratory laparotomy, lysis of adhesions, resection of necrotic colon without anastomosis 04/18/2022     Past Surgical History:   Procedure Laterality Date    ABDOMINAL SURGERY  4/16/2022    BLADDER SLING MODIFIED, ANTERIOR AND POSTERIOR VAGINAL REPAIR  2018    [place]    BLADDER SURGERY  05/01/2024    stimulator  @ [facility]    BOTOX INJECTION      In abdomen    BREAST LUMPECTOMY Left     BENIGN    CARDIAC CATHETERIZATION      NO INTERVENTION    CHOLECYSTECTOMY  5/12/2015    COLON RESECTION      PERFORATED COLON, EEA    COLONOSCOPY N/A 08/14/2023    Procedure: COLONOSCOPY WITH POLYPECTOMY, TATTOO;  Surgeon: Shannan Charles MD;  Location: [facility] ENDOSCOPY;  Service: Gastroenterology;  Laterality: N/A;  COLON POLYPS  DIVERTICULOSIS  HEMORRHOIDS    COLONOSCOPY N/A 04/22/2024    Procedure: COLONOSCOPY COLD SNARE POLYPECTOMIES WITH CLIP PLACEMENT X3;  Surgeon: Shannan Charles MD;  Location: [facility] ENDOSCOPY;  Service: Gastroenterology;  Laterality: N/A;  DIVERTICULOSIS  COLON POLYPS    D & C HYSTEROSCOPY N/A 07/25/2023    Procedure: resection of endometrial polyp with myosure;  Surgeon: Ashleigh Harding DO;  Location: [facility] MAIN OR;  Service: Gynecology;  Laterality: N/A;    ENDOSCOPY N/A 06/03/2022    Procedure: ESOPHAGOGASTRODUODENOSCOPY;  Surgeon: Shannan Charles MD;  Location: [facility] ENDOSCOPY;  Service: Gastroenterology;  Laterality: N/A;  RETAINED FOOD IN STOMACH  ESOPHAGEAL VARICIES    ENDOSCOPY N/A 04/22/2024    Procedure: ESOPHAGOGASTRODUODENOSCOPY WITH BIOPSIES;  Surgeon: Shannan Charles MD;  Location: [facility] ENDOSCOPY;  Service: Gastroenterology;  Laterality: N/A;  ESOPHAGEAL BIOPSIES    EXPLORATORY  LAPAROTOMY N/A 04/16/2022    Procedure: EXPLORATORY LAPAROTOMY, LYSIS OF ADHESIONS, RESECTION OF NECTROIC COLON;  Surgeon: Cande Stanley MD;  Location: [facility] MAIN OR;  Service: General;  Laterality: N/A;    EYE SURGERY  2023    GALLBLADDER SURGERY      LAPAROSCOPIC    LIVER BIOPSY  2021    TUBAL ABDOMINAL LIGATION      UPPER GASTROINTESTINAL ENDOSCOPY  6/3/2022     Social History     Socioeconomic History    Marital status:      Spouse name: fannie   Tobacco Use    Smoking status: Every Day     Current packs/day: 0.25     Average packs/day: 0.3 packs/day for 49.6 years (12.4 ttl pk-yrs)     Types: Cigarettes     Start date: 6/17/1975    Smokeless tobacco: Never    Tobacco comments:     INST PER ANESTHESIA PROTOCOL   Vaping Use    Vaping status: Never Used   Substance and Sexual Activity    Alcohol use: Yes     Alcohol/week: 1.0 standard drink of alcohol     Types: 1 Drinks containing 0.5 oz of alcohol per week     Comment: 1 a month    Drug use: Never    Sexual activity: Yes     Partners: Male     Birth control/protection: Post-menopausal, Tubal ligation     Family History   Adopted: Yes   Problem Relation Age of Onset    No Known Problems Mother     No Known Problems Father     No Known Problems Sister     No Known Problems Brother     No Known Problems Maternal Aunt     No Known Problems Paternal Aunt     No Known Problems Maternal Uncle     No Known Problems Paternal Uncle     No Known Problems Maternal Grandfather     No Known Problems Maternal Grandmother     No Known Problems Paternal Grandfather     No Known Problems Paternal Grandmother     No Known Problems Cousin     No Known Problems Other     ADD / ADHD Son     Malig Hyperthermia Neg Hx     Alcohol abuse Neg Hx     Anxiety disorder Neg Hx     Bipolar disorder Neg Hx     Dementia Neg Hx     Depression Neg Hx     Drug abuse Neg Hx     OCD Neg Hx     Paranoid behavior Neg Hx     Schizophrenia Neg Hx     Seizures Neg Hx     Self-Injurious  "Behavior  Neg Hx     Suicide Attempts Neg Hx      Immunization History   Administered Date(s) Administered    COVID-19 (PFIZER) Purple Cap Monovalent 02/06/2021, 03/06/2021, 12/04/2021    Flu Vaccine Intradermal Quad 18-64YR 09/12/2021, 09/12/2021    Fluzone  >6mos 01/16/2025    Fluzone (or Fluarix & Flulaval for VFC) >6mos 10/29/2019, 09/27/2020, 10/03/2022, 10/05/2023    Influenza Split Preservative Free ID 09/12/2021    Influenza, Unspecified 11/12/2018, 09/27/2020    Pneumococcal Conjugate 13-Valent (PCV13) 01/09/2017    Pneumococcal Polysaccharide (PPSV23) 10/12/2020    flucelvax quad pfs =>4 YRS 09/27/2020       Tobacco Use: High Risk (1/22/2025)    Patient History     Smoking Tobacco Use: Every Day     Smokeless Tobacco Use: Never     Passive Exposure: Not on file       Objective     Physical Exam  Vitals and nursing note reviewed.   Constitutional:       Appearance: Normal appearance.   HENT:      Nose: Nose normal.      Mouth/Throat:      Mouth: Mucous membranes are moist.   Cardiovascular:      Rate and Rhythm: Normal rate and regular rhythm.   Pulmonary:      Effort: Pulmonary effort is normal.   Musculoskeletal:         General: Normal range of motion.      Cervical back: Normal range of motion and neck supple.   Skin:     General: Skin is warm and dry.      Capillary Refill: Capillary refill takes less than 2 seconds.   Neurological:      General: No focal deficit present.      Mental Status: She is alert and oriented to person, place, and time.   Psychiatric:         Mood and Affect: Mood normal.         Behavior: Behavior normal.         Thought Content: Thought content normal.         Judgment: Judgment normal.         Vitals:    01/22/25 1201   BP: 134/74   Pulse: 73   Resp: 18   Temp: 97.3 °F (36.3 °C)   TempSrc: Temporal   SpO2: 98%   Weight: 108 kg (238 lb 9.6 oz)   Height: 165.1 cm (65\")   PainSc: 0-No pain       Wt Readings from Last 3 Encounters:   01/22/25 108 kg (238 lb 9.6 oz)   01/16/25 " 107 kg (236 lb 6.4 oz)   12/26/24 105 kg (232 lb 5.8 oz)       ECOG score: 0         ECOG: (0) Fully Active - Able to Carry On All Pre-disease Performance Without Restriction  Fall Risk Assessment was completed, and patient is at low risk for falls.  PHQ-9 Total Score:         The patient is  experiencing fatigue. Fatigue score: 1    PT/OT Functional Screening: PT fx screen : No needs identified  Speech Functional Screening: Speech fx screen : No needs identified  Rehab to be ordered: Rehab to be ordered : No needs identified        Result Review :  The following data was reviewed by: CHU Pedraza on 01/22/2025:  Lab Results   Component Value Date    HGB 11.3 (L) 01/20/2025    HCT 32.6 (L) 01/20/2025    MCV 87.4 01/20/2025     01/20/2025    WBC 6.73 01/20/2025    NEUTROABS 3.27 01/20/2025    LYMPHSABS 2.70 01/20/2025    MONOSABS 0.60 01/20/2025    EOSABS 0.09 01/20/2025    BASOSABS 0.05 01/20/2025     Lab Results   Component Value Date    GLUCOSE 264 (H) 01/20/2025    BUN 14 01/20/2025    CREATININE 0.92 01/20/2025     01/20/2025    K 4.6 01/20/2025     01/20/2025    CO2 20.8 (L) 01/20/2025    CALCIUM 9.9 01/20/2025    PROTEINTOT 8.4 01/20/2025    ALBUMIN 3.8 01/20/2025    BILITOT 0.3 01/20/2025    ALKPHOS 160 (H) 01/20/2025    AST 78 (H) 01/20/2025    ALT 74 (H) 01/20/2025     Lab Results   Component Value Date    Ferritin 369.90 (H) 01/20/2025    Folate >20.00 02/29/2024     Lab Results   Component Value Date    IRON 89 01/20/2025    LABIRON 29 01/20/2025    TRANSFERRIN 209 01/20/2025    TIBC 311 01/20/2025     Lab Results   Component Value Date    FERRITIN 369.90 (H) 01/20/2025    [id number] 1,669 (H) 02/29/2024    FOLATE >20.00 02/29/2024     Lab Results   Component Value Date    AFP 2.15 01/20/2023       XR Chest PA & Lateral    Result Date: 1/17/2025  Impression: No evidence of pneumonia Electronically Signed: Viral Walters  1/17/2025 3:18 PM EST  Workstation ID:  [id number]    XR Chest 1 View    Result Date: 12/26/2024  Impression: No acute cardiopulmonary findings. Electronically Signed: Lowell Collins MD  12/26/2024 2:54 PM EST  Workstation ID: [id number]      Results  Laboratory Studies  Liver enzymes have improved. Iron studies are stable and improved. Ferritin level is elevated. Hemoglobin is slightly lower but stable. Hematocrit is down. RBC is down. RDW is elevated. Folate was normal in February 2024.           Assessment and Plan:  Diagnoses and all orders for this visit:    1. Iron deficiency anemia, unspecified iron deficiency anemia type (Primary)  -     Iron Profile; Future  -     Ferritin; Future  -     CBC & Differential; Future  -     Vitamin B12; Future  -     Folate; Future    2. Cirrhosis of liver without ascites, unspecified hepatic cirrhosis type    3. Esophageal varices without bleeding, unspecified esophageal varices type        Assessment & Plan  1. Iron deficiency anemia.  Her iron studies have shown stability and improvement since September 2024. The elevated ferritin level is likely due to liver cirrhosis or residual effects from the intravenous iron administered in July 2024. Hemoglobin levels, although slightly decreased compared to December 2024, remain stable. The decrease in hematocrit is attributed to the reduced hemoglobin levels. Red blood cell count is also diminished, correlating with the decreased hemoglobin and hematocrit levels. The elevated RDW could be indicative of iron deficiency anemia, but it is not significantly concerning at this point. Fatigue levels are stable, and no new symptoms have been reported. Weight is up 2 pounds. Blood pressure is normal. Given the stability of her condition, no iron infusions are deemed necessary at this time. She has been advised to seek immediate medical attention at the emergency room if she experiences any bleeding, particularly bright red blood, due to her esophageal varices. She has been instructed  to contact the office if she notices an increased craving for ice or more frequent ice chewing. Laboratory tests will be conducted in mid to late April 2025, and she will follow up with the nurse practitioner 1 to 2 days after the completion of these tests.    Esophageal varices: follow up with GI in march as scheduled.     Cirrhosis: liver enzymes improved. Follow up with GI as scheduled.     PROCEDURE  The patient underwent a colonoscopy / EGD  in April of 2024.             I spent 25 minutes caring for Anika on this date of service. This time includes time spent by me in the following activities:preparing for the visit, reviewing tests, obtaining and/or reviewing a separately obtained history, performing a medically appropriate examination and/or evaluation , counseling and educating the patient/family/caregiver, ordering medications, tests, or procedures, referring and communicating with other health care professionals , documenting information in the medical record, and independently interpreting results and communicating that information with the patient/family/caregiver    Patient Follow Up: 3 months with NP. Labs 1-2 days prior.     Patient was given instructions and counseling regarding her condition or for health maintenance advice. Please see specific information pulled into the AVS if appropriate.     CHU Pedraza    1/22/2025    .tob    Patient or patient representative verbalized consent for the use of Ambient Listening during the visit with  CHU Pedraza for chart documentation. 1/22/2025  12:55 EST

## 2025-01-24 ENCOUNTER — TRANSCRIBE ORDERS (OUTPATIENT)
Dept: ADMINISTRATIVE | Facility: HOSPITAL | Age: 65
End: 2025-01-24
Payer: COMMERCIAL

## 2025-01-24 ENCOUNTER — LAB (OUTPATIENT)
Facility: HOSPITAL | Age: 65
End: 2025-01-24
Payer: COMMERCIAL

## 2025-01-24 DIAGNOSIS — K22.70 SHORT-SEGMENT BARRETT'S ESOPHAGUS: ICD-10-CM

## 2025-01-24 DIAGNOSIS — I48.0 PAROXYSMAL ATRIAL FIBRILLATION: ICD-10-CM

## 2025-01-24 DIAGNOSIS — R60.0 LEG EDEMA: ICD-10-CM

## 2025-01-24 DIAGNOSIS — K74.60 HEPATIC CIRRHOSIS, UNSPECIFIED HEPATIC CIRRHOSIS TYPE, UNSPECIFIED WHETHER ASCITES PRESENT: ICD-10-CM

## 2025-01-24 DIAGNOSIS — E55.9 VITAMIN D DEFICIENCY: ICD-10-CM

## 2025-01-24 DIAGNOSIS — I10 ESSENTIAL (PRIMARY) HYPERTENSION: ICD-10-CM

## 2025-01-24 DIAGNOSIS — I85.00 ESOPHAGEAL VARICES WITHOUT BLEEDING, UNSPECIFIED ESOPHAGEAL VARICES TYPE: ICD-10-CM

## 2025-01-24 DIAGNOSIS — Z72.0 TOBACCO ABUSE: ICD-10-CM

## 2025-01-24 DIAGNOSIS — E11.3212 DIABETIC MACULOPATHY OF LEFT EYE WITH MILD NONPROLIFERATIVE RETINOPATHY AND MACULAR EDEMA DETERMINED BY EXAMINATION ASSOCIATED WITH TYPE 2 DIABETES MELLITUS: ICD-10-CM

## 2025-01-24 DIAGNOSIS — K29.50 CHRONIC ANTRAL GASTRITIS: ICD-10-CM

## 2025-01-24 DIAGNOSIS — R60.0 LOWER EXTREMITY EDEMA: ICD-10-CM

## 2025-01-24 DIAGNOSIS — N18.2 CKD (CHRONIC KIDNEY DISEASE), STAGE II: ICD-10-CM

## 2025-01-24 DIAGNOSIS — N28.1 ACQUIRED CYST OF KIDNEY: ICD-10-CM

## 2025-01-24 DIAGNOSIS — E11.43 VISCERAL DIABETIC NEUROPATHY: ICD-10-CM

## 2025-01-24 DIAGNOSIS — N18.2 CKD (CHRONIC KIDNEY DISEASE), STAGE II: Primary | ICD-10-CM

## 2025-01-24 DIAGNOSIS — G47.33 OBSTRUCTIVE SLEEP APNEA SYNDROME: ICD-10-CM

## 2025-01-24 LAB
ALBUMIN SERPL-MCNC: 3.8 G/DL (ref 3.5–5.2)
ANION GAP SERPL CALCULATED.3IONS-SCNC: 11 MMOL/L (ref 5–15)
BACTERIA UR QL AUTO: ABNORMAL /HPF
BASOPHILS # BLD AUTO: 0.05 10*3/MM3 (ref 0–0.2)
BASOPHILS NFR BLD AUTO: 0.7 % (ref 0–1.5)
BILIRUB UR QL STRIP: NEGATIVE
BUN SERPL-MCNC: 10 MG/DL (ref 8–23)
BUN/CREAT SERPL: 11.6 (ref 7–25)
CALCIUM SPEC-SCNC: 10 MG/DL (ref 8.6–10.5)
CHLORIDE SERPL-SCNC: 104 MMOL/L (ref 98–107)
CLARITY UR: ABNORMAL
CO2 SERPL-SCNC: 22 MMOL/L (ref 22–29)
COLOR UR: ABNORMAL
CREAT SERPL-MCNC: 0.86 MG/DL (ref 0.57–1)
CREAT UR-MCNC: 399.8 MG/DL
DEPRECATED RDW RBC AUTO: 53.4 FL (ref 37–54)
EGFRCR SERPLBLD CKD-EPI 2021: 76.5 ML/MIN/1.73
EOSINOPHIL # BLD AUTO: 0.15 10*3/MM3 (ref 0–0.4)
EOSINOPHIL NFR BLD AUTO: 2 % (ref 0.3–6.2)
ERYTHROCYTE [DISTWIDTH] IN BLOOD BY AUTOMATED COUNT: 15.6 % (ref 12.3–15.4)
GLUCOSE SERPL-MCNC: 257 MG/DL (ref 65–99)
GLUCOSE UR STRIP-MCNC: ABNORMAL MG/DL
HCT VFR BLD AUTO: 36.7 % (ref 34–46.6)
HGB BLD-MCNC: 11.9 G/DL (ref 12–15.9)
HGB UR QL STRIP.AUTO: NEGATIVE
HYALINE CASTS UR QL AUTO: ABNORMAL /LPF
IMM GRANULOCYTES # BLD AUTO: 0.02 10*3/MM3 (ref 0–0.05)
IMM GRANULOCYTES NFR BLD AUTO: 0.3 % (ref 0–0.5)
KETONES UR QL STRIP: ABNORMAL
LEUKOCYTE ESTERASE UR QL STRIP.AUTO: NEGATIVE
LYMPHOCYTES # BLD AUTO: 2.82 10*3/MM3 (ref 0.7–3.1)
LYMPHOCYTES NFR BLD AUTO: 37.4 % (ref 19.6–45.3)
MCH RBC QN AUTO: 30.2 PG (ref 26.6–33)
MCHC RBC AUTO-ENTMCNC: 32.4 G/DL (ref 31.5–35.7)
MCV RBC AUTO: 93.1 FL (ref 79–97)
MONOCYTES # BLD AUTO: 0.64 10*3/MM3 (ref 0.1–0.9)
MONOCYTES NFR BLD AUTO: 8.5 % (ref 5–12)
NEUTROPHILS NFR BLD AUTO: 3.87 10*3/MM3 (ref 1.7–7)
NEUTROPHILS NFR BLD AUTO: 51.1 % (ref 42.7–76)
NITRITE UR QL STRIP: NEGATIVE
NRBC BLD AUTO-RTO: 0 /100 WBC (ref 0–0.2)
PH UR STRIP.AUTO: 6 [PH] (ref 5–8)
PHOSPHATE SERPL-MCNC: 3 MG/DL (ref 2.5–4.5)
PLATELET # BLD AUTO: 220 10*3/MM3 (ref 140–450)
PMV BLD AUTO: 11.6 FL (ref 6–12)
POTASSIUM SERPL-SCNC: 4.2 MMOL/L (ref 3.5–5.2)
PROT ?TM UR-MCNC: 62 MG/DL
PROT UR QL STRIP: ABNORMAL
PROT/CREAT UR: 0.16 MG/G{CREAT}
RBC # BLD AUTO: 3.94 10*6/MM3 (ref 3.77–5.28)
RBC # UR STRIP: ABNORMAL /HPF
REF LAB TEST METHOD: ABNORMAL
SODIUM SERPL-SCNC: 137 MMOL/L (ref 136–145)
SP GR UR STRIP: >1.03 (ref 1–1.03)
SQUAMOUS #/AREA URNS HPF: ABNORMAL /HPF
UROBILINOGEN UR QL STRIP: ABNORMAL
WBC # UR STRIP: ABNORMAL /HPF
WBC NRBC COR # BLD AUTO: 7.55 10*3/MM3 (ref 3.4–10.8)

## 2025-01-24 PROCEDURE — 36415 COLL VENOUS BLD VENIPUNCTURE: CPT

## 2025-01-24 PROCEDURE — 84156 ASSAY OF PROTEIN URINE: CPT

## 2025-01-24 PROCEDURE — 82570 ASSAY OF URINE CREATININE: CPT

## 2025-01-24 PROCEDURE — 85025 COMPLETE CBC W/AUTO DIFF WBC: CPT

## 2025-01-24 PROCEDURE — 80069 RENAL FUNCTION PANEL: CPT

## 2025-01-24 PROCEDURE — 81001 URINALYSIS AUTO W/SCOPE: CPT

## 2025-01-29 ENCOUNTER — TELEPHONE (OUTPATIENT)
Dept: GASTROENTEROLOGY | Facility: CLINIC | Age: 65
End: 2025-01-29
Payer: COMMERCIAL

## 2025-01-29 NOTE — TELEPHONE ENCOUNTER
Spoke with patient from the cancellation list to see if she would like a sooner appointment with Midgalia Sanon. She rescheduled her appointment on 3/11/25 to 1/30/25@10:45.

## 2025-01-30 ENCOUNTER — OFFICE VISIT (OUTPATIENT)
Dept: PULMONOLOGY | Facility: CLINIC | Age: 65
End: 2025-01-30
Payer: COMMERCIAL

## 2025-01-30 ENCOUNTER — TELEPHONE (OUTPATIENT)
Dept: GASTROENTEROLOGY | Facility: CLINIC | Age: 65
End: 2025-01-30
Payer: COMMERCIAL

## 2025-01-30 ENCOUNTER — OFFICE VISIT (OUTPATIENT)
Dept: GASTROENTEROLOGY | Facility: CLINIC | Age: 65
End: 2025-01-30
Payer: COMMERCIAL

## 2025-01-30 VITALS
HEART RATE: 73 BPM | DIASTOLIC BLOOD PRESSURE: 77 MMHG | OXYGEN SATURATION: 97 % | TEMPERATURE: 98 F | BODY MASS INDEX: 39.65 KG/M2 | RESPIRATION RATE: 18 BRPM | WEIGHT: 238 LBS | HEIGHT: 65 IN | SYSTOLIC BLOOD PRESSURE: 133 MMHG

## 2025-01-30 VITALS
HEIGHT: 65 IN | WEIGHT: 238.6 LBS | BODY MASS INDEX: 39.75 KG/M2 | HEART RATE: 72 BPM | SYSTOLIC BLOOD PRESSURE: 145 MMHG | DIASTOLIC BLOOD PRESSURE: 67 MMHG

## 2025-01-30 DIAGNOSIS — K76.82 HEPATIC ENCEPHALOPATHY: ICD-10-CM

## 2025-01-30 DIAGNOSIS — K21.9 GASTROESOPHAGEAL REFLUX DISEASE WITHOUT ESOPHAGITIS: ICD-10-CM

## 2025-01-30 DIAGNOSIS — Z12.2 SCREENING FOR LUNG CANCER: Primary | ICD-10-CM

## 2025-01-30 DIAGNOSIS — R79.89 ELEVATED LIVER FUNCTION TESTS: ICD-10-CM

## 2025-01-30 DIAGNOSIS — J43.9 PULMONARY EMPHYSEMA, UNSPECIFIED EMPHYSEMA TYPE: ICD-10-CM

## 2025-01-30 DIAGNOSIS — K22.70 BARRETT'S ESOPHAGUS WITHOUT DYSPLASIA: Primary | ICD-10-CM

## 2025-01-30 DIAGNOSIS — Z86.0100 HISTORY OF COLON POLYPS: ICD-10-CM

## 2025-01-30 DIAGNOSIS — Z87.19 HISTORY OF CIRRHOSIS OF LIVER: ICD-10-CM

## 2025-01-30 PROCEDURE — 99214 OFFICE O/P EST MOD 30 MIN: CPT | Performed by: NURSE PRACTITIONER

## 2025-01-30 RX ORDER — OMEPRAZOLE 40 MG/1
40 CAPSULE, DELAYED RELEASE ORAL DAILY
Qty: 30 CAPSULE | Refills: 11 | Status: SHIPPED | OUTPATIENT
Start: 2025-01-30

## 2025-01-30 RX ORDER — SODIUM, POTASSIUM,MAG SULFATES 17.5-3.13G
2 SOLUTION, RECONSTITUTED, ORAL ORAL ONCE
Qty: 354 ML | Refills: 0 | Status: SHIPPED | OUTPATIENT
Start: 2025-01-30 | End: 2025-01-30

## 2025-01-30 RX ORDER — ALBUTEROL SULFATE 90 UG/1
2 INHALANT RESPIRATORY (INHALATION) EVERY 4 HOURS PRN
Qty: 1 G | Refills: 5 | Status: SHIPPED | OUTPATIENT
Start: 2025-01-30

## 2025-01-30 RX ORDER — FAMOTIDINE 40 MG/1
40 TABLET, FILM COATED ORAL NIGHTLY PRN
Qty: 90 TABLET | Refills: 3 | Status: SHIPPED | OUTPATIENT
Start: 2025-01-30

## 2025-01-30 NOTE — PROGRESS NOTES
Primary Care Provider  Elena Henderson PA   Referring Provider  No ref. provider found      Patient Complaint  Follow-up (5 Months) and Pulmonary emphysema, unspecified emphysema type      Subjective          Anika Casillas presents to [facility] PULMONARY & CRITICAL CARE MEDICINE      History of Presenting Illness  Anika Casillas is a 64 y.o. female with history of memory impairment, RUKHSANA on CPAP, hypertension, diabetes, COPD, tobacco use here for follow up.     Interval Update: Patient is doing fair today. Chronic cough still present.  Patient had COVID just before [place] and required increased amount of her nebulizer and albuterol rescue inhaler.  She is now feeling better and closer to her baseline.  She continues to smoke quarter pack per day.  She is trying hard to quit.  She continues Trelegy 100 mg daily and finds this regimen helpful.  She continues with CPAP at night.  Patient is also due for her annual lung cancer screening.  No hemoptysis, weight loss, night sweats    Review of Systems  Constitutional:  No fever. No chills. No weakness.  Eyes: No pain, erythema, or discharge. No blurring of vision.  ENT:  No sore throat, URI symptoms.   Cardiovascular:  No chest pain. No palpitations. No lower extremity edema.  Respiratory:  +chronic cough; no pleuritic chest pain. No hemoptysis. +chronic dyspnea on exertion   GI:  Normal appetite. No nausea, vomiting, diarrhea. No pain. No melena.  Musculoskeletal:  No arthralgias or myalgias.  Neurologic:  No headache. No weakness.    Family History   Adopted: Yes   Problem Relation Age of Onset    No Known Problems Mother     No Known Problems Father     No Known Problems Sister     No Known Problems Brother     No Known Problems Maternal Aunt     No Known Problems Paternal Aunt     No Known Problems Maternal Uncle     No Known Problems Paternal Uncle     No Known Problems Maternal Grandfather     No Known Problems Maternal Grandmother     No  Known Problems Paternal Grandfather     No Known Problems Paternal Grandmother     No Known Problems Cousin     No Known Problems Other     ADD / ADHD Son     Malig Hyperthermia Neg Hx     Alcohol abuse Neg Hx     Anxiety disorder Neg Hx     Bipolar disorder Neg Hx     Dementia Neg Hx     Depression Neg Hx     Drug abuse Neg Hx     OCD Neg Hx     Paranoid behavior Neg Hx     Schizophrenia Neg Hx     Seizures Neg Hx     Self-Injurious Behavior  Neg Hx     Suicide Attempts Neg Hx         Social History     Socioeconomic History    Marital status:      Spouse name: fannie   Tobacco Use    Smoking status: Every Day     Current packs/day: 0.25     Average packs/day: 0.3 packs/day for 49.6 years (12.4 ttl pk-yrs)     Types: Cigarettes     Start date: 6/17/1975    Smokeless tobacco: Never    Tobacco comments:     States down to 1-2 a day 1/30/25   Vaping Use    Vaping status: Never Used   Substance and Sexual Activity    Alcohol use: Yes     Alcohol/week: 1.0 standard drink of alcohol     Types: 1 Drinks containing 0.5 oz of alcohol per week     Comment: 1 a month    Drug use: Never    Sexual activity: Yes     Partners: Male     Birth control/protection: Post-menopausal, Tubal ligation        Past Medical History:   Diagnosis Date    Abnormal Pap smear of cervix     Atrial fibrillation     ON ELIQUIS/FOLLOWS SERGO    Colindres's esophagus     Cirrhosis 2015    Colon polyp 2015    Depression     Diabetes mellitus     Emphysema lung     INHALER    Endometrial hyperplasia     D&C SCHEDULED 7/25/23    Esophageal varices     Fatty liver 2015    Gastroparesis     TAKES XIFAXAN    GERD (gastroesophageal reflux disease) 1995    Hyperlipidemia     Hypertension     Iron deficiency anemia 05/02/2023    IRON INFUSION LAST 7/24/23    Irritable bowel syndrome     W/CONSTIPATION    BERKOWITZ (nonalcoholic steatohepatitis)     NO S/S CURRENTLY, ELEVATED ENZYMES    Obstructive sleep apnea syndrome     Pulmonary emphysema 11/22/2021     S/P exploratory laparotomy, lysis of adhesions, resection of necrotic colon without anastomosis 04/18/2022        Immunization History   Administered Date(s) Administered    COVID-19 (PFIZER) Purple Cap Monovalent 02/06/2021, 03/06/2021, 12/04/2021    Flu Vaccine Intradermal Quad 18-64YR 09/12/2021, 09/12/2021    Fluzone  >6mos 01/16/2025    Fluzone (or Fluarix & Flulaval for VFC) >6mos 10/29/2019, 09/27/2020, 10/03/2022, 10/05/2023    Influenza Split Preservative Free ID 09/12/2021    Influenza, Unspecified 11/12/2018, 09/27/2020    Pneumococcal Conjugate 13-Valent (PCV13) 01/09/2017    Pneumococcal Polysaccharide (PPSV23) 10/12/2020    flucelvax quad pfs =>4 YRS 09/27/2020       Allergies   Allergen Reactions    Liraglutide Nausea And Vomiting    Lisinopril Other (See Comments)     Cough    Metformin Nausea Only     GI Upset          Current Outpatient Medications:     albuterol sulfate  (90 Base) MCG/ACT inhaler, Inhale 2 puffs Every 4 (Four) Hours As Needed for Shortness of Air or Wheezing., Disp: 1 g, Rfl: 5    amLODIPine (NORVASC) 10 MG tablet, Take 1 tablet by mouth Daily., Disp: 90 tablet, Rfl: 1    apixaban (Eliquis) 5 MG tablet tablet, Take 1 tablet by mouth 2 (Two) Times a Day., Disp: 24 tablet, Rfl: 0    ascorbic acid (VITAMIN C) 250 MG tablet, Take 1 tablet by mouth Daily., Disp: , Rfl:     B-D UF III MINI PEN NEEDLES 31G X 5 MM misc, , Disp: , Rfl:     Blood Glucose Monitoring Suppl (Accu-Chek Guide) w/Device kit, , Disp: , Rfl:     buPROPion XL (WELLBUTRIN XL) 300 MG 24 hr tablet, Take 1 tablet by mouth Daily. Two Xls in the morning., Disp: 90 tablet, Rfl: 1    Cholecalciferol (Vitamin D3) 50 MCG (2000 UT) capsule, Take 3 capsules by mouth Daily. LAST DOSE 7/21/23, Disp: , Rfl:     DULoxetine (CYMBALTA) 60 MG capsule, Take 1 capsule by mouth Daily., Disp: 90 capsule, Rfl: 1    Evolocumab (Repatha) solution prefilled syringe injection, Inject 1 mL under the skin into the appropriate area as  directed Every 14 (Fourteen) Days., Disp: 6 each, Rfl: 3    famotidine (PEPCID) 40 MG tablet, Take 1 tablet by mouth At Night As Needed for Heartburn., Disp: 90 tablet, Rfl: 3    Fluticasone-Umeclidin-Vilant (TRELEGY) 100-62.5-25 MCG/ACT inhaler, Inhale 1 puff Daily., Disp: 1 each, Rfl: 5    furosemide (LASIX) 40 MG tablet, Take 1 tablet by mouth Every Other Day., Disp: 30 tablet, Rfl: 3    glucose blood test strip, Check blood glucose twice daily, Disp: 60 each, Rfl: 12    glucose monitor monitoring kit, 1 each Daily. Patient prefers Accu Chek Meter., Disp: 1 each, Rfl: 0    Insulin Glargine, 2 Unit Dial, (Toujeo Max SoloStar) 300 UNIT/ML solution pen-injector injection, Inject 122 Units under the skin into the appropriate area as directed Every Morning. INST PER ANESTHESIA PROTOCOL, Disp: , Rfl:     ipratropium-albuterol (DUO-NEB) 0.5-2.5 mg/3 ml nebulizer, Take 3 mL by nebulization 4 (Four) Times a Day As Needed for Wheezing or Shortness of Air., Disp: 360 mL, Rfl: 3    Lancets (accu-chek soft touch) lancets, Check blood sugar twice daily, Disp: 100 each, Rfl: 12    metoprolol tartrate (LOPRESSOR) 50 MG tablet, TAKE 1 AND 1/2 TABLETS BY MOUTH TWICE DAILY, Disp: 270 tablet, Rfl: 1    Mounjaro 2.5 MG/0.5ML solution pen-injector pen, Inject 0.5 mL under the skin into the appropriate area as directed 1 (One) Time Per Week., Disp: , Rfl:     multivitamin with minerals tablet tablet, Take 1 tablet by mouth Daily. LAST DOSE 7/21/23, Disp: , Rfl:     NovoLOG FlexPen 100 UNIT/ML solution pen-injector sc pen, Inject 60 Units under the skin into the appropriate area as directed 2 (Two) Times a Day. PER SLIDING SCALE INST PER ANESTHESIA PROTOCOL, Disp: , Rfl:     olmesartan (BENICAR) 20 MG tablet, Take 1 tablet by mouth Daily., Disp: 90 tablet, Rfl: 1    omeprazole (priLOSEC) 40 MG capsule, Take 1 capsule by mouth Daily., Disp: 30 capsule, Rfl: 11    potassium chloride 10 MEQ CR tablet, Take 1 tablet by mouth Every Other  "Day. With lasix., Disp: 30 tablet, Rfl: 3    promethazine (PHENERGAN) 25 MG tablet, Take 1 tablet by mouth Every 6 (Six) Hours As Needed for Nausea or Vomiting., Disp: 30 tablet, Rfl: 0    riFAXIMin (Xifaxan) 550 MG tablet, Take 1 tablet by mouth Every 12 (Twelve) Hours., Disp: 180 tablet, Rfl: 3    sodium-potassium-magnesium sulfates (SUPREP) 17.5-3.13-1.6 GM/177ML solution oral solution, Take 2 bottles by mouth 1 (One) Time for 1 dose. Take as directed, Disp: 354 mL, Rfl: 0    traZODone (DESYREL) 50 MG tablet, Take 1 tablet by mouth Every Night., Disp: 30 tablet, Rfl: 2     Objective     Vital Signs:   /77 (BP Location: Left arm, Patient Position: Sitting, Cuff Size: Adult)   Pulse 73   Temp 98 °F (36.7 °C) (Temporal)   Resp 18   Ht 165.1 cm (65\")   Wt 108 kg (238 lb)   SpO2 97% Comment: Room air  BMI 39.61 kg/m²     Physical Exam  Vitals:    01/30/25 1402   BP: 133/77   Pulse: 73   Resp: 18   Temp: 98 °F (36.7 °C)   SpO2: 97%         General: Alert, NAD  HEENT:  EOMI, no sinus tenderness  Neck:  Supple, no thyromegaly,  no JVD  Lymph: no cervical, supraclavicular lymphadenopathy bilaterally  Chest:  clear to auscultation bilaterally, no wheezing or crackles; no work of breathing noted on room air  CV: RRR, no M/G/R, pulses 2+, equal.  Abd:  Soft, NT, ND, +BS, obese  EXT:  no clubbing, no cyanosis, no edema b/l  Neuro:  A&Ox3, CN grossly intact, no focal deficits  Skin: No rashes or lesions noted       Result Review :   I have personally reviewed patient's labs and images.          Assessment and Plan      Patient Active Problem List   Diagnosis    Diabetic gastroparesis    Diverticulosis of colon    Elevated transaminase level    Essential hypertension    Gastroesophageal reflux disease without esophagitis    Hx of colonic polyp    Hypothyroidism    Major depressive disorder with single episode, in full remission    Mixed hyperlipidemia    BERKOWITZ (nonalcoholic steatohepatitis)    Class 2 severe obesity " due to excess calories with serious comorbidity and body mass index (BMI) of 36.0 to 36.9 in adult    OAB (overactive bladder)    Paroxysmal atrial fibrillation    Tobacco abuse    Type 2 diabetes mellitus with hyperglycemia, with long-term current use of insulin    Pulmonary emphysema    Colindres's esophagus without dysplasia    Secondary esophageal varices without bleeding    Encounter for long-term (current) use of insulin    Hyperinsulinism    Uncontrolled type 2 diabetes mellitus with neurologic complication    Class 2 obesity    Chronic gastritis without bleeding, unspecified gastritis type    Pneumoperitoneum of unknown etiology    S/P exploratory laparotomy, lysis of adhesions, resection of necrotic colon without anastomosis    Memory loss    RUKHSANA on CPAP    Abnormal finding on MRI of brain    Iron deficiency anemia    Non-alcoholic cirrhosis    Depression    Encephalopathy, portal systemic    Esophageal varices in cirrhosis    Alkaline phosphatase raised    Iron deficiency    Other specified postprocedural states    Endometrial hyperplasia    Iron malabsorption    Endometrial polyp    Localized edema    Simple renal cyst    Esophageal varices without bleeding    Chronic kidney disease, stage 2 (mild)    Obesity (BMI 35.0-39.9 without comorbidity)    Cirrhosis of liver without ascites    Hepatic encephalopathy    History of colon polyps       Impression and Plan:    COPD/emphysema  Active smoker (4 cigs/day)  Proximal A. fib on Eliquis  History of hypertension  RUKHSANA on CPAP    -Continue Trelegy Ellipta daily and albuterol as needed. Remember patient to rinse mouth after each use  -Continue DuoNeb as needed  -PFTs done 7/5/2022 showed normal spirometry, mild hyperinflation with signs of air trapping, and moderate reduction in DLCO.  There was no response to bronchodilator therapy.   -2D echo done 1/27/23 showed EF 66-70%, LVH, G1DD  -Low-dose CT for cancer screening done 2/21/2024 without evidence of lung  cancer.  Previous nodular density in the left lower lobe resolved.  Will continue annual screening, next due 02/2025, ordered  -Continue PPI for acid reflux  -Continue jonathan pot for postnasal drip if beneficial  -Continue CPAP with [employer] for RUKHSANA  -Continue discussion regarding smoking cessation; recently switched to Cymbalta (from Wellbutrin); will defer Chantix at this time  -Follow-up in 5 months or earlier as needed     Vaccination status: Patient is up-to-date with her vaccinations  Medications personally reviewed.    Follow Up   No follow-ups on file.  Patient was given instructions and counseling regarding her condition or for health maintenance advice. Please see specific information pulled into the AVS if appropriate.

## 2025-01-30 NOTE — TELEPHONE ENCOUNTER
1/30/2025    Dear Dr Webb,     Patient: Anika Casillas   YOB: 1960        This patient is waiting to have a Colonoscopy which I will perform at [facility] on 03/18/2025.     Our records indicate this patient is currently taking ELIQUIS. This procedure requires the patient to suspend their anticoagulant medication prior to surgery.     Please respond to this request noting your recommendations. You may contact our office at [phone] Option 3 with any questions. I appreciate your prompt response in this matter.     Please return this form to our office no later than two weeks prior to the procedure date listed above. Please return form to [phone]. Please inform our office if the patient requires additional follow-up from your office prior to scheduled procedure date.     ____ I approve my patient to stop taking their Anticoagulant Therapy medication 2 days prior to the scheduled procedure.    ____ I do NOT approve my patient to stop taking their Anticoagulant Therapy medication at this time.  ____ I approve my patient from a Cardiac  standpoint    ____ I do NOT approve my patient from a Cardiac  standpoint at this time    Please specify clearance expiration date:_____________________________    Approving physician name (please print):     _____________________________________________    Approving physician signature:     ________________________________    Date:________________        Sincerely,  [facility]   Gastroenterology -

## 2025-01-30 NOTE — TELEPHONE ENCOUNTER
Procedure: Colonoscopy and/or EGD     Med Directive: Eliquis     PMH: Paroxysmal Afib, HTN, HLD     Last Seen: 12/12/2024

## 2025-01-30 NOTE — PROGRESS NOTES
Chief Complaint   Gastroparesis , Bloated, and Cirrhosis    History of Present Illness       Anika Casillas is a 64 y.o. female who presents to [facility] for follow-up for cirrhosis.  She was last seen in the office by me on 3/20/2024.    Has a history of GERD with Colindres's esophagus without dysplasia. Is taking Protonix 40 mg every morning and Pepcid 40 mg at night. Denies any dysphagia, rectal bleeding or melena.  Admits her appetite is okay.  Has to be really careful with what she eats due to her gastroparesis.     Has a history of Kline cirrhosis.  With grade 2 esophageal varices noted on most recent EGD 6/22.  Has a history of hepatic encephalopathy.  Unable to tolerate lactulose.  On Xifaxan twice daily.  Was previously referred to Dr. Lugo for her gastroparesis.    Had a CT scan of the abdomen pelvis done on April 21 of this year that showed:     IMPRESSION:      1. The endometrium has an abnormally thickened heterogeneous appearance.  Recommend a pelvic   ultrasound examination to evaluate for endometrial hyperplasia or endometrial carcinoma.     2. Cirrhosis.  Splenorenal shunt.     3. Colonic diverticulosis.  No CT evidence of colitis or diverticulitis.  Is waiting for results of the pelvic ultrasound she had done.     Underwent colon resection with lysis of adhesions by Dr. Guidry on 4/16/2022.  Does feel like she has had more abdominal pain ever since that surgery.     Does have history of cholecystectomy.     Hx tobacco use. Smokes 1-3 a day.      Has hx iron def anemia. Hx a-fib on eliquis.       GI FI--adopted.      Underwent colonoscopy with Dr. Charles on 8/14/2023.  She was found to have moderate diverticulosis and nonbleeding external hemorrhoids.  4 mm cecal polyp removed, 3 4-5 ascending colon polyps removed, 2 to 3 mm ascending colon polyps removed, 6 mm transverse colon removed and a polypoid lesion in the ascending colon completely removed and  injected.  Four 4 to 6 mm sigmoid colon polyps were removed.  Recall colonoscopy in 1 year for surveillance. Path + Tubular adenoma.     She did meet with  GI. She underwent EGD with botox for her stomach. She is happy to report she is noticeable better since botox.   She is working with gynecology about her endometrial hyperplasia.     Sees Dr. Chirinos  for cirrhosis.      Had LIVER US done earlier this month at  and it showed stable cirrhosis with no NEW LIVER lesions. She is due for EGD and colonoscopy this summer. Will schedule those today. Doing well with protonix 40 mg AM and pepcid 20 mg at night. Doing well with xifaxan. Still doing well with miralax and motegrity. Is having 2 Bms most days. She is still having memory and confusion issues. But she feels its better on the xifaxan BID. She is planning bladder stimulator surgery next month for urinary incontinence and urgency. She is getting iron infusions per hematology. Off OZEMPIC. ON MOUNJARO.     She saw  liver clinic on 5/3/2024 for History of steatohepatitis with stage IV fibrosis.  Cirrhosis.    She underwent EGD and colonoscopy with Dr. Charles on 4/22/2024.  EGD showed grade 2 esophageal varices.  Irregular Z-line.  Normal stomach.  Repeat EGD in 2 years.  Colonoscopy showed nonthrombosed external hemorrhoids.  Three 4 to 5 mm polyps in the ascending colon which were removed, 8 mm polypoid lesion in the descending colon which was removed with mucosal resection.  Three 5 to 6 mm polyps in the sigmoid colon which were removed.  Moderate diverticulosis.  Repeat colonoscopy in 6 months for surveillance due to piecemeal resection.    Bowels are moving ok. Having more bloating and abdominal fullness lately. Has been having more fluid in legs and feet. Doing lasix. Needs f/u with  liver. Is seeing renal and hematology. Has had IRON infusions in the past. Needs to schedule repeat colonoscopy for her polyps. LFTs are still elevated but about the  same as previous.  Admits last year was a rough year for her.  She had bladder surgery and had a neurostimulator placed.  That has been helpful.  Had to go on disability. lots of changes to her insurance and coverage.      Results       Result Review :       CMP          12/26/2024    14:21 1/20/2025    13:07 1/24/2025    14:49   CMP   Glucose 279  264  257    BUN 10  14  10    Creatinine 0.81  0.92  0.86    EGFR 82.2  70.5  76.5    Sodium 133  136  137    Potassium 4.0  4.6  4.2    Chloride 99  105  104    Calcium 9.8  9.9  10.0    Total Protein 8.7  8.4     Albumin 3.7  3.8  3.8    Globulin 5.0  4.6     Total Bilirubin 0.6  0.3     Alkaline Phosphatase 174  160     AST (SGOT) 253  78     ALT (SGPT) 148  74     Albumin/Globulin Ratio 0.7  0.8     BUN/Creatinine Ratio 12.3  15.2  11.6    Anion Gap 9.6  10.2  11.0      CBC          12/26/2024    14:21 1/20/2025    13:07 1/24/2025    14:49   CBC   WBC 7.22  6.73  7.55    RBC 4.45  3.73  3.94    Hemoglobin 13.5  11.3  11.9    Hematocrit 38.1  32.6  36.7    MCV 85.6  87.4  93.1    MCH 30.3  30.3  30.2    MCHC 35.4  34.7  32.4    RDW 15.5  16.2  15.6    Platelets 180  192  220      CBC w/diff          12/26/2024    14:21 1/20/2025    13:07 1/24/2025    14:49   CBC w/Diff   WBC 7.22  6.73  7.55    RBC 4.45  3.73  3.94    Hemoglobin 13.5  11.3  11.9    Hematocrit 38.1  32.6  36.7    MCV 85.6  87.4  93.1    MCH 30.3  30.3  30.2    MCHC 35.4  34.7  32.4    RDW 15.5  16.2  15.6    Platelets 180  192  220    Neutrophil Rel % 77.4  48.7  51.1    Immature Granulocyte Rel % 0.4  0.3  0.3    Lymphocyte Rel % 14.3  40.1  37.4    Monocyte Rel % 6.6  8.9  8.5    Eosinophil Rel % 0.7  1.3  2.0    Basophil Rel % 0.6  0.7  0.7      Lipid Panel          2/13/2024    14:00   Lipid Panel   Total Cholesterol 130    Triglycerides 100    HDL Cholesterol 45    VLDL Cholesterol 19    LDL Cholesterol  66    LDL/HDL Ratio 1.44      TSH          2/29/2024    12:03   TSH   TSH 2.570        Lipase  "  Lipase   Date Value Ref Range Status   01/09/2023 20 13 - 60 U/L Final   01/06/2019 124 73 - 393 U/L Final     Amylase No results found for: \"AMYLASE\"  Iron Profile   Iron   Date Value Ref Range Status   01/20/2025 89 37 - 145 mcg/dL Final     TIBC   Date Value Ref Range Status   01/20/2025 311 298 - 536 mcg/dL Final     Iron Saturation (TSAT)   Date Value Ref Range Status   01/20/2025 29 20 - 50 % Final     Transferrin   Date Value Ref Range Status   01/20/2025 209 200 - 360 mg/dL Final     Ferritin   Ferritin   Date Value Ref Range Status   01/20/2025 369.90 (H) 13.00 - 150.00 ng/mL Final     ESR (Sed Rate) No results found for: \"SEDRATE\"  CRP (C-Reactive) No results found for: \"CRP\"            Past Medical History       Past Medical History:   Diagnosis Date    Abnormal Pap smear of cervix     Atrial fibrillation     ON ELIQUIS/FOLLOWS TROTTER    Colindres's esophagus     Cirrhosis 2015    Colon polyp 2015    Depression     Diabetes mellitus     Emphysema lung     INHALER    Endometrial hyperplasia     D&C SCHEDULED 7/25/23    Esophageal varices     Fatty liver 2015    Gastroparesis     TAKES XIFAXAN    GERD (gastroesophageal reflux disease) 1995    Hyperlipidemia     Hypertension     Iron deficiency anemia 05/02/2023    IRON INFUSION LAST 7/24/23    Irritable bowel syndrome     W/CONSTIPATION    BERKOWITZ (nonalcoholic steatohepatitis)     NO S/S CURRENTLY, ELEVATED ENZYMES    Obstructive sleep apnea syndrome     Pulmonary emphysema 11/22/2021    S/P exploratory laparotomy, lysis of adhesions, resection of necrotic colon without anastomosis 04/18/2022       Past Surgical History:   Procedure Laterality Date    ABDOMINAL SURGERY  4/16/2022    BLADDER SLING MODIFIED, ANTERIOR AND POSTERIOR VAGINAL REPAIR  2018    [place]    BLADDER SURGERY  05/01/2024    stimulator  @ [facility]    BOTOX INJECTION      In abdomen    BREAST LUMPECTOMY Left     BENIGN    CARDIAC CATHETERIZATION      NO INTERVENTION    CHOLECYSTECTOMY  " 5/12/2015    COLON RESECTION      PERFORATED COLON, EEA    COLONOSCOPY N/A 08/14/2023    Procedure: COLONOSCOPY WITH POLYPECTOMY, TATTOO;  Surgeon: Shannan Charles MD;  Location: [facility] ENDOSCOPY;  Service: Gastroenterology;  Laterality: N/A;  COLON POLYPS  DIVERTICULOSIS  HEMORRHOIDS    COLONOSCOPY N/A 04/22/2024    Procedure: COLONOSCOPY COLD SNARE POLYPECTOMIES WITH CLIP PLACEMENT X3;  Surgeon: Shannan Charles MD;  Location: [facility] ENDOSCOPY;  Service: Gastroenterology;  Laterality: N/A;  DIVERTICULOSIS  COLON POLYPS    D & C HYSTEROSCOPY N/A 07/25/2023    Procedure: resection of endometrial polyp with myosure;  Surgeon: Ashleigh Harding DO;  Location: [facility] MAIN OR;  Service: Gynecology;  Laterality: N/A;    ENDOSCOPY N/A 06/03/2022    Procedure: ESOPHAGOGASTRODUODENOSCOPY;  Surgeon: Shannan Charles MD;  Location: [facility] ENDOSCOPY;  Service: Gastroenterology;  Laterality: N/A;  RETAINED FOOD IN STOMACH  ESOPHAGEAL VARICIES    ENDOSCOPY N/A 04/22/2024    Procedure: ESOPHAGOGASTRODUODENOSCOPY WITH BIOPSIES;  Surgeon: Shannan Charles MD;  Location: [facility] ENDOSCOPY;  Service: Gastroenterology;  Laterality: N/A;  ESOPHAGEAL BIOPSIES    EXPLORATORY LAPAROTOMY N/A 04/16/2022    Procedure: EXPLORATORY LAPAROTOMY, LYSIS OF ADHESIONS, RESECTION OF NECTROIC COLON;  Surgeon: Cande Stanley MD;  Location: [facility] MAIN OR;  Service: General;  Laterality: N/A;    EYE SURGERY  2023    GALLBLADDER SURGERY      LAPAROSCOPIC    LIVER BIOPSY  2021    TUBAL ABDOMINAL LIGATION      UPPER GASTROINTESTINAL ENDOSCOPY  6/3/2022         Current Outpatient Medications:     albuterol sulfate  (90 Base) MCG/ACT inhaler, Inhale 2 puffs Every 4 (Four) Hours As Needed for Shortness of Air or Wheezing., Disp: 1 g, Rfl: 5    amLODIPine (NORVASC) 10 MG tablet, Take 1 tablet by mouth Daily., Disp: 90 tablet, Rfl: 1    apixaban (Eliquis) 5 MG tablet tablet, Take 1 tablet by mouth 2 (Two) Times a Day.,  Disp: 24 tablet, Rfl: 0    ascorbic acid (VITAMIN C) 250 MG tablet, Take 1 tablet by mouth Daily., Disp: , Rfl:     B-D UF III MINI PEN NEEDLES 31G X 5 MM misc, , Disp: , Rfl:     Blood Glucose Monitoring Suppl (Accu-Chek Guide) w/Device kit, , Disp: , Rfl:     buPROPion XL (WELLBUTRIN XL) 300 MG 24 hr tablet, Take 1 tablet by mouth Daily. Two Xls in the morning., Disp: 90 tablet, Rfl: 1    Cholecalciferol (Vitamin D3) 50 MCG (2000 UT) capsule, Take 3 capsules by mouth Daily. LAST DOSE 7/21/23, Disp: , Rfl:     DULoxetine (CYMBALTA) 60 MG capsule, Take 1 capsule by mouth Daily., Disp: 90 capsule, Rfl: 1    Evolocumab (Repatha) solution prefilled syringe injection, Inject 1 mL under the skin into the appropriate area as directed Every 14 (Fourteen) Days., Disp: 6 each, Rfl: 3    famotidine (PEPCID) 40 MG tablet, Take 1 tablet by mouth At Night As Needed for Heartburn., Disp: 90 tablet, Rfl: 3    Fluticasone-Umeclidin-Vilant (TRELEGY) 100-62.5-25 MCG/ACT inhaler, Inhale 1 puff Daily., Disp: 1 each, Rfl: 5    furosemide (LASIX) 40 MG tablet, Take 1 tablet by mouth Every Other Day., Disp: 30 tablet, Rfl: 3    glucose blood test strip, Check blood glucose twice daily, Disp: 60 each, Rfl: 12    glucose monitor monitoring kit, 1 each Daily. Patient prefers Accu Chek Meter., Disp: 1 each, Rfl: 0    Insulin Glargine, 2 Unit Dial, (Toujeo Max SoloStar) 300 UNIT/ML solution pen-injector injection, Inject 122 Units under the skin into the appropriate area as directed Every Morning. INST PER ANESTHESIA PROTOCOL, Disp: , Rfl:     ipratropium-albuterol (DUO-NEB) 0.5-2.5 mg/3 ml nebulizer, Take 3 mL by nebulization 4 (Four) Times a Day As Needed for Wheezing or Shortness of Air., Disp: 360 mL, Rfl: 3    Lancets (accu-chek soft touch) lancets, Check blood sugar twice daily, Disp: 100 each, Rfl: 12    metoprolol tartrate (LOPRESSOR) 50 MG tablet, TAKE 1 AND 1/2 TABLETS BY MOUTH TWICE DAILY, Disp: 270 tablet, Rfl: 1    Mounjaro 2.5  MG/0.5ML solution pen-injector pen, Inject 0.5 mL under the skin into the appropriate area as directed 1 (One) Time Per Week., Disp: , Rfl:     multivitamin with minerals tablet tablet, Take 1 tablet by mouth Daily. LAST DOSE 7/21/23, Disp: , Rfl:     NovoLOG FlexPen 100 UNIT/ML solution pen-injector sc pen, Inject 60 Units under the skin into the appropriate area as directed 2 (Two) Times a Day. PER SLIDING SCALE INST PER ANESTHESIA PROTOCOL, Disp: , Rfl:     olmesartan (BENICAR) 20 MG tablet, Take 1 tablet by mouth Daily., Disp: 90 tablet, Rfl: 1    potassium chloride 10 MEQ CR tablet, Take 1 tablet by mouth Every Other Day. With lasix., Disp: 30 tablet, Rfl: 3    promethazine (PHENERGAN) 25 MG tablet, Take 1 tablet by mouth Every 6 (Six) Hours As Needed for Nausea or Vomiting., Disp: 30 tablet, Rfl: 0    riFAXIMin (Xifaxan) 550 MG tablet, Take 1 tablet by mouth Every 12 (Twelve) Hours., Disp: 180 tablet, Rfl: 3    traZODone (DESYREL) 50 MG tablet, Take 1 tablet by mouth Every Night., Disp: 30 tablet, Rfl: 2    omeprazole (priLOSEC) 40 MG capsule, Take 1 capsule by mouth Daily., Disp: 30 capsule, Rfl: 11    sodium-potassium-magnesium sulfates (SUPREP) 17.5-3.13-1.6 GM/177ML solution oral solution, Take 2 bottles by mouth 1 (One) Time for 1 dose. Take as directed, Disp: 354 mL, Rfl: 0     Allergies   Allergen Reactions    Liraglutide Nausea And Vomiting    Lisinopril Other (See Comments)     Cough    Metformin Nausea Only     GI Upset       Family History   Adopted: Yes   Problem Relation Age of Onset    No Known Problems Mother     No Known Problems Father     No Known Problems Sister     No Known Problems Brother     No Known Problems Maternal Aunt     No Known Problems Paternal Aunt     No Known Problems Maternal Uncle     No Known Problems Paternal Uncle     No Known Problems Maternal Grandfather     No Known Problems Maternal Grandmother     No Known Problems Paternal Grandfather     No Known Problems Paternal  "Grandmother     No Known Problems Cousin     No Known Problems Other     ADD / ADHD Son     Vic Hyperthermia Neg Hx     Alcohol abuse Neg Hx     Anxiety disorder Neg Hx     Bipolar disorder Neg Hx     Dementia Neg Hx     Depression Neg Hx     Drug abuse Neg Hx     OCD Neg Hx     Paranoid behavior Neg Hx     Schizophrenia Neg Hx     Seizures Neg Hx     Self-Injurious Behavior  Neg Hx     Suicide Attempts Neg Hx         Social History     Social History Narrative    Exercises 1 x week    Lives at home with spouse       Objective       Review of Systems   Constitutional:  Negative for appetite change, fatigue, fever, unexpected weight gain and unexpected weight loss.   HENT:  Negative for trouble swallowing.    Respiratory:  Negative for cough, choking, chest tightness, shortness of breath, wheezing and stridor.    Cardiovascular:  Positive for leg swelling. Negative for chest pain and palpitations.   Gastrointestinal:  Positive for abdominal distention. Negative for abdominal pain, anal bleeding, blood in stool, constipation, diarrhea, nausea, rectal pain, vomiting, GERD and indigestion.        Vital Signs:   /67 (BP Location: Left arm, Patient Position: Sitting, Cuff Size: Adult)   Pulse 72   Ht 165.1 cm (65\")   Wt 108 kg (238 lb 9.6 oz)   BMI 39.71 kg/m²       Physical Exam  Constitutional:       General: She is not in acute distress.     Appearance: She is well-developed. She is not ill-appearing.   HENT:      Head: Normocephalic.   Eyes:      General: No scleral icterus.     Pupils: Pupils are equal, round, and reactive to light.   Cardiovascular:      Rate and Rhythm: Normal rate and regular rhythm.      Heart sounds: Normal heart sounds.   Pulmonary:      Effort: Pulmonary effort is normal.      Breath sounds: Normal breath sounds.   Abdominal:      General: Bowel sounds are normal. There is distension.      Palpations: Abdomen is soft. There is no mass.      Tenderness: There is no abdominal " tenderness. There is no guarding or rebound.      Hernia: No hernia is present.   Musculoskeletal:         General: Normal range of motion.      Right lower leg: Edema present.      Left lower leg: Edema present.      Comments: Trace bilateral pedal edema noted   Skin:     General: Skin is warm and dry.      Coloration: Skin is not jaundiced.   Neurological:      Mental Status: She is alert and oriented to person, place, and time.   Psychiatric:         Speech: Speech normal.         Behavior: Behavior normal.         Judgment: Judgment normal.           Assessment & Plan          Assessment and Plan    Diagnoses and all orders for this visit:    1. Colindres's esophagus without dysplasia (Primary)  -     omeprazole (priLOSEC) 40 MG capsule; Take 1 capsule by mouth Daily.  Dispense: 30 capsule; Refill: 11  -     famotidine (PEPCID) 40 MG tablet; Take 1 tablet by mouth At Night As Needed for Heartburn.  Dispense: 90 tablet; Refill: 3    2. Hepatic encephalopathy  -     riFAXIMin (Xifaxan) 550 MG tablet; Take 1 tablet by mouth Every 12 (Twelve) Hours.  Dispense: 180 tablet; Refill: 3    3. Gastroesophageal reflux disease without esophagitis  Comments:  Stable on Protonix 40mg and Pepcid 40mg daily; continue and continue with GI.    4. History of cirrhosis of liver  -     US Liver; Future    5. Elevated liver function tests  -     US Liver; Future    6. History of colon polyps  -     Case Request; Standing  -     Follow Anesthesia Guidelines / Protocol; Future  -     sodium-potassium-magnesium sulfates (SUPREP) 17.5-3.13-1.6 GM/177ML solution oral solution; Take 2 bottles by mouth 1 (One) Time for 1 dose. Take as directed  Dispense: 354 mL; Refill: 0  -     Case Request    Other orders  -     Follow Anesthesia Guidelines / Protocol; Standing  -     Verify NPO; Standing  -     Verify Bowel Prep Was Successful; Standing  -     Give Tap Water Enema If Bowel Prep Insufficient; Standing    Cirrhosis: BERKOWITZ MELD 9=== sees [facility] liver clinic  Ascites: Minimal ascites noted today.  Continue Lasix.  Continue 2 g sodium diet.  Continue compression stockings.  Check liver ultrasound.  Varices: Grade 2 esophageal varices noted on EGD 4/22/2024.  Next surveillance EGD due on 4/2026.  Encephalopathy: Restart Xifaxan.continue Motegrity and MiraLAX.  Health maintenance: Next surveillance colonoscopy due this spring.  Next surveillance EGD due in April 2026.  Patient to follow-up with [facility] liver clinic.    Reviewed [facility] liver notes, most recent labs and imaging results.  LFTs stable.  She is having some trace pedal edema on exam today.  No appreciated ascites noted.  Will check liver ultrasound.  Recommend she continue taking her Lasix.  Continue 2 g sodium diet.  Follow-up with [facility] liver clinic as planned.  Patient needs to restart Xifaxan twice daily.  Continue current bowel regimen.  GERD seems well-controlled.  Will change Protonix to omeprazole 40 mg daily due to insurance.  Continue GERD precautions.  She does need to schedule her repeat colonoscopy to check on the piecemeal polyps.  Will need cardiac clearance being on Eliquis.  Next surveillance EGD will be due in April 2026.  Patient to call the office with any issues.  Patient to follow-up with me in 6 months.  Patient is agreeable to the plan.    Surgical Risk and Benefits discussed: Possible risks/complications, benefits, and alternatives to surgical or invasive procedure have been explained to patient and/or legal guardian; risks include bleeding, infection, and perforation. Patient has been evaluated and can tolerate anesthesia and/or sedation. Risks, benefits, and alternatives to anesthesia and sedation have been explained to patient and/or legal guardian.           Follow Up       Follow Up   Return in about 6 months (around 7/30/2025) for CIRRHOSIS, ELEVATED LIVER ENZYMES.  Patient was given instructions and counseling regarding her condition or for health maintenance  advice. Please see specific information pulled into the AVS if appropriate.

## 2025-02-17 ENCOUNTER — DOCUMENTATION (OUTPATIENT)
Dept: CARDIOLOGY | Facility: CLINIC | Age: 65
End: 2025-02-17
Payer: COMMERCIAL

## 2025-02-18 ENCOUNTER — TELEPHONE (OUTPATIENT)
Dept: GASTROENTEROLOGY | Facility: CLINIC | Age: 65
End: 2025-02-18
Payer: COMMERCIAL

## 2025-02-18 NOTE — TELEPHONE ENCOUNTER
[facility] states that insurance is listed as reference based pricing plan and patient needs to make a deposit to be financially cleared. Pt has not made deposit to proceed unless procedure is deemed urgent. Please advise.   Direct line: [phone] Cande

## 2025-02-18 NOTE — TELEPHONE ENCOUNTER
Detailed message left on formerly [facility] secure line for Cande.

## 2025-02-18 NOTE — TELEPHONE ENCOUNTER
[facility] states they are calling about ultrasound of the liver. Is this also emergent? Please advise.

## 2025-02-20 ENCOUNTER — TELEPHONE (OUTPATIENT)
Dept: CARDIOLOGY | Facility: CLINIC | Age: 65
End: 2025-02-20
Payer: COMMERCIAL

## 2025-02-20 ENCOUNTER — HOSPITAL ENCOUNTER (OUTPATIENT)
Dept: ULTRASOUND IMAGING | Facility: HOSPITAL | Age: 65
Discharge: HOME | End: 2025-02-20
Admitting: NURSE PRACTITIONER
Payer: COMMERCIAL

## 2025-02-20 DIAGNOSIS — Z87.19 HISTORY OF CIRRHOSIS OF LIVER: ICD-10-CM

## 2025-02-20 DIAGNOSIS — R79.89 ELEVATED LIVER FUNCTION TESTS: ICD-10-CM

## 2025-02-20 PROCEDURE — 76705 ECHO EXAM OF ABDOMEN: CPT

## 2025-02-20 NOTE — TELEPHONE ENCOUNTER
[facility] received a fax that requires your attention. The document has been indexed to the patient’s chart for your review.      Reason for sending: EXTERNAL MEDICAL RECORD NOTIFICATION     Documents Description: INFO NEEDED-[facility] PA[facility]-2.20.25    Name of Sender: ELLA OLSON     Date Indexed: 2.20.25

## 2025-02-20 NOTE — TELEPHONE ENCOUNTER
On pt's application for asst with Eliquis, pt did not initial a required line on the form.  Called pt to get permission to put her initials.  She agreed and was appreciative.    Rec'd a call from danika Almendarez, giving a return phone number of [phone].  When I called the number, which is the number to [facility], I was told that the pt did not have an acct yet and the pw had not been rec'd.  But they said the pt did have an acct with [employer], just not the pt asst foundation.  I am very confused by this and did not get any answers.      Ultimately, I refaxed the pw to the pt asst foundation.

## 2025-02-20 NOTE — TELEPHONE ENCOUNTER
"The \"missing\" documents were sent in the original fax; however, I refaxed the documents a second time.  "

## 2025-02-24 ENCOUNTER — HOSPITAL ENCOUNTER (OUTPATIENT)
Dept: CT IMAGING | Facility: HOSPITAL | Age: 65
Discharge: HOME | End: 2025-02-24
Admitting: STUDENT IN AN ORGANIZED HEALTH CARE EDUCATION/TRAINING PROGRAM
Payer: COMMERCIAL

## 2025-02-24 DIAGNOSIS — Z12.2 SCREENING FOR LUNG CANCER: ICD-10-CM

## 2025-02-24 PROCEDURE — 71271 CT THORAX LUNG CANCER SCR C-: CPT

## 2025-02-25 ENCOUNTER — TELEPHONE (OUTPATIENT)
Dept: GASTROENTEROLOGY | Facility: CLINIC | Age: 65
End: 2025-02-25
Payer: COMMERCIAL

## 2025-02-25 NOTE — TELEPHONE ENCOUNTER
----- Message from Migdalia Sanon sent at 2/24/2025 12:54 PM EST -----  Liver ultrasound stable.  Still showing an enlarged liver with chronic liver disease.  No new changes.

## 2025-02-27 ENCOUNTER — HOSPITAL ENCOUNTER (OUTPATIENT)
Facility: HOSPITAL | Age: 65
Discharge: HOME | End: 2025-02-27
Payer: COMMERCIAL

## 2025-02-27 ENCOUNTER — HOSPITAL ENCOUNTER (OUTPATIENT)
Dept: MAMMOGRAPHY | Facility: HOSPITAL | Age: 65
Discharge: HOME | End: 2025-02-27
Payer: COMMERCIAL

## 2025-02-27 DIAGNOSIS — Z12.31 ENCOUNTER FOR SCREENING MAMMOGRAM FOR MALIGNANT NEOPLASM OF BREAST: ICD-10-CM

## 2025-02-27 DIAGNOSIS — R42 DIZZINESS AFTER EXTENSION OF NECK: ICD-10-CM

## 2025-02-27 LAB
BH CV XLRA MEAS LEFT CAROTID BULB EDV: 8 CM/SEC
BH CV XLRA MEAS LEFT CAROTID BULB PSV: 28.5 CM/SEC
BH CV XLRA MEAS LEFT DIST CCA EDV: 16.8 CM/SEC
BH CV XLRA MEAS LEFT DIST CCA PSV: 64.9 CM/SEC
BH CV XLRA MEAS LEFT DIST ICA EDV: 19.1 CM/SEC
BH CV XLRA MEAS LEFT DIST ICA PSV: 52.4 CM/SEC
BH CV XLRA MEAS LEFT ICA/CCA RATIO: 0.8
BH CV XLRA MEAS LEFT MID ICA EDV: 13.5 CM/SEC
BH CV XLRA MEAS LEFT MID ICA PSV: 50.3 CM/SEC
BH CV XLRA MEAS LEFT PROX CCA EDV: 17.4 CM/SEC
BH CV XLRA MEAS LEFT PROX CCA PSV: 60.2 CM/SEC
BH CV XLRA MEAS LEFT PROX ECA EDV: 12.6 CM/SEC
BH CV XLRA MEAS LEFT PROX ECA PSV: 84.7 CM/SEC
BH CV XLRA MEAS LEFT PROX ICA EDV: 15.2 CM/SEC
BH CV XLRA MEAS LEFT PROX ICA PSV: 52.5 CM/SEC
BH CV XLRA MEAS LEFT VERTEBRAL A EDV: 12.8 CM/SEC
BH CV XLRA MEAS LEFT VERTEBRAL A PSV: 47.1 CM/SEC
BH CV XLRA MEAS RIGHT CAROTID BULB EDV: 12.3 CM/SEC
BH CV XLRA MEAS RIGHT CAROTID BULB PSV: 34.4 CM/SEC
BH CV XLRA MEAS RIGHT DIST ICA EDV: 15.6 CM/SEC
BH CV XLRA MEAS RIGHT DIST ICA PSV: 44.8 CM/SEC
BH CV XLRA MEAS RIGHT ICA/CCA RATIO: 0.5
BH CV XLRA MEAS RIGHT MID ICA EDV: 11 CM/SEC
BH CV XLRA MEAS RIGHT MID ICA PSV: 32.1 CM/SEC
BH CV XLRA MEAS RIGHT PROX CCA EDV: 10.1 CM/SEC
BH CV XLRA MEAS RIGHT PROX CCA PSV: 57.1 CM/SEC
BH CV XLRA MEAS RIGHT PROX ECA EDV: 10.7 CM/SEC
BH CV XLRA MEAS RIGHT PROX ECA PSV: 67 CM/SEC
BH CV XLRA MEAS RIGHT PROX ICA EDV: 11.7 CM/SEC
BH CV XLRA MEAS RIGHT PROX ICA PSV: 27.7 CM/SEC
BH CV XLRA MEAS RIGHT VERTEBRAL A EDV: 18.1 CM/SEC
BH CV XLRA MEAS RIGHT VERTEBRAL A PSV: 54.9 CM/SEC
LEFT ARM BP: NORMAL MMHG
RIGHT ARM BP: NORMAL MMHG

## 2025-02-27 PROCEDURE — 93880 EXTRACRANIAL BILAT STUDY: CPT

## 2025-02-27 PROCEDURE — 77063 BREAST TOMOSYNTHESIS BI: CPT

## 2025-02-27 PROCEDURE — 77067 SCR MAMMO BI INCL CAD: CPT

## 2025-02-27 PROCEDURE — 93880 EXTRACRANIAL BILAT STUDY: CPT | Performed by: SURGERY

## 2025-03-05 ENCOUNTER — TELEPHONE (OUTPATIENT)
Dept: CARDIOLOGY | Facility: CLINIC | Age: 65
End: 2025-03-05
Payer: COMMERCIAL

## 2025-03-05 ENCOUNTER — TELEPHONE (OUTPATIENT)
Dept: GASTROENTEROLOGY | Facility: CLINIC | Age: 65
End: 2025-03-05
Payer: COMMERCIAL

## 2025-03-05 NOTE — TELEPHONE ENCOUNTER
Verify source of procedure(s): Office visit  TIME OUT-CONFIRM CORRECT PROCEDURE: colonoscopy  Cardiology: Jon  Pulmonology: no  Blood thinner: Eliquis  GLP-1: no    Cardiac and Eliquis clearance received from Dr Webb on 1/31/25 (TE 1/30), expires in 60 days.

## 2025-03-06 ENCOUNTER — SPECIALTY PHARMACY (OUTPATIENT)
Facility: HOSPITAL | Age: 65
End: 2025-03-06
Payer: COMMERCIAL

## 2025-03-06 RX ORDER — EVOLOCUMAB 140 MG/ML
140 INJECTION, SOLUTION SUBCUTANEOUS
Qty: 6 ML | Refills: 3 | Status: SHIPPED | OUTPATIENT
Start: 2025-03-06

## 2025-03-06 NOTE — PROGRESS NOTES
Specialty Pharmacy Patient Management Program  Cardiology Introduction to Services Outreach     Anika Casillas is a 64 y.o. female seen by a Cardiology provider for Hyperlipidemia. This was an initial visit to introduce Cardiology Patient Management Program and Specialty Pharmacy services offered by [facility], as the patient is taking specialty medication Repatha.     Anika Casillas DECLINED to fill specialty medication at [facility] and/or enrollment in the Cardiology Disorders Patient Management Program at this time, but REMAINS ELIGIBLE for services in the future if desired and medication is continued.     Benefit Investigation  As of 3/6/2025 09:33 EST  Insurance: No insurance  Medication(s) and Costs: Sky Friedman PharmD  Clinical Specialty Pharmacist, Cardiology  3/6/2025  09:33 EST

## 2025-03-06 NOTE — PROGRESS NOTES
Specialty Pharmacy Patient Management Program  Cardiology Initial Assessment     Anika Casillas was referred by a Cardiology provider to the Cardiology Patient Management program offered by [facility] for Hyperlipidemia on 03/06/25.  An initial outreach was conducted, including assessment of therapy appropriateness and specialty medication education for Repatha. The patient was introduced to services offered by [facility], including: regular assessments, refill coordination, mail order delivery options, prior authorization maintenance, and financial assistance programs as applicable. The patient was also provided with contact information for the pharmacy team.     Insurance Coverage & Financial Support  No insurance     Relevant Past Medical History and Comorbidities  Relevant medical history and concomitant health conditions were discussed with the patient. The patient's chart has been reviewed for relevant past medical history and comorbid conditions and updated as necessary.  Past Medical History:   Diagnosis Date    Abnormal Pap smear of cervix     Atrial fibrillation     ON ELIQUIS/FOLLOWS TROTTER    Colindres's esophagus     Cirrhosis 2015    Colon polyp 2015    Depression     Diabetes mellitus     Emphysema lung     INHALER    Endometrial hyperplasia     D&C SCHEDULED 7/25/23    Esophageal varices     Fatty liver 2015    Gastroparesis     TAKES XIFAXAN    GERD (gastroesophageal reflux disease) 1995    Hyperlipidemia     Hypertension     Iron deficiency anemia 05/02/2023    IRON INFUSION LAST 7/24/23    Irritable bowel syndrome     W/CONSTIPATION    BERKOWITZ (nonalcoholic steatohepatitis)     NO S/S CURRENTLY, ELEVATED ENZYMES    Obstructive sleep apnea syndrome     Pulmonary emphysema 11/22/2021    S/P exploratory laparotomy, lysis of adhesions, resection of necrotic colon without anastomosis 04/18/2022     Social History     Socioeconomic History    Marital status:       Spouse name: fannie   Tobacco Use    Smoking status: Every Day     Current packs/day: 0.25     Average packs/day: 0.3 packs/day for 49.7 years (12.4 ttl pk-yrs)     Types: Cigarettes     Start date: 6/17/1975    Smokeless tobacco: Never    Tobacco comments:     States down to 1-2 a day 1/30/25   Vaping Use    Vaping status: Never Used   Substance and Sexual Activity    Alcohol use: Yes     Alcohol/week: 1.0 standard drink of alcohol     Types: 1 Drinks containing 0.5 oz of alcohol per week     Comment: 1 a month    Drug use: Never    Sexual activity: Yes     Partners: Male     Birth control/protection: Post-menopausal, Tubal ligation            Allergies  Known allergies and reactions were discussed with the patient. The patient's chart has been reviewed for  allergy information and updated as necessary.   Allergies   Allergen Reactions    Liraglutide Nausea And Vomiting    Lisinopril Other (See Comments)     Cough    Metformin Nausea Only     GI Upset            Relevant Laboratory Values  Relevant laboratory values were discussed with the patient. The following specialty medication dose adjustment(s) are recommended: Repatha    Lab Results   Component Value Date    GLUCOSE 257 (H) 01/24/2025    CALCIUM 10.0 01/24/2025     01/24/2025    K 4.2 01/24/2025    CO2 22.0 01/24/2025     01/24/2025    BUN 10 01/24/2025    CREATININE 0.86 01/24/2025    EGFRIFAFRI 102 01/27/2022    BCR 11.6 01/24/2025    ANIONGAP 11.0 01/24/2025     Lab Results   Component Value Date    CHOL 130 02/13/2024    CHLPL 257 (H) 10/12/2020    TRIG 100 02/13/2024    HDL 45 02/13/2024    LDL 66 02/13/2024         Current Medication List  This medication list has been reviewed with the patient and evaluated for any interactions or necessary modifications/recommendations, and updated to include all prescription medications, OTC medications, and supplements the patient is currently taking.  This list reflects what is contained  in the patient's profile, which has also been marked as reviewed to communicate to other providers it is the most up to date version of the patient's current medication therapy.     Current Outpatient Medications:     albuterol sulfate  (90 Base) MCG/ACT inhaler, Inhale 2 puffs Every 4 (Four) Hours As Needed for Shortness of Air or Wheezing., Disp: 1 g, Rfl: 5    amLODIPine (NORVASC) 10 MG tablet, Take 1 tablet by mouth Daily., Disp: 90 tablet, Rfl: 1    apixaban (Eliquis) 5 MG tablet tablet, Take 1 tablet by mouth 2 (Two) Times a Day., Disp: 24 tablet, Rfl: 0    ascorbic acid (VITAMIN C) 250 MG tablet, Take 1 tablet by mouth Daily., Disp: , Rfl:     B-D UF III MINI PEN NEEDLES 31G X 5 MM misc, , Disp: , Rfl:     Blood Glucose Monitoring Suppl (Accu-Chek Guide) w/Device kit, , Disp: , Rfl:     buPROPion XL (WELLBUTRIN XL) 300 MG 24 hr tablet, Take 1 tablet by mouth Daily. Two Xls in the morning., Disp: 90 tablet, Rfl: 1    Cholecalciferol (Vitamin D3) 50 MCG (2000 UT) capsule, Take 3 capsules by mouth Daily. LAST DOSE 7/21/23, Disp: , Rfl:     DULoxetine (CYMBALTA) 60 MG capsule, Take 1 capsule by mouth Daily., Disp: 90 capsule, Rfl: 1    Evolocumab (Repatha SureClick) solution auto-injector SureClick injection, Inject 1 mL under the skin into the appropriate area as directed Every 14 (Fourteen) Days., Disp: 6 mL, Rfl: 3    famotidine (PEPCID) 40 MG tablet, Take 1 tablet by mouth At Night As Needed for Heartburn., Disp: 90 tablet, Rfl: 3    Fluticasone-Umeclidin-Vilant (TRELEGY) 100-62.5-25 MCG/ACT inhaler, Inhale 1 puff Daily., Disp: 1 each, Rfl: 5    furosemide (LASIX) 40 MG tablet, Take 1 tablet by mouth Every Other Day., Disp: 30 tablet, Rfl: 3    glucose blood test strip, Check blood glucose twice daily, Disp: 60 each, Rfl: 12    glucose monitor monitoring kit, 1 each Daily. Patient prefers Accu Chek Meter., Disp: 1 each, Rfl: 0    Insulin Glargine, 2 Unit Dial, (Toujeo Max SoloStar) 300 UNIT/ML solution  pen-injector injection, Inject 122 Units under the skin into the appropriate area as directed Every Morning. INST PER ANESTHESIA PROTOCOL, Disp: , Rfl:     ipratropium-albuterol (DUO-NEB) 0.5-2.5 mg/3 ml nebulizer, Take 3 mL by nebulization 4 (Four) Times a Day As Needed for Wheezing or Shortness of Air., Disp: 360 mL, Rfl: 3    Lancets (accu-chek soft touch) lancets, Check blood sugar twice daily, Disp: 100 each, Rfl: 12    metoprolol tartrate (LOPRESSOR) 50 MG tablet, TAKE 1 AND 1/2 TABLETS BY MOUTH TWICE DAILY, Disp: 270 tablet, Rfl: 1    Mounjaro 2.5 MG/0.5ML solution pen-injector pen, Inject 0.5 mL under the skin into the appropriate area as directed 1 (One) Time Per Week., Disp: , Rfl:     multivitamin with minerals tablet tablet, Take 1 tablet by mouth Daily. LAST DOSE 7/21/23, Disp: , Rfl:     NovoLOG FlexPen 100 UNIT/ML solution pen-injector sc pen, Inject 60 Units under the skin into the appropriate area as directed 2 (Two) Times a Day. PER SLIDING SCALE INST PER ANESTHESIA PROTOCOL, Disp: , Rfl:     olmesartan (BENICAR) 20 MG tablet, Take 1 tablet by mouth Daily., Disp: 90 tablet, Rfl: 1    omeprazole (priLOSEC) 40 MG capsule, Take 1 capsule by mouth Daily., Disp: 30 capsule, Rfl: 11    potassium chloride 10 MEQ CR tablet, Take 1 tablet by mouth Every Other Day. With lasix., Disp: 30 tablet, Rfl: 3    promethazine (PHENERGAN) 25 MG tablet, Take 1 tablet by mouth Every 6 (Six) Hours As Needed for Nausea or Vomiting., Disp: 30 tablet, Rfl: 0    riFAXIMin (Xifaxan) 550 MG tablet, Take 1 tablet by mouth Every 12 (Twelve) Hours., Disp: 180 tablet, Rfl: 3    traZODone (DESYREL) 50 MG tablet, Take 1 tablet by mouth Every Night., Disp: 30 tablet, Rfl: 2         Drug Interactions  None    Adherence and Self-Administration  Adherence related to the patient's specialty therapy was discussed with the patient. The Adherence segment of this outreach has been reviewed and updated.   Methods for Supporting Patient  Adherence and/or Self-Administration: None    Open Medication Therapy Problems  No medication therapy recommendations to display    Reassessment Plan & Follow-Up  1. Medication Therapy Changes: Repatha 140mg SUBQ every 14 days  2. Related Plans, Therapy Recommendations, or Therapy Problems to Be Addressed: None      Attestation      I attest the patient was actively involved in and has agreed to the above plan of care. If the prescribed therapy is at any point deemed not appropriate based on the current or future assessments, a consultation will be initiated with the patient's specialty care provider to determine the best course of action. The revised plan of therapy will be documented along with any required assessments and/or additional patient education provided.     Carina Friedman, Sapna  Clinical Specialty Pharmacist, Cardiology  3/6/2025  09:42 EST

## 2025-03-10 ENCOUNTER — TELEPHONE (OUTPATIENT)
Dept: CARDIOLOGY | Facility: CLINIC | Age: 65
End: 2025-03-10
Payer: COMMERCIAL

## 2025-03-10 NOTE — TELEPHONE ENCOUNTER
SPOKE TO HARI WITH PT [facility] W/ ELIQUIS.  SHE STATED THEY ARE MISSING THE 2ND PAGE OF PT'S KELECHI.      RESCANNED AND FAXED.

## 2025-03-12 NOTE — PRE-PROCEDURE INSTRUCTIONS
Reminded of arrival time at  0730      , Entrance C of the [facility]. Instructed to bring or have a  over the age of 18 set up to drive you home the day of procedure.    Instructed on clear liquid diet the day before, nothing red or purple. Call with any questions about the prep or if in need of the prep.  Reminded them not to eat or drink anything am of procedure unless its a sip of water with medications. Use/ bring inhalers am of procedure. Hold eliquis 3 days prior to procedure. Hold mounjaro/ozempic 7 days prior to procedure, last dose 3/7. Hold lasix and short acting insulin am of procedure, take 1/2 dose of long acting insulin night before pending blood sugar reading. Patient verbalized understanding.

## 2025-03-14 DIAGNOSIS — Z12.11 ENCOUNTER FOR SCREENING FOR MALIGNANT NEOPLASM OF COLON: Primary | ICD-10-CM

## 2025-03-14 RX ORDER — POLYETHYLENE GLYCOL 3350, SODIUM SULFATE, POTASSIUM CHLORIDE, MAGNESIUM SULFATE, AND SODIUM CHLORIDE FOR ORAL SOLUTION 178.7-7.3G
178.7 KIT ORAL TAKE AS DIRECTED
Qty: 2 EACH | Refills: 0 | COMMUNITY
Start: 2025-03-14

## 2025-03-14 NOTE — TELEPHONE ENCOUNTER
Patient came by the office today. She said her insurance did not approve her bowel prep & her scope is on Tuesday. I gave her a sample of Suflave.

## 2025-03-17 ENCOUNTER — ANESTHESIA EVENT (OUTPATIENT)
Dept: GASTROENTEROLOGY | Facility: HOSPITAL | Age: 65
End: 2025-03-17
Payer: COMMERCIAL

## 2025-03-17 NOTE — ANESTHESIA PREPROCEDURE EVALUATION
Anesthesia Evaluation     Nursing notes reviewed   NPO Solid Status: > 8 hours  NPO Liquid Status: > 2 hours           Airway   Mallampati: I  TM distance: >3 FB  Neck ROM: full  Large neck circumference and No difficulty expected  Dental          Pulmonary    (+) a smoker Current, Abstained day of surgery, COPD,sleep apnea on CPAP, decreased breath sounds  Cardiovascular - normal exam    (+) hypertension, dysrhythmias Atrial Fib, hyperlipidemia      Neuro/Psych  (+) psychiatric history Depression  GI/Hepatic/Renal/Endo    (+) obesity, morbid obesity, GERD, hepatitis, liver disease fatty liver disease, renal disease (CKD stage 2)-, diabetes mellitus, thyroid problem hypothyroidism    ROS Comment: History of gastroparesis    History of esophageal varices    Musculoskeletal     Abdominal   (+) obese   Substance History      OB/GYN          Other        ROS/Med Hx Other: CC on file     History of gastroparesis- pepcid and reglan ordered    BORDERLINE ECG -  Sinus rhythm  Short IN interval  When compared with ECG of 15-Apr-2022 23:02:08,  No significant interval change  Electronically Signed By: Earle Webb ([facility]) 2024-12-26 16:08:48  Date and Time of Study:2024-12-26 14:35:12    ECHO 1/27/23:   ·  Left ventricular systolic function is normal. Left ventricular ejection fraction appears to be 66 - 70%.  ·  Left ventricular wall thickness is consistent with concentric hypertrophy.                      Anesthesia Plan    ASA 3     general   total IV anesthesia  (Patient understands anesthesia not responsible for dental damage. Risks explained including allergic reactions, BP, HR, O2 changes, aspiration, advanced airway placement. Pt verbalized understanding.)  intravenous induction     Anesthetic plan, risks, benefits, and alternatives have been provided, discussed and informed consent has been obtained with: patient.    Plan discussed with CRNA.      CODE STATUS:

## 2025-03-18 ENCOUNTER — TELEPHONE (OUTPATIENT)
Dept: CARDIOLOGY | Facility: CLINIC | Age: 65
End: 2025-03-18
Payer: COMMERCIAL

## 2025-03-18 ENCOUNTER — ANESTHESIA (OUTPATIENT)
Dept: GASTROENTEROLOGY | Facility: HOSPITAL | Age: 65
End: 2025-03-18
Payer: COMMERCIAL

## 2025-03-18 ENCOUNTER — HOSPITAL ENCOUNTER (OUTPATIENT)
Facility: HOSPITAL | Age: 65
Setting detail: HOSPITAL OUTPATIENT SURGERY
Discharge: HOME | End: 2025-03-18
Attending: INTERNAL MEDICINE | Admitting: INTERNAL MEDICINE
Payer: COMMERCIAL

## 2025-03-18 VITALS
WEIGHT: 231.92 LBS | DIASTOLIC BLOOD PRESSURE: 70 MMHG | HEART RATE: 94 BPM | SYSTOLIC BLOOD PRESSURE: 120 MMHG | OXYGEN SATURATION: 98 % | RESPIRATION RATE: 16 BRPM | BODY MASS INDEX: 38.59 KG/M2 | TEMPERATURE: 98 F

## 2025-03-18 DIAGNOSIS — I48.0 PAROXYSMAL ATRIAL FIBRILLATION: Chronic | ICD-10-CM

## 2025-03-18 DIAGNOSIS — Z86.0100 HISTORY OF COLON POLYPS: ICD-10-CM

## 2025-03-18 LAB — GLUCOSE BLDC GLUCOMTR-MCNC: 254 MG/DL (ref 70–99)

## 2025-03-18 PROCEDURE — 82948 REAGENT STRIP/BLOOD GLUCOSE: CPT

## 2025-03-18 PROCEDURE — 88305 TISSUE EXAM BY PATHOLOGIST: CPT | Performed by: INTERNAL MEDICINE

## 2025-03-18 PROCEDURE — 25010000002 LIDOCAINE PF 2% 2 % SOLUTION: Performed by: NURSE ANESTHETIST, CERTIFIED REGISTERED

## 2025-03-18 PROCEDURE — 25810000003 LACTATED RINGERS PER 1000 ML

## 2025-03-18 PROCEDURE — 45385 COLONOSCOPY W/LESION REMOVAL: CPT | Performed by: INTERNAL MEDICINE

## 2025-03-18 PROCEDURE — 25010000002 PROPOFOL 10 MG/ML EMULSION: Performed by: NURSE ANESTHETIST, CERTIFIED REGISTERED

## 2025-03-18 PROCEDURE — 25010000002 METOCLOPRAMIDE PER 10 MG

## 2025-03-18 RX ORDER — FAMOTIDINE 10 MG/ML
20 INJECTION, SOLUTION INTRAVENOUS ONCE
Status: COMPLETED | OUTPATIENT
Start: 2025-03-18 | End: 2025-03-18

## 2025-03-18 RX ORDER — LIDOCAINE HYDROCHLORIDE 20 MG/ML
INJECTION, SOLUTION EPIDURAL; INFILTRATION; INTRACAUDAL; PERINEURAL AS NEEDED
Status: DISCONTINUED | OUTPATIENT
Start: 2025-03-18 | End: 2025-03-18 | Stop reason: SURG

## 2025-03-18 RX ORDER — PHENYLEPHRINE HCL IN 0.9% NACL 1 MG/10 ML
SYRINGE (ML) INTRAVENOUS AS NEEDED
Status: DISCONTINUED | OUTPATIENT
Start: 2025-03-18 | End: 2025-03-18 | Stop reason: SURG

## 2025-03-18 RX ORDER — SEMAGLUTIDE 1.34 MG/ML
INJECTION, SOLUTION SUBCUTANEOUS WEEKLY
COMMUNITY

## 2025-03-18 RX ORDER — PROPOFOL 10 MG/ML
VIAL (ML) INTRAVENOUS AS NEEDED
Status: DISCONTINUED | OUTPATIENT
Start: 2025-03-18 | End: 2025-03-18 | Stop reason: SURG

## 2025-03-18 RX ORDER — SODIUM CHLORIDE, SODIUM LACTATE, POTASSIUM CHLORIDE, CALCIUM CHLORIDE 600; 310; 30; 20 MG/100ML; MG/100ML; MG/100ML; MG/100ML
30 INJECTION, SOLUTION INTRAVENOUS CONTINUOUS
Status: DISCONTINUED | OUTPATIENT
Start: 2025-03-18 | End: 2025-03-18 | Stop reason: HOSPADM

## 2025-03-18 RX ORDER — METOCLOPRAMIDE HYDROCHLORIDE 5 MG/ML
10 INJECTION INTRAMUSCULAR; INTRAVENOUS ONCE
Status: COMPLETED | OUTPATIENT
Start: 2025-03-18 | End: 2025-03-18

## 2025-03-18 RX ADMIN — FAMOTIDINE 20 MG: 10 INJECTION INTRAVENOUS at 08:58

## 2025-03-18 RX ADMIN — Medication 100 MCG: at 09:34

## 2025-03-18 RX ADMIN — Medication 200 MCG: at 09:40

## 2025-03-18 RX ADMIN — METOCLOPRAMIDE 10 MG: 5 INJECTION, SOLUTION INTRAMUSCULAR; INTRAVENOUS at 09:01

## 2025-03-18 RX ADMIN — LIDOCAINE HYDROCHLORIDE 60 MG: 20 INJECTION, SOLUTION INTRAVENOUS at 09:09

## 2025-03-18 RX ADMIN — SODIUM CHLORIDE, SODIUM LACTATE, POTASSIUM CHLORIDE, CALCIUM CHLORIDE 30 ML/HR: 20; 30; 600; 310 INJECTION, SOLUTION INTRAVENOUS at 08:53

## 2025-03-18 RX ADMIN — PROPOFOL 200 MCG/KG/MIN: 10 INJECTION, EMULSION INTRAVENOUS at 09:11

## 2025-03-18 RX ADMIN — PROPOFOL 100 MG: 10 INJECTION, EMULSION INTRAVENOUS at 09:09

## 2025-03-18 NOTE — ANESTHESIA POSTPROCEDURE EVALUATION
Patient: Anika Casillsa    Procedure Summary       Date: 03/18/25 Room / Location: [facility] ENDOSCOPY 3 / [facility] ENDOSCOPY    Anesthesia Start: 0907 Anesthesia Stop: 0949    Procedure: COLONOSCOPY with cold snare polypectomies Diagnosis:       History of colon polyps      (History of colon polyps [Z86.0100])    Surgeons: Shannan Charles MD Provider: Natalia Flores CRNA    Anesthesia Type: general ASA Status: 3            Anesthesia Type: general    Vitals  Vitals Value Taken Time   /70 03/18/25 10:00   Temp 36.7 °C (98 °F) 03/18/25 09:47   Pulse 84 03/18/25 10:10   Resp 16 03/18/25 10:00   SpO2 97 % 03/18/25 10:10   Vitals shown include unfiled device data.        Post Anesthesia Care and Evaluation    Post-procedure mental status: acceptable.  Pain management: satisfactory to patient    Airway patency: patent  Anesthetic complications: No anesthetic complications    Cardiovascular status: acceptable  Respiratory status: acceptable    Comments: Per chart review

## 2025-03-18 NOTE — H&P
Pre Procedure History & Physical    Chief Complaint:   Surveillance colonoscopy    Subjective     HPI:   65 yo F here for surveillance colonoscopy.    Past Medical History:   Past Medical History:   Diagnosis Date    Abnormal Pap smear of cervix     Atrial fibrillation     ON ELIQUIS/FOLLOWS TROTTER    Colindres's esophagus     Cirrhosis 2015    Colon polyp 2015    Depression     Diabetes mellitus     Emphysema lung     INHALER    Endometrial hyperplasia     D&C SCHEDULED 7/25/23    Esophageal varices     Fatty liver 2015    Gastroparesis     TAKES XIFAXAN    GERD (gastroesophageal reflux disease) 1995    Hyperlipidemia     Hypertension     Iron deficiency anemia 05/02/2023    IRON INFUSION LAST 7/24/23    Irritable bowel syndrome     W/CONSTIPATION    BERKOWITZ (nonalcoholic steatohepatitis)     NO S/S CURRENTLY, ELEVATED ENZYMES    Obstructive sleep apnea syndrome     Pulmonary emphysema 11/22/2021    S/P exploratory laparotomy, lysis of adhesions, resection of necrotic colon without anastomosis 04/18/2022       Past Surgical History:  Past Surgical History:   Procedure Laterality Date    ABDOMINAL SURGERY  4/16/2022    BLADDER SLING MODIFIED, ANTERIOR AND POSTERIOR VAGINAL REPAIR  2018    [place]    BLADDER SURGERY  05/01/2024    stimulator  @ [facility]    BOTOX INJECTION      In abdomen    BREAST LUMPECTOMY Left     BENIGN    CARDIAC CATHETERIZATION      NO INTERVENTION    CHOLECYSTECTOMY  5/12/2015    COLON RESECTION      PERFORATED COLON, EEA    COLONOSCOPY N/A 08/14/2023    Procedure: COLONOSCOPY WITH POLYPECTOMY, TATTOO;  Surgeon: Shannan Charles MD;  Location: [facility] ENDOSCOPY;  Service: Gastroenterology;  Laterality: N/A;  COLON POLYPS  DIVERTICULOSIS  HEMORRHOIDS    COLONOSCOPY N/A 04/22/2024    Procedure: COLONOSCOPY COLD SNARE POLYPECTOMIES WITH CLIP PLACEMENT X3;  Surgeon: Shannan Charles MD;  Location: [facility] ENDOSCOPY;  Service: Gastroenterology;  Laterality: N/A;  DIVERTICULOSIS  COLON  POLYPS    D & C HYSTEROSCOPY N/A 07/25/2023    Procedure: resection of endometrial polyp with myosure;  Surgeon: Ashleigh Harding DO;  Location: [facility] MAIN OR;  Service: Gynecology;  Laterality: N/A;    ENDOSCOPY N/A 06/03/2022    Procedure: ESOPHAGOGASTRODUODENOSCOPY;  Surgeon: Shannan Charles MD;  Location: [facility] ENDOSCOPY;  Service: Gastroenterology;  Laterality: N/A;  RETAINED FOOD IN STOMACH  ESOPHAGEAL VARICIES    ENDOSCOPY N/A 04/22/2024    Procedure: ESOPHAGOGASTRODUODENOSCOPY WITH BIOPSIES;  Surgeon: Shannan Charles MD;  Location: [facility] ENDOSCOPY;  Service: Gastroenterology;  Laterality: N/A;  ESOPHAGEAL BIOPSIES    EXPLORATORY LAPAROTOMY N/A 04/16/2022    Procedure: EXPLORATORY LAPAROTOMY, LYSIS OF ADHESIONS, RESECTION OF NECTROIC COLON;  Surgeon: Cande Stanley MD;  Location: [facility] MAIN OR;  Service: General;  Laterality: N/A;    EYE SURGERY  2023    GALLBLADDER SURGERY      LAPAROSCOPIC    LIVER BIOPSY  2021    TUBAL ABDOMINAL LIGATION      UPPER GASTROINTESTINAL ENDOSCOPY  6/3/2022       Family History:  Family History   Adopted: Yes   Problem Relation Age of Onset    No Known Problems Mother     No Known Problems Father     No Known Problems Sister     No Known Problems Brother     No Known Problems Maternal Aunt     No Known Problems Paternal Aunt     No Known Problems Maternal Uncle     No Known Problems Paternal Uncle     No Known Problems Maternal Grandfather     No Known Problems Maternal Grandmother     No Known Problems Paternal Grandfather     No Known Problems Paternal Grandmother     No Known Problems Cousin     No Known Problems Other     ADD / ADHD Son     Malig Hyperthermia Neg Hx     Alcohol abuse Neg Hx     Anxiety disorder Neg Hx     Bipolar disorder Neg Hx     Dementia Neg Hx     Depression Neg Hx     Drug abuse Neg Hx     OCD Neg Hx     Paranoid behavior Neg Hx     Schizophrenia Neg Hx     Seizures Neg Hx     Self-Injurious Behavior  Neg Hx     Suicide  Attempts Neg Hx        Social History:   reports that she has been smoking cigarettes. She started smoking about 49 years ago. She has a 12.4 pack-year smoking history. She has never used smokeless tobacco. She reports current alcohol use of about 1.0 standard drink of alcohol per week. She reports that she does not use drugs.    Medications:   Medications Prior to Admission   Medication Sig Dispense Refill Last Dose/Taking    albuterol sulfate  (90 Base) MCG/ACT inhaler Inhale 2 puffs Every 4 (Four) Hours As Needed for Shortness of Air or Wheezing. 1 g 5     amLODIPine (NORVASC) 10 MG tablet Take 1 tablet by mouth Daily. 90 tablet 1     apixaban (Eliquis) 5 MG tablet tablet Take 1 tablet by mouth 2 (Two) Times a Day. 24 tablet 0     ascorbic acid (VITAMIN C) 250 MG tablet Take 1 tablet by mouth Daily.       B-D UF III MINI PEN NEEDLES 31G X 5 MM misc        Blood Glucose Monitoring Suppl (Accu-Chek Guide) w/Device kit        buPROPion XL (WELLBUTRIN XL) 300 MG 24 hr tablet Take 1 tablet by mouth Daily. Two Xls in the morning. 90 tablet 1     Cholecalciferol (Vitamin D3) 50 MCG (2000 UT) capsule Take 3 capsules by mouth Daily. LAST DOSE 7/21/23       DULoxetine (CYMBALTA) 60 MG capsule Take 1 capsule by mouth Daily. 90 capsule 1     Evolocumab (Repatha SureClick) solution auto-injector SureClick injection Inject 1 mL under the skin into the appropriate area as directed Every 14 (Fourteen) Days. 6 mL 3     famotidine (PEPCID) 40 MG tablet Take 1 tablet by mouth At Night As Needed for Heartburn. 90 tablet 3     Fluticasone-Umeclidin-Vilant (TRELEGY) 100-62.5-25 MCG/ACT inhaler Inhale 1 puff Daily. 1 each 5     furosemide (LASIX) 40 MG tablet Take 1 tablet by mouth Every Other Day. 30 tablet 3     glucose blood test strip Check blood glucose twice daily 60 each 12     glucose monitor monitoring kit 1 each Daily. Patient prefers Accu Chek Meter. 1 each 0     Insulin Glargine, 2 Unit Dial, (Toujeo Max SoloStar)  300 UNIT/ML solution pen-injector injection Inject 122 Units under the skin into the appropriate area as directed Every Morning. INST PER ANESTHESIA PROTOCOL       ipratropium-albuterol (DUO-NEB) 0.5-2.5 mg/3 ml nebulizer Take 3 mL by nebulization 4 (Four) Times a Day As Needed for Wheezing or Shortness of Air. 360 mL 3     Lancets (accu-chek soft touch) lancets Check blood sugar twice daily 100 each 12     metoprolol tartrate (LOPRESSOR) 50 MG tablet TAKE 1 AND 1/2 TABLETS BY MOUTH TWICE DAILY 270 tablet 1     Mounjaro 2.5 MG/0.5ML solution pen-injector pen Inject 0.5 mL under the skin into the appropriate area as directed 1 (One) Time Per Week.       multivitamin with minerals tablet tablet Take 1 tablet by mouth Daily. LAST DOSE 7/21/23       NovoLOG FlexPen 100 UNIT/ML solution pen-injector sc pen Inject 60 Units under the skin into the appropriate area as directed 2 (Two) Times a Day. PER SLIDING SCALE  INST PER ANESTHESIA PROTOCOL       olmesartan (BENICAR) 20 MG tablet Take 1 tablet by mouth Daily. 90 tablet 1     omeprazole (priLOSEC) 40 MG capsule Take 1 capsule by mouth Daily. 30 capsule 11     PEG 3350-KCl-NaCl-NaSulf-MgSul (Suflave) 178.7 g reconstituted solution Take 178.7 g by mouth Take As Directed. 2 each 0     potassium chloride 10 MEQ CR tablet Take 1 tablet by mouth Every Other Day. With lasix. 30 tablet 3     promethazine (PHENERGAN) 25 MG tablet Take 1 tablet by mouth Every 6 (Six) Hours As Needed for Nausea or Vomiting. 30 tablet 0     riFAXIMin (Xifaxan) 550 MG tablet Take 1 tablet by mouth Every 12 (Twelve) Hours. 180 tablet 3     traZODone (DESYREL) 50 MG tablet Take 1 tablet by mouth Every Night. 30 tablet 2        Allergies:  Liraglutide, Lisinopril, and Metformin    ROS:    Pertinent items are noted in HPI     Objective     Weight 105 kg (231 lb 14.8 oz), not currently breastfeeding.    Physical Exam   Constitutional: Pt is oriented to person, place, and time and well-developed,  well-nourished, and in no distress.   Mouth/Throat: Oropharynx is clear and moist.   Neck: Normal range of motion.   Cardiovascular: Normal rate, regular rhythm and normal heart sounds.    Pulmonary/Chest: Effort normal and breath sounds normal.   Abdominal: Soft. Nontender  Skin: Skin is warm and dry.   Psychiatric: Mood, memory, affect and judgment normal.     Assessment & Plan     Diagnosis:  Surveillance colonoscopy    Anticipated Surgical Procedure:  Colonoscopy    The risks, benefits, and alternatives of this procedure have been discussed with the patient or the responsible party- the patient understands and agrees to proceed.

## 2025-03-18 NOTE — TELEPHONE ENCOUNTER
WE HAD NOT HEARD ANY DETERMINATION FROM ELIQUIS.  SPOKE TO [employer] AND THEY HAD A QUESTION ABOUT PT'S INSURANCE.  [facility] INDICATED SHE DID NOT HAVE ANY INSURANCE BUT A COPY OF A CARD WAS SENT IN.  THEY ALSO STATED HER KELECHI WAS VERY BLURRY AND DIFFICULT TO READ.  I FILLED IT OUT AGAIN, EXCEPT SIGNATURE PAGE AND I AM RESUBMITTING PER THEIR REQUEST.     PT STATED SHE HAS NOT TAKEN ANY OF HER ELIQUIS FOR ABOUT 3 OR 4 WEEKS.  I GAVE HER A 30 DAY FREE OFFER CARD ALONG WITH 2 WEEKS OF SAMPLE MEDS.    PT DOES NOT HAVE INSURANCE SO I WILL LET [facility] KNOW THIS.

## 2025-03-19 ENCOUNTER — RESULTS FOLLOW-UP (OUTPATIENT)
Dept: GASTROENTEROLOGY | Facility: HOSPITAL | Age: 65
End: 2025-03-19
Payer: COMMERCIAL

## 2025-03-19 DIAGNOSIS — Z12.11 ENCOUNTER FOR SCREENING FOR MALIGNANT NEOPLASM OF COLON: ICD-10-CM

## 2025-03-25 ENCOUNTER — TELEPHONE (OUTPATIENT)
Dept: CARDIOLOGY | Facility: CLINIC | Age: 65
End: 2025-03-25
Payer: COMMERCIAL

## 2025-03-25 NOTE — TELEPHONE ENCOUNTER
[facility] received a fax that requires your attention. The document has been indexed to the patient’s chart for your review.      Reason for sending: EXTERNAL MEDICAL RECORD NOTIFICATION     Documents Description: INFO NEEDED-[facility]-3.25.25    Name of Sender: ELLA OLSON     Date Indexed: 3.25.25

## 2025-03-26 ENCOUNTER — LAB (OUTPATIENT)
Facility: HOSPITAL | Age: 65
End: 2025-03-26
Payer: COMMERCIAL

## 2025-03-26 ENCOUNTER — TRANSCRIBE ORDERS (OUTPATIENT)
Facility: HOSPITAL | Age: 65
End: 2025-03-26
Payer: COMMERCIAL

## 2025-03-26 DIAGNOSIS — D89.2 CRYOFIBRINOGENEMIA: ICD-10-CM

## 2025-03-26 DIAGNOSIS — D89.2 CRYOFIBRINOGENEMIA: Primary | ICD-10-CM

## 2025-03-26 DIAGNOSIS — K74.60 CIRRHOSIS OF LIVER WITHOUT ASCITES, UNSPECIFIED HEPATIC CIRRHOSIS TYPE: ICD-10-CM

## 2025-03-26 PROCEDURE — 36415 COLL VENOUS BLD VENIPUNCTURE: CPT

## 2025-03-26 PROCEDURE — 84165 PROTEIN E-PHORESIS SERUM: CPT

## 2025-03-26 PROCEDURE — 84155 ASSAY OF PROTEIN SERUM: CPT

## 2025-03-26 PROCEDURE — 82784 ASSAY IGA/IGD/IGG/IGM EACH: CPT

## 2025-03-26 PROCEDURE — 86334 IMMUNOFIX E-PHORESIS SERUM: CPT

## 2025-03-27 LAB
ALBUMIN SERPL ELPH-MCNC: 3.3 G/DL (ref 2.9–4.4)
ALBUMIN/GLOB SERPL: 0.7 {RATIO} (ref 0.7–1.7)
ALPHA1 GLOB SERPL ELPH-MCNC: 0.2 G/DL (ref 0–0.4)
ALPHA2 GLOB SERPL ELPH-MCNC: 0.8 G/DL (ref 0.4–1)
B-GLOBULIN SERPL ELPH-MCNC: 1.2 G/DL (ref 0.7–1.3)
GAMMA GLOB SERPL ELPH-MCNC: 2.4 G/DL (ref 0.4–1.8)
GLOBULIN SER CALC-MCNC: 4.7 G/DL (ref 2.2–3.9)
LABORATORY COMMENT REPORT: ABNORMAL
M PROTEIN SERPL ELPH-MCNC: ABNORMAL G/DL
PROT SERPL-MCNC: 8 G/DL (ref 6–8.5)

## 2025-03-28 LAB
IGA SERPL-MCNC: 350 MG/DL (ref 87–352)
IGG SERPL-MCNC: 2391 MG/DL (ref 586–1602)
IGM SERPL-MCNC: 218 MG/DL (ref 26–217)
PROT PATTERN SERPL IFE-IMP: ABNORMAL

## 2025-03-28 NOTE — TELEPHONE ENCOUNTER
HAVE SPOKE TO [employer] REP FOR ELIQUIS PT [facility], SEVERAL TIMES.  THEY HAVE ASKED I REFAX APPLICATION, SEVERAL TIMES.  THEY CONTINUE TELLING THE KELECHI IS BLURRY OR THERE ARE MISSING PAGES.      SPOKE TO PT.  SHE IS GOING TO FILL OUT ANOTHER APPLICATION TO SEE IF THIS WILL BE WHAT THEY NEED.  SCANNED IN MEDIA AND [place] [facility]

## 2025-04-02 ENCOUNTER — TELEPHONE (OUTPATIENT)
Dept: CARDIOLOGY | Facility: CLINIC | Age: 65
End: 2025-04-02

## 2025-04-16 DIAGNOSIS — I10 ESSENTIAL HYPERTENSION: ICD-10-CM

## 2025-04-16 RX ORDER — METOPROLOL TARTRATE 50 MG
75 TABLET ORAL 2 TIMES DAILY
Qty: 270 TABLET | Refills: 1 | Status: SHIPPED | OUTPATIENT
Start: 2025-04-16

## 2025-04-16 NOTE — TELEPHONE ENCOUNTER
Rx Refill Note  Requested Prescriptions     Pending Prescriptions Disp Refills    metoprolol tartrate (LOPRESSOR) 50 MG tablet 270 tablet 1     Sig: Take 1.5 tablets by mouth 2 (Two) Times a Day.        LAST OFFICE VISIT:  12/12/2024     NEXT OFFICE VISIT:  6/13/2025     Does the medication requests match the last office note:    [x] Yes   [] No    Does this refill request meet protocol details for MA to approve:     [x] Yes   [] No   [] No Protocols Provided

## 2025-04-18 RX ORDER — POTASSIUM CHLORIDE 750 MG/1
10 TABLET, EXTENDED RELEASE ORAL EVERY OTHER DAY
Qty: 45 TABLET | Refills: 3 | Status: SHIPPED | OUTPATIENT
Start: 2025-04-18

## 2025-04-22 ENCOUNTER — LAB (OUTPATIENT)
Dept: ONCOLOGY | Facility: HOSPITAL | Age: 65
End: 2025-04-22
Payer: COMMERCIAL

## 2025-04-22 DIAGNOSIS — D50.9 IRON DEFICIENCY ANEMIA, UNSPECIFIED IRON DEFICIENCY ANEMIA TYPE: ICD-10-CM

## 2025-04-22 LAB
BASOPHILS # BLD AUTO: 0.06 10*3/MM3 (ref 0–0.2)
BASOPHILS NFR BLD AUTO: 0.8 % (ref 0–1.5)
DEPRECATED RDW RBC AUTO: 50.3 FL (ref 37–54)
EOSINOPHIL # BLD AUTO: 0.09 10*3/MM3 (ref 0–0.4)
EOSINOPHIL NFR BLD AUTO: 1.3 % (ref 0.3–6.2)
ERYTHROCYTE [DISTWIDTH] IN BLOOD BY AUTOMATED COUNT: 15.5 % (ref 12.3–15.4)
FERRITIN SERPL-MCNC: 160.8 NG/ML (ref 13–150)
FOLATE SERPL-MCNC: 11.6 NG/ML (ref 4.78–24.2)
HCT VFR BLD AUTO: 38.6 % (ref 34–46.6)
HGB BLD-MCNC: 13.8 G/DL (ref 12–15.9)
IMM GRANULOCYTES # BLD AUTO: 0.02 10*3/MM3 (ref 0–0.05)
IMM GRANULOCYTES NFR BLD AUTO: 0.3 % (ref 0–0.5)
IRON 24H UR-MRATE: 44 MCG/DL (ref 37–145)
IRON SATN MFR SERPL: 13 % (ref 20–50)
LYMPHOCYTES # BLD AUTO: 2.53 10*3/MM3 (ref 0.7–3.1)
LYMPHOCYTES NFR BLD AUTO: 35.8 % (ref 19.6–45.3)
MCH RBC QN AUTO: 31.6 PG (ref 26.6–33)
MCHC RBC AUTO-ENTMCNC: 35.8 G/DL (ref 31.5–35.7)
MCV RBC AUTO: 88.3 FL (ref 79–97)
MONOCYTES # BLD AUTO: 0.45 10*3/MM3 (ref 0.1–0.9)
MONOCYTES NFR BLD AUTO: 6.4 % (ref 5–12)
NEUTROPHILS NFR BLD AUTO: 3.91 10*3/MM3 (ref 1.7–7)
NEUTROPHILS NFR BLD AUTO: 55.4 % (ref 42.7–76)
NRBC BLD AUTO-RTO: 0 /100 WBC (ref 0–0.2)
PLATELET # BLD AUTO: 244 10*3/MM3 (ref 140–450)
PMV BLD AUTO: 10.6 FL (ref 6–12)
RBC # BLD AUTO: 4.37 10*6/MM3 (ref 3.77–5.28)
TIBC SERPL-MCNC: 349 MCG/DL (ref 298–536)
TRANSFERRIN SERPL-MCNC: 234 MG/DL (ref 200–360)
VIT B12 BLD-MCNC: 1953 PG/ML (ref 211–946)
WBC NRBC COR # BLD AUTO: 7.06 10*3/MM3 (ref 3.4–10.8)

## 2025-04-22 PROCEDURE — 82746 ASSAY OF FOLIC ACID SERUM: CPT

## 2025-04-22 PROCEDURE — 36415 COLL VENOUS BLD VENIPUNCTURE: CPT

## 2025-04-22 PROCEDURE — 82728 ASSAY OF FERRITIN: CPT

## 2025-04-22 PROCEDURE — 83540 ASSAY OF IRON: CPT

## 2025-04-22 PROCEDURE — 84466 ASSAY OF TRANSFERRIN: CPT

## 2025-04-22 PROCEDURE — 82607 VITAMIN B-12: CPT

## 2025-04-22 PROCEDURE — 85025 COMPLETE CBC W/AUTO DIFF WBC: CPT

## 2025-04-24 ENCOUNTER — OFFICE VISIT (OUTPATIENT)
Dept: ONCOLOGY | Facility: HOSPITAL | Age: 65
End: 2025-04-24
Payer: COMMERCIAL

## 2025-04-24 VITALS
WEIGHT: 232 LBS | SYSTOLIC BLOOD PRESSURE: 161 MMHG | BODY MASS INDEX: 38.65 KG/M2 | RESPIRATION RATE: 20 BRPM | HEART RATE: 103 BPM | TEMPERATURE: 97.2 F | HEIGHT: 65 IN | DIASTOLIC BLOOD PRESSURE: 81 MMHG | OXYGEN SATURATION: 96 %

## 2025-04-24 DIAGNOSIS — K74.60 CIRRHOSIS OF LIVER WITHOUT ASCITES, UNSPECIFIED HEPATIC CIRRHOSIS TYPE: ICD-10-CM

## 2025-04-24 DIAGNOSIS — I85.00 ESOPHAGEAL VARICES WITHOUT BLEEDING, UNSPECIFIED ESOPHAGEAL VARICES TYPE: ICD-10-CM

## 2025-04-24 DIAGNOSIS — D50.9 IRON DEFICIENCY ANEMIA, UNSPECIFIED IRON DEFICIENCY ANEMIA TYPE: Primary | ICD-10-CM

## 2025-04-24 PROCEDURE — G0463 HOSPITAL OUTPT CLINIC VISIT: HCPCS | Performed by: NURSE PRACTITIONER

## 2025-04-24 RX ORDER — DAPAGLIFLOZIN 10 MG/1
10 TABLET, FILM COATED ORAL
COMMUNITY
Start: 2025-02-04 | End: 2026-02-04

## 2025-04-24 NOTE — PROGRESS NOTES
Chief Complaint/Reason for Referral:  Anemia    Elena Henderson,*  Elena Henderson PA    Records Obtained:  Records of the patients history including those obtained from [facility] and patient information were reviewed and summarized in detail.    Subjective        History of Present Illness  The patient is a 64-year-old -American female who presents for a follow-up of iron deficiency anemia. She was last seen in 01/2025. She has a history of liver cirrhosis and esophageal varices, as well as iron deficiency anemia. She is following with GI for the liver cirrhosis.    Overall improvement in her condition is reported. She has been unable to tolerate oral iron supplements. Ferrlecit infusions were administered in 07/2024 (4 infusions) and iron sucrose infusions in 03/2024.    She is under the care of a gastroenterologist for her liver cirrhosis and esophageal varices and reports no instances of bleeding. Endoscopic procedures were performed in 03/2025, revealing the presence of hemorrhoids, diverticula, and polyps. A repeat endoscopy is scheduled for 3 years from now.    A diagnosis of diabetes mellitus is noted.    B12 levels were found to be elevated, although she is not currently receiving B12 injections or taking oral B12 supplements. She does not consume energy drinks.    Myeloma studies were conducted by her nephrologist, but she has not received any results yet.    Results  Labs   - Iron studies: 04/22/2025, Iron saturation is mildly low at 13%   - Folate: 04/22/2025, Normal   - B12: 04/22/2025, Normal   - CBC: 04/22/2025, Normal, No anemia   - Hemoglobin: 04/22/2025, 13.8   - Iron: 04/22/2025, 44   - Ferritin: 04/22/2025, 160   - Iron saturation: 04/22/2025, 13%   - Glucose: 01/2025, Slightly elevated   - Calcium: Normal but on the higher end of normal   - IgG: Elevated       Oncology/Hematology History    No history exists.       Review of Systems   All other systems reviewed and are  negative.      Current Outpatient Medications on File Prior to Visit   Medication Sig Dispense Refill    albuterol sulfate  (90 Base) MCG/ACT inhaler Inhale 2 puffs Every 4 (Four) Hours As Needed for Shortness of Air or Wheezing. 1 g 5    amLODIPine (NORVASC) 10 MG tablet Take 1 tablet by mouth Daily. 90 tablet 1    apixaban (Eliquis) 5 MG tablet tablet Take 1 tablet by mouth 2 (Two) Times a Day. 28 tablet 0    ascorbic acid (VITAMIN C) 250 MG tablet Take 1 tablet by mouth Daily.      B-D UF III MINI PEN NEEDLES 31G X 5 MM misc       Blood Glucose Monitoring Suppl (Accu-Chek Guide) w/Device kit       buPROPion XL (WELLBUTRIN XL) 300 MG 24 hr tablet Take 1 tablet by mouth Daily. Two Xls in the morning. 90 tablet 1    Cholecalciferol (Vitamin D3) 50 MCG (2000 UT) capsule Take 3 capsules by mouth Daily. LAST DOSE 7/21/23      dapagliflozin Propanediol 10 MG tablet Take 10 mg by mouth.      DULoxetine (CYMBALTA) 60 MG capsule Take 1 capsule by mouth Daily. 90 capsule 1    Evolocumab (Repatha SureClick) solution auto-injector SureClick injection Inject 1 mL under the skin into the appropriate area as directed Every 14 (Fourteen) Days. 6 mL 3    famotidine (PEPCID) 40 MG tablet Take 1 tablet by mouth At Night As Needed for Heartburn. 90 tablet 3    Fluticasone-Umeclidin-Vilant (TRELEGY) 100-62.5-25 MCG/ACT inhaler Inhale 1 puff Daily. 1 each 5    furosemide (LASIX) 40 MG tablet Take 1 tablet by mouth Every Other Day. 30 tablet 3    glucose blood test strip Check blood glucose twice daily 60 each 12    glucose monitor monitoring kit 1 each Daily. Patient prefers Accu Chek Meter. 1 each 0    ipratropium-albuterol (DUO-NEB) 0.5-2.5 mg/3 ml nebulizer Take 3 mL by nebulization 4 (Four) Times a Day As Needed for Wheezing or Shortness of Air. 360 mL 3    Lancets (accu-chek soft touch) lancets Check blood sugar twice daily 100 each 12    metoprolol tartrate (LOPRESSOR) 50 MG tablet Take 1.5 tablets by mouth 2 (Two) Times a  Day. 270 tablet 1    multivitamin with minerals tablet tablet Take 1 tablet by mouth Daily. LAST DOSE 7/21/23      olmesartan (BENICAR) 20 MG tablet Take 1 tablet by mouth Daily. 90 tablet 1    omeprazole (priLOSEC) 40 MG capsule Take 1 capsule by mouth Daily. 30 capsule 11    potassium chloride 10 MEQ CR tablet TAKE 1 TABLET BY MOUTH EVERY OTHER DAY WITH LASIX 45 tablet 3    promethazine (PHENERGAN) 25 MG tablet Take 1 tablet by mouth Every 6 (Six) Hours As Needed for Nausea or Vomiting. 30 tablet 0    riFAXIMin (Xifaxan) 550 MG tablet Take 1 tablet by mouth Every 12 (Twelve) Hours. 180 tablet 3    Semaglutide,0.25 or 0.5MG/DOS, (Ozempic, 0.25 or 0.5 MG/DOSE,) 2 MG/1.5ML solution pen-injector Inject  under the skin into the appropriate area as directed 1 (One) Time Per Week.      traZODone (DESYREL) 50 MG tablet Take 1 tablet by mouth Every Night. 30 tablet 2    Insulin Glargine, 2 Unit Dial, (Toujeo Max SoloStar) 300 UNIT/ML solution pen-injector injection Inject 122 Units under the skin into the appropriate area as directed Every Morning. INST PER ANESTHESIA PROTOCOL (Patient not taking: Reported on 4/24/2025)      NovoLOG FlexPen 100 UNIT/ML solution pen-injector sc pen Inject 60 Units under the skin into the appropriate area as directed 2 (Two) Times a Day. PER SLIDING SCALE  INST PER ANESTHESIA PROTOCOL (Patient not taking: Reported on 4/24/2025)       No current facility-administered medications on file prior to visit.       Allergies   Allergen Reactions    Liraglutide Nausea And Vomiting    Lisinopril Other (See Comments)     Cough    Metformin Nausea Only     GI Upset     Past Medical History:   Diagnosis Date    Abnormal Pap smear of cervix     Atrial fibrillation     ON ELIQUIS/FOLLOWS SERGO    Colindres's esophagus     Cirrhosis 2015    Colon polyp 2015    Depression     Diabetes mellitus     Emphysema lung     INHALER    Endometrial hyperplasia     D&C SCHEDULED 7/25/23    Esophageal varices     Fatty  liver 2015    Gastroparesis     TAKES XIFAXAN    GERD (gastroesophageal reflux disease) 1995    Hyperlipidemia     Hypertension     Iron deficiency anemia 05/02/2023    IRON INFUSION LAST 7/24/23    Irritable bowel syndrome     W/CONSTIPATION    BERKOWITZ (nonalcoholic steatohepatitis)     NO S/S CURRENTLY, ELEVATED ENZYMES    Obstructive sleep apnea syndrome     Pulmonary emphysema 11/22/2021    S/P exploratory laparotomy, lysis of adhesions, resection of necrotic colon without anastomosis 04/18/2022     Past Surgical History:   Procedure Laterality Date    ABDOMINAL SURGERY  4/16/2022    BLADDER SLING MODIFIED, ANTERIOR AND POSTERIOR VAGINAL REPAIR  [place]    BLADDER SURGERY  05/01/2024    stimulator  @ [facility]    BOTOX INJECTION      In abdomen    BREAST LUMPECTOMY Left     BENIGN    CARDIAC CATHETERIZATION      NO INTERVENTION    CHOLECYSTECTOMY  5/12/2015    COLON RESECTION      PERFORATED COLON, EEA    COLONOSCOPY N/A 08/14/2023    Procedure: COLONOSCOPY WITH POLYPECTOMY, TATTOO;  Surgeon: Shannan Charles MD;  Location: [facility] ENDOSCOPY;  Service: Gastroenterology;  Laterality: N/A;  COLON POLYPS  DIVERTICULOSIS  HEMORRHOIDS    COLONOSCOPY N/A 04/22/2024    Procedure: COLONOSCOPY COLD SNARE POLYPECTOMIES WITH CLIP PLACEMENT X3;  Surgeon: Shannan Charles MD;  Location: [facility] ENDOSCOPY;  Service: Gastroenterology;  Laterality: N/A;  DIVERTICULOSIS  COLON POLYPS    COLONOSCOPY N/A 3/18/2025    Procedure: COLONOSCOPY with cold snare polypectomies;  Surgeon: Shannan Charles MD;  Location: [facility] ENDOSCOPY;  Service: Gastroenterology;  Laterality: N/A;  colon polyps, diverticulosis    D & C HYSTEROSCOPY N/A 07/25/2023    Procedure: resection of endometrial polyp with myosure;  Surgeon: Ashleigh Harding DO;  Location: [facility] MAIN OR;  Service: Gynecology;  Laterality: N/A;    ENDOSCOPY N/A 06/03/2022    Procedure: ESOPHAGOGASTRODUODENOSCOPY;  Surgeon: Shannan Charles MD;   Location: [facility] ENDOSCOPY;  Service: Gastroenterology;  Laterality: N/A;  RETAINED FOOD IN STOMACH  ESOPHAGEAL VARICIES    ENDOSCOPY N/A 04/22/2024    Procedure: ESOPHAGOGASTRODUODENOSCOPY WITH BIOPSIES;  Surgeon: Shannan Charles MD;  Location: [facility] ENDOSCOPY;  Service: Gastroenterology;  Laterality: N/A;  ESOPHAGEAL BIOPSIES    EXPLORATORY LAPAROTOMY N/A 04/16/2022    Procedure: EXPLORATORY LAPAROTOMY, LYSIS OF ADHESIONS, RESECTION OF NECTROIC COLON;  Surgeon: Cande Stanley MD;  Location: [facility] MAIN OR;  Service: General;  Laterality: N/A;    EYE SURGERY  2023    GALLBLADDER SURGERY      LAPAROSCOPIC    LIVER BIOPSY  2021    TUBAL ABDOMINAL LIGATION      UPPER GASTROINTESTINAL ENDOSCOPY  6/3/2022     Social History     Socioeconomic History    Marital status:      Spouse name: fannie   Tobacco Use    Smoking status: Every Day     Current packs/day: 0.25     Average packs/day: 0.3 packs/day for 49.9 years (12.5 ttl pk-yrs)     Types: Cigarettes     Start date: 6/17/1975    Smokeless tobacco: Never    Tobacco comments:     States down to 1-2 a day 1/30/25   Vaping Use    Vaping status: Never Used   Substance and Sexual Activity    Alcohol use: Yes     Alcohol/week: 1.0 standard drink of alcohol     Types: 1 Drinks containing 0.5 oz of alcohol per week     Comment: 1 a month    Drug use: Never    Sexual activity: Yes     Partners: Male     Birth control/protection: Post-menopausal, Tubal ligation     Family History   Adopted: Yes   Problem Relation Age of Onset    No Known Problems Mother     No Known Problems Father     No Known Problems Sister     No Known Problems Brother     No Known Problems Maternal Aunt     No Known Problems Paternal Aunt     No Known Problems Maternal Uncle     No Known Problems Paternal Uncle     No Known Problems Maternal Grandfather     No Known Problems Maternal Grandmother     No Known Problems Paternal Grandfather     No Known Problems Paternal Grandmother      No Known Problems Cousin     No Known Problems Other     ADD / ADHD Son     Vic Hyperthermia Neg Hx     Alcohol abuse Neg Hx     Anxiety disorder Neg Hx     Bipolar disorder Neg Hx     Dementia Neg Hx     Depression Neg Hx     Drug abuse Neg Hx     OCD Neg Hx     Paranoid behavior Neg Hx     Schizophrenia Neg Hx     Seizures Neg Hx     Self-Injurious Behavior  Neg Hx     Suicide Attempts Neg Hx      Immunization History   Administered Date(s) Administered    COVID-19 (PFIZER) Purple Cap Monovalent 02/06/2021, 03/06/2021, 12/04/2021    Flu Vaccine Intradermal Quad 18-64YR 09/12/2021, 09/12/2021    Fluzone  >6mos 01/16/2025    Fluzone (or Fluarix & Flulaval for VFC) >6mos 10/29/2019, 09/27/2020, 10/03/2022, 10/05/2023    Influenza Split Preservative Free ID 09/12/2021    Influenza, Unspecified 11/12/2018, 09/27/2020    Pneumococcal Conjugate 13-Valent (PCV13) 01/09/2017    Pneumococcal Polysaccharide (PPSV23) 10/12/2020    flucelvax quad pfs =>4 YRS 09/27/2020       Tobacco Use: High Risk (4/24/2025)    Patient History     Smoking Tobacco Use: Every Day     Smokeless Tobacco Use: Never     Passive Exposure: Not on file       Objective     Physical Exam  Vitals and nursing note reviewed.   Constitutional:       Appearance: Normal appearance.   HENT:      Mouth/Throat:      Mouth: Mucous membranes are moist.   Eyes:      Extraocular Movements: Extraocular movements intact.   Cardiovascular:      Rate and Rhythm: Normal rate.   Pulmonary:      Effort: Pulmonary effort is normal. No respiratory distress.   Musculoskeletal:         General: Normal range of motion.      Cervical back: Normal range of motion and neck supple.   Skin:     General: Skin is warm and dry.   Neurological:      General: No focal deficit present.      Mental Status: She is alert and oriented to person, place, and time.   Psychiatric:         Mood and Affect: Mood normal.         Behavior: Behavior normal.         Thought Content: Thought content  "normal.         Judgment: Judgment normal.         Vitals:    04/24/25 1142 04/24/25 1146   BP: 157/80 161/81   Pulse: 103    Resp: 20    Temp: 97.2 °F (36.2 °C)    TempSrc: Temporal    SpO2: 96%    Weight: 105 kg (232 lb)    Height: 165.1 cm (65\")    PainSc: 0-No pain        Wt Readings from Last 3 Encounters:   04/24/25 105 kg (232 lb)   03/18/25 105 kg (231 lb 14.8 oz)   01/30/25 108 kg (238 lb)       ECOG score: 0         ECOG: (0) Fully Active - Able to Carry On All Pre-disease Performance Without Restriction  Fall Risk Assessment was completed, and patient is at low risk for falls.  PHQ-9 Total Score:         The patient is  experiencing fatigue. Fatigue score: 1    PT/OT Functional Screening: PT fx screen : No needs identified  Speech Functional Screening: Speech fx screen : No needs identified  Rehab to be ordered: Rehab to be ordered : No needs identified        Result Review :  The following data was reviewed by: CHU Pedraza on 04/24/2025:  Lab Results   Component Value Date    HGB 13.8 04/22/2025    HCT 38.6 04/22/2025    MCV 88.3 04/22/2025     04/22/2025    WBC 7.06 04/22/2025    NEUTROABS 3.91 04/22/2025    LYMPHSABS 2.53 04/22/2025    MONOSABS 0.45 04/22/2025    EOSABS 0.09 04/22/2025    BASOSABS 0.06 04/22/2025     Lab Results   Component Value Date    GLUCOSE 257 (H) 01/24/2025    BUN 10 01/24/2025    CREATININE 0.86 01/24/2025     01/24/2025    K 4.2 01/24/2025     01/24/2025    CO2 22.0 01/24/2025    CALCIUM 10.0 01/24/2025    PROTEINTOT 8.0 03/26/2025    ALBUMIN 3.3 03/26/2025    BILITOT 0.3 01/20/2025    ALKPHOS 160 (H) 01/20/2025    AST 78 (H) 01/20/2025    ALT 74 (H) 01/20/2025     Lab Results   Component Value Date    Ferritin 160.80 (H) 04/22/2025    Folate 11.60 04/22/2025     Lab Results   Component Value Date    IRON 44 04/22/2025    LABIRON 13 (L) 04/22/2025    TRANSFERRIN 234 04/22/2025    TIBC 349 04/22/2025     Lab Results   Component Value Date    " FERRITIN 160.80 (H) 04/22/2025    UAMLGSFY40 1,953 (H) 04/22/2025    FOLATE 11.60 04/22/2025     Lab Results   Component Value Date    AFP 2.15 01/20/2023            Assessment and Plan:  Diagnoses and all orders for this visit:    1. Iron deficiency anemia, unspecified iron deficiency anemia type (Primary)  -     CBC & Differential; Future  -     Iron Profile; Future  -     Ferritin; Future    2. Cirrhosis of liver without ascites, unspecified hepatic cirrhosis type  -     CBC & Differential; Future  -     Iron Profile; Future  -     Ferritin; Future    3. Esophageal varices without bleeding, unspecified esophageal varices type  -     CBC & Differential; Future  -     Iron Profile; Future  -     Ferritin; Future        Assessment & Plan  1. Iron Deficiency Anemia.  Iron studies from 04/22/2025 show stable levels, with a mildly low iron saturation at 13%. Lab work is trending downward, but still acceptable. Hemoglobin has improved from 11.9 in January 2025 to 13.8. Ferrlecit infusions were administered in July 2024 and iron sucrose infusions in March 2024. Given the stability of iron levels, rechecking levels in 3 months is recommended. If necessary, iron infusions will be considered after the next lab work. Unable to tolerate oral iron.     2. Liver Cirrhosis.  Following up with GI for liver cirrhosis and esophageal varices. No current bleeding reported. Scopes done in March 2025 revealed hemorrhoids, diverticula, and polyps. A repeat scope is planned in 3 years.    3. Elevated Blood Glucose.  Glucose levels were slightly elevated in January 2025. The patient is diabetic.She follows with her PCP.     4. Elevated B12 Levels.  B12 levels are elevated, likely due to dietary intake. The patient is not taking B12 supplements or injections. No action is required as elevated B12 from dietary sources is not harmful.          I spent 30 minutes caring for Anika on this date of service. This time includes time spent by me  in the following activities:preparing for the visit, reviewing tests, obtaining and/or reviewing a separately obtained history, performing a medically appropriate examination and/or evaluation , counseling and educating the patient/family/caregiver, ordering medications, tests, or procedures, referring and communicating with other health care professionals , documenting information in the medical record, and independently interpreting results and communicating that information with the patient/family/caregiver    Patient Follow Up: 3 months with NP.     Patient was given instructions and counseling regarding her condition or for health maintenance advice. Please see specific information pulled into the AVS if appropriate.     CHU Pedraza    4/24/2025    .tob    Patient or patient representative verbalized consent for the use of Ambient Listening during the visit with  CHU Pedraza for chart documentation. 4/24/2025  12:39 EDT

## 2025-05-01 NOTE — PROGRESS NOTES
Chief Complaint  Chief Complaint   Patient presents with    Follow-up     3 month        Subjective          Anika Casillas presents to [facility] for 3mth follow up.    History of Present Illness    HTN: Patient presents for hypertension management. Patient is currently taking olmesartan 20 Mg daily and amlodipine 10 Mg daily and is consistent with medication. Patient denies chest pain, shortness of air and edema.  Blood pressure is a bit elevated today however patient states that she has had no medication today.     Afib: on Eliquis     Patient c/o dizziness with lying down especially on right side. Goes away after about 1-2 minutes. Also, dizziness with extension of neck.  Patient states that she has had maneuver for vertigo without improvement of symptoms. Carotid u/s 25 nonstenotic plaque bilaterally.    Depression: states she has no thoughts of self harm but would not care if she . Patient has appointment with psychiatry scheduled; will attempt to get therapy scheduled prior to psych appt. Patient is overwhelmed by medical/health issues. Staying in bed a lot.     Patient sees Rad Stoll for diabetic management. Patient states current yeast infection with dapagliflozin.     She has been approved for disability.     CXR 25: no infiltrate/pneumonia    Weight is down 6lbs since last office visit.    Medical History: has a past medical history of Abnormal Pap smear of cervix, Atrial fibrillation, Colindres's esophagus, Cirrhosis (), Colon polyp (), Depression, Diabetes mellitus, Emphysema lung, Endometrial hyperplasia, Esophageal varices, Fatty liver (), Gastroparesis, GERD (gastroesophageal reflux disease) (), Hyperlipidemia, Hypertension, Iron deficiency anemia (2023), Irritable bowel syndrome, BERKOWITZ (nonalcoholic steatohepatitis), Obstructive sleep apnea syndrome, Pulmonary emphysema (2021), and S/P exploratory laparotomy, lysis of  "adhesions, resection of necrotic colon without anastomosis (04/18/2022).   Surgical History: has a past surgical history that includes Gallbladder surgery; Breast lumpectomy (Left); Cardiac catheterization; bladder sling modified, anterior and posterior vaginal repair (2018); Tubal ligation; Exploratory Laparotomy (N/A, 04/16/2022); Esophagogastroduodenoscopy (N/A, 06/03/2022); Colectomy; d & c hysteroscopy (N/A, 07/25/2023); Colonoscopy (N/A, 08/14/2023); Abdominal surgery (4/16/2022); Cholecystectomy (5/12/2015); Upper gastrointestinal endoscopy (6/3/2022); Liver biopsy (2021); botox injection; Esophagogastroduodenoscopy (N/A, 04/22/2024); Colonoscopy (N/A, 04/22/2024); Bladder surgery (05/01/2024); Eye surgery (2023); and Colonoscopy (N/A, 3/18/2025).   Family History: family history includes ADD / ADHD in her son; No Known Problems in her brother, cousin, father, maternal aunt, maternal grandfather, maternal grandmother, maternal uncle, mother, paternal aunt, paternal grandfather, paternal grandmother, paternal uncle, sister, and another family member. She was adopted.   Social History: reports that she has been smoking cigarettes. She started smoking about 49 years ago. She has a 12.5 pack-year smoking history. She has never used smokeless tobacco. She reports current alcohol use of about 1.0 standard drink of alcohol per week. She reports that she does not use drugs.    Allergies: Liraglutide, Lisinopril, and Metformin    Health Maintenance Due   Topic Date Due    URINE MICROALBUMIN-CREATININE RATIO (uACR)  Never done    LIPID PANEL  02/13/2025       Objective     Vitals:    05/02/25 1115 05/02/25 1149   BP: 157/82 138/72   Pulse: 83    Resp: 16    SpO2: 95%    Weight: 103 kg (226 lb 9.6 oz)    Height: 165.1 cm (65\")      Body mass index is 37.71 kg/m².     Wt Readings from Last 3 Encounters:   05/02/25 103 kg (226 lb 9.6 oz)   04/24/25 105 kg (232 lb)   03/18/25 105 kg (231 lb 14.8 oz)     BP Readings from " Last 3 Encounters:   05/02/25 138/72   04/24/25 161/81   03/18/25 120/70       Patient Care Team:  Elena Henderson PA as PCP - General (Family Medicine)  KATHARINE Webb MD as Consulting Physician (Cardiology)  Cande Stanley MD as Consulting Physician (General Surgery)  Migdalia Sanon APRN as Nurse Practitioner (Nurse Practitioner)    Physical Exam  Vitals and nursing note reviewed.   Constitutional:       Appearance: Normal appearance. She is obese.   HENT:      Head: Normocephalic and atraumatic.   Neck:      Vascular: No carotid bruit.      Comments: Thyroid : gland size normal, nontender, no nodules or masses present on palpation   Cardiovascular:      Rate and Rhythm: Normal rate and regular rhythm.      Pulses: Normal pulses.      Heart sounds: Normal heart sounds.   Pulmonary:      Effort: Pulmonary effort is normal.      Breath sounds: Normal breath sounds.   Musculoskeletal:      Cervical back: Neck supple. No tenderness.      Right lower leg: No edema.      Left lower leg: No edema.   Lymphadenopathy:      Cervical: No cervical adenopathy.   Neurological:      Mental Status: She is alert.   Psychiatric:         Mood and Affect: Mood normal.         Behavior: Behavior normal.           Result Review :   Folate (04/22/2025 11:37)  Vitamin B12 (04/22/2025 11:37)  CBC & Differential (04/22/2025 11:37)  Ferritin (04/22/2025 11:37)  Iron Profile (04/22/2025 11:37)    Duplex Carotid Ultrasound CAR (02/27/2025 09:28)  XR Chest PA & Lateral (01/16/2025 13:26)           Assessment and Plan      Diagnoses and all orders for this visit:    1. Type 2 diabetes mellitus with hyperglycemia, with long-term current use of insulin (Primary)  Comments:  Continue [facility] End[facility]; check urine.  Orders:  -     Microalbumin / Creatinine Urine Ratio - Urine, Clean Catch; Future    2. Mixed hyperlipidemia  Comments:  Continue with cardiology; will check labs.  Orders:  -     Comprehensive Metabolic Panel;  Future  -     Lipid Panel; Future    3. Vitamin D deficiency  Comments:  Check stability with labs.  Orders:  -     Vitamin D,25-Hydroxy; Future    4. Acute vaginitis  Comments:  Most likely side effect of medication; treat with diflucan; ensure adequate water intake.  Orders:  -     fluconazole (Diflucan) 150 MG tablet; Take 1 tablet by mouth 1 (One) Time for 1 dose.  Dispense: 1 tablet; Refill: 0    5. Essential hypertension  Comments:  Stable on current medication; check labs; continue.  Orders:  -     TSH+Free T4; Future    6. Obesity (BMI 35.0-39.9 without comorbidity)    7. Depression, unspecified depression type  Comments:  Patient has upcoming appt with psych and denies suicidal ideation; will discuss with manager to get in with [facility] ASAP.            Follow Up     Return in about 6 months (around 11/2/2025).    Patient was given instructions and counseling regarding her condition or for health maintenance advice. Please see specific information pulled into the AVS if appropriate.

## 2025-05-02 ENCOUNTER — OFFICE VISIT (OUTPATIENT)
Dept: FAMILY MEDICINE CLINIC | Facility: CLINIC | Age: 65
End: 2025-05-02
Payer: COMMERCIAL

## 2025-05-02 VITALS
BODY MASS INDEX: 37.75 KG/M2 | HEART RATE: 83 BPM | RESPIRATION RATE: 16 BRPM | SYSTOLIC BLOOD PRESSURE: 138 MMHG | DIASTOLIC BLOOD PRESSURE: 72 MMHG | OXYGEN SATURATION: 95 % | WEIGHT: 226.6 LBS | HEIGHT: 65 IN

## 2025-05-02 DIAGNOSIS — N76.0 ACUTE VAGINITIS: ICD-10-CM

## 2025-05-02 DIAGNOSIS — E55.9 VITAMIN D DEFICIENCY: ICD-10-CM

## 2025-05-02 DIAGNOSIS — F32.A DEPRESSION, UNSPECIFIED DEPRESSION TYPE: Chronic | ICD-10-CM

## 2025-05-02 DIAGNOSIS — E11.65 TYPE 2 DIABETES MELLITUS WITH HYPERGLYCEMIA, WITH LONG-TERM CURRENT USE OF INSULIN: Primary | Chronic | ICD-10-CM

## 2025-05-02 DIAGNOSIS — Z79.4 TYPE 2 DIABETES MELLITUS WITH HYPERGLYCEMIA, WITH LONG-TERM CURRENT USE OF INSULIN: Primary | Chronic | ICD-10-CM

## 2025-05-02 DIAGNOSIS — E78.2 MIXED HYPERLIPIDEMIA: Chronic | ICD-10-CM

## 2025-05-02 DIAGNOSIS — I10 ESSENTIAL HYPERTENSION: Chronic | ICD-10-CM

## 2025-05-02 DIAGNOSIS — E66.9 OBESITY (BMI 35.0-39.9 WITHOUT COMORBIDITY): ICD-10-CM

## 2025-05-02 PROCEDURE — 99214 OFFICE O/P EST MOD 30 MIN: CPT | Performed by: PHYSICIAN ASSISTANT

## 2025-05-02 RX ORDER — INSULIN LISPRO 100 [IU]/ML
60 INJECTION, SOLUTION INTRAVENOUS; SUBCUTANEOUS 3 TIMES DAILY
COMMUNITY
Start: 2025-04-23 | End: 2025-07-22

## 2025-05-02 RX ORDER — METOPROLOL TARTRATE 75 MG/1
50 TABLET ORAL
COMMUNITY
End: 2025-05-02 | Stop reason: SDUPTHER

## 2025-05-02 RX ORDER — HYDROCHLOROTHIAZIDE 12.5 MG/1
CAPSULE ORAL
COMMUNITY
Start: 2025-04-16

## 2025-05-02 RX ORDER — FLUCONAZOLE 150 MG/1
150 TABLET ORAL ONCE
Qty: 1 TABLET | Refills: 0 | Status: SHIPPED | OUTPATIENT
Start: 2025-05-02 | End: 2025-05-02

## 2025-05-05 ENCOUNTER — TELEPHONE (OUTPATIENT)
Dept: FAMILY MEDICINE CLINIC | Facility: CLINIC | Age: 65
End: 2025-05-05
Payer: COMMERCIAL

## 2025-05-05 NOTE — TELEPHONE ENCOUNTER
Patient informed that she will have to wait until she establishes with Dr. Calvert to see one of their therapists. Patient declines referral outside of [facility] at this time and states she will wait until she sees Dr. Calvert. Patient informed to call us back if she changes her mind.

## 2025-05-09 ENCOUNTER — PATIENT MESSAGE (OUTPATIENT)
Dept: FAMILY MEDICINE CLINIC | Facility: CLINIC | Age: 65
End: 2025-05-09
Payer: COMMERCIAL

## 2025-05-09 DIAGNOSIS — F51.05 INSOMNIA DUE TO MENTAL CONDITION: ICD-10-CM

## 2025-05-09 DIAGNOSIS — F41.1 GENERALIZED ANXIETY DISORDER: ICD-10-CM

## 2025-05-09 DIAGNOSIS — F33.1 MAJOR DEPRESSIVE DISORDER, RECURRENT EPISODE, MODERATE: ICD-10-CM

## 2025-05-09 RX ORDER — BUPROPION HYDROCHLORIDE 300 MG/1
300 TABLET ORAL DAILY
Qty: 60 TABLET | Refills: 0 | Status: SHIPPED | OUTPATIENT
Start: 2025-05-09

## 2025-05-12 NOTE — TELEPHONE ENCOUNTER
Patient informed that Elena sent in medication. Advised patient to keep appt with Dr. Calvert 06/20/2025

## 2025-06-19 ENCOUNTER — LAB (OUTPATIENT)
Facility: HOSPITAL | Age: 65
End: 2025-06-19
Payer: COMMERCIAL

## 2025-06-19 DIAGNOSIS — E11.65 TYPE 2 DIABETES MELLITUS WITH HYPERGLYCEMIA, WITH LONG-TERM CURRENT USE OF INSULIN: Chronic | ICD-10-CM

## 2025-06-19 DIAGNOSIS — I10 ESSENTIAL HYPERTENSION: Chronic | ICD-10-CM

## 2025-06-19 DIAGNOSIS — Z79.4 TYPE 2 DIABETES MELLITUS WITH HYPERGLYCEMIA, WITH LONG-TERM CURRENT USE OF INSULIN: Chronic | ICD-10-CM

## 2025-06-19 DIAGNOSIS — E78.2 MIXED HYPERLIPIDEMIA: Chronic | ICD-10-CM

## 2025-06-19 DIAGNOSIS — E55.9 VITAMIN D DEFICIENCY: ICD-10-CM

## 2025-06-19 LAB
25(OH)D3 SERPL-MCNC: 35.8 NG/ML (ref 30–100)
ALBUMIN SERPL-MCNC: 3.6 G/DL (ref 3.5–5.2)
ALBUMIN UR-MCNC: 4.9 MG/DL
ALBUMIN/GLOB SERPL: 0.8 G/DL
ALP SERPL-CCNC: 135 U/L (ref 39–117)
ALT SERPL W P-5'-P-CCNC: 46 U/L (ref 1–33)
ANION GAP SERPL CALCULATED.3IONS-SCNC: 12 MMOL/L (ref 5–15)
AST SERPL-CCNC: 60 U/L (ref 1–32)
BILIRUB SERPL-MCNC: 0.3 MG/DL (ref 0–1.2)
BUN SERPL-MCNC: 9 MG/DL (ref 8–23)
BUN/CREAT SERPL: 10.8 (ref 7–25)
CALCIUM SPEC-SCNC: 9.8 MG/DL (ref 8.6–10.5)
CHLORIDE SERPL-SCNC: 106 MMOL/L (ref 98–107)
CHOLEST SERPL-MCNC: 121 MG/DL (ref 0–200)
CO2 SERPL-SCNC: 21 MMOL/L (ref 22–29)
CREAT SERPL-MCNC: 0.83 MG/DL (ref 0.57–1)
CREAT UR-MCNC: 142.2 MG/DL
EGFRCR SERPLBLD CKD-EPI 2021: 79.8 ML/MIN/1.73
GLOBULIN UR ELPH-MCNC: 4.7 GM/DL
GLUCOSE SERPL-MCNC: 275 MG/DL (ref 65–99)
HDLC SERPL-MCNC: 38 MG/DL (ref 40–60)
LDLC SERPL CALC-MCNC: 52 MG/DL (ref 0–100)
LDLC/HDLC SERPL: 1.21 {RATIO}
MICROALBUMIN/CREAT UR: 34.5 MG/G (ref 0–29)
POTASSIUM SERPL-SCNC: 3.7 MMOL/L (ref 3.5–5.2)
PROT SERPL-MCNC: 8.3 G/DL (ref 6–8.5)
SODIUM SERPL-SCNC: 139 MMOL/L (ref 136–145)
T4 FREE SERPL-MCNC: 0.97 NG/DL (ref 0.92–1.68)
TRIGL SERPL-MCNC: 185 MG/DL (ref 0–150)
TSH SERPL DL<=0.05 MIU/L-ACNC: 3.74 UIU/ML (ref 0.27–4.2)
VLDLC SERPL-MCNC: 31 MG/DL (ref 5–40)

## 2025-06-19 PROCEDURE — 82570 ASSAY OF URINE CREATININE: CPT

## 2025-06-19 PROCEDURE — 36415 COLL VENOUS BLD VENIPUNCTURE: CPT

## 2025-06-19 PROCEDURE — 80053 COMPREHEN METABOLIC PANEL: CPT

## 2025-06-19 PROCEDURE — 84439 ASSAY OF FREE THYROXINE: CPT

## 2025-06-19 PROCEDURE — 82043 UR ALBUMIN QUANTITATIVE: CPT

## 2025-06-19 PROCEDURE — 84443 ASSAY THYROID STIM HORMONE: CPT

## 2025-06-19 PROCEDURE — 82306 VITAMIN D 25 HYDROXY: CPT

## 2025-06-19 PROCEDURE — 80061 LIPID PANEL: CPT

## 2025-06-20 ENCOUNTER — OFFICE VISIT (OUTPATIENT)
Dept: PSYCHIATRY | Facility: CLINIC | Age: 65
End: 2025-06-20
Payer: COMMERCIAL

## 2025-06-20 VITALS
SYSTOLIC BLOOD PRESSURE: 121 MMHG | BODY MASS INDEX: 37.15 KG/M2 | HEIGHT: 65 IN | HEART RATE: 82 BPM | DIASTOLIC BLOOD PRESSURE: 66 MMHG | WEIGHT: 223 LBS

## 2025-06-20 DIAGNOSIS — F51.05 INSOMNIA DUE TO MENTAL CONDITION: ICD-10-CM

## 2025-06-20 DIAGNOSIS — F41.1 GENERALIZED ANXIETY DISORDER: ICD-10-CM

## 2025-06-20 DIAGNOSIS — F33.1 MAJOR DEPRESSIVE DISORDER, RECURRENT EPISODE, MODERATE: Primary | ICD-10-CM

## 2025-06-20 RX ORDER — BUSPIRONE HYDROCHLORIDE 10 MG/1
10 TABLET ORAL 3 TIMES DAILY
Qty: 90 TABLET | Refills: 2 | Status: SHIPPED | OUTPATIENT
Start: 2025-06-20

## 2025-06-20 RX ORDER — DULOXETIN HYDROCHLORIDE 60 MG/1
60 CAPSULE, DELAYED RELEASE ORAL DAILY
Qty: 90 CAPSULE | Refills: 3 | Status: SHIPPED | OUTPATIENT
Start: 2025-06-20

## 2025-06-20 RX ORDER — FLUCONAZOLE 150 MG/1
TABLET ORAL
COMMUNITY
Start: 2025-05-02 | End: 2025-06-20

## 2025-06-20 NOTE — PROGRESS NOTES
"Subjective   Anika Casillas is a 64 y.o. female who presents today for initial evaluation     Referring Provider:  No referring provider defined for this encounter.    Chief Complaint:  depression    History of Present Illness:     Chart Review By Dates:  06/20/2025: not seen in about a year. S/p colonoscopy, abnl labs  07/30/2024: 6/26 seen in ED for vertigo, urogyn x2, infusion x4, fam med, endocrine, onc. Neg CTOH. High ferritin, abnl iron profile, hgb 11.8 (anemia).    VISITS/APPOINTMENTS (BELOW):    \"Anika\"  Nonalcoholic cirrhosis  Chronic kidney disease  Type 2 diabetes    06/20/2025:   In person interview:  \"I'm not working anymore, I'm on disability (medical0.\"  I don't like it; I haven't been doing things  Saw Elena last month, I was not doing good  Stopped bupropion 2 mos ago; I had been on it for 3 years to help with smoking  Feeling more depressed   may have pancreatic cancer  Daughter moving in with her next week  Helps to have her  to take care of  Lost her insurance and wasn't able to see me for the last year  \"I would never hurt myself\"  I always feel like I'm a burden  MDD, seasonal: depressed mood  KERRY: worrying uncontrollably, on edge, irritable  Panic attacks: none  Energy: down  Concentration: down  Insomnia: initial  Eating/Weight: 223, 237, 228 lbs  Refills: y  Substances: def  Therapy: n  Medication compliant: y  SE: n  No SI HI AVH.      07/30/2024:   In person interview:  \"I'm good.\"  The cymbalta helped quite a bit.  Better but not where I need to be  Not sleeping, 3-4 hours a night  I want to cry but I just can't  Vertigo is gone  MDD: depressed mood  KERRY: worrying uncontrollably, on edge, irritable  Panic attacks: none  Energy: down  Concentration: down  Insomnia: initial  Eating/Weight: 237, 228 lbs  Refills: y  Substances: def  Therapy: n  Medication compliant: y  SE: n  No SI HI AVH.    ...    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   Per  note by neurology, this abnormality is benign and incidental.  Labs: 2024: CMP shows elevated alk phos 133, mildly elevated AST 95, ALT 87, reassuring thyroid studies, B12, folate, vitamin D  Initial Chart Review Notes: Sent to us in February by primary care for memory loss, fatigue, poor concentration.  Neuropsychological evaluation diagnosed her with mild cognitive impairment, monitor for progressive dementia, vascular AD, or mixed type, metabolic encephalopathy.  Recently given corrective action at work.  Concerned with her job performance.  She is on CPAP.  Seen this past year by sleep medicine.  She wants to take time off.  Depression.    2024: In person.  Interview:  His/Her Story: \"I made a mistake... 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  \"This really bothers me.\"  It's been slowly progressing. I have to think these days.  Lexapro for a few months.  I've been on antidepressants all my life.  Son dx'd with ADHD at 6 yo (Imtiaz), FOB is not , unsure if FOB has dx of ADHD  I didn't know my parents  Insomnia: sometimes won't sleep at all, other times sleeps too much  Depression/Mood:  Depressed mood, anhedonia, hopelessness or guilt, poor energy, poor concentration, insomnia.  Seasonal pattern:  Severity: Moderate  Duration: all of life  Anxiety:  Uncontrolled worrying, muscle tension, fatigue, " poor concentration, feeling on edge or restless, irritability, insomnia.  Severity: Moderate  Duration: all of life  Panic attacks: one in her life 40's  ADHD:   Elementary school:   Grades? good  Special classes or failures? denies  Got in trouble? no  Referral for ADHD testing? no  Fhx: Son  Psych ROS: Positive for depression, anxiety.  Negative for psychosis and shamika.  PTSD: def  No SI HI AVH.  Medication compliant: y    Access to Firearms: denies    PHQ-9 Depression Screening  PHQ-9 Total Score:      Little interest or pleasure in doing things?     Feeling down, depressed, or hopeless?     Trouble falling or staying asleep, or sleeping too much?     Feeling tired or having little energy?     Poor appetite or overeating?     Feeling bad about yourself - or that you are a failure or have let yourself or your family down?     Trouble concentrating on things, such as reading the newspaper or watching television?     Moving or speaking so slowly that other people could have noticed? Or the opposite - being so fidgety or restless that you have been moving around a lot more than usual?     Thoughts that you would be better off dead, or of hurting yourself in some way?     PHQ-9 Total Score       KERRY-7  Feeling nervous, anxious or on edge: Several days  Not being able to stop or control worrying: Several days  Worrying too much about different things: Several days  Trouble Relaxing: Several days  Being so restless that it is hard to sit still: Not at all  Feeling afraid as if something awful might happen: Not at all  Becoming easily annoyed or irritable: Not at all  KERRY 7 Total Score: 4  If you checked any problems, how difficult have these problems made it for you to do your work, take care of things at home, or get along with other people: Somewhat difficult    Past Surgical History:  Past Surgical History:   Procedure Laterality Date    ABDOMINAL SURGERY  4/16/2022    BLADDER SLING MODIFIED, ANTERIOR AND POSTERIOR  [place]AG[place]  2018    [place]    BLADDER SURGERY  05/01/2024    stimulator  @ [facility]    BOTOX INJECTION      In abdomen    BREAST LUMPECTOMY Left     BENIGN    CARDIAC CATHETERIZATION      NO INTERVENTION    CHOLECYSTECTOMY  5/12/2015    COLON RESECTION      PERFORATED COLON, EEA    COLONOSCOPY N/A 08/14/2023    Procedure: COLONOSCOPY WITH POLYPECTOMY, TATTOO;  Surgeon: Shannan Charles MD;  Location: [facility] ENDOSCOPY;  Service: Gastroenterology;  Laterality: N/A;  COLON POLYPS  DIVERTICULOSIS  HEMORRHOIDS    COLONOSCOPY N/A 04/22/2024    Procedure: COLONOSCOPY COLD SNARE POLYPECTOMIES WITH CLIP PLACEMENT X3;  Surgeon: Shannan Charles MD;  Location: [facility] ENDOSCOPY;  Service: Gastroenterology;  Laterality: N/A;  DIVERTICULOSIS  COLON POLYPS    COLONOSCOPY N/A 3/18/2025    Procedure: COLONOSCOPY with cold snare polypectomies;  Surgeon: Shannan Charles MD;  Location: [facility] ENDOSCOPY;  Service: Gastroenterology;  Laterality: N/A;  colon polyps, diverticulosis    D & C HYSTEROSCOPY N/A 07/25/2023    Procedure: resection of endometrial polyp with myosure;  Surgeon: Ashleigh Harding DO;  Location: [facility] MAIN OR;  Service: Gynecology;  Laterality: N/A;    ENDOSCOPY N/A 06/03/2022    Procedure: ESOPHAGOGASTRODUODENOSCOPY;  Surgeon: Shannan Charles MD;  Location: [facility] ENDOSCOPY;  Service: Gastroenterology;  Laterality: N/A;  RETAINED FOOD IN STOMACH  ESOPHAGEAL VARICIES    ENDOSCOPY N/A 04/22/2024    Procedure: ESOPHAGOGASTRODUODENOSCOPY WITH BIOPSIES;  Surgeon: Shannan Charles MD;  Location: [facility] ENDOSCOPY;  Service: Gastroenterology;  Laterality: N/A;  ESOPHAGEAL BIOPSIES    EXPLORATORY LAPAROTOMY N/A 04/16/2022    Procedure: EXPLORATORY LAPAROTOMY, LYSIS OF ADHESIONS, RESECTION OF NECTROIC COLON;  Surgeon: Cande Stanley MD;  Location: [facility] MAIN OR;  Service: General;  Laterality: N/A;    EYE SURGERY  2023    GALLBLADDER SURGERY      LAPAROSCOPIC    LIVER BIOPSY   2021    TUBAL ABDOMINAL LIGATION      UPPER GASTROINTESTINAL ENDOSCOPY  6/3/2022       Problem List:  Patient Active Problem List   Diagnosis    Diabetic gastroparesis    Diverticulosis of colon    Elevated transaminase level    Essential hypertension    Gastroesophageal reflux disease without esophagitis    Hx of colonic polyp    Major depressive disorder with single episode, in full remission    Mixed hyperlipidemia    BERKOWITZ (nonalcoholic steatohepatitis)    Class 2 severe obesity due to excess calories with serious comorbidity and body mass index (BMI) of 36.0 to 36.9 in adult    OAB (overactive bladder)    Paroxysmal atrial fibrillation    Tobacco abuse    Type 2 diabetes mellitus with hyperglycemia, with long-term current use of insulin    Pulmonary emphysema    Colindres's esophagus without dysplasia    Secondary esophageal varices without bleeding    Encounter for long-term (current) use of insulin    Hyperinsulinism    Uncontrolled type 2 diabetes mellitus with neurologic complication    Class 2 obesity    Chronic gastritis without bleeding, unspecified gastritis type    Pneumoperitoneum of unknown etiology    S/P exploratory laparotomy, lysis of adhesions, resection of necrotic colon without anastomosis    Memory loss    RUKHSANA on CPAP    Abnormal finding on MRI of brain    Iron deficiency anemia    Non-alcoholic cirrhosis    Depression    Encephalopathy, portal systemic    Esophageal varices in cirrhosis    Alkaline phosphatase raised    Iron deficiency    Other specified postprocedural states    Endometrial hyperplasia    Iron malabsorption    Endometrial polyp    Localized edema    Simple renal cyst    Esophageal varices without bleeding    Chronic kidney disease, stage 2 (mild)    Obesity (BMI 35.0-39.9 without comorbidity)    Cirrhosis of liver without ascites    Hepatic encephalopathy    History of colon polyps       Allergy:   Allergies   Allergen Reactions    Liraglutide Nausea And Vomiting    Lisinopril  Other (See Comments)     Cough    Metformin Nausea Only     GI Upset        Discontinued Medications:  Medications Discontinued During This Encounter   Medication Reason    fluconazole (DIFLUCAN) 150 MG tablet *Therapy completed    Insulin Degludec (TRESIBA FLEXTOUCH) 200 UNIT/ML solution pen-injector pen injection *Therapy completed    Insulin Glargine, 2 Unit Dial, (Toujeo Max SoloStar) 300 UNIT/ML solution pen-injector injection *Therapy completed    NovoLOG FlexPen 100 UNIT/ML solution pen-injector sc pen *Therapy completed    buPROPion XL (WELLBUTRIN XL) 300 MG 24 hr tablet Not Efficacious    DULoxetine (CYMBALTA) 60 MG capsule Reorder           Current Medications:   Current Outpatient Medications   Medication Sig Dispense Refill    albuterol sulfate  (90 Base) MCG/ACT inhaler Inhale 2 puffs Every 4 (Four) Hours As Needed for Shortness of Air or Wheezing. 1 g 5    amLODIPine (NORVASC) 10 MG tablet Take 1 tablet by mouth Daily. 90 tablet 1    apixaban (Eliquis) 5 MG tablet tablet Take 1 tablet by mouth 2 (Two) Times a Day. 28 tablet 0    B-D UF III MINI PEN NEEDLES 31G X 5 MM misc       Blood Glucose Monitoring Suppl (Accu-Chek Guide) w/Device kit       Cholecalciferol (Vitamin D3) 50 MCG (2000 UT) capsule Take 3 capsules by mouth Daily. LAST DOSE 7/21/23      Continuous Glucose Sensor (FreeStyle Sharon 3 Plus Sensor) USE AS DIRECTED EVER 15 DAYS      dapagliflozin Propanediol 10 MG tablet Take 10 mg by mouth.      DULoxetine (CYMBALTA) 60 MG capsule Take 1 capsule by mouth Daily. 90 capsule 3    Evolocumab (Repatha SureClick) solution auto-injector SureClick injection Inject 1 mL under the skin into the appropriate area as directed Every 14 (Fourteen) Days. 6 mL 3    famotidine (PEPCID) 40 MG tablet Take 1 tablet by mouth At Night As Needed for Heartburn. 90 tablet 3    Fluticasone-Umeclidin-Vilant (TRELEGY) 100-62.5-25 MCG/ACT inhaler Inhale 1 puff Daily. 1 each 5    furosemide (LASIX) 40 MG tablet Take  1 tablet by mouth Every Other Day. 30 tablet 3    glucose blood test strip Check blood glucose twice daily 60 each 12    glucose monitor monitoring kit 1 each Daily. Patient prefers Accu Chek Meter. 1 each 0    Insulin Lispro, 1 Unit Dial, (HUMALOG) 100 UNIT/ML solution pen-injector Inject 60 Units under the skin into the appropriate area as directed 3 (Three) Times a Day.      ipratropium-albuterol (DUO-NEB) 0.5-2.5 mg/3 ml nebulizer Take 3 mL by nebulization 4 (Four) Times a Day As Needed for Wheezing or Shortness of Air. 360 mL 3    Lancets (accu-chek soft touch) lancets Check blood sugar twice daily 100 each 12    metoprolol tartrate (LOPRESSOR) 50 MG tablet Take 1.5 tablets by mouth 2 (Two) Times a Day. 270 tablet 1    multivitamin with minerals tablet tablet Take 1 tablet by mouth Daily. LAST DOSE 7/21/23      olmesartan (BENICAR) 20 MG tablet Take 1 tablet by mouth Daily. 90 tablet 1    omeprazole (priLOSEC) 40 MG capsule Take 1 capsule by mouth Daily. 30 capsule 11    potassium chloride 10 MEQ CR tablet TAKE 1 TABLET BY MOUTH EVERY OTHER DAY WITH LASIX 45 tablet 3    promethazine (PHENERGAN) 25 MG tablet Take 1 tablet by mouth Every 6 (Six) Hours As Needed for Nausea or Vomiting. 30 tablet 0    riFAXIMin (Xifaxan) 550 MG tablet Take 1 tablet by mouth Every 12 (Twelve) Hours. 180 tablet 3    Semaglutide,0.25 or 0.5MG/DOS, (Ozempic, 0.25 or 0.5 MG/DOSE,) 2 MG/1.5ML solution pen-injector Inject  under the skin into the appropriate area as directed 1 (One) Time Per Week.      traZODone (DESYREL) 50 MG tablet Take 1 tablet by mouth Every Night. (Patient taking differently: Take 1 tablet by mouth Every Night. PT TAKES THIS PRN.) 30 tablet 2    busPIRone (BUSPAR) 10 MG tablet Take 1 tablet by mouth 3 (Three) Times a Day. 90 tablet 2     No current facility-administered medications for this visit.       Past Medical History:  Past Medical History:   Diagnosis Date    Abnormal Pap smear of cervix     Asthma      Atrial fibrillation     ON ELIQUIS/FOLLOWS SERGO    Colindres's esophagus     Cirrhosis 2015    Colon polyp 2015    Depression     Diabetes mellitus     Emphysema lung     INHALER    Endometrial hyperplasia     D&C SCHEDULED 23    Esophageal varices     Fatty liver     Gastroparesis     TAKES XIFAXAN    GERD (gastroesophageal reflux disease)     Hyperlipidemia     Hypertension     Iron deficiency anemia 2023    IRON INFUSION LAST 23    Irritable bowel syndrome     W/CONSTIPATION    BERKOWITZ (nonalcoholic steatohepatitis)     NO S/S CURRENTLY, ELEVATED ENZYMES    Obstructive sleep apnea syndrome     Pulmonary emphysema 2021    S/P exploratory laparotomy, lysis of adhesions, resection of necrotic colon without anastomosis 2022       Past Psychiatric History:  Began Treatment: all my life, 's  Diagnoses: MDD KERRY  Psychiatrist: , then in   Therapist:Denies  Admission History:Denies  Medication Trials:    Celexa worked for a while, then stopped    lexapro, bupropion    Cymbalta, might have worked    Self Harm: Denies  Suicide Attempts:Denies   Psychosis, Anxiety, Depression: denies    Substance Abuse History:   Types: 1/4 ppd, alcohol very rarely  Withdrawal Symptoms:Denies  Longest Period Sober:Not Applicable   AA: Not applicable     Social History:  Martial Status:  Employed:No  Kids:Yes  House:Lives in a house   History: Denies    Social History     Socioeconomic History    Marital status:      Spouse name: fannie   Tobacco Use    Smoking status: Every Day     Current packs/day: 0.25     Average packs/day: 0.3 packs/day for 58.3 years (15.0 ttl pk-yrs)     Types: Cigarettes     Start date: 1975    Smokeless tobacco: Never    Tobacco comments:     States down to 1-2 a day 25   Vaping Use    Vaping status: Never Used   Substance and Sexual Activity    Alcohol use: Yes     Alcohol/week: 1.0 standard drink of alcohol     Types: 1 Drinks  "containing 0.5 oz of alcohol per week     Comment: 1 a month    Drug use: Never    Sexual activity: Yes     Partners: Male     Birth control/protection: Post-menopausal, Tubal ligation       Family History:   Suicide Attempts: Denies  Suicide Completions:Denies      Family History   Adopted: Yes   Problem Relation Age of Onset    No Known Problems Mother     No Known Problems Father     No Known Problems Sister     No Known Problems Brother     No Known Problems Maternal Aunt     No Known Problems Paternal Aunt     No Known Problems Maternal Uncle     No Known Problems Paternal Uncle     No Known Problems Maternal Grandfather     No Known Problems Maternal Grandmother     No Known Problems Paternal Grandfather     No Known Problems Paternal Grandmother     No Known Problems Cousin     No Known Problems Other     ADD / ADHD Son     Malig Hyperthermia Neg Hx     Alcohol abuse Neg Hx     Anxiety disorder Neg Hx     Bipolar disorder Neg Hx     Dementia Neg Hx     Depression Neg Hx     Drug abuse Neg Hx     OCD Neg Hx     Paranoid behavior Neg Hx     Schizophrenia Neg Hx     Seizures Neg Hx     Self-Injurious Behavior  Neg Hx     Suicide Attempts Neg Hx        Developmental History:       Childhood: denies  High School:Completed  College: Nurse    Mental Status Exam  Appearance  : groomed, good eye contact, normal street clothes  Behavior  : pleasant and cooperative  Motor  : No abnormal  Speech  :normal rhythm, rate, volume, tone, not hyperverbal, not pressured, normal prosidy  Mood  : \"Last month was not good\"  Affect  : mild depression, mood congruent, good variability  Thought Content  : negative suicidal ideations, negative homicidal ideations, negative obsessions  Perceptions  : negative auditory hallucinations, negative visual hallucinations  Thought Process  : linear  Insight/Judgement  : Fair/fair  Cognition  : grossly intact  Attention   : intact      Review of Systems:  Review of Systems   Constitutional:  " "Negative for diaphoresis and fatigue.   HENT:  Negative for drooling.    Eyes:  Negative for visual disturbance.   Respiratory:  Negative for cough and shortness of breath.    Cardiovascular:  Negative for chest pain, palpitations and leg swelling.   Gastrointestinal:  Negative for nausea and vomiting.   Endocrine: Negative for cold intolerance and heat intolerance.   Genitourinary:  Negative for difficulty urinating.   Musculoskeletal:  Negative for joint swelling.   Allergic/Immunologic: Negative for immunocompromised state.   Neurological:  Negative for dizziness, seizures, speech difficulty and numbness.   Psychiatric/Behavioral:  Positive for confusion.        Physical Exam:  Physical Exam    Vital Signs:   /66   Pulse 82   Ht 165.1 cm (65\")   Wt 101 kg (223 lb)   BMI 37.11 kg/m²      Lab Results:   Lab on 06/19/2025   Component Date Value Ref Range Status    Total Cholesterol 06/19/2025 121  0 - 200 mg/dL Final    Triglycerides 06/19/2025 185 (H)  0 - 150 mg/dL Final    HDL Cholesterol 06/19/2025 38 (L)  40 - 60 mg/dL Final    LDL Cholesterol  06/19/2025 52  0 - 100 mg/dL Final    VLDL Cholesterol 06/19/2025 31  5 - 40 mg/dL Final    LDL/HDL Ratio 06/19/2025 1.21   Final    Glucose 06/19/2025 275 (H)  65 - 99 mg/dL Final    BUN 06/19/2025 9.0  8.0 - 23.0 mg/dL Final    Creatinine 06/19/2025 0.83  0.57 - 1.00 mg/dL Final    Sodium 06/19/2025 139  136 - 145 mmol/L Final    Potassium 06/19/2025 3.7  3.5 - 5.2 mmol/L Final    Slight hemolysis detected by analyzer. Result may be falsely elevated.    Chloride 06/19/2025 106  98 - 107 mmol/L Final    CO2 06/19/2025 21.0 (L)  22.0 - 29.0 mmol/L Final    Calcium 06/19/2025 9.8  8.6 - 10.5 mg/dL Final    Total Protein 06/19/2025 8.3  6.0 - 8.5 g/dL Final    Albumin 06/19/2025 3.6  3.5 - 5.2 g/dL Final    ALT (SGPT) 06/19/2025 46 (H)  1 - 33 U/L Final    AST (SGOT) 06/19/2025 60 (H)  1 - 32 U/L Final    Alkaline Phosphatase 06/19/2025 135 (H)  39 - 117 U/L " Final    Total Bilirubin 06/19/2025 0.3  0.0 - 1.2 mg/dL Final    Globulin 06/19/2025 4.7  gm/dL Final    A/G Ratio 06/19/2025 0.8  g/dL Final    BUN/Creatinine Ratio 06/19/2025 10.8  7.0 - 25.0 Final    Anion Gap 06/19/2025 12.0  5.0 - 15.0 mmol/L Final    eGFR 06/19/2025 79.8  >60.0 mL/min/1.73 Final    TSH 06/19/2025 3.740  0.270 - 4.200 uIU/mL Final    Free T4 06/19/2025 0.97  0.92 - 1.68 ng/dL Final    25 Hydroxy, Vitamin D 06/19/2025 35.8  30.0 - 100.0 ng/ml Final    Microalbumin/Creatinine Ratio 06/19/2025 34.5 (H)  0.0 - 29.0 mg/g Final    Creatinine, Urine 06/19/2025 142.2  mg/dL Final    Microalbumin, Urine 06/19/2025 4.9  mg/dL Final   Lab on 04/22/2025   Component Date Value Ref Range Status    Iron 04/22/2025 44  37 - 145 mcg/dL Final    Iron Saturation (TSAT) 04/22/2025 13 (L)  20 - 50 % Final    Transferrin 04/22/2025 234  200 - 360 mg/dL Final    TIBC 04/22/2025 349  298 - 536 mcg/dL Final    Ferritin 04/22/2025 160.80 (H)  13.00 - 150.00 ng/mL Final    Vitamin B-12 04/22/2025 1,953 (H)  211 - 946 pg/mL Final    Folate 04/22/2025 11.60  4.78 - 24.20 ng/mL Final    WBC 04/22/2025 7.06  3.40 - 10.80 10*3/mm3 Final    RBC 04/22/2025 4.37  3.77 - 5.28 10*6/mm3 Final    Hemoglobin 04/22/2025 13.8  12.0 - 15.9 g/dL Final    Hematocrit 04/22/2025 38.6  34.0 - 46.6 % Final    MCV 04/22/2025 88.3  79.0 - 97.0 fL Final    MCH 04/22/2025 31.6  26.6 - 33.0 pg Final    MCHC 04/22/2025 35.8 (H)  31.5 - 35.7 g/dL Final    RDW 04/22/2025 15.5 (H)  12.3 - 15.4 % Final    RDW-SD 04/22/2025 50.3  37.0 - 54.0 fl Final    MPV 04/22/2025 10.6  6.0 - 12.0 fL Final    Platelets 04/22/2025 244  140 - 450 10*3/mm3 Final    Neutrophil % 04/22/2025 55.4  42.7 - 76.0 % Final    Lymphocyte % 04/22/2025 35.8  19.6 - 45.3 % Final    Monocyte % 04/22/2025 6.4  5.0 - 12.0 % Final    Eosinophil % 04/22/2025 1.3  0.3 - 6.2 % Final    Basophil % 04/22/2025 0.8  0.0 - 1.5 % Final    Immature Grans % 04/22/2025 0.3  0.0 - 0.5 % Final     Neutrophils, Absolute 04/22/2025 3.91  1.70 - 7.00 10*3/mm3 Final    Lymphocytes, Absolute 04/22/2025 2.53  0.70 - 3.10 10*3/mm3 Final    Monocytes, Absolute 04/22/2025 0.45  0.10 - 0.90 10*3/mm3 Final    Eosinophils, Absolute 04/22/2025 0.09  0.00 - 0.40 10*3/mm3 Final    Basophils, Absolute 04/22/2025 0.06  0.00 - 0.20 10*3/mm3 Final    Immature Grans, Absolute 04/22/2025 0.02  0.00 - 0.05 10*3/mm3 Final    nRBC 04/22/2025 0.0  0.0 - 0.2 /100 WBC Final   Lab on 03/26/2025   Component Date Value Ref Range Status    Total Protein 03/26/2025 8.0  6.0 - 8.5 g/dL Final    Albumin 03/26/2025 3.3  2.9 - 4.4 g/dL Final    Alpha-1-Globulin 03/26/2025 0.2  0.0 - 0.4 g/dL Final    Alpha-2-Globulin 03/26/2025 0.8  0.4 - 1.0 g/dL Final    Beta Globulin 03/26/2025 1.2  0.7 - 1.3 g/dL Final    Gamma Globulin 03/26/2025 2.4 (H)  0.4 - 1.8 g/dL Final    M-Sid 03/26/2025 Not Observed  Not Observed g/dL Final    Globulin 03/26/2025 4.7 (H)  2.2 - 3.9 g/dL Final    A/G Ratio 03/26/2025 0.7  0.7 - 1.7 Final    Please note 03/26/2025 Comment   Final    Protein electrophoresis scan will follow via computer, mail, or   delivery.    Immunofixation Result, Serum 03/26/2025 Comment   Final    No monoclonality detected.    IgG 03/26/2025 2391 (H)  586 - 1602 mg/dL Final    IgA 03/26/2025 350  87 - 352 mg/dL Final    IgM 03/26/2025 218 (H)  26 - 217 mg/dL Final   Admission on 03/18/2025, Discharged on 03/18/2025   Component Date Value Ref Range Status    Glucose 03/18/2025 254 (H)  70 - 99 mg/dL Final    Serial Number: [id number]:  [id number]    Case Report 03/18/2025    Final                    Value:Surgical Pathology Report                         Case: [id number]                                  Authorizing Provider:  Shannan Charles MD Collected:           03/18/2025 09:30 AM          Ordering Location:     [facility] Received:            03/18/2025 11:19 AM                                 SUITES     "                                                                   Pathologist:           Barb Fraser MD                                                     Specimens:   1) - Large Intestine, Left / Descending Colon, descending colon polyp                               2) - Large Intestine, Sigmoid Colon, sigmoid colon polyps                                  Clinical Information 03/18/2025    Final                    Value:History of colon polyps      Final Diagnosis 03/18/2025    Final                    Value:1. Descending colon polyp, biopsy:   - Fragments of juvenile (retention) polyp      2. Sigmoid colon polyps, biopsy:   - Fragments of sessile serrated lesion            Gross Description 03/18/2025    Final                    Value:1. Large Intestine, Left / Descending Colon.  Received in formalin and labeled \" descending colon polyp\" are three fragments of tan soft tissue measuring 0.3-0.4 cm in greatest dimension. The specimen is entirely submitted in one cassette.    2. Large Intestine, Sigmoid Colon.  Received in formalin and labeled \" sigmoid colon polyps\" is a 0.7 cm aggregate of tan soft tissue fragments.  The 0.6 cm largest fragment is inked blue and bisected.  The specimen is entirely submitted in one cassette.   LUCINA      Microscopic Description 03/18/2025    Final                    Value:Microscopic examination performed.     Hospital Outpatient Visit on 02/27/2025   Component Date Value Ref Range Status    Right arm BP 02/27/2025 135/75  mmHg Final    Left arm BP 02/27/2025 133/74  mmHg Final    Prox CCA PSV 02/27/2025 57.1  cm/sec Final    Prox CCA EDV 02/27/2025 10.1  cm/sec Final    Prox ICA PSV 02/27/2025 27.7  cm/sec Final    Prox ICA EDV 02/27/2025 11.7  cm/sec Final    Mid ICA PSV 02/27/2025 32.1  cm/sec Final    Mid ICA EDV 02/27/2025 11.0  cm/sec Final    Dist ICA PSV 02/27/2025 44.8  cm/sec Final    Dist ICA EDV 02/27/2025 15.6  cm/sec Final    Prox ECA PSV 02/27/2025 67.0  " cm/sec Final    Prox ECA EDV 02/27/2025 10.7  cm/sec Final    Vertebral A PSV 02/27/2025 54.9  cm/sec Final    Vertebral A EDV 02/27/2025 18.1  cm/sec Final    Prox CCA PSV 02/27/2025 60.2  cm/sec Final    Prox CCA EDV 02/27/2025 17.4  cm/sec Final    Dist CCA PSV 02/27/2025 64.9  cm/sec Final    Dist CCA EDV 02/27/2025 16.8  cm/sec Final    Prox ICA PSV 02/27/2025 52.5  cm/sec Final    Prox ICA EDV 02/27/2025 15.2  cm/sec Final    Mid ICA PSV 02/27/2025 50.3  cm/sec Final    Mid ICA EDV 02/27/2025 13.5  cm/sec Final    Dist ICA PSV 02/27/2025 52.4  cm/sec Final    Dist ICA EDV 02/27/2025 19.1  cm/sec Final    Prox ECA PSV 02/27/2025 84.7  cm/sec Final    Prox ECA EDV 02/27/2025 12.6  cm/sec Final    Vertebral A PSV 02/27/2025 47.1  cm/sec Final    Vertebral A EDV 02/27/2025 12.8  cm/sec Final    XLRA ROBERT RIGHT CAROTID BULB PSV 02/27/2025 34.4  cm/sec Final    XLRA ROBERT RIGHT CAROTID BULB EDV 02/27/2025 12.3  cm/sec Final    XLRA ROBERT LEFT CAROTID BULB PSV 02/27/2025 28.5  cm/sec Final    XLRA ROBERT LEFT CAROTID BULB EDV 02/27/2025 8.0  cm/sec Final    ICA/CCA ratio 02/27/2025 0.50   Final    ICA/CCA ratio 02/27/2025 0.80   Final   Lab on 01/24/2025   Component Date Value Ref Range Status    Glucose 01/24/2025 257 (H)  65 - 99 mg/dL Final    BUN 01/24/2025 10  8 - 23 mg/dL Final    Creatinine 01/24/2025 0.86  0.57 - 1.00 mg/dL Final    Sodium 01/24/2025 137  136 - 145 mmol/L Final    Potassium 01/24/2025 4.2  3.5 - 5.2 mmol/L Final    Chloride 01/24/2025 104  98 - 107 mmol/L Final    CO2 01/24/2025 22.0  22.0 - 29.0 mmol/L Final    Calcium 01/24/2025 10.0  8.6 - 10.5 mg/dL Final    Albumin 01/24/2025 3.8  3.5 - 5.2 g/dL Final    Phosphorus 01/24/2025 3.0  2.5 - 4.5 mg/dL Final    Anion Gap 01/24/2025 11.0  5.0 - 15.0 mmol/L Final    BUN/Creatinine Ratio 01/24/2025 11.6  7.0 - 25.0 Final    eGFR 01/24/2025 76.5  >60.0 mL/min/1.73 Final    Creatinine, Urine 01/24/2025 399.8  mg/dL Final    Total Protein, Urine  01/24/2025 62.0  mg/dL Final    Protein/Creatinine Ratio, Urine 01/24/2025 0.16   Final    Color, UA 01/24/2025 Dark Yellow (A)  Yellow, Straw Final    Appearance, UA 01/24/2025 Cloudy (A)  Clear Final    pH, UA 01/24/2025 6.0  5.0 - 8.0 Final    Specific Gravity, UA 01/24/2025 >1.030 (H)  1.005 - 1.030 Final    Glucose, UA 01/24/2025 500 mg/dL (2+) (A)  Negative Final    Ketones, UA 01/24/2025 Trace (A)  Negative Final    Bilirubin, UA 01/24/2025 Negative  Negative Final    Blood, UA 01/24/2025 Negative  Negative Final    Protein, UA 01/24/2025 30 mg/dL (1+) (A)  Negative Final    Leuk Esterase, UA 01/24/2025 Negative  Negative Final    Nitrite, UA 01/24/2025 Negative  Negative Final    Urobilinogen, UA 01/24/2025 1.0 E.U./dL  0.2 - 1.0 E.U./dL Final    RBC, UA 01/24/2025 0-2  None Seen, 0-2 /HPF Final    WBC, UA 01/24/2025 3-5 (A)  None Seen, 0-2 /HPF Final    Bacteria, UA 01/24/2025 Trace (A)  None Seen /HPF Final    Squamous Epithelial Cells, UA 01/24/2025 7-12 (A)  None Seen, 0-2 /HPF Final    Hyaline Casts, UA 01/24/2025 3-6  None Seen /LPF Final    Methodology 01/24/2025 Automated Microscopy   Final    WBC 01/24/2025 7.55  3.40 - 10.80 10*3/mm3 Final    RBC 01/24/2025 3.94  3.77 - 5.28 10*6/mm3 Final    Hemoglobin 01/24/2025 11.9 (L)  12.0 - 15.9 g/dL Final    Hematocrit 01/24/2025 36.7  34.0 - 46.6 % Final    MCV 01/24/2025 93.1  79.0 - 97.0 fL Final    MCH 01/24/2025 30.2  26.6 - 33.0 pg Final    MCHC 01/24/2025 32.4  31.5 - 35.7 g/dL Final    RDW 01/24/2025 15.6 (H)  12.3 - 15.4 % Final    RDW-SD 01/24/2025 53.4  37.0 - 54.0 fl Final    MPV 01/24/2025 11.6  6.0 - 12.0 fL Final    Platelets 01/24/2025 220  140 - 450 10*3/mm3 Final    Neutrophil % 01/24/2025 51.1  42.7 - 76.0 % Final    Lymphocyte % 01/24/2025 37.4  19.6 - 45.3 % Final    Monocyte % 01/24/2025 8.5  5.0 - 12.0 % Final    Eosinophil % 01/24/2025 2.0  0.3 - 6.2 % Final    Basophil % 01/24/2025 0.7  0.0 - 1.5 % Final    Immature Grans %  01/24/2025 0.3  0.0 - 0.5 % Final    Neutrophils, Absolute 01/24/2025 3.87  1.70 - 7.00 10*3/mm3 Final    Lymphocytes, Absolute 01/24/2025 2.82  0.70 - 3.10 10*3/mm3 Final    Monocytes, Absolute 01/24/2025 0.64  0.10 - 0.90 10*3/mm3 Final    Eosinophils, Absolute 01/24/2025 0.15  0.00 - 0.40 10*3/mm3 Final    Basophils, Absolute 01/24/2025 0.05  0.00 - 0.20 10*3/mm3 Final    Immature Grans, Absolute 01/24/2025 0.02  0.00 - 0.05 10*3/mm3 Final    nRBC 01/24/2025 0.0  0.0 - 0.2 /100 WBC Final   Lab on 01/20/2025   Component Date Value Ref Range Status    Glucose 01/20/2025 264 (H)  65 - 99 mg/dL Final    BUN 01/20/2025 14  8 - 23 mg/dL Final    Creatinine 01/20/2025 0.92  0.57 - 1.00 mg/dL Final    Sodium 01/20/2025 136  136 - 145 mmol/L Final    Potassium 01/20/2025 4.6  3.5 - 5.2 mmol/L Final    Slight hemolysis detected by analyzer. Result may be falsely elevated.    Chloride 01/20/2025 105  98 - 107 mmol/L Final    CO2 01/20/2025 20.8 (L)  22.0 - 29.0 mmol/L Final    Calcium 01/20/2025 9.9  8.6 - 10.5 mg/dL Final    Total Protein 01/20/2025 8.4  6.0 - 8.5 g/dL Final    Albumin 01/20/2025 3.8  3.5 - 5.2 g/dL Final    ALT (SGPT) 01/20/2025 74 (H)  1 - 33 U/L Final    AST (SGOT) 01/20/2025 78 (H)  1 - 32 U/L Final    Alkaline Phosphatase 01/20/2025 160 (H)  39 - 117 U/L Final    Total Bilirubin 01/20/2025 0.3  0.0 - 1.2 mg/dL Final    Globulin 01/20/2025 4.6  gm/dL Final    A/G Ratio 01/20/2025 0.8  g/dL Final    BUN/Creatinine Ratio 01/20/2025 15.2  7.0 - 25.0 Final    Anion Gap 01/20/2025 10.2  5.0 - 15.0 mmol/L Final    eGFR 01/20/2025 70.5  >60.0 mL/min/1.73 Final    Iron 01/20/2025 89  37 - 145 mcg/dL Final    Iron Saturation (TSAT) 01/20/2025 29  20 - 50 % Final    Transferrin 01/20/2025 209  200 - 360 mg/dL Final    TIBC 01/20/2025 311  298 - 536 mcg/dL Final    Ferritin 01/20/2025 369.90 (H)  13.00 - 150.00 ng/mL Final    WBC 01/20/2025 6.73  3.40 - 10.80 10*3/mm3 Final    RBC 01/20/2025 3.73 (L)  3.77 - 5.28  10*6/mm3 Final    Hemoglobin 01/20/2025 11.3 (L)  12.0 - 15.9 g/dL Final    Hematocrit 01/20/2025 32.6 (L)  34.0 - 46.6 % Final    MCV 01/20/2025 87.4  79.0 - 97.0 fL Final    MCH 01/20/2025 30.3  26.6 - 33.0 pg Final    MCHC 01/20/2025 34.7  31.5 - 35.7 g/dL Final    RDW 01/20/2025 16.2 (H)  12.3 - 15.4 % Final    RDW-SD 01/20/2025 51.2  37.0 - 54.0 fl Final    MPV 01/20/2025 10.7  6.0 - 12.0 fL Final    Platelets 01/20/2025 192  140 - 450 10*3/mm3 Final    Neutrophil % 01/20/2025 48.7  42.7 - 76.0 % Final    Lymphocyte % 01/20/2025 40.1  19.6 - 45.3 % Final    Monocyte % 01/20/2025 8.9  5.0 - 12.0 % Final    Eosinophil % 01/20/2025 1.3  0.3 - 6.2 % Final    Basophil % 01/20/2025 0.7  0.0 - 1.5 % Final    Immature Grans % 01/20/2025 0.3  0.0 - 0.5 % Final    Neutrophils, Absolute 01/20/2025 3.27  1.70 - 7.00 10*3/mm3 Final    Lymphocytes, Absolute 01/20/2025 2.70  0.70 - 3.10 10*3/mm3 Final    Monocytes, Absolute 01/20/2025 0.60  0.10 - 0.90 10*3/mm3 Final    Eosinophils, Absolute 01/20/2025 0.09  0.00 - 0.40 10*3/mm3 Final    Basophils, Absolute 01/20/2025 0.05  0.00 - 0.20 10*3/mm3 Final    Immature Grans, Absolute 01/20/2025 0.02  0.00 - 0.05 10*3/mm3 Final    nRBC 01/20/2025 0.0  0.0 - 0.2 /100 WBC Final   Admission on 12/26/2024, Discharged on 12/26/2024   Component Date Value Ref Range Status    QT Interval 12/26/2024 366  ms Final    QTC Interval 12/26/2024 466  ms Final    Glucose 12/26/2024 279 (H)  65 - 99 mg/dL Final    BUN 12/26/2024 10  8 - 23 mg/dL Final    Creatinine 12/26/2024 0.81  0.57 - 1.00 mg/dL Final    Sodium 12/26/2024 133 (L)  136 - 145 mmol/L Final    Potassium 12/26/2024 4.0  3.5 - 5.2 mmol/L Final    Chloride 12/26/2024 99  98 - 107 mmol/L Final    CO2 12/26/2024 24.4  22.0 - 29.0 mmol/L Final    Calcium 12/26/2024 9.8  8.6 - 10.5 mg/dL Final    Total Protein 12/26/2024 8.7 (H)  6.0 - 8.5 g/dL Final    Albumin 12/26/2024 3.7  3.5 - 5.2 g/dL Final    ALT (SGPT) 12/26/2024 148 (H)  1 - 33  U/L Final    AST (SGOT) 12/26/2024 253 (H)  1 - 32 U/L Final    Alkaline Phosphatase 12/26/2024 174 (H)  39 - 117 U/L Final    Total Bilirubin 12/26/2024 0.6  0.0 - 1.2 mg/dL Final    Globulin 12/26/2024 5.0  gm/dL Final    A/G Ratio 12/26/2024 0.7  g/dL Final    BUN/Creatinine Ratio 12/26/2024 12.3  7.0 - 25.0 Final    Anion Gap 12/26/2024 9.6  5.0 - 15.0 mmol/L Final    eGFR 12/26/2024 82.2  >60.0 mL/min/1.73 Final    proBNP 12/26/2024 72.9  0.0 - 900.0 pg/mL Final    HS Troponin T 12/26/2024 <6  <14 ng/L Final    Extra Tube 12/26/2024 Hold for add-ons.   Final    Auto resulted.    Extra Tube 12/26/2024 hold for add-on   Final    Auto resulted    Extra Tube 12/26/2024 Hold for add-ons.   Final    Auto resulted.    Extra Tube 12/26/2024 Hold for add-ons.   Final    Auto resulted    WBC 12/26/2024 7.22  3.40 - 10.80 10*3/mm3 Final    RBC 12/26/2024 4.45  3.77 - 5.28 10*6/mm3 Final    Hemoglobin 12/26/2024 13.5  12.0 - 15.9 g/dL Final    Hematocrit 12/26/2024 38.1  34.0 - 46.6 % Final    MCV 12/26/2024 85.6  79.0 - 97.0 fL Final    MCH 12/26/2024 30.3  26.6 - 33.0 pg Final    MCHC 12/26/2024 35.4  31.5 - 35.7 g/dL Final    RDW 12/26/2024 15.5 (H)  12.3 - 15.4 % Final    RDW-SD 12/26/2024 48.7  37.0 - 54.0 fl Final    MPV 12/26/2024 10.7  6.0 - 12.0 fL Final    Platelets 12/26/2024 180  140 - 450 10*3/mm3 Final    Neutrophil % 12/26/2024 77.4 (H)  42.7 - 76.0 % Final    Lymphocyte % 12/26/2024 14.3 (L)  19.6 - 45.3 % Final    Monocyte % 12/26/2024 6.6  5.0 - 12.0 % Final    Eosinophil % 12/26/2024 0.7  0.3 - 6.2 % Final    Basophil % 12/26/2024 0.6  0.0 - 1.5 % Final    Immature Grans % 12/26/2024 0.4  0.0 - 0.5 % Final    Neutrophils, Absolute 12/26/2024 5.59  1.70 - 7.00 10*3/mm3 Final    Lymphocytes, Absolute 12/26/2024 1.03  0.70 - 3.10 10*3/mm3 Final    Monocytes, Absolute 12/26/2024 0.48  0.10 - 0.90 10*3/mm3 Final    Eosinophils, Absolute 12/26/2024 0.05  0.00 - 0.40 10*3/mm3 Final    Basophils, Absolute  12/26/2024 0.04  0.00 - 0.20 10*3/mm3 Final    Immature Grans, Absolute 12/26/2024 0.03  0.00 - 0.05 10*3/mm3 Final    nRBC 12/26/2024 0.0  0.0 - 0.2 /100 WBC Final    COVID19 12/26/2024 Detected (C)  Not Detected - Ref. Range Final    Influenza A PCR 12/26/2024 Not Detected  Not Detected Final    Influenza B PCR 12/26/2024 Not Detected  Not Detected Final    RSV, PCR 12/26/2024 Not Detected  Not Detected Final    HS Troponin T 12/26/2024 <6  <14 ng/L Final    Troponin T Numeric Delta 12/26/2024    Final    Unable to calculate.       EKG Results:  No orders to display       Imaging Results:  XR Chest 2 View    Result Date: 4/25/2024  No acute process.  Electronically Signed By-Graham Quiles MD On:4/25/2024 12:49 PM      US Abdomen Limited    Result Date: 3/5/2024    1. Enlarged heterogeneous appearance of the liver with nodular contour compatible with cirrhosis.  No lesion noted within the visualized liver parenchyma by sonographic evaluation     CAROLE SRINIVASAN MD       ELECTRONICALLY SIGNED AND APPROVED BY: CAROLE SRINIVASAN MD ON 3/05/2024 AT 10:07             CT Chest Low Dose Follow Up Without Contrast    Result Date: 2/22/2024    1. No evidence of lung cancer. 2. Previously identified nodular density in left lower lobe has resolved, and was likely infectious or inflammatory. 3. No acute cardiopulmonary process. 4. Lung RADS category 1 (negative, less than 1% chance of malignancy) 5. Recommend continued annual screening with low-dose CT chest.      CLOVIS NORTH MD       Electronically Signed and Approved By: CLOVIS NORTH MD on 2/22/2024 at 9:21                 Assessment & Plan   Diagnoses and all orders for this visit:    1. Major depressive disorder, recurrent episode, moderate (Primary)  -     busPIRone (BUSPAR) 10 MG tablet; Take 1 tablet by mouth 3 (Three) Times a Day.  Dispense: 90 tablet; Refill: 2  -     DULoxetine (CYMBALTA) 60 MG capsule; Take 1 capsule by mouth Daily.  Dispense: 90 capsule;  Refill: 3    2. Generalized anxiety disorder  -     busPIRone (BUSPAR) 10 MG tablet; Take 1 tablet by mouth 3 (Three) Times a Day.  Dispense: 90 tablet; Refill: 2  -     DULoxetine (CYMBALTA) 60 MG capsule; Take 1 capsule by mouth Daily.  Dispense: 90 capsule; Refill: 3    3. Insomnia due to mental condition  -     busPIRone (BUSPAR) 10 MG tablet; Take 1 tablet by mouth 3 (Three) Times a Day.  Dispense: 90 tablet; Refill: 2  -     DULoxetine (CYMBALTA) 60 MG capsule; Take 1 capsule by mouth Daily.  Dispense: 90 capsule; Refill: 3        Visit Diagnoses:    ICD-10-CM ICD-9-CM   1. Major depressive disorder, recurrent episode, moderate  F33.1 296.32   2. Generalized anxiety disorder  F41.1 300.02   3. Insomnia due to mental condition  F51.05 300.9     327.02     06/20/2025: Light box, working part time, volunteer -- all discussed with pt. Stoppeb bupropion (wasn't helping). Add buspar. Consider abilify, increasing cymbalta. 5w    Acknowledged and normalized patient's thoughts, feelings, and concerns. Allowed patient to freely discuss and process issues, such as:  Anxiety and depression regarding medical conditions.  ... using Rogerian psychotherapeutic techniques including unconditional positive regard, reflective listening, and demonstrating clear empathy, with the goal of ameliorating symptoms and maintaining, restoring, or improving self-esteem, adaptive skills, and ego or psychological functions (Alexis et al. 1991), the long-term goal of which is to develop a better, healthier perspective and help the patient bear their circumstances more easily.  Time (minutes) spent providing supportive psychotherapy: 18  (This time is exclusive to the therapy session and separate from the time spent on activities used to meet the criteria for the E/M service (history, exam, medical decision-making).)  Start: 11:19  Stop: 11:37  Functional status: mild impairment  Treatment plan: Medication management and supportive  psychotherapy  Prognosis: good  Progress: MDD, KERRY, insomnia  5w    07/30/2024: Improving, increase cymbalta, start trazodone for insomnia. Wants to start volunteering at [employer].    5/1/24: Taper off lexapro and start cymbalta. Cognitive issues leading to depression. Bupropion XL in am only (both). 6w    PLAN:  Safety: No acute safety concerns  Therapy: None  Risk Assessment: Risk of self-harm acutely is moderate.  Risk factors include anxiety disorder, mood disorder, and recent psychosocial stressors (pandemic). Protective factors include no family history, denies access to guns/weapons, no present SI, no history of suicide attempts or self-harm in the past, minimal AODA, healthcare seeking, future orientation, willingness to engage in care.  Risk of self-harm chronically is also moderate, but could be further elevated in the event of treatment noncompliance and/or AODA.  Meds:  STOPPED Bupropion  mg daily. (Note effective, 6/25)   CONTINUE cymbalta 60 mg qam. Risks, benefits, alternatives discussed with patient including GI upset, nausea vomiting diarrhea, theoretical decrease of seizure threshold predisposing the patient to a slightly higher seizure risk, headaches, sexual dysfunction, serotonin syndrome, bleeding risk, increased suicidality in patients 24 years and younger, switching to shamika/hypomania.  Also constipation and urinary retention.  After discussion of these risks and benefits, the patient voiced understanding and agreed to proceed.  START buspar 10 mg tid. Risks, benefits, alternatives discussed with patient including nausea, GI upset, mild sedation, falls risk.  Use care when operating vehicle, vessel, or machine. After discussion of these risks and benefits, the patient voiced understanding and agreed to proceed.  CONTINUE trazodone 50 mg qhs PRN. Risks, benefits, side effects discussed with patient including GI upset, sedation, dizziness/falls risk, grogginess the following day,  prolongation of the QTc interval.  Do not use before operating vehicle, vessel, or machine. After discussion of these risks and benefits, the patient voiced understanding and agreed to proceed.    S/P:  Lexapro 10 mg qday. Ineffective 7/24  Labs: none    Patient screened positive for depression based on a PHQ-9 score of 13 on 6/20/2025. Follow-up recommendations include: Prescribed antidepressant medication treatment and Suicide Risk Assessment performed.           TREATMENT PLAN/GOALS: Continue supportive psychotherapy efforts and medications as indicated. Treatment and medication options discussed during today's visit. Patient acknowledged and verbally consented to continue with current treatment plan and was educated on the importance of compliance with treatment and follow-up appointments.    MEDICATION ISSUES:  ORTIZ reviewed as expected.  Discussed medication options and treatment plan of prescribed medication as well as the risks, benefits, and side effects including potential falls, possible impaired driving and metabolic adversities among others. Patient is agreeable to call the office with any worsening of symptoms or onset of side effects. Patient is agreeable to call 911 or go to the nearest ER should he/she begin having SI/HI. No medication side effects or related complaints today.     MEDS ORDERED DURING VISIT:  New Medications Ordered This Visit   Medications    busPIRone (BUSPAR) 10 MG tablet     Sig: Take 1 tablet by mouth 3 (Three) Times a Day.     Dispense:  90 tablet     Refill:  2    DULoxetine (CYMBALTA) 60 MG capsule     Sig: Take 1 capsule by mouth Daily.     Dispense:  90 capsule     Refill:  3     Refills. Thank you for the help. Please call with questions: [phone].       Return in about 5 weeks (around 7/25/2025).         This document has been electronically signed by Demetrius Calvert MD  June 20, 2025 11:42 EDT    Dictated Utilizing Dragon Dictation: Part of this note may be an  electronic transcription/translation of spoken language to printed text using the Dragon Dictation System.

## 2025-06-20 NOTE — TREATMENT PLAN
Anxiety:  2/10 progressing    Goals:  Patient will develop and implement behavioral and cognitive strategies to reduce anxiety and irrational fears, monthly, using Rogerian psychotherapy and CBT where appropriate.  Help patient explore past emotional issues in relation to present anxiety, monthly, until remission of symptoms, using Rogerian psychotherapy and CBT where appropriate.  Help patient develop an awareness of their cognitive and physical responses to anxiety, monthly, until remission of symptoms, using Rogerian psychotherapy and CBT where appropriate.          Depression:  5/10 progressing    Goals:  Patient will demonstrate the ability to initiate new constructive life skills outside of sessions on a consistent basis, monthly, using Rogerian psychotherapy and CBT where appropriate.  Assist patient in setting attainable activities of daily living goals, monthly, using Rogerian psychotherapy and CBT where appropriate.  Provide education about depression, monthly, using Rogerian psychotherapy and CBT where appropriate.  Assist patient in developing healthy coping strategies, monthly, using Rogerian psychotherapy and CBT where appropriate.    Rogerian psychotherapy and CBT will be used to help accomplish the above goals and manage depression and anxiety related to medical conditions, family conflict      I have discussed and reviewed this treatment plan with the patient.      Reviewed by Demetrius Calvert MD   06/20/2025

## 2025-06-20 NOTE — PATIENT INSTRUCTIONS
1.  Please return to clinic at your next scheduled visit.  Contact the clinic ([phone]) at least 24 hours prior in the event you need to cancel.  2.  Do no harm to yourself or others.    3.  Avoid alcohol and drugs.    4.  Take all medications as prescribed.  Please contact the clinic with any concerns. If you are in need of medication refills, please call the clinic at [phone].    5. Should you want to get in touch with your provider, Dr. Demetrius Calvert, please utilize [employer] or contact the office ([phone]), and staff will be able to page Dr. Calvert directly.  6.  In the event you have personal crisis, contact the following crisis numbers: Suicide Prevention Hotline 1-754.127.1104; [facility] Helpline 2-242-883-VZMN; [facility] Emergency Room [phone]; text HELLO to 423736; or 306.     Light box/Happy Light/Light Therapy: Obtain a light box, 10,000 lux, put the box about 1 foot away from you, facing you at an angle (not directly), use it daily, right after waking up, for 1 hour daily. Don't stare directly at it. Read, eat, watch tv, etc. Use from September through March. Call your insurance company to see if they can reimburse you for the cost. Available at big box retailers. I used [employer] to get a [employer] one.

## 2025-06-23 ENCOUNTER — OFFICE VISIT (OUTPATIENT)
Dept: CARDIOLOGY | Facility: CLINIC | Age: 65
End: 2025-06-23
Payer: COMMERCIAL

## 2025-06-23 VITALS
WEIGHT: 223 LBS | HEIGHT: 65 IN | DIASTOLIC BLOOD PRESSURE: 91 MMHG | BODY MASS INDEX: 37.15 KG/M2 | SYSTOLIC BLOOD PRESSURE: 143 MMHG | HEART RATE: 91 BPM

## 2025-06-23 DIAGNOSIS — I10 ESSENTIAL HYPERTENSION: ICD-10-CM

## 2025-06-23 DIAGNOSIS — I48.0 PAROXYSMAL ATRIAL FIBRILLATION: Primary | ICD-10-CM

## 2025-06-23 DIAGNOSIS — E78.2 MIXED HYPERLIPIDEMIA: ICD-10-CM

## 2025-06-23 PROCEDURE — 99214 OFFICE O/P EST MOD 30 MIN: CPT | Performed by: INTERNAL MEDICINE

## 2025-06-23 RX ORDER — METOPROLOL TARTRATE 50 MG
75 TABLET ORAL 2 TIMES DAILY
Qty: 270 TABLET | Refills: 3 | Status: SHIPPED | OUTPATIENT
Start: 2025-06-23

## 2025-06-23 NOTE — PROGRESS NOTES
Chief Complaint  Follow-up and Hypertension    Subjective            Anika Casillas presents to [facility] CARDIOLOG[facility]      Ms. Casillas is here for follow-up evaluation and management of paroxysmal atrial fibrillation, essential hypertension, mixed hyperlipidemia, lower extremity edema.  Overall she is doing relatively well.  She has occasional fleeting palpitations but this is not affecting her day-to-day.  She denies chest pain or excessive shortness of breath.  No bleeding problems anticoagulation.    PMH  Past Medical History:   Diagnosis Date    Abnormal Pap smear of cervix     Asthma     Atrial fibrillation     ON ELIQUIS/FOLLOWS TROTTER    Colindres's esophagus     Cirrhosis 2015    Colon polyp 2015    Depression     Diabetes mellitus     Emphysema lung     INHALER    Endometrial hyperplasia     D&C SCHEDULED 7/25/23    Esophageal varices     Fatty liver 2015    Gastroparesis     TAKES XIFAXAN    GERD (gastroesophageal reflux disease) 1995    Hyperlipidemia     Hypertension     Iron deficiency anemia 05/02/2023    IRON INFUSION LAST 7/24/23    Irritable bowel syndrome     W/CONSTIPATION    BERKOWITZ (nonalcoholic steatohepatitis)     NO S/S CURRENTLY, ELEVATED ENZYMES    Obstructive sleep apnea syndrome     Pulmonary emphysema 11/22/2021    S/P exploratory laparotomy, lysis of adhesions, resection of necrotic colon without anastomosis 04/18/2022         SURGICALHX  Past Surgical History:   Procedure Laterality Date    ABDOMINAL SURGERY  4/16/2022    BLADDER SLING MODIFIED, ANTERIOR AND POSTERIOR VAGINAL REPAIR  2018    [place]    BLADDER SURGERY  05/01/2024    stimulator  @ [facility]    BOTOX INJECTION      In abdomen    BREAST LUMPECTOMY Left     BENIGN    CARDIAC CATHETERIZATION      NO INTERVENTION    CHOLECYSTECTOMY  5/12/2015    COLON RESECTION      PERFORATED COLON, EEA    COLONOSCOPY N/A 08/14/2023    Procedure: COLONOSCOPY WITH POLYPECTOMY, TATTOO;  Surgeon: Shannan Charles MD;   Location: [facility] ENDOSCOPY;  Service: Gastroenterology;  Laterality: N/A;  COLON POLYPS  DIVERTICULOSIS  HEMORRHOIDS    COLONOSCOPY N/A 04/22/2024    Procedure: COLONOSCOPY COLD SNARE POLYPECTOMIES WITH CLIP PLACEMENT X3;  Surgeon: Shannan Charles MD;  Location: [facility] ENDOSCOPY;  Service: Gastroenterology;  Laterality: N/A;  DIVERTICULOSIS  COLON POLYPS    COLONOSCOPY N/A 3/18/2025    Procedure: COLONOSCOPY with cold snare polypectomies;  Surgeon: Shannan Charles MD;  Location: [facility] ENDOSCOPY;  Service: Gastroenterology;  Laterality: N/A;  colon polyps, diverticulosis    D & C HYSTEROSCOPY N/A 07/25/2023    Procedure: resection of endometrial polyp with myosure;  Surgeon: Ashleigh Harding DO;  Location: [facility] MAIN OR;  Service: Gynecology;  Laterality: N/A;    ENDOSCOPY N/A 06/03/2022    Procedure: ESOPHAGOGASTRODUODENOSCOPY;  Surgeon: Shannan Charles MD;  Location: [facility] ENDOSCOPY;  Service: Gastroenterology;  Laterality: N/A;  RETAINED FOOD IN STOMACH  ESOPHAGEAL VARICIES    ENDOSCOPY N/A 04/22/2024    Procedure: ESOPHAGOGASTRODUODENOSCOPY WITH BIOPSIES;  Surgeon: Shannan Charles MD;  Location: [facility] ENDOSCOPY;  Service: Gastroenterology;  Laterality: N/A;  ESOPHAGEAL BIOPSIES    EXPLORATORY LAPAROTOMY N/A 04/16/2022    Procedure: EXPLORATORY LAPAROTOMY, LYSIS OF ADHESIONS, RESECTION OF NECTROIC COLON;  Surgeon: Cande Stanley MD;  Location: [facility] MAIN OR;  Service: General;  Laterality: N/A;    EYE SURGERY  2023    GALLBLADDER SURGERY      LAPAROSCOPIC    LIVER BIOPSY  2021    TUBAL ABDOMINAL LIGATION      UPPER GASTROINTESTINAL ENDOSCOPY  6/3/2022        SOC  Social History     Socioeconomic History    Marital status:      Spouse name: fannie   Tobacco Use    Smoking status: Every Day     Current packs/day: 0.25     Average packs/day: 0.3 packs/day for 58.3 years (15.0 ttl pk-yrs)     Types: Cigarettes     Start date: 6/17/1975    Smokeless tobacco: Never     Tobacco comments:     States down to 1-2 a day 1/30/25   Vaping Use    Vaping status: Never Used   Substance and Sexual Activity    Alcohol use: Yes     Alcohol/week: 1.0 standard drink of alcohol     Types: 1 Drinks containing 0.5 oz of alcohol per week     Comment: 1 a month    Drug use: Never    Sexual activity: Yes     Partners: Male     Birth control/protection: Post-menopausal, Tubal ligation         FAMHX  Family History   Adopted: Yes   Problem Relation Age of Onset    No Known Problems Mother     No Known Problems Father     No Known Problems Sister     No Known Problems Brother     No Known Problems Maternal Aunt     No Known Problems Paternal Aunt     No Known Problems Maternal Uncle     No Known Problems Paternal Uncle     No Known Problems Maternal Grandfather     No Known Problems Maternal Grandmother     No Known Problems Paternal Grandfather     No Known Problems Paternal Grandmother     No Known Problems Cousin     No Known Problems Other     ADD / ADHD Son     Malig Hyperthermia Neg Hx     Alcohol abuse Neg Hx     Anxiety disorder Neg Hx     Bipolar disorder Neg Hx     Dementia Neg Hx     Depression Neg Hx     Drug abuse Neg Hx     OCD Neg Hx     Paranoid behavior Neg Hx     Schizophrenia Neg Hx     Seizures Neg Hx     Self-Injurious Behavior  Neg Hx     Suicide Attempts Neg Hx           ALLERGY  Allergies   Allergen Reactions    Liraglutide Nausea And Vomiting    Lisinopril Other (See Comments)     Cough    Metformin Nausea Only     GI Upset        MEDSCURRENT    Current Outpatient Medications:     albuterol sulfate  (90 Base) MCG/ACT inhaler, Inhale 2 puffs Every 4 (Four) Hours As Needed for Shortness of Air or Wheezing., Disp: 1 g, Rfl: 5    amLODIPine (NORVASC) 10 MG tablet, Take 1 tablet by mouth Daily., Disp: 90 tablet, Rfl: 1    apixaban (Eliquis) 5 MG tablet tablet, Take 1 tablet by mouth 2 (Two) Times a Day., Disp: 28 tablet, Rfl: 0    B-D UF III MINI PEN NEEDLES 31G X 5 MM misc, ,  Disp: , Rfl:     Blood Glucose Monitoring Suppl (Accu-Chek Guide) w/Device kit, , Disp: , Rfl:     busPIRone (BUSPAR) 10 MG tablet, Take 1 tablet by mouth 3 (Three) Times a Day., Disp: 90 tablet, Rfl: 2    Cholecalciferol (Vitamin D3) 50 MCG (2000 UT) capsule, Take 3 capsules by mouth Daily. LAST DOSE 7/21/23, Disp: , Rfl:     Continuous Glucose Sensor (FreeStyle Sharon 3 Plus Sensor), USE AS DIRECTED EVER 15 DAYS, Disp: , Rfl:     dapagliflozin Propanediol 10 MG tablet, Take 10 mg by mouth., Disp: , Rfl:     DULoxetine (CYMBALTA) 60 MG capsule, Take 1 capsule by mouth Daily., Disp: 90 capsule, Rfl: 3    Evolocumab (Repatha SureClick) solution auto-injector SureClick injection, Inject 1 mL under the skin into the appropriate area as directed Every 14 (Fourteen) Days., Disp: 6 mL, Rfl: 3    famotidine (PEPCID) 40 MG tablet, Take 1 tablet by mouth At Night As Needed for Heartburn., Disp: 90 tablet, Rfl: 3    Fluticasone-Umeclidin-Vilant (TRELEGY) 100-62.5-25 MCG/ACT inhaler, Inhale 1 puff Daily., Disp: 1 each, Rfl: 5    furosemide (LASIX) 40 MG tablet, Take 1 tablet by mouth Every Other Day., Disp: 30 tablet, Rfl: 3    glucose blood test strip, Check blood glucose twice daily, Disp: 60 each, Rfl: 12    glucose monitor monitoring kit, 1 each Daily. Patient prefers Accu Chek Meter., Disp: 1 each, Rfl: 0    Insulin Lispro, 1 Unit Dial, (HUMALOG) 100 UNIT/ML solution pen-injector, Inject 60 Units under the skin into the appropriate area as directed 3 (Three) Times a Day., Disp: , Rfl:     ipratropium-albuterol (DUO-NEB) 0.5-2.5 mg/3 ml nebulizer, Take 3 mL by nebulization 4 (Four) Times a Day As Needed for Wheezing or Shortness of Air., Disp: 360 mL, Rfl: 3    Lancets (accu-chek soft touch) lancets, Check blood sugar twice daily, Disp: 100 each, Rfl: 12    metoprolol tartrate (LOPRESSOR) 50 MG tablet, Take 1.5 tablets by mouth 2 (Two) Times a Day., Disp: 270 tablet, Rfl: 3    multivitamin with minerals tablet tablet, Take  "1 tablet by mouth Daily. LAST DOSE 7/21/23, Disp: , Rfl:     olmesartan (BENICAR) 20 MG tablet, Take 1 tablet by mouth Daily., Disp: 90 tablet, Rfl: 1    omeprazole (priLOSEC) 40 MG capsule, Take 1 capsule by mouth Daily., Disp: 30 capsule, Rfl: 11    potassium chloride 10 MEQ CR tablet, TAKE 1 TABLET BY MOUTH EVERY OTHER DAY WITH LASIX, Disp: 45 tablet, Rfl: 3    promethazine (PHENERGAN) 25 MG tablet, Take 1 tablet by mouth Every 6 (Six) Hours As Needed for Nausea or Vomiting., Disp: 30 tablet, Rfl: 0    riFAXIMin (Xifaxan) 550 MG tablet, Take 1 tablet by mouth Every 12 (Twelve) Hours., Disp: 180 tablet, Rfl: 3    Semaglutide,0.25 or 0.5MG/DOS, (Ozempic, 0.25 or 0.5 MG/DOSE,) 2 MG/1.5ML solution pen-injector, Inject  under the skin into the appropriate area as directed 1 (One) Time Per Week., Disp: , Rfl:     traZODone (DESYREL) 50 MG tablet, Take 1 tablet by mouth Every Night. (Patient taking differently: Take 1 tablet by mouth Every Night. PT TAKES THIS PRN.), Disp: 30 tablet, Rfl: 2      Review of Systems   Cardiovascular:  Positive for leg swelling. Negative for chest pain, dyspnea on exertion, palpitations and syncope.   Neurological:  Positive for vertigo.        Objective     /91   Pulse 91   Ht 165.1 cm (65\")   Wt 101 kg (223 lb)   BMI 37.11 kg/m²       General Appearance:   well developed  well nourished  HENT:   oropharynx moist  lips not cyanotic  Neck:  thyroid not enlarged  supple  Respiratory:  no respiratory distress  normal breath sounds  no rales  Cardiovascular:  no jugular venous distention  regular rhythm  apical impulse normal  S1 normal, S2 normal  no S3, no S4   no murmur  no rub, no thrill  carotid pulses normal; no bruit  pedal pulses normal  lower extremity edema: none    Musculoskeletal:  no clubbing of fingers.   normocephalic, head atraumatic  Skin:   warm, dry  Psychiatric:  judgement and insight appropriate  normal mood and affect      Result Review :     The following data " was reviewed by: Mandeep Webb MD on 12/12/2024:    CMP          1/24/2025    14:49 3/26/2025    08:34 6/19/2025    07:44   CMP   Glucose 257   275    BUN 10   9.0    Creatinine 0.86   0.83    EGFR 76.5   79.8    Sodium 137   139    Potassium 4.2   3.7    Chloride 104   106    Calcium 10.0   9.8    Total Protein  8.0  8.3    Albumin 3.8  3.3  3.6    Globulin  4.7  4.7    Total Bilirubin   0.3    Alkaline Phosphatase   135    AST (SGOT)   60    ALT (SGPT)   46    Albumin/Globulin Ratio  0.7  0.8    BUN/Creatinine Ratio 11.6   10.8    Anion Gap 11.0   12.0      CBC          1/20/2025    13:07 1/24/2025    14:49 4/22/2025    11:37   CBC   WBC 6.73  7.55  7.06    RBC 3.73  3.94  4.37    Hemoglobin 11.3  11.9  13.8    Hematocrit 32.6  36.7  38.6    MCV 87.4  93.1  88.3    MCH 30.3  30.2  31.6    MCHC 34.7  32.4  35.8    RDW 16.2  15.6  15.5    Platelets 192  220  244      Lipid Panel          6/19/2025    07:44   Lipid Panel   Total Cholesterol 121    Triglycerides 185    HDL Cholesterol 38    VLDL Cholesterol 31    LDL Cholesterol  52    LDL/HDL Ratio 1.21      TSH          6/19/2025    07:44   TSH   TSH 3.740             Procedures      Anika Casillas  reports that she has been smoking cigarettes. She started smoking about 50 years ago. She has a 15 pack-year smoking history. She has never used smokeless tobacco. I have educated her on the risk of diseases from using tobacco products such as cancer, COPD, and heart disease.     I advised her to quit and she is not willing to quit.    I spent 3  minutes counseling the patient.                Assessment and Plan        ASSESSMENT:  Encounter Diagnoses   Name Primary?    Essential hypertension     Paroxysmal atrial fibrillation Yes    Mixed hyperlipidemia          PLAN:    1.  Paroxysmal atrial fibrillation, clinically maintaining sinus rhythm.  Continue beta-blocker and anticoagulation.  2.  Essential hypertension, slightly elevated today, home readings seem  to be better controlled.  Continue to monitor at home  3.  Mixed hyperlipidemia, much improved LDL on Repatha.  I think her elevated triglycerides are likely reflective of glycemic control.    Routine annual follow-up will be arranged      Patient was given instructions and counseling regarding her condition or for health maintenance advice. Please see specific information pulled into the AVS if appropriate.           Mandeep Webb MD   6/23/2025  11:30 EDT

## 2025-06-30 ENCOUNTER — OFFICE VISIT (OUTPATIENT)
Dept: PULMONOLOGY | Facility: CLINIC | Age: 65
End: 2025-06-30
Payer: COMMERCIAL

## 2025-06-30 ENCOUNTER — TELEPHONE (OUTPATIENT)
Dept: GASTROENTEROLOGY | Facility: CLINIC | Age: 65
End: 2025-06-30
Payer: COMMERCIAL

## 2025-06-30 VITALS
DIASTOLIC BLOOD PRESSURE: 86 MMHG | TEMPERATURE: 98.4 F | SYSTOLIC BLOOD PRESSURE: 156 MMHG | RESPIRATION RATE: 16 BRPM | OXYGEN SATURATION: 98 % | HEART RATE: 90 BPM | HEIGHT: 65 IN | BODY MASS INDEX: 37.32 KG/M2 | WEIGHT: 224 LBS

## 2025-06-30 DIAGNOSIS — J43.9 PULMONARY EMPHYSEMA, UNSPECIFIED EMPHYSEMA TYPE: ICD-10-CM

## 2025-06-30 DIAGNOSIS — K22.70 BARRETT'S ESOPHAGUS WITHOUT DYSPLASIA: ICD-10-CM

## 2025-06-30 DIAGNOSIS — G47.33 OSA ON CPAP: ICD-10-CM

## 2025-06-30 DIAGNOSIS — Z72.0 TOBACCO ABUSE: Primary | ICD-10-CM

## 2025-06-30 PROCEDURE — 99214 OFFICE O/P EST MOD 30 MIN: CPT | Performed by: STUDENT IN AN ORGANIZED HEALTH CARE EDUCATION/TRAINING PROGRAM

## 2025-06-30 RX ORDER — ALBUTEROL SULFATE 90 UG/1
2 INHALANT RESPIRATORY (INHALATION) EVERY 4 HOURS PRN
Qty: 1 G | Refills: 5 | Status: SHIPPED | OUTPATIENT
Start: 2025-06-30

## 2025-06-30 RX ORDER — IPRATROPIUM BROMIDE AND ALBUTEROL SULFATE 2.5; .5 MG/3ML; MG/3ML
3 SOLUTION RESPIRATORY (INHALATION) 4 TIMES DAILY PRN
Qty: 360 ML | Refills: 3 | Status: SHIPPED | OUTPATIENT
Start: 2025-06-30

## 2025-06-30 RX ORDER — OMEPRAZOLE 40 MG/1
40 CAPSULE, DELAYED RELEASE ORAL DAILY
Qty: 30 CAPSULE | Refills: 11 | Status: SHIPPED | OUTPATIENT
Start: 2025-06-30

## 2025-06-30 NOTE — TELEPHONE ENCOUNTER
Medication Requested omeprazole    Last Refill 1/30/25    Last OV 3/27/25    Next OV 7/30/25    Medication pended for approval and correct pharmacy verified yes

## 2025-06-30 NOTE — PROGRESS NOTES
Primary Care Provider  Elena Henderson PA   Referring Provider  No ref. provider found      Patient Complaint  Emphysema, Follow-up (5 Month ), and Results (CT 2/24/25)      Subjective          Anika Casillas presents to [facility]ULMON[facility]INE      History of Presenting Illness  Anika Casillas is a 65 y.o. female with history of memory impairment, RUKHSANA on CPAP, hypertension, diabetes, COPD, tobacco use here for follow up.     Feeling stressed and smoking a little more.  recently diagnosed with pancreatic cancer.  Has chronic cough, nothing new  Shortness of breath, improved  Happy with regimen of trelegy 100mg daily.  Has rescue inhaler but rarely uses it, 2x/months  No fevers, chills, chest pain    Review of Systems  Constitutional:  No fever. No chills. No weakness.  Eyes: No pain, erythema, or discharge. No blurring of vision.  ENT:  No sore throat, URI symptoms.   Cardiovascular:  No chest pain. No palpitations. No lower extremity edema.  Respiratory:  +chronic cough; no pleuritic chest pain. No hemoptysis. +chronic dyspnea on exertion   GI:  Normal appetite. No nausea, vomiting, diarrhea. No pain. No melena.  Musculoskeletal:  No arthralgias or myalgias.  Neurologic:  No headache. No weakness.    Family History   Adopted: Yes   Problem Relation Age of Onset    No Known Problems Mother     No Known Problems Father     No Known Problems Sister     No Known Problems Brother     No Known Problems Maternal Aunt     No Known Problems Paternal Aunt     No Known Problems Maternal Uncle     No Known Problems Paternal Uncle     No Known Problems Maternal Grandfather     No Known Problems Maternal Grandmother     No Known Problems Paternal Grandfather     No Known Problems Paternal Grandmother     No Known Problems Cousin     No Known Problems Other     ADD / ADHD Son     Malig Hyperthermia Neg Hx     Alcohol abuse Neg Hx     Anxiety disorder Neg Hx     Bipolar disorder  Neg Hx     Dementia Neg Hx     Depression Neg Hx     Drug abuse Neg Hx     OCD Neg Hx     Paranoid behavior Neg Hx     Schizophrenia Neg Hx     Seizures Neg Hx     Self-Injurious Behavior  Neg Hx     Suicide Attempts Neg Hx         Social History     Socioeconomic History    Marital status:      Spouse name: fannie   Tobacco Use    Smoking status: Every Day     Current packs/day: 0.25     Average packs/day: 0.3 packs/day for 50.0 years (12.5 ttl pk-yrs)     Types: Cigarettes     Start date: 6/17/1975     Passive exposure: Current    Smokeless tobacco: Never    Tobacco comments:     States down to 1-2 a day 1/30/25   Vaping Use    Vaping status: Never Used   Substance and Sexual Activity    Alcohol use: Yes     Alcohol/week: 1.0 standard drink of alcohol     Types: 1 Drinks containing 0.5 oz of alcohol per week     Comment: 1 a month    Drug use: Never    Sexual activity: Yes     Partners: Male     Birth control/protection: Post-menopausal, Tubal ligation        Past Medical History:   Diagnosis Date    Abnormal Pap smear of cervix     Asthma     Atrial fibrillation     ON ELIQUIS/FOLLOWS SERGO    Colindres's esophagus     Cirrhosis 2015    Colon polyp 2015    Depression     Diabetes mellitus     Emphysema lung     INHALER    Endometrial hyperplasia     D&C SCHEDULED 7/25/23    Esophageal varices     Fatty liver 2015    Gastroparesis     TAKES XIFAXAN    GERD (gastroesophageal reflux disease) 1995    Hyperlipidemia     Hypertension     Iron deficiency anemia 05/02/2023    IRON INFUSION LAST 7/24/23    Irritable bowel syndrome     W/CONSTIPATION    BERKOWITZ (nonalcoholic steatohepatitis)     NO S/S CURRENTLY, ELEVATED ENZYMES    Obstructive sleep apnea syndrome     Pulmonary emphysema 11/22/2021    S/P exploratory laparotomy, lysis of adhesions, resection of necrotic colon without anastomosis 04/18/2022        Immunization History   Administered Date(s) Administered    COVID-19 (PFIZER) Purple Cap Monovalent  02/06/2021, 03/06/2021, 12/04/2021    Flu Vaccine Intradermal Quad 18-64YR 09/12/2021, 09/12/2021    Fluzone  >6mos 01/16/2025    Fluzone (or Fluarix & Flulaval for VFC) >6mos 10/29/2019, 09/27/2020, 10/03/2022, 10/05/2023    Influenza Split Preservative Free ID 09/12/2021    Influenza, Unspecified 11/12/2018, 09/27/2020    Pneumococcal Conjugate 13-Valent (PCV13) 01/09/2017    Pneumococcal Polysaccharide (PPSV23) 10/12/2020    flucelvax quad pfs =>4 YRS 09/27/2020       Allergies   Allergen Reactions    Liraglutide Nausea And Vomiting    Lisinopril Other (See Comments)     Cough    Metformin Nausea Only     GI Upset          Current Outpatient Medications:     albuterol sulfate  (90 Base) MCG/ACT inhaler, Inhale 2 puffs Every 4 (Four) Hours As Needed for Shortness of Air or Wheezing., Disp: 1 g, Rfl: 5    amLODIPine (NORVASC) 10 MG tablet, Take 1 tablet by mouth Daily., Disp: 90 tablet, Rfl: 1    apixaban (Eliquis) 5 MG tablet tablet, Take 1 tablet by mouth 2 (Two) Times a Day., Disp: 28 tablet, Rfl: 0    B-D UF III MINI PEN NEEDLES 31G X 5 MM misc, , Disp: , Rfl:     Blood Glucose Monitoring Suppl (Accu-Chek Guide) w/Device kit, , Disp: , Rfl:     busPIRone (BUSPAR) 10 MG tablet, Take 1 tablet by mouth 3 (Three) Times a Day., Disp: 90 tablet, Rfl: 2    Cholecalciferol (Vitamin D3) 50 MCG (2000 UT) capsule, Take 3 capsules by mouth Daily. LAST DOSE 7/21/23, Disp: , Rfl:     Continuous Glucose Sensor (FreeStyle Sharon 3 Plus Sensor), USE AS DIRECTED EVER 15 DAYS, Disp: , Rfl:     dapagliflozin Propanediol 10 MG tablet, Take 10 mg by mouth., Disp: , Rfl:     DULoxetine (CYMBALTA) 60 MG capsule, Take 1 capsule by mouth Daily., Disp: 90 capsule, Rfl: 3    Evolocumab (Repatha SureClick) solution auto-injector SureClick injection, Inject 1 mL under the skin into the appropriate area as directed Every 14 (Fourteen) Days., Disp: 6 mL, Rfl: 3    famotidine (PEPCID) 40 MG tablet, Take 1 tablet by mouth At Night As  Needed for Heartburn., Disp: 90 tablet, Rfl: 3    Fluticasone-Umeclidin-Vilant (TRELEGY) 100-62.5-25 MCG/ACT inhaler, Inhale 1 puff Daily., Disp: 1 each, Rfl: 5    furosemide (LASIX) 40 MG tablet, Take 1 tablet by mouth Every Other Day., Disp: 30 tablet, Rfl: 3    glucose blood test strip, Check blood glucose twice daily, Disp: 60 each, Rfl: 12    glucose monitor monitoring kit, 1 each Daily. Patient prefers Accu Chek Meter., Disp: 1 each, Rfl: 0    Insulin Lispro, 1 Unit Dial, (HUMALOG) 100 UNIT/ML solution pen-injector, Inject 60 Units under the skin into the appropriate area as directed 3 (Three) Times a Day., Disp: , Rfl:     ipratropium-albuterol (DUO-NEB) 0.5-2.5 mg/3 ml nebulizer, Take 3 mL by nebulization 4 (Four) Times a Day As Needed for Wheezing or Shortness of Air., Disp: 360 mL, Rfl: 3    Lancets (accu-chek soft touch) lancets, Check blood sugar twice daily, Disp: 100 each, Rfl: 12    metoprolol tartrate (LOPRESSOR) 50 MG tablet, Take 1.5 tablets by mouth 2 (Two) Times a Day., Disp: 270 tablet, Rfl: 3    multivitamin with minerals tablet tablet, Take 1 tablet by mouth Daily. LAST DOSE 7/21/23, Disp: , Rfl:     olmesartan (BENICAR) 20 MG tablet, Take 1 tablet by mouth Daily., Disp: 90 tablet, Rfl: 1    omeprazole (priLOSEC) 40 MG capsule, Take 1 capsule by mouth Daily., Disp: 30 capsule, Rfl: 11    potassium chloride 10 MEQ CR tablet, TAKE 1 TABLET BY MOUTH EVERY OTHER DAY WITH LASIX, Disp: 45 tablet, Rfl: 3    promethazine (PHENERGAN) 25 MG tablet, Take 1 tablet by mouth Every 6 (Six) Hours As Needed for Nausea or Vomiting., Disp: 30 tablet, Rfl: 0    riFAXIMin (Xifaxan) 550 MG tablet, Take 1 tablet by mouth Every 12 (Twelve) Hours., Disp: 180 tablet, Rfl: 3    Semaglutide,0.25 or 0.5MG/DOS, (Ozempic, 0.25 or 0.5 MG/DOSE,) 2 MG/1.5ML solution pen-injector, Inject  under the skin into the appropriate area as directed 1 (One) Time Per Week., Disp: , Rfl:     traZODone (DESYREL) 50 MG tablet, Take 1 tablet  "by mouth Every Night. (Patient taking differently: Take 1 tablet by mouth Every Night. PT TAKES THIS PRN.), Disp: 30 tablet, Rfl: 2     Objective     Vital Signs:   /86 (BP Location: Left arm, Patient Position: Sitting, Cuff Size: Adult)   Pulse 90   Temp 98.4 °F (36.9 °C) (Oral)   Resp 16   Ht 165.1 cm (65\")   Wt 102 kg (224 lb)   SpO2 98% Comment: Room air  BMI 37.28 kg/m²     Physical Exam  Vitals:    06/30/25 1120   BP: 156/86   Pulse: 90   Resp: 16   Temp: 98.4 °F (36.9 °C)   SpO2: 98%         General: Alert, NAD  HEENT:  EOMI, no sinus tenderness  Neck:  Supple, no thyromegaly,  no JVD  Lymph: no cervical, supraclavicular lymphadenopathy bilaterally  Chest:  clear to auscultation bilaterally, no wheezing or crackles; no work of breathing noted on room air  CV: RRR, no M/G/R, pulses 2+, equal.  Abd:  Soft, NT, ND, +BS, obese  EXT:  no clubbing, no cyanosis, no edema b/l  Neuro:  A&Ox3, CN grossly intact, no focal deficits  Skin: No rashes or lesions noted       Result Review :   I have personally reviewed patient's labs and images.          Assessment and Plan      Patient Active Problem List   Diagnosis    Diabetic gastroparesis    Diverticulosis of colon    Elevated transaminase level    Essential hypertension    Gastroesophageal reflux disease without esophagitis    Hx of colonic polyp    Major depressive disorder with single episode, in full remission    Mixed hyperlipidemia    BERKOWITZ (nonalcoholic steatohepatitis)    Class 2 severe obesity due to excess calories with serious comorbidity and body mass index (BMI) of 36.0 to 36.9 in adult    OAB (overactive bladder)    Paroxysmal atrial fibrillation    Tobacco abuse    Type 2 diabetes mellitus with hyperglycemia, with long-term current use of insulin    Pulmonary emphysema    Colindres's esophagus without dysplasia    Secondary esophageal varices without bleeding    Encounter for long-term (current) use of insulin    Hyperinsulinism    Uncontrolled " type 2 diabetes mellitus with neurologic complication    Class 2 obesity    Chronic gastritis without bleeding, unspecified gastritis type    Pneumoperitoneum of unknown etiology    S/P exploratory laparotomy, lysis of adhesions, resection of necrotic colon without anastomosis    Memory loss    RUKHSANA on CPAP    Abnormal finding on MRI of brain    Iron deficiency anemia    Non-alcoholic cirrhosis    Depression    Encephalopathy, portal systemic    Esophageal varices in cirrhosis    Alkaline phosphatase raised    Iron deficiency    Other specified postprocedural states    Endometrial hyperplasia    Iron malabsorption    Endometrial polyp    Localized edema    Simple renal cyst    Esophageal varices without bleeding    Chronic kidney disease, stage 2 (mild)    Obesity (BMI 35.0-39.9 without comorbidity)    Cirrhosis of liver without ascites    Hepatic encephalopathy    History of colon polyps       Impression and Plan:    COPD/emphysema  Active smoker (4 cigs/day)  Proximal A. fib on Eliquis  History of hypertension  RUKHSANA on CPAP    -Continue Trelegy Ellipta daily and albuterol as needed. Remember patient to rinse mouth after each use  -Continue DuoNeb as needed  -PFTs done 7/5/2022 showed normal spirometry, mild hyperinflation with signs of air trapping, and moderate reduction in DLCO.  There was no response to bronchodilator therapy.   -2D echo done 1/27/23 showed EF 66-70%, LVH, G1DD  -Low-dose CT for cancer screening done 2/2025 unremarkable, continue annual screening  -Continue PPI for acid reflux  -Continue jonathan pot for postnasal drip if beneficial  -Continue CPAP with [employer] for RUKHSANA  -Continue discussion regarding smoking cessation; recently switched to Cymbalta (from Wellbutrin); will defer Chantix at this time  -Follow-up in 8 months or earlier as needed, after repeat CT     Vaccination status: Patient is up-to-date with her vaccinations  Medications personally reviewed.    Follow Up   No follow-ups on  file.  Patient was given instructions and counseling regarding her condition or for health maintenance advice. Please see specific information pulled into the AVS if appropriate.

## 2025-07-07 ENCOUNTER — LAB (OUTPATIENT)
Facility: HOSPITAL | Age: 65
End: 2025-07-07
Payer: COMMERCIAL

## 2025-07-07 DIAGNOSIS — K74.60 CIRRHOSIS OF LIVER WITHOUT ASCITES, UNSPECIFIED HEPATIC CIRRHOSIS TYPE: ICD-10-CM

## 2025-07-07 DIAGNOSIS — D50.9 IRON DEFICIENCY ANEMIA, UNSPECIFIED IRON DEFICIENCY ANEMIA TYPE: ICD-10-CM

## 2025-07-07 DIAGNOSIS — I85.00 ESOPHAGEAL VARICES WITHOUT BLEEDING, UNSPECIFIED ESOPHAGEAL VARICES TYPE: ICD-10-CM

## 2025-07-07 LAB
BASOPHILS # BLD AUTO: 0.08 10*3/MM3 (ref 0–0.2)
BASOPHILS NFR BLD AUTO: 1 % (ref 0–1.5)
DEPRECATED RDW RBC AUTO: 50.3 FL (ref 37–54)
EOSINOPHIL # BLD AUTO: 0.21 10*3/MM3 (ref 0–0.4)
EOSINOPHIL NFR BLD AUTO: 2.6 % (ref 0.3–6.2)
ERYTHROCYTE [DISTWIDTH] IN BLOOD BY AUTOMATED COUNT: 16.2 % (ref 12.3–15.4)
FERRITIN SERPL-MCNC: 80.11 NG/ML (ref 13–150)
HCT VFR BLD AUTO: 35.7 % (ref 34–46.6)
HGB BLD-MCNC: 12.6 G/DL (ref 12–15.9)
IMM GRANULOCYTES # BLD AUTO: 0.01 10*3/MM3 (ref 0–0.05)
IMM GRANULOCYTES NFR BLD AUTO: 0.1 % (ref 0–0.5)
IRON 24H UR-MRATE: 61 MCG/DL (ref 37–145)
IRON SATN MFR SERPL: 15 % (ref 20–50)
LYMPHOCYTES # BLD AUTO: 2.94 10*3/MM3 (ref 0.7–3.1)
LYMPHOCYTES NFR BLD AUTO: 36.6 % (ref 19.6–45.3)
MCH RBC QN AUTO: 30.1 PG (ref 26.6–33)
MCHC RBC AUTO-ENTMCNC: 35.3 G/DL (ref 31.5–35.7)
MCV RBC AUTO: 85.2 FL (ref 79–97)
MONOCYTES # BLD AUTO: 0.6 10*3/MM3 (ref 0.1–0.9)
MONOCYTES NFR BLD AUTO: 7.5 % (ref 5–12)
NEUTROPHILS NFR BLD AUTO: 4.2 10*3/MM3 (ref 1.7–7)
NEUTROPHILS NFR BLD AUTO: 52.2 % (ref 42.7–76)
NRBC BLD AUTO-RTO: 0 /100 WBC (ref 0–0.2)
PLATELET # BLD AUTO: 278 10*3/MM3 (ref 140–450)
PMV BLD AUTO: 10.5 FL (ref 6–12)
RBC # BLD AUTO: 4.19 10*6/MM3 (ref 3.77–5.28)
TIBC SERPL-MCNC: 417 MCG/DL (ref 298–536)
TRANSFERRIN SERPL-MCNC: 280 MG/DL (ref 200–360)
WBC NRBC COR # BLD AUTO: 8.04 10*3/MM3 (ref 3.4–10.8)

## 2025-07-07 PROCEDURE — 85025 COMPLETE CBC W/AUTO DIFF WBC: CPT

## 2025-07-07 PROCEDURE — 83540 ASSAY OF IRON: CPT

## 2025-07-07 PROCEDURE — 36415 COLL VENOUS BLD VENIPUNCTURE: CPT

## 2025-07-07 PROCEDURE — 82728 ASSAY OF FERRITIN: CPT

## 2025-07-07 PROCEDURE — 84466 ASSAY OF TRANSFERRIN: CPT

## 2025-07-24 ENCOUNTER — OFFICE VISIT (OUTPATIENT)
Dept: PSYCHIATRY | Facility: CLINIC | Age: 65
End: 2025-07-24
Payer: COMMERCIAL

## 2025-07-24 VITALS
SYSTOLIC BLOOD PRESSURE: 123 MMHG | DIASTOLIC BLOOD PRESSURE: 67 MMHG | HEIGHT: 65 IN | WEIGHT: 221 LBS | HEART RATE: 83 BPM | BODY MASS INDEX: 36.82 KG/M2

## 2025-07-24 DIAGNOSIS — F33.1 MAJOR DEPRESSIVE DISORDER, RECURRENT EPISODE, MODERATE: Primary | ICD-10-CM

## 2025-07-24 DIAGNOSIS — F41.1 GENERALIZED ANXIETY DISORDER: ICD-10-CM

## 2025-07-24 DIAGNOSIS — F51.05 INSOMNIA DUE TO MENTAL CONDITION: ICD-10-CM

## 2025-07-24 NOTE — PROGRESS NOTES
"Subjective   Anika Casillas is a 65 y.o. female who presents today for initial evaluation     Referring Provider:  No referring provider defined for this encounter.    Chief Complaint:  depression    History of Present Illness:     Chart Review By Dates:  07/24/2025: cards, pulm; low iron TSAT, high RDW on cbc, reassuring ferritin.  06/20/2025: not seen in about a year. S/p colonoscopy, abnl labs  07/30/2024: 6/26 seen in ED for vertigo, urogyn x2, infusion x4, fam med, endocrine, onc. Neg CTOH. High ferritin, abnl iron profile, hgb 11.8 (anemia).    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      06/20/2025:   In person interview:  \"I'm not working anymore, I'm on disability (medical0.\"  I don't like it; I haven't been doing things  Saw Elena last month, I was not doing good  Stopped bupropion 2 mos ago; I had been on it for 3 years to help with smoking  Feeling more depressed   may have pancreatic cancer  Daughter moving in with her next week  Helps to have her  to take care of  Lost her insurance and wasn't able to see me for the last year  \"I would never hurt myself\"  I always feel like I'm a burden  MDD, seasonal: depressed mood  KERRY: worrying " "uncontrollably, on edge, irritable  Panic attacks: none  Energy: down  Concentration: down  Insomnia: initial  Eating/Weight: 223, 237, 228 lbs  Refills: y  Substances: def  Therapy: n  Medication compliant: y  SE: n  No SI HI AVH.      2024:   In person interview:  \"I'm good.\"  The cymbalta helped quite a bit.  Better but not where I need to be  Not sleeping, 3-4 hours a night  I want to cry but I just can't  Vertigo is gone  MDD: depressed mood  KERRY: worrying uncontrollably, on edge, irritable  Panic attacks: none  Energy: down  Concentration: down  Insomnia: initial  Eating/Weight: 237, 228 lbs  Refills: y  Substances: def  Therapy: n  Medication compliant: y  SE: n  No SI HI AVH.    ...    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   Per  note by neurology, this abnormality is benign and incidental.  Labs: 2024: CMP shows elevated alk phos 133, mildly elevated AST 95, ALT 87, reassuring thyroid studies, B12, folate, vitamin D  Initial Chart Review Notes: Sent to us in February by primary care for memory loss, fatigue, poor concentration.  Neuropsychological evaluation diagnosed her with mild cognitive impairment, monitor for progressive dementia, vascular AD, or mixed type, metabolic encephalopathy.  Recently given corrective action at work.  Concerned with her job performance.  She is on CPAP.  Seen this past year " "by sleep medicine.  She wants to take time off.  Depression.    05/01/2024: In person.  Interview:  His/Her Story: \"I made a mistake... 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  \"This really bothers me.\"  It's been slowly progressing. I have to think these days.  Lexapro for a few months.  I've been on antidepressants all my life.  Son dx'd with ADHD at 8 yo (Imtiaz), FOB is not , unsure if FOB has dx of ADHD  I didn't know my parents  Insomnia: sometimes won't sleep at all, other times sleeps too much  Depression/Mood:  Depressed mood, anhedonia, hopelessness or guilt, poor energy, poor concentration, insomnia.  Seasonal pattern:  Severity: Moderate  Duration: all of life  Anxiety:  Uncontrolled worrying, muscle tension, fatigue, poor concentration, feeling on edge or restless, irritability, insomnia.  Severity: Moderate  Duration: all of life  Panic attacks: one in her life 40's  ADHD:   Elementary school:   Grades? good  Special classes or failures? denies  Got in trouble? no  Referral for ADHD testing? no  Fhx: Son  Psych ROS: Positive for depression, anxiety.  Negative for psychosis and shamika.  PTSD: def  No SI HI AVH.  Medication compliant: y    Access to Firearms: denies    PHQ-9 Depression Screening  PHQ-9 Total Score:      Little interest or pleasure in doing things?     Feeling down, depressed, or hopeless?     Trouble falling or staying asleep, or sleeping too much?     Feeling tired or having little energy?     Poor appetite or overeating?     Feeling bad about yourself - or that you are a failure or have let yourself or your family down?     Trouble concentrating on things, such as reading the newspaper or watching television?     Moving or speaking so slowly that other people could have noticed? Or the opposite - being so fidgety or restless that you have been moving around a lot more than usual?     Thoughts that you would be better off dead, or of hurting " yourself in some way?     PHQ-9 Total Score       KERRY-7       Past Surgical History:  Past Surgical History:   Procedure Laterality Date    ABDOMINAL SURGERY  4/16/2022    BLADDER SLING MODIFIED, ANTERIOR AND POSTERIOR VAGINAL REPAIR  2018    [place]    BLADDER SURGERY  05/01/2024    stimulator  @ [facility]    BOTOX INJECTION      In abdomen    BREAST LUMPECTOMY Left     BENIGN    CARDIAC CATHETERIZATION      NO INTERVENTION    CHOLECYSTECTOMY  5/12/2015    COLON RESECTION      PERFORATED COLON, EEA    COLONOSCOPY N/A 08/14/2023    Procedure: COLONOSCOPY WITH POLYPECTOMY, TATTOO;  Surgeon: Shannan Charles MD;  Location: [facility] ENDOSCOPY;  Service: Gastroenterology;  Laterality: N/A;  COLON POLYPS  DIVERTICULOSIS  HEMORRHOIDS    COLONOSCOPY N/A 04/22/2024    Procedure: COLONOSCOPY COLD SNARE POLYPECTOMIES WITH CLIP PLACEMENT X3;  Surgeon: Shannan Charles MD;  Location: [facility] ENDOSCOPY;  Service: Gastroenterology;  Laterality: N/A;  DIVERTICULOSIS  COLON POLYPS    COLONOSCOPY N/A 3/18/2025    Procedure: COLONOSCOPY with cold snare polypectomies;  Surgeon: Shannan Charles MD;  Location: [facility] ENDOSCOPY;  Service: Gastroenterology;  Laterality: N/A;  colon polyps, diverticulosis    D & C HYSTEROSCOPY N/A 07/25/2023    Procedure: resection of endometrial polyp with myosure;  Surgeon: Ashleigh Harding DO;  Location: [facility] MAIN OR;  Service: Gynecology;  Laterality: N/A;    ENDOSCOPY N/A 06/03/2022    Procedure: ESOPHAGOGASTRODUODENOSCOPY;  Surgeon: Shannan Charles MD;  Location: [facility] ENDOSCOPY;  Service: Gastroenterology;  Laterality: N/A;  RETAINED FOOD IN STOMACH  ESOPHAGEAL VARICIES    ENDOSCOPY N/A 04/22/2024    Procedure: ESOPHAGOGASTRODUODENOSCOPY WITH BIOPSIES;  Surgeon: Shannan Charles MD;  Location: [facility] ENDOSCOPY;  Service: Gastroenterology;  Laterality: N/A;  ESOPHAGEAL BIOPSIES    EXPLORATORY LAPAROTOMY N/A 04/16/2022    Procedure: EXPLORATORY LAPAROTOMY, LYSIS  OF ADHESIONS, RESECTION OF NECTROIC COLON;  Surgeon: Cande Stanley MD;  Location: [facility] MAIN OR;  Service: General;  Laterality: N/A;    EYE SURGERY  2023    GALLBLADDER SURGERY      LAPAROSCOPIC    LIVER BIOPSY  2021    TUBAL ABDOMINAL LIGATION      UPPER GASTROINTESTINAL ENDOSCOPY  6/3/2022       Problem List:  Patient Active Problem List   Diagnosis    Diabetic gastroparesis    Diverticulosis of colon    Elevated transaminase level    Essential hypertension    Gastroesophageal reflux disease without esophagitis    Hx of colonic polyp    Major depressive disorder with single episode, in full remission    Mixed hyperlipidemia    BERKOWITZ (nonalcoholic steatohepatitis)    Class 2 severe obesity due to excess calories with serious comorbidity and body mass index (BMI) of 36.0 to 36.9 in adult    OAB (overactive bladder)    Paroxysmal atrial fibrillation    Tobacco abuse    Type 2 diabetes mellitus with hyperglycemia, with long-term current use of insulin    Pulmonary emphysema    Colindres's esophagus without dysplasia    Secondary esophageal varices without bleeding    Encounter for long-term (current) use of insulin    Hyperinsulinism    Uncontrolled type 2 diabetes mellitus with neurologic complication    Class 2 obesity    Chronic gastritis without bleeding, unspecified gastritis type    Pneumoperitoneum of unknown etiology    S/P exploratory laparotomy, lysis of adhesions, resection of necrotic colon without anastomosis    Memory loss    RUKHSANA on CPAP    Abnormal finding on MRI of brain    Iron deficiency anemia    Non-alcoholic cirrhosis    Depression    Encephalopathy, portal systemic    Esophageal varices in cirrhosis    Alkaline phosphatase raised    Iron deficiency    Other specified postprocedural states    Endometrial hyperplasia    Iron malabsorption    Endometrial polyp    Localized edema    Simple renal cyst    Esophageal varices without bleeding    Chronic kidney disease, stage 2 (mild)    Obesity  (BMI 35.0-39.9 without comorbidity)    Cirrhosis of liver without ascites    Hepatic encephalopathy    History of colon polyps       Allergy:   Allergies   Allergen Reactions    Liraglutide Nausea And Vomiting    Lisinopril Other (See Comments)     Cough    Metformin Nausea Only     GI Upset        Discontinued Medications:  There are no discontinued medications.          Current Medications:   Current Outpatient Medications   Medication Sig Dispense Refill    albuterol sulfate  (90 Base) MCG/ACT inhaler Inhale 2 puffs Every 4 (Four) Hours As Needed for Shortness of Air or Wheezing. 1 g 5    amLODIPine (NORVASC) 10 MG tablet Take 1 tablet by mouth Daily. 90 tablet 1    apixaban (Eliquis) 5 MG tablet tablet Take 1 tablet by mouth 2 (Two) Times a Day. 28 tablet 0    B-D UF III MINI PEN NEEDLES 31G X 5 MM misc       Blood Glucose Monitoring Suppl (Accu-Chek Guide) w/Device kit       busPIRone (BUSPAR) 10 MG tablet Take 1 tablet by mouth 3 (Three) Times a Day. 90 tablet 2    Cholecalciferol (Vitamin D3) 50 MCG (2000 UT) capsule Take 3 capsules by mouth Daily. LAST DOSE 7/21/23      Continuous Glucose Sensor (FreeStyle Sharon 3 Plus Sensor) USE AS DIRECTED EVER 15 DAYS      dapagliflozin Propanediol 10 MG tablet Take 10 mg by mouth.      DULoxetine (CYMBALTA) 60 MG capsule Take 1 capsule by mouth Daily. 90 capsule 3    Evolocumab (Repatha SureClick) solution auto-injector SureClick injection Inject 1 mL under the skin into the appropriate area as directed Every 14 (Fourteen) Days. 6 mL 3    famotidine (PEPCID) 40 MG tablet Take 1 tablet by mouth At Night As Needed for Heartburn. 90 tablet 3    Fluticasone-Umeclidin-Vilant (TRELEGY) 100-62.5-25 MCG/ACT inhaler Inhale 1 puff Daily. 3 each 3    furosemide (LASIX) 40 MG tablet Take 1 tablet by mouth Every Other Day. 30 tablet 3    glucose blood test strip Check blood glucose twice daily 60 each 12    glucose monitor monitoring kit 1 each Daily. Patient prefers Accu  Chek Meter. 1 each 0    ipratropium-albuterol (DUO-NEB) 0.5-2.5 mg/3 ml nebulizer Take 3 mL by nebulization 4 (Four) Times a Day As Needed for Wheezing or Shortness of Air. 360 mL 3    Lancets (accu-chek soft touch) lancets Check blood sugar twice daily 100 each 12    metoprolol tartrate (LOPRESSOR) 50 MG tablet Take 1.5 tablets by mouth 2 (Two) Times a Day. 270 tablet 3    multivitamin with minerals tablet tablet Take 1 tablet by mouth Daily. LAST DOSE 7/21/23      olmesartan (BENICAR) 20 MG tablet Take 1 tablet by mouth Daily. 90 tablet 1    omeprazole (priLOSEC) 40 MG capsule Take 1 capsule by mouth Daily. 30 capsule 11    potassium chloride 10 MEQ CR tablet TAKE 1 TABLET BY MOUTH EVERY OTHER DAY WITH LASIX 45 tablet 3    promethazine (PHENERGAN) 25 MG tablet Take 1 tablet by mouth Every 6 (Six) Hours As Needed for Nausea or Vomiting. 30 tablet 0    riFAXIMin (Xifaxan) 550 MG tablet Take 1 tablet by mouth Every 12 (Twelve) Hours. 180 tablet 3    Semaglutide,0.25 or 0.5MG/DOS, (Ozempic, 0.25 or 0.5 MG/DOSE,) 2 MG/1.5ML solution pen-injector Inject  under the skin into the appropriate area as directed 1 (One) Time Per Week.      traZODone (DESYREL) 50 MG tablet Take 1 tablet by mouth Every Night. (Patient taking differently: Take 1 tablet by mouth Every Night. PT TAKES THIS PRN.) 30 tablet 2     No current facility-administered medications for this visit.       Past Medical History:  Past Medical History:   Diagnosis Date    Abnormal Pap smear of cervix     Asthma     Atrial fibrillation     ON ELIQUIS/FOLLOWS SERGO    Colindres's esophagus     Cirrhosis 2015    Colon polyp 2015    Depression     Diabetes mellitus 4/20/2016    Emphysema lung     INHALER    Endometrial hyperplasia     D&C SCHEDULED 7/25/23    Esophageal varices     Fatty liver 2015    Gastroparesis     TAKES XIFAXAN    GERD (gastroesophageal reflux disease) 1995    Hyperlipidemia     Hypertension     Iron deficiency anemia 05/02/2023    IRON INFUSION  LAST 23    Irritable bowel syndrome     W/CONSTIPATION    BERKOWITZ (nonalcoholic steatohepatitis)     NO S/S CURRENTLY, ELEVATED ENZYMES    Obstructive sleep apnea syndrome     Pulmonary emphysema 2021    S/P exploratory laparotomy, lysis of adhesions, resection of necrotic colon without anastomosis 2022       Past Psychiatric History:  Began Treatment: all my life, 20's  Diagnoses: MDD KERRY  Psychiatrist: 's, then in 40's  Therapist:Denies  Admission History:Denies  Medication Trials:    Celexa worked for a while, then stopped    lexapro, bupropion    Cymbalta, might have worked    Self Harm: Denies  Suicide Attempts:Denies   Psychosis, Anxiety, Depression: denies    Substance Abuse History:   Types: 1/4 ppd, alcohol very rarely  Withdrawal Symptoms:Denies  Longest Period Sober:Not Applicable   AA: Not applicable     Social History:  Martial Status:  Employed:No  Kids:Yes  House:Lives in a house   History: Denies    Social History     Socioeconomic History    Marital status:      Spouse name: fannie   Tobacco Use    Smoking status: Every Day     Current packs/day: 0.25     Average packs/day: 0.3 packs/day for 50.1 years (12.5 ttl pk-yrs)     Types: Cigarettes     Start date: 1975     Passive exposure: Current    Smokeless tobacco: Never    Tobacco comments:     States down to 1-2 a day 25   Vaping Use    Vaping status: Never Used   Substance and Sexual Activity    Alcohol use: Yes     Alcohol/week: 1.0 standard drink of alcohol     Types: 1 Drinks containing 0.5 oz of alcohol per week     Comment: 1 a month    Drug use: Never    Sexual activity: Yes     Partners: Male     Birth control/protection: Post-menopausal, Tubal ligation       Family History:   Suicide Attempts: Denies  Suicide Completions:Denies      Family History   Adopted: Yes   Problem Relation Age of Onset    No Known Problems Mother     No Known Problems Father     No Known Problems Sister      "No Known Problems Brother     No Known Problems Maternal Aunt     No Known Problems Paternal Aunt     No Known Problems Maternal Uncle     No Known Problems Paternal Uncle     No Known Problems Maternal Grandfather     No Known Problems Maternal Grandmother     No Known Problems Paternal Grandfather     No Known Problems Paternal Grandmother     No Known Problems Cousin     No Known Problems Other     ADD / ADHD Son     Malig Hyperthermia Neg Hx     Alcohol abuse Neg Hx     Anxiety disorder Neg Hx     Bipolar disorder Neg Hx     Dementia Neg Hx     Depression Neg Hx     Drug abuse Neg Hx     OCD Neg Hx     Paranoid behavior Neg Hx     Schizophrenia Neg Hx     Seizures Neg Hx     Self-Injurious Behavior  Neg Hx     Suicide Attempts Neg Hx        Developmental History:       Childhood: denies  High School:Completed  College: Nurse    Mental Status Exam  Appearance  : groomed, good eye contact, normal street clothes  Behavior  : pleasant and cooperative  Motor  : No abnormal  Speech  :normal rhythm, rate, volume, tone, not hyperverbal, not pressured, normal prosidy  Mood  : \"The buspar helping\"  Affect  : euthymic, mood congruent, good variability  Thought Content  : negative suicidal ideations, negative homicidal ideations, negative obsessions  Perceptions  : negative auditory hallucinations, negative visual hallucinations  Thought Process  : linear  Insight/Judgement  : Fair/fair  Cognition  : grossly intact  Attention   : intact      Review of Systems:  Review of Systems   Constitutional: Negative.  Negative for diaphoresis and fatigue.   HENT: Negative.  Negative for drooling.    Eyes: Negative.  Negative for visual disturbance.   Respiratory: Negative.  Negative for cough and shortness of breath.    Cardiovascular: Negative.  Negative for chest pain, palpitations and leg swelling.   Gastrointestinal: Negative.  Negative for nausea and vomiting.   Endocrine: Negative.  Negative for cold intolerance and heat " "intolerance.   Genitourinary: Negative.  Negative for difficulty urinating.   Musculoskeletal: Negative.  Negative for joint swelling.   Allergic/Immunologic: Negative.  Negative for immunocompromised state.   Neurological: Negative.  Negative for dizziness, seizures, speech difficulty and numbness.   Psychiatric/Behavioral:  Positive for confusion.        Physical Exam:  Physical Exam    Vital Signs:   /67   Pulse 83   Ht 165.1 cm (65\")   Wt 100 kg (221 lb)   BMI 36.78 kg/m²      Lab Results:   Lab on 07/07/2025   Component Date Value Ref Range Status    Iron 07/07/2025 61  37 - 145 mcg/dL Final    Iron Saturation (TSAT) 07/07/2025 15 (L)  20 - 50 % Final    Transferrin 07/07/2025 280  200 - 360 mg/dL Final    TIBC 07/07/2025 417  298 - 536 mcg/dL Final    Ferritin 07/07/2025 80.11  13.00 - 150.00 ng/mL Final    WBC 07/07/2025 8.04  3.40 - 10.80 10*3/mm3 Final    RBC 07/07/2025 4.19  3.77 - 5.28 10*6/mm3 Final    Hemoglobin 07/07/2025 12.6  12.0 - 15.9 g/dL Final    Hematocrit 07/07/2025 35.7  34.0 - 46.6 % Final    MCV 07/07/2025 85.2  79.0 - 97.0 fL Final    MCH 07/07/2025 30.1  26.6 - 33.0 pg Final    MCHC 07/07/2025 35.3  31.5 - 35.7 g/dL Final    RDW 07/07/2025 16.2 (H)  12.3 - 15.4 % Final    RDW-SD 07/07/2025 50.3  37.0 - 54.0 fl Final    MPV 07/07/2025 10.5  6.0 - 12.0 fL Final    Platelets 07/07/2025 278  140 - 450 10*3/mm3 Final    Neutrophil % 07/07/2025 52.2  42.7 - 76.0 % Final    Lymphocyte % 07/07/2025 36.6  19.6 - 45.3 % Final    Monocyte % 07/07/2025 7.5  5.0 - 12.0 % Final    Eosinophil % 07/07/2025 2.6  0.3 - 6.2 % Final    Basophil % 07/07/2025 1.0  0.0 - 1.5 % Final    Immature Grans % 07/07/2025 0.1  0.0 - 0.5 % Final    Neutrophils, Absolute 07/07/2025 4.20  1.70 - 7.00 10*3/mm3 Final    Lymphocytes, Absolute 07/07/2025 2.94  0.70 - 3.10 10*3/mm3 Final    Monocytes, Absolute 07/07/2025 0.60  0.10 - 0.90 10*3/mm3 Final    Eosinophils, Absolute 07/07/2025 0.21  0.00 - 0.40 10*3/mm3 " Final    Basophils, Absolute 07/07/2025 0.08  0.00 - 0.20 10*3/mm3 Final    Immature Grans, Absolute 07/07/2025 0.01  0.00 - 0.05 10*3/mm3 Final    nRBC 07/07/2025 0.0  0.0 - 0.2 /100 WBC Final   Lab on 06/19/2025   Component Date Value Ref Range Status    Total Cholesterol 06/19/2025 121  0 - 200 mg/dL Final    Triglycerides 06/19/2025 185 (H)  0 - 150 mg/dL Final    HDL Cholesterol 06/19/2025 38 (L)  40 - 60 mg/dL Final    LDL Cholesterol  06/19/2025 52  0 - 100 mg/dL Final    VLDL Cholesterol 06/19/2025 31  5 - 40 mg/dL Final    LDL/HDL Ratio 06/19/2025 1.21   Final    Glucose 06/19/2025 275 (H)  65 - 99 mg/dL Final    BUN 06/19/2025 9.0  8.0 - 23.0 mg/dL Final    Creatinine 06/19/2025 0.83  0.57 - 1.00 mg/dL Final    Sodium 06/19/2025 139  136 - 145 mmol/L Final    Potassium 06/19/2025 3.7  3.5 - 5.2 mmol/L Final    Slight hemolysis detected by analyzer. Result may be falsely elevated.    Chloride 06/19/2025 106  98 - 107 mmol/L Final    CO2 06/19/2025 21.0 (L)  22.0 - 29.0 mmol/L Final    Calcium 06/19/2025 9.8  8.6 - 10.5 mg/dL Final    Total Protein 06/19/2025 8.3  6.0 - 8.5 g/dL Final    Albumin 06/19/2025 3.6  3.5 - 5.2 g/dL Final    ALT (SGPT) 06/19/2025 46 (H)  1 - 33 U/L Final    AST (SGOT) 06/19/2025 60 (H)  1 - 32 U/L Final    Alkaline Phosphatase 06/19/2025 135 (H)  39 - 117 U/L Final    Total Bilirubin 06/19/2025 0.3  0.0 - 1.2 mg/dL Final    Globulin 06/19/2025 4.7  gm/dL Final    A/G Ratio 06/19/2025 0.8  g/dL Final    BUN/Creatinine Ratio 06/19/2025 10.8  7.0 - 25.0 Final    Anion Gap 06/19/2025 12.0  5.0 - 15.0 mmol/L Final    eGFR 06/19/2025 79.8  >60.0 mL/min/1.73 Final    TSH 06/19/2025 3.740  0.270 - 4.200 uIU/mL Final    Free T4 06/19/2025 0.97  0.92 - 1.68 ng/dL Final    25 Hydroxy, Vitamin D 06/19/2025 35.8  30.0 - 100.0 ng/ml Final    Microalbumin/Creatinine Ratio 06/19/2025 34.5 (H)  0.0 - 29.0 mg/g Final    Creatinine, Urine 06/19/2025 142.2  mg/dL Final    Microalbumin, Urine  06/19/2025 4.9  mg/dL Final   Lab on 04/22/2025   Component Date Value Ref Range Status    Iron 04/22/2025 44  37 - 145 mcg/dL Final    Iron Saturation (TSAT) 04/22/2025 13 (L)  20 - 50 % Final    Transferrin 04/22/2025 234  200 - 360 mg/dL Final    TIBC 04/22/2025 349  298 - 536 mcg/dL Final    Ferritin 04/22/2025 160.80 (H)  13.00 - 150.00 ng/mL Final    Vitamin B-12 04/22/2025 1,953 (H)  211 - 946 pg/mL Final    Folate 04/22/2025 11.60  4.78 - 24.20 ng/mL Final    WBC 04/22/2025 7.06  3.40 - 10.80 10*3/mm3 Final    RBC 04/22/2025 4.37  3.77 - 5.28 10*6/mm3 Final    Hemoglobin 04/22/2025 13.8  12.0 - 15.9 g/dL Final    Hematocrit 04/22/2025 38.6  34.0 - 46.6 % Final    MCV 04/22/2025 88.3  79.0 - 97.0 fL Final    MCH 04/22/2025 31.6  26.6 - 33.0 pg Final    MCHC 04/22/2025 35.8 (H)  31.5 - 35.7 g/dL Final    RDW 04/22/2025 15.5 (H)  12.3 - 15.4 % Final    RDW-SD 04/22/2025 50.3  37.0 - 54.0 fl Final    MPV 04/22/2025 10.6  6.0 - 12.0 fL Final    Platelets 04/22/2025 244  140 - 450 10*3/mm3 Final    Neutrophil % 04/22/2025 55.4  42.7 - 76.0 % Final    Lymphocyte % 04/22/2025 35.8  19.6 - 45.3 % Final    Monocyte % 04/22/2025 6.4  5.0 - 12.0 % Final    Eosinophil % 04/22/2025 1.3  0.3 - 6.2 % Final    Basophil % 04/22/2025 0.8  0.0 - 1.5 % Final    Immature Grans % 04/22/2025 0.3  0.0 - 0.5 % Final    Neutrophils, Absolute 04/22/2025 3.91  1.70 - 7.00 10*3/mm3 Final    Lymphocytes, Absolute 04/22/2025 2.53  0.70 - 3.10 10*3/mm3 Final    Monocytes, Absolute 04/22/2025 0.45  0.10 - 0.90 10*3/mm3 Final    Eosinophils, Absolute 04/22/2025 0.09  0.00 - 0.40 10*3/mm3 Final    Basophils, Absolute 04/22/2025 0.06  0.00 - 0.20 10*3/mm3 Final    Immature Grans, Absolute 04/22/2025 0.02  0.00 - 0.05 10*3/mm3 Final    nRBC 04/22/2025 0.0  0.0 - 0.2 /100 WBC Final   Lab on 03/26/2025   Component Date Value Ref Range Status    Total Protein 03/26/2025 8.0  6.0 - 8.5 g/dL Final    Albumin 03/26/2025 3.3  2.9 - 4.4 g/dL Final     Alpha-1-Globulin 03/26/2025 0.2  0.0 - 0.4 g/dL Final    Alpha-2-Globulin 03/26/2025 0.8  0.4 - 1.0 g/dL Final    Beta Globulin 03/26/2025 1.2  0.7 - 1.3 g/dL Final    Gamma Globulin 03/26/2025 2.4 (H)  0.4 - 1.8 g/dL Final    M-Sid 03/26/2025 Not Observed  Not Observed g/dL Final    Globulin 03/26/2025 4.7 (H)  2.2 - 3.9 g/dL Final    A/G Ratio 03/26/2025 0.7  0.7 - 1.7 Final    Please note 03/26/2025 Comment   Final    Protein electrophoresis scan will follow via computer, mail, or   delivery.    Immunofixation Result, Serum 03/26/2025 Comment   Final    No monoclonality detected.    IgG 03/26/2025 2391 (H)  586 - 1602 mg/dL Final    IgA 03/26/2025 350  87 - 352 mg/dL Final    IgM 03/26/2025 218 (H)  26 - 217 mg/dL Final   Admission on 03/18/2025, Discharged on 03/18/2025   Component Date Value Ref Range Status    Glucose 03/18/2025 254 (H)  70 - 99 mg/dL Final    Serial Number: [id number]:  [id number]    Case Report 03/18/2025    Final                    Value:Surgical Pathology Report                         Case: [id number]                                  Authorizing Provider:  Shannan Charles MD Collected:           03/18/2025 09:30 AM          Ordering Location:     [facility] Received:            03/18/2025 11:19 AM                                 SUITES                                                                       Pathologist:           Barb Fraser MD                                                     Specimens:   1) - Large Intestine, Left / Descending Colon, descending colon polyp                               2) - Large Intestine, Sigmoid Colon, sigmoid colon polyps                                  Clinical Information 03/18/2025    Final                    Value:History of colon polyps      Final Diagnosis 03/18/2025    Final                    Value:1. Descending colon polyp, biopsy:   - Fragments of juvenile (retention) polyp      2. Sigmoid  "colon polyps, biopsy:   - Fragments of sessile serrated lesion            Gross Description 03/18/2025    Final                    Value:1. Large Intestine, Left / Descending Colon.  Received in formalin and labeled \" descending colon polyp\" are three fragments of tan soft tissue measuring 0.3-0.4 cm in greatest dimension. The specimen is entirely submitted in one cassette.    2. Large Intestine, Sigmoid Colon.  Received in formalin and labeled \" sigmoid colon polyps\" is a 0.7 cm aggregate of tan soft tissue fragments.  The 0.6 cm largest fragment is inked blue and bisected.  The specimen is entirely submitted in one cassette.   LUCINA      Microscopic Description 03/18/2025    Final                    Value:Microscopic examination performed.     Hospital Outpatient Visit on 02/27/2025   Component Date Value Ref Range Status    Right arm BP 02/27/2025 135/75  mmHg Final    Left arm BP 02/27/2025 133/74  mmHg Final    Prox CCA PSV 02/27/2025 57.1  cm/sec Final    Prox CCA EDV 02/27/2025 10.1  cm/sec Final    Prox ICA PSV 02/27/2025 27.7  cm/sec Final    Prox ICA EDV 02/27/2025 11.7  cm/sec Final    Mid ICA PSV 02/27/2025 32.1  cm/sec Final    Mid ICA EDV 02/27/2025 11.0  cm/sec Final    Dist ICA PSV 02/27/2025 44.8  cm/sec Final    Dist ICA EDV 02/27/2025 15.6  cm/sec Final    Prox ECA PSV 02/27/2025 67.0  cm/sec Final    Prox ECA EDV 02/27/2025 10.7  cm/sec Final    Vertebral A PSV 02/27/2025 54.9  cm/sec Final    Vertebral A EDV 02/27/2025 18.1  cm/sec Final    Prox CCA PSV 02/27/2025 60.2  cm/sec Final    Prox CCA EDV 02/27/2025 17.4  cm/sec Final    Dist CCA PSV 02/27/2025 64.9  cm/sec Final    Dist CCA EDV 02/27/2025 16.8  cm/sec Final    Prox ICA PSV 02/27/2025 52.5  cm/sec Final    Prox ICA EDV 02/27/2025 15.2  cm/sec Final    Mid ICA PSV 02/27/2025 50.3  cm/sec Final    Mid ICA EDV 02/27/2025 13.5  cm/sec Final    Dist ICA PSV 02/27/2025 52.4  cm/sec Final    Dist ICA EDV 02/27/2025 19.1  cm/sec Final    Prox " ECA PSV 02/27/2025 84.7  cm/sec Final    Prox ECA EDV 02/27/2025 12.6  cm/sec Final    Vertebral A PSV 02/27/2025 47.1  cm/sec Final    Vertebral A EDV 02/27/2025 12.8  cm/sec Final    XLRA ROBERT RIGHT CAROTID BULB PSV 02/27/2025 34.4  cm/sec Final    XLRA ROBERT RIGHT CAROTID BULB EDV 02/27/2025 12.3  cm/sec Final    XLRA ROBERT LEFT CAROTID BULB PSV 02/27/2025 28.5  cm/sec Final    XLRA ROBERT LEFT CAROTID BULB EDV 02/27/2025 8.0  cm/sec Final    ICA/CCA ratio 02/27/2025 0.50   Final    ICA/CCA ratio 02/27/2025 0.80   Final       EKG Results:  No orders to display       Imaging Results:  XR Chest 2 View    Result Date: 4/25/2024  No acute process.  Electronically Signed By-Graham Quiles MD On:4/25/2024 12:49 PM      US Abdomen Limited    Result Date: 3/5/2024    1. Enlarged heterogeneous appearance of the liver with nodular contour compatible with cirrhosis.  No lesion noted within the visualized liver parenchyma by sonographic evaluation     CAROLE SRINIVASAN MD       ELECTRONICALLY SIGNED AND APPROVED BY: CAROLE SRINIVASAN MD ON 3/05/2024 AT 10:07             CT Chest Low Dose Follow Up Without Contrast    Result Date: 2/22/2024    1. No evidence of lung cancer. 2. Previously identified nodular density in left lower lobe has resolved, and was likely infectious or inflammatory. 3. No acute cardiopulmonary process. 4. Lung RADS category 1 (negative, less than 1% chance of malignancy) 5. Recommend continued annual screening with low-dose CT chest.      CLOVIS NORTH MD       Electronically Signed and Approved By: CLOVIS NORTH MD on 2/22/2024 at 9:21                 Assessment & Plan   Diagnoses and all orders for this visit:    1. Major depressive disorder, recurrent episode, moderate (Primary)    2. Generalized anxiety disorder    3. Insomnia due to mental condition        Visit Diagnoses:    ICD-10-CM ICD-9-CM   1. Major depressive disorder, recurrent episode, moderate  F33.1 296.32   2. Generalized anxiety disorder   F41.1 300.02   3. Insomnia due to mental condition  F51.05 300.9     327.02     07/24/2025: Improving, some memory concerns. Pt will bring in testing from [facility], states she was diagnosed with mild cognitive impairment.    Acknowledged and normalized patient's thoughts, feelings, and concerns. Allowed patient to freely discuss and process issues, such as:  Anxiety and depression regarding medical conditions.  ... using Rogerian psychotherapeutic techniques including unconditional positive regard, reflective listening, and demonstrating clear empathy, with the goal of ameliorating symptoms and maintaining, restoring, or improving self-esteem, adaptive skills, and ego or psychological functions (Alexis et al. 1991), the long-term goal of which is to develop a better, healthier perspective and help the patient bear their circumstances more easily.  Time (minutes) spent providing supportive psychotherapy: 18  (This time is exclusive to the therapy session and separate from the time spent on activities used to meet the criteria for the E/M service (history, exam, medical decision-making).)  Start: 2:15  Stop: 2:33  Functional status: mild impairment  Treatment plan: Medication management and supportive psychotherapy  Prognosis: good  Progress: MDD, KERRY, insomnia -- improving  6w    06/20/2025: [employer], working part time, volunteer -- all discussed with pt. Stopped bupropion (wasn't helping). Add buspar. Consider abilify, increasing cymbalta. 5w    07/30/2024: Improving, increase cymbalta, start trazodone for insomnia. Wants to start volunteering at [employer].    5/1/24: Taper off lexapro and start cymbalta. Cognitive issues leading to depression. Bupropion XL in am only (both). 6w    PLAN:  Safety: No acute safety concerns  Therapy: None  Risk Assessment: Risk of self-harm acutely is moderate.  Risk factors include anxiety disorder, mood disorder, and recent psychosocial stressors (pandemic). Protective factors  include no family history, denies access to guns/weapons, no present SI, no history of suicide attempts or self-harm in the past, minimal AODA, healthcare seeking, future orientation, willingness to engage in care.  Risk of self-harm chronically is also moderate, but could be further elevated in the event of treatment noncompliance and/or AODA.  Meds:  CONTINUE cymbalta 60 mg qam. Risks, benefits, alternatives discussed with patient including GI upset, nausea vomiting diarrhea, theoretical decrease of seizure threshold predisposing the patient to a slightly higher seizure risk, headaches, sexual dysfunction, serotonin syndrome, bleeding risk, increased suicidality in patients 24 years and younger, switching to shamika/hypomania.  Also constipation and urinary retention.  After discussion of these risks and benefits, the patient voiced understanding and agreed to proceed.  CONTINUE buspar 10 mg tid. Risks, benefits, alternatives discussed with patient including nausea, GI upset, mild sedation, falls risk.  Use care when operating vehicle, vessel, or machine. After discussion of these risks and benefits, the patient voiced understanding and agreed to proceed.  CONTINUE trazodone 50 mg qhs PRN. Risks, benefits, side effects discussed with patient including GI upset, sedation, dizziness/falls risk, grogginess the following day, prolongation of the QTc interval.  Do not use before operating vehicle, vessel, or machine. After discussion of these risks and benefits, the patient voiced understanding and agreed to proceed.    S/P:  Bupropion  mg daily. (Note effective, 6/25)   Lexapro 10 mg qday. Ineffective 7/24  Labs: none    Patient screened positive for depression based on a PHQ-9 score of 13 on 6/20/2025. Follow-up recommendations include: Prescribed antidepressant medication treatment and Suicide Risk Assessment performed.           TREATMENT PLAN/GOALS: Continue supportive psychotherapy efforts and medications as  indicated. Treatment and medication options discussed during today's visit. Patient acknowledged and verbally consented to continue with current treatment plan and was educated on the importance of compliance with treatment and follow-up appointments.    MEDICATION ISSUES:  ORTIZ reviewed as expected.  Discussed medication options and treatment plan of prescribed medication as well as the risks, benefits, and side effects including potential falls, possible impaired driving and metabolic adversities among others. Patient is agreeable to call the office with any worsening of symptoms or onset of side effects. Patient is agreeable to call 911 or go to the nearest ER should he/she begin having SI/HI. No medication side effects or related complaints today.     MEDS ORDERED DURING VISIT:  No orders of the defined types were placed in this encounter.      Return in about 6 weeks (around 9/4/2025).         This document has been electronically signed by Demetrius Calvert MD  July 24, 2025 14:33 EDT    Dictated Utilizing Dragon Dictation: Part of this note may be an electronic transcription/translation of spoken language to printed text using the Dragon Dictation System.

## 2025-07-25 ENCOUNTER — OFFICE VISIT (OUTPATIENT)
Dept: ONCOLOGY | Facility: HOSPITAL | Age: 65
End: 2025-07-25
Payer: COMMERCIAL

## 2025-07-25 VITALS
RESPIRATION RATE: 18 BRPM | HEIGHT: 65 IN | OXYGEN SATURATION: 97 % | HEART RATE: 95 BPM | SYSTOLIC BLOOD PRESSURE: 152 MMHG | DIASTOLIC BLOOD PRESSURE: 73 MMHG | BODY MASS INDEX: 36.82 KG/M2 | TEMPERATURE: 98.2 F | WEIGHT: 221 LBS

## 2025-07-25 DIAGNOSIS — I85.00 ESOPHAGEAL VARICES WITHOUT BLEEDING, UNSPECIFIED ESOPHAGEAL VARICES TYPE: ICD-10-CM

## 2025-07-25 DIAGNOSIS — K74.60 CIRRHOSIS OF LIVER WITHOUT ASCITES, UNSPECIFIED HEPATIC CIRRHOSIS TYPE: ICD-10-CM

## 2025-07-25 DIAGNOSIS — D50.9 IRON DEFICIENCY ANEMIA, UNSPECIFIED IRON DEFICIENCY ANEMIA TYPE: Primary | ICD-10-CM

## 2025-07-25 NOTE — PROGRESS NOTES
Chief Complaint/Reason for Referral:  Follow-up and Anemia    Elena Henderson,*  Elena Henderson, PA    Records Obtained:  Records of the patients history including those obtained from [facility] and patient information were reviewed and summarized in detail.    Subjective    History of Present Illness  The patient is a very pleasant -American 65-year-old female who follows up with our office for iron deficiency anemia. She was last seen in April 2025 by a nurse practitioner. She is unable to tolerate any oral iron. She has a history of cirrhosis and varices and is followed by the Gastroenterology department. She had lab work done on 07/07/2025, which showed normal hemoglobin levels at 12.6, an improvement from 11.9 in January. Her white blood cell count is normal at 8.04, and her platelet count is 278. The differential is normal. Elevated liver enzymes were noted in June 2025, with an AST of 60 and an ALT of 46. Iron studies on 07/07/2025 show stable iron levels at 61, ferritin at 80 (trending downward from 160 in April), and improved iron saturation at 15% from 13% in April. The TIBC is 417. Folate and B12 levels were previously within acceptable ranges. An immunofixation in March 2025 did not show any M spike. She last received IV iron in July of last year with Ferrlecit x 4 doses.    She continues her follow-ups with the Gastroenterology department and reports no instances of bleeding. Her diet includes red meat, greens, liver, and spinach. She has undergone a FibroScan, which revealed some degree of cirrhosis in her liver.    Results  Labs   - CBC: 07/07/2025, Hemoglobin is normal at 12.6, improved from 11.9 in January. White blood cell count is normal at 8.04. Platelet count is 278. Differential is normal.   - Liver Enzymes: 06/2025, AST of 60 and ALT of 46.   - Iron Studies: 07/07/2025, Iron is stable at 61. Ferritin is stable at 80 but trending downward from 160 in April. Iron saturation is  improved to 15% from 13% in April. TIBC is 417.   - Immunofixation: 03/2025, Did not show any M spike.       Oncology/Hematology History    No history exists.       Review of Systems   Constitutional:  Positive for fatigue. Negative for appetite change, diaphoresis, fever, unexpected weight gain and unexpected weight loss.   HENT:  Negative for hearing loss, sore throat and voice change.    Eyes:  Negative for blurred vision, double vision, pain, redness and visual disturbance.   Respiratory:  Negative for cough, shortness of breath and wheezing.    Cardiovascular:  Negative for chest pain, palpitations and leg swelling.   Endocrine: Negative for cold intolerance, heat intolerance, polydipsia and polyuria.   Genitourinary:  Negative for decreased urine volume, difficulty urinating, frequency and urinary incontinence.   Musculoskeletal:  Negative for arthralgias, back pain, joint swelling and myalgias.   Skin:  Negative for color change, rash, skin lesions and wound.   Neurological:  Negative for dizziness, seizures, numbness and headache.   Hematological:  Negative for adenopathy. Does not bruise/bleed easily.   Psychiatric/Behavioral:  Negative for depressed mood. The patient is not nervous/anxious.        Current Outpatient Medications on File Prior to Visit   Medication Sig Dispense Refill    albuterol sulfate  (90 Base) MCG/ACT inhaler Inhale 2 puffs Every 4 (Four) Hours As Needed for Shortness of Air or Wheezing. 1 g 5    amLODIPine (NORVASC) 10 MG tablet Take 1 tablet by mouth Daily. 90 tablet 1    apixaban (Eliquis) 5 MG tablet tablet Take 1 tablet by mouth 2 (Two) Times a Day. 28 tablet 0    B-D UF III MINI PEN NEEDLES 31G X 5 MM misc       Blood Glucose Monitoring Suppl (Accu-Chek Guide) w/Device kit       busPIRone (BUSPAR) 10 MG tablet Take 1 tablet by mouth 3 (Three) Times a Day. 90 tablet 2    Cholecalciferol (Vitamin D3) 50 MCG (2000 UT) capsule Take 3 capsules by mouth Daily. LAST DOSE 7/21/23       Continuous Glucose Sensor (FreeStyle Sharon 3 Plus Sensor) USE AS DIRECTED EVER 15 DAYS      dapagliflozin Propanediol 10 MG tablet Take 10 mg by mouth.      DULoxetine (CYMBALTA) 60 MG capsule Take 1 capsule by mouth Daily. 90 capsule 3    Evolocumab (Repatha SureClick) solution auto-injector SureClick injection Inject 1 mL under the skin into the appropriate area as directed Every 14 (Fourteen) Days. 6 mL 3    famotidine (PEPCID) 40 MG tablet Take 1 tablet by mouth At Night As Needed for Heartburn. 90 tablet 3    Fluticasone-Umeclidin-Vilant (TRELEGY) 100-62.5-25 MCG/ACT inhaler Inhale 1 puff Daily. 3 each 3    furosemide (LASIX) 40 MG tablet Take 1 tablet by mouth Every Other Day. 30 tablet 3    glucose blood test strip Check blood glucose twice daily 60 each 12    glucose monitor monitoring kit 1 each Daily. Patient prefers Accu Chek Meter. 1 each 0    ipratropium-albuterol (DUO-NEB) 0.5-2.5 mg/3 ml nebulizer Take 3 mL by nebulization 4 (Four) Times a Day As Needed for Wheezing or Shortness of Air. 360 mL 3    Lancets (accu-chek soft touch) lancets Check blood sugar twice daily 100 each 12    metoprolol tartrate (LOPRESSOR) 50 MG tablet Take 1.5 tablets by mouth 2 (Two) Times a Day. 270 tablet 3    multivitamin with minerals tablet tablet Take 1 tablet by mouth Daily. LAST DOSE 7/21/23      olmesartan (BENICAR) 20 MG tablet Take 1 tablet by mouth Daily. 90 tablet 1    omeprazole (priLOSEC) 40 MG capsule Take 1 capsule by mouth Daily. 30 capsule 11    potassium chloride 10 MEQ CR tablet TAKE 1 TABLET BY MOUTH EVERY OTHER DAY WITH LASIX 45 tablet 3    promethazine (PHENERGAN) 25 MG tablet Take 1 tablet by mouth Every 6 (Six) Hours As Needed for Nausea or Vomiting. 30 tablet 0    riFAXIMin (Xifaxan) 550 MG tablet Take 1 tablet by mouth Every 12 (Twelve) Hours. 180 tablet 3    Semaglutide,0.25 or 0.5MG/DOS, (Ozempic, 0.25 or 0.5 MG/DOSE,) 2 MG/1.5ML solution pen-injector Inject  under the skin into the  appropriate area as directed 1 (One) Time Per Week.      traZODone (DESYREL) 50 MG tablet Take 1 tablet by mouth Every Night. (Patient taking differently: Take 1 tablet by mouth Every Night. PT TAKES THIS PRN.) 30 tablet 2     No current facility-administered medications on file prior to visit.       Allergies   Allergen Reactions    Liraglutide Nausea And Vomiting    Lisinopril Other (See Comments)     Cough    Metformin Nausea Only     GI Upset     Past Medical History:   Diagnosis Date    Abnormal Pap smear of cervix     Asthma     Atrial fibrillation     ON ELIQUIS/FOLLOWS TROTTER    Colindres's esophagus     Cirrhosis 2015    Colon polyp 2015    Depression     Diabetes mellitus 4/20/2016    Emphysema lung     INHALER    Endometrial hyperplasia     D&C SCHEDULED 7/25/23    Esophageal varices     Fatty liver 2015    Gastroparesis     TAKES XIFAXAN    GERD (gastroesophageal reflux disease) 1995    Hyperlipidemia     Hypertension     Iron deficiency anemia 05/02/2023    IRON INFUSION LAST 7/24/23    Irritable bowel syndrome     W/CONSTIPATION    BERKOWITZ (nonalcoholic steatohepatitis)     NO S/S CURRENTLY, ELEVATED ENZYMES    Obstructive sleep apnea syndrome     Pulmonary emphysema 11/22/2021    S/P exploratory laparotomy, lysis of adhesions, resection of necrotic colon without anastomosis 04/18/2022     Past Surgical History:   Procedure Laterality Date    ABDOMINAL SURGERY  4/16/2022    BLADDER SLING MODIFIED, ANTERIOR AND POSTERIOR VAGINAL REPAIR  2018    [place]    BLADDER SURGERY  05/01/2024    stimulator  @ [facility]    BOTOX INJECTION      In abdomen    BREAST LUMPECTOMY Left     BENIGN    CARDIAC CATHETERIZATION      NO INTERVENTION    CHOLECYSTECTOMY  5/12/2015    COLON RESECTION      PERFORATED COLON, EEA    COLONOSCOPY N/A 08/14/2023    Procedure: COLONOSCOPY WITH POLYPECTOMY, TATTOO;  Surgeon: Shannan Charles MD;  Location: [facility] ENDOSCOPY;  Service: Gastroenterology;  Laterality: N/A;  COLON  POLYPS  DIVERTICULOSIS  HEMORRHOIDS    COLONOSCOPY N/A 04/22/2024    Procedure: COLONOSCOPY COLD SNARE POLYPECTOMIES WITH CLIP PLACEMENT X3;  Surgeon: Shannan Charles MD;  Location: [facility] ENDOSCOPY;  Service: Gastroenterology;  Laterality: N/A;  DIVERTICULOSIS  COLON POLYPS    COLONOSCOPY N/A 3/18/2025    Procedure: COLONOSCOPY with cold snare polypectomies;  Surgeon: Shannan Charles MD;  Location: [facility] ENDOSCOPY;  Service: Gastroenterology;  Laterality: N/A;  colon polyps, diverticulosis    D & C HYSTEROSCOPY N/A 07/25/2023    Procedure: resection of endometrial polyp with myosure;  Surgeon: Ashleigh Harding DO;  Location: [facility] MAIN OR;  Service: Gynecology;  Laterality: N/A;    ENDOSCOPY N/A 06/03/2022    Procedure: ESOPHAGOGASTRODUODENOSCOPY;  Surgeon: Shannan Charles MD;  Location: [facility] ENDOSCOPY;  Service: Gastroenterology;  Laterality: N/A;  RETAINED FOOD IN STOMACH  ESOPHAGEAL VARICIES    ENDOSCOPY N/A 04/22/2024    Procedure: ESOPHAGOGASTRODUODENOSCOPY WITH BIOPSIES;  Surgeon: Shannan Charles MD;  Location: [facility] ENDOSCOPY;  Service: Gastroenterology;  Laterality: N/A;  ESOPHAGEAL BIOPSIES    EXPLORATORY LAPAROTOMY N/A 04/16/2022    Procedure: EXPLORATORY LAPAROTOMY, LYSIS OF ADHESIONS, RESECTION OF NECTROIC COLON;  Surgeon: Cande Stanley MD;  Location: [facility] MAIN OR;  Service: General;  Laterality: N/A;    EYE SURGERY  2023    GALLBLADDER SURGERY      LAPAROSCOPIC    LIVER BIOPSY  2021    TUBAL ABDOMINAL LIGATION      UPPER GASTROINTESTINAL ENDOSCOPY  6/3/2022     Social History     Socioeconomic History    Marital status:      Spouse name: fannie   Tobacco Use    Smoking status: Every Day     Current packs/day: 0.25     Average packs/day: 0.3 packs/day for 50.1 years (12.5 ttl pk-yrs)     Types: Cigarettes     Start date: 6/17/1975     Passive exposure: Current    Smokeless tobacco: Never    Tobacco comments:     States down to 1-2 a day 1/30/25    Vaping Use    Vaping status: Never Used   Substance and Sexual Activity    Alcohol use: Yes     Alcohol/week: 1.0 standard drink of alcohol     Types: 1 Drinks containing 0.5 oz of alcohol per week     Comment: 1 a month    Drug use: Never    Sexual activity: Yes     Partners: Male     Birth control/protection: Post-menopausal, Tubal ligation     Family History   Adopted: Yes   Problem Relation Age of Onset    No Known Problems Mother     No Known Problems Father     No Known Problems Sister     No Known Problems Brother     No Known Problems Maternal Aunt     No Known Problems Paternal Aunt     No Known Problems Maternal Uncle     No Known Problems Paternal Uncle     No Known Problems Maternal Grandfather     No Known Problems Maternal Grandmother     No Known Problems Paternal Grandfather     No Known Problems Paternal Grandmother     No Known Problems Cousin     No Known Problems Other     ADD / ADHD Son     Malig Hyperthermia Neg Hx     Alcohol abuse Neg Hx     Anxiety disorder Neg Hx     Bipolar disorder Neg Hx     Dementia Neg Hx     Depression Neg Hx     Drug abuse Neg Hx     OCD Neg Hx     Paranoid behavior Neg Hx     Schizophrenia Neg Hx     Seizures Neg Hx     Self-Injurious Behavior  Neg Hx     Suicide Attempts Neg Hx      Immunization History   Administered Date(s) Administered    COVID-19 (PFIZER) Purple Cap Monovalent 02/06/2021, 03/06/2021, 12/04/2021    Flu Vaccine Intradermal Quad 18-64YR 09/12/2021, 09/12/2021    Fluzone  >6mos 01/16/2025    Fluzone (or Fluarix & Flulaval for VFC) >6mos 10/29/2019, 09/27/2020, 10/03/2022, 10/05/2023    Influenza Split Preservative Free ID 09/12/2021    Influenza, Unspecified 11/12/2018, 09/27/2020    Pneumococcal Conjugate 13-Valent (PCV13) 01/09/2017    Pneumococcal Polysaccharide (PPSV23) 10/12/2020    flucelvax quad pfs =>4 YRS 09/27/2020       Tobacco Use: High Risk (7/25/2025)    Patient History     Smoking Tobacco Use: Every Day     Smokeless Tobacco Use:  "Never     Passive Exposure: Current       Objective     Physical Exam  Vitals and nursing note reviewed.   Constitutional:       Appearance: Normal appearance.   HENT:      Mouth/Throat:      Mouth: Mucous membranes are moist.   Eyes:      Extraocular Movements: Extraocular movements intact.   Cardiovascular:      Rate and Rhythm: Normal rate.   Pulmonary:      Effort: Pulmonary effort is normal. No respiratory distress.   Musculoskeletal:         General: Normal range of motion.      Cervical back: Normal range of motion and neck supple.   Skin:     General: Skin is warm and dry.   Neurological:      General: No focal deficit present.      Mental Status: She is alert and oriented to person, place, and time.   Psychiatric:         Mood and Affect: Mood normal.         Behavior: Behavior normal.         Thought Content: Thought content normal.         Judgment: Judgment normal.         Vitals:    07/25/25 0902   BP: 152/73   Pulse: 95   Resp: 18   Temp: 98.2 °F (36.8 °C)   TempSrc: Oral   SpO2: 97%   Weight: 100 kg (221 lb)   Height: 165.1 cm (65\")   PainSc: 0-No pain       Wt Readings from Last 3 Encounters:   07/25/25 100 kg (221 lb)   07/24/25 100 kg (221 lb)   06/30/25 102 kg (224 lb)          ECOG score: 0         ECOG: (0) Fully Active - Able to Carry On All Pre-disease Performance Without Restriction  Fall Risk Assessment was completed, and patient is at low risk for falls.  PHQ-9 Total Score:         The patient is  experiencing fatigue. Fatigue score: 1    PT/OT Functional Screening: PT fx screen : No needs identified  Speech Functional Screening: Speech fx screen : No needs identified  Rehab to be ordered: Rehab to be ordered : No needs identified        Result Review :  The following data was reviewed by: CHU Pedraza on 07/25/2025:  Lab Results   Component Value Date    HGB 12.6 07/07/2025    HCT 35.7 07/07/2025    MCV 85.2 07/07/2025     07/07/2025    WBC 8.04 07/07/2025    " NEUTROABS 4.20 07/07/2025    LYMPHSABS 2.94 07/07/2025    MONOSABS 0.60 07/07/2025    EOSABS 0.21 07/07/2025    BASOSABS 0.08 07/07/2025     Lab Results   Component Value Date    GLUCOSE 275 (H) 06/19/2025    BUN 9.0 06/19/2025    CREATININE 0.83 06/19/2025     06/19/2025    K 3.7 06/19/2025     06/19/2025    CO2 21.0 (L) 06/19/2025    CALCIUM 9.8 06/19/2025    PROTEINTOT 8.3 06/19/2025    ALBUMIN 3.6 06/19/2025    BILITOT 0.3 06/19/2025    ALKPHOS 135 (H) 06/19/2025    AST 60 (H) 06/19/2025    ALT 46 (H) 06/19/2025     Lab Results   Component Value Date    Ferritin 80.11 07/07/2025    Folate 11.60 04/22/2025     Lab Results   Component Value Date    IRON 61 07/07/2025    LABIRON 15 (L) 07/07/2025    TRANSFERRIN 280 07/07/2025    TIBC 417 07/07/2025     Lab Results   Component Value Date    FERRITIN 80.11 07/07/2025    TCQPMVFR82 1,953 (H) 04/22/2025    FOLATE 11.60 04/22/2025     Lab Results   Component Value Date    AFP 2.15 01/20/2023            Assessment and Plan:  Diagnoses and all orders for this visit:    1. Iron deficiency anemia, unspecified iron deficiency anemia type (Primary)  -     CBC & Differential; Future  -     Iron Profile; Future  -     Ferritin; Future    2. Cirrhosis of liver without ascites, unspecified hepatic cirrhosis type  -     CBC & Differential; Future  -     Iron Profile; Future  -     Ferritin; Future    3. Esophageal varices without bleeding, unspecified esophageal varices type  -     CBC & Differential; Future  -     Iron Profile; Future  -     Ferritin; Future        Assessment & Plan  1. Iron deficiency anemia.  The condition appears to be stable with no signs of bleeding. Recent lab work from 07/07/2025 shows normal hemoglobin at 12.6, improved from 11.9 in January. Iron studies indicate stable iron levels at 61, with ferritin at 80, trending downward from 160 in April. Iron saturation has improved to 15% from 13% in April. Liver enzymes are slightly elevated but  stable. A recheck of labs is scheduled for 3 months from now to monitor progress. If any bleeding occurs, immediate medical attention at the emergency room is advised. Repeat labs in 3 months to see if additional IV iron is needed.     2. Cirrhosis: colonoscopy: 3/18/2025: Hemorrhoids found on perianal exam. - One 7 mm area of nodular tissue noted in the descending colon, removed with a cold snare. Resected and retrieved. - Three 4 to 6 mm polyps in the sigmoid colon, removed with a cold snare. Resected and retrieved. - Moderate diverticulosis in the left colon. There was no evidence of diverticular bleeding. - The distal rectum and anal verge are normal on retroflexion view.    EGD 4/14/2024: Grade II esophageal varices. - Z-line regular, at the gastroesophageal junction. No biopsies performed secondary to underlying varices. - Normal stomach. Biopsied. - Normal first portion of the duodenum and second portion of the duodenum.    Continue close follow up with GI.     Follow-up  Follow-up  with labs in 3 months and NP follow up.         I spent 29 minutes caring for Anika on this date of service. This time includes time spent by me in the following activities:preparing for the visit, reviewing tests, counseling and educating the patient/family/caregiver, ordering medications, tests, or procedures, referring and communicating with other health care professionals , documenting information in the medical record, and independently interpreting results and communicating that information with the patient/family/caregiver      Patient was given instructions and counseling regarding her condition or for health maintenance advice. Please see specific information pulled into the AVS if appropriate.     CHU Pedraza    7/25/2025      Patient or patient representative verbalized consent for the use of Ambient Listening during the visit with  CHU Pedraza for chart documentation. 7/25/2025  10:44  EDT

## 2025-07-30 ENCOUNTER — OFFICE VISIT (OUTPATIENT)
Dept: GASTROENTEROLOGY | Facility: CLINIC | Age: 65
End: 2025-07-30
Payer: COMMERCIAL

## 2025-07-30 VITALS
SYSTOLIC BLOOD PRESSURE: 146 MMHG | BODY MASS INDEX: 36.49 KG/M2 | HEIGHT: 65 IN | DIASTOLIC BLOOD PRESSURE: 69 MMHG | WEIGHT: 219 LBS | HEART RATE: 89 BPM

## 2025-07-30 DIAGNOSIS — I85.00 ESOPHAGEAL VARICES WITHOUT BLEEDING, UNSPECIFIED ESOPHAGEAL VARICES TYPE: ICD-10-CM

## 2025-07-30 DIAGNOSIS — K76.82 HEPATIC ENCEPHALOPATHY: ICD-10-CM

## 2025-07-30 DIAGNOSIS — K22.70 BARRETT'S ESOPHAGUS WITHOUT DYSPLASIA: ICD-10-CM

## 2025-07-30 DIAGNOSIS — K75.81 NASH (NONALCOHOLIC STEATOHEPATITIS): Primary | ICD-10-CM

## 2025-07-30 DIAGNOSIS — K74.60 CIRRHOSIS OF LIVER WITHOUT ASCITES, UNSPECIFIED HEPATIC CIRRHOSIS TYPE: ICD-10-CM

## 2025-07-30 PROCEDURE — 99214 OFFICE O/P EST MOD 30 MIN: CPT | Performed by: NURSE PRACTITIONER

## 2025-07-30 NOTE — PROGRESS NOTES
Chief Complaint   Follow-up (Patient has no issues at this time. ) and Cirrhosis of the liver    History of Present Illness       Anika Casillas is a 65 y.o. female who presents to [facility] for follow-up         History of Present Illness  The patient is a 65-year-old female who presents today for an office follow-up for cirrhosis. She was last seen in the office on 01/30/2025.    She has a history of BERKOWITZ cirrhosis with grade 2 esophageal varices and hepatic encephalopathy. She is unable to tolerate lactulose and has been on Xifaxan twice a day. She sees Dr. Chirinos at the [facility] for her cirrhosis. She had a liver ultrasound that showed stable cirrhosis and no new liver lesions. She had another EGD and colonoscopy with Dr. Charles in April 2024. The EGD showed grade 2 esophageal varices, an irregular Z-line, and a normal stomach. A repeat EGD is planned in 2 years. The colonoscopy showed hemorrhoids and colon polyps, with a repeat colonoscopy planned in 6 months for surveillance due to piecemeal resection at the last visit. She felt like her bowels were moving okay but still experienced some bloating. She has follow-up with the [facility] clinic and also sees renal and hematology specialists. She gets iron infusions. At the last visit, her reflux medicine and Xifaxan were refilled, a liver ultrasound was ordered, and she was scheduled for her colonoscopy. She underwent a colonoscopy with Dr. Charles in 03/2025, which showed hemorrhoids, some colon polyps, and moderate diverticulosis. A repeat colonoscopy is planned in 3 years for surveillance. Pathology showed fragments of a sessile serrated lesion. Her most recent CBC done earlier this month showed a hemoglobin level of 12.6. Her most recent liver enzymes were elevated but better than previous levels, with an ALT of 46, AST of 60, and alkaline phosphatase of 135.    She has a history of GERD  with Colindres's esophagus without dysplasia, normally managed with Protonix 40 mg in the morning and Pepcid 40 mg at night. She also has a history of gastroparesis and was previously referred to Dr. Lugo for this condition. She underwent an EGD with Botox for her stomach and reports noticeable improvement since the Botox treatment.    She is working with gynecology regarding her endometrial hyperplasia. She had a CT scan of the abdomen and pelvis done in April 2024 that showed an abnormally thickened endometrium, for which a pelvic ultrasound was recommended.    She has a history of iron deficiency anemia and receives iron infusions.    She has a history of atrial fibrillation and is on Eliquis.    She has a history of cholecystectomy and smokes 1 to 3 packs of cigarettes a day.    She has a history of bladder surgery and had a neurostimulator placed. She had to go on disability due to her various health issues.    She is currently on semaglutide (Ozempic) and manages her bowel movements with MiraLAX.    PAST SURGICAL HISTORY:  Colon resection with lysis of adhesions on 04/16/2022  Cholecystectomy  Bladder surgery with neurostimulator placement    SOCIAL HISTORY  Occupation: Had to go on disability due to health issues.  Tobacco: Smokes 1 to 3 packs a day.    FAMILY HISTORY  - Does not know her GI family history as she was adopted.        Results       Result Review :       CMP          1/24/2025    14:49 3/26/2025    08:34 6/19/2025    07:44   CMP   Glucose 257   275    BUN 10   9.0    Creatinine 0.86   0.83    EGFR 76.5   79.8    Sodium 137   139    Potassium 4.2   3.7    Chloride 104   106    Calcium 10.0   9.8    Total Protein  8.0  8.3    Albumin 3.8  3.3  3.6    Globulin  4.7  4.7    Total Bilirubin   0.3    Alkaline Phosphatase   135    AST (SGOT)   60    ALT (SGPT)   46    Albumin/Globulin Ratio  0.7  0.8    BUN/Creatinine Ratio 11.6   10.8    Anion Gap 11.0   12.0      CBC          1/24/2025    14:49  "4/22/2025    11:37 7/7/2025    10:55   CBC   WBC 7.55  7.06  8.04    RBC 3.94  4.37  4.19    Hemoglobin 11.9  13.8  12.6    Hematocrit 36.7  38.6  35.7    MCV 93.1  88.3  85.2    MCH 30.2  31.6  30.1    MCHC 32.4  35.8  35.3    RDW 15.6  15.5  16.2    Platelets 220  244  278      CBC w/diff          1/24/2025    14:49 4/22/2025    11:37 7/7/2025    10:55   CBC w/Diff   WBC 7.55  7.06  8.04    RBC 3.94  4.37  4.19    Hemoglobin 11.9  13.8  12.6    Hematocrit 36.7  38.6  35.7    MCV 93.1  88.3  85.2    MCH 30.2  31.6  30.1    MCHC 32.4  35.8  35.3    RDW 15.6  15.5  16.2    Platelets 220  244  278    Neutrophil Rel % 51.1  55.4  52.2    Immature Granulocyte Rel % 0.3  0.3  0.1    Lymphocyte Rel % 37.4  35.8  36.6    Monocyte Rel % 8.5  6.4  7.5    Eosinophil Rel % 2.0  1.3  2.6    Basophil Rel % 0.7  0.8  1.0      Lipid Panel          6/19/2025    07:44   Lipid Panel   Total Cholesterol 121    Triglycerides 185    HDL Cholesterol 38    VLDL Cholesterol 31    LDL Cholesterol  52    LDL/HDL Ratio 1.21      TSH          6/19/2025    07:44   TSH   TSH 3.740      Electrolytes          1/20/2025    13:07 1/24/2025    14:49 6/19/2025    07:44   Electrolytes   Sodium 136  137  139    Potassium 4.6  4.2  3.7    Chloride 105  104  106    Calcium 9.9  10.0  9.8      Renal Profile          1/20/2025    13:07 1/24/2025    14:49 6/19/2025    07:44   Renal Profile   BUN 14  10  9.0    Creatinine 0.92  0.86  0.83      BMP          1/20/2025    13:07 1/24/2025    14:49 6/19/2025    07:44   BMP   BUN 14  10  9.0    Creatinine 0.92  0.86  0.83    Sodium 136  137  139    Potassium 4.6  4.2  3.7    Chloride 105  104  106    CO2 20.8  22.0  21.0    Calcium 9.9  10.0  9.8              Lipase   Lipase   Date Value Ref Range Status   01/09/2023 20 13 - 60 U/L Final   01/06/2019 124 73 - 393 U/L Final     Amylase No results found for: \"AMYLASE\"  Iron Profile   Iron   Date Value Ref Range Status   07/07/2025 61 37 - 145 mcg/dL Final     TIBC " "  Date Value Ref Range Status   07/07/2025 417 298 - 536 mcg/dL Final     Iron Saturation (TSAT)   Date Value Ref Range Status   07/07/2025 15 (L) 20 - 50 % Final     Transferrin   Date Value Ref Range Status   07/07/2025 280 200 - 360 mg/dL Final     Ferritin   Ferritin   Date Value Ref Range Status   07/07/2025 80.11 13.00 - 150.00 ng/mL Final     ESR (Sed Rate) No results found for: \"SEDRATE\"  CRP (C-Reactive) No results found for: \"CRP\"  Liver Workup   AFP Tumor Marker   Date Value Ref Range Status   05/03/2024 2.3 ng/mL Final     Comment:     Reference Range:   <6.1  The use of AFP as a tumor marker in pregnant  females is not recommended.      This test was performed using the [employer]  chemiluminescent method. Values obtained from  different assay methods cannot be used  interchangeably. AFP levels, regardless of  value, should not be interpreted as absolute  evidence of the presence or absence of disease.     Ferritin   Date Value Ref Range Status   07/07/2025 80.11 13.00 - 150.00 ng/mL Final     Immunofixation Result, Serum   Date Value Ref Range Status   03/26/2025 Comment  Final     Comment:     No monoclonality detected.     IgG   Date Value Ref Range Status   03/26/2025 2391 (H) 586 - 1602 mg/dL Final     IgA   Date Value Ref Range Status   03/26/2025 350 87 - 352 mg/dL Final     IgM   Date Value Ref Range Status   03/26/2025 218 (H) 26 - 217 mg/dL Final     Iron   Date Value Ref Range Status   07/07/2025 61 37 - 145 mcg/dL Final     TIBC   Date Value Ref Range Status   07/07/2025 417 298 - 536 mcg/dL Final     Iron Saturation (TSAT)   Date Value Ref Range Status   07/07/2025 15 (L) 20 - 50 % Final     Transferrin   Date Value Ref Range Status   07/07/2025 280 200 - 360 mg/dL Final     Protime   Date Value Ref Range Status   06/26/2024 15.7 (H) 11.3 - 14.9 sec Final     Comment:     PLEASE NOTE NEW REFERENCE RANGE     INR   Date Value Ref Range Status   06/26/2024 1.2  Final     Comment:     " "Suggested therapeutic INR range:  Venous thrombosis and embolus  2.0-3.0  Prosthetic heart valve         2.5-3.5  ** Note new reference range and method **     ALPHA-FETOPROTEIN   Date Value Ref Range Status   01/20/2023 2.15 0 - 8.3 ng/mL Final     Acute HEP Panel   Hepatitis C Ab   Date Value Ref Range Status   11/19/2021 Non-Reactive Non-Reactive Final     Alpha 1   AFP Tumor Marker   Date Value Ref Range Status   05/03/2024 2.3 ng/mL Final     Comment:     Reference Range:   <6.1  The use of AFP as a tumor marker in pregnant  females is not recommended.      This test was performed using the [employer]  chemiluminescent method. Values obtained from  different assay methods cannot be used  interchangeably. AFP levels, regardless of  value, should not be interpreted as absolute  evidence of the presence or absence of disease.     LISSETH No results found for: \"DSDNA\", \"EXPANDEDENA\"  ASMA No results found for: \"SMOOTHMUSCAB\"  AFP   ALPHA-FETOPROTEIN   Date Value Ref Range Status   01/20/2023 2.15 0 - 8.3 ng/mL Final                Results  Labs   - CBC: 07/07/2025, Hemoglobin 12.6   - Liver Enzymes: 07/07/2025, ALT 46, AST 60, alk phos 135   - Ferritin: 07/07/2025, Improved   - Vitamin D: 07/07/2025, Normal   - Thyroid Function: 07/07/2025, Normal   - Blood Sugar: 07/07/2025, High    Imaging   - CT scan of the abdomen and pelvis: 04/2024, Abnormally thickened endometrium, cirrhosis, spleen or renal shunt, colonic diverticulosis   - Liver ultrasound: Stable cirrhosis, no new liver lesions    Diagnostic Testing   - Colonoscopy: 08/2023, Moderate diverticulosis, nonbleeding external hemorrhoids, multiple colon polyps removed   - EGD: 04/2024, Grade 2 esophageal varices, irregular Z-line, normal stomach   - Colonoscopy: 03/2025, Hemorrhoids, some colon polyps, moderate diverticulosis      Past Medical History       Past Medical History:   Diagnosis Date    Abnormal Pap smear of cervix     Asthma     Atrial " fibrillation     ON ELIQUIS/FOLLOWS SERGO    Colindres's esophagus     Cirrhosis 2015    Colon polyp 2015    Depression     Diabetes mellitus 4/20/2016    Emphysema lung     INHALER    Endometrial hyperplasia     D&C SCHEDULED 7/25/23    Esophageal varices     Fatty liver 2015    Gastroparesis     TAKES XIFAXAN    GERD (gastroesophageal reflux disease) 1995    Hyperlipidemia     Hypertension     Iron deficiency anemia 05/02/2023    IRON INFUSION LAST 7/24/23    Irritable bowel syndrome     W/CONSTIPATION    BERKOWITZ (nonalcoholic steatohepatitis)     NO S/S CURRENTLY, ELEVATED ENZYMES    Obstructive sleep apnea syndrome     Pulmonary emphysema 11/22/2021    S/P exploratory laparotomy, lysis of adhesions, resection of necrotic colon without anastomosis 04/18/2022       Past Surgical History:   Procedure Laterality Date    ABDOMINAL SURGERY  4/16/2022    BLADDER SLING MODIFIED, ANTERIOR AND POSTERIOR VAGINAL REPAIR  2018    [place]    BLADDER SURGERY  05/01/2024    stimulator  @ [facility]    BOTOX INJECTION      In abdomen    BREAST LUMPECTOMY Left     BENIGN    CARDIAC CATHETERIZATION      NO INTERVENTION    CHOLECYSTECTOMY  5/12/2015    COLON RESECTION      PERFORATED COLON, EEA    COLONOSCOPY N/A 08/14/2023    Procedure: COLONOSCOPY WITH POLYPECTOMY, TATTOO;  Surgeon: Shannan Charles MD;  Location: [facility] ENDOSCOPY;  Service: Gastroenterology;  Laterality: N/A;  COLON POLYPS  DIVERTICULOSIS  HEMORRHOIDS    COLONOSCOPY N/A 04/22/2024    Procedure: COLONOSCOPY COLD SNARE POLYPECTOMIES WITH CLIP PLACEMENT X3;  Surgeon: Shannan Charles MD;  Location: [facility] ENDOSCOPY;  Service: Gastroenterology;  Laterality: N/A;  DIVERTICULOSIS  COLON POLYPS    COLONOSCOPY N/A 3/18/2025    Procedure: COLONOSCOPY with cold snare polypectomies;  Surgeon: Shannan Charles MD;  Location: [facility] ENDOSCOPY;  Service: Gastroenterology;  Laterality: N/A;  colon polyps, diverticulosis    D & C HYSTEROSCOPY N/A 07/25/2023     Procedure: resection of endometrial polyp with myosure;  Surgeon: Ashleigh Harding DO;  Location: [facility] MAIN OR;  Service: Gynecology;  Laterality: N/A;    ENDOSCOPY N/A 06/03/2022    Procedure: ESOPHAGOGASTRODUODENOSCOPY;  Surgeon: Shannan Charles MD;  Location: [facility] ENDOSCOPY;  Service: Gastroenterology;  Laterality: N/A;  RETAINED FOOD IN STOMACH  ESOPHAGEAL VARICIES    ENDOSCOPY N/A 04/22/2024    Procedure: ESOPHAGOGASTRODUODENOSCOPY WITH BIOPSIES;  Surgeon: Shannan Charles MD;  Location: [facility] ENDOSCOPY;  Service: Gastroenterology;  Laterality: N/A;  ESOPHAGEAL BIOPSIES    EXPLORATORY LAPAROTOMY N/A 04/16/2022    Procedure: EXPLORATORY LAPAROTOMY, LYSIS OF ADHESIONS, RESECTION OF NECTROIC COLON;  Surgeon: Cande Stanley MD;  Location: [facility] MAIN OR;  Service: General;  Laterality: N/A;    EYE SURGERY  2023    GALLBLADDER SURGERY      LAPAROSCOPIC    LIVER BIOPSY  2021    TUBAL ABDOMINAL LIGATION      UPPER GASTROINTESTINAL ENDOSCOPY  6/3/2022         Current Outpatient Medications:     albuterol sulfate  (90 Base) MCG/ACT inhaler, Inhale 2 puffs Every 4 (Four) Hours As Needed for Shortness of Air or Wheezing., Disp: 1 g, Rfl: 5    amLODIPine (NORVASC) 10 MG tablet, Take 1 tablet by mouth Daily., Disp: 90 tablet, Rfl: 1    apixaban (Eliquis) 5 MG tablet tablet, Take 1 tablet by mouth 2 (Two) Times a Day., Disp: 28 tablet, Rfl: 0    B-D UF III MINI PEN NEEDLES 31G X 5 MM misc, , Disp: , Rfl:     Blood Glucose Monitoring Suppl (Accu-Chek Guide) w/Device kit, , Disp: , Rfl:     busPIRone (BUSPAR) 10 MG tablet, Take 1 tablet by mouth 3 (Three) Times a Day., Disp: 90 tablet, Rfl: 2    Cholecalciferol (Vitamin D3) 50 MCG (2000 UT) capsule, Take 3 capsules by mouth Daily. LAST DOSE 7/21/23, Disp: , Rfl:     Continuous Glucose Sensor (FreeStyle Sharon 3 Plus Sensor), USE AS DIRECTED EVER 15 DAYS, Disp: , Rfl:     dapagliflozin Propanediol 10 MG tablet, Take 10 mg by mouth., Disp: ,  Rfl:     DULoxetine (CYMBALTA) 60 MG capsule, Take 1 capsule by mouth Daily., Disp: 90 capsule, Rfl: 3    Evolocumab (Repatha SureClick) solution auto-injector SureClick injection, Inject 1 mL under the skin into the appropriate area as directed Every 14 (Fourteen) Days., Disp: 6 mL, Rfl: 3    famotidine (PEPCID) 40 MG tablet, Take 1 tablet by mouth At Night As Needed for Heartburn., Disp: 90 tablet, Rfl: 3    Fluticasone-Umeclidin-Vilant (TRELEGY) 100-62.5-25 MCG/ACT inhaler, Inhale 1 puff Daily., Disp: 3 each, Rfl: 3    furosemide (LASIX) 40 MG tablet, Take 1 tablet by mouth Every Other Day., Disp: 30 tablet, Rfl: 3    glucose blood test strip, Check blood glucose twice daily, Disp: 60 each, Rfl: 12    glucose monitor monitoring kit, 1 each Daily. Patient prefers Accu Chek Meter., Disp: 1 each, Rfl: 0    ipratropium-albuterol (DUO-NEB) 0.5-2.5 mg/3 ml nebulizer, Take 3 mL by nebulization 4 (Four) Times a Day As Needed for Wheezing or Shortness of Air., Disp: 360 mL, Rfl: 3    Lancets (accu-chek soft touch) lancets, Check blood sugar twice daily, Disp: 100 each, Rfl: 12    metoprolol tartrate (LOPRESSOR) 50 MG tablet, Take 1.5 tablets by mouth 2 (Two) Times a Day., Disp: 270 tablet, Rfl: 3    multivitamin with minerals tablet tablet, Take 1 tablet by mouth Daily. LAST DOSE 7/21/23, Disp: , Rfl:     olmesartan (BENICAR) 20 MG tablet, Take 1 tablet by mouth Daily., Disp: 90 tablet, Rfl: 1    omeprazole (priLOSEC) 40 MG capsule, Take 1 capsule by mouth Daily., Disp: 30 capsule, Rfl: 11    potassium chloride 10 MEQ CR tablet, TAKE 1 TABLET BY MOUTH EVERY OTHER DAY WITH LASIX, Disp: 45 tablet, Rfl: 3    promethazine (PHENERGAN) 25 MG tablet, Take 1 tablet by mouth Every 6 (Six) Hours As Needed for Nausea or Vomiting., Disp: 30 tablet, Rfl: 0    riFAXIMin (Xifaxan) 550 MG tablet, Take 1 tablet by mouth Every 12 (Twelve) Hours., Disp: 180 tablet, Rfl: 3    Semaglutide,0.25 or 0.5MG/DOS, (Ozempic, 0.25 or 0.5 MG/DOSE,) 2  MG/1.5ML solution pen-injector, Inject  under the skin into the appropriate area as directed 1 (One) Time Per Week., Disp: , Rfl:     traZODone (DESYREL) 50 MG tablet, Take 1 tablet by mouth Every Night. (Patient taking differently: Take 1 tablet by mouth Every Night. PT TAKES THIS PRN.), Disp: 30 tablet, Rfl: 2     Allergies   Allergen Reactions    Liraglutide Nausea And Vomiting    Lisinopril Other (See Comments)     Cough    Metformin Nausea Only     GI Upset       Family History   Adopted: Yes   Problem Relation Age of Onset    No Known Problems Mother     No Known Problems Father     No Known Problems Sister     No Known Problems Brother     No Known Problems Maternal Aunt     No Known Problems Paternal Aunt     No Known Problems Maternal Uncle     No Known Problems Paternal Uncle     No Known Problems Maternal Grandfather     No Known Problems Maternal Grandmother     No Known Problems Paternal Grandfather     No Known Problems Paternal Grandmother     No Known Problems Cousin     No Known Problems Other     ADD / ADHD Son     Malig Hyperthermia Neg Hx     Alcohol abuse Neg Hx     Anxiety disorder Neg Hx     Bipolar disorder Neg Hx     Dementia Neg Hx     Depression Neg Hx     Drug abuse Neg Hx     OCD Neg Hx     Paranoid behavior Neg Hx     Schizophrenia Neg Hx     Seizures Neg Hx     Self-Injurious Behavior  Neg Hx     Suicide Attempts Neg Hx         Social History     Social History Narrative    Exercises 1 x week    Lives at home with spouse       Objective       Review of Systems   Constitutional:  Negative for appetite change, fatigue, fever, unexpected weight gain and unexpected weight loss.   HENT:  Negative for trouble swallowing.    Respiratory:  Negative for cough, choking, chest tightness, shortness of breath, wheezing and stridor.    Cardiovascular:  Negative for chest pain, palpitations and leg swelling.   Gastrointestinal:  Negative for abdominal distention, abdominal pain, anal bleeding, blood  "in stool, constipation, diarrhea, nausea, rectal pain, vomiting, GERD and indigestion.        Vital Signs:   /69 (BP Location: Right arm, Patient Position: Sitting, Cuff Size: Adult)   Pulse 89   Ht 165.1 cm (65\")   Wt 99.3 kg (219 lb)   BMI 36.44 kg/m²       Physical Exam  Constitutional:       General: She is not in acute distress.     Appearance: She is well-developed. She is not ill-appearing.   HENT:      Head: Normocephalic.   Eyes:      Pupils: Pupils are equal, round, and reactive to light.   Cardiovascular:      Rate and Rhythm: Normal rate and regular rhythm.      Heart sounds: Normal heart sounds.   Pulmonary:      Effort: Pulmonary effort is normal.      Breath sounds: Normal breath sounds.   Abdominal:      General: Bowel sounds are normal. There is no distension.      Palpations: Abdomen is soft. There is no mass.      Tenderness: There is no abdominal tenderness. There is no guarding or rebound.      Hernia: No hernia is present.   Musculoskeletal:         General: Normal range of motion.   Skin:     General: Skin is warm and dry.   Neurological:      Mental Status: She is alert and oriented to person, place, and time.   Psychiatric:         Speech: Speech normal.         Behavior: Behavior normal.         Judgment: Judgment normal.         Physical Exam          Assessment & Plan          Assessment and Plan    Diagnoses and all orders for this visit:    1. BERKOWITZ (nonalcoholic steatohepatitis) (Primary)    2. Hepatic encephalopathy    3. Esophageal varices without bleeding, unspecified esophageal varices type    4. Cirrhosis of liver without ascites, unspecified hepatic cirrhosis type    5. Colindres's esophagus without dysplasia        Cirrhosis: BERKOWITZ MELD 9=== sees [facility] liver clinic  Ascites: Minimal ascites noted today.  Continue Lasix.  Continue 2 g sodium diet.  Continue compression stockings.  Check liver ultrasound.  Varices: Grade 2 esophageal varices noted on EGD 4/22/2024.  Next " surveillance EGD due on 4/2026.  Encephalopathy: Restart Xifaxan.continue Motegrity and MiraLAX.  Health maintenance: Next surveillance colonoscopy due this spring.  Next surveillance EGD due in April 2026.  Patient to follow-up with [facility] liver clinic.  Assessment & Plan  1. Cirrhosis:  - Continue current medication regimen, including Xifaxan twice daily.  - Repeat EGD scheduled for 2 years.  - Repeat colonoscopy scheduled for 3 years.    2. Gastroesophageal Reflux Disease (GERD):  - Continue Protonix 40 mg in the morning and Pepcid 40 mg at night.    3. Gastroparesis:  - Continue current management plan, including Botox treatment.    4. Endometrial Hyperplasia:  - Continue working with gynecology.    5. Iron Deficiency Anemia:  - Continue receiving iron infusions.    6. Atrial Fibrillation:  - Continue Eliquis.    7. Tobacco Use:  - Smoking cessation recommended.    8. Bladder Surgery:  - Monitor neurostimulator function.    9. Medication Management:  - Continue semaglutide (Ozempic).  - Manage bowel movements with MiraLAX.    Follow-up: 01/2026.          Follow Up       Follow Up   Return in about 6 months (around 1/30/2026) for CIRRHOSIS, GERD.  Patient was given instructions and counseling regarding her condition or for health maintenance advice. Please see specific information pulled into the AVS if appropriate.       Patient or patient representative verbalized consent for the use of Ambient Listening during the visit with  [facility] Jj for chart documentation. 7/30/2025  12:48 EDT

## (undated) DEVICE — SOLIDIFIER LIQLOC PLS 1500CC BT

## (undated) DEVICE — SOL NACL 0.9PCT 1000ML

## (undated) DEVICE — THE SINGLE USE ETRAP – POLYP TRAP IS USED FOR SUCTION RETRIEVAL OF ENDOSCOPICALLY REMOVED POLYPS.: Brand: ETRAP

## (undated) DEVICE — Device: Brand: DEFENDO AIR/WATER/SUCTION AND BIOPSY VALVE

## (undated) DEVICE — LINER SURG CANSTR SXN S/RIGD 1500CC

## (undated) DEVICE — SUT SILK 3/0 SH CR8 18IN C013D

## (undated) DEVICE — INJ SUB/MUCUS ELEVIEW FOR/GI/ENDO/PROC AMPL/10ML

## (undated) DEVICE — CONN JET HYDRA H20 AUXILIARY DISP

## (undated) DEVICE — CONNECTOR TBNG OD5-7MM O2 END DISP

## (undated) DEVICE — FORCEPS BX L240CM JAW DIA2.8MM L CAP W/ NDL MIC MESH TOOTH

## (undated) DEVICE — CANNULA NSL ORAL AD FOR CAPNOFLEX CO2 O2 AIRLFE

## (undated) DEVICE — CONTAINER PREFIL FRMLN 40ML --

## (undated) DEVICE — BLOCK BITE AD 60FR W/ VELC STRP ADDRESSES MOST PT AND

## (undated) DEVICE — SYR 5ML 1/5 GRAD LL NSAF LF --

## (undated) DEVICE — SYR 3ML LL TIP 1/10ML GRAD --

## (undated) DEVICE — ELECTRD BLD EDGE/STD 1P 2.4X6.35MM STRL

## (undated) DEVICE — STERILE POLYISOPRENE POWDER-FREE SURGICAL GLOVES WITH EMOLLIENT COATING: Brand: PROTEXIS

## (undated) DEVICE — DEV TISS REMOV MYOSURE REACH

## (undated) DEVICE — GLV SURG BIOGEL LTX PF 6 1/2

## (undated) DEVICE — PENCL E/S SMOKEEVAC W/TELESCP CANN

## (undated) DEVICE — DRAPE,UNDERBUTTOCKS,PCH,STERILE: Brand: MEDLINE

## (undated) DEVICE — TOTAL TRAY, 16FR 10ML SIL FOLEY, URN: Brand: MEDLINE

## (undated) DEVICE — LITHOTOMY-SLINGS: Brand: MEDLINE INDUSTRIES, INC.

## (undated) DEVICE — 1000ML,PRESSURE INFUSER W/STOPCOCK: Brand: MEDLINE

## (undated) DEVICE — SNARE POLYP SM W13MMXL240CM SHTH DIA2.4MM OVL FLX DISP

## (undated) DEVICE — KENDALL RADIOLUCENT FOAM MONITORING ELECTRODE RECTANGULAR SHAPE: Brand: KENDALL

## (undated) DEVICE — MAJOR-LF: Brand: MEDLINE INDUSTRIES, INC.

## (undated) DEVICE — ELECTRD BLD EDGE COAT 3IN

## (undated) DEVICE — REM POLYHESIVE ADULT PATIENT RETURN ELECTRODE: Brand: VALLEYLAB

## (undated) DEVICE — SOL IRRG H2O PL/BG 1000ML STRL

## (undated) DEVICE — SLV SCD KN/LEN ADJ EXPRSS BLENDED MD 1P/U

## (undated) DEVICE — KT DRP SPY/PHI W/ICG 25MG STRL

## (undated) DEVICE — CATH URETH INTRMIT ALLPURP LTX 16F RED

## (undated) DEVICE — Device: Brand: SINGLE USE INJECTOR NM600/610

## (undated) DEVICE — SYR 50ML SLIP TIP NSAF LF STRL --

## (undated) DEVICE — Device

## (undated) DEVICE — PROXIMATE RH ROTATING HEAD SKIN STAPLERS (35 WIDE) CONTAINS 35 STAINLESS STEEL STAPLES: Brand: PROXIMATE

## (undated) DEVICE — ANTIBACTERIAL VIOLET BRAIDED (POLYGLACTIN 910), SYNTHETIC ABSORBABLE SUTURE: Brand: COATED VICRYL

## (undated) DEVICE — NDL PRT INJ NSAF BLNT 18GX1.5 --

## (undated) DEVICE — DRSNG WND BORDR/ADHS NONADHR/GZ LF 4X14IN STRL

## (undated) DEVICE — SUT SILK 2/0 TIES 18IN A185H

## (undated) DEVICE — THE STERILE LIGHT HANDLE COVER IS USED WITH STERIS SURGICAL LIGHTING AND VISUALIZATION SYSTEMS.

## (undated) DEVICE — GOWN,REINFORCE,POLY,SIRUS,BREATH SLV,XLG: Brand: MEDLINE

## (undated) DEVICE — INTENDED FOR TISSUE SEPARATION, AND OTHER PROCEDURES THAT REQUIRE A SHARP SURGICAL BLADE TO PUNCTURE OR CUT.: Brand: BARD-PARKER ® CARBON RIB-BACK BLADES

## (undated) DEVICE — SNAR E/S POLYP SNAREMASTER OVL/10MM 2.8X2300MM YEL

## (undated) DEVICE — EGD OR ERCP KIT: Brand: MEDLINE INDUSTRIES, INC.

## (undated) DEVICE — NDL HYPO PRECISIONGLIDE REG 22G 1 1/2

## (undated) DEVICE — DEV TISS REMOV MYOSURE/XL FOR FLUENT 32CM

## (undated) DEVICE — DEV OPN LIGASURE CRV 180D 36MM 13.5CM  1P/U

## (undated) DEVICE — SNAR POLYP CAPTIFLEX XS/OVL 11X2.4MM 240CM 1P/U

## (undated) DEVICE — SUT PDS 1 XLH LP 99IN Z881G

## (undated) DEVICE — VAGINAL PREP TRAY: Brand: MEDLINE INDUSTRIES, INC.

## (undated) DEVICE — DEFENDO AIR WATER SUCTION AND BIOPSY VALVE KIT FOR  OLYMPUS: Brand: DEFENDO AIR/WATER/SUCTION AND BIOPSY VALVE

## (undated) DEVICE — SEAL HYSTERSCOPE/OUTFLOW CHANNEL MYOSURE

## (undated) DEVICE — Device: Brand: BLACK EYE

## (undated) DEVICE — SINGLE-USE BIOPSY FORCEPS: Brand: RADIAL JAW 4

## (undated) DEVICE — BLCK/BITE BLOX WO/DENTL/RIM W/STRAP 54F